# Patient Record
Sex: FEMALE | Race: OTHER | NOT HISPANIC OR LATINO | URBAN - METROPOLITAN AREA
[De-identification: names, ages, dates, MRNs, and addresses within clinical notes are randomized per-mention and may not be internally consistent; named-entity substitution may affect disease eponyms.]

---

## 2022-01-01 ENCOUNTER — OUTPATIENT (OUTPATIENT)
Dept: OUTPATIENT SERVICES | Facility: HOSPITAL | Age: 57
LOS: 1 days | Discharge: HOME | End: 2022-01-01

## 2022-01-01 ENCOUNTER — RESULT REVIEW (OUTPATIENT)
Age: 57
End: 2022-01-01

## 2022-01-01 ENCOUNTER — INPATIENT (INPATIENT)
Facility: HOSPITAL | Age: 57
LOS: 51 days | Discharge: EXPIRED | End: 2022-10-05
Attending: STUDENT IN AN ORGANIZED HEALTH CARE EDUCATION/TRAINING PROGRAM | Admitting: STUDENT IN AN ORGANIZED HEALTH CARE EDUCATION/TRAINING PROGRAM

## 2022-01-01 ENCOUNTER — APPOINTMENT (OUTPATIENT)
Dept: HEMATOLOGY ONCOLOGY | Facility: CLINIC | Age: 57
End: 2022-01-01

## 2022-01-01 ENCOUNTER — TRANSCRIPTION ENCOUNTER (OUTPATIENT)
Age: 57
End: 2022-01-01

## 2022-01-01 ENCOUNTER — APPOINTMENT (OUTPATIENT)
Dept: SURGICAL ONCOLOGY | Facility: HOSPITAL | Age: 57
End: 2022-01-01

## 2022-01-01 VITALS
RESPIRATION RATE: 20 BRPM | DIASTOLIC BLOOD PRESSURE: 50 MMHG | OXYGEN SATURATION: 94 % | TEMPERATURE: 98 F | HEART RATE: 84 BPM | SYSTOLIC BLOOD PRESSURE: 85 MMHG

## 2022-01-01 VITALS
TEMPERATURE: 98 F | OXYGEN SATURATION: 98 % | DIASTOLIC BLOOD PRESSURE: 82 MMHG | RESPIRATION RATE: 18 BRPM | HEART RATE: 97 BPM | WEIGHT: 154.32 LBS | SYSTOLIC BLOOD PRESSURE: 115 MMHG

## 2022-01-01 DIAGNOSIS — Z29.9 ENCOUNTER FOR PROPHYLACTIC MEASURES, UNSPECIFIED: ICD-10-CM

## 2022-01-01 DIAGNOSIS — R10.9 UNSPECIFIED ABDOMINAL PAIN: ICD-10-CM

## 2022-01-01 DIAGNOSIS — Z01.818 ENCOUNTER FOR OTHER PREPROCEDURAL EXAMINATION: ICD-10-CM

## 2022-01-01 DIAGNOSIS — A41.9 SEPSIS, UNSPECIFIED ORGANISM: ICD-10-CM

## 2022-01-01 DIAGNOSIS — C16.9 MALIGNANT NEOPLASM OF STOMACH, UNSPECIFIED: ICD-10-CM

## 2022-01-01 DIAGNOSIS — R11.2 NAUSEA WITH VOMITING, UNSPECIFIED: ICD-10-CM

## 2022-01-01 DIAGNOSIS — D50.9 IRON DEFICIENCY ANEMIA, UNSPECIFIED: ICD-10-CM

## 2022-01-01 DIAGNOSIS — E43 UNSPECIFIED SEVERE PROTEIN-CALORIE MALNUTRITION: ICD-10-CM

## 2022-01-01 DIAGNOSIS — Z51.5 ENCOUNTER FOR PALLIATIVE CARE: ICD-10-CM

## 2022-01-01 DIAGNOSIS — I26.99 OTHER PULMONARY EMBOLISM WITHOUT ACUTE COR PULMONALE: ICD-10-CM

## 2022-01-01 DIAGNOSIS — G89.3 NEOPLASM RELATED PAIN (ACUTE) (CHRONIC): ICD-10-CM

## 2022-01-01 DIAGNOSIS — N17.9 ACUTE KIDNEY FAILURE, UNSPECIFIED: ICD-10-CM

## 2022-01-01 DIAGNOSIS — R60.0 LOCALIZED EDEMA: ICD-10-CM

## 2022-01-01 DIAGNOSIS — Z71.89 OTHER SPECIFIED COUNSELING: ICD-10-CM

## 2022-01-01 DIAGNOSIS — N20.0 CALCULUS OF KIDNEY: ICD-10-CM

## 2022-01-01 DIAGNOSIS — K92.0 HEMATEMESIS: ICD-10-CM

## 2022-01-01 DIAGNOSIS — F41.9 ANXIETY DISORDER, UNSPECIFIED: ICD-10-CM

## 2022-01-01 DIAGNOSIS — R74.01 ELEVATION OF LEVELS OF LIVER TRANSAMINASE LEVELS: ICD-10-CM

## 2022-01-01 DIAGNOSIS — N13.30 UNSPECIFIED HYDRONEPHROSIS: ICD-10-CM

## 2022-01-01 LAB
A1C WITH ESTIMATED AVERAGE GLUCOSE RESULT: 5.8 % — HIGH (ref 4–5.6)
ACE SERPL-CCNC: 23 U/L — SIGNIFICANT CHANGE UP (ref 14–82)
AFP-TM SERPL-MCNC: <1.8 NG/ML — SIGNIFICANT CHANGE UP
ALBUMIN SERPL ELPH-MCNC: 2.5 G/DL — LOW (ref 3.3–5)
ALBUMIN SERPL ELPH-MCNC: 2.5 G/DL — LOW (ref 3.3–5)
ALBUMIN SERPL ELPH-MCNC: 2.6 G/DL — LOW (ref 3.3–5)
ALBUMIN SERPL ELPH-MCNC: 2.7 G/DL — LOW (ref 3.3–5)
ALBUMIN SERPL ELPH-MCNC: 2.8 G/DL — LOW (ref 3.3–5)
ALBUMIN SERPL ELPH-MCNC: 2.9 G/DL — LOW (ref 3.3–5)
ALBUMIN SERPL ELPH-MCNC: 3 G/DL — LOW (ref 3.3–5)
ALBUMIN SERPL ELPH-MCNC: 3.1 G/DL — LOW (ref 3.3–5)
ALBUMIN SERPL ELPH-MCNC: 3.2 G/DL — LOW (ref 3.3–5)
ALBUMIN SERPL ELPH-MCNC: 4.3 G/DL — SIGNIFICANT CHANGE UP (ref 3.3–5)
ALP SERPL-CCNC: 1016 U/L — HIGH (ref 40–120)
ALP SERPL-CCNC: 109 U/L — SIGNIFICANT CHANGE UP (ref 40–120)
ALP SERPL-CCNC: 1111 U/L — HIGH (ref 40–120)
ALP SERPL-CCNC: 1133 U/L — HIGH (ref 40–120)
ALP SERPL-CCNC: 116 U/L — SIGNIFICANT CHANGE UP (ref 40–120)
ALP SERPL-CCNC: 125 U/L — HIGH (ref 40–120)
ALP SERPL-CCNC: 1261 U/L — HIGH (ref 40–120)
ALP SERPL-CCNC: 288 U/L — HIGH (ref 40–120)
ALP SERPL-CCNC: 312 U/L — HIGH (ref 40–120)
ALP SERPL-CCNC: 328 U/L — HIGH (ref 40–120)
ALP SERPL-CCNC: 355 U/L — HIGH (ref 40–120)
ALP SERPL-CCNC: 436 U/L — HIGH (ref 40–120)
ALP SERPL-CCNC: 440 U/L — HIGH (ref 40–120)
ALP SERPL-CCNC: 480 U/L — HIGH (ref 40–120)
ALP SERPL-CCNC: 534 U/L — HIGH (ref 40–120)
ALP SERPL-CCNC: 545 U/L — HIGH (ref 40–120)
ALP SERPL-CCNC: 603 U/L — HIGH (ref 40–120)
ALP SERPL-CCNC: 62 U/L — SIGNIFICANT CHANGE UP (ref 40–120)
ALP SERPL-CCNC: 648 U/L — HIGH (ref 40–120)
ALP SERPL-CCNC: 693 U/L — HIGH (ref 40–120)
ALP SERPL-CCNC: 70 U/L — SIGNIFICANT CHANGE UP (ref 40–120)
ALP SERPL-CCNC: 74 U/L — SIGNIFICANT CHANGE UP (ref 40–120)
ALP SERPL-CCNC: 75 U/L — SIGNIFICANT CHANGE UP (ref 40–120)
ALP SERPL-CCNC: 796 U/L — HIGH (ref 40–120)
ALP SERPL-CCNC: 81 U/L — SIGNIFICANT CHANGE UP (ref 40–120)
ALP SERPL-CCNC: 83 U/L — SIGNIFICANT CHANGE UP (ref 40–120)
ALP SERPL-CCNC: 85 U/L — SIGNIFICANT CHANGE UP (ref 40–120)
ALP SERPL-CCNC: 87 U/L — SIGNIFICANT CHANGE UP (ref 40–120)
ALP SERPL-CCNC: 877 U/L — HIGH (ref 40–120)
ALP SERPL-CCNC: 896 U/L — HIGH (ref 40–120)
ALP SERPL-CCNC: 91 U/L — SIGNIFICANT CHANGE UP (ref 40–120)
ALP SERPL-CCNC: 950 U/L — HIGH (ref 40–120)
ALT FLD-CCNC: 10 U/L — SIGNIFICANT CHANGE UP (ref 4–33)
ALT FLD-CCNC: 110 U/L — HIGH (ref 4–33)
ALT FLD-CCNC: 117 U/L — HIGH (ref 4–33)
ALT FLD-CCNC: 13 U/L — SIGNIFICANT CHANGE UP (ref 4–33)
ALT FLD-CCNC: 139 U/L — HIGH (ref 4–33)
ALT FLD-CCNC: 14 U/L — SIGNIFICANT CHANGE UP (ref 4–33)
ALT FLD-CCNC: 147 U/L — HIGH (ref 4–33)
ALT FLD-CCNC: 154 U/L — HIGH (ref 4–33)
ALT FLD-CCNC: 157 U/L — HIGH (ref 4–33)
ALT FLD-CCNC: 160 U/L — HIGH (ref 4–33)
ALT FLD-CCNC: 168 U/L — HIGH (ref 4–33)
ALT FLD-CCNC: 18 U/L — SIGNIFICANT CHANGE UP (ref 4–33)
ALT FLD-CCNC: 18 U/L — SIGNIFICANT CHANGE UP (ref 4–33)
ALT FLD-CCNC: 183 U/L — HIGH (ref 4–33)
ALT FLD-CCNC: 190 U/L — HIGH (ref 4–33)
ALT FLD-CCNC: 194 U/L — HIGH (ref 4–33)
ALT FLD-CCNC: 201 U/L — HIGH (ref 4–33)
ALT FLD-CCNC: 202 U/L — HIGH (ref 4–33)
ALT FLD-CCNC: 208 U/L — HIGH (ref 4–33)
ALT FLD-CCNC: 21 U/L — SIGNIFICANT CHANGE UP (ref 4–33)
ALT FLD-CCNC: 212 U/L — HIGH (ref 4–33)
ALT FLD-CCNC: 22 U/L — SIGNIFICANT CHANGE UP (ref 4–33)
ALT FLD-CCNC: 22 U/L — SIGNIFICANT CHANGE UP (ref 4–33)
ALT FLD-CCNC: 221 U/L — HIGH (ref 4–33)
ALT FLD-CCNC: 226 U/L — HIGH (ref 4–33)
ALT FLD-CCNC: 242 U/L — HIGH (ref 4–33)
ALT FLD-CCNC: 242 U/L — HIGH (ref 4–33)
ALT FLD-CCNC: 243 U/L — HIGH (ref 4–33)
ALT FLD-CCNC: 26 U/L — SIGNIFICANT CHANGE UP (ref 4–33)
ALT FLD-CCNC: 8 U/L — SIGNIFICANT CHANGE UP (ref 4–33)
ALT FLD-CCNC: 9 U/L — SIGNIFICANT CHANGE UP (ref 4–33)
ALT FLD-CCNC: 9 U/L — SIGNIFICANT CHANGE UP (ref 4–33)
ANA TITR SER: NEGATIVE — SIGNIFICANT CHANGE UP
ANION GAP SERPL CALC-SCNC: 10 MMOL/L — SIGNIFICANT CHANGE UP (ref 7–14)
ANION GAP SERPL CALC-SCNC: 11 MMOL/L — SIGNIFICANT CHANGE UP (ref 7–14)
ANION GAP SERPL CALC-SCNC: 12 MMOL/L — SIGNIFICANT CHANGE UP (ref 7–14)
ANION GAP SERPL CALC-SCNC: 13 MMOL/L — SIGNIFICANT CHANGE UP (ref 7–14)
ANION GAP SERPL CALC-SCNC: 14 MMOL/L — SIGNIFICANT CHANGE UP (ref 7–14)
ANION GAP SERPL CALC-SCNC: 15 MMOL/L — HIGH (ref 7–14)
ANION GAP SERPL CALC-SCNC: 16 MMOL/L — HIGH (ref 7–14)
ANION GAP SERPL CALC-SCNC: 17 MMOL/L — HIGH (ref 7–14)
ANION GAP SERPL CALC-SCNC: 18 MMOL/L — HIGH (ref 7–14)
ANION GAP SERPL CALC-SCNC: 9 MMOL/L — SIGNIFICANT CHANGE UP (ref 7–14)
ANION GAP SERPL CALC-SCNC: 9 MMOL/L — SIGNIFICANT CHANGE UP (ref 7–14)
ANISOCYTOSIS BLD QL: SLIGHT — SIGNIFICANT CHANGE UP
APPEARANCE UR: ABNORMAL
APPEARANCE UR: CLEAR — SIGNIFICANT CHANGE UP
APPEARANCE UR: CLEAR — SIGNIFICANT CHANGE UP
APTT BLD: 25 SEC — LOW (ref 27–36.3)
APTT BLD: 25.4 SEC — LOW (ref 27–36.3)
APTT BLD: 26.7 SEC — LOW (ref 27–36.3)
APTT BLD: 27.4 SEC — SIGNIFICANT CHANGE UP (ref 27–36.3)
APTT BLD: 30.4 SEC — SIGNIFICANT CHANGE UP (ref 27–36.3)
APTT BLD: 30.7 SEC — SIGNIFICANT CHANGE UP (ref 27–36.3)
AST SERPL-CCNC: 115 U/L — HIGH (ref 4–32)
AST SERPL-CCNC: 161 U/L — HIGH (ref 4–32)
AST SERPL-CCNC: 168 U/L — HIGH (ref 4–32)
AST SERPL-CCNC: 173 U/L — HIGH (ref 4–32)
AST SERPL-CCNC: 178 U/L — HIGH (ref 4–32)
AST SERPL-CCNC: 18 U/L — SIGNIFICANT CHANGE UP (ref 4–32)
AST SERPL-CCNC: 180 U/L — HIGH (ref 4–32)
AST SERPL-CCNC: 181 U/L — HIGH (ref 4–32)
AST SERPL-CCNC: 19 U/L — SIGNIFICANT CHANGE UP (ref 4–32)
AST SERPL-CCNC: 19 U/L — SIGNIFICANT CHANGE UP (ref 4–32)
AST SERPL-CCNC: 195 U/L — HIGH (ref 4–32)
AST SERPL-CCNC: 198 U/L — HIGH (ref 4–32)
AST SERPL-CCNC: 208 U/L — HIGH (ref 4–32)
AST SERPL-CCNC: 21 U/L — SIGNIFICANT CHANGE UP (ref 4–32)
AST SERPL-CCNC: 214 U/L — HIGH (ref 4–32)
AST SERPL-CCNC: 23 U/L — SIGNIFICANT CHANGE UP (ref 4–32)
AST SERPL-CCNC: 233 U/L — HIGH (ref 4–32)
AST SERPL-CCNC: 234 U/L — HIGH (ref 4–32)
AST SERPL-CCNC: 242 U/L — HIGH (ref 4–32)
AST SERPL-CCNC: 245 U/L — HIGH (ref 4–32)
AST SERPL-CCNC: 25 U/L — SIGNIFICANT CHANGE UP (ref 4–32)
AST SERPL-CCNC: 26 U/L — SIGNIFICANT CHANGE UP (ref 4–32)
AST SERPL-CCNC: 273 U/L — HIGH (ref 4–32)
AST SERPL-CCNC: 283 U/L — HIGH (ref 4–32)
AST SERPL-CCNC: 29 U/L — SIGNIFICANT CHANGE UP (ref 4–32)
AST SERPL-CCNC: 29 U/L — SIGNIFICANT CHANGE UP (ref 4–32)
AST SERPL-CCNC: 293 U/L — HIGH (ref 4–32)
AST SERPL-CCNC: 294 U/L — HIGH (ref 4–32)
AST SERPL-CCNC: 30 U/L — SIGNIFICANT CHANGE UP (ref 4–32)
AST SERPL-CCNC: 33 U/L — HIGH (ref 4–32)
AST SERPL-CCNC: 36 U/L — HIGH (ref 4–32)
AST SERPL-CCNC: 38 U/L — HIGH (ref 4–32)
AST SERPL-CCNC: 44 U/L — HIGH (ref 4–32)
AST SERPL-CCNC: 93 U/L — HIGH (ref 4–32)
B PERT DNA SPEC QL NAA+PROBE: SIGNIFICANT CHANGE UP
B PERT DNA SPEC QL NAA+PROBE: SIGNIFICANT CHANGE UP
B PERT+PARAPERT DNA PNL SPEC NAA+PROBE: SIGNIFICANT CHANGE UP
B PERT+PARAPERT DNA PNL SPEC NAA+PROBE: SIGNIFICANT CHANGE UP
B-OH-BUTYR SERPL-SCNC: 0.8 MMOL/L — HIGH (ref 0–0.4)
B-OH-BUTYR SERPL-SCNC: 1.1 MMOL/L — HIGH (ref 0–0.4)
BACTERIA # UR AUTO: ABNORMAL
BACTERIA # UR AUTO: NEGATIVE — SIGNIFICANT CHANGE UP
BASE EXCESS BLDV CALC-SCNC: -1.6 MMOL/L — SIGNIFICANT CHANGE UP (ref -2–3)
BASE EXCESS BLDV CALC-SCNC: 0.8 MMOL/L — SIGNIFICANT CHANGE UP (ref -2–3)
BASE EXCESS BLDV CALC-SCNC: 1.6 MMOL/L — SIGNIFICANT CHANGE UP (ref -2–3)
BASOPHILS # BLD AUTO: 0 K/UL — SIGNIFICANT CHANGE UP (ref 0–0.2)
BASOPHILS # BLD AUTO: 0.02 K/UL — SIGNIFICANT CHANGE UP (ref 0–0.2)
BASOPHILS # BLD AUTO: 0.03 K/UL — SIGNIFICANT CHANGE UP (ref 0–0.2)
BASOPHILS # BLD AUTO: 0.04 K/UL — SIGNIFICANT CHANGE UP (ref 0–0.2)
BASOPHILS # BLD AUTO: 0.04 K/UL — SIGNIFICANT CHANGE UP (ref 0–0.2)
BASOPHILS # BLD AUTO: 0.05 K/UL — SIGNIFICANT CHANGE UP (ref 0–0.2)
BASOPHILS # BLD AUTO: 0.05 K/UL — SIGNIFICANT CHANGE UP (ref 0–0.2)
BASOPHILS # BLD AUTO: 0.06 K/UL — SIGNIFICANT CHANGE UP (ref 0–0.2)
BASOPHILS # BLD AUTO: 0.17 K/UL — SIGNIFICANT CHANGE UP (ref 0–0.2)
BASOPHILS NFR BLD AUTO: 0 % — SIGNIFICANT CHANGE UP (ref 0–2)
BASOPHILS NFR BLD AUTO: 0.2 % — SIGNIFICANT CHANGE UP (ref 0–2)
BASOPHILS NFR BLD AUTO: 0.3 % — SIGNIFICANT CHANGE UP (ref 0–2)
BASOPHILS NFR BLD AUTO: 0.4 % — SIGNIFICANT CHANGE UP (ref 0–2)
BASOPHILS NFR BLD AUTO: 0.5 % — SIGNIFICANT CHANGE UP (ref 0–2)
BASOPHILS NFR BLD AUTO: 0.6 % — SIGNIFICANT CHANGE UP (ref 0–2)
BASOPHILS NFR BLD AUTO: 1.7 % — SIGNIFICANT CHANGE UP (ref 0–2)
BILIRUB SERPL-MCNC: 0.2 MG/DL — SIGNIFICANT CHANGE UP (ref 0.2–1.2)
BILIRUB SERPL-MCNC: 0.3 MG/DL — SIGNIFICANT CHANGE UP (ref 0.2–1.2)
BILIRUB SERPL-MCNC: 0.4 MG/DL — SIGNIFICANT CHANGE UP (ref 0.2–1.2)
BILIRUB SERPL-MCNC: 0.5 MG/DL — SIGNIFICANT CHANGE UP (ref 0.2–1.2)
BILIRUB SERPL-MCNC: 0.6 MG/DL — SIGNIFICANT CHANGE UP (ref 0.2–1.2)
BILIRUB SERPL-MCNC: 0.7 MG/DL — SIGNIFICANT CHANGE UP (ref 0.2–1.2)
BILIRUB SERPL-MCNC: 0.7 MG/DL — SIGNIFICANT CHANGE UP (ref 0.2–1.2)
BILIRUB SERPL-MCNC: 1 MG/DL — SIGNIFICANT CHANGE UP (ref 0.2–1.2)
BILIRUB SERPL-MCNC: 1.1 MG/DL — SIGNIFICANT CHANGE UP (ref 0.2–1.2)
BILIRUB SERPL-MCNC: 1.3 MG/DL — HIGH (ref 0.2–1.2)
BILIRUB SERPL-MCNC: 1.4 MG/DL — HIGH (ref 0.2–1.2)
BILIRUB SERPL-MCNC: 1.6 MG/DL — HIGH (ref 0.2–1.2)
BILIRUB SERPL-MCNC: 1.8 MG/DL — HIGH (ref 0.2–1.2)
BILIRUB SERPL-MCNC: 11.6 MG/DL — HIGH (ref 0.2–1.2)
BILIRUB SERPL-MCNC: 13.3 MG/DL — HIGH (ref 0.2–1.2)
BILIRUB SERPL-MCNC: 2.9 MG/DL — HIGH (ref 0.2–1.2)
BILIRUB SERPL-MCNC: 3.9 MG/DL — HIGH (ref 0.2–1.2)
BILIRUB SERPL-MCNC: 5.3 MG/DL — HIGH (ref 0.2–1.2)
BILIRUB SERPL-MCNC: 6.2 MG/DL — HIGH (ref 0.2–1.2)
BILIRUB SERPL-MCNC: 7.1 MG/DL — HIGH (ref 0.2–1.2)
BILIRUB SERPL-MCNC: 7.9 MG/DL — HIGH (ref 0.2–1.2)
BILIRUB SERPL-MCNC: 8.3 MG/DL — HIGH (ref 0.2–1.2)
BILIRUB SERPL-MCNC: 9.3 MG/DL — HIGH (ref 0.2–1.2)
BILIRUB SERPL-MCNC: <0.2 MG/DL — SIGNIFICANT CHANGE UP (ref 0.2–1.2)
BILIRUB UR-MCNC: ABNORMAL
BILIRUB UR-MCNC: NEGATIVE — SIGNIFICANT CHANGE UP
BILIRUB UR-MCNC: NEGATIVE — SIGNIFICANT CHANGE UP
BLD GP AB SCN SERPL QL: NEGATIVE — SIGNIFICANT CHANGE UP
BLOOD GAS VENOUS COMPREHENSIVE RESULT: SIGNIFICANT CHANGE UP
BORDETELLA PARAPERTUSSIS (RAPRVP): SIGNIFICANT CHANGE UP
BORDETELLA PARAPERTUSSIS (RAPRVP): SIGNIFICANT CHANGE UP
BUN SERPL-MCNC: 10 MG/DL — SIGNIFICANT CHANGE UP (ref 7–23)
BUN SERPL-MCNC: 11 MG/DL — SIGNIFICANT CHANGE UP (ref 7–23)
BUN SERPL-MCNC: 12 MG/DL — SIGNIFICANT CHANGE UP (ref 7–23)
BUN SERPL-MCNC: 13 MG/DL — SIGNIFICANT CHANGE UP (ref 7–23)
BUN SERPL-MCNC: 13 MG/DL — SIGNIFICANT CHANGE UP (ref 7–23)
BUN SERPL-MCNC: 14 MG/DL — SIGNIFICANT CHANGE UP (ref 7–23)
BUN SERPL-MCNC: 16 MG/DL — SIGNIFICANT CHANGE UP (ref 7–23)
BUN SERPL-MCNC: 16 MG/DL — SIGNIFICANT CHANGE UP (ref 7–23)
BUN SERPL-MCNC: 17 MG/DL — SIGNIFICANT CHANGE UP (ref 7–23)
BUN SERPL-MCNC: 20 MG/DL — SIGNIFICANT CHANGE UP (ref 7–23)
BUN SERPL-MCNC: 24 MG/DL — HIGH (ref 7–23)
BUN SERPL-MCNC: 26 MG/DL — HIGH (ref 7–23)
BUN SERPL-MCNC: 36 MG/DL — HIGH (ref 7–23)
BUN SERPL-MCNC: 37 MG/DL — HIGH (ref 7–23)
BUN SERPL-MCNC: 40 MG/DL — HIGH (ref 7–23)
BUN SERPL-MCNC: 42 MG/DL — HIGH (ref 7–23)
BUN SERPL-MCNC: 6 MG/DL — LOW (ref 7–23)
BUN SERPL-MCNC: 6 MG/DL — LOW (ref 7–23)
BUN SERPL-MCNC: 8 MG/DL — SIGNIFICANT CHANGE UP (ref 7–23)
BURR CELLS BLD QL SMEAR: PRESENT — SIGNIFICANT CHANGE UP
BURR CELLS BLD QL SMEAR: PRESENT — SIGNIFICANT CHANGE UP
C PNEUM DNA SPEC QL NAA+PROBE: SIGNIFICANT CHANGE UP
C PNEUM DNA SPEC QL NAA+PROBE: SIGNIFICANT CHANGE UP
CALCIUM SERPL-MCNC: 7.8 MG/DL — LOW (ref 8.4–10.5)
CALCIUM SERPL-MCNC: 7.8 MG/DL — LOW (ref 8.4–10.5)
CALCIUM SERPL-MCNC: 7.9 MG/DL — LOW (ref 8.4–10.5)
CALCIUM SERPL-MCNC: 8 MG/DL — LOW (ref 8.4–10.5)
CALCIUM SERPL-MCNC: 8.1 MG/DL — LOW (ref 8.4–10.5)
CALCIUM SERPL-MCNC: 8.2 MG/DL — LOW (ref 8.4–10.5)
CALCIUM SERPL-MCNC: 8.3 MG/DL — LOW (ref 8.4–10.5)
CALCIUM SERPL-MCNC: 8.4 MG/DL — SIGNIFICANT CHANGE UP (ref 8.4–10.5)
CALCIUM SERPL-MCNC: 8.5 MG/DL — SIGNIFICANT CHANGE UP (ref 8.4–10.5)
CALCIUM SERPL-MCNC: 8.6 MG/DL — SIGNIFICANT CHANGE UP (ref 8.4–10.5)
CALCIUM SERPL-MCNC: 8.8 MG/DL — SIGNIFICANT CHANGE UP (ref 8.4–10.5)
CALCIUM SERPL-MCNC: 9 MG/DL — SIGNIFICANT CHANGE UP (ref 8.4–10.5)
CALCIUM SERPL-MCNC: 9.8 MG/DL — SIGNIFICANT CHANGE UP (ref 8.4–10.5)
CANCER AG125 SERPL-ACNC: 47 U/ML — HIGH
CANCER AG19-9 SERPL-ACNC: 24 U/ML — SIGNIFICANT CHANGE UP
CEA SERPL-MCNC: 1544 NG/ML — HIGH (ref 1–3.8)
CHLORIDE BLDV-SCNC: 103 MMOL/L — SIGNIFICANT CHANGE UP (ref 96–108)
CHLORIDE BLDV-SCNC: 105 MMOL/L — SIGNIFICANT CHANGE UP (ref 96–108)
CHLORIDE BLDV-SCNC: 105 MMOL/L — SIGNIFICANT CHANGE UP (ref 96–108)
CHLORIDE SERPL-SCNC: 100 MMOL/L — SIGNIFICANT CHANGE UP (ref 98–107)
CHLORIDE SERPL-SCNC: 101 MMOL/L — SIGNIFICANT CHANGE UP (ref 98–107)
CHLORIDE SERPL-SCNC: 102 MMOL/L — SIGNIFICANT CHANGE UP (ref 98–107)
CHLORIDE SERPL-SCNC: 103 MMOL/L — SIGNIFICANT CHANGE UP (ref 98–107)
CHLORIDE SERPL-SCNC: 104 MMOL/L — SIGNIFICANT CHANGE UP (ref 98–107)
CHLORIDE SERPL-SCNC: 105 MMOL/L — SIGNIFICANT CHANGE UP (ref 98–107)
CHLORIDE SERPL-SCNC: 106 MMOL/L — SIGNIFICANT CHANGE UP (ref 98–107)
CHLORIDE SERPL-SCNC: 106 MMOL/L — SIGNIFICANT CHANGE UP (ref 98–107)
CHLORIDE SERPL-SCNC: 107 MMOL/L — SIGNIFICANT CHANGE UP (ref 98–107)
CHLORIDE SERPL-SCNC: 108 MMOL/L — HIGH (ref 98–107)
CHLORIDE SERPL-SCNC: 109 MMOL/L — HIGH (ref 98–107)
CHLORIDE SERPL-SCNC: 110 MMOL/L — HIGH (ref 98–107)
CHLORIDE SERPL-SCNC: 111 MMOL/L — HIGH (ref 98–107)
CHLORIDE SERPL-SCNC: 114 MMOL/L — HIGH (ref 98–107)
CHLORIDE SERPL-SCNC: 116 MMOL/L — HIGH (ref 98–107)
CHLORIDE SERPL-SCNC: 117 MMOL/L — HIGH (ref 98–107)
CHLORIDE SERPL-SCNC: 98 MMOL/L — SIGNIFICANT CHANGE UP (ref 98–107)
CHLORIDE SERPL-SCNC: 99 MMOL/L — SIGNIFICANT CHANGE UP (ref 98–107)
CMV DNA CSF QL NAA+PROBE: SIGNIFICANT CHANGE UP
CMV DNA SPEC NAA+PROBE-LOG#: SIGNIFICANT CHANGE UP LOG10IU/ML
CO2 BLDV-SCNC: 23.9 MMOL/L — SIGNIFICANT CHANGE UP (ref 22–26)
CO2 BLDV-SCNC: 26.4 MMOL/L — HIGH (ref 22–26)
CO2 BLDV-SCNC: 27 MMOL/L — HIGH (ref 22–26)
CO2 SERPL-SCNC: 15 MMOL/L — LOW (ref 22–31)
CO2 SERPL-SCNC: 17 MMOL/L — LOW (ref 22–31)
CO2 SERPL-SCNC: 17 MMOL/L — LOW (ref 22–31)
CO2 SERPL-SCNC: 18 MMOL/L — LOW (ref 22–31)
CO2 SERPL-SCNC: 18 MMOL/L — LOW (ref 22–31)
CO2 SERPL-SCNC: 19 MMOL/L — LOW (ref 22–31)
CO2 SERPL-SCNC: 20 MMOL/L — LOW (ref 22–31)
CO2 SERPL-SCNC: 21 MMOL/L — LOW (ref 22–31)
CO2 SERPL-SCNC: 22 MMOL/L — SIGNIFICANT CHANGE UP (ref 22–31)
CO2 SERPL-SCNC: 23 MMOL/L — SIGNIFICANT CHANGE UP (ref 22–31)
CO2 SERPL-SCNC: 24 MMOL/L — SIGNIFICANT CHANGE UP (ref 22–31)
CO2 SERPL-SCNC: 25 MMOL/L — SIGNIFICANT CHANGE UP (ref 22–31)
CO2 SERPL-SCNC: 26 MMOL/L — SIGNIFICANT CHANGE UP (ref 22–31)
COLOR SPEC: ABNORMAL
COLOR SPEC: YELLOW — SIGNIFICANT CHANGE UP
COLOR SPEC: YELLOW — SIGNIFICANT CHANGE UP
CREAT SERPL-MCNC: 0.43 MG/DL — LOW (ref 0.5–1.3)
CREAT SERPL-MCNC: 0.45 MG/DL — LOW (ref 0.5–1.3)
CREAT SERPL-MCNC: 0.46 MG/DL — LOW (ref 0.5–1.3)
CREAT SERPL-MCNC: 0.47 MG/DL — LOW (ref 0.5–1.3)
CREAT SERPL-MCNC: 0.48 MG/DL — LOW (ref 0.5–1.3)
CREAT SERPL-MCNC: 0.52 MG/DL — SIGNIFICANT CHANGE UP (ref 0.5–1.3)
CREAT SERPL-MCNC: 0.53 MG/DL — SIGNIFICANT CHANGE UP (ref 0.5–1.3)
CREAT SERPL-MCNC: 0.53 MG/DL — SIGNIFICANT CHANGE UP (ref 0.5–1.3)
CREAT SERPL-MCNC: 0.55 MG/DL — SIGNIFICANT CHANGE UP (ref 0.5–1.3)
CREAT SERPL-MCNC: 0.58 MG/DL — SIGNIFICANT CHANGE UP (ref 0.5–1.3)
CREAT SERPL-MCNC: 0.59 MG/DL — SIGNIFICANT CHANGE UP (ref 0.5–1.3)
CREAT SERPL-MCNC: 0.62 MG/DL — SIGNIFICANT CHANGE UP (ref 0.5–1.3)
CREAT SERPL-MCNC: 0.63 MG/DL — SIGNIFICANT CHANGE UP (ref 0.5–1.3)
CREAT SERPL-MCNC: 0.64 MG/DL — SIGNIFICANT CHANGE UP (ref 0.5–1.3)
CREAT SERPL-MCNC: 0.65 MG/DL — SIGNIFICANT CHANGE UP (ref 0.5–1.3)
CREAT SERPL-MCNC: 0.65 MG/DL — SIGNIFICANT CHANGE UP (ref 0.5–1.3)
CREAT SERPL-MCNC: 0.66 MG/DL — SIGNIFICANT CHANGE UP (ref 0.5–1.3)
CREAT SERPL-MCNC: 0.67 MG/DL — SIGNIFICANT CHANGE UP (ref 0.5–1.3)
CREAT SERPL-MCNC: 0.68 MG/DL — SIGNIFICANT CHANGE UP (ref 0.5–1.3)
CREAT SERPL-MCNC: 0.69 MG/DL — SIGNIFICANT CHANGE UP (ref 0.5–1.3)
CREAT SERPL-MCNC: 0.71 MG/DL — SIGNIFICANT CHANGE UP (ref 0.5–1.3)
CREAT SERPL-MCNC: 0.72 MG/DL — SIGNIFICANT CHANGE UP (ref 0.5–1.3)
CREAT SERPL-MCNC: 0.78 MG/DL — SIGNIFICANT CHANGE UP (ref 0.5–1.3)
CREAT SERPL-MCNC: 0.85 MG/DL — SIGNIFICANT CHANGE UP (ref 0.5–1.3)
CREAT SERPL-MCNC: 0.93 MG/DL — SIGNIFICANT CHANGE UP (ref 0.5–1.3)
CREAT SERPL-MCNC: 0.96 MG/DL — SIGNIFICANT CHANGE UP (ref 0.5–1.3)
CREAT SERPL-MCNC: 1.03 MG/DL — SIGNIFICANT CHANGE UP (ref 0.5–1.3)
CREAT SERPL-MCNC: 1.42 MG/DL — HIGH (ref 0.5–1.3)
CREAT SERPL-MCNC: 1.53 MG/DL — HIGH (ref 0.5–1.3)
CREAT SERPL-MCNC: 2.14 MG/DL — HIGH (ref 0.5–1.3)
CREAT SERPL-MCNC: 3.13 MG/DL — HIGH (ref 0.5–1.3)
CULTURE RESULTS: SIGNIFICANT CHANGE UP
DIFF PNL FLD: ABNORMAL
DIFF PNL FLD: NEGATIVE — SIGNIFICANT CHANGE UP
DIFF PNL FLD: NEGATIVE — SIGNIFICANT CHANGE UP
EBV EA AB SER IA-ACNC: 122 U/ML — HIGH
EBV EA AB TITR SER IF: POSITIVE
EBV EA IGG SER-ACNC: POSITIVE
EBV NA IGG SER IA-ACNC: 27 U/ML — HIGH
EBV PATRN SPEC IB-IMP: SIGNIFICANT CHANGE UP
EBV VCA IGG AVIDITY SER QL IA: POSITIVE
EBV VCA IGM SER IA-ACNC: <10 U/ML — SIGNIFICANT CHANGE UP
EBV VCA IGM SER IA-ACNC: >750 U/ML — HIGH
EBV VCA IGM TITR FLD: NEGATIVE — SIGNIFICANT CHANGE UP
EGFR: 100 ML/MIN/1.73M2 — SIGNIFICANT CHANGE UP
EGFR: 102 ML/MIN/1.73M2 — SIGNIFICANT CHANGE UP
EGFR: 102 ML/MIN/1.73M2 — SIGNIFICANT CHANGE UP
EGFR: 103 ML/MIN/1.73M2 — SIGNIFICANT CHANGE UP
EGFR: 104 ML/MIN/1.73M2 — SIGNIFICANT CHANGE UP
EGFR: 106 ML/MIN/1.73M2 — SIGNIFICANT CHANGE UP
EGFR: 106 ML/MIN/1.73M2 — SIGNIFICANT CHANGE UP
EGFR: 108 ML/MIN/1.73M2 — SIGNIFICANT CHANGE UP
EGFR: 109 ML/MIN/1.73M2 — SIGNIFICANT CHANGE UP
EGFR: 111 ML/MIN/1.73M2 — SIGNIFICANT CHANGE UP
EGFR: 112 ML/MIN/1.73M2 — SIGNIFICANT CHANGE UP
EGFR: 112 ML/MIN/1.73M2 — SIGNIFICANT CHANGE UP
EGFR: 113 ML/MIN/1.73M2 — SIGNIFICANT CHANGE UP
EGFR: 114 ML/MIN/1.73M2 — SIGNIFICANT CHANGE UP
EGFR: 17 ML/MIN/1.73M2 — LOW
EGFR: 27 ML/MIN/1.73M2 — LOW
EGFR: 40 ML/MIN/1.73M2 — LOW
EGFR: 43 ML/MIN/1.73M2 — LOW
EGFR: 64 ML/MIN/1.73M2 — SIGNIFICANT CHANGE UP
EGFR: 69 ML/MIN/1.73M2 — SIGNIFICANT CHANGE UP
EGFR: 72 ML/MIN/1.73M2 — SIGNIFICANT CHANGE UP
EGFR: 80 ML/MIN/1.73M2 — SIGNIFICANT CHANGE UP
EGFR: 89 ML/MIN/1.73M2 — SIGNIFICANT CHANGE UP
EGFR: 98 ML/MIN/1.73M2 — SIGNIFICANT CHANGE UP
ELLIPTOCYTES BLD QL SMEAR: SLIGHT — SIGNIFICANT CHANGE UP
EOSINOPHIL # BLD AUTO: 0.05 K/UL — SIGNIFICANT CHANGE UP (ref 0–0.5)
EOSINOPHIL # BLD AUTO: 0.18 K/UL — SIGNIFICANT CHANGE UP (ref 0–0.5)
EOSINOPHIL # BLD AUTO: 0.18 K/UL — SIGNIFICANT CHANGE UP (ref 0–0.5)
EOSINOPHIL # BLD AUTO: 0.19 K/UL — SIGNIFICANT CHANGE UP (ref 0–0.5)
EOSINOPHIL # BLD AUTO: 0.26 K/UL — SIGNIFICANT CHANGE UP (ref 0–0.5)
EOSINOPHIL # BLD AUTO: 0.3 K/UL — SIGNIFICANT CHANGE UP (ref 0–0.5)
EOSINOPHIL # BLD AUTO: 0.35 K/UL — SIGNIFICANT CHANGE UP (ref 0–0.5)
EOSINOPHIL # BLD AUTO: 0.37 K/UL — SIGNIFICANT CHANGE UP (ref 0–0.5)
EOSINOPHIL # BLD AUTO: 0.41 K/UL — SIGNIFICANT CHANGE UP (ref 0–0.5)
EOSINOPHIL # BLD AUTO: 0.41 K/UL — SIGNIFICANT CHANGE UP (ref 0–0.5)
EOSINOPHIL # BLD AUTO: 0.42 K/UL — SIGNIFICANT CHANGE UP (ref 0–0.5)
EOSINOPHIL # BLD AUTO: 0.42 K/UL — SIGNIFICANT CHANGE UP (ref 0–0.5)
EOSINOPHIL # BLD AUTO: 0.51 K/UL — HIGH (ref 0–0.5)
EOSINOPHIL # BLD AUTO: 0.52 K/UL — HIGH (ref 0–0.5)
EOSINOPHIL # BLD AUTO: 0.8 K/UL — HIGH (ref 0–0.5)
EOSINOPHIL NFR BLD AUTO: 0.3 % — SIGNIFICANT CHANGE UP (ref 0–6)
EOSINOPHIL NFR BLD AUTO: 1.7 % — SIGNIFICANT CHANGE UP (ref 0–6)
EOSINOPHIL NFR BLD AUTO: 1.8 % — SIGNIFICANT CHANGE UP (ref 0–6)
EOSINOPHIL NFR BLD AUTO: 1.8 % — SIGNIFICANT CHANGE UP (ref 0–6)
EOSINOPHIL NFR BLD AUTO: 3.5 % — SIGNIFICANT CHANGE UP (ref 0–6)
EOSINOPHIL NFR BLD AUTO: 4.3 % — SIGNIFICANT CHANGE UP (ref 0–6)
EOSINOPHIL NFR BLD AUTO: 4.4 % — SIGNIFICANT CHANGE UP (ref 0–6)
EOSINOPHIL NFR BLD AUTO: 4.4 % — SIGNIFICANT CHANGE UP (ref 0–6)
EOSINOPHIL NFR BLD AUTO: 4.8 % — SIGNIFICANT CHANGE UP (ref 0–6)
EOSINOPHIL NFR BLD AUTO: 4.9 % — SIGNIFICANT CHANGE UP (ref 0–6)
EOSINOPHIL NFR BLD AUTO: 5.8 % — SIGNIFICANT CHANGE UP (ref 0–6)
EOSINOPHIL NFR BLD AUTO: 6.4 % — HIGH (ref 0–6)
EOSINOPHIL NFR BLD AUTO: 7.2 % — HIGH (ref 0–6)
EPI CELLS # UR: 0 /HPF — SIGNIFICANT CHANGE UP (ref 0–5)
EPI CELLS # UR: 1 /HPF — SIGNIFICANT CHANGE UP (ref 0–5)
ESTIMATED AVERAGE GLUCOSE: 120 — SIGNIFICANT CHANGE UP
FERRITIN SERPL-MCNC: 99 NG/ML — SIGNIFICANT CHANGE UP (ref 15–150)
FLUAV SUBTYP SPEC NAA+PROBE: SIGNIFICANT CHANGE UP
FLUAV SUBTYP SPEC NAA+PROBE: SIGNIFICANT CHANGE UP
FLUBV RNA SPEC QL NAA+PROBE: SIGNIFICANT CHANGE UP
FLUBV RNA SPEC QL NAA+PROBE: SIGNIFICANT CHANGE UP
GAMMA INTERFERON BACKGROUND BLD IA-ACNC: 0.04 IU/ML — SIGNIFICANT CHANGE UP
GAS PNL BLDV: 134 MMOL/L — LOW (ref 136–145)
GAS PNL BLDV: 136 MMOL/L — SIGNIFICANT CHANGE UP (ref 136–145)
GAS PNL BLDV: 136 MMOL/L — SIGNIFICANT CHANGE UP (ref 136–145)
GAS PNL BLDV: SIGNIFICANT CHANGE UP
GGT SERPL-CCNC: 531 U/L — HIGH (ref 5–36)
GIANT PLATELETS BLD QL SMEAR: PRESENT — SIGNIFICANT CHANGE UP
GIANT PLATELETS BLD QL SMEAR: PRESENT — SIGNIFICANT CHANGE UP
GLUCOSE BLDC GLUCOMTR-MCNC: 128 MG/DL — HIGH (ref 70–99)
GLUCOSE BLDV-MCNC: 110 MG/DL — HIGH (ref 70–99)
GLUCOSE BLDV-MCNC: 125 MG/DL — HIGH (ref 70–99)
GLUCOSE BLDV-MCNC: 134 MG/DL — HIGH (ref 70–99)
GLUCOSE SERPL-MCNC: 103 MG/DL — HIGH (ref 70–99)
GLUCOSE SERPL-MCNC: 104 MG/DL — HIGH (ref 70–99)
GLUCOSE SERPL-MCNC: 105 MG/DL — HIGH (ref 70–99)
GLUCOSE SERPL-MCNC: 105 MG/DL — HIGH (ref 70–99)
GLUCOSE SERPL-MCNC: 106 MG/DL — HIGH (ref 70–99)
GLUCOSE SERPL-MCNC: 107 MG/DL — HIGH (ref 70–99)
GLUCOSE SERPL-MCNC: 110 MG/DL — HIGH (ref 70–99)
GLUCOSE SERPL-MCNC: 112 MG/DL — HIGH (ref 70–99)
GLUCOSE SERPL-MCNC: 113 MG/DL — HIGH (ref 70–99)
GLUCOSE SERPL-MCNC: 114 MG/DL — HIGH (ref 70–99)
GLUCOSE SERPL-MCNC: 115 MG/DL — HIGH (ref 70–99)
GLUCOSE SERPL-MCNC: 117 MG/DL — HIGH (ref 70–99)
GLUCOSE SERPL-MCNC: 118 MG/DL — HIGH (ref 70–99)
GLUCOSE SERPL-MCNC: 121 MG/DL — HIGH (ref 70–99)
GLUCOSE SERPL-MCNC: 122 MG/DL — HIGH (ref 70–99)
GLUCOSE SERPL-MCNC: 122 MG/DL — HIGH (ref 70–99)
GLUCOSE SERPL-MCNC: 123 MG/DL — HIGH (ref 70–99)
GLUCOSE SERPL-MCNC: 125 MG/DL — HIGH (ref 70–99)
GLUCOSE SERPL-MCNC: 128 MG/DL — HIGH (ref 70–99)
GLUCOSE SERPL-MCNC: 128 MG/DL — HIGH (ref 70–99)
GLUCOSE SERPL-MCNC: 129 MG/DL — HIGH (ref 70–99)
GLUCOSE SERPL-MCNC: 130 MG/DL — HIGH (ref 70–99)
GLUCOSE SERPL-MCNC: 130 MG/DL — HIGH (ref 70–99)
GLUCOSE SERPL-MCNC: 132 MG/DL — HIGH (ref 70–99)
GLUCOSE SERPL-MCNC: 133 MG/DL — HIGH (ref 70–99)
GLUCOSE SERPL-MCNC: 135 MG/DL — HIGH (ref 70–99)
GLUCOSE SERPL-MCNC: 139 MG/DL — HIGH (ref 70–99)
GLUCOSE SERPL-MCNC: 141 MG/DL — HIGH (ref 70–99)
GLUCOSE SERPL-MCNC: 141 MG/DL — HIGH (ref 70–99)
GLUCOSE SERPL-MCNC: 143 MG/DL — HIGH (ref 70–99)
GLUCOSE SERPL-MCNC: 148 MG/DL — HIGH (ref 70–99)
GLUCOSE SERPL-MCNC: 158 MG/DL — HIGH (ref 70–99)
GLUCOSE SERPL-MCNC: 160 MG/DL — HIGH (ref 70–99)
GLUCOSE SERPL-MCNC: 76 MG/DL — SIGNIFICANT CHANGE UP (ref 70–99)
GLUCOSE SERPL-MCNC: 86 MG/DL — SIGNIFICANT CHANGE UP (ref 70–99)
GLUCOSE SERPL-MCNC: 89 MG/DL — SIGNIFICANT CHANGE UP (ref 70–99)
GLUCOSE SERPL-MCNC: 91 MG/DL — SIGNIFICANT CHANGE UP (ref 70–99)
GLUCOSE SERPL-MCNC: 93 MG/DL — SIGNIFICANT CHANGE UP (ref 70–99)
GLUCOSE SERPL-MCNC: 95 MG/DL — SIGNIFICANT CHANGE UP (ref 70–99)
GLUCOSE SERPL-MCNC: 96 MG/DL — SIGNIFICANT CHANGE UP (ref 70–99)
GLUCOSE SERPL-MCNC: 98 MG/DL — SIGNIFICANT CHANGE UP (ref 70–99)
GLUCOSE SERPL-MCNC: 99 MG/DL — SIGNIFICANT CHANGE UP (ref 70–99)
GLUCOSE UR QL: NEGATIVE — SIGNIFICANT CHANGE UP
HADV DNA SPEC QL NAA+PROBE: SIGNIFICANT CHANGE UP
HADV DNA SPEC QL NAA+PROBE: SIGNIFICANT CHANGE UP
HAV IGG SER QL IA: REACTIVE
HAV IGM SER-ACNC: SIGNIFICANT CHANGE UP
HAV IGM SER-ACNC: SIGNIFICANT CHANGE UP
HBV CORE IGM SER-ACNC: SIGNIFICANT CHANGE UP
HBV SURFACE AB SER-ACNC: <3 MIU/ML — LOW
HBV SURFACE AB SER-ACNC: SIGNIFICANT CHANGE UP
HBV SURFACE AG SER-ACNC: SIGNIFICANT CHANGE UP
HBV SURFACE AG SER-ACNC: SIGNIFICANT CHANGE UP
HCG SERPL-ACNC: 1285 MIU/ML — SIGNIFICANT CHANGE UP
HCO3 BLDV-SCNC: 23 MMOL/L — SIGNIFICANT CHANGE UP (ref 22–29)
HCO3 BLDV-SCNC: 25 MMOL/L — SIGNIFICANT CHANGE UP (ref 22–29)
HCO3 BLDV-SCNC: 26 MMOL/L — SIGNIFICANT CHANGE UP (ref 22–29)
HCOV 229E RNA SPEC QL NAA+PROBE: SIGNIFICANT CHANGE UP
HCOV 229E RNA SPEC QL NAA+PROBE: SIGNIFICANT CHANGE UP
HCOV HKU1 RNA SPEC QL NAA+PROBE: SIGNIFICANT CHANGE UP
HCOV HKU1 RNA SPEC QL NAA+PROBE: SIGNIFICANT CHANGE UP
HCOV NL63 RNA SPEC QL NAA+PROBE: SIGNIFICANT CHANGE UP
HCOV NL63 RNA SPEC QL NAA+PROBE: SIGNIFICANT CHANGE UP
HCOV OC43 RNA SPEC QL NAA+PROBE: SIGNIFICANT CHANGE UP
HCOV OC43 RNA SPEC QL NAA+PROBE: SIGNIFICANT CHANGE UP
HCT VFR BLD CALC: 29.2 % — LOW (ref 34.5–45)
HCT VFR BLD CALC: 29.9 % — LOW (ref 34.5–45)
HCT VFR BLD CALC: 29.9 % — LOW (ref 34.5–45)
HCT VFR BLD CALC: 30 % — LOW (ref 34.5–45)
HCT VFR BLD CALC: 30 % — LOW (ref 34.5–45)
HCT VFR BLD CALC: 30.5 % — LOW (ref 34.5–45)
HCT VFR BLD CALC: 30.7 % — LOW (ref 34.5–45)
HCT VFR BLD CALC: 30.8 % — LOW (ref 34.5–45)
HCT VFR BLD CALC: 31 % — LOW (ref 34.5–45)
HCT VFR BLD CALC: 31.2 % — LOW (ref 34.5–45)
HCT VFR BLD CALC: 31.2 % — LOW (ref 34.5–45)
HCT VFR BLD CALC: 31.7 % — LOW (ref 34.5–45)
HCT VFR BLD CALC: 31.8 % — LOW (ref 34.5–45)
HCT VFR BLD CALC: 31.9 % — LOW (ref 34.5–45)
HCT VFR BLD CALC: 32 % — LOW (ref 34.5–45)
HCT VFR BLD CALC: 32.2 % — LOW (ref 34.5–45)
HCT VFR BLD CALC: 32.2 % — LOW (ref 34.5–45)
HCT VFR BLD CALC: 32.3 % — LOW (ref 34.5–45)
HCT VFR BLD CALC: 32.7 % — LOW (ref 34.5–45)
HCT VFR BLD CALC: 32.7 % — LOW (ref 34.5–45)
HCT VFR BLD CALC: 32.8 % — LOW (ref 34.5–45)
HCT VFR BLD CALC: 32.8 % — LOW (ref 34.5–45)
HCT VFR BLD CALC: 32.9 % — LOW (ref 34.5–45)
HCT VFR BLD CALC: 33.3 % — LOW (ref 34.5–45)
HCT VFR BLD CALC: 33.5 % — LOW (ref 34.5–45)
HCT VFR BLD CALC: 33.5 % — LOW (ref 34.5–45)
HCT VFR BLD CALC: 33.6 % — LOW (ref 34.5–45)
HCT VFR BLD CALC: 33.8 % — LOW (ref 34.5–45)
HCT VFR BLD CALC: 33.8 % — LOW (ref 34.5–45)
HCT VFR BLD CALC: 34 % — LOW (ref 34.5–45)
HCT VFR BLD CALC: 34.1 % — LOW (ref 34.5–45)
HCT VFR BLD CALC: 34.3 % — LOW (ref 34.5–45)
HCT VFR BLD CALC: 34.8 % — SIGNIFICANT CHANGE UP (ref 34.5–45)
HCT VFR BLD CALC: 35.3 % — SIGNIFICANT CHANGE UP (ref 34.5–45)
HCT VFR BLD CALC: 35.4 % — SIGNIFICANT CHANGE UP (ref 34.5–45)
HCT VFR BLD CALC: 36.8 % — SIGNIFICANT CHANGE UP (ref 34.5–45)
HCT VFR BLDA CALC: 22 % — LOW (ref 34.5–46.5)
HCT VFR BLDA CALC: 28 % — LOW (ref 34.5–46.5)
HCT VFR BLDA CALC: 33 % — LOW (ref 34.5–46.5)
HCV AB S/CO SERPL IA: 0.13 S/CO — SIGNIFICANT CHANGE UP (ref 0–0.99)
HCV AB S/CO SERPL IA: 0.15 S/CO — SIGNIFICANT CHANGE UP (ref 0–0.99)
HCV AB SERPL-IMP: SIGNIFICANT CHANGE UP
HCV AB SERPL-IMP: SIGNIFICANT CHANGE UP
HEV IGM SER QL: SIGNIFICANT CHANGE UP
HGB BLD CALC-MCNC: 11 G/DL — LOW (ref 11.5–15.5)
HGB BLD CALC-MCNC: 7.3 G/DL — LOW (ref 11.5–15.5)
HGB BLD CALC-MCNC: 9.3 G/DL — LOW (ref 11.5–15.5)
HGB BLD-MCNC: 10 G/DL — LOW (ref 11.5–15.5)
HGB BLD-MCNC: 10.1 G/DL — LOW (ref 11.5–15.5)
HGB BLD-MCNC: 10.2 G/DL — LOW (ref 11.5–15.5)
HGB BLD-MCNC: 10.3 G/DL — LOW (ref 11.5–15.5)
HGB BLD-MCNC: 10.4 G/DL — LOW (ref 11.5–15.5)
HGB BLD-MCNC: 10.5 G/DL — LOW (ref 11.5–15.5)
HGB BLD-MCNC: 10.6 G/DL — LOW (ref 11.5–15.5)
HGB BLD-MCNC: 10.6 G/DL — LOW (ref 11.5–15.5)
HGB BLD-MCNC: 10.8 G/DL — LOW (ref 11.5–15.5)
HGB BLD-MCNC: 11 G/DL — LOW (ref 11.5–15.5)
HGB BLD-MCNC: 11.4 G/DL — LOW (ref 11.5–15.5)
HGB BLD-MCNC: 8.9 G/DL — LOW (ref 11.5–15.5)
HGB BLD-MCNC: 9 G/DL — LOW (ref 11.5–15.5)
HGB BLD-MCNC: 9.3 G/DL — LOW (ref 11.5–15.5)
HGB BLD-MCNC: 9.3 G/DL — LOW (ref 11.5–15.5)
HGB BLD-MCNC: 9.4 G/DL — LOW (ref 11.5–15.5)
HGB BLD-MCNC: 9.5 G/DL — LOW (ref 11.5–15.5)
HGB BLD-MCNC: 9.5 G/DL — LOW (ref 11.5–15.5)
HGB BLD-MCNC: 9.6 G/DL — LOW (ref 11.5–15.5)
HGB BLD-MCNC: 9.6 G/DL — LOW (ref 11.5–15.5)
HGB BLD-MCNC: 9.7 G/DL — LOW (ref 11.5–15.5)
HGB BLD-MCNC: 9.7 G/DL — LOW (ref 11.5–15.5)
HGB BLD-MCNC: 9.8 G/DL — LOW (ref 11.5–15.5)
HGB BLD-MCNC: 9.9 G/DL — LOW (ref 11.5–15.5)
HMPV RNA SPEC QL NAA+PROBE: SIGNIFICANT CHANGE UP
HMPV RNA SPEC QL NAA+PROBE: SIGNIFICANT CHANGE UP
HPIV1 RNA SPEC QL NAA+PROBE: SIGNIFICANT CHANGE UP
HPIV1 RNA SPEC QL NAA+PROBE: SIGNIFICANT CHANGE UP
HPIV2 RNA SPEC QL NAA+PROBE: SIGNIFICANT CHANGE UP
HPIV2 RNA SPEC QL NAA+PROBE: SIGNIFICANT CHANGE UP
HPIV3 RNA SPEC QL NAA+PROBE: SIGNIFICANT CHANGE UP
HPIV3 RNA SPEC QL NAA+PROBE: SIGNIFICANT CHANGE UP
HPIV4 RNA SPEC QL NAA+PROBE: SIGNIFICANT CHANGE UP
HPIV4 RNA SPEC QL NAA+PROBE: SIGNIFICANT CHANGE UP
HSV DNA1: SIGNIFICANT CHANGE UP
HSV DNA2: SIGNIFICANT CHANGE UP
HSV1 DNA BLD QL NAA+PROBE: SIGNIFICANT CHANGE UP
HSV2 DNA BLD QL NAA+PROBE: SIGNIFICANT CHANGE UP
HYALINE CASTS # UR AUTO: 0 /LPF — SIGNIFICANT CHANGE UP (ref 0–7)
HYALINE CASTS # UR AUTO: 0 /LPF — SIGNIFICANT CHANGE UP (ref 0–7)
IANC: 13.09 K/UL — HIGH (ref 1.8–7.4)
IANC: 3.79 K/UL — SIGNIFICANT CHANGE UP (ref 1.8–7.4)
IANC: 4.06 K/UL — SIGNIFICANT CHANGE UP (ref 1.8–7.4)
IANC: 4.51 K/UL — SIGNIFICANT CHANGE UP (ref 1.8–7.4)
IANC: 5.04 K/UL — SIGNIFICANT CHANGE UP (ref 1.8–7.4)
IANC: 5.14 K/UL — SIGNIFICANT CHANGE UP (ref 1.8–7.4)
IANC: 5.29 K/UL — SIGNIFICANT CHANGE UP (ref 1.8–7.4)
IANC: 5.86 K/UL — SIGNIFICANT CHANGE UP (ref 1.8–7.4)
IANC: 5.92 K/UL — SIGNIFICANT CHANGE UP (ref 1.8–7.4)
IANC: 6.03 K/UL — SIGNIFICANT CHANGE UP (ref 1.8–7.4)
IANC: 7.28 K/UL — SIGNIFICANT CHANGE UP (ref 1.8–7.4)
IANC: 7.78 K/UL — HIGH (ref 1.8–7.4)
IANC: 8.22 K/UL — HIGH (ref 1.8–7.4)
IANC: 8.73 K/UL — HIGH (ref 1.8–7.4)
IANC: 9.56 K/UL — HIGH (ref 1.8–7.4)
IF BLOCK AB SER-ACNC: 0.9 AU/ML — SIGNIFICANT CHANGE UP (ref 0–1.1)
IGA FLD-MCNC: 493 MG/DL — HIGH (ref 70–400)
IGG FLD-MCNC: 1616 MG/DL — HIGH (ref 700–1600)
IGG SERPL-MCNC: 1770 MG/DL — HIGH (ref 586–1602)
IGG1 SER-MCNC: 947 MG/DL — HIGH (ref 248–810)
IGG2 SER-MCNC: 467 MG/DL — SIGNIFICANT CHANGE UP (ref 130–555)
IGG3 SER-MCNC: 80 MG/DL — SIGNIFICANT CHANGE UP (ref 15–102)
IGG4 SER-MCNC: 63 MG/DL — SIGNIFICANT CHANGE UP (ref 2–96)
IGM SERPL-MCNC: 118 MG/DL — SIGNIFICANT CHANGE UP (ref 40–230)
IMM GRANULOCYTES NFR BLD AUTO: 0.3 % — SIGNIFICANT CHANGE UP (ref 0–1.5)
IMM GRANULOCYTES NFR BLD AUTO: 0.3 % — SIGNIFICANT CHANGE UP (ref 0–1.5)
IMM GRANULOCYTES NFR BLD AUTO: 0.5 % — SIGNIFICANT CHANGE UP (ref 0–1.5)
IMM GRANULOCYTES NFR BLD AUTO: 0.6 % — SIGNIFICANT CHANGE UP (ref 0–1.5)
IMM GRANULOCYTES NFR BLD AUTO: 0.8 % — SIGNIFICANT CHANGE UP (ref 0–1.5)
IMM GRANULOCYTES NFR BLD AUTO: 0.9 % — SIGNIFICANT CHANGE UP (ref 0–1.5)
IMM GRANULOCYTES NFR BLD AUTO: 1.1 % — SIGNIFICANT CHANGE UP (ref 0–1.5)
IMM GRANULOCYTES NFR BLD AUTO: 1.3 % — SIGNIFICANT CHANGE UP (ref 0–1.5)
IMM GRANULOCYTES NFR BLD AUTO: 1.4 % — SIGNIFICANT CHANGE UP (ref 0–1.5)
IMM GRANULOCYTES NFR BLD AUTO: 1.6 % — HIGH (ref 0–1.5)
IMM GRANULOCYTES NFR BLD AUTO: 1.7 % — HIGH (ref 0–0.9)
IMM GRANULOCYTES NFR BLD AUTO: 1.7 % — HIGH (ref 0–1.5)
IMM GRANULOCYTES NFR BLD AUTO: 2 % — HIGH (ref 0–0.9)
INR BLD: 1.03 RATIO — SIGNIFICANT CHANGE UP (ref 0.88–1.16)
INR BLD: 1.04 RATIO — SIGNIFICANT CHANGE UP (ref 0.88–1.16)
INR BLD: 1.04 RATIO — SIGNIFICANT CHANGE UP (ref 0.88–1.16)
INR BLD: 1.1 RATIO — SIGNIFICANT CHANGE UP (ref 0.88–1.16)
INR BLD: 1.15 RATIO — SIGNIFICANT CHANGE UP (ref 0.88–1.16)
INR BLD: 1.22 RATIO — HIGH (ref 0.88–1.16)
INR BLD: 1.23 RATIO — HIGH (ref 0.88–1.16)
INR BLD: 1.23 RATIO — HIGH (ref 0.88–1.16)
INR BLD: 1.26 RATIO — HIGH (ref 0.88–1.16)
IRON SATN MFR SERPL: 13 % — LOW (ref 14–50)
IRON SATN MFR SERPL: 22 UG/DL — LOW (ref 30–160)
KETONES UR-MCNC: ABNORMAL
KETONES UR-MCNC: ABNORMAL
KETONES UR-MCNC: NEGATIVE — SIGNIFICANT CHANGE UP
LACTATE BLDV-MCNC: 1.4 MMOL/L — SIGNIFICANT CHANGE UP (ref 0.5–2)
LACTATE BLDV-MCNC: 1.5 MMOL/L — SIGNIFICANT CHANGE UP (ref 0.5–2)
LACTATE BLDV-MCNC: 1.8 MMOL/L — SIGNIFICANT CHANGE UP (ref 0.5–2)
LEUKOCYTE ESTERASE UR-ACNC: ABNORMAL
LEUKOCYTE ESTERASE UR-ACNC: NEGATIVE — SIGNIFICANT CHANGE UP
LEUKOCYTE ESTERASE UR-ACNC: NEGATIVE — SIGNIFICANT CHANGE UP
LIDOCAIN IGE QN: 152 U/L — HIGH (ref 7–60)
LKM AB SER-ACNC: <20.1 UNITS — SIGNIFICANT CHANGE UP (ref 0–20)
LYMPHOCYTES # BLD AUTO: 0.38 K/UL — LOW (ref 1–3.3)
LYMPHOCYTES # BLD AUTO: 0.83 K/UL — LOW (ref 1–3.3)
LYMPHOCYTES # BLD AUTO: 0.93 K/UL — LOW (ref 1–3.3)
LYMPHOCYTES # BLD AUTO: 1 K/UL — SIGNIFICANT CHANGE UP (ref 1–3.3)
LYMPHOCYTES # BLD AUTO: 1.05 K/UL — SIGNIFICANT CHANGE UP (ref 1–3.3)
LYMPHOCYTES # BLD AUTO: 1.12 K/UL — SIGNIFICANT CHANGE UP (ref 1–3.3)
LYMPHOCYTES # BLD AUTO: 1.21 K/UL — SIGNIFICANT CHANGE UP (ref 1–3.3)
LYMPHOCYTES # BLD AUTO: 1.33 K/UL — SIGNIFICANT CHANGE UP (ref 1–3.3)
LYMPHOCYTES # BLD AUTO: 1.61 K/UL — SIGNIFICANT CHANGE UP (ref 1–3.3)
LYMPHOCYTES # BLD AUTO: 1.73 K/UL — SIGNIFICANT CHANGE UP (ref 1–3.3)
LYMPHOCYTES # BLD AUTO: 1.82 K/UL — SIGNIFICANT CHANGE UP (ref 1–3.3)
LYMPHOCYTES # BLD AUTO: 1.91 K/UL — SIGNIFICANT CHANGE UP (ref 1–3.3)
LYMPHOCYTES # BLD AUTO: 1.94 K/UL — SIGNIFICANT CHANGE UP (ref 1–3.3)
LYMPHOCYTES # BLD AUTO: 1.99 K/UL — SIGNIFICANT CHANGE UP (ref 1–3.3)
LYMPHOCYTES # BLD AUTO: 10.8 % — LOW (ref 13–44)
LYMPHOCYTES # BLD AUTO: 12.2 % — LOW (ref 13–44)
LYMPHOCYTES # BLD AUTO: 12.6 % — LOW (ref 13–44)
LYMPHOCYTES # BLD AUTO: 13.1 % — SIGNIFICANT CHANGE UP (ref 13–44)
LYMPHOCYTES # BLD AUTO: 13.2 % — SIGNIFICANT CHANGE UP (ref 13–44)
LYMPHOCYTES # BLD AUTO: 14.9 % — SIGNIFICANT CHANGE UP (ref 13–44)
LYMPHOCYTES # BLD AUTO: 15 % — SIGNIFICANT CHANGE UP (ref 13–44)
LYMPHOCYTES # BLD AUTO: 16.4 % — SIGNIFICANT CHANGE UP (ref 13–44)
LYMPHOCYTES # BLD AUTO: 19.1 % — SIGNIFICANT CHANGE UP (ref 13–44)
LYMPHOCYTES # BLD AUTO: 2.09 K/UL — SIGNIFICANT CHANGE UP (ref 1–3.3)
LYMPHOCYTES # BLD AUTO: 20.2 % — SIGNIFICANT CHANGE UP (ref 13–44)
LYMPHOCYTES # BLD AUTO: 20.3 % — SIGNIFICANT CHANGE UP (ref 13–44)
LYMPHOCYTES # BLD AUTO: 21.7 % — SIGNIFICANT CHANGE UP (ref 13–44)
LYMPHOCYTES # BLD AUTO: 22.7 % — SIGNIFICANT CHANGE UP (ref 13–44)
LYMPHOCYTES # BLD AUTO: 3.5 % — LOW (ref 13–44)
LYMPHOCYTES # BLD AUTO: 8.5 % — LOW (ref 13–44)
M PNEUMO DNA SPEC QL NAA+PROBE: SIGNIFICANT CHANGE UP
M PNEUMO DNA SPEC QL NAA+PROBE: SIGNIFICANT CHANGE UP
M TB IFN-G BLD-IMP: NEGATIVE — SIGNIFICANT CHANGE UP
M TB IFN-G CD4+ BCKGRND COR BLD-ACNC: 0 IU/ML — SIGNIFICANT CHANGE UP
M TB IFN-G CD4+CD8+ BCKGRND COR BLD-ACNC: 0 IU/ML — SIGNIFICANT CHANGE UP
MAGNESIUM SERPL-MCNC: 1.4 MG/DL — LOW (ref 1.6–2.6)
MAGNESIUM SERPL-MCNC: 1.4 MG/DL — LOW (ref 1.6–2.6)
MAGNESIUM SERPL-MCNC: 1.6 MG/DL — SIGNIFICANT CHANGE UP (ref 1.6–2.6)
MAGNESIUM SERPL-MCNC: 1.7 MG/DL — SIGNIFICANT CHANGE UP (ref 1.6–2.6)
MAGNESIUM SERPL-MCNC: 1.8 MG/DL — SIGNIFICANT CHANGE UP (ref 1.6–2.6)
MAGNESIUM SERPL-MCNC: 1.9 MG/DL — SIGNIFICANT CHANGE UP (ref 1.6–2.6)
MAGNESIUM SERPL-MCNC: 2 MG/DL — SIGNIFICANT CHANGE UP (ref 1.6–2.6)
MAGNESIUM SERPL-MCNC: 2.1 MG/DL — SIGNIFICANT CHANGE UP (ref 1.6–2.6)
MAGNESIUM SERPL-MCNC: 2.2 MG/DL — SIGNIFICANT CHANGE UP (ref 1.6–2.6)
MAGNESIUM SERPL-MCNC: 2.3 MG/DL — SIGNIFICANT CHANGE UP (ref 1.6–2.6)
MAGNESIUM SERPL-MCNC: 2.3 MG/DL — SIGNIFICANT CHANGE UP (ref 1.6–2.6)
MAGNESIUM SERPL-MCNC: 2.4 MG/DL — SIGNIFICANT CHANGE UP (ref 1.6–2.6)
MAGNESIUM SERPL-MCNC: 2.6 MG/DL — SIGNIFICANT CHANGE UP (ref 1.6–2.6)
MANUAL SMEAR VERIFICATION: SIGNIFICANT CHANGE UP
MCHC RBC-ENTMCNC: 24 PG — LOW (ref 27–34)
MCHC RBC-ENTMCNC: 24.1 PG — LOW (ref 27–34)
MCHC RBC-ENTMCNC: 24.2 PG — LOW (ref 27–34)
MCHC RBC-ENTMCNC: 24.3 PG — LOW (ref 27–34)
MCHC RBC-ENTMCNC: 24.3 PG — LOW (ref 27–34)
MCHC RBC-ENTMCNC: 24.4 PG — LOW (ref 27–34)
MCHC RBC-ENTMCNC: 24.5 PG — LOW (ref 27–34)
MCHC RBC-ENTMCNC: 24.6 PG — LOW (ref 27–34)
MCHC RBC-ENTMCNC: 24.7 PG — LOW (ref 27–34)
MCHC RBC-ENTMCNC: 24.8 PG — LOW (ref 27–34)
MCHC RBC-ENTMCNC: 24.9 PG — LOW (ref 27–34)
MCHC RBC-ENTMCNC: 25 PG — LOW (ref 27–34)
MCHC RBC-ENTMCNC: 25 PG — LOW (ref 27–34)
MCHC RBC-ENTMCNC: 25.3 PG — LOW (ref 27–34)
MCHC RBC-ENTMCNC: 25.4 PG — LOW (ref 27–34)
MCHC RBC-ENTMCNC: 25.5 PG — LOW (ref 27–34)
MCHC RBC-ENTMCNC: 29.6 GM/DL — LOW (ref 32–36)
MCHC RBC-ENTMCNC: 29.8 GM/DL — LOW (ref 32–36)
MCHC RBC-ENTMCNC: 29.8 GM/DL — LOW (ref 32–36)
MCHC RBC-ENTMCNC: 29.9 GM/DL — LOW (ref 32–36)
MCHC RBC-ENTMCNC: 30 GM/DL — LOW (ref 32–36)
MCHC RBC-ENTMCNC: 30.1 GM/DL — LOW (ref 32–36)
MCHC RBC-ENTMCNC: 30.2 GM/DL — LOW (ref 32–36)
MCHC RBC-ENTMCNC: 30.2 GM/DL — LOW (ref 32–36)
MCHC RBC-ENTMCNC: 30.3 GM/DL — LOW (ref 32–36)
MCHC RBC-ENTMCNC: 30.4 GM/DL — LOW (ref 32–36)
MCHC RBC-ENTMCNC: 30.6 GM/DL — LOW (ref 32–36)
MCHC RBC-ENTMCNC: 30.6 GM/DL — LOW (ref 32–36)
MCHC RBC-ENTMCNC: 30.7 GM/DL — LOW (ref 32–36)
MCHC RBC-ENTMCNC: 30.8 GM/DL — LOW (ref 32–36)
MCHC RBC-ENTMCNC: 30.9 GM/DL — LOW (ref 32–36)
MCHC RBC-ENTMCNC: 31 GM/DL — LOW (ref 32–36)
MCHC RBC-ENTMCNC: 31.1 GM/DL — LOW (ref 32–36)
MCHC RBC-ENTMCNC: 31.3 GM/DL — LOW (ref 32–36)
MCHC RBC-ENTMCNC: 31.4 GM/DL — LOW (ref 32–36)
MCHC RBC-ENTMCNC: 31.7 GM/DL — LOW (ref 32–36)
MCHC RBC-ENTMCNC: 31.8 GM/DL — LOW (ref 32–36)
MCHC RBC-ENTMCNC: 31.9 GM/DL — LOW (ref 32–36)
MCHC RBC-ENTMCNC: 32.1 GM/DL — SIGNIFICANT CHANGE UP (ref 32–36)
MCV RBC AUTO: 78.2 FL — LOW (ref 80–100)
MCV RBC AUTO: 78.3 FL — LOW (ref 80–100)
MCV RBC AUTO: 78.8 FL — LOW (ref 80–100)
MCV RBC AUTO: 78.8 FL — LOW (ref 80–100)
MCV RBC AUTO: 78.9 FL — LOW (ref 80–100)
MCV RBC AUTO: 79 FL — LOW (ref 80–100)
MCV RBC AUTO: 79.1 FL — LOW (ref 80–100)
MCV RBC AUTO: 79.1 FL — LOW (ref 80–100)
MCV RBC AUTO: 79.2 FL — LOW (ref 80–100)
MCV RBC AUTO: 79.2 FL — LOW (ref 80–100)
MCV RBC AUTO: 79.3 FL — LOW (ref 80–100)
MCV RBC AUTO: 79.3 FL — LOW (ref 80–100)
MCV RBC AUTO: 79.4 FL — LOW (ref 80–100)
MCV RBC AUTO: 79.5 FL — LOW (ref 80–100)
MCV RBC AUTO: 79.5 FL — LOW (ref 80–100)
MCV RBC AUTO: 79.6 FL — LOW (ref 80–100)
MCV RBC AUTO: 79.6 FL — LOW (ref 80–100)
MCV RBC AUTO: 79.8 FL — LOW (ref 80–100)
MCV RBC AUTO: 79.9 FL — LOW (ref 80–100)
MCV RBC AUTO: 80 FL — SIGNIFICANT CHANGE UP (ref 80–100)
MCV RBC AUTO: 80 FL — SIGNIFICANT CHANGE UP (ref 80–100)
MCV RBC AUTO: 80.1 FL — SIGNIFICANT CHANGE UP (ref 80–100)
MCV RBC AUTO: 80.1 FL — SIGNIFICANT CHANGE UP (ref 80–100)
MCV RBC AUTO: 80.3 FL — SIGNIFICANT CHANGE UP (ref 80–100)
MCV RBC AUTO: 80.3 FL — SIGNIFICANT CHANGE UP (ref 80–100)
MCV RBC AUTO: 80.4 FL — SIGNIFICANT CHANGE UP (ref 80–100)
MCV RBC AUTO: 80.6 FL — SIGNIFICANT CHANGE UP (ref 80–100)
MCV RBC AUTO: 80.8 FL — SIGNIFICANT CHANGE UP (ref 80–100)
MCV RBC AUTO: 80.9 FL — SIGNIFICANT CHANGE UP (ref 80–100)
MCV RBC AUTO: 80.9 FL — SIGNIFICANT CHANGE UP (ref 80–100)
MCV RBC AUTO: 81 FL — SIGNIFICANT CHANGE UP (ref 80–100)
MCV RBC AUTO: 81.4 FL — SIGNIFICANT CHANGE UP (ref 80–100)
MCV RBC AUTO: 81.9 FL — SIGNIFICANT CHANGE UP (ref 80–100)
MCV RBC AUTO: 82.5 FL — SIGNIFICANT CHANGE UP (ref 80–100)
MCV RBC AUTO: 84 FL — SIGNIFICANT CHANGE UP (ref 80–100)
MICROCYTES BLD QL: SLIGHT — SIGNIFICANT CHANGE UP
MITOCHONDRIA AB SER-ACNC: SIGNIFICANT CHANGE UP
MONOCYTES # BLD AUTO: 0.19 K/UL — SIGNIFICANT CHANGE UP (ref 0–0.9)
MONOCYTES # BLD AUTO: 0.41 K/UL — SIGNIFICANT CHANGE UP (ref 0–0.9)
MONOCYTES # BLD AUTO: 0.64 K/UL — SIGNIFICANT CHANGE UP (ref 0–0.9)
MONOCYTES # BLD AUTO: 0.67 K/UL — SIGNIFICANT CHANGE UP (ref 0–0.9)
MONOCYTES # BLD AUTO: 0.79 K/UL — SIGNIFICANT CHANGE UP (ref 0–0.9)
MONOCYTES # BLD AUTO: 0.89 K/UL — SIGNIFICANT CHANGE UP (ref 0–0.9)
MONOCYTES # BLD AUTO: 0.92 K/UL — HIGH (ref 0–0.9)
MONOCYTES # BLD AUTO: 1.04 K/UL — HIGH (ref 0–0.9)
MONOCYTES # BLD AUTO: 1.05 K/UL — HIGH (ref 0–0.9)
MONOCYTES # BLD AUTO: 1.05 K/UL — HIGH (ref 0–0.9)
MONOCYTES # BLD AUTO: 1.06 K/UL — HIGH (ref 0–0.9)
MONOCYTES # BLD AUTO: 1.1 K/UL — HIGH (ref 0–0.9)
MONOCYTES # BLD AUTO: 1.11 K/UL — HIGH (ref 0–0.9)
MONOCYTES # BLD AUTO: 1.26 K/UL — HIGH (ref 0–0.9)
MONOCYTES # BLD AUTO: 1.55 K/UL — HIGH (ref 0–0.9)
MONOCYTES NFR BLD AUTO: 1.8 % — LOW (ref 2–14)
MONOCYTES NFR BLD AUTO: 10.2 % — SIGNIFICANT CHANGE UP (ref 2–14)
MONOCYTES NFR BLD AUTO: 10.5 % — SIGNIFICANT CHANGE UP (ref 2–14)
MONOCYTES NFR BLD AUTO: 10.9 % — SIGNIFICANT CHANGE UP (ref 2–14)
MONOCYTES NFR BLD AUTO: 11 % — SIGNIFICANT CHANGE UP (ref 2–14)
MONOCYTES NFR BLD AUTO: 11 % — SIGNIFICANT CHANGE UP (ref 2–14)
MONOCYTES NFR BLD AUTO: 11.2 % — SIGNIFICANT CHANGE UP (ref 2–14)
MONOCYTES NFR BLD AUTO: 12.5 % — SIGNIFICANT CHANGE UP (ref 2–14)
MONOCYTES NFR BLD AUTO: 13.7 % — SIGNIFICANT CHANGE UP (ref 2–14)
MONOCYTES NFR BLD AUTO: 14.6 % — HIGH (ref 2–14)
MONOCYTES NFR BLD AUTO: 4.2 % — SIGNIFICANT CHANGE UP (ref 2–14)
MONOCYTES NFR BLD AUTO: 7.9 % — SIGNIFICANT CHANGE UP (ref 2–14)
MONOCYTES NFR BLD AUTO: 9.1 % — SIGNIFICANT CHANGE UP (ref 2–14)
MONOCYTES NFR BLD AUTO: 9.2 % — SIGNIFICANT CHANGE UP (ref 2–14)
MONOCYTES NFR BLD AUTO: 9.2 % — SIGNIFICANT CHANGE UP (ref 2–14)
MYELOCYTES NFR BLD: 1.8 % — HIGH (ref 0–0)
NEUTROPHILS # BLD AUTO: 10.01 K/UL — HIGH (ref 1.8–7.4)
NEUTROPHILS # BLD AUTO: 13.09 K/UL — HIGH (ref 1.8–7.4)
NEUTROPHILS # BLD AUTO: 3.79 K/UL — SIGNIFICANT CHANGE UP (ref 1.8–7.4)
NEUTROPHILS # BLD AUTO: 4.06 K/UL — SIGNIFICANT CHANGE UP (ref 1.8–7.4)
NEUTROPHILS # BLD AUTO: 4.51 K/UL — SIGNIFICANT CHANGE UP (ref 1.8–7.4)
NEUTROPHILS # BLD AUTO: 5.04 K/UL — SIGNIFICANT CHANGE UP (ref 1.8–7.4)
NEUTROPHILS # BLD AUTO: 5.14 K/UL — SIGNIFICANT CHANGE UP (ref 1.8–7.4)
NEUTROPHILS # BLD AUTO: 5.29 K/UL — SIGNIFICANT CHANGE UP (ref 1.8–7.4)
NEUTROPHILS # BLD AUTO: 5.86 K/UL — SIGNIFICANT CHANGE UP (ref 1.8–7.4)
NEUTROPHILS # BLD AUTO: 5.92 K/UL — SIGNIFICANT CHANGE UP (ref 1.8–7.4)
NEUTROPHILS # BLD AUTO: 6.78 K/UL — SIGNIFICANT CHANGE UP (ref 1.8–7.4)
NEUTROPHILS # BLD AUTO: 7.28 K/UL — SIGNIFICANT CHANGE UP (ref 1.8–7.4)
NEUTROPHILS # BLD AUTO: 7.78 K/UL — HIGH (ref 1.8–7.4)
NEUTROPHILS # BLD AUTO: 8.22 K/UL — HIGH (ref 1.8–7.4)
NEUTROPHILS # BLD AUTO: 9.56 K/UL — HIGH (ref 1.8–7.4)
NEUTROPHILS NFR BLD AUTO: 60 % — SIGNIFICANT CHANGE UP (ref 43–77)
NEUTROPHILS NFR BLD AUTO: 60.3 % — SIGNIFICANT CHANGE UP (ref 43–77)
NEUTROPHILS NFR BLD AUTO: 62.1 % — SIGNIFICANT CHANGE UP (ref 43–77)
NEUTROPHILS NFR BLD AUTO: 62.1 % — SIGNIFICANT CHANGE UP (ref 43–77)
NEUTROPHILS NFR BLD AUTO: 63.2 % — SIGNIFICANT CHANGE UP (ref 43–77)
NEUTROPHILS NFR BLD AUTO: 63.2 % — SIGNIFICANT CHANGE UP (ref 43–77)
NEUTROPHILS NFR BLD AUTO: 65 % — SIGNIFICANT CHANGE UP (ref 43–77)
NEUTROPHILS NFR BLD AUTO: 66.9 % — SIGNIFICANT CHANGE UP (ref 43–77)
NEUTROPHILS NFR BLD AUTO: 67.5 % — SIGNIFICANT CHANGE UP (ref 43–77)
NEUTROPHILS NFR BLD AUTO: 70 % — SIGNIFICANT CHANGE UP (ref 43–77)
NEUTROPHILS NFR BLD AUTO: 72.2 % — SIGNIFICANT CHANGE UP (ref 43–77)
NEUTROPHILS NFR BLD AUTO: 74.9 % — SIGNIFICANT CHANGE UP (ref 43–77)
NEUTROPHILS NFR BLD AUTO: 76.5 % — SIGNIFICANT CHANGE UP (ref 43–77)
NEUTROPHILS NFR BLD AUTO: 84.4 % — HIGH (ref 43–77)
NEUTROPHILS NFR BLD AUTO: 90.3 % — HIGH (ref 43–77)
NEUTS BAND # BLD: 2.6 % — SIGNIFICANT CHANGE UP (ref 0–6)
NITRITE UR-MCNC: NEGATIVE — SIGNIFICANT CHANGE UP
NITRITE UR-MCNC: NEGATIVE — SIGNIFICANT CHANGE UP
NITRITE UR-MCNC: POSITIVE
NRBC # BLD: 0 /100 WBCS — SIGNIFICANT CHANGE UP
NRBC # BLD: 0 /100 WBCS — SIGNIFICANT CHANGE UP (ref 0–0)
NRBC # BLD: 1 /100 WBCS — HIGH (ref 0–0)
NRBC # FLD: 0 K/UL — SIGNIFICANT CHANGE UP
NRBC # FLD: 0 K/UL — SIGNIFICANT CHANGE UP (ref 0–0)
NRBC # FLD: 0.02 K/UL — HIGH (ref 0–0)
NRBC # FLD: 0.04 K/UL — HIGH (ref 0–0)
NRBC # FLD: 0.22 K/UL — HIGH (ref 0–0)
OVALOCYTES BLD QL SMEAR: SLIGHT — SIGNIFICANT CHANGE UP
PCO2 BLDV: 36 MMHG — LOW (ref 39–42)
PCO2 BLDV: 38 MMHG — LOW (ref 39–42)
PCO2 BLDV: 38 MMHG — LOW (ref 39–42)
PH BLDV: 7.41 — SIGNIFICANT CHANGE UP (ref 7.32–7.43)
PH BLDV: 7.43 — SIGNIFICANT CHANGE UP (ref 7.32–7.43)
PH BLDV: 7.44 — HIGH (ref 7.32–7.43)
PH UR: 6 — SIGNIFICANT CHANGE UP (ref 5–8)
PH UR: 6 — SIGNIFICANT CHANGE UP (ref 5–8)
PH UR: 7.5 — SIGNIFICANT CHANGE UP (ref 5–8)
PHOSPHATE SERPL-MCNC: 2.3 MG/DL — LOW (ref 2.5–4.5)
PHOSPHATE SERPL-MCNC: 2.4 MG/DL — LOW (ref 2.5–4.5)
PHOSPHATE SERPL-MCNC: 2.8 MG/DL — SIGNIFICANT CHANGE UP (ref 2.5–4.5)
PHOSPHATE SERPL-MCNC: 2.9 MG/DL — SIGNIFICANT CHANGE UP (ref 2.5–4.5)
PHOSPHATE SERPL-MCNC: 3 MG/DL — SIGNIFICANT CHANGE UP (ref 2.5–4.5)
PHOSPHATE SERPL-MCNC: 3.1 MG/DL — SIGNIFICANT CHANGE UP (ref 2.5–4.5)
PHOSPHATE SERPL-MCNC: 3.2 MG/DL — SIGNIFICANT CHANGE UP (ref 2.5–4.5)
PHOSPHATE SERPL-MCNC: 3.3 MG/DL — SIGNIFICANT CHANGE UP (ref 2.5–4.5)
PHOSPHATE SERPL-MCNC: 3.4 MG/DL — SIGNIFICANT CHANGE UP (ref 2.5–4.5)
PHOSPHATE SERPL-MCNC: 3.5 MG/DL — SIGNIFICANT CHANGE UP (ref 2.5–4.5)
PHOSPHATE SERPL-MCNC: 3.6 MG/DL — SIGNIFICANT CHANGE UP (ref 2.5–4.5)
PHOSPHATE SERPL-MCNC: 3.8 MG/DL — SIGNIFICANT CHANGE UP (ref 2.5–4.5)
PHOSPHATE SERPL-MCNC: 3.9 MG/DL — SIGNIFICANT CHANGE UP (ref 2.5–4.5)
PHOSPHATE SERPL-MCNC: 4 MG/DL — SIGNIFICANT CHANGE UP (ref 2.5–4.5)
PHOSPHATE SERPL-MCNC: 5.4 MG/DL — HIGH (ref 2.5–4.5)
PLAT MORPH BLD: NORMAL — SIGNIFICANT CHANGE UP
PLAT MORPH BLD: NORMAL — SIGNIFICANT CHANGE UP
PLATELET # BLD AUTO: 239 K/UL — SIGNIFICANT CHANGE UP (ref 150–400)
PLATELET # BLD AUTO: 244 K/UL — SIGNIFICANT CHANGE UP (ref 150–400)
PLATELET # BLD AUTO: 249 K/UL — SIGNIFICANT CHANGE UP (ref 150–400)
PLATELET # BLD AUTO: 252 K/UL — SIGNIFICANT CHANGE UP (ref 150–400)
PLATELET # BLD AUTO: 261 K/UL — SIGNIFICANT CHANGE UP (ref 150–400)
PLATELET # BLD AUTO: 267 K/UL — SIGNIFICANT CHANGE UP (ref 150–400)
PLATELET # BLD AUTO: 281 K/UL — SIGNIFICANT CHANGE UP (ref 150–400)
PLATELET # BLD AUTO: 283 K/UL — SIGNIFICANT CHANGE UP (ref 150–400)
PLATELET # BLD AUTO: 284 K/UL — SIGNIFICANT CHANGE UP (ref 150–400)
PLATELET # BLD AUTO: 285 K/UL — SIGNIFICANT CHANGE UP (ref 150–400)
PLATELET # BLD AUTO: 286 K/UL — SIGNIFICANT CHANGE UP (ref 150–400)
PLATELET # BLD AUTO: 286 K/UL — SIGNIFICANT CHANGE UP (ref 150–400)
PLATELET # BLD AUTO: 291 K/UL — SIGNIFICANT CHANGE UP (ref 150–400)
PLATELET # BLD AUTO: 296 K/UL — SIGNIFICANT CHANGE UP (ref 150–400)
PLATELET # BLD AUTO: 302 K/UL — SIGNIFICANT CHANGE UP (ref 150–400)
PLATELET # BLD AUTO: 304 K/UL — SIGNIFICANT CHANGE UP (ref 150–400)
PLATELET # BLD AUTO: 309 K/UL — SIGNIFICANT CHANGE UP (ref 150–400)
PLATELET # BLD AUTO: 324 K/UL — SIGNIFICANT CHANGE UP (ref 150–400)
PLATELET # BLD AUTO: 326 K/UL — SIGNIFICANT CHANGE UP (ref 150–400)
PLATELET # BLD AUTO: 329 K/UL — SIGNIFICANT CHANGE UP (ref 150–400)
PLATELET # BLD AUTO: 335 K/UL — SIGNIFICANT CHANGE UP (ref 150–400)
PLATELET # BLD AUTO: 336 K/UL — SIGNIFICANT CHANGE UP (ref 150–400)
PLATELET # BLD AUTO: 338 K/UL — SIGNIFICANT CHANGE UP (ref 150–400)
PLATELET # BLD AUTO: 344 K/UL — SIGNIFICANT CHANGE UP (ref 150–400)
PLATELET # BLD AUTO: 344 K/UL — SIGNIFICANT CHANGE UP (ref 150–400)
PLATELET # BLD AUTO: 351 K/UL — SIGNIFICANT CHANGE UP (ref 150–400)
PLATELET # BLD AUTO: 354 K/UL — SIGNIFICANT CHANGE UP (ref 150–400)
PLATELET # BLD AUTO: 357 K/UL — SIGNIFICANT CHANGE UP (ref 150–400)
PLATELET # BLD AUTO: 357 K/UL — SIGNIFICANT CHANGE UP (ref 150–400)
PLATELET # BLD AUTO: 372 K/UL — SIGNIFICANT CHANGE UP (ref 150–400)
PLATELET # BLD AUTO: 374 K/UL — SIGNIFICANT CHANGE UP (ref 150–400)
PLATELET # BLD AUTO: 381 K/UL — SIGNIFICANT CHANGE UP (ref 150–400)
PLATELET # BLD AUTO: 381 K/UL — SIGNIFICANT CHANGE UP (ref 150–400)
PLATELET # BLD AUTO: 387 K/UL — SIGNIFICANT CHANGE UP (ref 150–400)
PLATELET # BLD AUTO: 388 K/UL — SIGNIFICANT CHANGE UP (ref 150–400)
PLATELET # BLD AUTO: 389 K/UL — SIGNIFICANT CHANGE UP (ref 150–400)
PLATELET # BLD AUTO: 390 K/UL — SIGNIFICANT CHANGE UP (ref 150–400)
PLATELET # BLD AUTO: 390 K/UL — SIGNIFICANT CHANGE UP (ref 150–400)
PLATELET # BLD AUTO: 391 K/UL — SIGNIFICANT CHANGE UP (ref 150–400)
PLATELET # BLD AUTO: 400 K/UL — SIGNIFICANT CHANGE UP (ref 150–400)
PLATELET # BLD AUTO: 400 K/UL — SIGNIFICANT CHANGE UP (ref 150–400)
PLATELET # BLD AUTO: 413 K/UL — HIGH (ref 150–400)
PLATELET # BLD AUTO: 448 K/UL — HIGH (ref 150–400)
PLATELET COUNT - ESTIMATE: NORMAL — SIGNIFICANT CHANGE UP
PLATELET COUNT - ESTIMATE: NORMAL — SIGNIFICANT CHANGE UP
PO2 BLDV: 56 MMHG — SIGNIFICANT CHANGE UP
PO2 BLDV: 60 MMHG — SIGNIFICANT CHANGE UP
PO2 BLDV: 77 MMHG — SIGNIFICANT CHANGE UP
POIKILOCYTOSIS BLD QL AUTO: SIGNIFICANT CHANGE UP
POIKILOCYTOSIS BLD QL AUTO: SLIGHT — SIGNIFICANT CHANGE UP
POLYCHROMASIA BLD QL SMEAR: SLIGHT — SIGNIFICANT CHANGE UP
POTASSIUM BLDV-SCNC: 4.2 MMOL/L — SIGNIFICANT CHANGE UP (ref 3.5–5.1)
POTASSIUM BLDV-SCNC: 4.5 MMOL/L — SIGNIFICANT CHANGE UP (ref 3.5–5.1)
POTASSIUM BLDV-SCNC: 4.8 MMOL/L — SIGNIFICANT CHANGE UP (ref 3.5–5.1)
POTASSIUM SERPL-MCNC: 3.4 MMOL/L — LOW (ref 3.5–5.3)
POTASSIUM SERPL-MCNC: 3.5 MMOL/L — SIGNIFICANT CHANGE UP (ref 3.5–5.3)
POTASSIUM SERPL-MCNC: 3.6 MMOL/L — SIGNIFICANT CHANGE UP (ref 3.5–5.3)
POTASSIUM SERPL-MCNC: 3.7 MMOL/L — SIGNIFICANT CHANGE UP (ref 3.5–5.3)
POTASSIUM SERPL-MCNC: 3.8 MMOL/L — SIGNIFICANT CHANGE UP (ref 3.5–5.3)
POTASSIUM SERPL-MCNC: 3.8 MMOL/L — SIGNIFICANT CHANGE UP (ref 3.5–5.3)
POTASSIUM SERPL-MCNC: 3.9 MMOL/L — SIGNIFICANT CHANGE UP (ref 3.5–5.3)
POTASSIUM SERPL-MCNC: 4 MMOL/L — SIGNIFICANT CHANGE UP (ref 3.5–5.3)
POTASSIUM SERPL-MCNC: 4.1 MMOL/L — SIGNIFICANT CHANGE UP (ref 3.5–5.3)
POTASSIUM SERPL-MCNC: 4.2 MMOL/L — SIGNIFICANT CHANGE UP (ref 3.5–5.3)
POTASSIUM SERPL-MCNC: 4.2 MMOL/L — SIGNIFICANT CHANGE UP (ref 3.5–5.3)
POTASSIUM SERPL-MCNC: 4.4 MMOL/L — SIGNIFICANT CHANGE UP (ref 3.5–5.3)
POTASSIUM SERPL-MCNC: 4.5 MMOL/L — SIGNIFICANT CHANGE UP (ref 3.5–5.3)
POTASSIUM SERPL-MCNC: 4.7 MMOL/L — SIGNIFICANT CHANGE UP (ref 3.5–5.3)
POTASSIUM SERPL-MCNC: 4.7 MMOL/L — SIGNIFICANT CHANGE UP (ref 3.5–5.3)
POTASSIUM SERPL-MCNC: 4.8 MMOL/L — SIGNIFICANT CHANGE UP (ref 3.5–5.3)
POTASSIUM SERPL-SCNC: 3.4 MMOL/L — LOW (ref 3.5–5.3)
POTASSIUM SERPL-SCNC: 3.5 MMOL/L — SIGNIFICANT CHANGE UP (ref 3.5–5.3)
POTASSIUM SERPL-SCNC: 3.6 MMOL/L — SIGNIFICANT CHANGE UP (ref 3.5–5.3)
POTASSIUM SERPL-SCNC: 3.7 MMOL/L — SIGNIFICANT CHANGE UP (ref 3.5–5.3)
POTASSIUM SERPL-SCNC: 3.8 MMOL/L — SIGNIFICANT CHANGE UP (ref 3.5–5.3)
POTASSIUM SERPL-SCNC: 3.8 MMOL/L — SIGNIFICANT CHANGE UP (ref 3.5–5.3)
POTASSIUM SERPL-SCNC: 3.9 MMOL/L — SIGNIFICANT CHANGE UP (ref 3.5–5.3)
POTASSIUM SERPL-SCNC: 4 MMOL/L — SIGNIFICANT CHANGE UP (ref 3.5–5.3)
POTASSIUM SERPL-SCNC: 4.1 MMOL/L — SIGNIFICANT CHANGE UP (ref 3.5–5.3)
POTASSIUM SERPL-SCNC: 4.2 MMOL/L — SIGNIFICANT CHANGE UP (ref 3.5–5.3)
POTASSIUM SERPL-SCNC: 4.2 MMOL/L — SIGNIFICANT CHANGE UP (ref 3.5–5.3)
POTASSIUM SERPL-SCNC: 4.4 MMOL/L — SIGNIFICANT CHANGE UP (ref 3.5–5.3)
POTASSIUM SERPL-SCNC: 4.5 MMOL/L — SIGNIFICANT CHANGE UP (ref 3.5–5.3)
POTASSIUM SERPL-SCNC: 4.7 MMOL/L — SIGNIFICANT CHANGE UP (ref 3.5–5.3)
POTASSIUM SERPL-SCNC: 4.7 MMOL/L — SIGNIFICANT CHANGE UP (ref 3.5–5.3)
POTASSIUM SERPL-SCNC: 4.8 MMOL/L — SIGNIFICANT CHANGE UP (ref 3.5–5.3)
PROT SERPL-MCNC: 5.8 G/DL — LOW (ref 6–8.3)
PROT SERPL-MCNC: 5.9 G/DL — LOW (ref 6–8.3)
PROT SERPL-MCNC: 6.1 G/DL — SIGNIFICANT CHANGE UP (ref 6–8.3)
PROT SERPL-MCNC: 6.2 G/DL — SIGNIFICANT CHANGE UP (ref 6–8.3)
PROT SERPL-MCNC: 6.2 G/DL — SIGNIFICANT CHANGE UP (ref 6–8.3)
PROT SERPL-MCNC: 6.3 G/DL — SIGNIFICANT CHANGE UP (ref 6–8.3)
PROT SERPL-MCNC: 6.4 G/DL — SIGNIFICANT CHANGE UP (ref 6–8.3)
PROT SERPL-MCNC: 6.5 G/DL — SIGNIFICANT CHANGE UP (ref 6–8.3)
PROT SERPL-MCNC: 6.6 G/DL — SIGNIFICANT CHANGE UP (ref 6–8.3)
PROT SERPL-MCNC: 6.7 G/DL — SIGNIFICANT CHANGE UP (ref 6–8.3)
PROT SERPL-MCNC: 6.8 G/DL — SIGNIFICANT CHANGE UP (ref 6–8.3)
PROT SERPL-MCNC: 6.9 G/DL — SIGNIFICANT CHANGE UP (ref 6–8.3)
PROT SERPL-MCNC: 6.9 G/DL — SIGNIFICANT CHANGE UP (ref 6–8.3)
PROT SERPL-MCNC: 7 G/DL — SIGNIFICANT CHANGE UP (ref 6–8.3)
PROT SERPL-MCNC: 7 G/DL — SIGNIFICANT CHANGE UP (ref 6–8.3)
PROT SERPL-MCNC: 7.1 G/DL — SIGNIFICANT CHANGE UP (ref 6–8.3)
PROT SERPL-MCNC: 7.8 G/DL — SIGNIFICANT CHANGE UP (ref 6–8.3)
PROT UR-MCNC: ABNORMAL
PROTHROM AB SERPL-ACNC: 12 SEC — SIGNIFICANT CHANGE UP (ref 10.5–13.4)
PROTHROM AB SERPL-ACNC: 12.1 SEC — SIGNIFICANT CHANGE UP (ref 10.5–13.4)
PROTHROM AB SERPL-ACNC: 12.1 SEC — SIGNIFICANT CHANGE UP (ref 10.5–13.4)
PROTHROM AB SERPL-ACNC: 12.8 SEC — SIGNIFICANT CHANGE UP (ref 10.5–13.4)
PROTHROM AB SERPL-ACNC: 13.4 SEC — SIGNIFICANT CHANGE UP (ref 10.5–13.4)
PROTHROM AB SERPL-ACNC: 14.2 SEC — HIGH (ref 10.5–13.4)
PROTHROM AB SERPL-ACNC: 14.3 SEC — HIGH (ref 10.5–13.4)
PROTHROM AB SERPL-ACNC: 14.3 SEC — HIGH (ref 10.5–13.4)
PROTHROM AB SERPL-ACNC: 14.7 SEC — HIGH (ref 10.5–13.4)
QUANT TB PLUS MITOGEN MINUS NIL: 1.71 IU/ML — SIGNIFICANT CHANGE UP
RAPID RVP RESULT: SIGNIFICANT CHANGE UP
RAPID RVP RESULT: SIGNIFICANT CHANGE UP
RBC # BLD: 3.54 M/UL — LOW (ref 3.8–5.2)
RBC # BLD: 3.56 M/UL — LOW (ref 3.8–5.2)
RBC # BLD: 3.79 M/UL — LOW (ref 3.8–5.2)
RBC # BLD: 3.8 M/UL — SIGNIFICANT CHANGE UP (ref 3.8–5.2)
RBC # BLD: 3.81 M/UL — SIGNIFICANT CHANGE UP (ref 3.8–5.2)
RBC # BLD: 3.83 M/UL — SIGNIFICANT CHANGE UP (ref 3.8–5.2)
RBC # BLD: 3.85 M/UL — SIGNIFICANT CHANGE UP (ref 3.8–5.2)
RBC # BLD: 3.87 M/UL — SIGNIFICANT CHANGE UP (ref 3.8–5.2)
RBC # BLD: 3.88 M/UL — SIGNIFICANT CHANGE UP (ref 3.8–5.2)
RBC # BLD: 3.88 M/UL — SIGNIFICANT CHANGE UP (ref 3.8–5.2)
RBC # BLD: 3.94 M/UL — SIGNIFICANT CHANGE UP (ref 3.8–5.2)
RBC # BLD: 3.96 M/UL — SIGNIFICANT CHANGE UP (ref 3.8–5.2)
RBC # BLD: 3.97 M/UL — SIGNIFICANT CHANGE UP (ref 3.8–5.2)
RBC # BLD: 3.98 M/UL — SIGNIFICANT CHANGE UP (ref 3.8–5.2)
RBC # BLD: 4 M/UL — SIGNIFICANT CHANGE UP (ref 3.8–5.2)
RBC # BLD: 4.02 M/UL — SIGNIFICANT CHANGE UP (ref 3.8–5.2)
RBC # BLD: 4.02 M/UL — SIGNIFICANT CHANGE UP (ref 3.8–5.2)
RBC # BLD: 4.03 M/UL — SIGNIFICANT CHANGE UP (ref 3.8–5.2)
RBC # BLD: 4.03 M/UL — SIGNIFICANT CHANGE UP (ref 3.8–5.2)
RBC # BLD: 4.05 M/UL — SIGNIFICANT CHANGE UP (ref 3.8–5.2)
RBC # BLD: 4.07 M/UL — SIGNIFICANT CHANGE UP (ref 3.8–5.2)
RBC # BLD: 4.11 M/UL — SIGNIFICANT CHANGE UP (ref 3.8–5.2)
RBC # BLD: 4.12 M/UL — SIGNIFICANT CHANGE UP (ref 3.8–5.2)
RBC # BLD: 4.13 M/UL — SIGNIFICANT CHANGE UP (ref 3.8–5.2)
RBC # BLD: 4.15 M/UL — SIGNIFICANT CHANGE UP (ref 3.8–5.2)
RBC # BLD: 4.16 M/UL — SIGNIFICANT CHANGE UP (ref 3.8–5.2)
RBC # BLD: 4.19 M/UL — SIGNIFICANT CHANGE UP (ref 3.8–5.2)
RBC # BLD: 4.21 M/UL — SIGNIFICANT CHANGE UP (ref 3.8–5.2)
RBC # BLD: 4.22 M/UL — SIGNIFICANT CHANGE UP (ref 3.8–5.2)
RBC # BLD: 4.29 M/UL — SIGNIFICANT CHANGE UP (ref 3.8–5.2)
RBC # BLD: 4.31 M/UL — SIGNIFICANT CHANGE UP (ref 3.8–5.2)
RBC # BLD: 4.32 M/UL — SIGNIFICANT CHANGE UP (ref 3.8–5.2)
RBC # BLD: 4.36 M/UL — SIGNIFICANT CHANGE UP (ref 3.8–5.2)
RBC # BLD: 4.44 M/UL — SIGNIFICANT CHANGE UP (ref 3.8–5.2)
RBC # BLD: 4.49 M/UL — SIGNIFICANT CHANGE UP (ref 3.8–5.2)
RBC # BLD: 4.61 M/UL — SIGNIFICANT CHANGE UP (ref 3.8–5.2)
RBC # FLD: 14.4 % — SIGNIFICANT CHANGE UP (ref 10.3–14.5)
RBC # FLD: 14.6 % — HIGH (ref 10.3–14.5)
RBC # FLD: 14.8 % — HIGH (ref 10.3–14.5)
RBC # FLD: 14.9 % — HIGH (ref 10.3–14.5)
RBC # FLD: 15 % — HIGH (ref 10.3–14.5)
RBC # FLD: 15.2 % — HIGH (ref 10.3–14.5)
RBC # FLD: 15.3 % — HIGH (ref 10.3–14.5)
RBC # FLD: 15.4 % — HIGH (ref 10.3–14.5)
RBC # FLD: 15.4 % — HIGH (ref 10.3–14.5)
RBC # FLD: 15.5 % — HIGH (ref 10.3–14.5)
RBC # FLD: 15.5 % — HIGH (ref 10.3–14.5)
RBC # FLD: 15.8 % — HIGH (ref 10.3–14.5)
RBC # FLD: 15.8 % — HIGH (ref 10.3–14.5)
RBC # FLD: 16.2 % — HIGH (ref 10.3–14.5)
RBC # FLD: 16.2 % — HIGH (ref 10.3–14.5)
RBC # FLD: 16.3 % — HIGH (ref 10.3–14.5)
RBC # FLD: 16.4 % — HIGH (ref 10.3–14.5)
RBC # FLD: 16.5 % — HIGH (ref 10.3–14.5)
RBC # FLD: 16.6 % — HIGH (ref 10.3–14.5)
RBC # FLD: 16.6 % — HIGH (ref 10.3–14.5)
RBC # FLD: 16.8 % — HIGH (ref 10.3–14.5)
RBC # FLD: 16.9 % — HIGH (ref 10.3–14.5)
RBC # FLD: 16.9 % — HIGH (ref 10.3–14.5)
RBC # FLD: 17.2 % — HIGH (ref 10.3–14.5)
RBC # FLD: 17.2 % — HIGH (ref 10.3–14.5)
RBC # FLD: 17.7 % — HIGH (ref 10.3–14.5)
RBC # FLD: 18.2 % — HIGH (ref 10.3–14.5)
RBC # FLD: 18.4 % — HIGH (ref 10.3–14.5)
RBC # FLD: 18.5 % — HIGH (ref 10.3–14.5)
RBC # FLD: 18.7 % — HIGH (ref 10.3–14.5)
RBC # FLD: 19.2 % — HIGH (ref 10.3–14.5)
RBC # FLD: 19.3 % — HIGH (ref 10.3–14.5)
RBC # FLD: 19.7 % — HIGH (ref 10.3–14.5)
RBC # FLD: 20.4 % — HIGH (ref 10.3–14.5)
RBC # FLD: 20.5 % — HIGH (ref 10.3–14.5)
RBC # FLD: 20.5 % — HIGH (ref 10.3–14.5)
RBC # FLD: 20.7 % — HIGH (ref 10.3–14.5)
RBC # FLD: 20.8 % — HIGH (ref 10.3–14.5)
RBC # FLD: 20.9 % — HIGH (ref 10.3–14.5)
RBC # FLD: 21 % — HIGH (ref 10.3–14.5)
RBC # FLD: 21.1 % — HIGH (ref 10.3–14.5)
RBC BLD AUTO: ABNORMAL
RBC BLD AUTO: ABNORMAL
RBC CASTS # UR COMP ASSIST: 1 /HPF — SIGNIFICANT CHANGE UP (ref 0–4)
RBC CASTS # UR COMP ASSIST: 7 /HPF — HIGH (ref 0–4)
RH IG SCN BLD-IMP: POSITIVE — SIGNIFICANT CHANGE UP
RSV RNA SPEC QL NAA+PROBE: SIGNIFICANT CHANGE UP
RSV RNA SPEC QL NAA+PROBE: SIGNIFICANT CHANGE UP
RV+EV RNA SPEC QL NAA+PROBE: SIGNIFICANT CHANGE UP
RV+EV RNA SPEC QL NAA+PROBE: SIGNIFICANT CHANGE UP
SAO2 % BLDV: 90.1 % — SIGNIFICANT CHANGE UP
SAO2 % BLDV: 91 % — SIGNIFICANT CHANGE UP
SAO2 % BLDV: 96.2 % — SIGNIFICANT CHANGE UP
SARS-COV-2 RNA SPEC QL NAA+PROBE: SIGNIFICANT CHANGE UP
SMOOTH MUSCLE AB SER-ACNC: ABNORMAL
SODIUM SERPL-SCNC: 133 MMOL/L — LOW (ref 135–145)
SODIUM SERPL-SCNC: 134 MMOL/L — LOW (ref 135–145)
SODIUM SERPL-SCNC: 134 MMOL/L — LOW (ref 135–145)
SODIUM SERPL-SCNC: 135 MMOL/L — SIGNIFICANT CHANGE UP (ref 135–145)
SODIUM SERPL-SCNC: 136 MMOL/L — SIGNIFICANT CHANGE UP (ref 135–145)
SODIUM SERPL-SCNC: 137 MMOL/L — SIGNIFICANT CHANGE UP (ref 135–145)
SODIUM SERPL-SCNC: 137 MMOL/L — SIGNIFICANT CHANGE UP (ref 135–145)
SODIUM SERPL-SCNC: 138 MMOL/L — SIGNIFICANT CHANGE UP (ref 135–145)
SODIUM SERPL-SCNC: 138 MMOL/L — SIGNIFICANT CHANGE UP (ref 135–145)
SODIUM SERPL-SCNC: 139 MMOL/L — SIGNIFICANT CHANGE UP (ref 135–145)
SODIUM SERPL-SCNC: 140 MMOL/L — SIGNIFICANT CHANGE UP (ref 135–145)
SODIUM SERPL-SCNC: 141 MMOL/L — SIGNIFICANT CHANGE UP (ref 135–145)
SODIUM SERPL-SCNC: 142 MMOL/L — SIGNIFICANT CHANGE UP (ref 135–145)
SODIUM SERPL-SCNC: 143 MMOL/L — SIGNIFICANT CHANGE UP (ref 135–145)
SODIUM SERPL-SCNC: 144 MMOL/L — SIGNIFICANT CHANGE UP (ref 135–145)
SODIUM SERPL-SCNC: 145 MMOL/L — SIGNIFICANT CHANGE UP (ref 135–145)
SP GR SPEC: 1.02 — SIGNIFICANT CHANGE UP (ref 1.01–1.05)
SP GR SPEC: 1.04 — SIGNIFICANT CHANGE UP (ref 1.01–1.05)
SP GR SPEC: >1.05 (ref 1.01–1.05)
SPECIMEN SOURCE: SIGNIFICANT CHANGE UP
SURGICAL PATHOLOGY STUDY: SIGNIFICANT CHANGE UP
TIBC SERPL-MCNC: 165 UG/DL — LOW (ref 220–430)
TRANSFERRIN SERPL-MCNC: 149 MG/DL — LOW (ref 200–360)
UIBC SERPL-MCNC: 143 UG/DL — SIGNIFICANT CHANGE UP (ref 110–370)
UROBILINOGEN FLD QL: SIGNIFICANT CHANGE UP
VARIANT LYMPHS # BLD: 4.4 % — SIGNIFICANT CHANGE UP (ref 0–6)
VIT B12 SERPL-MCNC: 1333 PG/ML — HIGH (ref 200–900)
WBC # BLD: 10.04 K/UL — SIGNIFICANT CHANGE UP (ref 3.8–10.5)
WBC # BLD: 10.18 K/UL — SIGNIFICANT CHANGE UP (ref 3.8–10.5)
WBC # BLD: 10.76 K/UL — HIGH (ref 3.8–10.5)
WBC # BLD: 10.77 K/UL — HIGH (ref 3.8–10.5)
WBC # BLD: 10.86 K/UL — HIGH (ref 3.8–10.5)
WBC # BLD: 11.72 K/UL — HIGH (ref 3.8–10.5)
WBC # BLD: 12.43 K/UL — HIGH (ref 3.8–10.5)
WBC # BLD: 12.95 K/UL — HIGH (ref 3.8–10.5)
WBC # BLD: 13.69 K/UL — HIGH (ref 3.8–10.5)
WBC # BLD: 14.68 K/UL — HIGH (ref 3.8–10.5)
WBC # BLD: 14.82 K/UL — HIGH (ref 3.8–10.5)
WBC # BLD: 15.22 K/UL — HIGH (ref 3.8–10.5)
WBC # BLD: 15.81 K/UL — HIGH (ref 3.8–10.5)
WBC # BLD: 16.39 K/UL — HIGH (ref 3.8–10.5)
WBC # BLD: 16.76 K/UL — HIGH (ref 3.8–10.5)
WBC # BLD: 19.8 K/UL — HIGH (ref 3.8–10.5)
WBC # BLD: 20.12 K/UL — HIGH (ref 3.8–10.5)
WBC # BLD: 5.62 K/UL — SIGNIFICANT CHANGE UP (ref 3.8–10.5)
WBC # BLD: 5.85 K/UL — SIGNIFICANT CHANGE UP (ref 3.8–10.5)
WBC # BLD: 6.08 K/UL — SIGNIFICANT CHANGE UP (ref 3.8–10.5)
WBC # BLD: 6.55 K/UL — SIGNIFICANT CHANGE UP (ref 3.8–10.5)
WBC # BLD: 7 K/UL — SIGNIFICANT CHANGE UP (ref 3.8–10.5)
WBC # BLD: 7.02 K/UL — SIGNIFICANT CHANGE UP (ref 3.8–10.5)
WBC # BLD: 7.12 K/UL — SIGNIFICANT CHANGE UP (ref 3.8–10.5)
WBC # BLD: 7.33 K/UL — SIGNIFICANT CHANGE UP (ref 3.8–10.5)
WBC # BLD: 7.51 K/UL — SIGNIFICANT CHANGE UP (ref 3.8–10.5)
WBC # BLD: 7.51 K/UL — SIGNIFICANT CHANGE UP (ref 3.8–10.5)
WBC # BLD: 7.73 K/UL — SIGNIFICANT CHANGE UP (ref 3.8–10.5)
WBC # BLD: 8.12 K/UL — SIGNIFICANT CHANGE UP (ref 3.8–10.5)
WBC # BLD: 8.4 K/UL — SIGNIFICANT CHANGE UP (ref 3.8–10.5)
WBC # BLD: 8.48 K/UL — SIGNIFICANT CHANGE UP (ref 3.8–10.5)
WBC # BLD: 8.64 K/UL — SIGNIFICANT CHANGE UP (ref 3.8–10.5)
WBC # BLD: 8.77 K/UL — SIGNIFICANT CHANGE UP (ref 3.8–10.5)
WBC # BLD: 8.77 K/UL — SIGNIFICANT CHANGE UP (ref 3.8–10.5)
WBC # BLD: 9.33 K/UL — SIGNIFICANT CHANGE UP (ref 3.8–10.5)
WBC # BLD: 9.45 K/UL — SIGNIFICANT CHANGE UP (ref 3.8–10.5)
WBC # BLD: 9.48 K/UL — SIGNIFICANT CHANGE UP (ref 3.8–10.5)
WBC # BLD: 9.54 K/UL — SIGNIFICANT CHANGE UP (ref 3.8–10.5)
WBC # BLD: 9.69 K/UL — SIGNIFICANT CHANGE UP (ref 3.8–10.5)
WBC # BLD: 9.72 K/UL — SIGNIFICANT CHANGE UP (ref 3.8–10.5)
WBC # BLD: 9.72 K/UL — SIGNIFICANT CHANGE UP (ref 3.8–10.5)
WBC # BLD: 9.75 K/UL — SIGNIFICANT CHANGE UP (ref 3.8–10.5)
WBC # BLD: 9.88 K/UL — SIGNIFICANT CHANGE UP (ref 3.8–10.5)
WBC # FLD AUTO: 10.04 K/UL — SIGNIFICANT CHANGE UP (ref 3.8–10.5)
WBC # FLD AUTO: 10.18 K/UL — SIGNIFICANT CHANGE UP (ref 3.8–10.5)
WBC # FLD AUTO: 10.76 K/UL — HIGH (ref 3.8–10.5)
WBC # FLD AUTO: 10.77 K/UL — HIGH (ref 3.8–10.5)
WBC # FLD AUTO: 10.86 K/UL — HIGH (ref 3.8–10.5)
WBC # FLD AUTO: 11.72 K/UL — HIGH (ref 3.8–10.5)
WBC # FLD AUTO: 12.43 K/UL — HIGH (ref 3.8–10.5)
WBC # FLD AUTO: 12.95 K/UL — HIGH (ref 3.8–10.5)
WBC # FLD AUTO: 13.69 K/UL — HIGH (ref 3.8–10.5)
WBC # FLD AUTO: 14.68 K/UL — HIGH (ref 3.8–10.5)
WBC # FLD AUTO: 14.82 K/UL — HIGH (ref 3.8–10.5)
WBC # FLD AUTO: 15.22 K/UL — HIGH (ref 3.8–10.5)
WBC # FLD AUTO: 15.81 K/UL — HIGH (ref 3.8–10.5)
WBC # FLD AUTO: 16.39 K/UL — HIGH (ref 3.8–10.5)
WBC # FLD AUTO: 16.76 K/UL — HIGH (ref 3.8–10.5)
WBC # FLD AUTO: 19.8 K/UL — HIGH (ref 3.8–10.5)
WBC # FLD AUTO: 20.12 K/UL — HIGH (ref 3.8–10.5)
WBC # FLD AUTO: 5.62 K/UL — SIGNIFICANT CHANGE UP (ref 3.8–10.5)
WBC # FLD AUTO: 5.85 K/UL — SIGNIFICANT CHANGE UP (ref 3.8–10.5)
WBC # FLD AUTO: 6.08 K/UL — SIGNIFICANT CHANGE UP (ref 3.8–10.5)
WBC # FLD AUTO: 6.55 K/UL — SIGNIFICANT CHANGE UP (ref 3.8–10.5)
WBC # FLD AUTO: 7 K/UL — SIGNIFICANT CHANGE UP (ref 3.8–10.5)
WBC # FLD AUTO: 7.02 K/UL — SIGNIFICANT CHANGE UP (ref 3.8–10.5)
WBC # FLD AUTO: 7.12 K/UL — SIGNIFICANT CHANGE UP (ref 3.8–10.5)
WBC # FLD AUTO: 7.33 K/UL — SIGNIFICANT CHANGE UP (ref 3.8–10.5)
WBC # FLD AUTO: 7.51 K/UL — SIGNIFICANT CHANGE UP (ref 3.8–10.5)
WBC # FLD AUTO: 7.51 K/UL — SIGNIFICANT CHANGE UP (ref 3.8–10.5)
WBC # FLD AUTO: 7.73 K/UL — SIGNIFICANT CHANGE UP (ref 3.8–10.5)
WBC # FLD AUTO: 8.12 K/UL — SIGNIFICANT CHANGE UP (ref 3.8–10.5)
WBC # FLD AUTO: 8.4 K/UL — SIGNIFICANT CHANGE UP (ref 3.8–10.5)
WBC # FLD AUTO: 8.48 K/UL — SIGNIFICANT CHANGE UP (ref 3.8–10.5)
WBC # FLD AUTO: 8.64 K/UL — SIGNIFICANT CHANGE UP (ref 3.8–10.5)
WBC # FLD AUTO: 8.77 K/UL — SIGNIFICANT CHANGE UP (ref 3.8–10.5)
WBC # FLD AUTO: 8.77 K/UL — SIGNIFICANT CHANGE UP (ref 3.8–10.5)
WBC # FLD AUTO: 9.33 K/UL — SIGNIFICANT CHANGE UP (ref 3.8–10.5)
WBC # FLD AUTO: 9.45 K/UL — SIGNIFICANT CHANGE UP (ref 3.8–10.5)
WBC # FLD AUTO: 9.48 K/UL — SIGNIFICANT CHANGE UP (ref 3.8–10.5)
WBC # FLD AUTO: 9.54 K/UL — SIGNIFICANT CHANGE UP (ref 3.8–10.5)
WBC # FLD AUTO: 9.69 K/UL — SIGNIFICANT CHANGE UP (ref 3.8–10.5)
WBC # FLD AUTO: 9.72 K/UL — SIGNIFICANT CHANGE UP (ref 3.8–10.5)
WBC # FLD AUTO: 9.72 K/UL — SIGNIFICANT CHANGE UP (ref 3.8–10.5)
WBC # FLD AUTO: 9.75 K/UL — SIGNIFICANT CHANGE UP (ref 3.8–10.5)
WBC # FLD AUTO: 9.88 K/UL — SIGNIFICANT CHANGE UP (ref 3.8–10.5)
WBC UR QL: 1 /HPF — SIGNIFICANT CHANGE UP (ref 0–5)
WBC UR QL: 285 /HPF — HIGH (ref 0–5)

## 2022-01-01 PROCEDURE — 74177 CT ABD & PELVIS W/CONTRAST: CPT | Mod: 26

## 2022-01-01 PROCEDURE — 99232 SBSQ HOSP IP/OBS MODERATE 35: CPT

## 2022-01-01 PROCEDURE — 43235 EGD DIAGNOSTIC BRUSH WASH: CPT

## 2022-01-01 PROCEDURE — 44186 LAP JEJUNOSTOMY: CPT

## 2022-01-01 PROCEDURE — 99232 SBSQ HOSP IP/OBS MODERATE 35: CPT | Mod: GC

## 2022-01-01 PROCEDURE — 88367 INSITU HYBRIDIZATION AUTO: CPT | Mod: 26

## 2022-01-01 PROCEDURE — 99233 SBSQ HOSP IP/OBS HIGH 50: CPT | Mod: GC

## 2022-01-01 PROCEDURE — 99233 SBSQ HOSP IP/OBS HIGH 50: CPT

## 2022-01-01 PROCEDURE — 88341 IMHCHEM/IMCYTCHM EA ADD ANTB: CPT | Mod: 26,59

## 2022-01-01 PROCEDURE — 74018 RADEX ABDOMEN 1 VIEW: CPT | Mod: 26

## 2022-01-01 PROCEDURE — 49203: CPT

## 2022-01-01 PROCEDURE — 43259 EGD US EXAM DUODENUM/JEJUNUM: CPT | Mod: GC

## 2022-01-01 PROCEDURE — 99223 1ST HOSP IP/OBS HIGH 75: CPT | Mod: GC

## 2022-01-01 PROCEDURE — 88341 IMHCHEM/IMCYTCHM EA ADD ANTB: CPT | Mod: 26

## 2022-01-01 PROCEDURE — 88342 IMHCHEM/IMCYTCHM 1ST ANTB: CPT | Mod: 26,59

## 2022-01-01 PROCEDURE — 43239 EGD BIOPSY SINGLE/MULTIPLE: CPT

## 2022-01-01 PROCEDURE — 71045 X-RAY EXAM CHEST 1 VIEW: CPT | Mod: 26

## 2022-01-01 PROCEDURE — 99231 SBSQ HOSP IP/OBS SF/LOW 25: CPT

## 2022-01-01 PROCEDURE — 31645 BRNCHSC W/THER ASPIR 1ST: CPT | Mod: GC

## 2022-01-01 PROCEDURE — 99221 1ST HOSP IP/OBS SF/LOW 40: CPT

## 2022-01-01 PROCEDURE — 88305 TISSUE EXAM BY PATHOLOGIST: CPT | Mod: 26

## 2022-01-01 PROCEDURE — 99497 ADVNCD CARE PLAN 30 MIN: CPT

## 2022-01-01 PROCEDURE — 99222 1ST HOSP IP/OBS MODERATE 55: CPT | Mod: GC

## 2022-01-01 PROCEDURE — 99222 1ST HOSP IP/OBS MODERATE 55: CPT

## 2022-01-01 PROCEDURE — S2900 ROBOTIC SURGICAL SYSTEM: CPT | Mod: NC

## 2022-01-01 PROCEDURE — 99291 CRITICAL CARE FIRST HOUR: CPT

## 2022-01-01 PROCEDURE — 93970 EXTREMITY STUDY: CPT | Mod: 26

## 2022-01-01 PROCEDURE — 74183 MRI ABD W/O CNTR FLWD CNTR: CPT | Mod: 26

## 2022-01-01 PROCEDURE — 93306 TTE W/DOPPLER COMPLETE: CPT | Mod: 26

## 2022-01-01 PROCEDURE — 12345: CPT | Mod: NC,GC

## 2022-01-01 PROCEDURE — 99253 IP/OBS CNSLTJ NEW/EST LOW 45: CPT | Mod: GC

## 2022-01-01 PROCEDURE — 88365 INSITU HYBRIDIZATION (FISH): CPT | Mod: 26,59

## 2022-01-01 PROCEDURE — 93010 ELECTROCARDIOGRAM REPORT: CPT

## 2022-01-01 PROCEDURE — 71260 CT THORAX DX C+: CPT | Mod: 26

## 2022-01-01 PROCEDURE — 88342 IMHCHEM/IMCYTCHM 1ST ANTB: CPT | Mod: 26

## 2022-01-01 PROCEDURE — 99233 SBSQ HOSP IP/OBS HIGH 50: CPT | Mod: GC,25

## 2022-01-01 PROCEDURE — 74177 CT ABD & PELVIS W/CONTRAST: CPT | Mod: 26,MA

## 2022-01-01 PROCEDURE — 71275 CT ANGIOGRAPHY CHEST: CPT | Mod: 26

## 2022-01-01 PROCEDURE — 88112 CYTOPATH CELL ENHANCE TECH: CPT | Mod: 26

## 2022-01-01 PROCEDURE — 88173 CYTOPATH EVAL FNA REPORT: CPT | Mod: 26

## 2022-01-01 PROCEDURE — 31652 BRONCH EBUS SAMPLNG 1/2 NODE: CPT | Mod: GC

## 2022-01-01 PROCEDURE — 88305 TISSUE EXAM BY PATHOLOGIST: CPT | Mod: 26,59

## 2022-01-01 PROCEDURE — 88360 TUMOR IMMUNOHISTOCHEM/MANUAL: CPT | Mod: 26,59

## 2022-01-01 PROCEDURE — 76700 US EXAM ABDOM COMPLETE: CPT | Mod: 26

## 2022-01-01 PROCEDURE — 99223 1ST HOSP IP/OBS HIGH 75: CPT

## 2022-01-01 PROCEDURE — 99024 POSTOP FOLLOW-UP VISIT: CPT

## 2022-01-01 DEVICE — HYDRATOME 44: Type: IMPLANTABLE DEVICE | Status: FUNCTIONAL

## 2022-01-01 RX ORDER — METHADONE HYDROCHLORIDE 40 MG/1
5 TABLET ORAL EVERY 12 HOURS
Refills: 0 | Status: DISCONTINUED | OUTPATIENT
Start: 2022-01-01 | End: 2022-01-01

## 2022-01-01 RX ORDER — MORPHINE SULFATE 50 MG/1
7.5 CAPSULE, EXTENDED RELEASE ORAL
Qty: 900 | Refills: 0
Start: 2022-01-01 | End: 2022-01-01

## 2022-01-01 RX ORDER — HYDROMORPHONE HYDROCHLORIDE 2 MG/ML
1 INJECTION INTRAMUSCULAR; INTRAVENOUS; SUBCUTANEOUS EVERY 4 HOURS
Refills: 0 | Status: DISCONTINUED | OUTPATIENT
Start: 2022-01-01 | End: 2022-01-01

## 2022-01-01 RX ORDER — ENOXAPARIN SODIUM 100 MG/ML
100 INJECTION SUBCUTANEOUS
Qty: 300 | Refills: 3
Start: 2022-01-01 | End: 2023-01-14

## 2022-01-01 RX ORDER — HYDROMORPHONE HYDROCHLORIDE 2 MG/ML
0.9 INJECTION INTRAMUSCULAR; INTRAVENOUS; SUBCUTANEOUS
Refills: 0 | Status: DISCONTINUED | OUTPATIENT
Start: 2022-01-01 | End: 2022-01-01

## 2022-01-01 RX ORDER — HYDROMORPHONE HYDROCHLORIDE 2 MG/ML
0.5 INJECTION INTRAMUSCULAR; INTRAVENOUS; SUBCUTANEOUS EVERY 6 HOURS
Refills: 0 | Status: DISCONTINUED | OUTPATIENT
Start: 2022-01-01 | End: 2022-01-01

## 2022-01-01 RX ORDER — ENOXAPARIN SODIUM 100 MG/ML
40 INJECTION SUBCUTANEOUS EVERY 24 HOURS
Refills: 0 | Status: DISCONTINUED | OUTPATIENT
Start: 2022-01-01 | End: 2022-01-01

## 2022-01-01 RX ORDER — LANOLIN ALCOHOL/MO/W.PET/CERES
3 CREAM (GRAM) TOPICAL AT BEDTIME
Refills: 0 | Status: DISCONTINUED | OUTPATIENT
Start: 2022-01-01 | End: 2022-01-01

## 2022-01-01 RX ORDER — MAGNESIUM SULFATE 500 MG/ML
2 VIAL (ML) INJECTION ONCE
Refills: 0 | Status: COMPLETED | OUTPATIENT
Start: 2022-01-01 | End: 2022-01-01

## 2022-01-01 RX ORDER — SODIUM CHLORIDE 9 MG/ML
2500 INJECTION INTRAMUSCULAR; INTRAVENOUS; SUBCUTANEOUS
Refills: 0 | Status: DISCONTINUED | OUTPATIENT
Start: 2022-01-01 | End: 2022-01-01

## 2022-01-01 RX ORDER — CALCIUM CARBONATE 500(1250)
1 TABLET ORAL THREE TIMES A DAY
Refills: 0 | Status: DISCONTINUED | OUTPATIENT
Start: 2022-01-01 | End: 2022-01-01

## 2022-01-01 RX ORDER — POLYETHYLENE GLYCOL 3350 17 G/17G
17 POWDER, FOR SOLUTION ORAL DAILY
Refills: 0 | Status: DISCONTINUED | OUTPATIENT
Start: 2022-01-01 | End: 2022-01-01

## 2022-01-01 RX ORDER — METHADONE HYDROCHLORIDE 40 MG/1
1 TABLET ORAL
Qty: 180 | Refills: 0
Start: 2022-01-01 | End: 2022-12-11

## 2022-01-01 RX ORDER — METOCLOPRAMIDE HCL 10 MG
5 TABLET ORAL ONCE
Refills: 0 | Status: COMPLETED | OUTPATIENT
Start: 2022-01-01 | End: 2022-01-01

## 2022-01-01 RX ORDER — MORPHINE SULFATE 50 MG/1
15 CAPSULE, EXTENDED RELEASE ORAL
Refills: 0 | Status: DISCONTINUED | OUTPATIENT
Start: 2022-01-01 | End: 2022-01-01

## 2022-01-01 RX ORDER — SENNA PLUS 8.6 MG/1
2 TABLET ORAL AT BEDTIME
Refills: 0 | Status: DISCONTINUED | OUTPATIENT
Start: 2022-01-01 | End: 2022-01-01

## 2022-01-01 RX ORDER — SODIUM CHLORIDE 9 MG/ML
2000 INJECTION INTRAMUSCULAR; INTRAVENOUS; SUBCUTANEOUS ONCE
Refills: 0 | Status: COMPLETED | OUTPATIENT
Start: 2022-01-01 | End: 2022-01-01

## 2022-01-01 RX ORDER — KETOROLAC TROMETHAMINE 30 MG/ML
15 SYRINGE (ML) INJECTION ONCE
Refills: 0 | Status: DISCONTINUED | OUTPATIENT
Start: 2022-01-01 | End: 2022-01-01

## 2022-01-01 RX ORDER — ONDANSETRON 8 MG/1
4 TABLET, FILM COATED ORAL THREE TIMES A DAY
Refills: 0 | Status: DISCONTINUED | OUTPATIENT
Start: 2022-01-01 | End: 2022-01-01

## 2022-01-01 RX ORDER — LIDOCAINE 4 G/100G
1 CREAM TOPICAL ONCE
Refills: 0 | Status: COMPLETED | OUTPATIENT
Start: 2022-01-01 | End: 2022-01-01

## 2022-01-01 RX ORDER — MORPHINE SULFATE 50 MG/1
2 CAPSULE, EXTENDED RELEASE ORAL ONCE
Refills: 0 | Status: DISCONTINUED | OUTPATIENT
Start: 2022-01-01 | End: 2022-01-01

## 2022-01-01 RX ORDER — PIPERACILLIN AND TAZOBACTAM 4; .5 G/20ML; G/20ML
3.38 INJECTION, POWDER, LYOPHILIZED, FOR SOLUTION INTRAVENOUS ONCE
Refills: 0 | Status: COMPLETED | OUTPATIENT
Start: 2022-01-01 | End: 2022-01-01

## 2022-01-01 RX ORDER — MORPHINE SULFATE 50 MG/1
10 CAPSULE, EXTENDED RELEASE ORAL EVERY 4 HOURS
Refills: 0 | Status: DISCONTINUED | OUTPATIENT
Start: 2022-01-01 | End: 2022-01-01

## 2022-01-01 RX ORDER — METOCLOPRAMIDE HCL 10 MG
5 TABLET ORAL ONCE
Refills: 0 | Status: DISCONTINUED | OUTPATIENT
Start: 2022-01-01 | End: 2022-01-01

## 2022-01-01 RX ORDER — MORPHINE SULFATE 50 MG/1
20 CAPSULE, EXTENDED RELEASE ORAL EVERY 4 HOURS
Refills: 0 | Status: DISCONTINUED | OUTPATIENT
Start: 2022-01-01 | End: 2022-01-01

## 2022-01-01 RX ORDER — METOCLOPRAMIDE HCL 10 MG
5 TABLET ORAL EVERY 4 HOURS
Refills: 0 | Status: DISCONTINUED | OUTPATIENT
Start: 2022-01-01 | End: 2022-01-01

## 2022-01-01 RX ORDER — PIPERACILLIN AND TAZOBACTAM 4; .5 G/20ML; G/20ML
3.38 INJECTION, POWDER, LYOPHILIZED, FOR SOLUTION INTRAVENOUS EVERY 8 HOURS
Refills: 0 | Status: DISCONTINUED | OUTPATIENT
Start: 2022-01-01 | End: 2022-01-01

## 2022-01-01 RX ORDER — HYDROMORPHONE HYDROCHLORIDE 2 MG/ML
2 INJECTION INTRAMUSCULAR; INTRAVENOUS; SUBCUTANEOUS EVERY 4 HOURS
Refills: 0 | Status: DISCONTINUED | OUTPATIENT
Start: 2022-01-01 | End: 2022-01-01

## 2022-01-01 RX ORDER — HYDROMORPHONE HYDROCHLORIDE 2 MG/ML
1.5 INJECTION INTRAMUSCULAR; INTRAVENOUS; SUBCUTANEOUS ONCE
Refills: 0 | Status: DISCONTINUED | OUTPATIENT
Start: 2022-01-01 | End: 2022-01-01

## 2022-01-01 RX ORDER — HYDROMORPHONE HYDROCHLORIDE 2 MG/ML
0.3 INJECTION INTRAMUSCULAR; INTRAVENOUS; SUBCUTANEOUS
Refills: 0 | Status: DISCONTINUED | OUTPATIENT
Start: 2022-01-01 | End: 2022-01-01

## 2022-01-01 RX ORDER — HYDROMORPHONE HYDROCHLORIDE 2 MG/ML
1 INJECTION INTRAMUSCULAR; INTRAVENOUS; SUBCUTANEOUS
Qty: 100 | Refills: 0 | Status: DISCONTINUED | OUTPATIENT
Start: 2022-01-01 | End: 2022-01-01

## 2022-01-01 RX ORDER — SODIUM CHLORIDE 9 MG/ML
1000 INJECTION INTRAMUSCULAR; INTRAVENOUS; SUBCUTANEOUS
Refills: 0 | Status: DISCONTINUED | OUTPATIENT
Start: 2022-01-01 | End: 2022-01-01

## 2022-01-01 RX ORDER — HYDROMORPHONE HYDROCHLORIDE 2 MG/ML
2 INJECTION INTRAMUSCULAR; INTRAVENOUS; SUBCUTANEOUS
Refills: 0 | Status: DISCONTINUED | OUTPATIENT
Start: 2022-01-01 | End: 2022-01-01

## 2022-01-01 RX ORDER — MORPHINE SULFATE 50 MG/1
10 CAPSULE, EXTENDED RELEASE ORAL
Qty: 600 | Refills: 0
Start: 2022-01-01 | End: 2022-01-01

## 2022-01-01 RX ORDER — MAGNESIUM SULFATE 500 MG/ML
2 VIAL (ML) INJECTION
Refills: 0 | Status: COMPLETED | OUTPATIENT
Start: 2022-01-01 | End: 2022-01-01

## 2022-01-01 RX ORDER — POTASSIUM CHLORIDE 20 MEQ
40 PACKET (EA) ORAL ONCE
Refills: 0 | Status: COMPLETED | OUTPATIENT
Start: 2022-01-01 | End: 2022-01-01

## 2022-01-01 RX ORDER — PANTOPRAZOLE SODIUM 20 MG/1
40 TABLET, DELAYED RELEASE ORAL EVERY 12 HOURS
Refills: 0 | Status: DISCONTINUED | OUTPATIENT
Start: 2022-01-01 | End: 2022-01-01

## 2022-01-01 RX ORDER — LIDOCAINE 4 G/100G
1 CREAM TOPICAL DAILY
Refills: 0 | Status: DISCONTINUED | OUTPATIENT
Start: 2022-01-01 | End: 2022-01-01

## 2022-01-01 RX ORDER — SODIUM CHLORIDE 9 MG/ML
1000 INJECTION, SOLUTION INTRAVENOUS
Refills: 0 | Status: DISCONTINUED | OUTPATIENT
Start: 2022-01-01 | End: 2022-01-01

## 2022-01-01 RX ORDER — HYDROMORPHONE HYDROCHLORIDE 2 MG/ML
0.5 INJECTION INTRAMUSCULAR; INTRAVENOUS; SUBCUTANEOUS
Refills: 0 | Status: DISCONTINUED | OUTPATIENT
Start: 2022-01-01 | End: 2022-01-01

## 2022-01-01 RX ORDER — ENOXAPARIN SODIUM 100 MG/ML
70 INJECTION SUBCUTANEOUS EVERY 12 HOURS
Refills: 0 | Status: DISCONTINUED | OUTPATIENT
Start: 2022-01-01 | End: 2022-01-01

## 2022-01-01 RX ORDER — MORPHINE SULFATE 50 MG/1
10 CAPSULE, EXTENDED RELEASE ORAL
Refills: 0 | Status: DISCONTINUED | OUTPATIENT
Start: 2022-01-01 | End: 2022-01-01

## 2022-01-01 RX ORDER — POLYETHYLENE GLYCOL 3350 17 G/17G
17 POWDER, FOR SOLUTION ORAL
Refills: 0 | Status: DISCONTINUED | OUTPATIENT
Start: 2022-01-01 | End: 2022-01-01

## 2022-01-01 RX ORDER — LANOLIN ALCOHOL/MO/W.PET/CERES
3 CREAM (GRAM) TOPICAL ONCE
Refills: 0 | Status: COMPLETED | OUTPATIENT
Start: 2022-01-01 | End: 2022-01-01

## 2022-01-01 RX ORDER — ACETAMINOPHEN 500 MG
1000 TABLET ORAL ONCE
Refills: 0 | Status: COMPLETED | OUTPATIENT
Start: 2022-01-01 | End: 2022-01-01

## 2022-01-01 RX ORDER — NALOXONE HYDROCHLORIDE 4 MG/.1ML
4 SPRAY NASAL
Qty: 1 | Refills: 0
Start: 2022-01-01 | End: 2022-01-01

## 2022-01-01 RX ORDER — PANTOPRAZOLE SODIUM 20 MG/1
40 TABLET, DELAYED RELEASE ORAL
Refills: 0 | Status: DISCONTINUED | OUTPATIENT
Start: 2022-01-01 | End: 2022-01-01

## 2022-01-01 RX ORDER — MULTIVIT-MIN/FERROUS GLUCONATE 9 MG/15 ML
1 LIQUID (ML) ORAL DAILY
Refills: 0 | Status: DISCONTINUED | OUTPATIENT
Start: 2022-01-01 | End: 2022-01-01

## 2022-01-01 RX ORDER — METOCLOPRAMIDE HCL 10 MG
10 TABLET ORAL ONCE
Refills: 0 | Status: COMPLETED | OUTPATIENT
Start: 2022-01-01 | End: 2022-01-01

## 2022-01-01 RX ORDER — ONDANSETRON 8 MG/1
4 TABLET, FILM COATED ORAL ONCE
Refills: 0 | Status: COMPLETED | OUTPATIENT
Start: 2022-01-01 | End: 2022-01-01

## 2022-01-01 RX ORDER — ACETAMINOPHEN 500 MG
650 TABLET ORAL EVERY 6 HOURS
Refills: 0 | Status: DISCONTINUED | OUTPATIENT
Start: 2022-01-01 | End: 2022-01-01

## 2022-01-01 RX ORDER — POTASSIUM PHOSPHATE, MONOBASIC POTASSIUM PHOSPHATE, DIBASIC 236; 224 MG/ML; MG/ML
15 INJECTION, SOLUTION INTRAVENOUS ONCE
Refills: 0 | Status: COMPLETED | OUTPATIENT
Start: 2022-01-01 | End: 2022-01-01

## 2022-01-01 RX ORDER — ENOXAPARIN SODIUM 100 MG/ML
30 INJECTION SUBCUTANEOUS EVERY 24 HOURS
Refills: 0 | Status: DISCONTINUED | OUTPATIENT
Start: 2022-01-01 | End: 2022-01-01

## 2022-01-01 RX ORDER — POTASSIUM CHLORIDE 20 MEQ
10 PACKET (EA) ORAL ONCE
Refills: 0 | Status: COMPLETED | OUTPATIENT
Start: 2022-01-01 | End: 2022-01-01

## 2022-01-01 RX ORDER — HYDROMORPHONE HYDROCHLORIDE 2 MG/ML
1.5 INJECTION INTRAMUSCULAR; INTRAVENOUS; SUBCUTANEOUS EVERY 4 HOURS
Refills: 0 | Status: DISCONTINUED | OUTPATIENT
Start: 2022-01-01 | End: 2022-01-01

## 2022-01-01 RX ORDER — HYDROMORPHONE HYDROCHLORIDE 2 MG/ML
0.6 INJECTION INTRAMUSCULAR; INTRAVENOUS; SUBCUTANEOUS
Refills: 0 | Status: DISCONTINUED | OUTPATIENT
Start: 2022-01-01 | End: 2022-01-01

## 2022-01-01 RX ORDER — ENOXAPARIN SODIUM 100 MG/ML
100 INJECTION SUBCUTANEOUS
Qty: 9000 | Refills: 0
Start: 2022-01-01 | End: 2022-12-10

## 2022-01-01 RX ORDER — ONDANSETRON 8 MG/1
8 TABLET, FILM COATED ORAL EVERY 8 HOURS
Refills: 0 | Status: DISCONTINUED | OUTPATIENT
Start: 2022-01-01 | End: 2022-01-01

## 2022-01-01 RX ORDER — ONDANSETRON 8 MG/1
4 TABLET, FILM COATED ORAL EVERY 6 HOURS
Refills: 0 | Status: DISCONTINUED | OUTPATIENT
Start: 2022-01-01 | End: 2022-01-01

## 2022-01-01 RX ORDER — ONDANSETRON 8 MG/1
4 TABLET, FILM COATED ORAL
Refills: 0 | Status: DISCONTINUED | OUTPATIENT
Start: 2022-01-01 | End: 2022-01-01

## 2022-01-01 RX ORDER — HYDROMORPHONE HYDROCHLORIDE 2 MG/ML
0.5 INJECTION INTRAMUSCULAR; INTRAVENOUS; SUBCUTANEOUS
Qty: 10 | Refills: 0 | Status: DISCONTINUED | OUTPATIENT
Start: 2022-01-01 | End: 2022-01-01

## 2022-01-01 RX ORDER — METOCLOPRAMIDE HCL 10 MG
5 TABLET ORAL EVERY 4 HOURS
Refills: 0 | Status: COMPLETED | OUTPATIENT
Start: 2022-01-01 | End: 2022-01-01

## 2022-01-01 RX ORDER — LANOLIN ALCOHOL/MO/W.PET/CERES
3 CREAM (GRAM) TOPICAL ONCE
Refills: 0 | Status: DISCONTINUED | OUTPATIENT
Start: 2022-01-01 | End: 2022-01-01

## 2022-01-01 RX ORDER — HYDROMORPHONE HYDROCHLORIDE 2 MG/ML
1.5 INJECTION INTRAMUSCULAR; INTRAVENOUS; SUBCUTANEOUS
Refills: 0 | Status: DISCONTINUED | OUTPATIENT
Start: 2022-01-01 | End: 2022-01-01

## 2022-01-01 RX ORDER — HYDROMORPHONE HYDROCHLORIDE 2 MG/ML
0.6 INJECTION INTRAMUSCULAR; INTRAVENOUS; SUBCUTANEOUS EVERY 4 HOURS
Refills: 0 | Status: DISCONTINUED | OUTPATIENT
Start: 2022-01-01 | End: 2022-01-01

## 2022-01-01 RX ORDER — HYDROMORPHONE HYDROCHLORIDE 2 MG/ML
1 INJECTION INTRAMUSCULAR; INTRAVENOUS; SUBCUTANEOUS
Refills: 0 | Status: DISCONTINUED | OUTPATIENT
Start: 2022-01-01 | End: 2022-01-01

## 2022-01-01 RX ORDER — SODIUM CHLORIDE 9 MG/ML
1000 INJECTION INTRAMUSCULAR; INTRAVENOUS; SUBCUTANEOUS ONCE
Refills: 0 | Status: DISCONTINUED | OUTPATIENT
Start: 2022-01-01 | End: 2022-01-01

## 2022-01-01 RX ORDER — POLYETHYLENE GLYCOL 3350 17 G/17G
17 POWDER, FOR SOLUTION ORAL
Qty: 510 | Refills: 0
Start: 2022-01-01 | End: 2022-10-16

## 2022-01-01 RX ORDER — ENOXAPARIN SODIUM 100 MG/ML
65 INJECTION SUBCUTANEOUS EVERY 12 HOURS
Refills: 0 | Status: DISCONTINUED | OUTPATIENT
Start: 2022-01-01 | End: 2022-01-01

## 2022-01-01 RX ORDER — HYDROMORPHONE HYDROCHLORIDE 2 MG/ML
0.5 INJECTION INTRAMUSCULAR; INTRAVENOUS; SUBCUTANEOUS
Qty: 100 | Refills: 0 | Status: DISCONTINUED | OUTPATIENT
Start: 2022-01-01 | End: 2022-01-01

## 2022-01-01 RX ORDER — METHADONE HYDROCHLORIDE 40 MG/1
2.5 TABLET ORAL
Qty: 50 | Refills: 0
Start: 2022-01-01 | End: 2022-01-01

## 2022-01-01 RX ORDER — MORPHINE SULFATE 50 MG/1
15 CAPSULE, EXTENDED RELEASE ORAL EVERY 4 HOURS
Refills: 0 | Status: DISCONTINUED | OUTPATIENT
Start: 2022-01-01 | End: 2022-01-01

## 2022-01-01 RX ORDER — FENTANYL CITRATE 50 UG/ML
25 INJECTION INTRAVENOUS
Refills: 0 | Status: DISCONTINUED | OUTPATIENT
Start: 2022-01-01 | End: 2022-01-01

## 2022-01-01 RX ORDER — SALIVA SUBSTITUTE COMB NO.11 351 MG
5 POWDER IN PACKET (EA) MUCOUS MEMBRANE
Refills: 0 | Status: DISCONTINUED | OUTPATIENT
Start: 2022-01-01 | End: 2022-01-01

## 2022-01-01 RX ORDER — PANTOPRAZOLE SODIUM 20 MG/1
40 TABLET, DELAYED RELEASE ORAL DAILY
Refills: 0 | Status: DISCONTINUED | OUTPATIENT
Start: 2022-01-01 | End: 2022-01-01

## 2022-01-01 RX ORDER — METOCLOPRAMIDE HCL 10 MG
5 TABLET ORAL
Qty: 300 | Refills: 2
Start: 2022-01-01 | End: 2022-10-12

## 2022-01-01 RX ORDER — POTASSIUM CHLORIDE 20 MEQ
10 PACKET (EA) ORAL
Refills: 0 | Status: COMPLETED | OUTPATIENT
Start: 2022-01-01 | End: 2022-01-01

## 2022-01-01 RX ORDER — POLYETHYLENE GLYCOL 3350 17 G/17G
17 POWDER, FOR SOLUTION ORAL
Qty: 0 | Refills: 0 | DISCHARGE
Start: 2022-01-01

## 2022-01-01 RX ORDER — HYDROMORPHONE HYDROCHLORIDE 2 MG/ML
0.8 INJECTION INTRAMUSCULAR; INTRAVENOUS; SUBCUTANEOUS
Qty: 100 | Refills: 0 | Status: DISCONTINUED | OUTPATIENT
Start: 2022-01-01 | End: 2022-01-01

## 2022-01-01 RX ORDER — METHADONE HYDROCHLORIDE 40 MG/1
7.5 TABLET ORAL EVERY 12 HOURS
Refills: 0 | Status: DISCONTINUED | OUTPATIENT
Start: 2022-01-01 | End: 2022-01-01

## 2022-01-01 RX ORDER — HYDROMORPHONE HYDROCHLORIDE 2 MG/ML
1 INJECTION INTRAMUSCULAR; INTRAVENOUS; SUBCUTANEOUS EVERY 6 HOURS
Refills: 0 | Status: DISCONTINUED | OUTPATIENT
Start: 2022-01-01 | End: 2022-01-01

## 2022-01-01 RX ADMIN — METHADONE HYDROCHLORIDE 5 MILLIGRAM(S): 40 TABLET ORAL at 05:02

## 2022-01-01 RX ADMIN — LIDOCAINE 1 PATCH: 4 CREAM TOPICAL at 18:25

## 2022-01-01 RX ADMIN — HYDROMORPHONE HYDROCHLORIDE 0.5 MILLIGRAM(S): 2 INJECTION INTRAMUSCULAR; INTRAVENOUS; SUBCUTANEOUS at 08:54

## 2022-01-01 RX ADMIN — PIPERACILLIN AND TAZOBACTAM 25 GRAM(S): 4; .5 INJECTION, POWDER, LYOPHILIZED, FOR SOLUTION INTRAVENOUS at 06:29

## 2022-01-01 RX ADMIN — Medication 5 MILLILITER(S): at 21:52

## 2022-01-01 RX ADMIN — Medication 5 MILLILITER(S): at 06:50

## 2022-01-01 RX ADMIN — HYDROMORPHONE HYDROCHLORIDE 1.5 MILLIGRAM(S): 2 INJECTION INTRAMUSCULAR; INTRAVENOUS; SUBCUTANEOUS at 06:16

## 2022-01-01 RX ADMIN — POLYETHYLENE GLYCOL 3350 17 GRAM(S): 17 POWDER, FOR SOLUTION ORAL at 12:20

## 2022-01-01 RX ADMIN — MORPHINE SULFATE 15 MILLIGRAM(S): 50 CAPSULE, EXTENDED RELEASE ORAL at 22:36

## 2022-01-01 RX ADMIN — HYDROMORPHONE HYDROCHLORIDE 0.8 MG/HR: 2 INJECTION INTRAMUSCULAR; INTRAVENOUS; SUBCUTANEOUS at 20:06

## 2022-01-01 RX ADMIN — Medication 5 MILLIGRAM(S): at 06:15

## 2022-01-01 RX ADMIN — LIDOCAINE 1 PATCH: 4 CREAM TOPICAL at 19:04

## 2022-01-01 RX ADMIN — PANTOPRAZOLE SODIUM 40 MILLIGRAM(S): 20 TABLET, DELAYED RELEASE ORAL at 05:04

## 2022-01-01 RX ADMIN — Medication 5 MILLIGRAM(S): at 06:21

## 2022-01-01 RX ADMIN — PANTOPRAZOLE SODIUM 40 MILLIGRAM(S): 20 TABLET, DELAYED RELEASE ORAL at 11:54

## 2022-01-01 RX ADMIN — Medication 5 MILLILITER(S): at 18:45

## 2022-01-01 RX ADMIN — SODIUM CHLORIDE 75 MILLILITER(S): 9 INJECTION INTRAMUSCULAR; INTRAVENOUS; SUBCUTANEOUS at 11:32

## 2022-01-01 RX ADMIN — MORPHINE SULFATE 15 MILLIGRAM(S): 50 CAPSULE, EXTENDED RELEASE ORAL at 02:30

## 2022-01-01 RX ADMIN — LIDOCAINE 1 PATCH: 4 CREAM TOPICAL at 11:20

## 2022-01-01 RX ADMIN — HYDROMORPHONE HYDROCHLORIDE 1 MILLIGRAM(S): 2 INJECTION INTRAMUSCULAR; INTRAVENOUS; SUBCUTANEOUS at 06:23

## 2022-01-01 RX ADMIN — LIDOCAINE 1 PATCH: 4 CREAM TOPICAL at 19:00

## 2022-01-01 RX ADMIN — Medication 5 MILLIGRAM(S): at 15:22

## 2022-01-01 RX ADMIN — LIDOCAINE 1 PATCH: 4 CREAM TOPICAL at 11:35

## 2022-01-01 RX ADMIN — MORPHINE SULFATE 15 MILLIGRAM(S): 50 CAPSULE, EXTENDED RELEASE ORAL at 20:55

## 2022-01-01 RX ADMIN — HYDROMORPHONE HYDROCHLORIDE 1 MILLIGRAM(S): 2 INJECTION INTRAMUSCULAR; INTRAVENOUS; SUBCUTANEOUS at 13:50

## 2022-01-01 RX ADMIN — Medication 5 MILLILITER(S): at 06:42

## 2022-01-01 RX ADMIN — LIDOCAINE 1 PATCH: 4 CREAM TOPICAL at 20:00

## 2022-01-01 RX ADMIN — LIDOCAINE 1 PATCH: 4 CREAM TOPICAL at 13:36

## 2022-01-01 RX ADMIN — HYDROMORPHONE HYDROCHLORIDE 1.5 MILLIGRAM(S): 2 INJECTION INTRAMUSCULAR; INTRAVENOUS; SUBCUTANEOUS at 03:47

## 2022-01-01 RX ADMIN — ENOXAPARIN SODIUM 65 MILLIGRAM(S): 100 INJECTION SUBCUTANEOUS at 18:30

## 2022-01-01 RX ADMIN — Medication 5 MILLIGRAM(S): at 21:46

## 2022-01-01 RX ADMIN — LIDOCAINE 1 PATCH: 4 CREAM TOPICAL at 18:48

## 2022-01-01 RX ADMIN — Medication 5 MILLILITER(S): at 05:36

## 2022-01-01 RX ADMIN — LIDOCAINE 1 PATCH: 4 CREAM TOPICAL at 13:07

## 2022-01-01 RX ADMIN — PANTOPRAZOLE SODIUM 40 MILLIGRAM(S): 20 TABLET, DELAYED RELEASE ORAL at 18:42

## 2022-01-01 RX ADMIN — Medication 5 MILLIGRAM(S): at 22:05

## 2022-01-01 RX ADMIN — HYDROMORPHONE HYDROCHLORIDE 2 MILLIGRAM(S): 2 INJECTION INTRAMUSCULAR; INTRAVENOUS; SUBCUTANEOUS at 13:27

## 2022-01-01 RX ADMIN — PANTOPRAZOLE SODIUM 40 MILLIGRAM(S): 20 TABLET, DELAYED RELEASE ORAL at 05:34

## 2022-01-01 RX ADMIN — MORPHINE SULFATE 15 MILLIGRAM(S): 50 CAPSULE, EXTENDED RELEASE ORAL at 17:00

## 2022-01-01 RX ADMIN — ONDANSETRON 8 MILLIGRAM(S): 8 TABLET, FILM COATED ORAL at 22:21

## 2022-01-01 RX ADMIN — ONDANSETRON 4 MILLIGRAM(S): 8 TABLET, FILM COATED ORAL at 20:43

## 2022-01-01 RX ADMIN — HYDROMORPHONE HYDROCHLORIDE 1 MILLIGRAM(S): 2 INJECTION INTRAMUSCULAR; INTRAVENOUS; SUBCUTANEOUS at 13:09

## 2022-01-01 RX ADMIN — LIDOCAINE 1 PATCH: 4 CREAM TOPICAL at 15:15

## 2022-01-01 RX ADMIN — ENOXAPARIN SODIUM 40 MILLIGRAM(S): 100 INJECTION SUBCUTANEOUS at 06:28

## 2022-01-01 RX ADMIN — PIPERACILLIN AND TAZOBACTAM 200 GRAM(S): 4; .5 INJECTION, POWDER, LYOPHILIZED, FOR SOLUTION INTRAVENOUS at 17:12

## 2022-01-01 RX ADMIN — Medication 5 MILLIGRAM(S): at 02:11

## 2022-01-01 RX ADMIN — MORPHINE SULFATE 20 MILLIGRAM(S): 50 CAPSULE, EXTENDED RELEASE ORAL at 13:34

## 2022-01-01 RX ADMIN — HYDROMORPHONE HYDROCHLORIDE 0.6 MILLIGRAM(S): 2 INJECTION INTRAMUSCULAR; INTRAVENOUS; SUBCUTANEOUS at 04:15

## 2022-01-01 RX ADMIN — MORPHINE SULFATE 15 MILLIGRAM(S): 50 CAPSULE, EXTENDED RELEASE ORAL at 11:22

## 2022-01-01 RX ADMIN — LIDOCAINE 1 PATCH: 4 CREAM TOPICAL at 00:00

## 2022-01-01 RX ADMIN — ONDANSETRON 4 MILLIGRAM(S): 8 TABLET, FILM COATED ORAL at 18:11

## 2022-01-01 RX ADMIN — HYDROMORPHONE HYDROCHLORIDE 0.9 MILLIGRAM(S): 2 INJECTION INTRAMUSCULAR; INTRAVENOUS; SUBCUTANEOUS at 04:15

## 2022-01-01 RX ADMIN — ONDANSETRON 4 MILLIGRAM(S): 8 TABLET, FILM COATED ORAL at 09:54

## 2022-01-01 RX ADMIN — MORPHINE SULFATE 15 MILLIGRAM(S): 50 CAPSULE, EXTENDED RELEASE ORAL at 14:12

## 2022-01-01 RX ADMIN — HYDROMORPHONE HYDROCHLORIDE 1.5 MILLIGRAM(S): 2 INJECTION INTRAMUSCULAR; INTRAVENOUS; SUBCUTANEOUS at 22:10

## 2022-01-01 RX ADMIN — LIDOCAINE 1 PATCH: 4 CREAM TOPICAL at 12:59

## 2022-01-01 RX ADMIN — HYDROMORPHONE HYDROCHLORIDE 1.5 MILLIGRAM(S): 2 INJECTION INTRAMUSCULAR; INTRAVENOUS; SUBCUTANEOUS at 14:31

## 2022-01-01 RX ADMIN — POLYETHYLENE GLYCOL 3350 17 GRAM(S): 17 POWDER, FOR SOLUTION ORAL at 13:38

## 2022-01-01 RX ADMIN — MORPHINE SULFATE 15 MILLIGRAM(S): 50 CAPSULE, EXTENDED RELEASE ORAL at 06:54

## 2022-01-01 RX ADMIN — SODIUM CHLORIDE 75 MILLILITER(S): 9 INJECTION INTRAMUSCULAR; INTRAVENOUS; SUBCUTANEOUS at 19:12

## 2022-01-01 RX ADMIN — MORPHINE SULFATE 15 MILLIGRAM(S): 50 CAPSULE, EXTENDED RELEASE ORAL at 07:31

## 2022-01-01 RX ADMIN — Medication 5 MILLIGRAM(S): at 22:00

## 2022-01-01 RX ADMIN — Medication 5 MILLILITER(S): at 06:10

## 2022-01-01 RX ADMIN — HYDROMORPHONE HYDROCHLORIDE 1 MILLIGRAM(S): 2 INJECTION INTRAMUSCULAR; INTRAVENOUS; SUBCUTANEOUS at 17:56

## 2022-01-01 RX ADMIN — Medication 5 MILLIGRAM(S): at 22:10

## 2022-01-01 RX ADMIN — MORPHINE SULFATE 15 MILLIGRAM(S): 50 CAPSULE, EXTENDED RELEASE ORAL at 04:00

## 2022-01-01 RX ADMIN — HYDROMORPHONE HYDROCHLORIDE 0.6 MILLIGRAM(S): 2 INJECTION INTRAMUSCULAR; INTRAVENOUS; SUBCUTANEOUS at 04:05

## 2022-01-01 RX ADMIN — Medication 5 MILLIGRAM(S): at 14:02

## 2022-01-01 RX ADMIN — HYDROMORPHONE HYDROCHLORIDE 1 MILLIGRAM(S): 2 INJECTION INTRAMUSCULAR; INTRAVENOUS; SUBCUTANEOUS at 20:41

## 2022-01-01 RX ADMIN — SODIUM CHLORIDE 2000 MILLILITER(S): 9 INJECTION INTRAMUSCULAR; INTRAVENOUS; SUBCUTANEOUS at 16:01

## 2022-01-01 RX ADMIN — MORPHINE SULFATE 15 MILLIGRAM(S): 50 CAPSULE, EXTENDED RELEASE ORAL at 00:46

## 2022-01-01 RX ADMIN — Medication 5 MILLIGRAM(S): at 14:22

## 2022-01-01 RX ADMIN — MORPHINE SULFATE 15 MILLIGRAM(S): 50 CAPSULE, EXTENDED RELEASE ORAL at 23:39

## 2022-01-01 RX ADMIN — Medication 5 MILLIGRAM(S): at 04:03

## 2022-01-01 RX ADMIN — LIDOCAINE 1 PATCH: 4 CREAM TOPICAL at 19:02

## 2022-01-01 RX ADMIN — Medication 15 MILLIGRAM(S): at 14:06

## 2022-01-01 RX ADMIN — HYDROMORPHONE HYDROCHLORIDE 0.5 MILLIGRAM(S): 2 INJECTION INTRAMUSCULAR; INTRAVENOUS; SUBCUTANEOUS at 10:24

## 2022-01-01 RX ADMIN — ONDANSETRON 8 MILLIGRAM(S): 8 TABLET, FILM COATED ORAL at 08:16

## 2022-01-01 RX ADMIN — MORPHINE SULFATE 15 MILLIGRAM(S): 50 CAPSULE, EXTENDED RELEASE ORAL at 13:45

## 2022-01-01 RX ADMIN — MORPHINE SULFATE 20 MILLIGRAM(S): 50 CAPSULE, EXTENDED RELEASE ORAL at 14:04

## 2022-01-01 RX ADMIN — Medication 1 TABLET(S): at 17:58

## 2022-01-01 RX ADMIN — PANTOPRAZOLE SODIUM 40 MILLIGRAM(S): 20 TABLET, DELAYED RELEASE ORAL at 06:20

## 2022-01-01 RX ADMIN — ONDANSETRON 8 MILLIGRAM(S): 8 TABLET, FILM COATED ORAL at 08:02

## 2022-01-01 RX ADMIN — LIDOCAINE 1 PATCH: 4 CREAM TOPICAL at 11:33

## 2022-01-01 RX ADMIN — HYDROMORPHONE HYDROCHLORIDE 1.5 MILLIGRAM(S): 2 INJECTION INTRAMUSCULAR; INTRAVENOUS; SUBCUTANEOUS at 18:06

## 2022-01-01 RX ADMIN — Medication 1 TABLET(S): at 18:07

## 2022-01-01 RX ADMIN — POLYETHYLENE GLYCOL 3350 17 GRAM(S): 17 POWDER, FOR SOLUTION ORAL at 13:32

## 2022-01-01 RX ADMIN — HYDROMORPHONE HYDROCHLORIDE 1 MILLIGRAM(S): 2 INJECTION INTRAMUSCULAR; INTRAVENOUS; SUBCUTANEOUS at 13:57

## 2022-01-01 RX ADMIN — HYDROMORPHONE HYDROCHLORIDE 0.9 MILLIGRAM(S): 2 INJECTION INTRAMUSCULAR; INTRAVENOUS; SUBCUTANEOUS at 00:51

## 2022-01-01 RX ADMIN — Medication 5 MILLIGRAM(S): at 01:48

## 2022-01-01 RX ADMIN — HYDROMORPHONE HYDROCHLORIDE 1 MILLIGRAM(S): 2 INJECTION INTRAMUSCULAR; INTRAVENOUS; SUBCUTANEOUS at 03:03

## 2022-01-01 RX ADMIN — HYDROMORPHONE HYDROCHLORIDE 1 MILLIGRAM(S): 2 INJECTION INTRAMUSCULAR; INTRAVENOUS; SUBCUTANEOUS at 19:03

## 2022-01-01 RX ADMIN — PIPERACILLIN AND TAZOBACTAM 25 GRAM(S): 4; .5 INJECTION, POWDER, LYOPHILIZED, FOR SOLUTION INTRAVENOUS at 22:40

## 2022-01-01 RX ADMIN — PANTOPRAZOLE SODIUM 40 MILLIGRAM(S): 20 TABLET, DELAYED RELEASE ORAL at 18:15

## 2022-01-01 RX ADMIN — Medication 5 MILLILITER(S): at 06:45

## 2022-01-01 RX ADMIN — ENOXAPARIN SODIUM 70 MILLIGRAM(S): 100 INJECTION SUBCUTANEOUS at 15:52

## 2022-01-01 RX ADMIN — MORPHINE SULFATE 15 MILLIGRAM(S): 50 CAPSULE, EXTENDED RELEASE ORAL at 13:09

## 2022-01-01 RX ADMIN — METHADONE HYDROCHLORIDE 5 MILLIGRAM(S): 40 TABLET ORAL at 17:39

## 2022-01-01 RX ADMIN — ENOXAPARIN SODIUM 40 MILLIGRAM(S): 100 INJECTION SUBCUTANEOUS at 07:03

## 2022-01-01 RX ADMIN — Medication 5 MILLIGRAM(S): at 18:31

## 2022-01-01 RX ADMIN — MORPHINE SULFATE 15 MILLIGRAM(S): 50 CAPSULE, EXTENDED RELEASE ORAL at 02:16

## 2022-01-01 RX ADMIN — HYDROMORPHONE HYDROCHLORIDE 1 MILLIGRAM(S): 2 INJECTION INTRAMUSCULAR; INTRAVENOUS; SUBCUTANEOUS at 20:56

## 2022-01-01 RX ADMIN — LIDOCAINE 1 PATCH: 4 CREAM TOPICAL at 06:00

## 2022-01-01 RX ADMIN — HYDROMORPHONE HYDROCHLORIDE 0.5 MG/HR: 2 INJECTION INTRAMUSCULAR; INTRAVENOUS; SUBCUTANEOUS at 19:11

## 2022-01-01 RX ADMIN — Medication 5 MILLIGRAM(S): at 21:25

## 2022-01-01 RX ADMIN — HYDROMORPHONE HYDROCHLORIDE 1.5 MILLIGRAM(S): 2 INJECTION INTRAMUSCULAR; INTRAVENOUS; SUBCUTANEOUS at 13:26

## 2022-01-01 RX ADMIN — HYDROMORPHONE HYDROCHLORIDE 1.5 MILLIGRAM(S): 2 INJECTION INTRAMUSCULAR; INTRAVENOUS; SUBCUTANEOUS at 22:26

## 2022-01-01 RX ADMIN — HYDROMORPHONE HYDROCHLORIDE 1 MILLIGRAM(S): 2 INJECTION INTRAMUSCULAR; INTRAVENOUS; SUBCUTANEOUS at 23:09

## 2022-01-01 RX ADMIN — MORPHINE SULFATE 15 MILLIGRAM(S): 50 CAPSULE, EXTENDED RELEASE ORAL at 03:16

## 2022-01-01 RX ADMIN — Medication 5 MILLIGRAM(S): at 21:05

## 2022-01-01 RX ADMIN — MORPHINE SULFATE 15 MILLIGRAM(S): 50 CAPSULE, EXTENDED RELEASE ORAL at 03:05

## 2022-01-01 RX ADMIN — HYDROMORPHONE HYDROCHLORIDE 1 MILLIGRAM(S): 2 INJECTION INTRAMUSCULAR; INTRAVENOUS; SUBCUTANEOUS at 22:13

## 2022-01-01 RX ADMIN — PANTOPRAZOLE SODIUM 40 MILLIGRAM(S): 20 TABLET, DELAYED RELEASE ORAL at 18:11

## 2022-01-01 RX ADMIN — HYDROMORPHONE HYDROCHLORIDE 1 MILLIGRAM(S): 2 INJECTION INTRAMUSCULAR; INTRAVENOUS; SUBCUTANEOUS at 14:10

## 2022-01-01 RX ADMIN — Medication 400 MILLIGRAM(S): at 11:32

## 2022-01-01 RX ADMIN — LIDOCAINE 1 PATCH: 4 CREAM TOPICAL at 14:05

## 2022-01-01 RX ADMIN — LIDOCAINE 1 PATCH: 4 CREAM TOPICAL at 22:43

## 2022-01-01 RX ADMIN — HYDROMORPHONE HYDROCHLORIDE 1.5 MILLIGRAM(S): 2 INJECTION INTRAMUSCULAR; INTRAVENOUS; SUBCUTANEOUS at 09:29

## 2022-01-01 RX ADMIN — HYDROMORPHONE HYDROCHLORIDE 1 MILLIGRAM(S): 2 INJECTION INTRAMUSCULAR; INTRAVENOUS; SUBCUTANEOUS at 11:38

## 2022-01-01 RX ADMIN — HYDROMORPHONE HYDROCHLORIDE 0.9 MILLIGRAM(S): 2 INJECTION INTRAMUSCULAR; INTRAVENOUS; SUBCUTANEOUS at 01:26

## 2022-01-01 RX ADMIN — Medication 5 MILLIGRAM(S): at 05:59

## 2022-01-01 RX ADMIN — MORPHINE SULFATE 15 MILLIGRAM(S): 50 CAPSULE, EXTENDED RELEASE ORAL at 19:37

## 2022-01-01 RX ADMIN — MORPHINE SULFATE 15 MILLIGRAM(S): 50 CAPSULE, EXTENDED RELEASE ORAL at 11:15

## 2022-01-01 RX ADMIN — LIDOCAINE 1 PATCH: 4 CREAM TOPICAL at 20:42

## 2022-01-01 RX ADMIN — ENOXAPARIN SODIUM 70 MILLIGRAM(S): 100 INJECTION SUBCUTANEOUS at 05:18

## 2022-01-01 RX ADMIN — HYDROMORPHONE HYDROCHLORIDE 1.5 MILLIGRAM(S): 2 INJECTION INTRAMUSCULAR; INTRAVENOUS; SUBCUTANEOUS at 18:29

## 2022-01-01 RX ADMIN — HYDROMORPHONE HYDROCHLORIDE 2 MILLIGRAM(S): 2 INJECTION INTRAMUSCULAR; INTRAVENOUS; SUBCUTANEOUS at 10:05

## 2022-01-01 RX ADMIN — MORPHINE SULFATE 15 MILLIGRAM(S): 50 CAPSULE, EXTENDED RELEASE ORAL at 02:05

## 2022-01-01 RX ADMIN — HYDROMORPHONE HYDROCHLORIDE 1 MILLIGRAM(S): 2 INJECTION INTRAMUSCULAR; INTRAVENOUS; SUBCUTANEOUS at 00:49

## 2022-01-01 RX ADMIN — MORPHINE SULFATE 15 MILLIGRAM(S): 50 CAPSULE, EXTENDED RELEASE ORAL at 03:37

## 2022-01-01 RX ADMIN — PANTOPRAZOLE SODIUM 40 MILLIGRAM(S): 20 TABLET, DELAYED RELEASE ORAL at 13:28

## 2022-01-01 RX ADMIN — Medication 5 MILLILITER(S): at 17:13

## 2022-01-01 RX ADMIN — Medication 5 MILLIGRAM(S): at 01:53

## 2022-01-01 RX ADMIN — HYDROMORPHONE HYDROCHLORIDE 0.8 MG/HR: 2 INJECTION INTRAMUSCULAR; INTRAVENOUS; SUBCUTANEOUS at 02:03

## 2022-01-01 RX ADMIN — SODIUM CHLORIDE 100 MILLILITER(S): 9 INJECTION INTRAMUSCULAR; INTRAVENOUS; SUBCUTANEOUS at 17:12

## 2022-01-01 RX ADMIN — HYDROMORPHONE HYDROCHLORIDE 1 MILLIGRAM(S): 2 INJECTION INTRAMUSCULAR; INTRAVENOUS; SUBCUTANEOUS at 22:47

## 2022-01-01 RX ADMIN — Medication 5 MILLIGRAM(S): at 07:05

## 2022-01-01 RX ADMIN — PIPERACILLIN AND TAZOBACTAM 25 GRAM(S): 4; .5 INJECTION, POWDER, LYOPHILIZED, FOR SOLUTION INTRAVENOUS at 21:55

## 2022-01-01 RX ADMIN — Medication 5 MILLIGRAM(S): at 00:37

## 2022-01-01 RX ADMIN — Medication 5 MILLIGRAM(S): at 10:46

## 2022-01-01 RX ADMIN — HYDROMORPHONE HYDROCHLORIDE 1 MILLIGRAM(S): 2 INJECTION INTRAMUSCULAR; INTRAVENOUS; SUBCUTANEOUS at 10:01

## 2022-01-01 RX ADMIN — MORPHINE SULFATE 10 MILLIGRAM(S): 50 CAPSULE, EXTENDED RELEASE ORAL at 12:20

## 2022-01-01 RX ADMIN — LIDOCAINE 1 PATCH: 4 CREAM TOPICAL at 13:28

## 2022-01-01 RX ADMIN — HYDROMORPHONE HYDROCHLORIDE 0.5 MG/HR: 2 INJECTION INTRAMUSCULAR; INTRAVENOUS; SUBCUTANEOUS at 16:15

## 2022-01-01 RX ADMIN — Medication 5 MILLILITER(S): at 22:29

## 2022-01-01 RX ADMIN — Medication 5 MILLILITER(S): at 06:56

## 2022-01-01 RX ADMIN — PANTOPRAZOLE SODIUM 40 MILLIGRAM(S): 20 TABLET, DELAYED RELEASE ORAL at 06:09

## 2022-01-01 RX ADMIN — METHADONE HYDROCHLORIDE 7.5 MILLIGRAM(S): 40 TABLET ORAL at 20:59

## 2022-01-01 RX ADMIN — HYDROMORPHONE HYDROCHLORIDE 2 MILLIGRAM(S): 2 INJECTION INTRAMUSCULAR; INTRAVENOUS; SUBCUTANEOUS at 06:08

## 2022-01-01 RX ADMIN — HYDROMORPHONE HYDROCHLORIDE 1 MILLIGRAM(S): 2 INJECTION INTRAMUSCULAR; INTRAVENOUS; SUBCUTANEOUS at 05:54

## 2022-01-01 RX ADMIN — HYDROMORPHONE HYDROCHLORIDE 1.5 MILLIGRAM(S): 2 INJECTION INTRAMUSCULAR; INTRAVENOUS; SUBCUTANEOUS at 21:33

## 2022-01-01 RX ADMIN — ONDANSETRON 8 MILLIGRAM(S): 8 TABLET, FILM COATED ORAL at 15:15

## 2022-01-01 RX ADMIN — Medication 5 MILLILITER(S): at 17:36

## 2022-01-01 RX ADMIN — MORPHINE SULFATE 15 MILLIGRAM(S): 50 CAPSULE, EXTENDED RELEASE ORAL at 03:39

## 2022-01-01 RX ADMIN — MORPHINE SULFATE 15 MILLIGRAM(S): 50 CAPSULE, EXTENDED RELEASE ORAL at 13:30

## 2022-01-01 RX ADMIN — PANTOPRAZOLE SODIUM 40 MILLIGRAM(S): 20 TABLET, DELAYED RELEASE ORAL at 06:16

## 2022-01-01 RX ADMIN — HYDROMORPHONE HYDROCHLORIDE 1 MILLIGRAM(S): 2 INJECTION INTRAMUSCULAR; INTRAVENOUS; SUBCUTANEOUS at 20:26

## 2022-01-01 RX ADMIN — HYDROMORPHONE HYDROCHLORIDE 2 MILLIGRAM(S): 2 INJECTION INTRAMUSCULAR; INTRAVENOUS; SUBCUTANEOUS at 17:29

## 2022-01-01 RX ADMIN — HYDROMORPHONE HYDROCHLORIDE 0.5 MG/HR: 2 INJECTION INTRAMUSCULAR; INTRAVENOUS; SUBCUTANEOUS at 19:08

## 2022-01-01 RX ADMIN — LIDOCAINE 1 PATCH: 4 CREAM TOPICAL at 18:45

## 2022-01-01 RX ADMIN — HYDROMORPHONE HYDROCHLORIDE 2 MILLIGRAM(S): 2 INJECTION INTRAMUSCULAR; INTRAVENOUS; SUBCUTANEOUS at 06:43

## 2022-01-01 RX ADMIN — LIDOCAINE 1 PATCH: 4 CREAM TOPICAL at 13:45

## 2022-01-01 RX ADMIN — ENOXAPARIN SODIUM 65 MILLIGRAM(S): 100 INJECTION SUBCUTANEOUS at 06:45

## 2022-01-01 RX ADMIN — MORPHINE SULFATE 15 MILLIGRAM(S): 50 CAPSULE, EXTENDED RELEASE ORAL at 04:20

## 2022-01-01 RX ADMIN — LIDOCAINE 1 PATCH: 4 CREAM TOPICAL at 02:00

## 2022-01-01 RX ADMIN — Medication 5 MILLIGRAM(S): at 18:29

## 2022-01-01 RX ADMIN — HYDROMORPHONE HYDROCHLORIDE 0.6 MILLIGRAM(S): 2 INJECTION INTRAMUSCULAR; INTRAVENOUS; SUBCUTANEOUS at 14:40

## 2022-01-01 RX ADMIN — Medication 5 MILLIGRAM(S): at 16:39

## 2022-01-01 RX ADMIN — PANTOPRAZOLE SODIUM 40 MILLIGRAM(S): 20 TABLET, DELAYED RELEASE ORAL at 18:06

## 2022-01-01 RX ADMIN — Medication 5 MILLIGRAM(S): at 17:29

## 2022-01-01 RX ADMIN — Medication 25 GRAM(S): at 12:57

## 2022-01-01 RX ADMIN — Medication 5 MILLIGRAM(S): at 10:31

## 2022-01-01 RX ADMIN — Medication 5 MILLIGRAM(S): at 09:30

## 2022-01-01 RX ADMIN — HYDROMORPHONE HYDROCHLORIDE 2 MILLIGRAM(S): 2 INJECTION INTRAMUSCULAR; INTRAVENOUS; SUBCUTANEOUS at 09:05

## 2022-01-01 RX ADMIN — MORPHINE SULFATE 15 MILLIGRAM(S): 50 CAPSULE, EXTENDED RELEASE ORAL at 02:40

## 2022-01-01 RX ADMIN — HYDROMORPHONE HYDROCHLORIDE 0.9 MILLIGRAM(S): 2 INJECTION INTRAMUSCULAR; INTRAVENOUS; SUBCUTANEOUS at 16:45

## 2022-01-01 RX ADMIN — SODIUM CHLORIDE 200 MILLILITER(S): 9 INJECTION, SOLUTION INTRAVENOUS at 21:57

## 2022-01-01 RX ADMIN — HYDROMORPHONE HYDROCHLORIDE 2 MILLIGRAM(S): 2 INJECTION INTRAMUSCULAR; INTRAVENOUS; SUBCUTANEOUS at 13:40

## 2022-01-01 RX ADMIN — PANTOPRAZOLE SODIUM 40 MILLIGRAM(S): 20 TABLET, DELAYED RELEASE ORAL at 05:41

## 2022-01-01 RX ADMIN — Medication 5 MILLIGRAM(S): at 20:01

## 2022-01-01 RX ADMIN — METHADONE HYDROCHLORIDE 5 MILLIGRAM(S): 40 TABLET ORAL at 05:49

## 2022-01-01 RX ADMIN — HYDROMORPHONE HYDROCHLORIDE 0.3 MILLIGRAM(S): 2 INJECTION INTRAMUSCULAR; INTRAVENOUS; SUBCUTANEOUS at 16:57

## 2022-01-01 RX ADMIN — MORPHINE SULFATE 15 MILLIGRAM(S): 50 CAPSULE, EXTENDED RELEASE ORAL at 20:38

## 2022-01-01 RX ADMIN — MORPHINE SULFATE 20 MILLIGRAM(S): 50 CAPSULE, EXTENDED RELEASE ORAL at 19:04

## 2022-01-01 RX ADMIN — MORPHINE SULFATE 15 MILLIGRAM(S): 50 CAPSULE, EXTENDED RELEASE ORAL at 10:20

## 2022-01-01 RX ADMIN — PANTOPRAZOLE SODIUM 40 MILLIGRAM(S): 20 TABLET, DELAYED RELEASE ORAL at 05:05

## 2022-01-01 RX ADMIN — PIPERACILLIN AND TAZOBACTAM 200 GRAM(S): 4; .5 INJECTION, POWDER, LYOPHILIZED, FOR SOLUTION INTRAVENOUS at 10:59

## 2022-01-01 RX ADMIN — LIDOCAINE 1 PATCH: 4 CREAM TOPICAL at 23:00

## 2022-01-01 RX ADMIN — MORPHINE SULFATE 15 MILLIGRAM(S): 50 CAPSULE, EXTENDED RELEASE ORAL at 13:04

## 2022-01-01 RX ADMIN — ENOXAPARIN SODIUM 40 MILLIGRAM(S): 100 INJECTION SUBCUTANEOUS at 12:57

## 2022-01-01 RX ADMIN — HYDROMORPHONE HYDROCHLORIDE 2 MILLIGRAM(S): 2 INJECTION INTRAMUSCULAR; INTRAVENOUS; SUBCUTANEOUS at 05:30

## 2022-01-01 RX ADMIN — MORPHINE SULFATE 20 MILLIGRAM(S): 50 CAPSULE, EXTENDED RELEASE ORAL at 14:21

## 2022-01-01 RX ADMIN — METHADONE HYDROCHLORIDE 5 MILLIGRAM(S): 40 TABLET ORAL at 06:52

## 2022-01-01 RX ADMIN — POLYETHYLENE GLYCOL 3350 17 GRAM(S): 17 POWDER, FOR SOLUTION ORAL at 13:34

## 2022-01-01 RX ADMIN — HYDROMORPHONE HYDROCHLORIDE 0.5 MILLIGRAM(S): 2 INJECTION INTRAMUSCULAR; INTRAVENOUS; SUBCUTANEOUS at 12:36

## 2022-01-01 RX ADMIN — HYDROMORPHONE HYDROCHLORIDE 2 MILLIGRAM(S): 2 INJECTION INTRAMUSCULAR; INTRAVENOUS; SUBCUTANEOUS at 09:27

## 2022-01-01 RX ADMIN — SODIUM CHLORIDE 75 MILLILITER(S): 9 INJECTION INTRAMUSCULAR; INTRAVENOUS; SUBCUTANEOUS at 16:36

## 2022-01-01 RX ADMIN — HYDROMORPHONE HYDROCHLORIDE 0.6 MILLIGRAM(S): 2 INJECTION INTRAMUSCULAR; INTRAVENOUS; SUBCUTANEOUS at 18:10

## 2022-01-01 RX ADMIN — PANTOPRAZOLE SODIUM 40 MILLIGRAM(S): 20 TABLET, DELAYED RELEASE ORAL at 06:15

## 2022-01-01 RX ADMIN — Medication 5 MILLILITER(S): at 05:30

## 2022-01-01 RX ADMIN — Medication 5 MILLILITER(S): at 07:06

## 2022-01-01 RX ADMIN — HYDROMORPHONE HYDROCHLORIDE 2 MILLIGRAM(S): 2 INJECTION INTRAMUSCULAR; INTRAVENOUS; SUBCUTANEOUS at 13:14

## 2022-01-01 RX ADMIN — HYDROMORPHONE HYDROCHLORIDE 0.8 MG/HR: 2 INJECTION INTRAMUSCULAR; INTRAVENOUS; SUBCUTANEOUS at 19:45

## 2022-01-01 RX ADMIN — MORPHINE SULFATE 15 MILLIGRAM(S): 50 CAPSULE, EXTENDED RELEASE ORAL at 11:50

## 2022-01-01 RX ADMIN — PIPERACILLIN AND TAZOBACTAM 25 GRAM(S): 4; .5 INJECTION, POWDER, LYOPHILIZED, FOR SOLUTION INTRAVENOUS at 13:02

## 2022-01-01 RX ADMIN — Medication 5 MILLIGRAM(S): at 10:01

## 2022-01-01 RX ADMIN — MORPHINE SULFATE 15 MILLIGRAM(S): 50 CAPSULE, EXTENDED RELEASE ORAL at 08:30

## 2022-01-01 RX ADMIN — HYDROMORPHONE HYDROCHLORIDE 1.5 MILLIGRAM(S): 2 INJECTION INTRAMUSCULAR; INTRAVENOUS; SUBCUTANEOUS at 13:18

## 2022-01-01 RX ADMIN — MORPHINE SULFATE 10 MILLIGRAM(S): 50 CAPSULE, EXTENDED RELEASE ORAL at 05:02

## 2022-01-01 RX ADMIN — LIDOCAINE 1 PATCH: 4 CREAM TOPICAL at 17:57

## 2022-01-01 RX ADMIN — ENOXAPARIN SODIUM 40 MILLIGRAM(S): 100 INJECTION SUBCUTANEOUS at 06:30

## 2022-01-01 RX ADMIN — PANTOPRAZOLE SODIUM 40 MILLIGRAM(S): 20 TABLET, DELAYED RELEASE ORAL at 11:12

## 2022-01-01 RX ADMIN — METHADONE HYDROCHLORIDE 5 MILLIGRAM(S): 40 TABLET ORAL at 05:22

## 2022-01-01 RX ADMIN — Medication 400 MILLIGRAM(S): at 14:06

## 2022-01-01 RX ADMIN — METHADONE HYDROCHLORIDE 7.5 MILLIGRAM(S): 40 TABLET ORAL at 22:36

## 2022-01-01 RX ADMIN — Medication 5 MILLIGRAM(S): at 15:23

## 2022-01-01 RX ADMIN — LIDOCAINE 1 PATCH: 4 CREAM TOPICAL at 22:23

## 2022-01-01 RX ADMIN — POLYETHYLENE GLYCOL 3350 17 GRAM(S): 17 POWDER, FOR SOLUTION ORAL at 11:25

## 2022-01-01 RX ADMIN — PIPERACILLIN AND TAZOBACTAM 25 GRAM(S): 4; .5 INJECTION, POWDER, LYOPHILIZED, FOR SOLUTION INTRAVENOUS at 13:42

## 2022-01-01 RX ADMIN — MORPHINE SULFATE 20 MILLIGRAM(S): 50 CAPSULE, EXTENDED RELEASE ORAL at 22:01

## 2022-01-01 RX ADMIN — Medication 100 MILLIEQUIVALENT(S): at 12:16

## 2022-01-01 RX ADMIN — MORPHINE SULFATE 20 MILLIGRAM(S): 50 CAPSULE, EXTENDED RELEASE ORAL at 05:47

## 2022-01-01 RX ADMIN — PIPERACILLIN AND TAZOBACTAM 25 GRAM(S): 4; .5 INJECTION, POWDER, LYOPHILIZED, FOR SOLUTION INTRAVENOUS at 13:28

## 2022-01-01 RX ADMIN — HYDROMORPHONE HYDROCHLORIDE 0.6 MILLIGRAM(S): 2 INJECTION INTRAMUSCULAR; INTRAVENOUS; SUBCUTANEOUS at 06:47

## 2022-01-01 RX ADMIN — HYDROMORPHONE HYDROCHLORIDE 2 MILLIGRAM(S): 2 INJECTION INTRAMUSCULAR; INTRAVENOUS; SUBCUTANEOUS at 21:34

## 2022-01-01 RX ADMIN — MORPHINE SULFATE 15 MILLIGRAM(S): 50 CAPSULE, EXTENDED RELEASE ORAL at 02:10

## 2022-01-01 RX ADMIN — PIPERACILLIN AND TAZOBACTAM 25 GRAM(S): 4; .5 INJECTION, POWDER, LYOPHILIZED, FOR SOLUTION INTRAVENOUS at 06:26

## 2022-01-01 RX ADMIN — PIPERACILLIN AND TAZOBACTAM 25 GRAM(S): 4; .5 INJECTION, POWDER, LYOPHILIZED, FOR SOLUTION INTRAVENOUS at 06:54

## 2022-01-01 RX ADMIN — HYDROMORPHONE HYDROCHLORIDE 1.5 MILLIGRAM(S): 2 INJECTION INTRAMUSCULAR; INTRAVENOUS; SUBCUTANEOUS at 10:35

## 2022-01-01 RX ADMIN — MORPHINE SULFATE 15 MILLIGRAM(S): 50 CAPSULE, EXTENDED RELEASE ORAL at 22:06

## 2022-01-01 RX ADMIN — MORPHINE SULFATE 15 MILLIGRAM(S): 50 CAPSULE, EXTENDED RELEASE ORAL at 22:40

## 2022-01-01 RX ADMIN — HYDROMORPHONE HYDROCHLORIDE 1.5 MILLIGRAM(S): 2 INJECTION INTRAMUSCULAR; INTRAVENOUS; SUBCUTANEOUS at 18:50

## 2022-01-01 RX ADMIN — Medication 5 MILLILITER(S): at 06:28

## 2022-01-01 RX ADMIN — HYDROMORPHONE HYDROCHLORIDE 2 MILLIGRAM(S): 2 INJECTION INTRAMUSCULAR; INTRAVENOUS; SUBCUTANEOUS at 17:31

## 2022-01-01 RX ADMIN — MORPHINE SULFATE 20 MILLIGRAM(S): 50 CAPSULE, EXTENDED RELEASE ORAL at 18:00

## 2022-01-01 RX ADMIN — MORPHINE SULFATE 15 MILLIGRAM(S): 50 CAPSULE, EXTENDED RELEASE ORAL at 11:39

## 2022-01-01 RX ADMIN — MORPHINE SULFATE 20 MILLIGRAM(S): 50 CAPSULE, EXTENDED RELEASE ORAL at 01:50

## 2022-01-01 RX ADMIN — HYDROMORPHONE HYDROCHLORIDE 1 MILLIGRAM(S): 2 INJECTION INTRAMUSCULAR; INTRAVENOUS; SUBCUTANEOUS at 07:19

## 2022-01-01 RX ADMIN — SODIUM CHLORIDE 75 MILLILITER(S): 9 INJECTION INTRAMUSCULAR; INTRAVENOUS; SUBCUTANEOUS at 16:44

## 2022-01-01 RX ADMIN — HYDROMORPHONE HYDROCHLORIDE 1 MILLIGRAM(S): 2 INJECTION INTRAMUSCULAR; INTRAVENOUS; SUBCUTANEOUS at 00:34

## 2022-01-01 RX ADMIN — SODIUM CHLORIDE 75 MILLILITER(S): 9 INJECTION INTRAMUSCULAR; INTRAVENOUS; SUBCUTANEOUS at 10:23

## 2022-01-01 RX ADMIN — PANTOPRAZOLE SODIUM 40 MILLIGRAM(S): 20 TABLET, DELAYED RELEASE ORAL at 11:34

## 2022-01-01 RX ADMIN — MORPHINE SULFATE 10 MILLIGRAM(S): 50 CAPSULE, EXTENDED RELEASE ORAL at 11:33

## 2022-01-01 RX ADMIN — MORPHINE SULFATE 15 MILLIGRAM(S): 50 CAPSULE, EXTENDED RELEASE ORAL at 13:50

## 2022-01-01 RX ADMIN — MORPHINE SULFATE 15 MILLIGRAM(S): 50 CAPSULE, EXTENDED RELEASE ORAL at 13:00

## 2022-01-01 RX ADMIN — LIDOCAINE 1 PATCH: 4 CREAM TOPICAL at 23:38

## 2022-01-01 RX ADMIN — HYDROMORPHONE HYDROCHLORIDE 0.6 MILLIGRAM(S): 2 INJECTION INTRAMUSCULAR; INTRAVENOUS; SUBCUTANEOUS at 02:26

## 2022-01-01 RX ADMIN — ENOXAPARIN SODIUM 40 MILLIGRAM(S): 100 INJECTION SUBCUTANEOUS at 06:37

## 2022-01-01 RX ADMIN — HYDROMORPHONE HYDROCHLORIDE 1 MILLIGRAM(S): 2 INJECTION INTRAMUSCULAR; INTRAVENOUS; SUBCUTANEOUS at 13:38

## 2022-01-01 RX ADMIN — Medication 5 MILLIGRAM(S): at 14:29

## 2022-01-01 RX ADMIN — METHADONE HYDROCHLORIDE 7.5 MILLIGRAM(S): 40 TABLET ORAL at 08:20

## 2022-01-01 RX ADMIN — LIDOCAINE 1 PATCH: 4 CREAM TOPICAL at 18:31

## 2022-01-01 RX ADMIN — MORPHINE SULFATE 15 MILLIGRAM(S): 50 CAPSULE, EXTENDED RELEASE ORAL at 02:23

## 2022-01-01 RX ADMIN — ONDANSETRON 8 MILLIGRAM(S): 8 TABLET, FILM COATED ORAL at 13:34

## 2022-01-01 RX ADMIN — HYDROMORPHONE HYDROCHLORIDE 0.6 MILLIGRAM(S): 2 INJECTION INTRAMUSCULAR; INTRAVENOUS; SUBCUTANEOUS at 00:20

## 2022-01-01 RX ADMIN — HYDROMORPHONE HYDROCHLORIDE 1 MILLIGRAM(S): 2 INJECTION INTRAMUSCULAR; INTRAVENOUS; SUBCUTANEOUS at 22:37

## 2022-01-01 RX ADMIN — PANTOPRAZOLE SODIUM 40 MILLIGRAM(S): 20 TABLET, DELAYED RELEASE ORAL at 11:48

## 2022-01-01 RX ADMIN — HYDROMORPHONE HYDROCHLORIDE 1 MILLIGRAM(S): 2 INJECTION INTRAMUSCULAR; INTRAVENOUS; SUBCUTANEOUS at 09:38

## 2022-01-01 RX ADMIN — HYDROMORPHONE HYDROCHLORIDE 0.6 MILLIGRAM(S): 2 INJECTION INTRAMUSCULAR; INTRAVENOUS; SUBCUTANEOUS at 15:05

## 2022-01-01 RX ADMIN — Medication 5 MILLIGRAM(S): at 10:12

## 2022-01-01 RX ADMIN — PIPERACILLIN AND TAZOBACTAM 25 GRAM(S): 4; .5 INJECTION, POWDER, LYOPHILIZED, FOR SOLUTION INTRAVENOUS at 07:08

## 2022-01-01 RX ADMIN — LIDOCAINE 1 PATCH: 4 CREAM TOPICAL at 20:56

## 2022-01-01 RX ADMIN — Medication 5 MILLIGRAM(S): at 02:06

## 2022-01-01 RX ADMIN — LIDOCAINE 1 PATCH: 4 CREAM TOPICAL at 12:29

## 2022-01-01 RX ADMIN — PANTOPRAZOLE SODIUM 40 MILLIGRAM(S): 20 TABLET, DELAYED RELEASE ORAL at 18:32

## 2022-01-01 RX ADMIN — MORPHINE SULFATE 15 MILLIGRAM(S): 50 CAPSULE, EXTENDED RELEASE ORAL at 18:13

## 2022-01-01 RX ADMIN — POLYETHYLENE GLYCOL 3350 17 GRAM(S): 17 POWDER, FOR SOLUTION ORAL at 11:56

## 2022-01-01 RX ADMIN — HYDROMORPHONE HYDROCHLORIDE 1.5 MILLIGRAM(S): 2 INJECTION INTRAMUSCULAR; INTRAVENOUS; SUBCUTANEOUS at 17:52

## 2022-01-01 RX ADMIN — HYDROMORPHONE HYDROCHLORIDE 2 MILLIGRAM(S): 2 INJECTION INTRAMUSCULAR; INTRAVENOUS; SUBCUTANEOUS at 18:19

## 2022-01-01 RX ADMIN — HYDROMORPHONE HYDROCHLORIDE 1 MILLIGRAM(S): 2 INJECTION INTRAMUSCULAR; INTRAVENOUS; SUBCUTANEOUS at 08:45

## 2022-01-01 RX ADMIN — Medication 650 MILLIGRAM(S): at 00:23

## 2022-01-01 RX ADMIN — HYDROMORPHONE HYDROCHLORIDE 1 MILLIGRAM(S): 2 INJECTION INTRAMUSCULAR; INTRAVENOUS; SUBCUTANEOUS at 09:43

## 2022-01-01 RX ADMIN — Medication 5 MILLIGRAM(S): at 13:26

## 2022-01-01 RX ADMIN — Medication 5 MILLIGRAM(S): at 02:20

## 2022-01-01 RX ADMIN — ENOXAPARIN SODIUM 65 MILLIGRAM(S): 100 INJECTION SUBCUTANEOUS at 06:00

## 2022-01-01 RX ADMIN — Medication 10 MILLIGRAM(S): at 13:44

## 2022-01-01 RX ADMIN — LIDOCAINE 1 PATCH: 4 CREAM TOPICAL at 21:15

## 2022-01-01 RX ADMIN — Medication 5 MILLILITER(S): at 06:33

## 2022-01-01 RX ADMIN — METHADONE HYDROCHLORIDE 5 MILLIGRAM(S): 40 TABLET ORAL at 05:48

## 2022-01-01 RX ADMIN — HYDROMORPHONE HYDROCHLORIDE 1 MILLIGRAM(S): 2 INJECTION INTRAMUSCULAR; INTRAVENOUS; SUBCUTANEOUS at 08:51

## 2022-01-01 RX ADMIN — SODIUM CHLORIDE 75 MILLILITER(S): 9 INJECTION INTRAMUSCULAR; INTRAVENOUS; SUBCUTANEOUS at 12:25

## 2022-01-01 RX ADMIN — HYDROMORPHONE HYDROCHLORIDE 1.5 MILLIGRAM(S): 2 INJECTION INTRAMUSCULAR; INTRAVENOUS; SUBCUTANEOUS at 22:13

## 2022-01-01 RX ADMIN — MORPHINE SULFATE 15 MILLIGRAM(S): 50 CAPSULE, EXTENDED RELEASE ORAL at 12:25

## 2022-01-01 RX ADMIN — LIDOCAINE 1 PATCH: 4 CREAM TOPICAL at 19:48

## 2022-01-01 RX ADMIN — LIDOCAINE 1 PATCH: 4 CREAM TOPICAL at 00:30

## 2022-01-01 RX ADMIN — MORPHINE SULFATE 15 MILLIGRAM(S): 50 CAPSULE, EXTENDED RELEASE ORAL at 01:39

## 2022-01-01 RX ADMIN — ONDANSETRON 4 MILLIGRAM(S): 8 TABLET, FILM COATED ORAL at 10:59

## 2022-01-01 RX ADMIN — MORPHINE SULFATE 15 MILLIGRAM(S): 50 CAPSULE, EXTENDED RELEASE ORAL at 17:12

## 2022-01-01 RX ADMIN — PANTOPRAZOLE SODIUM 40 MILLIGRAM(S): 20 TABLET, DELAYED RELEASE ORAL at 12:12

## 2022-01-01 RX ADMIN — Medication 5 MILLIGRAM(S): at 06:25

## 2022-01-01 RX ADMIN — LIDOCAINE 1 PATCH: 4 CREAM TOPICAL at 00:27

## 2022-01-01 RX ADMIN — METHADONE HYDROCHLORIDE 5 MILLIGRAM(S): 40 TABLET ORAL at 06:26

## 2022-01-01 RX ADMIN — Medication 5 MILLIGRAM(S): at 13:20

## 2022-01-01 RX ADMIN — MORPHINE SULFATE 15 MILLIGRAM(S): 50 CAPSULE, EXTENDED RELEASE ORAL at 03:12

## 2022-01-01 RX ADMIN — HYDROMORPHONE HYDROCHLORIDE 0.9 MILLIGRAM(S): 2 INJECTION INTRAMUSCULAR; INTRAVENOUS; SUBCUTANEOUS at 06:21

## 2022-01-01 RX ADMIN — HYDROMORPHONE HYDROCHLORIDE 0.6 MILLIGRAM(S): 2 INJECTION INTRAMUSCULAR; INTRAVENOUS; SUBCUTANEOUS at 04:30

## 2022-01-01 RX ADMIN — HYDROMORPHONE HYDROCHLORIDE 2 MILLIGRAM(S): 2 INJECTION INTRAMUSCULAR; INTRAVENOUS; SUBCUTANEOUS at 05:31

## 2022-01-01 RX ADMIN — PANTOPRAZOLE SODIUM 40 MILLIGRAM(S): 20 TABLET, DELAYED RELEASE ORAL at 13:18

## 2022-01-01 RX ADMIN — LIDOCAINE 1 PATCH: 4 CREAM TOPICAL at 19:18

## 2022-01-01 RX ADMIN — PANTOPRAZOLE SODIUM 40 MILLIGRAM(S): 20 TABLET, DELAYED RELEASE ORAL at 05:39

## 2022-01-01 RX ADMIN — ONDANSETRON 4 MILLIGRAM(S): 8 TABLET, FILM COATED ORAL at 22:38

## 2022-01-01 RX ADMIN — HYDROMORPHONE HYDROCHLORIDE 1.5 MILLIGRAM(S): 2 INJECTION INTRAMUSCULAR; INTRAVENOUS; SUBCUTANEOUS at 22:15

## 2022-01-01 RX ADMIN — HYDROMORPHONE HYDROCHLORIDE 1.5 MILLIGRAM(S): 2 INJECTION INTRAMUSCULAR; INTRAVENOUS; SUBCUTANEOUS at 02:41

## 2022-01-01 RX ADMIN — MORPHINE SULFATE 15 MILLIGRAM(S): 50 CAPSULE, EXTENDED RELEASE ORAL at 09:52

## 2022-01-01 RX ADMIN — ENOXAPARIN SODIUM 40 MILLIGRAM(S): 100 INJECTION SUBCUTANEOUS at 13:00

## 2022-01-01 RX ADMIN — HYDROMORPHONE HYDROCHLORIDE 1 MILLIGRAM(S): 2 INJECTION INTRAMUSCULAR; INTRAVENOUS; SUBCUTANEOUS at 01:50

## 2022-01-01 RX ADMIN — SENNA PLUS 2 TABLET(S): 8.6 TABLET ORAL at 22:32

## 2022-01-01 RX ADMIN — PIPERACILLIN AND TAZOBACTAM 25 GRAM(S): 4; .5 INJECTION, POWDER, LYOPHILIZED, FOR SOLUTION INTRAVENOUS at 14:36

## 2022-01-01 RX ADMIN — METHADONE HYDROCHLORIDE 5 MILLIGRAM(S): 40 TABLET ORAL at 05:24

## 2022-01-01 RX ADMIN — ONDANSETRON 4 MILLIGRAM(S): 8 TABLET, FILM COATED ORAL at 09:05

## 2022-01-01 RX ADMIN — ENOXAPARIN SODIUM 40 MILLIGRAM(S): 100 INJECTION SUBCUTANEOUS at 06:25

## 2022-01-01 RX ADMIN — METHADONE HYDROCHLORIDE 5 MILLIGRAM(S): 40 TABLET ORAL at 06:11

## 2022-01-01 RX ADMIN — Medication 5 MILLIGRAM(S): at 05:19

## 2022-01-01 RX ADMIN — METHADONE HYDROCHLORIDE 7.5 MILLIGRAM(S): 40 TABLET ORAL at 08:54

## 2022-01-01 RX ADMIN — HYDROMORPHONE HYDROCHLORIDE 2 MILLIGRAM(S): 2 INJECTION INTRAMUSCULAR; INTRAVENOUS; SUBCUTANEOUS at 17:59

## 2022-01-01 RX ADMIN — Medication 5 MILLIGRAM(S): at 09:37

## 2022-01-01 RX ADMIN — PANTOPRAZOLE SODIUM 40 MILLIGRAM(S): 20 TABLET, DELAYED RELEASE ORAL at 12:59

## 2022-01-01 RX ADMIN — Medication 5 MILLILITER(S): at 17:31

## 2022-01-01 RX ADMIN — PANTOPRAZOLE SODIUM 40 MILLIGRAM(S): 20 TABLET, DELAYED RELEASE ORAL at 17:37

## 2022-01-01 RX ADMIN — LIDOCAINE 1 PATCH: 4 CREAM TOPICAL at 19:29

## 2022-01-01 RX ADMIN — PIPERACILLIN AND TAZOBACTAM 25 GRAM(S): 4; .5 INJECTION, POWDER, LYOPHILIZED, FOR SOLUTION INTRAVENOUS at 22:14

## 2022-01-01 RX ADMIN — Medication 5 MILLIGRAM(S): at 09:31

## 2022-01-01 RX ADMIN — PIPERACILLIN AND TAZOBACTAM 25 GRAM(S): 4; .5 INJECTION, POWDER, LYOPHILIZED, FOR SOLUTION INTRAVENOUS at 22:36

## 2022-01-01 RX ADMIN — MORPHINE SULFATE 2 MILLIGRAM(S): 50 CAPSULE, EXTENDED RELEASE ORAL at 13:31

## 2022-01-01 RX ADMIN — MORPHINE SULFATE 15 MILLIGRAM(S): 50 CAPSULE, EXTENDED RELEASE ORAL at 21:55

## 2022-01-01 RX ADMIN — ENOXAPARIN SODIUM 70 MILLIGRAM(S): 100 INJECTION SUBCUTANEOUS at 18:43

## 2022-01-01 RX ADMIN — Medication 5 MILLIGRAM(S): at 06:17

## 2022-01-01 RX ADMIN — LIDOCAINE 1 PATCH: 4 CREAM TOPICAL at 19:07

## 2022-01-01 RX ADMIN — MORPHINE SULFATE 15 MILLIGRAM(S): 50 CAPSULE, EXTENDED RELEASE ORAL at 18:39

## 2022-01-01 RX ADMIN — MORPHINE SULFATE 15 MILLIGRAM(S): 50 CAPSULE, EXTENDED RELEASE ORAL at 14:45

## 2022-01-01 RX ADMIN — SODIUM CHLORIDE 250 MILLILITER(S): 9 INJECTION INTRAMUSCULAR; INTRAVENOUS; SUBCUTANEOUS at 03:13

## 2022-01-01 RX ADMIN — Medication 5 MILLILITER(S): at 05:43

## 2022-01-01 RX ADMIN — Medication 5 MILLIGRAM(S): at 05:49

## 2022-01-01 RX ADMIN — Medication 5 MILLIGRAM(S): at 13:56

## 2022-01-01 RX ADMIN — HYDROMORPHONE HYDROCHLORIDE 2 MILLIGRAM(S): 2 INJECTION INTRAMUSCULAR; INTRAVENOUS; SUBCUTANEOUS at 01:31

## 2022-01-01 RX ADMIN — HYDROMORPHONE HYDROCHLORIDE 0.5 MILLIGRAM(S): 2 INJECTION INTRAMUSCULAR; INTRAVENOUS; SUBCUTANEOUS at 13:05

## 2022-01-01 RX ADMIN — HYDROMORPHONE HYDROCHLORIDE 2 MILLIGRAM(S): 2 INJECTION INTRAMUSCULAR; INTRAVENOUS; SUBCUTANEOUS at 01:38

## 2022-01-01 RX ADMIN — METHADONE HYDROCHLORIDE 5 MILLIGRAM(S): 40 TABLET ORAL at 07:07

## 2022-01-01 RX ADMIN — HYDROMORPHONE HYDROCHLORIDE 1 MILLIGRAM(S): 2 INJECTION INTRAMUSCULAR; INTRAVENOUS; SUBCUTANEOUS at 13:22

## 2022-01-01 RX ADMIN — PANTOPRAZOLE SODIUM 40 MILLIGRAM(S): 20 TABLET, DELAYED RELEASE ORAL at 12:29

## 2022-01-01 RX ADMIN — HYDROMORPHONE HYDROCHLORIDE 1 MILLIGRAM(S): 2 INJECTION INTRAMUSCULAR; INTRAVENOUS; SUBCUTANEOUS at 02:27

## 2022-01-01 RX ADMIN — LIDOCAINE 1 PATCH: 4 CREAM TOPICAL at 13:02

## 2022-01-01 RX ADMIN — HYDROMORPHONE HYDROCHLORIDE 1 MILLIGRAM(S): 2 INJECTION INTRAMUSCULAR; INTRAVENOUS; SUBCUTANEOUS at 13:39

## 2022-01-01 RX ADMIN — MORPHINE SULFATE 15 MILLIGRAM(S): 50 CAPSULE, EXTENDED RELEASE ORAL at 07:40

## 2022-01-01 RX ADMIN — HYDROMORPHONE HYDROCHLORIDE 0.3 MILLIGRAM(S): 2 INJECTION INTRAMUSCULAR; INTRAVENOUS; SUBCUTANEOUS at 12:05

## 2022-01-01 RX ADMIN — HYDROMORPHONE HYDROCHLORIDE 0.9 MILLIGRAM(S): 2 INJECTION INTRAMUSCULAR; INTRAVENOUS; SUBCUTANEOUS at 00:36

## 2022-01-01 RX ADMIN — METHADONE HYDROCHLORIDE 5 MILLIGRAM(S): 40 TABLET ORAL at 18:42

## 2022-01-01 RX ADMIN — PIPERACILLIN AND TAZOBACTAM 25 GRAM(S): 4; .5 INJECTION, POWDER, LYOPHILIZED, FOR SOLUTION INTRAVENOUS at 05:47

## 2022-01-01 RX ADMIN — Medication 5 MILLIGRAM(S): at 21:53

## 2022-01-01 RX ADMIN — Medication 5 MILLIGRAM(S): at 22:15

## 2022-01-01 RX ADMIN — PIPERACILLIN AND TAZOBACTAM 25 GRAM(S): 4; .5 INJECTION, POWDER, LYOPHILIZED, FOR SOLUTION INTRAVENOUS at 22:59

## 2022-01-01 RX ADMIN — LIDOCAINE 1 PATCH: 4 CREAM TOPICAL at 10:03

## 2022-01-01 RX ADMIN — ENOXAPARIN SODIUM 40 MILLIGRAM(S): 100 INJECTION SUBCUTANEOUS at 06:17

## 2022-01-01 RX ADMIN — Medication 5 MILLIGRAM(S): at 21:33

## 2022-01-01 RX ADMIN — METHADONE HYDROCHLORIDE 7.5 MILLIGRAM(S): 40 TABLET ORAL at 22:39

## 2022-01-01 RX ADMIN — HYDROMORPHONE HYDROCHLORIDE 1 MILLIGRAM(S): 2 INJECTION INTRAMUSCULAR; INTRAVENOUS; SUBCUTANEOUS at 04:26

## 2022-01-01 RX ADMIN — ENOXAPARIN SODIUM 70 MILLIGRAM(S): 100 INJECTION SUBCUTANEOUS at 17:24

## 2022-01-01 RX ADMIN — HYDROMORPHONE HYDROCHLORIDE 0.6 MILLIGRAM(S): 2 INJECTION INTRAMUSCULAR; INTRAVENOUS; SUBCUTANEOUS at 20:58

## 2022-01-01 RX ADMIN — ENOXAPARIN SODIUM 40 MILLIGRAM(S): 100 INJECTION SUBCUTANEOUS at 06:19

## 2022-01-01 RX ADMIN — HYDROMORPHONE HYDROCHLORIDE 1 MILLIGRAM(S): 2 INJECTION INTRAMUSCULAR; INTRAVENOUS; SUBCUTANEOUS at 06:30

## 2022-01-01 RX ADMIN — Medication 5 MILLILITER(S): at 20:55

## 2022-01-01 RX ADMIN — ENOXAPARIN SODIUM 65 MILLIGRAM(S): 100 INJECTION SUBCUTANEOUS at 21:47

## 2022-01-01 RX ADMIN — MORPHINE SULFATE 15 MILLIGRAM(S): 50 CAPSULE, EXTENDED RELEASE ORAL at 23:28

## 2022-01-01 RX ADMIN — Medication 5 MILLIGRAM(S): at 14:05

## 2022-01-01 RX ADMIN — Medication 1 TABLET(S): at 13:32

## 2022-01-01 RX ADMIN — HYDROMORPHONE HYDROCHLORIDE 0.6 MILLIGRAM(S): 2 INJECTION INTRAMUSCULAR; INTRAVENOUS; SUBCUTANEOUS at 22:36

## 2022-01-01 RX ADMIN — ENOXAPARIN SODIUM 40 MILLIGRAM(S): 100 INJECTION SUBCUTANEOUS at 13:38

## 2022-01-01 RX ADMIN — PANTOPRAZOLE SODIUM 40 MILLIGRAM(S): 20 TABLET, DELAYED RELEASE ORAL at 12:38

## 2022-01-01 RX ADMIN — LIDOCAINE 1 PATCH: 4 CREAM TOPICAL at 12:11

## 2022-01-01 RX ADMIN — MORPHINE SULFATE 15 MILLIGRAM(S): 50 CAPSULE, EXTENDED RELEASE ORAL at 11:55

## 2022-01-01 RX ADMIN — PANTOPRAZOLE SODIUM 40 MILLIGRAM(S): 20 TABLET, DELAYED RELEASE ORAL at 18:28

## 2022-01-01 RX ADMIN — Medication 5 MILLILITER(S): at 07:25

## 2022-01-01 RX ADMIN — HYDROMORPHONE HYDROCHLORIDE 1 MILLIGRAM(S): 2 INJECTION INTRAMUSCULAR; INTRAVENOUS; SUBCUTANEOUS at 14:20

## 2022-01-01 RX ADMIN — HYDROMORPHONE HYDROCHLORIDE 0.3 MILLIGRAM(S): 2 INJECTION INTRAMUSCULAR; INTRAVENOUS; SUBCUTANEOUS at 11:40

## 2022-01-01 RX ADMIN — Medication 5 MILLILITER(S): at 10:21

## 2022-01-01 RX ADMIN — Medication 5 MILLIGRAM(S): at 03:17

## 2022-01-01 RX ADMIN — HYDROMORPHONE HYDROCHLORIDE 1 MILLIGRAM(S): 2 INJECTION INTRAMUSCULAR; INTRAVENOUS; SUBCUTANEOUS at 22:29

## 2022-01-01 RX ADMIN — Medication 25 GRAM(S): at 10:02

## 2022-01-01 RX ADMIN — Medication 10 MILLIGRAM(S): at 17:41

## 2022-01-01 RX ADMIN — HYDROMORPHONE HYDROCHLORIDE 1 MILLIGRAM(S): 2 INJECTION INTRAMUSCULAR; INTRAVENOUS; SUBCUTANEOUS at 02:05

## 2022-01-01 RX ADMIN — PIPERACILLIN AND TAZOBACTAM 25 GRAM(S): 4; .5 INJECTION, POWDER, LYOPHILIZED, FOR SOLUTION INTRAVENOUS at 18:45

## 2022-01-01 RX ADMIN — PIPERACILLIN AND TAZOBACTAM 25 GRAM(S): 4; .5 INJECTION, POWDER, LYOPHILIZED, FOR SOLUTION INTRAVENOUS at 21:58

## 2022-01-01 RX ADMIN — Medication 650 MILLIGRAM(S): at 08:33

## 2022-01-01 RX ADMIN — HYDROMORPHONE HYDROCHLORIDE 0.8 MG/HR: 2 INJECTION INTRAMUSCULAR; INTRAVENOUS; SUBCUTANEOUS at 07:26

## 2022-01-01 RX ADMIN — ONDANSETRON 4 MILLIGRAM(S): 8 TABLET, FILM COATED ORAL at 13:25

## 2022-01-01 RX ADMIN — MORPHINE SULFATE 15 MILLIGRAM(S): 50 CAPSULE, EXTENDED RELEASE ORAL at 02:57

## 2022-01-01 RX ADMIN — HYDROMORPHONE HYDROCHLORIDE 1.5 MILLIGRAM(S): 2 INJECTION INTRAMUSCULAR; INTRAVENOUS; SUBCUTANEOUS at 10:19

## 2022-01-01 RX ADMIN — HYDROMORPHONE HYDROCHLORIDE 1.5 MILLIGRAM(S): 2 INJECTION INTRAMUSCULAR; INTRAVENOUS; SUBCUTANEOUS at 10:04

## 2022-01-01 RX ADMIN — HYDROMORPHONE HYDROCHLORIDE 0.6 MILLIGRAM(S): 2 INJECTION INTRAMUSCULAR; INTRAVENOUS; SUBCUTANEOUS at 10:31

## 2022-01-01 RX ADMIN — PANTOPRAZOLE SODIUM 40 MILLIGRAM(S): 20 TABLET, DELAYED RELEASE ORAL at 06:13

## 2022-01-01 RX ADMIN — MORPHINE SULFATE 15 MILLIGRAM(S): 50 CAPSULE, EXTENDED RELEASE ORAL at 06:02

## 2022-01-01 RX ADMIN — HYDROMORPHONE HYDROCHLORIDE 1.5 MILLIGRAM(S): 2 INJECTION INTRAMUSCULAR; INTRAVENOUS; SUBCUTANEOUS at 10:46

## 2022-01-01 RX ADMIN — Medication 1 TABLET(S): at 12:20

## 2022-01-01 RX ADMIN — ONDANSETRON 4 MILLIGRAM(S): 8 TABLET, FILM COATED ORAL at 00:02

## 2022-01-01 RX ADMIN — METHADONE HYDROCHLORIDE 5 MILLIGRAM(S): 40 TABLET ORAL at 17:11

## 2022-01-01 RX ADMIN — LIDOCAINE 1 PATCH: 4 CREAM TOPICAL at 23:25

## 2022-01-01 RX ADMIN — LIDOCAINE 1 PATCH: 4 CREAM TOPICAL at 13:21

## 2022-01-01 RX ADMIN — HYDROMORPHONE HYDROCHLORIDE 0.3 MILLIGRAM(S): 2 INJECTION INTRAMUSCULAR; INTRAVENOUS; SUBCUTANEOUS at 20:10

## 2022-01-01 RX ADMIN — HYDROMORPHONE HYDROCHLORIDE 1 MILLIGRAM(S): 2 INJECTION INTRAMUSCULAR; INTRAVENOUS; SUBCUTANEOUS at 18:12

## 2022-01-01 RX ADMIN — HYDROMORPHONE HYDROCHLORIDE 2 MILLIGRAM(S): 2 INJECTION INTRAMUSCULAR; INTRAVENOUS; SUBCUTANEOUS at 22:18

## 2022-01-01 RX ADMIN — ONDANSETRON 8 MILLIGRAM(S): 8 TABLET, FILM COATED ORAL at 09:47

## 2022-01-01 RX ADMIN — Medication 5 MILLIGRAM(S): at 18:15

## 2022-01-01 RX ADMIN — PIPERACILLIN AND TAZOBACTAM 25 GRAM(S): 4; .5 INJECTION, POWDER, LYOPHILIZED, FOR SOLUTION INTRAVENOUS at 14:07

## 2022-01-01 RX ADMIN — HYDROMORPHONE HYDROCHLORIDE 1.5 MILLIGRAM(S): 2 INJECTION INTRAMUSCULAR; INTRAVENOUS; SUBCUTANEOUS at 06:20

## 2022-01-01 RX ADMIN — Medication 5 MILLILITER(S): at 19:03

## 2022-01-01 RX ADMIN — Medication 5 MILLIGRAM(S): at 22:31

## 2022-01-01 RX ADMIN — Medication 5 MILLIGRAM(S): at 21:49

## 2022-01-01 RX ADMIN — PIPERACILLIN AND TAZOBACTAM 25 GRAM(S): 4; .5 INJECTION, POWDER, LYOPHILIZED, FOR SOLUTION INTRAVENOUS at 21:18

## 2022-01-01 RX ADMIN — METHADONE HYDROCHLORIDE 5 MILLIGRAM(S): 40 TABLET ORAL at 18:29

## 2022-01-01 RX ADMIN — HYDROMORPHONE HYDROCHLORIDE 2 MILLIGRAM(S): 2 INJECTION INTRAMUSCULAR; INTRAVENOUS; SUBCUTANEOUS at 10:36

## 2022-01-01 RX ADMIN — ENOXAPARIN SODIUM 40 MILLIGRAM(S): 100 INJECTION SUBCUTANEOUS at 06:24

## 2022-01-01 RX ADMIN — HYDROMORPHONE HYDROCHLORIDE 2 MILLIGRAM(S): 2 INJECTION INTRAMUSCULAR; INTRAVENOUS; SUBCUTANEOUS at 06:00

## 2022-01-01 RX ADMIN — PIPERACILLIN AND TAZOBACTAM 25 GRAM(S): 4; .5 INJECTION, POWDER, LYOPHILIZED, FOR SOLUTION INTRAVENOUS at 13:49

## 2022-01-01 RX ADMIN — Medication 5 MILLILITER(S): at 06:29

## 2022-01-01 RX ADMIN — ENOXAPARIN SODIUM 70 MILLIGRAM(S): 100 INJECTION SUBCUTANEOUS at 06:55

## 2022-01-01 RX ADMIN — Medication 5 MILLILITER(S): at 18:46

## 2022-01-01 RX ADMIN — MORPHINE SULFATE 15 MILLIGRAM(S): 50 CAPSULE, EXTENDED RELEASE ORAL at 02:53

## 2022-01-01 RX ADMIN — HYDROMORPHONE HYDROCHLORIDE 2 MILLIGRAM(S): 2 INJECTION INTRAMUSCULAR; INTRAVENOUS; SUBCUTANEOUS at 22:06

## 2022-01-01 RX ADMIN — Medication 650 MILLIGRAM(S): at 00:50

## 2022-01-01 RX ADMIN — HYDROMORPHONE HYDROCHLORIDE 1 MILLIGRAM(S): 2 INJECTION INTRAMUSCULAR; INTRAVENOUS; SUBCUTANEOUS at 09:21

## 2022-01-01 RX ADMIN — LIDOCAINE 1 PATCH: 4 CREAM TOPICAL at 20:46

## 2022-01-01 RX ADMIN — Medication 5 MILLILITER(S): at 06:26

## 2022-01-01 RX ADMIN — LIDOCAINE 1 PATCH: 4 CREAM TOPICAL at 03:10

## 2022-01-01 RX ADMIN — HYDROMORPHONE HYDROCHLORIDE 1 MILLIGRAM(S): 2 INJECTION INTRAMUSCULAR; INTRAVENOUS; SUBCUTANEOUS at 18:33

## 2022-01-01 RX ADMIN — Medication 5 MILLIGRAM(S): at 20:55

## 2022-01-01 RX ADMIN — HYDROMORPHONE HYDROCHLORIDE 1.5 MILLIGRAM(S): 2 INJECTION INTRAMUSCULAR; INTRAVENOUS; SUBCUTANEOUS at 01:56

## 2022-01-01 RX ADMIN — LIDOCAINE 1 PATCH: 4 CREAM TOPICAL at 12:20

## 2022-01-01 RX ADMIN — MORPHINE SULFATE 15 MILLIGRAM(S): 50 CAPSULE, EXTENDED RELEASE ORAL at 09:23

## 2022-01-01 RX ADMIN — Medication 5 MILLILITER(S): at 17:03

## 2022-01-01 RX ADMIN — HYDROMORPHONE HYDROCHLORIDE 1.5 MILLIGRAM(S): 2 INJECTION INTRAMUSCULAR; INTRAVENOUS; SUBCUTANEOUS at 10:30

## 2022-01-01 RX ADMIN — Medication 5 MILLIGRAM(S): at 09:23

## 2022-01-01 RX ADMIN — Medication 5 MILLIGRAM(S): at 02:10

## 2022-01-01 RX ADMIN — MORPHINE SULFATE 2 MILLIGRAM(S): 50 CAPSULE, EXTENDED RELEASE ORAL at 13:01

## 2022-01-01 RX ADMIN — ENOXAPARIN SODIUM 40 MILLIGRAM(S): 100 INJECTION SUBCUTANEOUS at 06:10

## 2022-01-01 RX ADMIN — LIDOCAINE 1 PATCH: 4 CREAM TOPICAL at 19:30

## 2022-01-01 RX ADMIN — MORPHINE SULFATE 20 MILLIGRAM(S): 50 CAPSULE, EXTENDED RELEASE ORAL at 10:11

## 2022-01-01 RX ADMIN — HYDROMORPHONE HYDROCHLORIDE 2 MILLIGRAM(S): 2 INJECTION INTRAMUSCULAR; INTRAVENOUS; SUBCUTANEOUS at 14:40

## 2022-01-01 RX ADMIN — MORPHINE SULFATE 15 MILLIGRAM(S): 50 CAPSULE, EXTENDED RELEASE ORAL at 15:17

## 2022-01-01 RX ADMIN — Medication 5 MILLIGRAM(S): at 06:54

## 2022-01-01 RX ADMIN — HYDROMORPHONE HYDROCHLORIDE 1.5 MILLIGRAM(S): 2 INJECTION INTRAMUSCULAR; INTRAVENOUS; SUBCUTANEOUS at 17:37

## 2022-01-01 RX ADMIN — Medication 5 MILLILITER(S): at 18:23

## 2022-01-01 RX ADMIN — HYDROMORPHONE HYDROCHLORIDE 1.5 MILLIGRAM(S): 2 INJECTION INTRAMUSCULAR; INTRAVENOUS; SUBCUTANEOUS at 21:52

## 2022-01-01 RX ADMIN — HYDROMORPHONE HYDROCHLORIDE 0.3 MILLIGRAM(S): 2 INJECTION INTRAMUSCULAR; INTRAVENOUS; SUBCUTANEOUS at 00:42

## 2022-01-01 RX ADMIN — MORPHINE SULFATE 15 MILLIGRAM(S): 50 CAPSULE, EXTENDED RELEASE ORAL at 14:10

## 2022-01-01 RX ADMIN — HYDROMORPHONE HYDROCHLORIDE 2 MILLIGRAM(S): 2 INJECTION INTRAMUSCULAR; INTRAVENOUS; SUBCUTANEOUS at 19:00

## 2022-01-01 RX ADMIN — Medication 650 MILLIGRAM(S): at 17:31

## 2022-01-01 RX ADMIN — Medication 1 TABLET(S): at 02:25

## 2022-01-01 RX ADMIN — Medication 5 MILLILITER(S): at 18:25

## 2022-01-01 RX ADMIN — Medication 5 MILLIGRAM(S): at 01:39

## 2022-01-01 RX ADMIN — HYDROMORPHONE HYDROCHLORIDE 1.5 MILLIGRAM(S): 2 INJECTION INTRAMUSCULAR; INTRAVENOUS; SUBCUTANEOUS at 02:15

## 2022-01-01 RX ADMIN — MORPHINE SULFATE 15 MILLIGRAM(S): 50 CAPSULE, EXTENDED RELEASE ORAL at 17:55

## 2022-01-01 RX ADMIN — HYDROMORPHONE HYDROCHLORIDE 1 MILLIGRAM(S): 2 INJECTION INTRAMUSCULAR; INTRAVENOUS; SUBCUTANEOUS at 06:12

## 2022-01-01 RX ADMIN — HYDROMORPHONE HYDROCHLORIDE 2 MILLIGRAM(S): 2 INJECTION INTRAMUSCULAR; INTRAVENOUS; SUBCUTANEOUS at 11:36

## 2022-01-01 RX ADMIN — HYDROMORPHONE HYDROCHLORIDE 2 MILLIGRAM(S): 2 INJECTION INTRAMUSCULAR; INTRAVENOUS; SUBCUTANEOUS at 13:44

## 2022-01-01 RX ADMIN — Medication 5 MILLIGRAM(S): at 02:40

## 2022-01-01 RX ADMIN — PIPERACILLIN AND TAZOBACTAM 25 GRAM(S): 4; .5 INJECTION, POWDER, LYOPHILIZED, FOR SOLUTION INTRAVENOUS at 21:45

## 2022-01-01 RX ADMIN — ENOXAPARIN SODIUM 40 MILLIGRAM(S): 100 INJECTION SUBCUTANEOUS at 05:47

## 2022-01-01 RX ADMIN — HYDROMORPHONE HYDROCHLORIDE 1.5 MILLIGRAM(S): 2 INJECTION INTRAMUSCULAR; INTRAVENOUS; SUBCUTANEOUS at 02:20

## 2022-01-01 RX ADMIN — METHADONE HYDROCHLORIDE 5 MILLIGRAM(S): 40 TABLET ORAL at 06:00

## 2022-01-01 RX ADMIN — PANTOPRAZOLE SODIUM 40 MILLIGRAM(S): 20 TABLET, DELAYED RELEASE ORAL at 18:19

## 2022-01-01 RX ADMIN — HYDROMORPHONE HYDROCHLORIDE 2 MILLIGRAM(S): 2 INJECTION INTRAMUSCULAR; INTRAVENOUS; SUBCUTANEOUS at 09:57

## 2022-01-01 RX ADMIN — LIDOCAINE 1 PATCH: 4 CREAM TOPICAL at 02:55

## 2022-01-01 RX ADMIN — Medication 5 MILLILITER(S): at 05:22

## 2022-01-01 RX ADMIN — LIDOCAINE 1 PATCH: 4 CREAM TOPICAL at 12:16

## 2022-01-01 RX ADMIN — MORPHINE SULFATE 15 MILLIGRAM(S): 50 CAPSULE, EXTENDED RELEASE ORAL at 01:45

## 2022-01-01 RX ADMIN — ONDANSETRON 4 MILLIGRAM(S): 8 TABLET, FILM COATED ORAL at 06:32

## 2022-01-01 RX ADMIN — Medication 100 MILLIEQUIVALENT(S): at 10:35

## 2022-01-01 RX ADMIN — METHADONE HYDROCHLORIDE 7.5 MILLIGRAM(S): 40 TABLET ORAL at 10:36

## 2022-01-01 RX ADMIN — ENOXAPARIN SODIUM 70 MILLIGRAM(S): 100 INJECTION SUBCUTANEOUS at 05:20

## 2022-01-01 RX ADMIN — LIDOCAINE 1 PATCH: 4 CREAM TOPICAL at 11:14

## 2022-01-01 RX ADMIN — PANTOPRAZOLE SODIUM 40 MILLIGRAM(S): 20 TABLET, DELAYED RELEASE ORAL at 11:06

## 2022-01-01 RX ADMIN — Medication 3 MILLIGRAM(S): at 21:35

## 2022-01-01 RX ADMIN — Medication 3 MILLIGRAM(S): at 22:26

## 2022-01-01 RX ADMIN — Medication 5 MILLIGRAM(S): at 14:17

## 2022-01-01 RX ADMIN — LIDOCAINE 1 PATCH: 4 CREAM TOPICAL at 19:21

## 2022-01-01 RX ADMIN — LIDOCAINE 1 PATCH: 4 CREAM TOPICAL at 01:01

## 2022-01-01 RX ADMIN — ENOXAPARIN SODIUM 70 MILLIGRAM(S): 100 INJECTION SUBCUTANEOUS at 06:52

## 2022-01-01 RX ADMIN — HYDROMORPHONE HYDROCHLORIDE 2 MILLIGRAM(S): 2 INJECTION INTRAMUSCULAR; INTRAVENOUS; SUBCUTANEOUS at 11:05

## 2022-01-01 RX ADMIN — HYDROMORPHONE HYDROCHLORIDE 0.9 MILLIGRAM(S): 2 INJECTION INTRAMUSCULAR; INTRAVENOUS; SUBCUTANEOUS at 16:32

## 2022-01-01 RX ADMIN — PIPERACILLIN AND TAZOBACTAM 25 GRAM(S): 4; .5 INJECTION, POWDER, LYOPHILIZED, FOR SOLUTION INTRAVENOUS at 13:01

## 2022-01-01 RX ADMIN — PANTOPRAZOLE SODIUM 40 MILLIGRAM(S): 20 TABLET, DELAYED RELEASE ORAL at 18:05

## 2022-01-01 RX ADMIN — Medication 5 MILLIGRAM(S): at 02:44

## 2022-01-01 RX ADMIN — HYDROMORPHONE HYDROCHLORIDE 0.6 MILLIGRAM(S): 2 INJECTION INTRAMUSCULAR; INTRAVENOUS; SUBCUTANEOUS at 22:21

## 2022-01-01 RX ADMIN — MORPHINE SULFATE 15 MILLIGRAM(S): 50 CAPSULE, EXTENDED RELEASE ORAL at 10:37

## 2022-01-01 RX ADMIN — HYDROMORPHONE HYDROCHLORIDE 1 MILLIGRAM(S): 2 INJECTION INTRAMUSCULAR; INTRAVENOUS; SUBCUTANEOUS at 13:40

## 2022-01-01 RX ADMIN — HYDROMORPHONE HYDROCHLORIDE 0.5 MG/HR: 2 INJECTION INTRAMUSCULAR; INTRAVENOUS; SUBCUTANEOUS at 19:12

## 2022-01-01 RX ADMIN — HYDROMORPHONE HYDROCHLORIDE 0.6 MILLIGRAM(S): 2 INJECTION INTRAMUSCULAR; INTRAVENOUS; SUBCUTANEOUS at 10:46

## 2022-01-01 RX ADMIN — MORPHINE SULFATE 15 MILLIGRAM(S): 50 CAPSULE, EXTENDED RELEASE ORAL at 21:48

## 2022-01-01 RX ADMIN — HYDROMORPHONE HYDROCHLORIDE 1 MILLIGRAM(S): 2 INJECTION INTRAMUSCULAR; INTRAVENOUS; SUBCUTANEOUS at 05:55

## 2022-01-01 RX ADMIN — Medication 5 MILLIGRAM(S): at 23:27

## 2022-01-01 RX ADMIN — HYDROMORPHONE HYDROCHLORIDE 1 MILLIGRAM(S): 2 INJECTION INTRAMUSCULAR; INTRAVENOUS; SUBCUTANEOUS at 22:54

## 2022-01-01 RX ADMIN — HYDROMORPHONE HYDROCHLORIDE 1 MILLIGRAM(S): 2 INJECTION INTRAMUSCULAR; INTRAVENOUS; SUBCUTANEOUS at 02:17

## 2022-01-01 RX ADMIN — Medication 5 MILLILITER(S): at 05:48

## 2022-01-01 RX ADMIN — ENOXAPARIN SODIUM 70 MILLIGRAM(S): 100 INJECTION SUBCUTANEOUS at 05:48

## 2022-01-01 RX ADMIN — ENOXAPARIN SODIUM 40 MILLIGRAM(S): 100 INJECTION SUBCUTANEOUS at 05:45

## 2022-01-01 RX ADMIN — MORPHINE SULFATE 15 MILLIGRAM(S): 50 CAPSULE, EXTENDED RELEASE ORAL at 02:39

## 2022-01-01 RX ADMIN — Medication 5 MILLIGRAM(S): at 18:06

## 2022-01-01 RX ADMIN — Medication 5 MILLILITER(S): at 19:01

## 2022-01-01 RX ADMIN — PANTOPRAZOLE SODIUM 40 MILLIGRAM(S): 20 TABLET, DELAYED RELEASE ORAL at 18:04

## 2022-01-01 RX ADMIN — HYDROMORPHONE HYDROCHLORIDE 0.3 MILLIGRAM(S): 2 INJECTION INTRAMUSCULAR; INTRAVENOUS; SUBCUTANEOUS at 20:25

## 2022-01-01 RX ADMIN — LIDOCAINE 1 PATCH: 4 CREAM TOPICAL at 19:16

## 2022-01-01 RX ADMIN — MORPHINE SULFATE 15 MILLIGRAM(S): 50 CAPSULE, EXTENDED RELEASE ORAL at 03:30

## 2022-01-01 RX ADMIN — HYDROMORPHONE HYDROCHLORIDE 1 MILLIGRAM(S): 2 INJECTION INTRAMUSCULAR; INTRAVENOUS; SUBCUTANEOUS at 13:44

## 2022-01-01 RX ADMIN — PANTOPRAZOLE SODIUM 40 MILLIGRAM(S): 20 TABLET, DELAYED RELEASE ORAL at 06:12

## 2022-01-01 RX ADMIN — HYDROMORPHONE HYDROCHLORIDE 0.5 MG/HR: 2 INJECTION INTRAMUSCULAR; INTRAVENOUS; SUBCUTANEOUS at 07:07

## 2022-01-01 RX ADMIN — ENOXAPARIN SODIUM 40 MILLIGRAM(S): 100 INJECTION SUBCUTANEOUS at 06:13

## 2022-01-01 RX ADMIN — Medication 5 MILLILITER(S): at 07:09

## 2022-01-01 RX ADMIN — Medication 3 MILLIGRAM(S): at 22:42

## 2022-01-01 RX ADMIN — MORPHINE SULFATE 15 MILLIGRAM(S): 50 CAPSULE, EXTENDED RELEASE ORAL at 15:00

## 2022-01-01 RX ADMIN — PANTOPRAZOLE SODIUM 40 MILLIGRAM(S): 20 TABLET, DELAYED RELEASE ORAL at 05:45

## 2022-01-01 RX ADMIN — PANTOPRAZOLE SODIUM 40 MILLIGRAM(S): 20 TABLET, DELAYED RELEASE ORAL at 17:28

## 2022-01-01 RX ADMIN — MORPHINE SULFATE 15 MILLIGRAM(S): 50 CAPSULE, EXTENDED RELEASE ORAL at 04:37

## 2022-01-01 RX ADMIN — MORPHINE SULFATE 15 MILLIGRAM(S): 50 CAPSULE, EXTENDED RELEASE ORAL at 02:46

## 2022-01-01 RX ADMIN — Medication 5 MILLILITER(S): at 18:59

## 2022-01-01 RX ADMIN — Medication 5 MILLIGRAM(S): at 18:04

## 2022-01-01 RX ADMIN — MORPHINE SULFATE 15 MILLIGRAM(S): 50 CAPSULE, EXTENDED RELEASE ORAL at 12:04

## 2022-01-01 RX ADMIN — Medication 5 MILLIGRAM(S): at 03:08

## 2022-01-01 RX ADMIN — MORPHINE SULFATE 15 MILLIGRAM(S): 50 CAPSULE, EXTENDED RELEASE ORAL at 12:39

## 2022-01-01 RX ADMIN — Medication 5 MILLILITER(S): at 07:12

## 2022-01-01 RX ADMIN — PIPERACILLIN AND TAZOBACTAM 25 GRAM(S): 4; .5 INJECTION, POWDER, LYOPHILIZED, FOR SOLUTION INTRAVENOUS at 05:43

## 2022-01-01 RX ADMIN — MORPHINE SULFATE 15 MILLIGRAM(S): 50 CAPSULE, EXTENDED RELEASE ORAL at 10:57

## 2022-01-01 RX ADMIN — Medication 5 MILLIGRAM(S): at 11:19

## 2022-01-01 RX ADMIN — HYDROMORPHONE HYDROCHLORIDE 0.6 MILLIGRAM(S): 2 INJECTION INTRAMUSCULAR; INTRAVENOUS; SUBCUTANEOUS at 05:56

## 2022-01-01 RX ADMIN — Medication 5 MILLIGRAM(S): at 01:51

## 2022-01-01 RX ADMIN — Medication 5 MILLIGRAM(S): at 18:43

## 2022-01-01 RX ADMIN — MORPHINE SULFATE 15 MILLIGRAM(S): 50 CAPSULE, EXTENDED RELEASE ORAL at 22:42

## 2022-01-01 RX ADMIN — Medication 5 MILLIGRAM(S): at 01:04

## 2022-01-01 RX ADMIN — PANTOPRAZOLE SODIUM 40 MILLIGRAM(S): 20 TABLET, DELAYED RELEASE ORAL at 12:05

## 2022-01-01 RX ADMIN — LIDOCAINE 1 PATCH: 4 CREAM TOPICAL at 11:53

## 2022-01-01 RX ADMIN — Medication 25 GRAM(S): at 13:39

## 2022-01-01 RX ADMIN — METHADONE HYDROCHLORIDE 5 MILLIGRAM(S): 40 TABLET ORAL at 18:55

## 2022-01-01 RX ADMIN — LIDOCAINE 1 PATCH: 4 CREAM TOPICAL at 00:28

## 2022-01-01 RX ADMIN — ENOXAPARIN SODIUM 65 MILLIGRAM(S): 100 INJECTION SUBCUTANEOUS at 11:19

## 2022-01-01 RX ADMIN — ONDANSETRON 8 MILLIGRAM(S): 8 TABLET, FILM COATED ORAL at 14:45

## 2022-01-01 RX ADMIN — LIDOCAINE 1 PATCH: 4 CREAM TOPICAL at 13:32

## 2022-01-01 RX ADMIN — ENOXAPARIN SODIUM 40 MILLIGRAM(S): 100 INJECTION SUBCUTANEOUS at 11:26

## 2022-01-01 RX ADMIN — MORPHINE SULFATE 15 MILLIGRAM(S): 50 CAPSULE, EXTENDED RELEASE ORAL at 22:57

## 2022-01-01 RX ADMIN — MORPHINE SULFATE 15 MILLIGRAM(S): 50 CAPSULE, EXTENDED RELEASE ORAL at 18:10

## 2022-01-01 RX ADMIN — Medication 1 TABLET(S): at 12:57

## 2022-01-01 RX ADMIN — PIPERACILLIN AND TAZOBACTAM 25 GRAM(S): 4; .5 INJECTION, POWDER, LYOPHILIZED, FOR SOLUTION INTRAVENOUS at 13:17

## 2022-01-01 RX ADMIN — MORPHINE SULFATE 15 MILLIGRAM(S): 50 CAPSULE, EXTENDED RELEASE ORAL at 00:48

## 2022-01-01 RX ADMIN — Medication 5 MILLILITER(S): at 05:40

## 2022-01-01 RX ADMIN — PANTOPRAZOLE SODIUM 40 MILLIGRAM(S): 20 TABLET, DELAYED RELEASE ORAL at 05:28

## 2022-01-01 RX ADMIN — MORPHINE SULFATE 15 MILLIGRAM(S): 50 CAPSULE, EXTENDED RELEASE ORAL at 21:30

## 2022-01-01 RX ADMIN — MORPHINE SULFATE 15 MILLIGRAM(S): 50 CAPSULE, EXTENDED RELEASE ORAL at 19:26

## 2022-01-01 RX ADMIN — HYDROMORPHONE HYDROCHLORIDE 0.6 MILLIGRAM(S): 2 INJECTION INTRAMUSCULAR; INTRAVENOUS; SUBCUTANEOUS at 17:05

## 2022-01-01 RX ADMIN — LIDOCAINE 1 PATCH: 4 CREAM TOPICAL at 01:11

## 2022-01-01 RX ADMIN — HYDROMORPHONE HYDROCHLORIDE 2 MILLIGRAM(S): 2 INJECTION INTRAMUSCULAR; INTRAVENOUS; SUBCUTANEOUS at 05:34

## 2022-01-01 RX ADMIN — PANTOPRAZOLE SODIUM 40 MILLIGRAM(S): 20 TABLET, DELAYED RELEASE ORAL at 11:38

## 2022-01-01 RX ADMIN — Medication 5 MILLILITER(S): at 05:54

## 2022-01-01 RX ADMIN — Medication 5 MILLILITER(S): at 06:37

## 2022-01-01 RX ADMIN — Medication 5 MILLIGRAM(S): at 01:05

## 2022-01-01 RX ADMIN — HYDROMORPHONE HYDROCHLORIDE 1 MILLIGRAM(S): 2 INJECTION INTRAMUSCULAR; INTRAVENOUS; SUBCUTANEOUS at 23:24

## 2022-01-01 RX ADMIN — Medication 5 MILLIGRAM(S): at 18:09

## 2022-01-01 RX ADMIN — Medication 1 TABLET(S): at 11:26

## 2022-01-01 RX ADMIN — METHADONE HYDROCHLORIDE 5 MILLIGRAM(S): 40 TABLET ORAL at 17:30

## 2022-01-01 RX ADMIN — Medication 5 MILLIGRAM(S): at 23:29

## 2022-01-01 RX ADMIN — HYDROMORPHONE HYDROCHLORIDE 0.5 MG/HR: 2 INJECTION INTRAMUSCULAR; INTRAVENOUS; SUBCUTANEOUS at 07:10

## 2022-01-01 RX ADMIN — HYDROMORPHONE HYDROCHLORIDE 1.5 MILLIGRAM(S): 2 INJECTION INTRAMUSCULAR; INTRAVENOUS; SUBCUTANEOUS at 22:43

## 2022-01-01 RX ADMIN — PANTOPRAZOLE SODIUM 40 MILLIGRAM(S): 20 TABLET, DELAYED RELEASE ORAL at 18:38

## 2022-01-01 RX ADMIN — Medication 5 MILLIGRAM(S): at 22:17

## 2022-01-01 RX ADMIN — HYDROMORPHONE HYDROCHLORIDE 1.5 MILLIGRAM(S): 2 INJECTION INTRAMUSCULAR; INTRAVENOUS; SUBCUTANEOUS at 14:02

## 2022-01-01 RX ADMIN — METHADONE HYDROCHLORIDE 5 MILLIGRAM(S): 40 TABLET ORAL at 06:59

## 2022-01-01 RX ADMIN — MORPHINE SULFATE 15 MILLIGRAM(S): 50 CAPSULE, EXTENDED RELEASE ORAL at 19:01

## 2022-01-01 RX ADMIN — MORPHINE SULFATE 15 MILLIGRAM(S): 50 CAPSULE, EXTENDED RELEASE ORAL at 23:40

## 2022-01-01 RX ADMIN — HYDROMORPHONE HYDROCHLORIDE 2 MILLIGRAM(S): 2 INJECTION INTRAMUSCULAR; INTRAVENOUS; SUBCUTANEOUS at 12:57

## 2022-01-01 RX ADMIN — Medication 0.5 MILLIGRAM(S): at 16:41

## 2022-01-01 RX ADMIN — MORPHINE SULFATE 15 MILLIGRAM(S): 50 CAPSULE, EXTENDED RELEASE ORAL at 22:35

## 2022-01-01 RX ADMIN — HYDROMORPHONE HYDROCHLORIDE 0.6 MILLIGRAM(S): 2 INJECTION INTRAMUSCULAR; INTRAVENOUS; SUBCUTANEOUS at 14:10

## 2022-01-01 RX ADMIN — HYDROMORPHONE HYDROCHLORIDE 1.5 MILLIGRAM(S): 2 INJECTION INTRAMUSCULAR; INTRAVENOUS; SUBCUTANEOUS at 06:40

## 2022-01-01 RX ADMIN — METHADONE HYDROCHLORIDE 5 MILLIGRAM(S): 40 TABLET ORAL at 18:32

## 2022-01-01 RX ADMIN — LIDOCAINE 1 PATCH: 4 CREAM TOPICAL at 19:37

## 2022-01-01 RX ADMIN — POLYETHYLENE GLYCOL 3350 17 GRAM(S): 17 POWDER, FOR SOLUTION ORAL at 11:37

## 2022-01-01 RX ADMIN — HYDROMORPHONE HYDROCHLORIDE 2 MILLIGRAM(S): 2 INJECTION INTRAMUSCULAR; INTRAVENOUS; SUBCUTANEOUS at 18:38

## 2022-01-01 RX ADMIN — Medication 5 MILLIGRAM(S): at 06:28

## 2022-01-01 RX ADMIN — SODIUM CHLORIDE 2000 MILLILITER(S): 9 INJECTION INTRAMUSCULAR; INTRAVENOUS; SUBCUTANEOUS at 13:25

## 2022-01-01 RX ADMIN — HYDROMORPHONE HYDROCHLORIDE 0.6 MILLIGRAM(S): 2 INJECTION INTRAMUSCULAR; INTRAVENOUS; SUBCUTANEOUS at 08:22

## 2022-01-01 RX ADMIN — LIDOCAINE 1 PATCH: 4 CREAM TOPICAL at 00:20

## 2022-01-01 RX ADMIN — PANTOPRAZOLE SODIUM 40 MILLIGRAM(S): 20 TABLET, DELAYED RELEASE ORAL at 06:56

## 2022-01-01 RX ADMIN — Medication 5 MILLIGRAM(S): at 23:50

## 2022-01-01 RX ADMIN — MORPHINE SULFATE 10 MILLIGRAM(S): 50 CAPSULE, EXTENDED RELEASE ORAL at 04:47

## 2022-01-01 RX ADMIN — HYDROMORPHONE HYDROCHLORIDE 2 MILLIGRAM(S): 2 INJECTION INTRAMUSCULAR; INTRAVENOUS; SUBCUTANEOUS at 22:04

## 2022-01-01 RX ADMIN — Medication 5 MILLIGRAM(S): at 06:09

## 2022-01-01 RX ADMIN — HYDROMORPHONE HYDROCHLORIDE 1.5 MILLIGRAM(S): 2 INJECTION INTRAMUSCULAR; INTRAVENOUS; SUBCUTANEOUS at 01:06

## 2022-01-01 RX ADMIN — PANTOPRAZOLE SODIUM 40 MILLIGRAM(S): 20 TABLET, DELAYED RELEASE ORAL at 17:29

## 2022-01-01 RX ADMIN — METHADONE HYDROCHLORIDE 7.5 MILLIGRAM(S): 40 TABLET ORAL at 07:37

## 2022-01-01 RX ADMIN — MORPHINE SULFATE 15 MILLIGRAM(S): 50 CAPSULE, EXTENDED RELEASE ORAL at 05:02

## 2022-01-01 RX ADMIN — HYDROMORPHONE HYDROCHLORIDE 1 MILLIGRAM(S): 2 INJECTION INTRAMUSCULAR; INTRAVENOUS; SUBCUTANEOUS at 09:27

## 2022-01-01 RX ADMIN — HYDROMORPHONE HYDROCHLORIDE 1.5 MILLIGRAM(S): 2 INJECTION INTRAMUSCULAR; INTRAVENOUS; SUBCUTANEOUS at 02:10

## 2022-01-01 RX ADMIN — MORPHINE SULFATE 15 MILLIGRAM(S): 50 CAPSULE, EXTENDED RELEASE ORAL at 21:01

## 2022-01-01 RX ADMIN — Medication 5 MILLIGRAM(S): at 17:37

## 2022-01-01 RX ADMIN — Medication 5 MILLIGRAM(S): at 05:47

## 2022-01-01 RX ADMIN — HYDROMORPHONE HYDROCHLORIDE 2 MILLIGRAM(S): 2 INJECTION INTRAMUSCULAR; INTRAVENOUS; SUBCUTANEOUS at 06:39

## 2022-01-01 RX ADMIN — PANTOPRAZOLE SODIUM 40 MILLIGRAM(S): 20 TABLET, DELAYED RELEASE ORAL at 05:13

## 2022-01-01 RX ADMIN — LIDOCAINE 1 PATCH: 4 CREAM TOPICAL at 10:31

## 2022-01-01 RX ADMIN — MORPHINE SULFATE 20 MILLIGRAM(S): 50 CAPSULE, EXTENDED RELEASE ORAL at 01:39

## 2022-01-01 RX ADMIN — PIPERACILLIN AND TAZOBACTAM 25 GRAM(S): 4; .5 INJECTION, POWDER, LYOPHILIZED, FOR SOLUTION INTRAVENOUS at 21:26

## 2022-01-01 RX ADMIN — PANTOPRAZOLE SODIUM 40 MILLIGRAM(S): 20 TABLET, DELAYED RELEASE ORAL at 16:30

## 2022-01-01 RX ADMIN — Medication 15 MILLIGRAM(S): at 13:25

## 2022-01-01 RX ADMIN — LIDOCAINE 1 PATCH: 4 CREAM TOPICAL at 11:09

## 2022-01-01 RX ADMIN — PANTOPRAZOLE SODIUM 40 MILLIGRAM(S): 20 TABLET, DELAYED RELEASE ORAL at 18:30

## 2022-01-01 RX ADMIN — HYDROMORPHONE HYDROCHLORIDE 1.5 MILLIGRAM(S): 2 INJECTION INTRAMUSCULAR; INTRAVENOUS; SUBCUTANEOUS at 22:06

## 2022-01-01 RX ADMIN — ENOXAPARIN SODIUM 40 MILLIGRAM(S): 100 INJECTION SUBCUTANEOUS at 12:20

## 2022-01-01 RX ADMIN — Medication 5 MILLIGRAM(S): at 09:56

## 2022-01-01 RX ADMIN — Medication 5 MILLIGRAM(S): at 22:36

## 2022-01-01 RX ADMIN — LIDOCAINE 1 PATCH: 4 CREAM TOPICAL at 11:55

## 2022-01-01 RX ADMIN — Medication 5 MILLIGRAM(S): at 22:01

## 2022-01-01 RX ADMIN — Medication 5 MILLIGRAM(S): at 18:35

## 2022-01-01 RX ADMIN — SODIUM CHLORIDE 200 MILLILITER(S): 9 INJECTION, SOLUTION INTRAVENOUS at 16:15

## 2022-01-01 RX ADMIN — MORPHINE SULFATE 20 MILLIGRAM(S): 50 CAPSULE, EXTENDED RELEASE ORAL at 22:00

## 2022-01-01 RX ADMIN — LIDOCAINE 1 PATCH: 4 CREAM TOPICAL at 18:49

## 2022-01-01 RX ADMIN — Medication 5 MILLIGRAM(S): at 00:48

## 2022-01-01 RX ADMIN — Medication 650 MILLIGRAM(S): at 17:01

## 2022-01-01 RX ADMIN — Medication 5 MILLIGRAM(S): at 01:56

## 2022-01-01 RX ADMIN — LIDOCAINE 1 PATCH: 4 CREAM TOPICAL at 01:17

## 2022-01-01 RX ADMIN — HYDROMORPHONE HYDROCHLORIDE 1 MILLIGRAM(S): 2 INJECTION INTRAMUSCULAR; INTRAVENOUS; SUBCUTANEOUS at 04:03

## 2022-01-01 RX ADMIN — HYDROMORPHONE HYDROCHLORIDE 0.9 MILLIGRAM(S): 2 INJECTION INTRAMUSCULAR; INTRAVENOUS; SUBCUTANEOUS at 17:02

## 2022-01-01 RX ADMIN — Medication 5 MILLIGRAM(S): at 10:30

## 2022-01-01 RX ADMIN — Medication 5 MILLIGRAM(S): at 11:46

## 2022-01-01 RX ADMIN — MORPHINE SULFATE 15 MILLIGRAM(S): 50 CAPSULE, EXTENDED RELEASE ORAL at 22:01

## 2022-01-01 RX ADMIN — MORPHINE SULFATE 15 MILLIGRAM(S): 50 CAPSULE, EXTENDED RELEASE ORAL at 20:37

## 2022-01-01 RX ADMIN — ENOXAPARIN SODIUM 40 MILLIGRAM(S): 100 INJECTION SUBCUTANEOUS at 06:45

## 2022-01-01 RX ADMIN — PIPERACILLIN AND TAZOBACTAM 25 GRAM(S): 4; .5 INJECTION, POWDER, LYOPHILIZED, FOR SOLUTION INTRAVENOUS at 05:20

## 2022-01-01 RX ADMIN — LIDOCAINE 1 PATCH: 4 CREAM TOPICAL at 11:22

## 2022-01-01 RX ADMIN — HYDROMORPHONE HYDROCHLORIDE 1 MILLIGRAM(S): 2 INJECTION INTRAMUSCULAR; INTRAVENOUS; SUBCUTANEOUS at 14:25

## 2022-01-01 RX ADMIN — PIPERACILLIN AND TAZOBACTAM 25 GRAM(S): 4; .5 INJECTION, POWDER, LYOPHILIZED, FOR SOLUTION INTRAVENOUS at 05:18

## 2022-01-01 RX ADMIN — HYDROMORPHONE HYDROCHLORIDE 1.5 MILLIGRAM(S): 2 INJECTION INTRAMUSCULAR; INTRAVENOUS; SUBCUTANEOUS at 06:19

## 2022-01-01 RX ADMIN — HYDROMORPHONE HYDROCHLORIDE 2 MILLIGRAM(S): 2 INJECTION INTRAMUSCULAR; INTRAVENOUS; SUBCUTANEOUS at 21:36

## 2022-01-01 RX ADMIN — PIPERACILLIN AND TAZOBACTAM 25 GRAM(S): 4; .5 INJECTION, POWDER, LYOPHILIZED, FOR SOLUTION INTRAVENOUS at 05:38

## 2022-01-01 RX ADMIN — Medication 25 GRAM(S): at 13:31

## 2022-01-01 RX ADMIN — Medication 1 TABLET(S): at 11:56

## 2022-01-01 RX ADMIN — HYDROMORPHONE HYDROCHLORIDE 1.5 MILLIGRAM(S): 2 INJECTION INTRAMUSCULAR; INTRAVENOUS; SUBCUTANEOUS at 03:01

## 2022-01-01 RX ADMIN — ONDANSETRON 4 MILLIGRAM(S): 8 TABLET, FILM COATED ORAL at 13:09

## 2022-01-01 RX ADMIN — Medication 1000 MILLIGRAM(S): at 12:02

## 2022-01-01 RX ADMIN — ENOXAPARIN SODIUM 70 MILLIGRAM(S): 100 INJECTION SUBCUTANEOUS at 17:22

## 2022-01-01 RX ADMIN — MORPHINE SULFATE 15 MILLIGRAM(S): 50 CAPSULE, EXTENDED RELEASE ORAL at 18:00

## 2022-01-01 RX ADMIN — HYDROMORPHONE HYDROCHLORIDE 1 MILLIGRAM(S): 2 INJECTION INTRAMUSCULAR; INTRAVENOUS; SUBCUTANEOUS at 05:39

## 2022-01-01 RX ADMIN — PANTOPRAZOLE SODIUM 40 MILLIGRAM(S): 20 TABLET, DELAYED RELEASE ORAL at 11:53

## 2022-01-01 RX ADMIN — HYDROMORPHONE HYDROCHLORIDE 2 MILLIGRAM(S): 2 INJECTION INTRAMUSCULAR; INTRAVENOUS; SUBCUTANEOUS at 15:16

## 2022-01-01 RX ADMIN — HYDROMORPHONE HYDROCHLORIDE 1.5 MILLIGRAM(S): 2 INJECTION INTRAMUSCULAR; INTRAVENOUS; SUBCUTANEOUS at 06:25

## 2022-01-01 RX ADMIN — HYDROMORPHONE HYDROCHLORIDE 0.8 MG/HR: 2 INJECTION INTRAMUSCULAR; INTRAVENOUS; SUBCUTANEOUS at 08:25

## 2022-01-01 RX ADMIN — Medication 1 TABLET(S): at 12:59

## 2022-01-01 RX ADMIN — HYDROMORPHONE HYDROCHLORIDE 1 MILLIGRAM(S): 2 INJECTION INTRAMUSCULAR; INTRAVENOUS; SUBCUTANEOUS at 02:35

## 2022-01-01 RX ADMIN — Medication 5 MILLILITER(S): at 18:42

## 2022-01-01 RX ADMIN — HYDROMORPHONE HYDROCHLORIDE 1.5 MILLIGRAM(S): 2 INJECTION INTRAMUSCULAR; INTRAVENOUS; SUBCUTANEOUS at 14:21

## 2022-01-01 RX ADMIN — POLYETHYLENE GLYCOL 3350 17 GRAM(S): 17 POWDER, FOR SOLUTION ORAL at 13:09

## 2022-01-01 RX ADMIN — HYDROMORPHONE HYDROCHLORIDE 2 MILLIGRAM(S): 2 INJECTION INTRAMUSCULAR; INTRAVENOUS; SUBCUTANEOUS at 18:31

## 2022-01-01 RX ADMIN — ENOXAPARIN SODIUM 70 MILLIGRAM(S): 100 INJECTION SUBCUTANEOUS at 18:11

## 2022-01-01 RX ADMIN — POLYETHYLENE GLYCOL 3350 17 GRAM(S): 17 POWDER, FOR SOLUTION ORAL at 14:36

## 2022-01-01 RX ADMIN — MORPHINE SULFATE 20 MILLIGRAM(S): 50 CAPSULE, EXTENDED RELEASE ORAL at 06:24

## 2022-01-01 RX ADMIN — HYDROMORPHONE HYDROCHLORIDE 0.5 MG/HR: 2 INJECTION INTRAMUSCULAR; INTRAVENOUS; SUBCUTANEOUS at 08:45

## 2022-01-01 RX ADMIN — MORPHINE SULFATE 15 MILLIGRAM(S): 50 CAPSULE, EXTENDED RELEASE ORAL at 12:20

## 2022-01-01 RX ADMIN — ENOXAPARIN SODIUM 65 MILLIGRAM(S): 100 INJECTION SUBCUTANEOUS at 12:54

## 2022-01-01 RX ADMIN — HYDROMORPHONE HYDROCHLORIDE 2 MILLIGRAM(S): 2 INJECTION INTRAMUSCULAR; INTRAVENOUS; SUBCUTANEOUS at 05:06

## 2022-01-01 RX ADMIN — HYDROMORPHONE HYDROCHLORIDE 2 MILLIGRAM(S): 2 INJECTION INTRAMUSCULAR; INTRAVENOUS; SUBCUTANEOUS at 00:46

## 2022-01-01 RX ADMIN — Medication 25 GRAM(S): at 10:35

## 2022-01-01 RX ADMIN — PIPERACILLIN AND TAZOBACTAM 25 GRAM(S): 4; .5 INJECTION, POWDER, LYOPHILIZED, FOR SOLUTION INTRAVENOUS at 22:31

## 2022-01-01 RX ADMIN — MORPHINE SULFATE 15 MILLIGRAM(S): 50 CAPSULE, EXTENDED RELEASE ORAL at 10:54

## 2022-01-01 RX ADMIN — HYDROMORPHONE HYDROCHLORIDE 0.5 MG/HR: 2 INJECTION INTRAMUSCULAR; INTRAVENOUS; SUBCUTANEOUS at 19:28

## 2022-01-01 RX ADMIN — PIPERACILLIN AND TAZOBACTAM 25 GRAM(S): 4; .5 INJECTION, POWDER, LYOPHILIZED, FOR SOLUTION INTRAVENOUS at 21:53

## 2022-01-01 RX ADMIN — LIDOCAINE 1 PATCH: 4 CREAM TOPICAL at 11:07

## 2022-01-01 RX ADMIN — PANTOPRAZOLE SODIUM 40 MILLIGRAM(S): 20 TABLET, DELAYED RELEASE ORAL at 18:09

## 2022-01-01 RX ADMIN — HYDROMORPHONE HYDROCHLORIDE 0.6 MILLIGRAM(S): 2 INJECTION INTRAMUSCULAR; INTRAVENOUS; SUBCUTANEOUS at 22:05

## 2022-01-01 RX ADMIN — LIDOCAINE 1 PATCH: 4 CREAM TOPICAL at 23:11

## 2022-01-01 RX ADMIN — Medication 1000 MILLIGRAM(S): at 16:01

## 2022-01-01 RX ADMIN — PIPERACILLIN AND TAZOBACTAM 25 GRAM(S): 4; .5 INJECTION, POWDER, LYOPHILIZED, FOR SOLUTION INTRAVENOUS at 15:24

## 2022-01-01 RX ADMIN — PIPERACILLIN AND TAZOBACTAM 25 GRAM(S): 4; .5 INJECTION, POWDER, LYOPHILIZED, FOR SOLUTION INTRAVENOUS at 14:04

## 2022-01-01 RX ADMIN — MORPHINE SULFATE 15 MILLIGRAM(S): 50 CAPSULE, EXTENDED RELEASE ORAL at 06:44

## 2022-01-01 RX ADMIN — ENOXAPARIN SODIUM 70 MILLIGRAM(S): 100 INJECTION SUBCUTANEOUS at 06:27

## 2022-01-01 RX ADMIN — HYDROMORPHONE HYDROCHLORIDE 1.5 MILLIGRAM(S): 2 INJECTION INTRAMUSCULAR; INTRAVENOUS; SUBCUTANEOUS at 10:31

## 2022-01-01 RX ADMIN — Medication 5 MILLILITER(S): at 06:00

## 2022-01-01 RX ADMIN — PANTOPRAZOLE SODIUM 40 MILLIGRAM(S): 20 TABLET, DELAYED RELEASE ORAL at 06:08

## 2022-01-01 RX ADMIN — HYDROMORPHONE HYDROCHLORIDE 1 MILLIGRAM(S): 2 INJECTION INTRAMUSCULAR; INTRAVENOUS; SUBCUTANEOUS at 09:00

## 2022-01-01 RX ADMIN — Medication 5 MILLIGRAM(S): at 09:38

## 2022-01-01 RX ADMIN — METHADONE HYDROCHLORIDE 5 MILLIGRAM(S): 40 TABLET ORAL at 06:39

## 2022-01-01 RX ADMIN — Medication 40 MILLIEQUIVALENT(S): at 10:02

## 2022-01-01 RX ADMIN — PIPERACILLIN AND TAZOBACTAM 25 GRAM(S): 4; .5 INJECTION, POWDER, LYOPHILIZED, FOR SOLUTION INTRAVENOUS at 06:56

## 2022-01-01 RX ADMIN — HYDROMORPHONE HYDROCHLORIDE 1.5 MILLIGRAM(S): 2 INJECTION INTRAMUSCULAR; INTRAVENOUS; SUBCUTANEOUS at 03:17

## 2022-01-01 RX ADMIN — Medication 400 MILLIGRAM(S): at 17:12

## 2022-01-01 RX ADMIN — PIPERACILLIN AND TAZOBACTAM 25 GRAM(S): 4; .5 INJECTION, POWDER, LYOPHILIZED, FOR SOLUTION INTRAVENOUS at 06:39

## 2022-01-01 RX ADMIN — Medication 5 MILLIGRAM(S): at 18:40

## 2022-01-01 RX ADMIN — ENOXAPARIN SODIUM 40 MILLIGRAM(S): 100 INJECTION SUBCUTANEOUS at 06:20

## 2022-01-01 RX ADMIN — Medication 5 MILLIGRAM(S): at 22:46

## 2022-01-01 RX ADMIN — HYDROMORPHONE HYDROCHLORIDE 0.5 MG/HR: 2 INJECTION INTRAMUSCULAR; INTRAVENOUS; SUBCUTANEOUS at 19:14

## 2022-01-01 RX ADMIN — PANTOPRAZOLE SODIUM 40 MILLIGRAM(S): 20 TABLET, DELAYED RELEASE ORAL at 17:57

## 2022-01-01 RX ADMIN — HYDROMORPHONE HYDROCHLORIDE 2 MILLIGRAM(S): 2 INJECTION INTRAMUSCULAR; INTRAVENOUS; SUBCUTANEOUS at 09:47

## 2022-01-01 RX ADMIN — PIPERACILLIN AND TAZOBACTAM 25 GRAM(S): 4; .5 INJECTION, POWDER, LYOPHILIZED, FOR SOLUTION INTRAVENOUS at 22:04

## 2022-01-01 RX ADMIN — HYDROMORPHONE HYDROCHLORIDE 0.6 MILLIGRAM(S): 2 INJECTION INTRAMUSCULAR; INTRAVENOUS; SUBCUTANEOUS at 10:23

## 2022-01-01 RX ADMIN — HYDROMORPHONE HYDROCHLORIDE 2 MILLIGRAM(S): 2 INJECTION INTRAMUSCULAR; INTRAVENOUS; SUBCUTANEOUS at 02:32

## 2022-01-01 RX ADMIN — Medication 3 MILLIGRAM(S): at 00:45

## 2022-01-01 RX ADMIN — HYDROMORPHONE HYDROCHLORIDE 0.6 MILLIGRAM(S): 2 INJECTION INTRAMUSCULAR; INTRAVENOUS; SUBCUTANEOUS at 06:19

## 2022-01-01 RX ADMIN — HYDROMORPHONE HYDROCHLORIDE 1.5 MILLIGRAM(S): 2 INJECTION INTRAMUSCULAR; INTRAVENOUS; SUBCUTANEOUS at 18:23

## 2022-01-01 RX ADMIN — HYDROMORPHONE HYDROCHLORIDE 1.5 MILLIGRAM(S): 2 INJECTION INTRAMUSCULAR; INTRAVENOUS; SUBCUTANEOUS at 02:19

## 2022-01-01 RX ADMIN — Medication 3 MILLIGRAM(S): at 22:30

## 2022-01-01 RX ADMIN — Medication 5 MILLIGRAM(S): at 19:59

## 2022-01-01 RX ADMIN — Medication 5 MILLIGRAM(S): at 06:55

## 2022-01-01 RX ADMIN — HYDROMORPHONE HYDROCHLORIDE 1.5 MILLIGRAM(S): 2 INJECTION INTRAMUSCULAR; INTRAVENOUS; SUBCUTANEOUS at 17:28

## 2022-01-01 RX ADMIN — HYDROMORPHONE HYDROCHLORIDE 2 MILLIGRAM(S): 2 INJECTION INTRAMUSCULAR; INTRAVENOUS; SUBCUTANEOUS at 03:05

## 2022-01-01 RX ADMIN — MORPHINE SULFATE 15 MILLIGRAM(S): 50 CAPSULE, EXTENDED RELEASE ORAL at 06:42

## 2022-01-01 RX ADMIN — SENNA PLUS 2 TABLET(S): 8.6 TABLET ORAL at 22:56

## 2022-01-01 RX ADMIN — MORPHINE SULFATE 15 MILLIGRAM(S): 50 CAPSULE, EXTENDED RELEASE ORAL at 00:30

## 2022-01-01 RX ADMIN — Medication 5 MILLILITER(S): at 19:33

## 2022-01-01 RX ADMIN — MORPHINE SULFATE 15 MILLIGRAM(S): 50 CAPSULE, EXTENDED RELEASE ORAL at 11:21

## 2022-01-01 RX ADMIN — LIDOCAINE 1 PATCH: 4 CREAM TOPICAL at 12:23

## 2022-01-01 RX ADMIN — HYDROMORPHONE HYDROCHLORIDE 0.8 MG/HR: 2 INJECTION INTRAMUSCULAR; INTRAVENOUS; SUBCUTANEOUS at 10:59

## 2022-01-01 RX ADMIN — HYDROMORPHONE HYDROCHLORIDE 1.5 MILLIGRAM(S): 2 INJECTION INTRAMUSCULAR; INTRAVENOUS; SUBCUTANEOUS at 18:14

## 2022-01-01 RX ADMIN — PANTOPRAZOLE SODIUM 40 MILLIGRAM(S): 20 TABLET, DELAYED RELEASE ORAL at 18:51

## 2022-01-01 RX ADMIN — PANTOPRAZOLE SODIUM 40 MILLIGRAM(S): 20 TABLET, DELAYED RELEASE ORAL at 14:12

## 2022-01-01 RX ADMIN — ONDANSETRON 4 MILLIGRAM(S): 8 TABLET, FILM COATED ORAL at 05:56

## 2022-01-01 RX ADMIN — Medication 5 MILLILITER(S): at 17:23

## 2022-01-01 RX ADMIN — MORPHINE SULFATE 15 MILLIGRAM(S): 50 CAPSULE, EXTENDED RELEASE ORAL at 15:52

## 2022-01-01 RX ADMIN — HYDROMORPHONE HYDROCHLORIDE 1.5 MILLIGRAM(S): 2 INJECTION INTRAMUSCULAR; INTRAVENOUS; SUBCUTANEOUS at 09:38

## 2022-01-01 RX ADMIN — HYDROMORPHONE HYDROCHLORIDE 0.5 MG/HR: 2 INJECTION INTRAMUSCULAR; INTRAVENOUS; SUBCUTANEOUS at 07:29

## 2022-01-01 RX ADMIN — HYDROMORPHONE HYDROCHLORIDE 0.9 MILLIGRAM(S): 2 INJECTION INTRAMUSCULAR; INTRAVENOUS; SUBCUTANEOUS at 04:00

## 2022-01-01 RX ADMIN — PANTOPRAZOLE SODIUM 40 MILLIGRAM(S): 20 TABLET, DELAYED RELEASE ORAL at 06:25

## 2022-01-01 RX ADMIN — HYDROMORPHONE HYDROCHLORIDE 2 MILLIGRAM(S): 2 INJECTION INTRAMUSCULAR; INTRAVENOUS; SUBCUTANEOUS at 23:06

## 2022-01-01 RX ADMIN — MORPHINE SULFATE 15 MILLIGRAM(S): 50 CAPSULE, EXTENDED RELEASE ORAL at 06:12

## 2022-01-01 RX ADMIN — HYDROMORPHONE HYDROCHLORIDE 1.5 MILLIGRAM(S): 2 INJECTION INTRAMUSCULAR; INTRAVENOUS; SUBCUTANEOUS at 02:06

## 2022-01-01 RX ADMIN — HYDROMORPHONE HYDROCHLORIDE 0.6 MILLIGRAM(S): 2 INJECTION INTRAMUSCULAR; INTRAVENOUS; SUBCUTANEOUS at 02:33

## 2022-01-01 RX ADMIN — POLYETHYLENE GLYCOL 3350 17 GRAM(S): 17 POWDER, FOR SOLUTION ORAL at 12:59

## 2022-01-01 RX ADMIN — MORPHINE SULFATE 15 MILLIGRAM(S): 50 CAPSULE, EXTENDED RELEASE ORAL at 16:56

## 2022-01-01 RX ADMIN — Medication 5 MILLIGRAM(S): at 05:27

## 2022-01-01 RX ADMIN — Medication 0.5 MILLIGRAM(S): at 10:00

## 2022-01-01 RX ADMIN — Medication 400 MILLIGRAM(S): at 14:05

## 2022-01-01 RX ADMIN — HYDROMORPHONE HYDROCHLORIDE 1.5 MILLIGRAM(S): 2 INJECTION INTRAMUSCULAR; INTRAVENOUS; SUBCUTANEOUS at 18:32

## 2022-01-01 RX ADMIN — LIDOCAINE 1 PATCH: 4 CREAM TOPICAL at 22:46

## 2022-01-01 RX ADMIN — HYDROMORPHONE HYDROCHLORIDE 2 MILLIGRAM(S): 2 INJECTION INTRAMUSCULAR; INTRAVENOUS; SUBCUTANEOUS at 05:04

## 2022-01-01 RX ADMIN — LIDOCAINE 1 PATCH: 4 CREAM TOPICAL at 14:31

## 2022-01-01 RX ADMIN — PANTOPRAZOLE SODIUM 40 MILLIGRAM(S): 20 TABLET, DELAYED RELEASE ORAL at 21:48

## 2022-01-01 RX ADMIN — POTASSIUM PHOSPHATE, MONOBASIC POTASSIUM PHOSPHATE, DIBASIC 62.5 MILLIMOLE(S): 236; 224 INJECTION, SOLUTION INTRAVENOUS at 10:43

## 2022-01-01 RX ADMIN — HYDROMORPHONE HYDROCHLORIDE 0.5 MG/HR: 2 INJECTION INTRAMUSCULAR; INTRAVENOUS; SUBCUTANEOUS at 07:11

## 2022-01-01 RX ADMIN — PIPERACILLIN AND TAZOBACTAM 25 GRAM(S): 4; .5 INJECTION, POWDER, LYOPHILIZED, FOR SOLUTION INTRAVENOUS at 06:27

## 2022-01-01 RX ADMIN — LIDOCAINE 1 PATCH: 4 CREAM TOPICAL at 23:10

## 2022-01-01 RX ADMIN — PANTOPRAZOLE SODIUM 40 MILLIGRAM(S): 20 TABLET, DELAYED RELEASE ORAL at 11:13

## 2022-01-01 RX ADMIN — Medication 5 MILLIGRAM(S): at 05:48

## 2022-01-01 RX ADMIN — PANTOPRAZOLE SODIUM 40 MILLIGRAM(S): 20 TABLET, DELAYED RELEASE ORAL at 14:36

## 2022-01-01 RX ADMIN — Medication 5 MILLIGRAM(S): at 20:56

## 2022-01-01 RX ADMIN — LIDOCAINE 1 PATCH: 4 CREAM TOPICAL at 23:54

## 2022-01-01 RX ADMIN — HYDROMORPHONE HYDROCHLORIDE 1.5 MILLIGRAM(S): 2 INJECTION INTRAMUSCULAR; INTRAVENOUS; SUBCUTANEOUS at 05:33

## 2022-01-01 RX ADMIN — LIDOCAINE 1 PATCH: 4 CREAM TOPICAL at 23:30

## 2022-01-01 RX ADMIN — ENOXAPARIN SODIUM 40 MILLIGRAM(S): 100 INJECTION SUBCUTANEOUS at 13:08

## 2022-01-01 RX ADMIN — HYDROMORPHONE HYDROCHLORIDE 1 MILLIGRAM(S): 2 INJECTION INTRAMUSCULAR; INTRAVENOUS; SUBCUTANEOUS at 04:11

## 2022-01-01 RX ADMIN — LIDOCAINE 1 PATCH: 4 CREAM TOPICAL at 19:34

## 2022-01-01 RX ADMIN — MORPHINE SULFATE 15 MILLIGRAM(S): 50 CAPSULE, EXTENDED RELEASE ORAL at 11:24

## 2022-01-01 RX ADMIN — ENOXAPARIN SODIUM 65 MILLIGRAM(S): 100 INJECTION SUBCUTANEOUS at 17:14

## 2022-01-01 RX ADMIN — HYDROMORPHONE HYDROCHLORIDE 0.5 MILLIGRAM(S): 2 INJECTION INTRAMUSCULAR; INTRAVENOUS; SUBCUTANEOUS at 19:13

## 2022-01-01 RX ADMIN — METHADONE HYDROCHLORIDE 7.5 MILLIGRAM(S): 40 TABLET ORAL at 20:27

## 2022-01-01 RX ADMIN — PANTOPRAZOLE SODIUM 40 MILLIGRAM(S): 20 TABLET, DELAYED RELEASE ORAL at 11:14

## 2022-01-01 RX ADMIN — MORPHINE SULFATE 15 MILLIGRAM(S): 50 CAPSULE, EXTENDED RELEASE ORAL at 02:35

## 2022-01-01 RX ADMIN — Medication 5 MILLILITER(S): at 06:49

## 2022-01-01 RX ADMIN — LIDOCAINE 1 PATCH: 4 CREAM TOPICAL at 20:13

## 2022-01-01 RX ADMIN — Medication 5 MILLIGRAM(S): at 18:55

## 2022-01-01 RX ADMIN — PIPERACILLIN AND TAZOBACTAM 25 GRAM(S): 4; .5 INJECTION, POWDER, LYOPHILIZED, FOR SOLUTION INTRAVENOUS at 13:58

## 2022-01-01 RX ADMIN — MORPHINE SULFATE 15 MILLIGRAM(S): 50 CAPSULE, EXTENDED RELEASE ORAL at 20:42

## 2022-01-01 RX ADMIN — PANTOPRAZOLE SODIUM 40 MILLIGRAM(S): 20 TABLET, DELAYED RELEASE ORAL at 06:21

## 2022-01-01 RX ADMIN — Medication 5 MILLILITER(S): at 06:52

## 2022-01-01 RX ADMIN — MORPHINE SULFATE 15 MILLIGRAM(S): 50 CAPSULE, EXTENDED RELEASE ORAL at 01:31

## 2022-01-01 RX ADMIN — MORPHINE SULFATE 15 MILLIGRAM(S): 50 CAPSULE, EXTENDED RELEASE ORAL at 18:40

## 2022-01-01 RX ADMIN — PANTOPRAZOLE SODIUM 40 MILLIGRAM(S): 20 TABLET, DELAYED RELEASE ORAL at 12:34

## 2022-01-01 RX ADMIN — Medication 5 MILLIGRAM(S): at 10:15

## 2022-01-01 RX ADMIN — ONDANSETRON 4 MILLIGRAM(S): 8 TABLET, FILM COATED ORAL at 22:54

## 2022-01-01 RX ADMIN — HYDROMORPHONE HYDROCHLORIDE 0.6 MILLIGRAM(S): 2 INJECTION INTRAMUSCULAR; INTRAVENOUS; SUBCUTANEOUS at 02:06

## 2022-01-01 RX ADMIN — HYDROMORPHONE HYDROCHLORIDE 1 MILLIGRAM(S): 2 INJECTION INTRAMUSCULAR; INTRAVENOUS; SUBCUTANEOUS at 03:48

## 2022-01-01 RX ADMIN — HYDROMORPHONE HYDROCHLORIDE 0.6 MILLIGRAM(S): 2 INJECTION INTRAMUSCULAR; INTRAVENOUS; SUBCUTANEOUS at 18:47

## 2022-01-01 RX ADMIN — PANTOPRAZOLE SODIUM 40 MILLIGRAM(S): 20 TABLET, DELAYED RELEASE ORAL at 13:00

## 2022-01-01 RX ADMIN — HYDROMORPHONE HYDROCHLORIDE 1 MILLIGRAM(S): 2 INJECTION INTRAMUSCULAR; INTRAVENOUS; SUBCUTANEOUS at 03:18

## 2022-01-01 RX ADMIN — ONDANSETRON 4 MILLIGRAM(S): 8 TABLET, FILM COATED ORAL at 09:03

## 2022-01-01 RX ADMIN — HYDROMORPHONE HYDROCHLORIDE 1.5 MILLIGRAM(S): 2 INJECTION INTRAMUSCULAR; INTRAVENOUS; SUBCUTANEOUS at 13:56

## 2022-01-01 RX ADMIN — HYDROMORPHONE HYDROCHLORIDE 1 MILLIGRAM(S): 2 INJECTION INTRAMUSCULAR; INTRAVENOUS; SUBCUTANEOUS at 09:57

## 2022-01-01 RX ADMIN — HYDROMORPHONE HYDROCHLORIDE 1.5 MILLIGRAM(S): 2 INJECTION INTRAMUSCULAR; INTRAVENOUS; SUBCUTANEOUS at 11:01

## 2022-01-01 RX ADMIN — METHADONE HYDROCHLORIDE 7.5 MILLIGRAM(S): 40 TABLET ORAL at 08:16

## 2022-01-01 RX ADMIN — MORPHINE SULFATE 15 MILLIGRAM(S): 50 CAPSULE, EXTENDED RELEASE ORAL at 16:08

## 2022-01-01 RX ADMIN — HYDROMORPHONE HYDROCHLORIDE 1 MILLIGRAM(S): 2 INJECTION INTRAMUSCULAR; INTRAVENOUS; SUBCUTANEOUS at 18:15

## 2022-01-01 RX ADMIN — Medication 5 MILLIGRAM(S): at 09:47

## 2022-01-01 RX ADMIN — ENOXAPARIN SODIUM 70 MILLIGRAM(S): 100 INJECTION SUBCUTANEOUS at 17:36

## 2022-01-01 RX ADMIN — HYDROMORPHONE HYDROCHLORIDE 1.5 MILLIGRAM(S): 2 INJECTION INTRAMUSCULAR; INTRAVENOUS; SUBCUTANEOUS at 14:17

## 2022-01-01 RX ADMIN — METHADONE HYDROCHLORIDE 7.5 MILLIGRAM(S): 40 TABLET ORAL at 21:55

## 2022-01-01 RX ADMIN — METHADONE HYDROCHLORIDE 7.5 MILLIGRAM(S): 40 TABLET ORAL at 08:22

## 2022-01-01 RX ADMIN — LIDOCAINE 1 PATCH: 4 CREAM TOPICAL at 11:46

## 2022-01-01 RX ADMIN — MORPHINE SULFATE 20 MILLIGRAM(S): 50 CAPSULE, EXTENDED RELEASE ORAL at 10:31

## 2022-01-01 RX ADMIN — HYDROMORPHONE HYDROCHLORIDE 0.9 MILLIGRAM(S): 2 INJECTION INTRAMUSCULAR; INTRAVENOUS; SUBCUTANEOUS at 01:56

## 2022-01-01 RX ADMIN — PANTOPRAZOLE SODIUM 40 MILLIGRAM(S): 20 TABLET, DELAYED RELEASE ORAL at 18:25

## 2022-01-01 RX ADMIN — METHADONE HYDROCHLORIDE 5 MILLIGRAM(S): 40 TABLET ORAL at 17:15

## 2022-01-01 RX ADMIN — ONDANSETRON 8 MILLIGRAM(S): 8 TABLET, FILM COATED ORAL at 22:37

## 2022-01-01 RX ADMIN — Medication 5 MILLILITER(S): at 18:31

## 2022-01-01 RX ADMIN — METHADONE HYDROCHLORIDE 5 MILLIGRAM(S): 40 TABLET ORAL at 21:47

## 2022-01-01 RX ADMIN — LIDOCAINE 1 PATCH: 4 CREAM TOPICAL at 00:23

## 2022-01-01 RX ADMIN — LIDOCAINE 1 PATCH: 4 CREAM TOPICAL at 11:40

## 2022-01-01 RX ADMIN — HYDROMORPHONE HYDROCHLORIDE 1 MILLIGRAM(S): 2 INJECTION INTRAMUSCULAR; INTRAVENOUS; SUBCUTANEOUS at 14:02

## 2022-01-01 RX ADMIN — LIDOCAINE 1 PATCH: 4 CREAM TOPICAL at 19:49

## 2022-01-01 RX ADMIN — PANTOPRAZOLE SODIUM 40 MILLIGRAM(S): 20 TABLET, DELAYED RELEASE ORAL at 18:16

## 2022-01-01 RX ADMIN — PANTOPRAZOLE SODIUM 40 MILLIGRAM(S): 20 TABLET, DELAYED RELEASE ORAL at 11:19

## 2022-01-01 RX ADMIN — MORPHINE SULFATE 15 MILLIGRAM(S): 50 CAPSULE, EXTENDED RELEASE ORAL at 22:00

## 2022-01-01 RX ADMIN — HYDROMORPHONE HYDROCHLORIDE 1.5 MILLIGRAM(S): 2 INJECTION INTRAMUSCULAR; INTRAVENOUS; SUBCUTANEOUS at 18:05

## 2022-01-01 RX ADMIN — ONDANSETRON 4 MILLIGRAM(S): 8 TABLET, FILM COATED ORAL at 06:58

## 2022-01-01 RX ADMIN — HYDROMORPHONE HYDROCHLORIDE 2 MILLIGRAM(S): 2 INJECTION INTRAMUSCULAR; INTRAVENOUS; SUBCUTANEOUS at 05:16

## 2022-01-01 RX ADMIN — MORPHINE SULFATE 15 MILLIGRAM(S): 50 CAPSULE, EXTENDED RELEASE ORAL at 22:30

## 2022-01-01 RX ADMIN — Medication 5 MILLIGRAM(S): at 18:25

## 2022-01-01 RX ADMIN — HYDROMORPHONE HYDROCHLORIDE 1 MILLIGRAM(S): 2 INJECTION INTRAMUSCULAR; INTRAVENOUS; SUBCUTANEOUS at 23:40

## 2022-01-01 RX ADMIN — ONDANSETRON 4 MILLIGRAM(S): 8 TABLET, FILM COATED ORAL at 06:44

## 2022-01-01 RX ADMIN — LIDOCAINE 1 PATCH: 4 CREAM TOPICAL at 19:24

## 2022-01-01 RX ADMIN — PIPERACILLIN AND TAZOBACTAM 25 GRAM(S): 4; .5 INJECTION, POWDER, LYOPHILIZED, FOR SOLUTION INTRAVENOUS at 05:45

## 2022-01-01 RX ADMIN — HYDROMORPHONE HYDROCHLORIDE 2 MILLIGRAM(S): 2 INJECTION INTRAMUSCULAR; INTRAVENOUS; SUBCUTANEOUS at 19:10

## 2022-01-01 RX ADMIN — HYDROMORPHONE HYDROCHLORIDE 0.6 MILLIGRAM(S): 2 INJECTION INTRAMUSCULAR; INTRAVENOUS; SUBCUTANEOUS at 16:31

## 2022-01-01 RX ADMIN — HYDROMORPHONE HYDROCHLORIDE 2 MILLIGRAM(S): 2 INJECTION INTRAMUSCULAR; INTRAVENOUS; SUBCUTANEOUS at 06:12

## 2022-01-01 RX ADMIN — PIPERACILLIN AND TAZOBACTAM 25 GRAM(S): 4; .5 INJECTION, POWDER, LYOPHILIZED, FOR SOLUTION INTRAVENOUS at 22:37

## 2022-01-01 RX ADMIN — Medication 5 MILLIGRAM(S): at 01:31

## 2022-01-01 RX ADMIN — LIDOCAINE 1 PATCH: 4 CREAM TOPICAL at 12:26

## 2022-01-01 RX ADMIN — METHADONE HYDROCHLORIDE 7.5 MILLIGRAM(S): 40 TABLET ORAL at 21:27

## 2022-01-01 RX ADMIN — MORPHINE SULFATE 15 MILLIGRAM(S): 50 CAPSULE, EXTENDED RELEASE ORAL at 15:45

## 2022-01-01 RX ADMIN — MORPHINE SULFATE 15 MILLIGRAM(S): 50 CAPSULE, EXTENDED RELEASE ORAL at 00:20

## 2022-01-01 RX ADMIN — HYDROMORPHONE HYDROCHLORIDE 2 MILLIGRAM(S): 2 INJECTION INTRAMUSCULAR; INTRAVENOUS; SUBCUTANEOUS at 05:12

## 2022-01-01 RX ADMIN — MORPHINE SULFATE 15 MILLIGRAM(S): 50 CAPSULE, EXTENDED RELEASE ORAL at 08:52

## 2022-01-01 RX ADMIN — HYDROMORPHONE HYDROCHLORIDE 1.5 MILLIGRAM(S): 2 INJECTION INTRAMUSCULAR; INTRAVENOUS; SUBCUTANEOUS at 09:51

## 2022-01-01 RX ADMIN — PANTOPRAZOLE SODIUM 40 MILLIGRAM(S): 20 TABLET, DELAYED RELEASE ORAL at 17:55

## 2022-01-01 RX ADMIN — Medication 5 MILLIGRAM(S): at 05:33

## 2022-01-01 RX ADMIN — ONDANSETRON 8 MILLIGRAM(S): 8 TABLET, FILM COATED ORAL at 03:44

## 2022-01-01 RX ADMIN — HYDROMORPHONE HYDROCHLORIDE 1 MILLIGRAM(S): 2 INJECTION INTRAMUSCULAR; INTRAVENOUS; SUBCUTANEOUS at 14:07

## 2022-01-01 RX ADMIN — PIPERACILLIN AND TAZOBACTAM 25 GRAM(S): 4; .5 INJECTION, POWDER, LYOPHILIZED, FOR SOLUTION INTRAVENOUS at 21:46

## 2022-01-01 RX ADMIN — MORPHINE SULFATE 15 MILLIGRAM(S): 50 CAPSULE, EXTENDED RELEASE ORAL at 16:30

## 2022-01-01 RX ADMIN — PANTOPRAZOLE SODIUM 40 MILLIGRAM(S): 20 TABLET, DELAYED RELEASE ORAL at 05:15

## 2022-01-01 RX ADMIN — PIPERACILLIN AND TAZOBACTAM 25 GRAM(S): 4; .5 INJECTION, POWDER, LYOPHILIZED, FOR SOLUTION INTRAVENOUS at 13:18

## 2022-01-01 RX ADMIN — HYDROMORPHONE HYDROCHLORIDE 1.5 MILLIGRAM(S): 2 INJECTION INTRAMUSCULAR; INTRAVENOUS; SUBCUTANEOUS at 11:05

## 2022-01-01 RX ADMIN — HYDROMORPHONE HYDROCHLORIDE 1.5 MILLIGRAM(S): 2 INJECTION INTRAMUSCULAR; INTRAVENOUS; SUBCUTANEOUS at 05:27

## 2022-01-01 RX ADMIN — Medication 5 MILLIGRAM(S): at 13:17

## 2022-01-01 RX ADMIN — ONDANSETRON 8 MILLIGRAM(S): 8 TABLET, FILM COATED ORAL at 06:25

## 2022-01-01 RX ADMIN — Medication 5 MILLIGRAM(S): at 18:42

## 2022-01-01 RX ADMIN — HYDROMORPHONE HYDROCHLORIDE 1 MILLIGRAM(S): 2 INJECTION INTRAMUSCULAR; INTRAVENOUS; SUBCUTANEOUS at 20:11

## 2022-01-01 RX ADMIN — Medication 5 MILLILITER(S): at 18:38

## 2022-01-01 RX ADMIN — ENOXAPARIN SODIUM 40 MILLIGRAM(S): 100 INJECTION SUBCUTANEOUS at 11:56

## 2022-01-01 RX ADMIN — MORPHINE SULFATE 15 MILLIGRAM(S): 50 CAPSULE, EXTENDED RELEASE ORAL at 15:08

## 2022-01-01 RX ADMIN — Medication 1 TABLET(S): at 11:37

## 2022-01-01 RX ADMIN — HYDROMORPHONE HYDROCHLORIDE 0.6 MILLIGRAM(S): 2 INJECTION INTRAMUSCULAR; INTRAVENOUS; SUBCUTANEOUS at 22:37

## 2022-01-01 RX ADMIN — Medication 650 MILLIGRAM(S): at 08:03

## 2022-01-01 RX ADMIN — Medication 5 MILLIGRAM(S): at 05:17

## 2022-01-01 RX ADMIN — HYDROMORPHONE HYDROCHLORIDE 0.9 MILLIGRAM(S): 2 INJECTION INTRAMUSCULAR; INTRAVENOUS; SUBCUTANEOUS at 16:30

## 2022-01-01 RX ADMIN — Medication 5 MILLIGRAM(S): at 15:02

## 2022-01-01 RX ADMIN — METHADONE HYDROCHLORIDE 5 MILLIGRAM(S): 40 TABLET ORAL at 20:55

## 2022-01-01 RX ADMIN — HYDROMORPHONE HYDROCHLORIDE 1.5 MILLIGRAM(S): 2 INJECTION INTRAMUSCULAR; INTRAVENOUS; SUBCUTANEOUS at 22:36

## 2022-01-01 RX ADMIN — LIDOCAINE 1 PATCH: 4 CREAM TOPICAL at 11:54

## 2022-01-01 RX ADMIN — Medication 5 MILLIGRAM(S): at 06:59

## 2022-01-01 RX ADMIN — HYDROMORPHONE HYDROCHLORIDE 2 MILLIGRAM(S): 2 INJECTION INTRAMUSCULAR; INTRAVENOUS; SUBCUTANEOUS at 03:27

## 2022-01-01 RX ADMIN — HYDROMORPHONE HYDROCHLORIDE 0.3 MILLIGRAM(S): 2 INJECTION INTRAMUSCULAR; INTRAVENOUS; SUBCUTANEOUS at 00:27

## 2022-01-01 RX ADMIN — MORPHINE SULFATE 15 MILLIGRAM(S): 50 CAPSULE, EXTENDED RELEASE ORAL at 20:05

## 2022-01-01 RX ADMIN — LIDOCAINE 1 PATCH: 4 CREAM TOPICAL at 23:43

## 2022-01-01 RX ADMIN — MORPHINE SULFATE 15 MILLIGRAM(S): 50 CAPSULE, EXTENDED RELEASE ORAL at 05:54

## 2022-01-01 RX ADMIN — Medication 5 MILLIGRAM(S): at 18:00

## 2022-01-01 RX ADMIN — Medication 1 TABLET(S): at 13:08

## 2022-01-01 RX ADMIN — LIDOCAINE 1 PATCH: 4 CREAM TOPICAL at 23:32

## 2022-01-01 RX ADMIN — PIPERACILLIN AND TAZOBACTAM 25 GRAM(S): 4; .5 INJECTION, POWDER, LYOPHILIZED, FOR SOLUTION INTRAVENOUS at 05:40

## 2022-01-01 RX ADMIN — LIDOCAINE 1 PATCH: 4 CREAM TOPICAL at 01:09

## 2022-01-01 RX ADMIN — METHADONE HYDROCHLORIDE 5 MILLIGRAM(S): 40 TABLET ORAL at 18:30

## 2022-01-01 RX ADMIN — Medication 5 MILLIGRAM(S): at 21:52

## 2022-01-01 RX ADMIN — Medication 5 MILLIGRAM(S): at 05:06

## 2022-01-01 RX ADMIN — ENOXAPARIN SODIUM 65 MILLIGRAM(S): 100 INJECTION SUBCUTANEOUS at 06:11

## 2022-01-01 RX ADMIN — HYDROMORPHONE HYDROCHLORIDE 1.5 MILLIGRAM(S): 2 INJECTION INTRAMUSCULAR; INTRAVENOUS; SUBCUTANEOUS at 22:23

## 2022-01-01 RX ADMIN — Medication 5 MILLILITER(S): at 17:45

## 2022-01-01 RX ADMIN — HYDROMORPHONE HYDROCHLORIDE 0.9 MILLIGRAM(S): 2 INJECTION INTRAMUSCULAR; INTRAVENOUS; SUBCUTANEOUS at 06:51

## 2022-08-14 NOTE — ED PROVIDER NOTE - OBJECTIVE STATEMENT
Laura: History of kidney sounds. Never had a procedure for the kidney stone. Presents with one month of left greater than right flank pain associated with abdominal pain and nausea/vomiting. No fever. The patient has not been able to take in good or intake for the last month going to paint and vomiting. She states she’s lost 25 pounds in the last month.

## 2022-08-14 NOTE — ED ADULT TRIAGE NOTE - CHIEF COMPLAINT QUOTE
Pt AOX4 c/o  bilateral flank pain, worse on Left, pt has Hx of kidney stones and says she is always sent home on pain meds but pain never stops,  in past month pt has lost 25 lbs, stsates she cannot eat due to nausea from the pain, no PMHx

## 2022-08-14 NOTE — ED PROVIDER NOTE - CRITICAL CARE ATTENDING CONTRIBUTION TO CARE
I have personally provided the amount of critical care time documented below, excluding time spent on separate procedures.     I contacted the GI consult service.

## 2022-08-14 NOTE — ED PROVIDER NOTE - PHYSICAL EXAMINATION
Well appearing, well nourished, awake, alert, oriented to person, place, time/situation and in pain distress.    Airway patent    Eyes without scleral injection. No jaundice.    Strong pulse.    Respirations unlabored.    Abdomen soft, non-tender, no guarding. L CVAT.    Spine appears normal, range of motion is not limited, no muscle or joint tenderness. No LE edema.     Alert and oriented, no gross motor or sensory deficits.    Skin normal color for race, warm, dry and intact. No evidence of rash.    No SI/HI.

## 2022-08-14 NOTE — ED ADULT NURSE NOTE - OBJECTIVE STATEMENT
patient arrives to the ER A&Ox4, ambulatory c/o L flank pain, patient has Hx of kidney stones and says she is always sent home on pain meds but pain never stops. patient was seen in [facility] and dc'd with pain medication however it never helped her pain and it is getting worse. patient has not been able to eat or drink r/t vomiting after PO intake. patient is well appearing, no acute distress noted. 20G IV Placed in R AC, labs sent. medication given and tolerated well per EMAR. all safety maintained at this time.

## 2022-08-14 NOTE — ED PROVIDER NOTE - EMPLOYMENT
12 year old M pt with no PMHx presents to the ED c/o abdominal pain, fever, headache and n/v that onset 3 days ago s/p receiving flu shot. Came to this ED 1 day ago, seen by Dr. Manning when he was told if the pain did not go away, to return. Localizes the pain to the gavin-umbilical region of the abdomen, Denies chest pain, SOB, testicular pain, cough, rhinnorea, blurred vision, rash or any other complaints. CHRIS Unemployed

## 2022-08-14 NOTE — ED PROVIDER NOTE - CLINICAL SUMMARY MEDICAL DECISION MAKING FREE TEXT BOX
Laura: Consider renal stone versus pancreatitis versus renal cancer. Check CT scan. Pain control. IV fluids. Nausea medication. Pain medication.

## 2022-08-14 NOTE — ED ADULT NURSE NOTE - NSIMPLEMENTINTERV_GEN_ALL_ED
Implemented All Universal Safety Interventions:  [place] to call system. Call bell, personal items and telephone within reach. Instruct patient to call for assistance. Room bathroom lighting operational. Non-slip footwear when patient is off stretcher. Physically safe environment: no spills, clutter or unnecessary equipment. Stretcher in lowest position, wheels locked, appropriate side rails in place.

## 2022-08-15 NOTE — PATIENT PROFILE ADULT - FALL HARM RISK - HARM RISK INTERVENTIONS

## 2022-08-15 NOTE — CONSULT NOTE ADULT - SUBJECTIVE AND OBJECTIVE BOX
HPI:  56 Y/O F from [facility] w/ PMH Nephrolithiasis who presents to the hospital for abdominal pain lasting about 1 month associated with nausea/vomiting. The abdominal pain is in the midepigastric and left flank > r flank. although pain not worst after eating, osborn report nausea & vomiting after any PO intake. No dysphagia. No heartburn. reports 20 pounds wt loss in the last month. Patient went to [facility] for abdominal pain recently, was prescribed Ibuprofen for which she has been getting for the past 1-2 weeks.    No family hx of Colon/liver/pancreatic cancer. Never had an EGD/Colonoscopy in the past. Hospital evaluation remarkable for gastric body wall thickening and gastrocolic ligament nodularity + Trace ascites concerning for malignancy. GI called for further evaluation       Allergies:  No Known Allergies        Hospital Medications:  acetaminophen     Tablet .. 650 milliGRAM(s) Oral every 6 hours PRN  HYDROmorphone  Injectable 1 milliGRAM(s) IV Push every 6 hours PRN  ondansetron Injectable 4 milliGRAM(s) IV Push every 6 hours PRN      PMHX/PSHX:  Nephrolithiasis        Family history:      Social History: no smoking    ROS:   General:  No fevers, chills or night sweats  ENT:  No sore throat or dysphagia  CV:  No pain or palpitations  Resp:  No dyspnea, cough or  wheezing  GI:  as above  Skin:  No rash or edema  Neuro: no weakness   Hematologic: no bleeding  Musculoskeletal: no muscle pain or join pain  Psych: no agitation     : no dysuria      PHYSICAL EXAM:   GENERAL:  NAD, Appears stated age  HEENT:  NC/AT,  conjunctivae clear and pink, sclera -anicteric  CHEST:  CTA B/L, Normal effort  HEART:  RRR S1/S2,  ABDOMEN:  Soft tender to palpation in epigastric region  EXTREMITIES:  No cyanosis or Edema  SKIN:  Warm & Dry. No rash or erythema  NEURO:  Alert, oriented, no focal deficit    Vital Signs:  Vital Signs Last 24 Hrs  T(C): 36.8 (15 Aug 2022 06:25), Max: 36.8 (15 Aug 2022 06:25)  T(F): 98.2 (15 Aug 2022 06:25), Max: 98.2 (15 Aug 2022 06:25)  HR: 69 (15 Aug 2022 06:25) (68 - 97)  BP: 113/70 (15 Aug 2022 06:25) (100/78 - 124/63)  BP(mean): --  RR: 17 (15 Aug 2022 06:25) (16 - 18)  SpO2: 100% (15 Aug 2022 06:25) (98% - 100%)    Parameters below as of 15 Aug 2022 06:25  Patient On (Oxygen Delivery Method): room air      Daily     Daily     LABS:                        9.5    6.08  )-----------( 283      ( 15 Aug 2022 07:20 )             31.7     Mean Cell Volume: 81.9 fL (08-15- @ 07:20)    -15    144  |  117<H>  |  24<H>  ----------------------------<  105<H>  3.6   |  15<L>  |  0.93    Ca    7.8<L>      15 Aug 2022 07:20  Phos  3.8     08-15  Mg     1.60     08-15    TPro  5.8<L>  /  Alb  2.9<L>  /  TBili  0.2  /  DBili  x   /  AST  19  /  ALT  10  /  AlkPhos  62  08-15    LIVER FUNCTIONS - ( 15 Aug 2022 07:20 )  Alb: 2.9 g/dL / Pro: 5.8 g/dL / ALK PHOS: 62 U/L / ALT: 10 U/L / AST: 19 U/L / GGT: x             Urinalysis Basic - ( 14 Aug 2022 16:20 )    Color: Yellow / Appearance: Clear / S.036 / pH: x  Gluc: x / Ketone: Trace  / Bili: Negative / Urobili: <2 mg/dL   Blood: x / Protein: 30 mg/dL / Nitrite: Negative   Leuk Esterase: Negative / RBC: 2 /HPF / WBC 3 /HPF   Sq Epi: x / Non Sq Epi: 2 /HPF / Bacteria: Negative      Amylase Serum--      Lipase [id number]       Ammonia--                          9.5    6.08  )-----------( 283      ( 15 Aug 2022 07:20 )             31.7                         11.4   9.72  )-----------( 372      ( 14 Aug 2022 13:33 )             36.8     Imaging:  < from: CT Abdomen and Pelvis w/ IV Cont (22 @ 15:37) >    FINDINGS:  LOWER CHEST: Incompletely imaged  adenopathy, for example:  *  Right hilar, 1.3 x 1.0 cm  *  Anterior supradiaphragmatic, 1.4 x 0.9 cm      LIVER: No suspicious lesion.  BILE DUCTS: Normal caliber.  GALLBLADDER: Within normal limits.  SPLEEN: Within normal limits.  PANCREAS: Within normal limits.  ADRENALS: Left adrenal thickening.  KIDNEYS/URETERS: Punctate nonobstructing calculi and bilateral cysts.    BLADDER: Within normal limits.  REPRODUCTIVE ORGANS: Uterusin situ.    BOWEL/ PERITONEUM: No bowel obstruction. Quality gastric body wall   thickening. Loculated fluid and gastrocolic ligament nodularity (2, 47;   601, 18). Small volume ascites.    RETROPERITONEUM/LYMPH NODES: Retroperitoneal adenopathy, forexample:  *  [Left retrocrural, 2.0 x 1.5 cm  *  Posterior to right renal vein, 2.7 x 1.8 cm  *  Left para-aortic, 2.5 x 1.9 cm    BONES AND SOFT TISSUES: No suspicious lesion.    IMPRESSION:    1. Abdominal and incompletely imaged low thoracic adenopathy.  Query   gastric body wall thickening and gastrocolic ligament nodularity. Trace   ascites. Findings suspicious for malignancy, metastatic or   lymphoproliferative.  2. Nonobstructing nephrolithiasis.    < end of copied text >     HPI:  58 Y/O F from [facility] w/ PMH Nephrolithiasis who presents to the hospital for abdominal pain lasting about 1 month associated with nausea/vomiting. The abdominal pain is in the midepigastric and left flank > r flank. although pain not worst after eating, osborn report nausea & vomiting after any PO intake. No dysphagia. No heartburn. reports 20 pounds wt loss in the last month. Patient went to [facility] for abdominal pain recently, was prescribed Ibuprofen for which she has been getting for the past 1-2 weeks.    No family hx of Colon/liver/pancreatic cancer. Never had an EGD/Colonoscopy in the past. Hospital evaluation remarkable for gastric body wall thickening and gastrocolic ligament nodularity + Trace ascites concerning for malignancy. GI called for further evaluation       Allergies:  No Known Allergies        Hospital Medications:  acetaminophen     Tablet .. 650 milliGRAM(s) Oral every 6 hours PRN  HYDROmorphone  Injectable 1 milliGRAM(s) IV Push every 6 hours PRN  ondansetron Injectable 4 milliGRAM(s) IV Push every 6 hours PRN      PMHX/PSHX:  Nephrolithiasis        Family history:      Social History: no smoking    ROS:   14-point ROS reviewed and negative except as per HPI above    PHYSICAL EXAM:   GENERAL:  mild distress, Appears stated age  HEENT:  NC/AT,  conjunctivae clear and pink, sclera -anicteric  CHEST:  CTA B/L, Normal effort  HEART:  RRR S1/S2, no murmur  ABDOMEN:  Soft tender to palpation in epigastric region, no rebound/guarding  EXTREMITIES:  No cyanosis or Edema  SKIN: Normal temperature, turgor and texture; no rash  PSYCH: Appropriate affect, alert and oriented to person, place and time  NEURO: No tremor, asterixis       Vital Signs:  Vital Signs Last 24 Hrs  T(C): 36.8 (15 Aug 2022 06:25), Max: 36.8 (15 Aug 2022 06:25)  T(F): 98.2 (15 Aug 2022 06:25), Max: 98.2 (15 Aug 2022 06:25)  HR: 69 (15 Aug 2022 06:25) (68 - 97)  BP: 113/70 (15 Aug 2022 06:25) (100/78 - 124/63)  BP(mean): --  RR: 17 (15 Aug 2022 06:25) (16 - 18)  SpO2: 100% (15 Aug 2022 06:25) (98% - 100%)    Parameters below as of 15 Aug 2022 06:25  Patient On (Oxygen Delivery Method): room air      Daily     Daily     LABS:                        9.5    6.08  )-----------( 283      ( 15 Aug 2022 07:20 )             31.7     Mean Cell Volume: 81.9 fL (08-15- @ 07:20)    08-15    144  |  117<H>  |  24<H>  ----------------------------<  105<H>  3.6   |  15<L>  |  0.93    Ca    7.8<L>      15 Aug 2022 07:20  Phos  3.8     08-15  Mg     1.60     08-15    TPro  5.8<L>  /  Alb  2.9<L>  /  TBili  0.2  /  DBili  x   /  AST  19  /  ALT  10  /  AlkPhos  62  08-15    LIVER FUNCTIONS - ( 15 Aug 2022 07:20 )  Alb: 2.9 g/dL / Pro: 5.8 g/dL / ALK PHOS: 62 U/L / ALT: 10 U/L / AST: 19 U/L / GGT: x             Urinalysis Basic - ( 14 Aug 2022 16:20 )    Color: Yellow / Appearance: Clear / S.036 / pH: x  Gluc: x / Ketone: Trace  / Bili: Negative / Urobili: <2 mg/dL   Blood: x / Protein: 30 mg/dL / Nitrite: Negative   Leuk Esterase: Negative / RBC: 2 /HPF / WBC 3 /HPF   Sq Epi: x / Non Sq Epi: 2 /HPF / Bacteria: Negative      Amylase Serum--      Lipase [id number]       Ammonia--                          9.5    6.08  )-----------( 283      ( 15 Aug 2022 07:20 )             31.7                         11.4   9.72  )-----------( 372      ( 14 Aug 2022 13:33 )             36.8     Imaging:  < from: CT Abdomen and Pelvis w/ IV Cont (22 @ 15:37) >    FINDINGS:  LOWER CHEST: Incompletely imaged  adenopathy, for example:  *  Right hilar, 1.3 x 1.0 cm  *  Anterior supradiaphragmatic, 1.4 x 0.9 cm      LIVER: No suspicious lesion.  BILE DUCTS: Normal caliber.  GALLBLADDER: Within normal limits.  SPLEEN: Within normal limits.  PANCREAS: Within normal limits.  ADRENALS: Left adrenal thickening.  KIDNEYS/URETERS: Punctate nonobstructing calculi and bilateral cysts.    BLADDER: Within normal limits.  REPRODUCTIVE ORGANS: Uterusin situ.    BOWEL/ PERITONEUM: No bowel obstruction. Quality gastric body wall   thickening. Loculated fluid and gastrocolic ligament nodularity (2, 47;   601, 18). Small volume ascites.    RETROPERITONEUM/LYMPH NODES: Retroperitoneal adenopathy, forexample:  *  [Left retrocrural, 2.0 x 1.5 cm  *  Posterior to right renal vein, 2.7 x 1.8 cm  *  Left para-aortic, 2.5 x 1.9 cm    BONES AND SOFT TISSUES: No suspicious lesion.    IMPRESSION:    1. Abdominal and incompletely imaged low thoracic adenopathy.  Query   gastric body wall thickening and gastrocolic ligament nodularity. Trace   ascites. Findings suspicious for malignancy, metastatic or   lymphoproliferative.  2. Nonobstructing nephrolithiasis.    < end of copied text >

## 2022-08-15 NOTE — H&P ADULT - PROBLEM SELECTOR PLAN 1
The patient's abdominal pain, persistent nausea/vomiting and weight loss in the s/o Ct findings indicating gastric body thickening are suspicious for malignancy. Patient has never felt abdominal pain similar to this level, family history is unknown for gastric carcinoma/rectal cancer.  Patient will likely need to be managed by Heme/Onc and Gastrology guidance on the presentation and clinical course.  - Consult Gastrology for likely EGD & colonoscopy  - Consult Heme/Onc for management of likely malignancy  - Can get IR on board for biopsy as well  - Quantiferon for PPD rule-out  - ACE levels for Sarcoidosis r/o The patient's abdominal pain, persistent nausea/vomiting and weight loss in the s/o Ct findings indicating gastric body thickening are suspicious for malignancy. Patient has never felt abdominal pain similar to this level, family history is unknown for gastric carcinoma/rectal cancer.  Patient will likely need to be managed by Heme/Onc and Gastrology guidance on the presentation and clinical course.  - Consult Gastrology for likely EGD   - Keep NPO for now in case of EGD tomorrow  - Consult Heme/Onc for management of likely malignancy  - Quantiferon for TB r/o  - ACE levels for Sarcoidosis r/o

## 2022-08-15 NOTE — PROGRESS NOTE ADULT - ASSESSMENT
Ms. Cali Gordillo is a 58 Y/O F from [facility] w/ PMH Nephrolithiasis who presents to the hospital for abdominal pain lasting about 2 months, associated with nausea/vomiting, associated with 20 lb weight loss, w/ CT Abd/Pelvis indicating gastric body wall thickening and gastrocolic ligament nodularity, suspicious for malignancy, metastatic or lymphoproliferative.    The patient's abdominal pain, persistent nausea/vomiting and weight loss in the s/o Ct findings indicating gastric body thickening are suspicious for malignancy. Patient has never felt abdominal pain similar to this level, family history is unknown for gastric carcinoma/rectal cancer. Causes of gastric can include lymphoma, adenocarcinoma, Menetriers' disease, Crohn's disease, peptic ulcer disease, sarcoidosis and tuberculosis. Unlikely to be TB, as pt not endorsing night sweats or hemoptysis, does endorse mild cough occasionally at night however, Chron's unlikely to flare up now. DEVI likely 2/2 volume depletion from poor PO intake. Nephrolithiasis also found on imaging, as pt seems to have h/o nephrolith but denies having history. Nephrolith unlikely to fully explain abdominal pain, may be adding to flank pain and unlikely to be causing DEVI. Microcytic anemia, barely microcytic w/ MVC 79.8, may be combination of Iron deficiency anemia from poor PO intake and normocytic anemia from Anemia of Chronic Disease w/ this presentation c/f gastric malignancy. Iron studies to be ordered for workup of anemia. Gastrology will be consulted for likely EGD & colonoscopy. Ms. Cali Gordillo is a 58 Y/O F from [facility] Nephrolithiasis admitted to the hospital for high clinical suspicion for GI malignancy, from CT findings and elevated CEA   Ms. Cali Gordillo is a 56 Y/O F from [facility] w/ [facility] Nephrolithiasis admitted to the hospital for high clinical suspicion for GI malignancy, from CT findings and elevated CEA c/b DEVI, nephrolithiasis.

## 2022-08-15 NOTE — PROGRESS NOTE ADULT - PROBLEM SELECTOR PLAN 5
Anemia to 11.4, microcytic but barely at 79.8. May be combination of Iron deficiency anemia from poor PO intake and normocytic anemia from Anemia of Chronic Disease w/ this presentation c/f gastric malignancy.  - Iron studies (ferritin, TIBC, iron, transferrin) labs ordered  - Replete iron levels  - FOBT Anemia to 11.4, microcytic but barely at 79.8. May be combination of Iron deficiency anemia from poor PO intake and normocytic anemia from Anemia of Chronic Disease w/ this presentation c/f gastric malignancy.  - Total Iron 22, TIBC 165, transferrin 149  - Replete iron levels

## 2022-08-15 NOTE — PROGRESS NOTE ADULT - PROBLEM SELECTOR PLAN 1
The patient's abdominal pain, persistent nausea/vomiting and weight loss in the s/o Ct findings indicating gastric body thickening are suspicious for malignancy. Patient has never felt abdominal pain similar to this level, family history is unknown for gastric carcinoma/rectal cancer.  Patient will likely need to be managed by Heme/Onc and Gastrology guidance on the presentation and clinical course.  - Consult Gastrology for likely EGD   - Keep NPO for now in case of EGD tomorrow  - Consult Heme/Onc for management of likely malignancy  - Quantiferon for TB r/o  - ACE levels for Sarcoidosis r/o The patient's abdominal pain, persistent nausea/vomiting and weight loss in the s/o Ct findings indicating gastric body thickening are suspicious for malignancy. Patient has never felt abdominal pain similar to this level, family history is unknown for gastric carcinoma/rectal cancer.  Patient will likely need to be managed by Heme/Onc and Gastrology guidance on the presentation and clinical course.  - GI following  - As per GI recs, CLD now, NPO at midnight - EGD vs. colonoscopy or both tomorrow  - dilaudid 1 mg IV Q6 for pain   - pantoprazole IV 40 mg BID  - Quantiferon for TB r/o  - ACE levels for Sarcoidosis r/o

## 2022-08-15 NOTE — H&P ADULT - NSHPSOCIALHISTORY_GEN_ALL_CORE
Lives with son in apartment in [place]  Moved [place] in [place] from [facility]  Is single, not currently sexually active  Denies ever drinking alcohol, smoking cigarettes.

## 2022-08-15 NOTE — H&P ADULT - PROBLEM SELECTOR PLAN 3
Seen on CT imaging, nonobstructive. May be adding to the flank pain presentation, unlikely to be sole cause of abdominal tenderness. Pt denies [id number] history of nephrolithiasis, but gives a history about being evaluated for nephroliths. Was seen at [facility] for abdominal pain, according to her, and prescribed NSAIDs, possibly being diagnosed with nephrolithiasis there.  - As nonobstructive, monitor for now  - IV hydration, pain control (avoid NSAIDs for now) Seen on CT imaging, nonobstructive. May be adding to the flank pain presentation, unlikely to be sole cause of abdominal tenderness. Pt denies [id number] history of nephrolithiasis, but gives a history about being evaluated for nephroliths. Was seen at [facility] for abdominal pain, according to her, and prescribed NSAIDs, possibly being diagnosed with nephrolithiasis there.  - As nonobstructive, monitor for now  - IV hydration  mL/hr for 10 hours  - Pain control w/ Dilaudid 1mg q6h PRN  -  (avoid NSAIDs for now)

## 2022-08-15 NOTE — H&P ADULT - NSHPPHYSICALEXAM_GEN_ALL_CORE
VITALS:   T(C): 36.7 (08-14-22 @ 19:42), Max: 36.7 (08-14-22 @ 12:29)  HR: 68 (08-14-22 @ 19:42) (68 - 97)  BP: 122/47 (08-14-22 @ 19:42) (100/78 - 124/63)  RR: 18 (08-14-22 @ 19:42) (16 - 18)  SpO2: 100% (08-14-22 @ 19:42) (98% - 100%)    PHYSICAL EXAM:     GENERAL: NAD, lying in bed comfortably.  HEAD:  Atraumatic, normocephalic.  EYES: EOMI, PERRLA, conjunctiva and sclera clear.  ENT: Moist mucous membranes.  NECK: Supple, no JVD, trachea midline.  CHEST/LUNG: CTAB. No rales, rhonchi, wheezing, or rubs. Unlabored respirations.  HEART: RRR, no M/R/G, S1/S2  ABDOMEN: Soft, Normoactive bowel sounds. Significant tenderness to palpation of midepigastrium and L flank, w/ guarding. Mild tenderness to palpation of R flank. No organomegaly upon deep palpation. No masses felt.   EXTREMITIES:  2+ peripheral pulses b/l, brisk capillary refill. No clubbing, cyanosis, or edema.  Neurological:  AAOx3, no focal deficits. 5/5 muscle strength on all muscle movements of UEs and LEs bilaterally.  SKIN: No rashes or lesions. No lymphadenopathy felt  PSYCH: Normal affect and mood. VITALS:   T(C): 36.7 (08-14-22 @ 19:42), Max: 36.7 (08-14-22 @ 12:29)  HR: 68 (08-14-22 @ 19:42) (68 - 97)  BP: 122/47 (08-14-22 @ 19:42) (100/78 - 124/63)  RR: 18 (08-14-22 @ 19:42) (16 - 18)  SpO2: 100% (08-14-22 @ 19:42) (98% - 100%)    PHYSICAL EXAM:     GENERAL: NAD, lying in bed comfortably.  HEAD:  Atraumatic, normocephalic.  EYES: EOMI, PERRLA, conjunctiva and sclera clear.  ENT: Moist mucous membranes.  NECK: Supple, no JVD, trachea midline.  CHEST/LUNG: CTAB. No rales, rhonchi, wheezing, or rubs. Unlabored respirations.  HEART: RRR, no M/R/G, S1/S2  ABDOMEN: Soft, Normoactive bowel sounds. Significant tenderness to palpation of midepigastrium and L flank, w/ guarding. Mild tenderness to palpation of R flank. No organomegaly upon deep palpation. No masses felt.   EXTREMITIES:  2+ peripheral pulses b/l, brisk capillary refill. No clubbing, cyanosis, or edema.  Neurological:  AAOx3, no focal deficits. 5/5 muscle strength on all muscle movements of UEs and LEs bilaterally.  SKIN: No rashes or lesions. No lymphadenopathy felt (no Sister Zuleyma Valenzuela nodule)  PSYCH: Normal affect and mood. VITALS:   T(C): 36.7 (08-14-22 @ 19:42), Max: 36.7 (08-14-22 @ 12:29)  HR: 68 (08-14-22 @ 19:42) (68 - 97)  BP: 122/47 (08-14-22 @ 19:42) (100/78 - 124/63)  RR: 18 (08-14-22 @ 19:42) (16 - 18)  SpO2: 100% (08-14-22 @ 19:42) (98% - 100%)    PHYSICAL EXAM:     GENERAL: NAD, lying in bed comfortably.  HEAD:  Atraumatic, normocephalic.  EYES: EOMI, PERRLA, conjunctiva and sclera clear.  ENT: Moist mucous membranes.  NECK: Supple, no JVD, trachea midline.  CHEST/LUNG: CTAB. No rales, rhonchi, wheezing, or rubs. Unlabored respirations.  HEART: RRR, no M/R/G, S1/S2  ABDOMEN: Soft, Normoactive bowel sounds. Significant tenderness to palpation of midepigastrium and L flank, w/ guarding. Mild tenderness to palpation of R flank. No organomegaly upon deep palpation. No masses felt. No CVA tenderness  EXTREMITIES:  2+ peripheral pulses b/l, brisk capillary refill. No clubbing, cyanosis, or edema.  Neurological:  AAOx3, no focal deficits. 5/5 muscle strength on all muscle movements of UEs and LEs bilaterally.  SKIN: No rashes or lesions. No lymphadenopathy felt (no Sister Zuleyma Valenzuela nodule)  PSYCH: Normal affect and mood.

## 2022-08-15 NOTE — H&P ADULT - PROBLEM SELECTOR PLAN 2
Cr upon admission 3.13 down to 2.14 on subsequent labs, has been getting IV hydration. DEVI likely 2/2 volume depletion from poor PO intake w/ intractable nausea/vomiting. Nephrolithiasis also found on imaging, as pt seems to have h/o nephrolith but denies having history. Nephrolith may be adding to flank pain, unlikely to be causing DEVI.  - Continue IV fluids  - Monitor BMP daily  - Avoid NSAIDs, nephrotoxins

## 2022-08-15 NOTE — PROGRESS NOTE ADULT - ATTENDING COMMENTS
57F From [facility], Renal stones p/w suspicion for Gastric malignancy c/b DEVI, Nephrolithiasis.  - High clinical concern for GI malignancy; GI consult - plan for EGD on 8/15; Pain control with dilaudid IV PRN; CLD for today, NPO after MN for planned procedure.  - DEVI - resolved after IVF hydration; likely due to pre-renal due to poor PO intake.  - Nephrolithiasis noted but no obstructed or infected. Continue to monitor.  - Lovenox SC for VTE ppx

## 2022-08-15 NOTE — H&P ADULT - HISTORY OF PRESENT ILLNESS
Ms. Cali Gordillo is a 58 Y/O F from [facility] w/ [facility] Nephrolithiasis who presents to the hospital for abdominal pain lasting about 2 months, associated with nausea/vomiting. The abdominal pain is described as midepigastric and left flank > r flank, 10/10 pain unable to describe consistency of pain. Pain is worsened by lying flat and is worse in general at night, is better during day. Flank pain is worsened with breathing. Nausea & vomiting always with eating food & drinking, denies any red color or blood she noticed. Pt has also lost 20 pounds in the last month, which she associates with inability to eat and feels fatigued in general because of it. Patient went to [facility] for abdominal pain recently, was prescribed pain medications and discharged, unable to remember name of pain meds or when she went to hospital. Is having fewer bowel movements because is eating less, says probably constipated. Denies headaches, dizziness, weakness, diarrhea, blood in stool or urine. Denies fevers, chills, night sweats, rashes.    Patient's family history difficult to assess for colorectal cancer or gastric etiologies, as she never knew father and mother passed away at age 23, had no medical conditions. Has three children, aged 40, 30 and 29. Lives with son in apartment. All children healthy w/o medical conditions. Ms. Cali Gordillo is a 56 Y/O F from [facility] w/ [facility] Nephrolithiasis who presents to the hospital for abdominal pain lasting about 2 months, associated with nausea/vomiting. The abdominal pain is described as midepigastric and left flank > r flank, 10/10 pain unable to describe consistency of pain. Pain is worsened by lying flat and is worse in general at night, is better during day. Flank pain is worsened with breathing. Nausea & vomiting always with eating food & drinking, denies any red color or blood she noticed. Pt has also lost 20 pounds in the last month, which she associates with inability to eat and feels fatigued in general because of it. Patient went to [facility] for abdominal pain recently, was prescribed pain medications and discharged, unable to remember name of pain meds or when she went to hospital. Is having fewer bowel movements because is eating less, says probably constipated. Denies headaches, dizziness, weakness, diarrhea, blood in stool or urine. Denies fevers, chills, night sweats, rashes.    Patient's family history difficult to assess for colorectal cancer or gastric etiologies, as she never knew father and mother passed away at age 23, had no medical conditions. Has three children, aged 40, 30 and 29. Lives with son in apartment. All children healthy w/o medical conditions.

## 2022-08-15 NOTE — H&P ADULT - NSHPADDITIONALINFOADULT_GEN_ALL_CORE
# Dispo  DVT prophylaxis w/ Lovenox 30mg SubQ qd renal dosing (DEVI )  regular diet when can tolerate

## 2022-08-15 NOTE — PROGRESS NOTE ADULT - PROBLEM SELECTOR PLAN 4
Anemia to 11.4, microcytic but barely at 79.8. May be combination of Iron deficiency anemia from poor PO intake and normocytic anemia from Anemia of Chronic Disease w/ this presentation c/f gastric malignancy.  - Iron studies (ferritin, TIBC, iron, transferrin) labs ordered  - Replete iron levels  - FOBT Seen on CT imaging, nonobstructive. May be adding to the flank pain presentation, unlikely to be sole cause of abdominal tenderness. Pt denies [id number] history of nephrolithiasis, but gives a history about being evaluated for nephroliths. Was seen at [facility] for abdominal pain, according to her, and prescribed NSAIDs, possibly being diagnosed with nephrolithiasis there.  - As nonobstructive, monitor for now  - IV hydration  mL/hr for 10 hours  - Pain control w/ Dilaudid 1mg q6h PRN  -  (avoid NSAIDs for now)

## 2022-08-15 NOTE — H&P ADULT - ASSESSMENT
Ms. Cali Gordillo is a 58 Y/O F from [facility] w/ [facility] who presents to the hospital for abdominal pain lasting about 2 months, associated with nausea/vomiting, associated with 20 lb weight loss, w/ CT Abd/Pelvis indicating gastric body wall thickening and gastrocolic ligament nodularity, suspicious for malignancy, metastatic or lymphoproliferative.    The patient's abdominal pain, persistent nausea/vomiting and weight loss in the s/o Ct findings indicating gastric body thickening are suspicious for malignancy. Patient has never felt abdominal pain similar to this level, family history is unknown for gastric carcinoma/rectal cancer. Causes of gastric can include lymphoma, adenocarcinoma, Menetriers' disease, Crohn's disease, peptic ulcer disease, sarcoidosis and tuberculosis. Unlikely to be TB, as pt not endorsing night sweats or hemoptysis, does endorse mild cough occasionally at night however, Chron's unlikely to flare up now. DEVI likely 2/2 volume depletion from poor PO intake. Nephrolithiasis also found on imaging, as pt seems to have h/o nephrolith but denies having history. Nephrolith unlikely to fully explain abdominal pain, may be adding to flank pain and unlikely to be causing DEVI. Microcytic anemia, barely microcytic w/ MVC 79.8, may be combination of Iron deficiency anemia from poor PO intake and normocytic anemia from Anemia of Chronic Disease w/ this presentation c/f gastric malignancy. Iron studies to be ordered for workup of anemia. Gastrology will be consulted for likely EGD & colonoscopy. Ms. Cali Gordillo is a 58 Y/O F from [facility] w/ [facility]phrol[facility] who presents to the hospital for abdominal pain lasting about 2 months, associated with nausea/vomiting, associated with 20 lb weight loss, w/ CT Abd/Pelvis indicating gastric body wall thickening and gastrocolic ligament nodularity, suspicious for malignancy, metastatic or lymphoproliferative.    The patient's abdominal pain, persistent nausea/vomiting and weight loss in the s/o Ct findings indicating gastric body thickening are suspicious for malignancy. Patient has never felt abdominal pain similar to this level, family history is unknown for gastric carcinoma/rectal cancer. Causes of gastric can include lymphoma, adenocarcinoma, Menetriers' disease, Crohn's disease, peptic ulcer disease, sarcoidosis and tuberculosis. Unlikely to be TB, as pt not endorsing night sweats or hemoptysis, does endorse mild cough occasionally at night however, Chron's unlikely to flare up now. DEVI likely 2/2 volume depletion from poor PO intake. Nephrolithiasis also found on imaging, as pt seems to have h/o nephrolith but denies having history. Nephrolith unlikely to fully explain abdominal pain, may be adding to flank pain and unlikely to be causing DEVI. Microcytic anemia, barely microcytic w/ MVC 79.8, may be combination of Iron deficiency anemia from poor PO intake and normocytic anemia from Anemia of Chronic Disease w/ this presentation c/f gastric malignancy. Iron studies to be ordered for workup of anemia. Gastrology will be consulted for likely EGD & colonoscopy.

## 2022-08-15 NOTE — H&P ADULT - NSHPLABSRESULTS_GEN_ALL_CORE
LABS:                           11.4   9.72  )-----------( 372      ( 14 Aug 2022 13:33 )             36.8     08-    140  |  108<H>  |  36<H>  ----------------------------<  135<H>  3.6   |  17<L>  |  2.14<H>    Ca    7.9<L>      14 Aug 2022 18:41    TPro  7.8  /  Alb  4.3  /  TBili  0.4  /  DBili  x   /  AST  26  /  ALT  14  /  AlkPhos  85                Urinalysis Basic - ( 14 Aug 2022 16:20 )    Color: Yellow / Appearance: Clear / S.036 / pH: x  Gluc: x / Ketone: Trace  / Bili: Negative / Urobili: <2 mg/dL   Blood: x / Protein: 30 mg/dL / Nitrite: Negative   Leuk Esterase: Negative / RBC: 2 /HPF / WBC 3 /HPF   Sq Epi: x / Non Sq Epi: 2 /HPF / Bacteria: Negative        LIVER FUNCTIONS - ( 14 Aug 2022 13:33 )  Alb: 4.3 g/dL / Pro: 7.8 g/dL / ALK PHOS: 85 U/L / ALT: 14 U/L / AST: 26 U/L / GGT: x          RADIOLOGY:  CT Abd/Pelvis w/IV Contrast:  1. Abdominal and incompletely imaged low thoracic adenopathy.  Query   gastric body wall thickening and gastrocolic ligament nodularity. Trace   ascites. Findings suspicious for malignancy, metastatic or   lymphoproliferative.  2. Nonobstructive nephrolithiasis.

## 2022-08-15 NOTE — H&P ADULT - PROBLEM SELECTOR PLAN 4
Anemia to 11.4, microcytic but barely at 79.8. May be combination of Iron deficiency anemia from poor PO intake and normocytic anemia from Anemia of Chronic Disease w/ this presentation c/f gastric malignancy.  - Iron studies (ferritin, TIBC, iron, transferrin) labs ordered  - Replete iron levels  - FOBT

## 2022-08-15 NOTE — H&P ADULT - NSHPREVIEWOFSYSTEMS_GEN_ALL_CORE
REVIEW OF SYSTEMS:  CONSTITUTIONAL: No fever, chills, night sweats, or fatigue  EYES: No eye pain, visual disturbances, or discharge  ENMT:  No difficulty hearing, tinnitus, vertigo; No sinus or throat pain  NECK: No pain or stiffness  RESPIRATORY: No cough, wheezing, or hemoptysis; No shortness of breath  CARDIOVASCULAR: No chest pain, palpitations, dizziness, or leg swelling  GASTROINTESTINAL: No abdominal or epigastric pain. No nausea, vomiting, or hematemesis; No diarrhea or constipation. No melena or hematochezia.  GENITOURINARY: No dysuria, frequency, hematuria, or incontinence  NEUROLOGICAL: No headaches, memory loss, loss of strength, numbness, or tremors  SKIN: No itching, burning, rashes, or lesions   LYMPH NODES: No enlarged glands  ENDOCRINE: No heat or cold intolerance; No hair loss  MUSCULOSKELETAL: No joint pain or swelling; No muscle, back, or extremity pain  PSYCHIATRIC: No depression, anxiety, mood swings, or difficulty sleeping  HEME/LYMPH: No easy bruising, or bleeding gums  ALLERGY AND IMMUNOLOGIC: No hives or eczema REVIEW OF SYSTEMS:  CONSTITUTIONAL: No fever, chills, night sweats, or fatigue  EYES: No eye pain, visual disturbances, or discharge  ENMT:  No difficulty hearing, tinnitus, vertigo; No sinus or throat pain  NECK: No pain or stiffness  RESPIRATORY: + occasional cough, wheezing, or hemoptysis; No shortness of breath  CARDIOVASCULAR: No chest pain, palpitations, dizziness, or leg swelling  GASTROINTESTINAL: No abdominal or epigastric pain. + nausea, vomiting, - hematemesis; No diarrhea. No melena or hematochezia.  GENITOURINARY: No dysuria, frequency, hematuria, or incontinence  NEUROLOGICAL: No headaches, memory loss, loss of strength, numbness, or tremors  SKIN: No itching, burning, rashes, or lesions   LYMPH NODES: No enlarged glands  ENDOCRINE: No heat or cold intolerance; No hair loss  MUSCULOSKELETAL: No joint pain or swelling; No muscle, back, or extremity pain  PSYCHIATRIC: No depression, anxiety, mood swings, or difficulty sleeping  HEME/LYMPH: No easy bruising, or bleeding gums  ALLERGY AND IMMUNOLOGIC: No hives or eczema

## 2022-08-15 NOTE — PROGRESS NOTE ADULT - SUBJECTIVE AND OBJECTIVE BOX
OVERNIGHT EVENTS: No acute overnight events.      SUBJECTIVE:       MEDICATIONS  (STANDING):  sodium chloride 0.9%. 2500 milliLiter(s) (125 mL/Hr) IV Continuous <Continuous>    MEDICATIONS  (PRN):  acetaminophen     Tablet .. 650 milliGRAM(s) Oral every 6 hours PRN Temp greater or equal to 38C (100.4F), Mild Pain (1 - 3)  HYDROmorphone  Injectable 1 milliGRAM(s) IV Push every 6 hours PRN Severe Pain (7 - 10)  ondansetron Injectable 4 milliGRAM(s) IV Push every 6 hours PRN Nausea and/or Vomiting        T(F): 98.2 (08-15-22 @ 06:25), Max: 98.2 (08-15-22 @ 06:25)  HR: 69 (08-15-22 @ 06:25) (68 - 97)  BP: 113/70 (08-15-22 @ 06:25) (100/78 - 124/63)  BP(mean): --  RR: 17 (08-15-22 @ 06:25) (16 - 18)  SpO2: 100% (08-15-22 @ 06:25) (98% - 100%)    PHYSICAL EXAM:     GENERAL: NAD, lying in bed comfortably  HEAD:  Atraumatic, Normocephalic  EYES: EOMI, PERRLA, conjunctiva and sclera clear, no nystagmus noted  ENT: Moist mucous membranes,   NECK: Supple, No JVD, trachea midline  CHEST/LUNG: Clear to auscultation bilaterally; No rales, rhonchi, wheezing, or rubs. Unlabored respirations  HEART: Regular rate and rhythm; No murmurs, rubs, or gallops, normal S1/S2  ABDOMEN: BS+, significant tenderness to palpation of midepigastrium and L flank. Guarding. Mild tenderness to palpation of R flank. No organomegaly  EXTREMITIES:  2+ Peripheral Pulses, brisk capillary refill. No clubbing, cyanosis, or edema  MSK: No gross deformities noted   Neurological:  A&Ox3, no focal deficits   SKIN: No rashes or lesions  PSYCH: Normal mood, affect     TELEMETRY:    LABS:                        11.4   9.72  )-----------( 372      ( 14 Aug 2022 13:33 )             36.8     08-14    140  |  108<H>  |  36<H>  ----------------------------<  135<H>  3.6   |  17<L>  |  2.14<H>    Ca    7.9<L>      14 Aug 2022 18:41    TPro  7.8  /  Alb  4.3  /  TBili  0.4  /  DBili  x   /  AST  26  /  ALT  14  /  AlkPhos  85  08-14            Creatinine Trend: 2.14<--, 3.13<--  I&O's Summary    BNP    RADIOLOGY & ADDITIONAL STUDIES:                 OVERNIGHT EVENTS: No acute overnight events.      SUBJECTIVE: Patient endorsing epigastric and L flank pain. Patient also states she vomited earlier in the morning. Patient states the dilaudid and zofran are effective in managing her symptoms  Denies SOB, chest pain, fever, chills, dysuria, hematuria, melena, hematochezia      MEDICATIONS  (STANDING):  sodium chloride 0.9%. 2500 milliLiter(s) (125 mL/Hr) IV Continuous <Continuous>    MEDICATIONS  (PRN):  acetaminophen     Tablet .. 650 milliGRAM(s) Oral every 6 hours PRN Temp greater or equal to 38C (100.4F), Mild Pain (1 - 3)  HYDROmorphone  Injectable 1 milliGRAM(s) IV Push every 6 hours PRN Severe Pain (7 - 10)  ondansetron Injectable 4 milliGRAM(s) IV Push every 6 hours PRN Nausea and/or Vomiting        T(F): 98.2 (08-15-22 @ 06:25), Max: 98.2 (08-15-22 @ 06:25)  HR: 69 (08-15-22 @ 06:25) (68 - 97)  BP: 113/70 (08-15-22 @ 06:25) (100/78 - 124/63)  BP(mean): --  RR: 17 (08-15-22 @ 06:25) (16 - 18)  SpO2: 100% (08-15-22 @ 06:25) (98% - 100%)    PHYSICAL EXAM:     GENERAL: NAD, lying in bed comfortably  HEAD:  Atraumatic, Normocephalic  EYES: EOMI, PERRLA, conjunctiva and sclera clear, no nystagmus noted  ENT: Moist mucous membranes,   NECK: Supple, No JVD, trachea midline  CHEST/LUNG: Clear to auscultation bilaterally; No rales, rhonchi, wheezing, or rubs. Unlabored respirations  HEART: Regular rate and rhythm; No murmurs, rubs, or gallops, normal S1/S2  ABDOMEN: BS+, significant tenderness to palpation of midepigastrium and L flank. Guarding. Mild tenderness to palpation of R flank. No organomegaly  EXTREMITIES:  2+ Peripheral Pulses, brisk capillary refill. No clubbing, cyanosis, or edema  MSK: No gross deformities noted   Neurological:  A&Ox3, no focal deficits   SKIN: No rashes or lesions  PSYCH: Normal mood, affect     TELEMETRY:    LABS:                        11.4   9.72  )-----------( 372      ( 14 Aug 2022 13:33 )             36.8     08-14    140  |  108<H>  |  36<H>  ----------------------------<  135<H>  3.6   |  17<L>  |  2.14<H>    Ca    7.9<L>      14 Aug 2022 18:41    TPro  7.8  /  Alb  4.3  /  TBili  0.4  /  DBili  x   /  AST  26  /  ALT  14  /  AlkPhos  85  08-14            Creatinine Trend: 2.14<--, 3.13<--  I&O's Summary    BNP    RADIOLOGY & ADDITIONAL STUDIES:                 OVERNIGHT EVENTS: No acute overnight events.      SUBJECTIVE: Patient endorsing epigastric and L flank pain. Patient also states she vomited earlier in the morning. Patient states the dilaudid and zofran are effective in managing her symptoms  Denies SOB, chest pain, fever, chills, dysuria, hematuria, melena, hematochezia      MEDICATIONS  (STANDING):  sodium chloride 0.9%. 2500 milliLiter(s) (125 mL/Hr) IV Continuous <Continuous>    MEDICATIONS  (PRN):  acetaminophen     Tablet .. 650 milliGRAM(s) Oral every 6 hours PRN Temp greater or equal to 38C (100.4F), Mild Pain (1 - 3)  HYDROmorphone  Injectable 1 milliGRAM(s) IV Push every 6 hours PRN Severe Pain (7 - 10)  ondansetron Injectable 4 milliGRAM(s) IV Push every 6 hours PRN Nausea and/or Vomiting        T(F): 98.2 (08-15-22 @ 06:25), Max: 98.2 (08-15-22 @ 06:25)  HR: 69 (08-15-22 @ 06:25) (68 - 97)  BP: 113/70 (08-15-22 @ 06:25) (100/78 - 124/63)  BP(mean): --  RR: 17 (08-15-22 @ 06:25) (16 - 18)  SpO2: 100% (08-15-22 @ 06:25) (98% - 100%)    PHYSICAL EXAM:     GENERAL: NAD, lying in bed comfortably  HEAD:  Atraumatic, Normocephalic  EYES: EOMI, PERRLA, conjunctiva and sclera clear, no nystagmus noted  ENT: Moist mucous membranes,   NECK: Supple, No JVD, trachea midline  CHEST/LUNG: Clear to auscultation bilaterally; No rales, rhonchi, wheezing, or rubs. Unlabored respirations  HEART: Regular rate and rhythm; No murmurs, rubs, or gallops, normal S1/S2  ABDOMEN: BS+, significant tenderness to palpation of midepigastrium and L flank. Guarding. Mild tenderness to palpation of R flank. No organomegaly. Abdomen distended  EXTREMITIES:  2+ Peripheral Pulses, brisk capillary refill. No clubbing, cyanosis, or edema  MSK: No gross deformities noted   Neurological:  A&Ox3, no focal deficits   SKIN: No rashes or lesions  PSYCH: Normal mood, affect     TELEMETRY:    LABS:                        11.4   9.72  )-----------( 372      ( 14 Aug 2022 13:33 )             36.8     08-14    140  |  108<H>  |  36<H>  ----------------------------<  135<H>  3.6   |  17<L>  |  2.14<H>    Ca    7.9<L>      14 Aug 2022 18:41    TPro  7.8  /  Alb  4.3  /  TBili  0.4  /  DBili  x   /  AST  26  /  ALT  14  /  AlkPhos  85  08-14            Creatinine Trend: 2.14<--, 3.13<--  I&O's Summary    BNP    RADIOLOGY & ADDITIONAL STUDIES:

## 2022-08-15 NOTE — PROGRESS NOTE ADULT - PROBLEM SELECTOR PLAN 2
Cr upon admission 3.13 down to 2.14 on subsequent labs, has been getting IV hydration. DEVI likely 2/2 volume depletion from poor PO intake w/ intractable nausea/vomiting. Nephrolithiasis also found on imaging, as pt seems to have h/o nephrolith but denies having history. Nephrolith may be adding to flank pain, unlikely to be causing DEVI.  - Continue IV fluids  - Monitor BMP daily  - Avoid NSAIDs, nephrotoxins ondansetron 4 mg q6 IV PRN  nausea now well-controlled - no emesis episodes in over 24 hours

## 2022-08-15 NOTE — H&P ADULT - ATTENDING COMMENTS
Pt with h/o kidney stones p/w abd pain, and vomiting.   On exam: Pt in NAD, heart RRR, lungs CTA B, abd s/nt/nd BS normal  Labs reviewed  CT a/p reviewed showing abd and thoracic LAD, and gastric wall thickening  Symptoms concerning for gastric neoplasm.  GI consult this am for EGD   Pain ctrl with dilaudid and tylenol  trend creatinine and hydrate with NS

## 2022-08-15 NOTE — CONSULT NOTE ADULT - ASSESSMENT
56 Y/O F from [facility] w/ [facility] Nephrolithiasis who presents to the hospital for abdominal pain lasting about 1 month associated with nausea/vomiting. The abdominal pain is in the midepigastric and left flank > r flank. although pain not worst after eating, osborn report nausea & vomiting after any PO intake. No dysphagia. No heartburn. reports 20 pounds wt loss in the last month. Patient went to [facility] for abdominal pain recently, was prescribed Ibuprofen for which she has been getting for the past 1-2 weeks.    No family hx of Colon/liver/pancreatic cancer. Never had an EGD/Colonoscopy in the past. Hospital evaluation remarkable for gastric body wall thickening and gastrocolic ligament nodularity + Trace ascites concerning for malignancy. GI called for further evaluation     #Gastric body wall thickening and gastrocolic ligament nodularity  #Ascites, trace  #Wt loss  #Abd pain  DDx includes PUD vs GI malignancy, although patient with kidney stone pain it seems less likely.     Recommendations:  -Keep on clear liquid diet for now, NPO after midnight  -Ultimately, would benefit from EGD and Colonoscopy. Pending how patient does, could consider doing both tomorrow vs EGD alone if unable to tolerate bowel prep due to pain  -IV PPI BID  -IV fluid and supportive care per primary team  -Obtain CA 19-9 and AFP    GI will continue to follow    Recommendations preliminary until signed by attending.     Reagan Ramsay MD  Gastroenterology/Hepatology Fellow  1st option: [phone] (text or call), ONLY available from 7:00 am to 5:00 pm.   **Contact on-call GI fellow via answering service ([phone]) from 5pm-7am AND on weekends/holidays**  2nd option: Available via [employer] Teams  3rd option: Pager: [phone]           56 Y/O F from [facility] w/ [facility] Nephrolithiasis who presents to the hospital for abdominal pain lasting about 1 month associated with nausea/vomiting. The abdominal pain is in the midepigastric and left flank > r flank. although pain not worst after eating, osborn report nausea & vomiting after any PO intake. No dysphagia. No heartburn. reports 20 pounds wt loss in the last month. Patient went to [facility] for abdominal pain recently, was prescribed Ibuprofen for which she has been getting for the past 1-2 weeks.    No family hx of Colon/liver/pancreatic cancer. Never had an EGD/Colonoscopy in the past. Hospital evaluation remarkable for gastric body wall thickening and gastrocolic ligament nodularity + Trace ascites concerning for malignancy. GI called for further evaluation     #Gastric body wall thickening and gastrocolic ligament nodularity  #Ascites, trace  #Wt loss  #Abd pain  DDx includes PUD vs GI malignancy, although patient with kidney stone pain it seems less likely.     Recommendations:  -Keep on clear liquid diet for now, NPO after midnight  -Ultimately, would benefit from EGD +/- colonoscopy. Will plan for EGD tomorrow 8/16. If unremarkable, will readdress possible colonoscopy (patient in pain and does not believe she can tolerate prep at this time)  -IV PPI BID  -IV fluid and supportive care per primary team  -Obtain CA 19-9 and AFP    GI will continue to follow    Recommendations preliminary until signed by attending.     Reagan Ramsay MD  Gastroenterology/Hepatology Fellow  1st option: [phone] (text or call), ONLY available from 7:00 am to 5:00 pm.   **Contact on-call GI fellow via answering service ([phone]) from 5pm-7am AND on weekends/holidays**  2nd option: Available via [employer] Teams  3rd option: Pager: [phone]

## 2022-08-15 NOTE — PROGRESS NOTE ADULT - PROBLEM SELECTOR PLAN 3
Seen on CT imaging, nonobstructive. May be adding to the flank pain presentation, unlikely to be sole cause of abdominal tenderness. Pt denies [id number] history of nephrolithiasis, but gives a history about being evaluated for nephroliths. Was seen at [facility] for abdominal pain, according to her, and prescribed NSAIDs, possibly being diagnosed with nephrolithiasis there.  - As nonobstructive, monitor for now  - IV hydration  mL/hr for 10 hours  - Pain control w/ Dilaudid 1mg q6h PRN  -  (avoid NSAIDs for now) Cr upon admission 3.13 down to 2.14 on subsequent labs, has been getting IV hydration. DEVI likely 2/2 volume depletion from poor PO intake w/ intractable nausea/vomiting. Nephrolithiasis also found on imaging, as pt seems to have h/o nephrolith but denies having history. Nephrolith may be adding to flank pain, unlikely to be causing DEVI.  - Continue IV fluids  - Monitor BMP daily  - Avoid NSAIDs, nephrotoxins

## 2022-08-16 NOTE — PROGRESS NOTE ADULT - PROBLEM SELECTOR PLAN 5
Anemia to 11.4, microcytic but barely at 79.8. May be combination of Iron deficiency anemia from poor PO intake and normocytic anemia from Anemia of Chronic Disease w/ this presentation c/f gastric malignancy.  - Total Iron 22, TIBC 165, transferrin 149  - Replete iron levels

## 2022-08-16 NOTE — CHART NOTE - NSCHARTNOTEFT_GEN_A_CORE
56 yo female admitted for progressive abdominal pain, vomiting, weight loss with findings suspicious for malignancy.   CT imaging showing abdominal and thoracic adenopathy along with a gastric body thickening.     Gi did EGD today 8/16. showing:    - Normal esophagus.  - Infiltrative changes in gastric body concerning for gastric malignancy. Biopsied.  - Congested duodenal mucosa.    Labs significant for elevated CEA, normal AFP     Recommend to complete staging with   - CT Chest with IV contrast   - CA-125 and      Full consult to follow once pathology and staging complete   Will email to expedite pathology     Jennifer Madsen MD   PGY5 heme onc fellow

## 2022-08-16 NOTE — DISCHARGE NOTE PROVIDER - NSFOLLOWUPCLINICS_GEN_ALL_ED_FT
[facility]  Geriatrics  [address]  [place] [address]  Phone: [phone]  Fax:

## 2022-08-16 NOTE — DIETITIAN INITIAL EVALUATION ADULT - ORAL INTAKE PTA/DIET HISTORY
Pt lives at home with her son. They cook together at home. No difficulty obtaining groceries. No specific diet followed PTA. No supplements/Vitamins taken PTA. Pt states she has lost a lot of weight within the last month. UBW (~170#).

## 2022-08-16 NOTE — DISCHARGE NOTE PROVIDER - CARE PROVIDER_API CALL
Danita Hernandez)  Internal Medicine  [address] [place] [address]  Phone: [phone]  Fax: [phone]  Scheduled Appointment: 09/19/2022 08:30 AM    Amauri Arredondo)  Urology  [address], [address]  [place] [address]  Phone: [phone]  Fax: [phone]  Follow Up Time: 2 weeks   Aiden Cardona  (RN)  2018 13:33:28 Anastasiia Estes  (DO)  2018 08:32:23

## 2022-08-16 NOTE — DIETITIAN INITIAL EVALUATION ADULT - OTHER INFO
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    Nutrition interview: No recent episodes of nausea, vomiting, diarrhea or constipation, BM noted yesterday per Pt. Denies any chewing/swallowing difficulties. No food allergies. Stated UBW: ~170# x1 month ago, attributed to decreased PO intake 2/2 nausea and vomiting with PO intake (-7.8%). Food preferences explored and noted. Intake is <50% per pt. Feeding skills: independent. Pt amendable to trying Ensure Clear 3x daily (540 azchary and 24 gm protein) to promote optimal PO intake.

## 2022-08-16 NOTE — DIETITIAN INITIAL EVALUATION ADULT - PERTINENT MEDS FT
MEDICATIONS  (STANDING):  enoxaparin Injectable 40 milliGRAM(s) SubCutaneous every 24 hours  pantoprazole    Tablet 40 milliGRAM(s) Oral two times a day  polyethylene glycol 3350 17 Gram(s) Oral daily    MEDICATIONS  (PRN):  acetaminophen     Tablet .. 650 milliGRAM(s) Oral every 6 hours PRN Temp greater or equal to 38C (100.4F), Mild Pain (1 - 3)  HYDROmorphone  Injectable 1 milliGRAM(s) IV Push every 6 hours PRN Severe Pain (7 - 10)  ondansetron Injectable 4 milliGRAM(s) IV Push every 6 hours PRN Nausea and/or Vomiting

## 2022-08-16 NOTE — PROGRESS NOTE ADULT - ASSESSMENT
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

## 2022-08-16 NOTE — DISCHARGE NOTE PROVIDER - HOSPITAL COURSE
Ms. 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 Patient presented to the ED with abdominal pain for the past two months associated with nausea/vomiting. She also noted a 20 pound weight loss.      Ms. 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 Patient presented to the ED with abdominal pain for the past two months associated with nausea/vomiting. She also noted a 20 pound weight loss. In the ED, patient received IVF and zofran for nausea management. A CT A/P was done that showed gastric wall thickening concerning for malignancy. The patient was admitted to the hospital and GI was consulted. An EGD with biopsy was done and it showed severe, diffuse mucosal changes in the gastric body that were highly suspicious of gastric malignancy. Oncology was consulted for further management and a CT chest was done for further staging. Ms. 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 Patient presented to the ED with abdominal pain for the past two months associated with nausea/vomiting. She also noted a 20 pound weight loss. In the ED, patient received IVF and zofran for nausea management. A CT A/P was done that showed gastric wall thickening concerning for malignancy. The patient was admitted to the hospital and GI was consulted. An EGD with biopsy was done and it showed severe, diffuse mucosal changes in the gastric body that were highly suspicious of gastric malignancy. Oncology was consulted for further management and a CT chest was done for further staging. The chest CT scan showed several areas of lymphadenopathy, concerning for mets. Ms. 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 Patient presented to the ED with abdominal pain for the past two months associated with nausea/vomiting. She also noted a 20 pound weight loss. In the ED, patient received IVF and zofran for nausea management. A CT A/P was done that showed gastric wall thickening concerning for malignancy. The patient was admitted to the hospital and GI was consulted. An EGD with biopsy was done and it showed severe, diffuse mucosal changes in the gastric body that were highly suspicious of gastric malignancy. Oncology was consulted for further management and a CT chest was done for further staging. The chest CT scan showed several areas of lymphadenopathy, concerning for mets. Interventional radiology was consulted regarding a lymph node biopsy and it was determined there was not percutaneous window for intervention. Interventional pulmonology was then consulted and a lymph node biopsy was scheduled, which was positive in the station #7 mediastinal lymph node for adenocarcinoma. The patient continued to experience pain and the palliative care team was consulted for further management. Her PO Dilaudid was switched to IV with good effect. Ms. 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 Patient presented to the ED with abdominal pain for the past two months associated with nausea/vomiting. She also noted a 20 pound weight loss. In the ED, patient received IVF and zofran for nausea management. A CT A/P was done that showed gastric wall thickening concerning for malignancy. The patient was admitted to the hospital and GI was consulted. An EGD with biopsy was done and it showed severe, diffuse mucosal changes in the gastric body that were highly suspicious of gastric malignancy. Oncology was consulted for further management and a CT chest was done for further staging. The chest CT scan showed several areas of lymphadenopathy, concerning for mets. Interventional radiology was consulted regarding a lymph node biopsy and it was determined there was not percutaneous window for intervention. Interventional pulmonology was then consulted and a lymph node biopsy was scheduled, which was positive in the station #7 mediastinal lymph node for adenocarcinoma. The patient continued to experience pain and the palliative care team was consulted for further management. Her PO Dilaudid was switched to IV with good effect.    Patient was Ms. 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 Patient presented to the ED with abdominal pain for the past two months associated with nausea/vomiting. She also noted a 20 pound weight loss. In the ED, patient received IVF and zofran for nausea management. A CT A/P was done that showed gastric wall thickening concerning for malignancy. The patient was admitted to the hospital and GI was consulted. An EGD with biopsy was done and it showed severe, diffuse mucosal changes in the gastric body that were highly suspicious of gastric malignancy. Oncology was consulted for further management and a CT chest was done for further staging. The chest CT scan showed several areas of lymphadenopathy, concerning for mets. Interventional radiology was consulted regarding a lymph node biopsy and it was determined there was not percutaneous window for intervention. Interventional pulmonology was then consulted and a lymph node biopsy was scheduled, which was positive in the station #7 mediastinal lymph node for adenocarcinoma. The patient continued to experience pain and the palliative care team was consulted for further management. Her PO Dilaudid was switched to IV with good effect.    Patient was evaluated by surgery and underwent j-tube placement. No surgical resection could be done because of the extent of disease in the stomach. The patient was seen by nutrition and started on tube feeds. SW helped in the application of emergency medicaid. Ms. 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 Patient presented to the ED with abdominal pain for the past two months associated with nausea/vomiting. She also noted a 20 pound weight loss. In the ED, patient received IVF and zofran for nausea management. A CT A/P was done that showed gastric wall thickening concerning for malignancy. The patient was admitted to the hospital and GI was consulted. An EGD with biopsy was done and it showed severe, diffuse mucosal changes in the gastric body that were highly suspicious of gastric malignancy. Oncology was consulted for further management and a CT chest was done for further staging. The chest CT scan showed several areas of lymphadenopathy, concerning for mets. Interventional radiology was consulted regarding a lymph node biopsy and it was determined there was not percutaneous window for intervention. Interventional pulmonology was then consulted and a lymph node biopsy was scheduled, which was positive in the station #7 mediastinal lymph node for adenocarcinoma. The patient continued to experience pain and the palliative care team was consulted for further management. Her PO Dilaudid was switched to IV with good effect.    Patient was evaluated by surgery and underwent j-tube placement. No surgical resection could be done because of the extent of disease in the stomach, Jejunostomy feeding tube was placed. The patient was seen by nutrition and started on tube feeds. SW helped in the application of emergency medicaid and patient was approved for outpatient treatment with [facility]. Patient spiked fever on 9/8/22, infectious workup showed Ms. 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 Patient presented to the ED with abdominal pain for the past two months associated with nausea/vomiting. She also noted a 20 pound weight loss.     ED Course:, patient received IVF and zofran for nausea management. A CT A/P was done that showed gastric wall thickening concerning for malignancy. EGD with biopsy showed severe, diffuse mucosal changes in the gastric body that were highly suspicious of gastric malignancy. Oncology was consulted for further management and a CT chest was done for further staging. The chest CT scan showed several areas of lymphadenopathy, concerning for mets. Interventional pulmonology consulted, Bx showed #7 mediastinal lymph node for adenocarcinoma.    Patient was evaluated by surgery; and underwent j-tube placement. No surgical resection could be done because of the extent of disease in the stomach, Jejunostomy feeding tube was placed. The patient was seen by nutrition and started on tube feeds. SW helped in the application of emergency medicaid and patient was approved for outpatient treatment with [facility]. Patient spiked fever on 9/8/22, subsequent workup showed concern for cholangitis as well as PE     The patient continued to experience pain and the palliative care team was consulted for further management. Her PO Dilaudid was switched to IV with good effect. Ms. 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 Patient presented to the ED with abdominal pain for the past two months associated with nausea/vomiting. She also noted a 20 pound weight loss.     ED Course:, patient received IVF and zofran for nausea management. A CT A/P was done that showed gastric wall thickening concerning for malignancy. EGD with biopsy showed severe, diffuse mucosal changes in the gastric body that were highly suspicious of gastric malignancy. Oncology was consulted for further management and a CT chest was done for further staging. The chest CT scan showed several areas of lymphadenopathy, concerning for mets. Interventional pulmonology consulted, Bx showed #7 mediastinal lymph node for adenocarcinoma.    Patient was evaluated by surgery; and underwent j-tube placement. No surgical resection could be done because of the extent of disease in the stomach, Jejunostomy feeding tube was placed. The patient was seen by nutrition and started on tube feeds; currently stable at 27cc/hr. The patient continued to experience pain and the palliative care team was consulted for further management. Patient's pain is managed with methadone and morphine solutions through her J tube. SW helped in the application of emergency medicaid and patient was approved for outpatient treatment with [email or web address] Patient spiked fever on 9/8/22, subsequent workup showed concern for cholangitis as well as PE. US was equivocal. Hepatology was consulted for progressively worseining transaminitis as well as elevated alkaline phosphatase. Ms. 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 Patient presented to the ED with abdominal pain for the past two months associated with nausea/vomiting. She also noted a 20 pound weight loss.     ED Course:, patient received IVF and zofran for nausea management. A CT A/P was done that showed gastric wall thickening concerning for malignancy. EGD with biopsy showed severe, diffuse mucosal changes in the gastric body that were highly suspicious of gastric malignancy. Oncology was consulted for further management and a CT chest was done for further staging. The chest CT scan showed several areas of lymphadenopathy, concerning for mets. Interventional pulmonology consulted, Bx showed #7 mediastinal lymph node for adenocarcinoma.    Patient was evaluated by surgery; and underwent j-tube placement. No surgical resection could be done because of the extent of disease in the stomach, Jejunostomy feeding tube was placed. The patient was seen by nutrition and started on tube feeds; currently stable at 27cc/hr. The patient continued to experience pain and the palliative care team was consulted for further management. Patient's pain is managed with methadone 5mg BID and morphine 15mg q2h PRN for severe pain all were solutions through her J tube. SW helped in the application of emergency medicaid and patient was approved for outpatient treatment with [facility]. Patient spiked fever on 9/8/22, subsequent workup showed concern for cholangitis as well as PE. Patient was started on Lovenox and  US was equivocal. Hepatology was consulted for progressively worsening transaminitis as well as elevated alkaline phosphatase. Ms. 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 Patient presented to the ED with abdominal pain for the past two months associated with nausea/vomiting. She also noted a 20 pound weight loss.     ED Course:, patient received IVF and zofran for nausea management. A CT A/P was done that showed gastric wall thickening concerning for malignancy. EGD with biopsy showed severe, diffuse mucosal changes in the gastric body that were highly suspicious of gastric malignancy. Oncology was consulted for further management and a CT chest was done for further staging. The chest CT scan showed several areas of lymphadenopathy, concerning for mets. Interventional pulmonology consulted, Bx showed #7 mediastinal lymph node for adenocarcinoma.    Patient was evaluated by surgery; and underwent j-tube placement. No surgical resection could be done because of the extent of disease in the stomach, Jejunostomy feeding tube was placed. The patient was seen by nutrition and started on tube feeds; currently stable at 27cc/hr. The patient continued to experience pain and the palliative care team was consulted for further management. Patient's pain is managed with methadone 5mg BID and morphine 15mg q2h PRN for severe pain all were solutions through her J tube. SW helped in the application of emergency medicaid and patient was approved for outpatient treatment with [facility]. Patient spiked fever on 9/8/22, subsequent workup showed concern for cholangitis as well as PE. Patient was started on Lovenox and started on zosyn. Hepatology was consulted for progressively worsening transaminitis as well as elevated alkaline phosphatase. Ms. 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 Patient presented to the ED with abdominal pain for the past two months associated with nausea/vomiting. She also noted a 20 pound weight loss.     ED Course:, patient received IVF and zofran for nausea management. A CT A/P was done that showed gastric wall thickening concerning for malignancy. EGD with biopsy showed severe, diffuse mucosal changes in the gastric body that were highly suspicious of gastric malignancy. Oncology was consulted for further management and a CT chest was done for further staging. The chest CT scan showed several areas of lymphadenopathy, concerning for mets. Interventional pulmonology consulted, Bx showed #7 mediastinal lymph node for adenocarcinoma.    Patient was evaluated by surgery; and underwent j-tube placement. No surgical resection could be done because of the extent of disease in the stomach, Jejunostomy feeding tube was placed. The patient was seen by nutrition and started on tube feeds; currently stable at 27cc/hr. The patient continued to experience pain and the palliative care team was consulted for further management. Patient's pain is managed with methadone 5mg BID and morphine 15mg q2h PRN for severe pain all were solutions through her J tube. SW helped in the application of emergency medicaid and patient was approved for outpatient treatment with [facility]. Patient spiked fever on 9/8/22, subsequent workup showed concern for cholangitis (patient only had 1 day of fever, normal TBili, anicteric),PE, and L sided hydronephrosis. Patient was started on Lovenox and started on prophylactic zosyn.    Patient improved clinically, however hepatology was consulted for progressively worsening transaminitis as well as elevated alkaline phosphatase. Patient underwent MRCP which showed _________    Patient considered medically stable for discharge. Ms. Cali Gordillo is a 58 Y/O F from 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 Patient presented to the ED with abdominal pain for the past two months associated with nausea/vomiting. She also noted a 20 pound weight loss.     ED Course:, patient received IVF and zofran for nausea management. A CT A/P was done that showed gastric wall thickening concerning for malignancy. EGD with biopsy showed severe, diffuse mucosal changes in the gastric body that were highly suspicious of gastric malignancy. Oncology was consulted for further management and a CT chest was done for further staging. The chest CT scan showed several areas of lymphadenopathy, concerning for mets. Interventional pulmonology consulted, Bx showed #7 mediastinal lymph node for adenocarcinoma.    Patient was evaluated by surgery; and underwent j-tube placement. No surgical resection could be done because of the extent of disease in the stomach, Jejunostomy feeding tube was placed. The patient was seen by nutrition and started on tube feeds; currently stable at 27cc/hr. The patient continued to experience pain and the palliative care team was consulted for further management. Patient's pain is managed with methadone 5mg BID and morphine 15mg q2h PRN for severe pain all were solutions through her J tube. SW helped in the application of emergency medicaid and patient was approved for outpatient treatment with [facility]. Patient spiked fever on 9/8/22, subsequent workup showed concern for cholangitis (patient only had 1 day of fever, normal TBili, anicteric),PE, and L sided hydronephrosis. Patient was started on Lovenox and started on prophylactic zosyn.    Patient improved clinically, however hepatology was consulted for progressively worsening transaminitis as well as elevated alkaline phosphatase. Patient underwent MRCP which showed possible stricture near the CBD and ERCP, but the team was unable to bypass disease within the duodenal bulb. Interventional radiology was consulted and indicated there was no need for percutaneous biliary drainage placement at the time.    Patient considered medically stable for discharge and further workup in the outpatient setting. Ms. 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 Patient presented to the ED with abdominal pain for the past two months associated with nausea/vomiting. She also noted a 20 pound weight loss.     ED Course:, patient received IVF and zofran for nausea management. A CT A/P was done that showed gastric wall thickening concerning for malignancy. EGD with biopsy showed severe, diffuse mucosal changes in the gastric body that were highly suspicious of gastric malignancy. Oncology was consulted for further management and a CT chest was done for further staging. The chest CT scan showed several areas of lymphadenopathy, concerning for mets. Interventional pulmonology consulted, Bx showed #7 mediastinal lymph node for adenocarcinoma.    Patient was evaluated by surgery; and underwent j-tube placement. No surgical resection could be done because of the extent of disease in the stomach, Jejunostomy feeding tube was placed. The patient was seen by nutrition and started on tube feeds. The patient continued to experience pain and the palliative care team was consulted for further management.     Patient with progressively worsening transaminitis as well as elevated alkaline phosphatase. 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 Interventional radiology was consulted and indicated there was no need for percutaneous biliary drainage placement at the time. Pt's consition continued to detiorate; she began to have hemoptysis/hematemesis so AC was held. At this point without therapeutic options focus shifted to comfort care.    Pt's family deciding if they are interested in inpatient hospice.

## 2022-08-16 NOTE — DISCHARGE NOTE PROVIDER - NSDCCPTREATMENT_GEN_ALL_CORE_FT
PRINCIPAL PROCEDURE  Procedure: EGD  Findings and Treatment: Procedure:           Upper GI endoscopy  Indications:         Abdominal pain, Abnormal CT of the GI tract,   Findings:       The examined esophagus was normal.       Severe diffuse mucosal changes were seen in the gastric body        characterized by congestion, erythema, friability (with spontaneous bleeding) and nodularity. Appearance concerning for malignant infiltration. This infiltrative process resulted in luminal narrowing in the distal gastric body; this could be traversed by the adult endoscopewithout resistance. Biopsies were taken with a cold forceps for histology. Retained pills and limited retained food seen in gastric body. The exam of the stomach was otherwise grossly normal. Diffuse moderately congested mucosa without active bleeding and with no stigmata of bleeding was found in the duodenal bulb.The second portion of the duodenum was normal.                                                                                   Impression:          - Normal esophagus.                       - Infiltrative changes in gastric body concerning for                        gastric malignancy. Biopsied.                       - Congested duodenal mucosa.                       - Normal second portion of the duodenum.  Recommendation:      - Return patient to hospital guidry for ongoing care.                       - Clear liquid diet as tolerated.                       - Await pathology results.                       - Obtain CT chest to complete staging and consult                        oncology for presumed gastric cancer.                       - May benefit from palliative care consultation for                        management of pain.                       - Ensure adequate bowel regimen.                       - Ultimately, may need to consider alternate means of                        nutrition (J-tube, TPN, etc).      SECONDARY PROCEDURE  Procedure: CT scan of abdomen and pelvis  Findings and Treatment:      PRINCIPAL PROCEDURE  Procedure: EGD  Findings and Treatment: Procedure:           Upper GI endoscopy  Indications:         Abdominal pain, Abnormal CT of the GI tract,   Findings:       The examined esophagus was normal.       Severe diffuse mucosal changes were seen in the gastric body        characterized by congestion, erythema, friability (with spontaneous bleeding) and nodularity. Appearance concerning for malignant infiltration. This infiltrative process resulted in luminal narrowing in the distal gastric body; this could be traversed by the adult endoscopewithout resistance. Biopsies were taken with a cold forceps for histology. Retained pills and limited retained food seen in gastric body. The exam of the stomach was otherwise grossly normal. Diffuse moderately congested mucosa without active bleeding and with no stigmata of bleeding was found in the duodenal bulb.The second portion of the duodenum was normal.                                                                                   Impression:          - Normal esophagus.                       - Infiltrative changes in gastric body concerning for                        gastric malignancy. Biopsied.                       - Congested duodenal mucosa.                       - Normal second portion of the duodenum.  Recommendation:      - Return patient to hospital guidry for ongoing care.                       - Clear liquid diet as tolerated.                       - Await pathology results.                       - Obtain CT chest to complete staging and consult                        oncology for presumed gastric cancer.                       - May benefit from palliative care consultation for                        management of pain.                       - Ensure adequate bowel regimen.                       - Ultimately, may need to consider alternate means of                        nutrition (J-tube, TPN, etc).      SECONDARY PROCEDURE  Procedure: CT scan of abdomen and pelvis  Findings and Treatment:   < end of copied text >  IMPRESSION:  1. Abdominal and incompletely imaged low thoracic adenopathy.  Query   gastric body wall thickening and gastrocolic ligament nodularity. Trace   ascites. Findings suspicious for malignancy, metastatic or   lymphoproliferative.  2. Nonobstructing nephrolithiasis.  --- End of Report ---  YAW HERNÁNDEZ MD; Attending Radiologist  This document has been electronically signed.  YAW HERNÁNDEZ MD; Attending Radiologist  This document has been electronically signed. Aug 14 2022  4:38PM< from: CT Abdomen and Pelvis w/ IV Cont (08.14.22 @ 15:37) >       PRINCIPAL PROCEDURE  Procedure: EGD  Findings and Treatment: Procedure:           Upper GI endoscopy  Indications:         Abdominal pain, Abnormal CT of the GI tract,   Findings:       The examined esophagus was normal.       Severe diffuse mucosal changes were seen in the gastric body        characterized by congestion, erythema, friability (with spontaneous bleeding) and nodularity. Appearance concerning for malignant infiltration. This infiltrative process resulted in luminal narrowing in the distal gastric body; this could be traversed by the adult endoscopewithout resistance. Biopsies were taken with a cold forceps for histology. Retained pills and limited retained food seen in gastric body. The exam of the stomach was otherwise grossly normal. Diffuse moderately congested mucosa without active bleeding and with no stigmata of bleeding was found in the duodenal bulb.The second portion of the duodenum was normal.                                                                                   Impression:          - Normal esophagus.                       - Infiltrative changes in gastric body concerning for                        gastric malignancy. Biopsied.                       - Congested duodenal mucosa.                       - Normal second portion of the duodenum.  Recommendation:      - Return patient to hospital guidry for ongoing care.                       - Clear liquid diet as tolerated.                       - Await pathology results.                       - Obtain CT chest to complete staging and consult                        oncology for presumed gastric cancer.                       - May benefit from palliative care consultation for                        management of pain.                       - Ensure adequate bowel regimen.                       - Ultimately, may need to consider alternate means of                        nutrition (J-tube, TPN, etc).      SECONDARY PROCEDURE  Procedure: CT scan of abdomen and pelvis  Findings and Treatment:   < end of copied text >  IMPRESSION:  1. Abdominal and incompletely imaged low thoracic adenopathy.  Query   gastric body wall thickening and gastrocolic ligament nodularity. Trace   ascites. Findings suspicious for malignancy, metastatic or   lymphoproliferative.  2. Nonobstructing nephrolithiasis.  --- End of Report ---  YAW HERNÁNDEZ MD; Attending Radiologist  This document has been electronically signed.  YAW HERNÁNDEZ MD; Attending Radiologist  This document has been electronically signed. Aug 14 2022  4:38PM< from: CT Abdomen and Pelvis w/ IV Cont (08.14.22 @ 15:37) >      Procedure: ERCP  Findings and Treatment:   < end of copied text >  indings:       The examined esophagus was normal.       A large, fungating, circumferential mass with oozing bleeding was found        in the gastric body and antrum. There was difficulty traversing with the        duodenoscope. The duodenoscope was exchanged for a standard endoscope.        The mass was able to be traversed.       The prepyloric region of the stomach was normal.       A moderate stenosis secondaryto infiltrative disease was found in the        distal duodenal bulb and was non-traversed. Given stenosis and large        gastric mass, the decision was made to abort the ERCP.            Impression:          - Normal esophagus.                       - Malignant gastric tumor in the gastric body.                       - Normal prepyloric region of the stomach.                       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                       - No specimens collected.  Recommendation:      - Return patient to hospital guidry for ongoing care.                       - Given J-tube for nutrition, no plans for luminal                        stenting at this time                       - Can consider IR evaluation for biliary compression.< from: Upper Endoscopy (09.16.22 @ 17:51) >      Procedure: MR MRCP  Findings and Treatment:   < end of copied text >  IMPRESSION:  Metastatic gastric cancer as previously visualized.  A smooth stricture of mid to distal CBD with findings suspicious for   cholangitis.  Abnormal signal and delayed enhancement in the left hepatic and caudate   lobes along the biliary radicles. This canbe infectious/inflammatory,   although infiltrative neoplasm like cholangiocarcinoma is also possible.< from: MR Abdomen w/wo IV Cont (09.14.22 @ 22:26) >      Procedure: Interventional radiology consult  Findings and Treatment: Recommendations:  - Case and imaging was reviewed. No intrahepatic biliary ductal dilatation on imaging with Tbili below 1.6 trending down. No indication for percutaneous biliary drainage  placement at this time.        PRINCIPAL PROCEDURE  Procedure: EGD  Findings and Treatment: Procedure:           Upper GI endoscopy  Indications:         Abdominal pain, Abnormal CT of the GI tract,   Findings:       The examined esophagus was normal.       Severe diffuse mucosal changes were seen in the gastric body        characterized by congestion, erythema, friability (with spontaneous bleeding) and nodularity. Appearance concerning for malignant infiltration. This infiltrative process resulted in luminal narrowing in the distal gastric body; this could be traversed by the adult endoscopewithout resistance. Biopsies were taken with a cold forceps for histology. Retained pills and limited retained food seen in gastric body. The exam of the stomach was otherwise grossly normal. Diffuse moderately congested mucosa without active bleeding and with no stigmata of bleeding was found in the duodenal bulb.The second portion of the duodenum was normal.                                                                                   Impression:          - Normal esophagus.                       - Infiltrative changes in gastric body concerning for                        gastric malignancy. Biopsied.                       - Congested duodenal mucosa.                       - Normal second portion of the duodenum.  Recommendation:      - Return patient to hospital guidry for ongoing care.                       - Clear liquid diet as tolerated.                       - Await pathology results.                       - Obtain CT chest to complete staging and consult                        oncology for presumed gastric cancer.                       - May benefit from palliative care consultation for                        management of pain.                       - Ensure adequate bowel regimen.                       - Ultimately, may need to consider alternate means of                        nutrition (J-tube, TPN, etc).      SECONDARY PROCEDURE  Procedure: CT scan of abdomen and pelvis  Findings and Treatment:   < end of copied text >  IMPRESSION:  1. Abdominal and incompletely imaged low thoracic adenopathy.  Query   gastric body wall thickening and gastrocolic ligament nodularity. Trace   ascites. Findings suspicious for malignancy, metastatic or   lymphoproliferative.  2. Nonobstructing nephrolithiasis.  --- End of Report ---  YAW HERNÁNDEZ MD; Attending Radiologist  This document has been electronically signed.  YAW HERNÁNDEZ MD; Attending Radiologist  This document has been electronically signed. Aug 14 2022  4:38PM< from: CT Abdomen and Pelvis w/ IV Cont (08.14.22 @ 15:37) >      Procedure: ERCP  Findings and Treatment:   < end of copied text >  indings:       The examined esophagus was normal.       A large, fungating, circumferential mass with oozing bleeding was found        in the gastric body and antrum. There was difficulty traversing with the        duodenoscope. The duodenoscope was exchanged for a standard endoscope.        The mass was able to be traversed.       The prepyloric region of the stomach was normal.       A moderate stenosis secondaryto infiltrative disease was found in the        distal duodenal bulb and was non-traversed. Given stenosis and large        gastric mass, the decision was made to abort the ERCP.            Impression:          - Normal esophagus.                       - Malignant gastric tumor in the gastric body.                       - Normal prepyloric region of the stomach.                       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                       - No specimens collected.  Recommendation:      - Return patient to hospital guidry for ongoing care.                       - Given J-tube for nutrition, no plans for luminal                        stenting at this time                       - Can consider IR evaluation for biliary compression.< from: Upper Endoscopy (09.16.22 @ 17:51) >      Procedure: MR MRCP  Findings and Treatment:   < end of copied text >  IMPRESSION:  Metastatic gastric cancer as previously visualized.  A smooth stricture of mid to distal CBD with findings suspicious for   cholangitis.  Abnormal signal and delayed enhancement in the left hepatic and caudate   lobes along the biliary radicles. This canbe infectious/inflammatory,   although infiltrative neoplasm like cholangiocarcinoma is also possible.< from: MR Abdomen w/wo IV Cont (09.14.22 @ 22:26) >      Procedure: Interventional radiology consult  Findings and Treatment: Recommendations:  - Case and imaging was reviewed. No intrahepatic biliary ductal dilatation on imaging with Tbili below 1.6 trending down. No indication for percutaneous biliary drainage  placement at this time.       Procedure: Transthoracic echo  Findings and Treatment: OBSERVATIONS:  Mitral Valve: Normal mitral valve.  Aortic Root: Normal aortic root.  Aortic Valve: Aortic valve not well visualized; probably  normal.  Left Atrium: Normal left atrium.  Left Ventricle: Endocardium not well visualized; grossly  normal left ventricular systolic function. Normal left  ventricular internal dimensions and wall thicknesses.  Right Heart: Normal right atrium. Normal right ventricular  size and function. Normal tricuspid valve. Normal pulmonic  valve.  Pericardium/PleuraNormal pericardium with no pericardial  effusion.  ------------------------------------------------------------------------  CONCLUSIONS:  1. Aortic valve not well visualized; probably normal.  2. Endocardium not well visualized; grossly normal left  ventricular systolic function.  3. Normal right ventricular size and function.       PRINCIPAL PROCEDURE  Procedure: EGD  Findings and Treatment: Procedure:           Upper GI endoscopy  Indications:         Abdominal pain, Abnormal CT of the GI tract,                                                                                    Impression:          - Normal esophagus.                       - Infiltrative changes in gastric body concerning for                        gastric malignancy. Biopsied.                       - Congested duodenal mucosa.                       - Normal second portion of the duodenum.        SECONDARY PROCEDURE  Procedure: CT scan of abdomen and pelvis  Findings and Treatment: IMPRESSION:  1. Abdominal and incompletely imaged low thoracic adenopathy.  Query   gastric body wall thickening and gastrocolic ligament nodularity. Trace   ascites. Findings suspicious for malignancy, metastatic or   lymphoproliferative.  2. Nonobstructing nephrolithiasis.      Procedure: ERCP  Findings and Treatment:   ERCP            Impression:          - Normal esophagus.                       - Malignant gastric tumor in the gastric body.                       - Normal prepyloric region of the stomach.                       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                       - No specimens collected.      Procedure: MR MRCP  Findings and Treatment: IMPRESSION:  Metastatic gastric cancer as previously visualized.  A smooth stricture of mid to distal CBD with findings suspicious for   cholangitis.  Abnormal signal and delayed enhancement in the left hepatic and caudate   lobes along the biliary radicles. This canbe infectious/inflammatory,   although infiltrative neoplasm like cholangiocarcinoma is also possible.< from: MR Abdomen w/wo IV Cont (09.14.22 @ 22:26) >      Procedure: Interventional radiology consult  Findings and Treatment: Recommendations:  - Case and imaging was reviewed. No intrahepatic biliary ductal dilatation on imaging with Tbili below 1.6 trending down. No indication for percutaneous biliary drainage  placement at this time.       Procedure: Transthoracic echo  Findings and Treatment: CONCLUSIONS:  1. Aortic valve not well visualized; probably normal.  2. Endocardium not well visualized; grossly normal left  ventricular systolic function.  3. Normal right ventricular size and function.

## 2022-08-16 NOTE — DISCHARGE NOTE PROVIDER - PROVIDER TOKENS
PROVIDER:[TOKEN:[[id number]],SCHEDULEDAPPT:[09/19/2022],SCHEDULEDAPPTTIME:[08:30 AM]],PROVIDER:[TOKEN:[[id number]],FOLLOWUP:[2 weeks]]

## 2022-08-16 NOTE — DIETITIAN INITIAL EVALUATION ADULT - ADD RECOMMEND
1) Advance diet to regular PO diet when medically feasible, and as tolerated by patient  2) Recommend Ensure Clear 3x daily (540 zachary and 24 gm protein) to promote optimal PO intake  3) Recommend multivitamin w/ minerals once daily for micronutrient and mineral provisions   4) Encourage PO intake and honor food preferences as able.  5) Continue to Monitor weights, labs, BM's, skin integrity, p.o. intake.  6) Reconsult RD as needed

## 2022-08-16 NOTE — DISCHARGE NOTE PROVIDER - NSDCFUSCHEDAPPT_GEN_ALL_CORE_FT
Sathya Kingsley  [facility]  [phone]   Scheduled Appointment: 08/26/2022     [facility]  [facility]an  Scheduled Appointment: 09/19/2022

## 2022-08-16 NOTE — DISCHARGE NOTE PROVIDER - NSDCMRMEDTOKEN_GEN_ALL_CORE_FT
morphine 10 mg/5 mL oral solution: 10 milliliter(s) orally every 4 hours     test script: please page [phone] with cost/coverage. Thanks! MDD:120mg or 60mL maximum daily dose  Narcan 4 mg/0.1 mL nasal spray: 4 milligram(s) intranasally once a day   tube feeds: Tube feeds:    TWOCAL Tube Feeds infused at rate of 20cc/hr for 24h/d via jejunostomy tube.     enoxaparin 100 mg/mL injectable solution: 100 milligram(s) subcutaneously once a day     test script, please page [phone] with price thx!   methadone 10 mg/5 mL oral solution: 2.5 milliliter(s) orally every 12 hours MDD:5mL per day  Test Script: Please page [phone] with price. Thanks!   metoclopramide 5 mg/5 mL oral syrup: 5 milliliter(s) orally every 4 hours    test script, please page [phone] with price thanks!   morphine 10 mg/5 mL oral solution: 7.5 milliliter(s) by jejunostomy tube every 2 hours  test script: please page [phone] with cost/coverage. Thanks!     MDD:180mg o 90mL maximum daily dose.  Narcan 4 mg/0.1 mL nasal spray: 4 milligram(s) intranasally once a day   tube feeds: Tube feeds:    TWOCAL Tube Feeds infused at rate of 20cc/hr for 24h/d via jejunostomy tube.     enoxaparin: 100 milligram(s) subcutaneous once a day   methadone 10 mg/5 mL oral solution: 2.5 milliliter(s) orally every 12 hours MDD:5mL per day  Test Script: Please page [phone] with price. Thanks!   metoclopramide 5 mg/5 mL oral syrup: 5 milliliter(s) orally every 4 hours    test script, please page [phone] with price thanks!   morphine 10 mg/5 mL oral solution: 7.5 milliliter(s) by jejunostomy tube every 2 hours  test script: please page [phone] with cost/coverage. Thanks!     MDD:180mg o 90mL maximum daily dose.  Narcan 4 mg/0.1 mL nasal spray: 4 milligram(s) intranasally once for opioid overdose.   polyethylene glycol 3350 oral powder for reconstitution: 17 gram(s) by jejunostomy tube once a day, As Needed -Constipation   tube feeds: Tube feeds:    TWOCAL Tube Feeds infused at rate of 20cc/hr for 24h/d via jejunostomy tube.     enoxaparin: 100 milligram(s) subcutaneous once a day   methadone 10 mg/5 mL oral solution: 2.5 milliliter(s) orally every 12 hours MDD:5mL per day  Test Script: Please page [phone] with price. Thanks!   metoclopramide 5 mg/5 mL oral syrup: 5 milliliter(s) orally every 4 hours    test script, please page [phone] with price thanks!   morphine 10 mg/5 mL oral solution: 7.5 milliliter(s) by jejunostomy tube every 2 hours  test script: please page [phone] with cost/coverage. Thanks!     MDD:180mg o 90mL maximum daily dose.  Narcan 4 mg/0.1 mL nasal spray: 4 milligram(s) intranasally once for opioid overdose.   polyethylene glycol 3350 oral powder for reconstitution: 17 gram(s) by jejunostomy tube once a day, As Needed -Constipation   polyethylene glycol 3350 oral powder for reconstitution: 17 gram(s) orally once a day, As needed, Constipation  tube feeds: Tube feeds:    TWOCAL Tube Feeds infused at rate of 20cc/hr for 24h/d via jejunostomy tube.

## 2022-08-16 NOTE — DIETITIAN INITIAL EVALUATION ADULT - NSICDXPASTMEDICALHX_GEN_ALL_CORE_FT
Problem: Communication  Goal: The ability to communicate needs accurately and effectively will improve  Outcome: PROGRESSING AS EXPECTED  Note:   Plan of care discussed, pt verbalizes understanding     Problem: Safety  Goal: Will remain free from injury  Outcome: PROGRESSING AS EXPECTED  Note:   Call light within reach, room near nursing station, hourly rounding      Problem: Pain Management  Goal: Pain level will decrease to patient's comfort goal  Outcome: PROGRESSING SLOWER THAN EXPECTED  Note:   Medicated per mar for spasm with flexeril, polar pad in place     Problem: Urinary Elimination:  Goal: Ability to reestablish a normal urinary elimination pattern will improve  Outcome: PROGRESSING SLOWER THAN EXPECTED  Note:   Pt has purewick in place for incontinence, adequate output      PAST MEDICAL HISTORY:  Nephrolithiasis

## 2022-08-16 NOTE — PROGRESS NOTE ADULT - ATTENDING COMMENTS
57F From [facility], Renal stones p/w suspicion for Gastric malignancy c/b DEVI, Nephrolithiasis.  - High clinical concern for GI malignancy; GI consult - s/p EGD on 8/15 - concern for gastric CA, Bx obtained - results pending; Pain control with dilaudid IV PRN - transition to PO formulation; CLD and advance as tolerated - however if patient has significant difficulty with PO intake, may need evaluation for J-tube for nutritional support; CT Chest pending for staging.  - Oncology consult - will need outpatient follow up.  - DEVI - resolved after IVF hydration; likely due to pre-renal due to poor PO intake.  - Nephrolithiasis noted but no obstructed or infected. Continue to monitor.  - Lovenox SC for VTE ppx.

## 2022-08-16 NOTE — PROGRESS NOTE ADULT - PROBLEM SELECTOR PLAN 4
Seen on CT imaging, nonobstructive. May be adding to the flank pain presentation, unlikely to be sole cause of abdominal tenderness. Pt denies [id number] history of nephrolithiasis, but gives a history about being evaluated for nephroliths. Was seen at [facility] for abdominal pain, according to her, and prescribed NSAIDs, possibly being diagnosed with nephrolithiasis there.  - As nonobstructive, monitor for now  - IV hydration  mL/hr for 10 hours  - Pain control w/ Dilaudid 1mg q6h PRN  -  (avoid NSAIDs for now)

## 2022-08-16 NOTE — DISCHARGE NOTE PROVIDER - DETAILS OF MALNUTRITION DIAGNOSIS/DIAGNOSES
This patient has been assessed with a concern for Malnutrition and was treated during this hospitalization for the following Nutrition diagnosis/diagnoses:     -  08/16/2022: Severe protein-calorie malnutrition

## 2022-08-16 NOTE — PROGRESS NOTE ADULT - SUBJECTIVE AND OBJECTIVE BOX
OVERNIGHT EVENTS: No acute overnight events.      SUBJECTIVE:       MEDICATIONS  (STANDING):  pantoprazole    Tablet 40 milliGRAM(s) Oral two times a day    MEDICATIONS  (PRN):  acetaminophen     Tablet .. 650 milliGRAM(s) Oral every 6 hours PRN Temp greater or equal to 38C (100.4F), Mild Pain (1 - 3)  HYDROmorphone  Injectable 1 milliGRAM(s) IV Push every 6 hours PRN Severe Pain (7 - 10)  ondansetron Injectable 4 milliGRAM(s) IV Push every 6 hours PRN Nausea and/or Vomiting        T(F): 98.3 (08-16-22 @ 05:37), Max: 99.1 (08-15-22 @ 23:46)  HR: 73 (08-16-22 @ 05:47) (63 - 73)  BP: 124/65 (08-16-22 @ 05:47) (104/56 - 135/84)  BP(mean): --  RR: 18 (08-16-22 @ 05:47) (16 - 18)  SpO2: 98% (08-16-22 @ 05:47) (98% - 100%)    PHYSICAL EXAM:     GENERAL: NAD, lying in bed comfortably  HEAD:  Atraumatic, Normocephalic  EYES: EOMI, PERRLA, conjunctiva and sclera clear, no nystagmus noted  ENT: Moist mucous membranes,   NECK: Supple, No JVD, trachea midline  CHEST/LUNG: Clear to auscultation bilaterally; No rales, rhonchi, wheezing, or rubs. Unlabored respirations  HEART: Regular rate and rhythm; No murmurs, rubs, or gallops, normal S1/S2  ABDOMEN: BS+, significant tenderness to palpation of midepigastrium and L flank. Guarding. Mild tenderness to palpation of R flank. No organomegaly. Abdomen distended  EXTREMITIES:  2+ Peripheral Pulses, brisk capillary refill. No clubbing, cyanosis, or edema  MSK: No gross deformities noted   Neurological:  A&Ox3, no focal deficits   SKIN: No rashes or lesions  PSYCH: Normal mood, affect       TELEMETRY:    LABS:                        9.5    6.08  )-----------( 283      ( 15 Aug 2022 07:20 )             31.7     08-15    144  |  117<H>  |  24<H>  ----------------------------<  105<H>  3.6   |  15<L>  |  0.93    Ca    7.8<L>      15 Aug 2022 07:20  Phos  3.8     08-15  Mg     1.60     08-15    TPro  5.8<L>  /  Alb  2.9<L>  /  TBili  0.2  /  DBili  x   /  AST  19  /  ALT  10  /  AlkPhos  62  08-15            Creatinine Trend: 0.93<--, 2.14<--, 3.13<--  I&O's Summary    BNP    RADIOLOGY & ADDITIONAL STUDIES:                 OVERNIGHT EVENTS: No acute overnight events.      SUBJECTIVE: Patient endorsing epigastric pain. States the dilaudid is effective for pain management. States that she has not had nausea because she has not had anything to eat. No other complaints at this time. Pt is passing gas and had 1 BM yesterday.  Denies SOB, chest pain, HA, rash, back pain, hematuria, hematochezia, paresthesia       MEDICATIONS  (STANDING):  pantoprazole    Tablet 40 milliGRAM(s) Oral two times a day    MEDICATIONS  (PRN):  acetaminophen     Tablet .. 650 milliGRAM(s) Oral every 6 hours PRN Temp greater or equal to 38C (100.4F), Mild Pain (1 - 3)  HYDROmorphone  Injectable 1 milliGRAM(s) IV Push every 6 hours PRN Severe Pain (7 - 10)  ondansetron Injectable 4 milliGRAM(s) IV Push every 6 hours PRN Nausea and/or Vomiting        T(F): 98.3 (08-16-22 @ 05:37), Max: 99.1 (08-15-22 @ 23:46)  HR: 73 (08-16-22 @ 05:47) (63 - 73)  BP: 124/65 (08-16-22 @ 05:47) (104/56 - 135/84)  BP(mean): --  RR: 18 (08-16-22 @ 05:47) (16 - 18)  SpO2: 98% (08-16-22 @ 05:47) (98% - 100%)    PHYSICAL EXAM:     GENERAL: NAD, lying in bed comfortably  HEAD:  Atraumatic, Normocephalic  EYES: EOMI, PERRLA, conjunctiva and sclera clear, no nystagmus noted  ENT: Moist mucous membranes,   NECK: Supple, No JVD, trachea midline  CHEST/LUNG: Clear to auscultation bilaterally; No rales, rhonchi, wheezing, or rubs. Unlabored respirations  HEART: Regular rate and rhythm; No murmurs, rubs, or gallops, normal S1/S2  ABDOMEN: BS+, significant tenderness to palpation of midepigastrium and L flank. Guarding. Mild tenderness to palpation of R flank. No organomegaly. Abdomen distended  EXTREMITIES:  2+ Peripheral Pulses, brisk capillary refill. No clubbing, cyanosis, or edema  MSK: No gross deformities noted   Neurological:  A&Ox3, no focal deficits   SKIN: No rashes or lesions  PSYCH: Normal mood, affect       TELEMETRY:    LABS:                        9.5    6.08  )-----------( 283      ( 15 Aug 2022 07:20 )             31.7     08-15    144  |  117<H>  |  24<H>  ----------------------------<  105<H>  3.6   |  15<L>  |  0.93    Ca    7.8<L>      15 Aug 2022 07:20  Phos  3.8     08-15  Mg     1.60     08-15    TPro  5.8<L>  /  Alb  2.9<L>  /  TBili  0.2  /  DBili  x   /  AST  19  /  ALT  10  /  AlkPhos  62  08-15            Creatinine Trend: 0.93<--, 2.14<--, 3.13<--  I&O's Summary    BNP    RADIOLOGY & ADDITIONAL STUDIES:

## 2022-08-16 NOTE — DISCHARGE NOTE PROVIDER - NSDCCPCAREPLAN_GEN_ALL_CORE_FT
PRINCIPAL DISCHARGE DIAGNOSIS  Diagnosis: Gastric adenocarcinoma  Assessment and Plan of Treatment: You came into the ED with abdominal pain and were found to have findings concerning for cancer on abdominal CT. An endoscopy/biopsy of the stomach showed gastric adenocarcinoma. A subsequent CT chest was done, which showed abnormal lymph node. A lymph node biopsy was then done, which also showed gastric cancer. We started you on tube feeds and managed your pain with the help of the palliative care team.   Please follow up outpatient with oncology.  Please return to the ED if you expereince chest pain, shortness of breath, vomiting, rash, or changes in vision.       PRINCIPAL DISCHARGE DIAGNOSIS  Diagnosis: Gastric adenocarcinoma  Assessment and Plan of Treatment: You came into the ED with abdominal pain and were found to have findings concerning for cancer on abdominal CT. An endoscopy/biopsy of the stomach showed gastric adenocarcinoma. A subsequent CT chest was done, which showed abnormal lymph node. A lymph node biopsy was then done, which also showed gastric cancer. We started you on tube feeds and managed your pain with the help of the palliative care team.   Please follow up outpatient with your oncologists at [facility] at your appointment on the 9/19/22  Please return to the ED if you expereince chest pain, shortness of breath, vomiting, rash, or changes in vision.       PRINCIPAL DISCHARGE DIAGNOSIS  Diagnosis: Gastric adenocarcinoma  Assessment and Plan of Treatment: You came into the ED with abdominal pain and were found to have findings concerning for cancer on abdominal CT. An endoscopy/biopsy of the stomach showed gastric adenocarcinoma. A subsequent CT chest was done, which showed abnormal lymph node. A lymph node biopsy was then done, which also showed gastric cancer. We started you on tube feeds and managed your pain with the help of the palliative care team.   Please follow up outpatient with your oncologists at [facility] at your appointment on the 9/19/22  Please return to the ED if you expereince chest pain, shortness of breath, vomiting, rash, or changes in vision.      SECONDARY DISCHARGE DIAGNOSES  Diagnosis: Cancer-related pain  Assessment and Plan of Treatment: Your course in the hospital was complicated by multiple bouts of pain that was likely related to your gastric cancer. Pain doctors were consulted to assist in management. You were started on a stable regiment with your palliative team.  Please follow up with your palliative care doctors in the outpatient setting for assistance with managing your pain medications.  Please return to the ED if you experience any sudden worsening of pain and are unable to control it at home.    Diagnosis: Transaminitis  Assessment and Plan of Treatment: During the course of your hospitalization, you were found to have some inflammation of the liver which we were concerned about. The liver team was consulted to assist and you underwent an MRI of your liver which showed ________.  Please follow up with your __________     PRINCIPAL DISCHARGE DIAGNOSIS  Diagnosis: Gastric adenocarcinoma  Assessment and Plan of Treatment: You came into the ED with abdominal pain and were found to have findings concerning for cancer on abdominal CT. An endoscopy/biopsy of the stomach showed gastric adenocarcinoma. A subsequent CT chest was done, which showed abnormal lymph node. A lymph node biopsy was then done, which also showed gastric cancer. We started you on tube feeds and managed your pain with the help of the palliative care team.   Please follow up outpatient with your oncologists at [facility] at your appointment on the 9/19/22  Please return to the ED if you expereince chest pain, shortness of breath, vomiting, rash, or changes in vision.      SECONDARY DISCHARGE DIAGNOSES  Diagnosis: Transaminitis  Assessment and Plan of Treatment: During the course of your hospitalization, you were found to have some inflammation of the liver which we were concerned about. The liver team was consulted to assist and you underwent an MRI of your liver which showed a possible stricture within your Common bile duct. [facility] was consulted to visualize and possibly stent the obstruction, however, they were unable to access the area during their procedure. Interventional radiology was subsequently consulted to assess the need for placeing a stent. Based on their interpretation of the images, they believed that further intervention was unnecessary.  If you feel any signficant pain in your abdomen or notice your eyes start to become yellow or begin to experience fevers, please return to the Emergency Department for further evaluation.  Please follow up with your outpatinet oncology appointment so they may obtain labs to further evaluate the progression of your liver numbers and ascertain if you need to return to the hopsital.    Diagnosis: Cancer-related pain  Assessment and Plan of Treatment: Your course in the hospital was complicated by multiple bouts of pain that was likely related to your gastric cancer. Pain doctors were consulted to assist in management. You were started on a stable regiment with your palliative team.  Please follow up with your palliative care doctors in the outpatient setting for assistance with managing your pain medications.  Please return to the ED if you experience any sudden worsening of pain and are unable to control it at home.     PRINCIPAL DISCHARGE DIAGNOSIS  Diagnosis: Gastric adenocarcinoma  Assessment and Plan of Treatment: You came into the ED with abdominal pain and were found to have findings concerning for cancer on abdominal CT. An endoscopy/biopsy of the stomach showed gastric adenocarcinoma. A subsequent CT chest was done, which showed abnormal lymph node. A lymph node biopsy was then done, which also showed gastric cancer. We started you on tube feeds and managed your pain with the help of the palliative care team.   Please follow up outpatient with your oncologists at [facility] at your appointment on the 9/19/22  Please return to the ED if you expereince chest pain, shortness of breath, vomiting, rash, or changes in vision.      SECONDARY DISCHARGE DIAGNOSES  Diagnosis: Transaminitis  Assessment and Plan of Treatment: During the course of your hospitalization, you were found to have some inflammation of the liver which we were concerned about. The liver team was consulted to assist and you underwent an MRI of your liver which showed a possible stricture within your Common bile duct. [facility] was consulted to visualize and possibly stent the obstruction, however, they were unable to access the area during their procedure. Interventional radiology was subsequently consulted to assess the need for placeing a stent. Based on their interpretation of the images, they believed that further intervention was unnecessary.  If you feel any signficant pain in your abdomen or notice your eyes start to become yellow or begin to experience fevers, please return to the Emergency Department for further evaluation.  Please follow up with your outpatinet oncology appointment so they may obtain labs to further evaluate the progression of your liver numbers and ascertain if you need to return to the hopsital.    Diagnosis: Cancer-related pain  Assessment and Plan of Treatment: Your course in the hospital was complicated by multiple bouts of pain that was likely related to your gastric cancer. Pain doctors were consulted to assist in management. You were started on a stable regiment with your palliative team.  Please follow up with your palliative care doctors in the outpatient setting for assistance with managing your pain medications.  Please return to the ED if you experience any sudden worsening of pain and are unable to control it at home.    Diagnosis: Hydronephrosis, left  Assessment and Plan of Treatment: During the course of your admission, you were found to have fluid around your kidney. This is a condition known as hydronephrosis. You were seen by both Urology and Interventional radiology who determined there was no urgent need for intervention due to the lack of infectious symptoms.   Please return to the ED if you feel worsening left sided flank pain, have any sustained fevers or begin to have urinary discomfort or bloody urine.  Please follow up with Dr. Amauri Arredondo in the outpatient setting to follow up the resolution of your     PRINCIPAL DISCHARGE DIAGNOSIS  Diagnosis: Gastric adenocarcinoma  Assessment and Plan of Treatment: You came into the ED with abdominal pain and were found to have findings concerning for cancer on abdominal CT. An endoscopy/biopsy of the stomach showed gastric adenocarcinoma. A subsequent CT chest was done, which showed abnormal lymph node. A lymph node biopsy was then done, which also showed gastric cancer. We started you on tube feeds and managed your pain with the help of the palliative care team.   Please follow up outpatient with your oncologists at [facility] at your appointment on the 9/19/22  Please return to the ED if you expereince chest pain, shortness of breath, vomiting, rash, or changes in vision.      SECONDARY DISCHARGE DIAGNOSES  Diagnosis: Transaminitis  Assessment and Plan of Treatment: During the course of your hospitalization, you were found to have some inflammation of the liver which we were concerned about. The liver team was consulted to assist and you underwent an MRI of your liver which showed a possible stricture within your Common bile duct. [facility] was consulted to visualize and possibly stent the obstruction, however, they were unable to access the area during their procedure. Interventional radiology was subsequently consulted to assess the need for placeing a stent. Based on their interpretation of the images, they believed that further intervention was unnecessary.  If you feel any signficant pain in your abdomen or notice your eyes start to become yellow or begin to experience fevers, please return to the Emergency Department for further evaluation.  Please follow up with your outpatinet oncology appointment so they may obtain labs to further evaluate the progression of your liver numbers and ascertain if you need to return to the hopsital.    Diagnosis: Cancer-related pain  Assessment and Plan of Treatment: Your course in the hospital was complicated by multiple bouts of pain that was likely related to your gastric cancer. Pain doctors were consulted to assist in management. You were started on a stable regiment with your palliative team.  Please follow up with your palliative care doctors in the outpatient setting for assistance with managing your pain medications.  Please return to the ED if you experience any sudden worsening of pain and are unable to control it at home.    Diagnosis: Hydronephrosis, left  Assessment and Plan of Treatment: During the course of your admission, you were found to have fluid around your kidney. This is a condition known as hydronephrosis. You were seen by both Urology and Interventional radiology who determined there was no urgent need for intervention due to the lack of infectious symptoms.   Please return to the ED if you feel worsening left sided flank pain, have any sustained fevers or begin to have urinary discomfort or bloody urine.  Please follow up with Dr. Amauri Arredondo in the outpatient setting to follow up the resolution of your    Diagnosis: Hematemesis  Assessment and Plan of Treatment: Episodes of throwing up blood after the procedure (ERCP attempt). GI team continues to follow-up, your hemoglobin remained stable. We provided you with anti-emetics with some relief.     PRINCIPAL DISCHARGE DIAGNOSIS  Diagnosis: Gastric adenocarcinoma  Assessment and Plan of Treatment: You came into the ED with abdominal pain and were found to have findings concerning for cancer on abdominal CT. An endoscopy/biopsy of the stomach showed gastric adenocarcinoma. A subsequent CT chest was done, which showed abnormal lymph node. A lymph node biopsy was then done, which also showed gastric cancer. We started you on tube feeds and managed your pain with the help of the palliative care team.         SECONDARY DISCHARGE DIAGNOSES  Diagnosis: Transaminitis  Assessment and Plan of Treatment: During the course of your hospitalization, you were found to have some inflammation of the liver which we were concerned about. The liver team was consulted to assist and you underwent an MRI of your liver which showed a possible stricture within your Common bile duct. [facility] was consulted to visualize and possibly stent the obstruction, however, they were unable to access the area during their procedure. Interventional radiology was subsequently consulted to assess the need for placeing a stent. Based on their interpretation of the images, they believed that further intervention was not indicated.      Diagnosis: Cancer-related pain  Assessment and Plan of Treatment: Your course in the hospital was complicated by multiple bouts of pain that was likely related to your gastric cancer. Pain doctors were consulted to assist in management. You were started on a stable regiment with your palliative team.    Diagnosis: Hydronephrosis, left  Assessment and Plan of Treatment: During the course of your admission, you were found to have fluid around your kidney. This is a condition known as hydronephrosis. You were seen by both Urology and Interventional radiology who determined there was no urgent need for intervention due to the lack of infectious symptoms.       Diagnosis: Hematemesis  Assessment and Plan of Treatment: Episodes of throwing up blood after the procedure (ERCP attempt). GI team continues to follow-up, your hemoglobin remained stable. We provided you with anti-emetics with some relief.

## 2022-08-16 NOTE — DIETITIAN INITIAL EVALUATION ADULT - PERTINENT LABORATORY DATA
08-16 Na 144 mmol/L Glu 89 mg/dL K+ 3.6 mmol/L Cr 0.66 mg/dL BUN 14 mg/dL Phos 2.9 mg/dL    A1C with Estimated Average Glucose Result: 5.8 % (08-15-22 @ 07:20)

## 2022-08-16 NOTE — PROGRESS NOTE ADULT - PROBLEM SELECTOR PLAN 1
The patient's abdominal pain, persistent nausea/vomiting and weight loss in the s/o Ct findings indicating gastric body thickening are suspicious for malignancy. Patient has never felt abdominal pain similar to this level, family history is unknown for gastric carcinoma/rectal cancer.  Patient will likely need to be managed by Heme/Onc and Gastrology guidance on the presentation and clinical course.  - GI following  - As per GI recs, CLD now, NPO at midnight - EGD vs. colonoscopy or both tomorrow  - dilaudid 1 mg IV Q6 for pain   - pantoprazole IV 40 mg BID  - Quantiferon for TB r/o  - ACE levels for Sarcoidosis r/o The patient's abdominal pain, persistent nausea/vomiting and weight loss in the s/o Ct findings indicating gastric body thickening are suspicious for malignancy. Patient has never felt abdominal pain similar to this level, family history is unknown for gastric carcinoma/rectal cancer.  Patient will likely need to be managed by Heme/Onc and Gastrology guidance on the presentation and clinical course.  - GI following  - dilaudid 1 mg IV Q6 for pain   - pantoprazole IV 40 mg BID  - EGD:   - Infiltrative changes in gastric body concerning gastric malignancy. Biopsied, Congested duodenal mucosa  - onc consulted - recs - CA-199 pending, f/u recs  - clear liquid diet

## 2022-08-17 NOTE — PROGRESS NOTE ADULT - ATTENDING COMMENTS
57F From [facility], Renal stones p/w suspicion for Gastric malignancy c/b DEVI, Nephrolithiasis.  - High clinical concern for GI malignancy; GI consult - s/p EGD on 8/15 - concern for gastric CA, Bx obtained - results pending; Pain control with dilaudid IV PRN - transition to PO formulation; CLD and advance as tolerated - however if patient has significant difficulty with PO intake, may need evaluation for J-tube for nutritional support; Caloric count on going; CT Chest reviewed - concern for metastasis to the mediastinal LNs.  - Oncology consult - will need outpatient follow up.  - DEVI - resolved after IVF hydration; likely due to pre-renal due to poor PO intake.  - Nephrolithiasis noted but no obstructed or infected. Continue to monitor.  - Lovenox SC for VTE ppx.

## 2022-08-17 NOTE — PROGRESS NOTE ADULT - PROBLEM SELECTOR PLAN 4
Seen on CT imaging, nonobstructive. May be adding to the flank pain presentation, unlikely to be sole cause of abdominal tenderness. Pt denies [id number] history of nephrolithiasis, but gives a history about being evaluated for nephroliths. Was seen at [facility] for abdominal pain, according to her, and prescribed NSAIDs, possibly being diagnosed with nephrolithiasis there.  - As nonobstructive, monitor for now  - IV hydration  mL/hr for 10 hours  - Pain control w/ Dilaudid 1mg q6h PRN  -  (avoid NSAIDs for now) Cr upon admission 3.13 down to 2.14 on subsequent labs,  DEVI likely 2/2 volume depletion from poor PO intake w/ intractable nausea/vomiting.   - 8/17 CR 0.6 wnl  - Continue IV fluids  - Monitor BMP daily  - Avoid NSAIDs, nephrotoxins

## 2022-08-17 NOTE — CHART NOTE - NSCHARTNOTEFT_GEN_A_CORE
Pathology results from gastric biopsies preformed on EGD on 8/16/2022 resulted as poorly differentiated adenocarcinoma. Findings discussed with primary team. Would continue to discussion with oncolgoy regarding staging and discussion of potential treatment options.      Philip Henning, PGY-4  Gastroenterology/Hepatology Fellow  Available on [employer] Teams   [phone] (Long Range Pager)  [phone] (Short Range Pager [facility])    After 5pm, please contact the on-call GI fellow. [phone]

## 2022-08-17 NOTE — PROGRESS NOTE ADULT - PROBLEM SELECTOR PLAN 5
Anemia to 11.4, microcytic but barely at 79.8. May be combination of Iron deficiency anemia from poor PO intake and normocytic anemia from Anemia of Chronic Disease w/ this presentation c/f gastric malignancy.  - Total Iron 22, TIBC 165, transferrin 149  - Replete iron levels Seen on CT imaging, nonobstructive. May be adding to the flank pain presentation, unlikely to be sole cause of abdominal tenderness. Pt denies [id number] history of nephrolithiasis, but gives a history about being evaluated for nephroliths. Was seen at [facility] for abdominal pain, according to her, and prescribed NSAIDs, possibly being diagnosed with nephrolithiasis there.  - As nonobstructive, monitor for now  - IV hydration  mL/hr for 10 hours  - Pain control w/ Dilaudid 1mg q6h PRN  -  (avoid NSAIDs for now)

## 2022-08-17 NOTE — PROGRESS NOTE ADULT - PROBLEM SELECTOR PLAN 2
ondansetron 4mg Q6 IV PRN  now well-controlled - no emesis episodes in over 24 hours. ondansetron 4mg Q6 IV PRN  now well-controlled - no emesis episodes since yesterday AM

## 2022-08-17 NOTE — PROGRESS NOTE ADULT - PROBLEM SELECTOR PLAN 1
The patient's abdominal pain, persistent nausea/vomiting and weight loss in the s/o Ct findings indicating gastric body thickening are suspicious for malignancy. Patient has never felt abdominal pain similar to this level, family history is unknown for gastric carcinoma/rectal cancer.  Patient will likely need to be managed by Heme/Onc and Gastrology guidance on the presentation and clinical course.  - GI following  - dilaudid 1 mg IV Q6 for pain   - pantoprazole IV 40 mg BID  - EGD:   - Infiltrative changes in gastric body concerning gastric malignancy. Biopsied, Congested duodenal mucosa  - onc consulted - recs - CA-199 pending, f/u recs  - clear liquid diet The patient's abdominal pain, persistent nausea/vomiting and weight loss in the s/o Ct findings indicating gastric body thickening are suspicious for malignancy. Patient has never felt abdominal pain similar to this level, family history is unknown for gastric carcinoma/rectal cancer.  Patient will likely need to be managed by Heme/Onc and Gastrology guidance on the presentation and clinical course.  - GI following  - dilaudid 1 mg IV Q6 for pain   - pantoprazole IV 40 mg BID  - EGD:   - Infiltrative changes in gastric body concerning gastric malignancy. Biopsied, Congested duodenal mucosa  - CT scan for staging - pending  - onc consulted - recs - CA-199 pending, f/u recs pending   - clear liquid diet The patient's abdominal pain, persistent nausea/vomiting and weight loss in the s/o Ct findings indicating gastric body thickening are suspicious for malignancy. Patient has never felt abdominal pain similar to this level, family history is unknown for gastric carcinoma/rectal cancer.  Patient will likely need to be managed by Heme/Onc and Gastrology guidance on the presentation and clinical course.  - GI, onc following  - transition from IV to PO dilaudid 1 mg Q4 for severe pain,   - PO dilaudid 1 mg Q4 for breakthrough pain, if first dose is not effective after 1 hour   - pantoprazole IV 40 mg BID  - EGD:  Infiltrative changes in gastric body concerning gastric malignancy. Biopsied, Congested duodenal mucosa  - staging CT chest - Mediastinal/bilateral hilar/R internal mammary LAD; indeterminate, but metastatic disease is a diagnostic consideration.  - As per onc - CA-199 pending, f/u recs pending   - clear liquid diet -> advance diet

## 2022-08-17 NOTE — PROGRESS NOTE ADULT - ASSESSMENT
Ms. Gordillo is a 57 year oldx from [facility] w/ [facility] Nephrolithiasis who presents to the hospital for abdominal pain lasting about 1 month associated with nausea/vomiting and weight loss with CT notable for gastric body wall thickening and gastrocolic ligament nodularity + Trace ascites concerning for malignancy.GI called for further evaluation     #Gastric body wall thickening and gastrocolic ligament nodularity  #Ascites, trace  #Wt loss/Abd pain  Workup with CT notable for gastric body wall thickening and gastrocolic ligament nodularity with trace ascites with diffuse LAD in the chest. s/p EGD on 8/16 notable for infiltrative changes in gastric body concerning for gastric malignancy. Results c/f metastatic cacner although awaiting pathology results.  Hospital course c/b continued n/v/abdominal pain although suspect that this will be an ongoing issues based on endoscopic findings. May benefit from palliative care consultation for management of pain and nausea. Pending PO adequacy of PO intake suspect that  she may need to consider alternate means of nutrition (J-tube, TPN, etc).     Recommendations:  - Clear liquid diet for now, advance as tolerated   - Follow up gastric pathology results, appreciate oncology recs   -PO PPI BID   - Zofran PRN (replete K>4 Mg>2 and monitor QTc)  -Consider palliative care consult for management of pain/nasuea     All recommendations are tentative until note is attested by attending.     Philip Henning, PGY-4  Gastroenterology/Hepatology Fellow  Available on Microsoft Teams   [phone] (Long Range Pager)  [phone] (Short Range Pager [facility])    After 5pm, please contact the on-call GI fellow. [phone]         Ms. Gordillo is a 57 year oldx from [facility] w/ [facility] Nephrolithiasis who presents to the hospital for abdominal pain lasting about 1 month associated with nausea/vomiting and weight loss with CT notable for gastric body wall thickening and gastrocolic ligament nodularity + Trace ascites concerning for malignancy.GI called for further evaluation     #Gastric body wall thickening and gastrocolic ligament nodularity  #Ascites, trace  #Wt loss/Abd pain  Workup with CT notable for gastric body wall thickening and gastrocolic ligament nodularity with trace ascites with diffuse LAD in the chest. s/p EGD on 8/16 notable for infiltrative changes in gastric body concerning for gastric malignancy. Results c/f metastatic cacner although awaiting pathology results.  Hospital course c/b continued n/v/abdominal pain although suspect that this will be an ongoing issues based on endoscopic findings. May benefit from palliative care consultation for management of pain and nausea. Pending PO adequacy of PO intake suspect that  she may need to consider alternate means of nutrition (J-tube, TPN, etc).     Recommendations:  - Clear liquid diet for now, advance as tolerated   - Follow up gastric pathology results, appreciate oncology recs   -PO PPI BID   - Zofran PRN (replete K>4 Mg>2 and monitor QTc)  - Consider palliative care consult for management of pain/nasuea     All recommendations are tentative until note is attested by attending.     Philip Henning, PGY-4  Gastroenterology/Hepatology Fellow  Available on Microsoft Teams   [phone] (Long Range Pager)  [phone] (Short Range Pager [facility])    After 5pm, please contact the on-call GI fellow. [phone]

## 2022-08-17 NOTE — PROGRESS NOTE ADULT - SUBJECTIVE AND OBJECTIVE BOX
OVERNIGHT EVENTS: No acute overnight events.      SUBJECTIVE:       MEDICATIONS  (STANDING):  enoxaparin Injectable 40 milliGRAM(s) SubCutaneous every 24 hours  multivitamin 1 Tablet(s) Oral daily  pantoprazole    Tablet 40 milliGRAM(s) Oral two times a day  polyethylene glycol 3350 17 Gram(s) Oral daily    MEDICATIONS  (PRN):  acetaminophen     Tablet .. 650 milliGRAM(s) Oral every 6 hours PRN Temp greater or equal to 38C (100.4F), Mild Pain (1 - 3)  HYDROmorphone  Injectable 1 milliGRAM(s) IV Push every 6 hours PRN Severe Pain (7 - 10)  ondansetron Injectable 4 milliGRAM(s) IV Push every 6 hours PRN Nausea and/or Vomiting        T(F): 97.8 (08-17-22 @ 05:22), Max: 98.1 (08-16-22 @ 21:59)  HR: 84 (08-17-22 @ 05:22) (69 - 84)  BP: 132/80 (08-17-22 @ 05:22) (103/75 - 137/79)  BP(mean): --  RR: 17 (08-17-22 @ 05:22) (17 - 25)  SpO2: 100% (08-17-22 @ 05:22) (97% - 100%)    PHYSICAL EXAM:     GENERAL: NAD, lying in bed comfortably  HEAD:  Atraumatic, Normocephalic  EYES: EOMI, PERRLA, conjunctiva and sclera clear, no nystagmus noted  ENT: Moist mucous membranes,   NECK: Supple, No JVD, trachea midline  CHEST/LUNG: Clear to auscultation bilaterally; No rales, rhonchi, wheezing, or rubs. Unlabored respirations  HEART: Regular rate and rhythm; No murmurs, rubs, or gallops, normal S1/S2  ABDOMEN: BS+, significant tenderness to palpation of midepigastrium and L flank. Guarding. Mild tenderness to palpation of R flank. No organomegaly. Abdomen distended  EXTREMITIES:  2+ Peripheral Pulses, brisk capillary refill. No clubbing, cyanosis, or edema  MSK: No gross deformities noted   Neurological:  A&Ox3, no focal deficits   SKIN: No rashes or lesions  PSYCH: Normal mood, affect     TELEMETRY:    LABS:                        9.8    5.85  )-----------( 261      ( 16 Aug 2022 07:31 )             30.5     08-16    144  |  116<H>  |  14  ----------------------------<  89  3.6   |  18<L>  |  0.66    Ca    8.2<L>      16 Aug 2022 07:31  Phos  2.9     08-16  Mg     1.40     08-16    TPro  5.9<L>  /  Alb  2.9<L>  /  TBili  0.3  /  DBili  x   /  AST  19  /  ALT  10  /  AlkPhos  70  08-16        PT/INR - ( 16 Aug 2022 07:31 )   PT: 14.3 sec;   INR: 1.23 ratio             Creatinine Trend: 0.66<--, 0.93<--, 2.14<--, 3.13<--  I&O's Summary    BNP    RADIOLOGY & ADDITIONAL STUDIES:                 OVERNIGHT EVENTS: No acute overnight events.      SUBJECTIVE: Patient states she still has high intensity epigastric pain and L flank pain. Patient is concerned about next steps regarding her potential malignancy        MEDICATIONS  (STANDING):  enoxaparin Injectable 40 milliGRAM(s) SubCutaneous every 24 hours  multivitamin 1 Tablet(s) Oral daily  pantoprazole    Tablet 40 milliGRAM(s) Oral two times a day  polyethylene glycol 3350 17 Gram(s) Oral daily    MEDICATIONS  (PRN):  acetaminophen     Tablet .. 650 milliGRAM(s) Oral every 6 hours PRN Temp greater or equal to 38C (100.4F), Mild Pain (1 - 3)  HYDROmorphone  Injectable 1 milliGRAM(s) IV Push every 6 hours PRN Severe Pain (7 - 10)  ondansetron Injectable 4 milliGRAM(s) IV Push every 6 hours PRN Nausea and/or Vomiting        T(F): 97.8 (08-17-22 @ 05:22), Max: 98.1 (08-16-22 @ 21:59)  HR: 84 (08-17-22 @ 05:22) (69 - 84)  BP: 132/80 (08-17-22 @ 05:22) (103/75 - 137/79)  BP(mean): --  RR: 17 (08-17-22 @ 05:22) (17 - 25)  SpO2: 100% (08-17-22 @ 05:22) (97% - 100%)    PHYSICAL EXAM:     GENERAL: NAD, lying in bed comfortably  HEAD:  Atraumatic, Normocephalic  EYES: EOMI, PERRLA, conjunctiva and sclera clear, no nystagmus noted  ENT: Moist mucous membranes,   NECK: Supple, No JVD, trachea midline  CHEST/LUNG: Clear to auscultation bilaterally; No rales, rhonchi, wheezing, or rubs. Unlabored respirations  HEART: Regular rate and rhythm; No murmurs, rubs, or gallops, normal S1/S2  ABDOMEN: BS+, significant tenderness to palpation of midepigastrium and L flank. Guarding. Mild tenderness to palpation of R flank. No organomegaly. Abdomen distended  EXTREMITIES:  2+ Peripheral Pulses, brisk capillary refill. No clubbing, cyanosis, or edema  MSK: No gross deformities noted   Neurological:  A&Ox3, no focal deficits   SKIN: No rashes or lesions  PSYCH: Normal mood, affect     TELEMETRY:    LABS:                        9.8    5.85  )-----------( 261      ( 16 Aug 2022 07:31 )             30.5     08-16    144  |  116<H>  |  14  ----------------------------<  89  3.6   |  18<L>  |  0.66    Ca    8.2<L>      16 Aug 2022 07:31  Phos  2.9     08-16  Mg     1.40     08-16    TPro  5.9<L>  /  Alb  2.9<L>  /  TBili  0.3  /  DBili  x   /  AST  19  /  ALT  10  /  AlkPhos  70  08-16        PT/INR - ( 16 Aug 2022 07:31 )   PT: 14.3 sec;   INR: 1.23 ratio             Creatinine Trend: 0.66<--, 0.93<--, 2.14<--, 3.13<--  I&O's Summary    BNP    RADIOLOGY & ADDITIONAL STUDIES:                 OVERNIGHT EVENTS: No acute overnight events.      SUBJECTIVE: Patient states she still has high intensity epigastric pain and L flank pain. Patient is concerned about next steps regarding her potential malignancy management. Patient also endorsing nausea but last vomiting episode was yesterday morning. Patient not interested in advancing diet at this time due to nasuea.   Denies chest pain, SOB, fever, chills, HA, numbness, tingling, leg pain, rash      MEDICATIONS  (STANDING):  enoxaparin Injectable 40 milliGRAM(s) SubCutaneous every 24 hours  multivitamin 1 Tablet(s) Oral daily  pantoprazole    Tablet 40 milliGRAM(s) Oral two times a day  polyethylene glycol 3350 17 Gram(s) Oral daily    MEDICATIONS  (PRN):  acetaminophen     Tablet .. 650 milliGRAM(s) Oral every 6 hours PRN Temp greater or equal to 38C (100.4F), Mild Pain (1 - 3)  HYDROmorphone  Injectable 1 milliGRAM(s) IV Push every 6 hours PRN Severe Pain (7 - 10)  ondansetron Injectable 4 milliGRAM(s) IV Push every 6 hours PRN Nausea and/or Vomiting        T(F): 97.8 (08-17-22 @ 05:22), Max: 98.1 (08-16-22 @ 21:59)  HR: 84 (08-17-22 @ 05:22) (69 - 84)  BP: 132/80 (08-17-22 @ 05:22) (103/75 - 137/79)  BP(mean): --  RR: 17 (08-17-22 @ 05:22) (17 - 25)  SpO2: 100% (08-17-22 @ 05:22) (97% - 100%)    PHYSICAL EXAM:     GENERAL: NAD, lying in bed comfortably  HEAD:  Atraumatic, Normocephalic  EYES: EOMI, PERRLA, conjunctiva and sclera clear, no nystagmus noted  ENT: Moist mucous membranes,   NECK: Supple, No JVD, trachea midline  CHEST/LUNG: Clear to auscultation bilaterally; No rales, rhonchi, wheezing, or rubs. Unlabored respirations  HEART: Regular rate and rhythm; No murmurs, rubs, or gallops, normal S1/S2  ABDOMEN: BS+, significant tenderness to palpation of midepigastrium and L flank. Guarding. Mild tenderness to palpation of R flank. No organomegaly. Abdomen distended  EXTREMITIES:  2+ Peripheral Pulses, brisk capillary refill. No clubbing, cyanosis, or edema  MSK: No gross deformities noted   Neurological:  A&Ox3, no focal deficits   SKIN: No rashes or lesions  PSYCH: Normal mood, affect     TELEMETRY:    LABS:                        9.8    5.85  )-----------( 261      ( 16 Aug 2022 07:31 )             30.5     08-16    144  |  116<H>  |  14  ----------------------------<  89  3.6   |  18<L>  |  0.66    Ca    8.2<L>      16 Aug 2022 07:31  Phos  2.9     08-16  Mg     1.40     08-16    TPro  5.9<L>  /  Alb  2.9<L>  /  TBili  0.3  /  DBili  x   /  AST  19  /  ALT  10  /  AlkPhos  70  08-16        PT/INR - ( 16 Aug 2022 07:31 )   PT: 14.3 sec;   INR: 1.23 ratio             Creatinine Trend: 0.66<--, 0.93<--, 2.14<--, 3.13<--  I&O's Summary    BNP    RADIOLOGY & ADDITIONAL STUDIES:

## 2022-08-17 NOTE — PROGRESS NOTE ADULT - NUTRITIONAL ASSESSMENT
This patient has been assessed with a concern for Malnutrition and has been determined to have a diagnosis/diagnoses of Severe protein-calorie malnutrition.    This patient is being managed with:   Diet Clear Liquid-  Supplement Feeding Modality:  Oral  Ensure Clear Cans or Servings Per Day:  1       Frequency:  Three Times a day  Entered: Aug 16 2022  3:49PM

## 2022-08-17 NOTE — PROGRESS NOTE ADULT - ASSESSMENT
57 year old female from [facility] with PMH of nephrolithiasis p/w high clinical suspicion for Gastric malignancy c/b DEVI, Nephrolithiasis. 57 year old female from [facility] with PMH of nephrolithiasis p/w high clinical suspicion for Gastric malignancy c/b DEVI, Nephrolithiasis.

## 2022-08-17 NOTE — PROGRESS NOTE ADULT - PROBLEM SELECTOR PLAN 3
Cr upon admission 3.13 down to 2.14 on subsequent labs, has been getting IV hydration. DEVI likely 2/2 volume depletion from poor PO intake w/ intractable nausea/vomiting. Nephrolithiasis also found on imaging, as pt seems to have h/o nephrolith but denies having history. Nephrolith may be adding to flank pain, unlikely to be causing DEVI.  - Continue IV fluids  - Monitor BMP daily  - Avoid NSAIDs, nephrotoxins As per RD, ensure clear  advance diet as tolerated  may need evaluation for J-tube for nutritional support if unable to tolerate diet

## 2022-08-17 NOTE — PROGRESS NOTE ADULT - ATTENDING COMMENTS
S/p EGD yesterday with findings concerning for gastric cancer (initial suspicion based on CTAP from admission). Biopsies obtained, path pending. CT chest also obtained with lymphadenopathy concerning for metastatic disease. Will likely benefit from palliative care input for symptom management given ongoing pain, nausea. Oncology input appreciated. Will follow up path results, expedited review requested.

## 2022-08-17 NOTE — PROGRESS NOTE ADULT - SUBJECTIVE AND OBJECTIVE BOX
Results of CL trial. Goal -1.00-1.25 *30. Gastroenterology Progress Note    Interval Events:   EGD on 8/16 with infiltrative changes in gastric body concerning for gastric malignancy. Narrowing of the gastric body although able to be transversed with the scope. Pathology results still pending. Unable to tolerate a liquid diet with nausea and episode of NBNB emesis. Having pain in the midepigastric abdominal region.  Anxious about biopsy results although understands that findings possibly c/f malignancy     Allergies:  No Known Allergies      Hospital Medications:  acetaminophen     Tablet .. 650 milliGRAM(s) Oral every 6 hours PRN  enoxaparin Injectable 40 milliGRAM(s) SubCutaneous every 24 hours  HYDROmorphone   Tablet 1 milliGRAM(s) Oral every 4 hours PRN  HYDROmorphone  Injectable 1 milliGRAM(s) IV Push every 4 hours PRN  multivitamin 1 Tablet(s) Oral daily  ondansetron Injectable 4 milliGRAM(s) IV Push every 6 hours PRN  pantoprazole    Tablet 40 milliGRAM(s) Oral two times a day  polyethylene glycol 3350 17 Gram(s) Oral daily      ROS: 14 point ROS negative unless otherwise state in subjective    PHYSICAL EXAM:   Vital Signs:  Vital Signs Last 24 Hrs  T(C): 36.8 (17 Aug 2022 12:45), Max: 36.8 (17 Aug 2022 12:45)  T(F): 98.2 (17 Aug 2022 12:45), Max: 98.2 (17 Aug 2022 12:45)  HR: 78 (17 Aug 2022 12:45) (74 - 84)  BP: 141/77 (17 Aug 2022 12:45) (132/80 - 141/77)  BP(mean): --  RR: 18 (17 Aug 2022 12:45) (17 - 18)  SpO2: 100% (17 Aug 2022 12:45) (100% - 100%)    Parameters below as of 17 Aug 2022 12:45  Patient On (Oxygen Delivery Method): room air    GENERAL:  Mild distress  HEENT:  NCAT, no scleral icterus  CHEST: no resp distress  HEART:  RRR  ABDOMEN:  +TTP in the midepigastric abdomnial region. No r/g.   NEURO:  Alert and oriented x 3    LABS:                        10.0   5.62  )-----------( 249      ( 17 Aug 2022 07:05 )             31.8     Mean Cell Volume: 78.9 fL (08-17-22 @ 07:05)    08-17    144  |  114<H>  |  8   ----------------------------<  103<H>  3.7   |  18<L>  |  0.63    Ca    8.5      17 Aug 2022 07:05  Phos  2.8     08-17  Mg     1.60     08-17    TPro  5.9<L>  /  Alb  2.9<L>  /  TBili  0.3  /  DBili  x   /  AST  19  /  ALT  10  /  AlkPhos  70  08-16    LIVER FUNCTIONS - ( 16 Aug 2022 07:31 )  Alb: 2.9 g/dL / Pro: 5.9 g/dL / ALK PHOS: 70 U/L / ALT: 10 U/L / AST: 19 U/L / GGT: x           PT/INR - ( 16 Aug 2022 07:31 )   PT: 14.3 sec;   INR: 1.23 ratio      Imaging:  EGD 8/16/2022    Impression:          - Normal esophagus.                       - Infiltrative changes in gastric body concerning for                        gastric malignancy. Biopsied.                       - Congested duodenal mucosa.                       - Normal second portion of the duodenum.  Recommendation:      - Return patient to hospital guidry for ongoing care.                       - Clear liquid diet as tolerated.                       - Await pathology results.                       - Obtain CT chest to complete staging and consult                        oncology for presumed gastric cancer.                       - May benefit from palliative care consultation for                        management of pain.                       - Ensure adequate bowel regimen.                       - Ultimately, may need to consider alternate means of                        nutrition (J-tube, TPN, etc).

## 2022-08-18 NOTE — CONSULT NOTE ADULT - SUBJECTIVE AND OBJECTIVE BOX
General Surgery Consult  Consulting surgical team: D Surgery  Consulting attending: Teetee    HPI:  Ms. Cali Gordillo is a 56 Y/O F from [facility] w/ [facility] Nephrolithiasis who presents to the hospital for abdominal pain lasting about 2 months, associated with nausea/vomiting. The abdominal pain is described as midepigastric and left flank > r flank, 10/10 pain unable to describe consistency of pain. Pain is worsened by lying flat and is worse in general at night, is better during day. Flank pain is worsened with breathing. Nausea & vomiting always with eating food & drinking, denies any red color or blood she noticed. Pt has also lost 20 pounds in the last month, which she associates with inability to eat and feels fatigued in general because of it. Patient went to [facility] for abdominal pain recently, was prescribed pain medications and discharged, unable to remember name of pain meds or when she went to hospital. Is having fewer bowel movements because is eating less, says probably constipated. Denies headaches, dizziness, weakness, diarrhea, blood in stool or urine. Denies fevers, chills, night sweats, rashes.  Patient's family history difficult to assess for colorectal cancer or gastric etiologies, as she never knew father and mother passed away at age 23, had no medical conditions. Has three children, aged 40, 30 and 29. Lives with son in apartment. All children healthy w/o medical conditions.     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 The pt had a CT chest and abdomen which was suspicious for advanced disease with mediastinal, b/l hilar, and internal mammary lymphadenopathy. Currently the pt reports she is having trouble eating. She reports nausea and emesis after trying to eat. She is able to tolerate liquids. She reports all her symptoms have occurred in the past month.       PAST MEDICAL HISTORY:  Nephrolithiasis        PAST SURGICAL HISTORY:      MEDICATIONS:  acetaminophen     Tablet .. 650 milliGRAM(s) Oral every 6 hours PRN  enoxaparin Injectable 40 milliGRAM(s) SubCutaneous every 24 hours  HYDROmorphone   Tablet 2 milliGRAM(s) Oral every 4 hours PRN  melatonin 3 milliGRAM(s) Oral at bedtime PRN  multivitamin 1 Tablet(s) Oral daily  ondansetron Injectable 4 milliGRAM(s) IV Push every 6 hours PRN  pantoprazole    Tablet 40 milliGRAM(s) Oral two times a day  polyethylene glycol 3350 17 Gram(s) Oral daily      ALLERGIES:  No Known Allergies      VITALS & I/Os:  Vital Signs Last 24 Hrs  T(C): 36.8 (18 Aug 2022 13:37), Max: 36.8 (17 Aug 2022 22:50)  T(F): 98.3 (18 Aug 2022 13:37), Max: 98.3 (17 Aug 2022 22:50)  HR: 83 (18 Aug 2022 13:37) (83 - 88)  BP: 124/71 (18 Aug 2022 13:37) (124/71 - 132/93)  BP(mean): --  RR: 18 (18 Aug 2022 13:37) (17 - 19)  SpO2: 100% (18 Aug 2022 13:37) (94% - 100%)    Parameters below as of 18 Aug 2022 13:37  Patient On (Oxygen Delivery Method): room air        I&O's Summary      PHYSICAL EXAM:  General: No acute distress  Respiratory: Nonlabored  Cardiovascular: RRR  Abdominal: Soft, mildly distended, epigastric tenderness. No rebound or guarding. No organomegaly, no palpable mass.  Extremities: Warm    LABS:                        10.3   6.55  )-----------( 252      ( 18 Aug 2022 07:05 )             32.8     08-18    143  |  111<H>  |  6<L>  ----------------------------<  96  3.4<L>   |  21<L>  |  0.62    Ca    8.5      18 Aug 2022 07:05  Phos  2.9     08-18  Mg     1.40     08-18      Lactate:                  IMAGING:  < from: CT Chest w/ IV Cont (08.16.22 @ 22:23) >  IMPRESSION:    Mediastinal, bilateral hilar, and right internal mammary lymphadenopathy;   indeterminate, but metastatic disease is a diagnostic consideration.    Right middle lobe 0.3 cm nodule; indeterminate and recommend CT chest   follow-up in 3 months to assess stability.    < end of copied text >  < from: CT Abdomen and Pelvis w/ IV Cont (08.14.22 @ 15:37) >  IMPRESSION:    1. Abdominal and incompletely imaged low thoracic adenopathy.  Query   gastric body wall thickening and gastrocolic ligament nodularity. Trace   ascites. Findings suspicious for malignancy, metastatic or   lymphoproliferative.  2. Nonobstructing nephrolithiasis.    < end of copied text >                                                                                               Surgical Oncology Consult  Consulting surgical team: D Surgery  Consulting attending: Teetee    HPI:  Ms. Cali Gordillo is a 56 Y/O F from [facility] w/ [facility] Nephrolithiasis who presents to the hospital for abdominal pain lasting about 2 months, associated with nausea/vomiting. The abdominal pain is described as midepigastric and left flank > r flank, 10/10 pain unable to describe consistency of pain. Pain is worsened by lying flat and is worse in general at night, is better during day. Flank pain is worsened with breathing. Nausea & vomiting always with eating food & drinking, denies any red color or blood she noticed. Pt has also lost 20 pounds in the last month, which she associates with inability to eat and feels fatigued in general because of it. Patient went to [facility] for abdominal pain recently, was prescribed pain medications and discharged, unable to remember name of pain meds or when she went to hospital. Is having fewer bowel movements because is eating less, says probably constipated. Denies headaches, dizziness, weakness, diarrhea, blood in stool or urine. Denies fevers, chills, night sweats, rashes.  Patient's family history difficult to assess for colorectal cancer or gastric etiologies, as she never knew father and mother passed away at age 23, had no medical conditions. Has three children, aged 40, 30 and 29. Lives with son in apartment. All children healthy w/o medical conditions.     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 The pt had a CT chest and abdomen which was suspicious for advanced disease with mediastinal, b/l hilar, and internal mammary lymphadenopathy. Currently the pt reports she is having trouble eating. She reports nausea and emesis after trying to eat. She is able to tolerate liquids. She reports all her symptoms have occurred in the past month.       PAST MEDICAL HISTORY:  Nephrolithiasis        PAST SURGICAL HISTORY:      MEDICATIONS:  acetaminophen     Tablet .. 650 milliGRAM(s) Oral every 6 hours PRN  enoxaparin Injectable 40 milliGRAM(s) SubCutaneous every 24 hours  HYDROmorphone   Tablet 2 milliGRAM(s) Oral every 4 hours PRN  melatonin 3 milliGRAM(s) Oral at bedtime PRN  multivitamin 1 Tablet(s) Oral daily  ondansetron Injectable 4 milliGRAM(s) IV Push every 6 hours PRN  pantoprazole    Tablet 40 milliGRAM(s) Oral two times a day  polyethylene glycol 3350 17 Gram(s) Oral daily      ALLERGIES:  No Known Allergies      VITALS & I/Os:  Vital Signs Last 24 Hrs  T(C): 36.8 (18 Aug 2022 13:37), Max: 36.8 (17 Aug 2022 22:50)  T(F): 98.3 (18 Aug 2022 13:37), Max: 98.3 (17 Aug 2022 22:50)  HR: 83 (18 Aug 2022 13:37) (83 - 88)  BP: 124/71 (18 Aug 2022 13:37) (124/71 - 132/93)  BP(mean): --  RR: 18 (18 Aug 2022 13:37) (17 - 19)  SpO2: 100% (18 Aug 2022 13:37) (94% - 100%)    Parameters below as of 18 Aug 2022 13:37  Patient On (Oxygen Delivery Method): room air        I&O's Summary      PHYSICAL EXAM:  General: No acute distress  Respiratory: Nonlabored  Cardiovascular: RRR  Abdominal: Soft, mildly distended, epigastric tenderness. No rebound or guarding. No organomegaly, no palpable mass.  Extremities: Warm    LABS:                        10.3   6.55  )-----------( 252      ( 18 Aug 2022 07:05 )             32.8     08-18    143  |  111<H>  |  6<L>  ----------------------------<  96  3.4<L>   |  21<L>  |  0.62    Ca    8.5      18 Aug 2022 07:05  Phos  2.9     08-18  Mg     1.40     08-18      Lactate:                  IMAGING:  < from: CT Chest w/ IV Cont (08.16.22 @ 22:23) >  IMPRESSION:    Mediastinal, bilateral hilar, and right internal mammary lymphadenopathy;   indeterminate, but metastatic disease is a diagnostic consideration.    Right middle lobe 0.3 cm nodule; indeterminate and recommend CT chest   follow-up in 3 months to assess stability.    < end of copied text >  < from: CT Abdomen and Pelvis w/ IV Cont (08.14.22 @ 15:37) >  IMPRESSION:    1. Abdominal and incompletely imaged low thoracic adenopathy.  Query   gastric body wall thickening and gastrocolic ligament nodularity. Trace   ascites. Findings suspicious for malignancy, metastatic or   lymphoproliferative.  2. Nonobstructing nephrolithiasis.    < end of copied text >

## 2022-08-18 NOTE — CONSULT NOTE ADULT - SUBJECTIVE AND OBJECTIVE BOX
CHIEF COMPLAINT:Patient is a 57y old  Female who presents with a chief complaint of abdominal pain & nausea/vomiting (18 Aug 2022 15:20)      HPI:  57 y.o. female who presents to the hospital for abdominal pain lasting about 2 months, associated with nausea/vomiting. The abdominal pain is described as midepigastric and left flank > r flank, 10/10 pain unable to describe consistency of pain. Pain is worsened by lying flat and is worse in general at night, is better during day. Flank pain is worsened with breathing. Nausea & vomiting always with eating food & drinking, denies any red color or blood she noticed. Pt has also lost 20 pounds in the last month, which she associates with inability to eat and feels fatigued in general because of it. Patient went to [facility] for abdominal pain recently, was prescribed pain medications and discharged, unable to remember name of pain meds or when she went to hospital. Is having fewer bowel movements because is eating less, says probably constipated. Denies headaches, dizziness, weakness, diarrhea, blood in stool or urine. Denies fevers, chills, night sweats, rashes.    Patient's family history difficult to assess for colorectal cancer or gastric etiologies, as she never knew father and mother passed away at age 23, had no medical conditions. Has three children, aged 40, 30 and 29. Lives with son in apartment. All children healthy w/o medical conditions.       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   Recent Travel:  Country of Birth: [place]  Advance Directives:    Allergies    No Known Allergies    Intolerances        HOME MEDICATIONS:  Home Medications:      REVIEW OF SYSTEMS:  Constitutional: [ x] negative [ ] fevers [ ] chills [ ] weight loss [ ] weight gain  HEENT: [x ] negative [ ] dry eyes [ ] eye irritation [ ] postnasal drip [ ] nasal congestion  CV: [ x] negative  [ ] chest pain [ ] orthopnea [ ] palpitations [ ] murmur  Resp: [x ] negative [ ] cough [ ] shortness of breath [ ] dyspnea [ ] wheezing [ ] sputum [ ] hemoptysis  GI: [ ] negative [ x] nausea [x ] vomiting [ ] diarrhea [ ] constipation [ ] abd pain [ ] dysphagia   : [x ] negative [ ] dysuria [ ] nocturia [ ] hematuria [ ] increased urinary frequency  Musculoskeletal: [x ] negative [ ] back pain [ ] myalgias [ ] arthralgias [ ] fracture  Skin: [ x] negative [ ] rash [ ] itch  Neurological: [ x] negative [ ] headache [ ] dizziness [ ] syncope [ ] weakness [ ] numbness  Psychiatric: [x ] negative [ ] anxiety [ ] depression  Endocrine: [x ] negative [ ] diabetes [ ] thyroid problem  Hematologic/Lymphatic: [ ] negative [ ] anemia [ ] bleeding problem  Allergic/Immunologic: [ ] negative [ ] itchy eyes [ ] nasal discharge [ ] hives [ ] angioedema  [ ] All other systems negative  [ ] Unable to assess ROS because ________    OBJECTIVE:  ICU Vital Signs Last 24 Hrs  T(C): 36.8 (18 Aug 2022 13:37), Max: 36.8 (17 Aug 2022 22:50)  T(F): 98.3 (18 Aug 2022 13:37), Max: 98.3 (17 Aug 2022 22:50)  HR: 83 (18 Aug 2022 13:37) (83 - 88)  BP: 124/71 (18 Aug 2022 13:37) (124/71 - 132/93)  BP(mean): --  ABP: --  ABP(mean): --  RR: 18 (18 Aug 2022 13:37) (17 - 19)  SpO2: 100% (18 Aug 2022 13:37) (94% - 100%)    O2 Parameters below as of 18 Aug 2022 13:37  Patient On (Oxygen Delivery Method): room air              CAPILLARY BLOOD GLUCOSE          PHYSICAL EXAM:  GENERAL: NAD, well-groomed, well-developed  EYES: EOMI, PERRLA, conjunctiva and sclera clear  ENMT: No tonsillar erythema, exudates, or enlargement; Moist mucous membranes, Good dentition, No lesions  CHEST/LUNG: Clear to auscultation bilaterally; No rales, rhonchi, wheezing, or rubs  HEART: Regular rate and rhythm; No murmurs, rubs, or gallops  ABDOMEN: Soft, Nontender, Nondistended; Bowel sounds present  VASCULAR:  2+ Peripheral Pulses, No clubbing, cyanosis, or edema  LYMPH: No lymphadenopathy noted  SKIN: No rashes or lesions  NERVOUS SYSTEM:  Alert & Oriented X3, Good concentration; Motor Strength 5/5 B/L upper and lower extremities    HOSPITAL MEDICATIONS:  Standing Meds:  enoxaparin Injectable 40 milliGRAM(s) SubCutaneous every 24 hours  multivitamin 1 Tablet(s) Oral daily  pantoprazole    Tablet 40 milliGRAM(s) Oral two times a day  polyethylene glycol 3350 17 Gram(s) Oral daily      PRN Meds:  acetaminophen     Tablet .. 650 milliGRAM(s) Oral every 6 hours PRN  HYDROmorphone   Tablet 2 milliGRAM(s) Oral every 4 hours PRN  melatonin 3 milliGRAM(s) Oral at bedtime PRN  ondansetron Injectable 4 milliGRAM(s) IV Push every 6 hours PRN      LABS:    The Labs were reviewed by me   The Radiology was reviewed by me    EKG tracing reviewed by me    08-18    143  |  111<H>  |  6<L>  ----------------------------<  96  3.4<L>   |  21<L>  |  0.62  08-17    144  |  114<H>  |  8   ----------------------------<  103<H>  3.7   |  18<L>  |  0.63  08-16    144  |  116<H>  |  14  ----------------------------<  89  3.6   |  18<L>  |  0.66    Ca    8.5      18 Aug 2022 07:05  Ca    8.5      17 Aug 2022 07:05  Ca    8.2<L>      16 Aug 2022 07:31  Phos  2.9     08-18  Mg     1.40     08-18    TPro  5.9<L>  /  Alb  2.9<L>  /  TBili  0.3  /  DBili  x   /  AST  19  /  ALT  10  /  AlkPhos  70  08-16    Magnesium, Serum: 1.40 mg/dL (08-18-22 @ 07:05)  Magnesium, Serum: 1.60 mg/dL (08-17-22 @ 07:05)  Magnesium, Serum: 1.40 mg/dL (08-16-22 @ 07:31)    Phosphorus Level, Serum: 2.9 mg/dL (08-18-22 @ 07:05)  Phosphorus Level, Serum: 2.8 mg/dL (08-17-22 @ 07:05)  Phosphorus Level, Serum: 2.9 mg/dL (08-16-22 @ 07:31)                                              10.3   6.55  )-----------( 252      ( 18 Aug 2022 07:05 )             32.8                         10.0   5.62  )-----------( 249      ( 17 Aug 2022 07:05 )             31.8                         9.8    5.85  )-----------( 261      ( 16 Aug 2022 07:31 )             30.5     CAPILLARY BLOOD GLUCOSE            MICROBIOLOGY:       RADIOLOGY:  [ x] Reviewed and interpreted by me    < from: CT Chest w/ IV Cont (08.16.22 @ 22:23) >  FINDINGS:    LYMPH NODES: Mediastinal and bilateral hilar lymphadenopathy; reference   subcarinal 2.2 x 1.3 cm node (image 55, series 2) and right hilar 2.3 x   1.8 cm node (image 47, series 2). Right internal mammary 0.7 cm node   (image 60, series 2).    HEART/VASCULATURE: Heart size normal.No pericardial effusion. Thoracic   aorta normal caliber.    AIRWAYS/LUNGS/PLEURA: Central airways are patent. Right upper lobe apex   interlobular septal thickening. Bilateral lower lobe passive atelectasis.   Small bilateral pleural effusions, new compared to 8/14/2022 CT abdomen.    Right middle lobe 3 mm nodule (2:56).    UPPER ABDOMEN: Fluid within the lumen of the esophagus. Gastric wall   thickening and ascites.    BONES/SOFT TISSUES: Unremarkable.    IMPRESSION:    Mediastinal, bilateral hilar, and right internal mammary lymphadenopathy;   indeterminate, but metastatic disease is a diagnostic consideration.    Right middle lobe 0.3 cm nodule; indeterminate and recommend CT chest   follow-up in 3 months to assess stability.    < end of copied text >      PULMONARY FUNCTION TESTS:    EKG:

## 2022-08-18 NOTE — CONSULT NOTE ADULT - ASSESSMENT
57 y.o. 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 IP team consulted for EBUS for oncological staging.     Plan   - Patient has multiple enlarged mediastinal LAD concerning for metastatic spread as well as 0.3 cm lung nodule in the RML  - plan for Flexible Bronchoscopy with EBUS for LN Biopsy   - Tentatively scheduled for next Tuesday on 8/23  - On the day prior to the Bronch   - NPO after midnight   - check CMP w/Mg and Phos, CBC, PTT/PT/INR, Type and Screen  - Hold VTE PPX on the day off the procedure   - Needs a negative COVID-19 PCR test within 72 hours of procedure      Ace France MD   Pulmonary/Critical Care Fellow PGY-7  [facility] Pager #: [phone]  [facility] Pager #: [phone] 57 y.o. 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 IP team consulted for EBUS for oncological staging.     Plan   - Patient has multiple enlarged mediastinal LAD concerning for metastatic spread as well as 0.3 cm lung nodule in the RML  - Interventional Pulmonology consulted for biopsy of the enlarged mediastinal/hilar nodes  - plan for Flexible Bronchoscopy with EBUS for LN Biopsy   - Tentatively scheduled for next Tuesday on 8/23  - On the day prior to the Bronch   - NPO after midnight   - check CMP w/Mg and Phos, CBC, PTT/PT/INR, Type and Screen  - Hold VTE PPX on the day off the procedure   - Needs a negative COVID-19 PCR test within 72 hours of procedure      Ace France MD   Pulmonary/Critical Care Fellow PGY-7  [facility] Pager #: [phone]  [facility] Pager #: [phone]

## 2022-08-18 NOTE — CONSULT NOTE ADULT - SUBJECTIVE AND OBJECTIVE BOX
Oncology Consult Note    HPI:  Ms. Cali Gordillo is a 56 Y/O F from [facility] w/ [facility] Nephrolithiasis who presents to the hospital for abdominal pain lasting about 2 months, associated with nausea/vomiting. The abdominal pain is described as midepigastric and left flank > r flank, 10/10 pain unable to describe consistency of pain. Pain is worsened by lying flat and is worse in general at night, is better during day. Flank pain is worsened with breathing. Nausea & vomiting always with eating food & drinking, denies any red color or blood she noticed. Pt has also lost 20 pounds in the last month, which she associates with inability to eat and feels fatigued in general because of it. Patient went to [facility] for abdominal pain recently, was prescribed pain medications and discharged, unable to remember name of pain meds or when she went to hospital. Is having fewer bowel movements because is eating less, says probably constipated. Denies headaches, dizziness, weakness, diarrhea, blood in stool or urine. Denies fevers, chills, night sweats, rashes.    Patient's family history difficult to assess for colorectal cancer or gastric etiologies, as she never knew father and mother passed away at age 23, had no medical conditions. Has three children, aged 40, 30 and 29. Lives with son in apartment. All children healthy w/o medical conditions. (15 Aug 2022 01:52)    During her admission, patient underwent a EUS with biopsy revealing poorly differentiated adenocarcinoma. Oncology consulted for further management.     Allergies    No Known Allergies    Intolerances        MEDICATIONS  (STANDING):  enoxaparin Injectable 40 milliGRAM(s) SubCutaneous every 24 hours  multivitamin 1 Tablet(s) Oral daily  pantoprazole    Tablet 40 milliGRAM(s) Oral two times a day  polyethylene glycol 3350 17 Gram(s) Oral daily    MEDICATIONS  (PRN):  acetaminophen     Tablet .. 650 milliGRAM(s) Oral every 6 hours PRN Temp greater or equal to 38C (100.4F), Mild Pain (1 - 3)  HYDROmorphone   Tablet 1 milliGRAM(s) Oral every 4 hours PRN Severe Pain (7 - 10)  HYDROmorphone  Injectable 1 milliGRAM(s) IV Push every 4 hours PRN Severe Pain (7 - 10)  melatonin 3 milliGRAM(s) Oral at bedtime PRN Sleep  ondansetron Injectable 4 milliGRAM(s) IV Push every 6 hours PRN Nausea and/or Vomiting      PAST MEDICAL & SURGICAL HISTORY:  Nephrolithiasis      FAMILY HISTORY:  Mother passed away young from fire; does not know her father; no siblings     SOCIAL HISTORY: No EtOH, no tobacco      T(F): 97.9 (08-18-22 @ 05:03), Max: 98.3 (08-17-22 @ 22:50)  HR: 88 (08-18-22 @ 05:03)  BP: 124/78 (08-18-22 @ 05:03)  RR: 17 (08-18-22 @ 05:03)  SpO2: 94% (08-18-22 @ 05:03)  Wt(kg): --    GENERAL: NAD, well-developed  HEAD:  Atraumatic, Normocephalic  EYES: EOMI, PERRLA, conjunctiva and sclera clear  NECK: Supple, No JVD  CHEST/LUNG: Clear to auscultation bilaterally; No wheeze  HEART: Regular rate and rhythm; No murmurs, rubs, or gallops  ABDOMEN: Soft, tender to palpatioin   EXTREMITIES:  2+ Peripheral Pulses, No clubbing, cyanosis, or edema  NEUROLOGY: non-focal  SKIN: No rashes or lesions                          10.3   6.55  )-----------( 252      ( 18 Aug 2022 07:05 )             32.8       08-18    143  |  111<H>  |  6<L>  ----------------------------<  96  3.4<L>   |  21<L>  |  0.62    Ca    8.5      18 Aug 2022 07:05  Phos  2.9     08-18  Mg     1.40     08-18        Phosphorus Level, Serum: 2.9 mg/dL (08-18 @ 07:05)  Magnesium, Serum: 1.40 mg/dL (08-18 @ 07:05)

## 2022-08-18 NOTE — PROGRESS NOTE ADULT - SUBJECTIVE AND OBJECTIVE BOX
OVERNIGHT EVENTS: No acute overnight events.      SUBJECTIVE:       MEDICATIONS  (STANDING):  enoxaparin Injectable 40 milliGRAM(s) SubCutaneous every 24 hours  multivitamin 1 Tablet(s) Oral daily  pantoprazole    Tablet 40 milliGRAM(s) Oral two times a day  polyethylene glycol 3350 17 Gram(s) Oral daily    MEDICATIONS  (PRN):  acetaminophen     Tablet .. 650 milliGRAM(s) Oral every 6 hours PRN Temp greater or equal to 38C (100.4F), Mild Pain (1 - 3)  HYDROmorphone   Tablet 1 milliGRAM(s) Oral every 4 hours PRN Severe Pain (7 - 10)  HYDROmorphone  Injectable 1 milliGRAM(s) IV Push every 4 hours PRN Severe Pain (7 - 10)  melatonin 3 milliGRAM(s) Oral at bedtime PRN Sleep  ondansetron Injectable 4 milliGRAM(s) IV Push every 6 hours PRN Nausea and/or Vomiting        T(F): 97.9 (08-18-22 @ 05:03), Max: 98.3 (08-17-22 @ 22:50)  HR: 88 (08-18-22 @ 05:03) (78 - 88)  BP: 124/78 (08-18-22 @ 05:03) (124/78 - 141/77)  BP(mean): --  RR: 17 (08-18-22 @ 05:03) (17 - 19)  SpO2: 94% (08-18-22 @ 05:03) (94% - 100%)    PHYSICAL EXAM:     GENERAL: NAD, lying in bed comfortably  HEAD:  Atraumatic, Normocephalic  EYES: EOMI, PERRLA, conjunctiva and sclera clear, no nystagmus noted  ENT: Moist mucous membranes,   NECK: Supple, No JVD, trachea midline  CHEST/LUNG: Clear to auscultation bilaterally; No rales, rhonchi, wheezing, or rubs. Unlabored respirations  HEART: Regular rate and rhythm; No murmurs, rubs, or gallops, normal S1/S2  ABDOMEN: BS+, significant tenderness to palpation of midepigastrium and L flank. Guarding. Mild tenderness to palpation of R flank. No organomegaly. Abdomen distended  EXTREMITIES:  2+ Peripheral Pulses, brisk capillary refill. No clubbing, cyanosis, or edema  MSK: No gross deformities noted   Neurological:  A&Ox3, no focal deficits   SKIN: No rashes or lesions  PSYCH: Normal mood, affect     TELEMETRY:    LABS:                        10.0   5.62  )-----------( 249      ( 17 Aug 2022 07:05 )             31.8     08-17    144  |  114<H>  |  8   ----------------------------<  103<H>  3.7   |  18<L>  |  0.63    Ca    8.5      17 Aug 2022 07:05  Phos  2.8     08-17  Mg     1.60     08-17              Creatinine Trend: 0.63<--, 0.66<--, 0.93<--, 2.14<--, 3.13<--  I&O's Summary    BNP    RADIOLOGY & ADDITIONAL STUDIES:

## 2022-08-18 NOTE — CONSULT NOTE ADULT - ASSESSMENT
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   - GI EUS on 8/16 with biopsy of only gastric mucosa; no LN; Procedure note stating normal esophagus, diffuse infiltrative changes in the gastric body concerning for gastric malignancy.   - Pathology stating: Poorly differentiated adenocarcinoma, pending HER2 and MMR status. No IHC stains reported. Email sent to pathologist to clarify depth of invasion as well as confirm origin.   - Given the LAD of the chest and abdomen; suspect advanced disease. This needs to be confirmed for staging with Biopsy of thoracic LN. Please arrange for biopsy of LN.   - Please consult Surg onc  for evaluation of intervention in likely palliative vs curative resection given continuned pain and poor PO intake.   - Aggressive pain management that is sustainable as outpatient.        Jennifer Madsen MD  PGY5 heme onc fellow   [phone]; please contact on call fellow from 5pm to 8am and weekends.

## 2022-08-18 NOTE — CONSULT NOTE ADULT - SUBJECTIVE AND OBJECTIVE BOX
Vascular & Interventional Radiology    HPI: 57y Female with PMH Nephrolithiasis who presents to the hospital for abdominal pain lasting about 2 months, associated with nausea/vomiting. The abdominal pain is described as midepigastric and left flank > r flank, 10/10 pain unable to describe consistency of pain. Pain is worsened by lying flat and is worse in general at night, is better during day. Flank pain is worsened with breathing. Nausea & vomiting always with eating food & drinking, denies any red color or blood she noticed. Pt has also lost 20 pounds in the last month, which she associates with inability to eat and feels fatigued in general because of it. Patient went to [facility] for abdominal pain recently, was prescribed pain medications and discharged, unable to remember name of pain meds or when she went to hospital. Is having fewer bowel movements because is eating less, says probably constipated. During her admission, patient underwent a EUS with biopsy revealing poorly differentiated adenocarcinoma.     CT chest w/ Mediastinal, bilateral hilar, and right internal mammary lymphadenopathy.  CT a/p w/ RP lymphadenopathy.    Data:  T(C): 36.8  HR: 83  BP: 124/71  RR: 18  SpO2: 100%    -WBC 6.55 / HgB 10.3 / Hct 32.8 / Plt 252  -Na 143 / Cl 111 / BUN 6 / Glucose 96  -K 3.4 / CO2 21 / Cr 0.62  -INR1.23    Imaging: reviewed and as in HPI.    Assessment:   57y Female w/ gastric neoplasm and concern for metastatic disease. IR consulted for evaluation for thoracic lymphadenopathy biopsy.    Plan:   - No percutaneous window for thoracic LN biopsy. Would consider CTS and/or pulm consult for EBUS biopsy.  - If EBUS is not possible or unsuccessful, we could biopsy a retroperitoneal lymph node.  - Reconsult as needed.    --  Grey Mondragon MD, PGY-6  Vascular and Interventional Radiology  Available on [employer] Teams    - Nonemergent consults:  [facility] order "Consult- Interventional Radiology"  - Emergent issues (pager): [facility] [phone]; [facility] [phone]; [phone]  - Scheduling questions: [facility] [phone]; [facility] [phone]  - Clinic/outpatient booking: [facility] [phone]; [facility] [phone]

## 2022-08-18 NOTE — PROGRESS NOTE ADULT - PROBLEM SELECTOR PLAN 3
As per RD, ensure clear  advance diet as tolerated  may need evaluation for J-tube for nutritional support if unable to tolerate diet As per RD, ensure clear  advance diet as tolerated  72 hour caloric count to evaluate for malnutrition  may need evaluation for J-tube for nutritional support if unable to tolerate diet

## 2022-08-18 NOTE — PROGRESS NOTE ADULT - ATTENDING COMMENTS
57F From [facility], Renal stones p/w gastric adenocarcinoma w/ mets to LN c/b DEVI, Nephrolithiasis.  - Bx resulted from EGD on 8/15. Pain control with dilaudid PO PRN - increasing dosage for more optimal pain control.   - IR consult for mediastinal LN bx to confirm metastasis  - Surg Onc consult  - Patient wishes to try regular diet and monitor PO intakes; however if patient has significant difficulty with PO intake, may need evaluation for J-tube for nutritional support; Caloric count on going;   - Oncology consult appreciated  - DEVI - resolved after IVF hydration; likely due to pre-renal due to poor PO intake.  - Nephrolithiasis noted but no obstructed or infected. Continue to monitor.  - Lovenox SC for VTE ppx.

## 2022-08-18 NOTE — PROGRESS NOTE ADULT - PROBLEM SELECTOR PLAN 5
Seen on CT imaging, nonobstructive. May be adding to the flank pain presentation, unlikely to be sole cause of abdominal tenderness. Pt denies [id number] history of nephrolithiasis, but gives a history about being evaluated for nephroliths. Was seen at [facility] for abdominal pain, according to her, and prescribed NSAIDs, possibly being diagnosed with nephrolithiasis there.  - As nonobstructive, monitor for now  - IV hydration  mL/hr for 10 hours  - Pain control w/ Dilaudid 1mg q6h PRN  -  (avoid NSAIDs for now)

## 2022-08-18 NOTE — PROGRESS NOTE ADULT - PROBLEM SELECTOR PLAN 1
The patient's abdominal pain, persistent nausea/vomiting and weight loss in the s/o Ct findings indicating gastric body thickening are suspicious for malignancy. Patient has never felt abdominal pain similar to this level, family history is unknown for gastric carcinoma/rectal cancer.  Patient will likely need to be managed by Heme/Onc and Gastrology guidance on the presentation and clinical course.  - GI, onc following  - transition from IV to PO dilaudid 1 mg Q4 for severe pain,   - PO dilaudid 1 mg Q4 for breakthrough pain, if first dose is not effective after 1 hour   - pantoprazole IV 40 mg BID  - EGD:  Infiltrative changes in gastric body concerning gastric malignancy. Biopsied, Congested duodenal mucosa  - staging CT chest - Mediastinal/bilateral hilar/R internal mammary LAD; indeterminate, but metastatic disease is a diagnostic consideration.  - As per onc - CA-199 pending, f/u recs pending   - clear liquid diet -> advance diet

## 2022-08-18 NOTE — CONSULT NOTE ADULT - ASSESSMENT
Ms. Cali Gordillo is a 58 Y/O F from [facility] w/ [facility] Nephrolithiasis who presents to the hospital for abdominal pain lasting about 2 months, associated with nausea/vomiting found to have gastric cancer with possible metastatic disease. Surgical oncology consulted for possible resection vs palliative procedure.     -f/u full staging workup and pending staging workup will discuss resection vs palliative procedure  -as pt tolerating liquids would supplement diet with ensures to better optimize nutrition  -will follow    Discussed w/ Dr. Teetee AVALOS Surgery, [phone]

## 2022-08-18 NOTE — PROGRESS NOTE ADULT - ASSESSMENT
57 year old female from [facility] with PMH of nephrolithiasis p/w undifferentiated gastric adenocarcinoma c/b DEVI, Nephrolithiasis.

## 2022-08-19 NOTE — PROGRESS NOTE ADULT - NUTRITIONAL ASSESSMENT
This patient has been assessed with a concern for Malnutrition and has been determined to have a diagnosis/diagnoses of Severe protein-calorie malnutrition.    This patient is being managed with:   Diet Regular-  Supplement Feeding Modality:  Oral  Ensure Enlive Cans or Servings Per Day:  1       Frequency:  Daily  Entered: Aug 18 2022 10:07AM

## 2022-08-19 NOTE — PROGRESS NOTE ADULT - PROBLEM SELECTOR PLAN 6
Anemia to 11.4, microcytic but barely at 79.8. May be combination of Iron deficiency anemia from poor PO intake and normocytic anemia from Anemia of Chronic Disease w/ this presentation c/f gastric malignancy.  - Total Iron 22, TIBC 165, transferrin 149  - Replete iron levels Stable. Anemia to 11.4, microcytic but barely at 79.8. May be combination of Iron deficiency anemia from poor PO intake and normocytic anemia from Anemia of Chronic Disease w/ this presentation c/f gastric malignancy.  - Total Iron 22, TIBC 165, transferrin 149  - Replete iron levels

## 2022-08-19 NOTE — PROGRESS NOTE ADULT - PROBLEM SELECTOR PLAN 3
As per RD, ensure clear  advance diet as tolerated  72 hour caloric count to evaluate for malnutrition  may need evaluation for J-tube for nutritional support if unable to tolerate diet  surg/onc consult - will f/u after full staging workup done

## 2022-08-19 NOTE — PROGRESS NOTE ADULT - ATTENDING COMMENTS
Patient seen and examined at the bedside. Diagnosis and staging discussed. Discussed the proposed procedure and indications that include staging for newly diagnosed gastric malignancy. Risks and benefits discussed. Planned for OR tuesday.

## 2022-08-19 NOTE — PROGRESS NOTE ADULT - ASSESSMENT
57F From [facility], Renal stones p/w gastric adenocarcinoma w/ mets to LN c/b DEVI, Nephrolithiasis.   57F From [facility], Renal stones p/w gastric adenocarcinoma w/ mets to LN c/b DEVI, Nephrolithiasis.

## 2022-08-19 NOTE — PROGRESS NOTE ADULT - PROBLEM SELECTOR PLAN 1
The patient's abdominal pain, persistent nausea/vomiting and weight loss in the s/o Ct findings indicating gastric body thickening are suspicious for malignancy. Patient has never felt abdominal pain similar to this level, family history is unknown for gastric carcinoma/rectal cancer.  Patient will likely need to be managed by Heme/Onc and Gastrology guidance on the presentation and clinical course.  - GI, onc following  - PO 2 mg dilaudid q4 pain  - pantoprazole IV 40 mg BID  - EGD:  Infiltrative changes in gastric body concerning gastric malignancy. Biopsied, Congested duodenal mucosa  - staging CT chest - Mediastinal/bilateral hilar/R internal mammary LAD; indeterminate, but metastatic disease is a diagnostic consideration.  - CEA elevated    As per onc, email sent to pathology to clarify depth of invasion  IR consulted regarding lymph node biopsy - no tx at this time - recs - consult pulm  Pulm consulted - lymph node bx 8/23 The patient's abdominal pain, persistent nausea/vomiting and weight loss in the s/o Ct findings indicating gastric body thickening are suspicious for malignancy. Patient has never felt abdominal pain similar to this level, family history is unknown for gastric carcinoma/rectal cancer.  Patient will likely need to be managed by Heme/Onc and Gastrology guidance on the presentation and clinical course.  - GI, onc following  - dilaudid 2 mg PO q4 pain  - pantoprazole IV 40 mg BID  - EGD:  Infiltrative changes in gastric body concerning gastric malignancy. Biopsied, Congested duodenal mucosa  - staging CT chest - Mediastinal/bilateral hilar/R internal mammary LAD; indeterminate, but metastatic disease is a diagnostic consideration.  - CEA elevated    As per onc, email sent to pathology to clarify depth of invasion  IR consulted regarding lymph node biopsy - no tx at this time - recs - consult pulm  Pulm consulted - lymph node bx 8/23

## 2022-08-19 NOTE — PROGRESS NOTE ADULT - SUBJECTIVE AND OBJECTIVE BOX
Chief complaint: Dysphagia    Interval events: No acute events overnight. No change in clinical symptoms. Mediastinal adenopathy.    REVIEW OF SYSTEMS:  Constitutional: [ x] negative [ ] fevers [ ] chills [ ] weight loss [ ] weight gain  HEENT: [x ] negative [ ] dry eyes [ ] eye irritation [ ] postnasal drip [ ] nasal congestion  CV: [ x] negative  [ ] chest pain [ ] orthopnea [ ] palpitations [ ] murmur  Resp: [x ] negative [ ] cough [ ] shortness of breath [ ] dyspnea [ ] wheezing [ ] sputum [ ] hemoptysis  GI: [ ] negative [ x] nausea [x ] vomiting [ ] diarrhea [ ] constipation [ ] abd pain [ ] dysphagia   : [x ] negative [ ] dysuria [ ] nocturia [ ] hematuria [ ] increased urinary frequency  Musculoskeletal: [x ] negative [ ] back pain [ ] myalgias [ ] arthralgias [ ] fracture  Skin: [ x] negative [ ] rash [ ] itch  Neurological: [ x] negative [ ] headache [ ] dizziness [ ] syncope [ ] weakness [ ] numbness  Psychiatric: [x ] negative [ ] anxiety [ ] depression  Endocrine: [x ] negative [ ] diabetes [ ] thyroid problem  Hematologic/Lymphatic: [ ] negative [ ] anemia [ ] bleeding problem  Allergic/Immunologic: [ ] negative [ ] itchy eyes [ ] nasal discharge [ ] hives [ ] angioedema  [ ] All other systems negative  [ ] Unable to assess ROS because ________    OBJECTIVE:  ICU Vital Signs Last 24 Hrs  T(C): 36.8 (18 Aug 2022 13:37), Max: 36.8 (17 Aug 2022 22:50)  T(F): 98.3 (18 Aug 2022 13:37), Max: 98.3 (17 Aug 2022 22:50)  HR: 83 (18 Aug 2022 13:37) (83 - 88)  BP: 124/71 (18 Aug 2022 13:37) (124/71 - 132/93)  BP(mean): --  ABP: --  ABP(mean): --  RR: 18 (18 Aug 2022 13:37) (17 - 19)  SpO2: 100% (18 Aug 2022 13:37) (94% - 100%)    O2 Parameters below as of 18 Aug 2022 13:37  Patient On (Oxygen Delivery Method): room air              CAPILLARY BLOOD GLUCOSE          PHYSICAL EXAM:  GENERAL: NAD, well-groomed, well-developed  EYES: EOMI, PERRLA, conjunctiva and sclera clear  ENMT: No tonsillar erythema, exudates, or enlargement; Moist mucous membranes, Good dentition, No lesions  CHEST/LUNG: Clear to auscultation bilaterally; No rales, rhonchi, wheezing, or rubs  HEART: Regular rate and rhythm; No murmurs, rubs, or gallops  ABDOMEN: Soft, Nontender, Nondistended; Bowel sounds present  VASCULAR:  2+ Peripheral Pulses, No clubbing, cyanosis, or edema  LYMPH: No lymphadenopathy noted  SKIN: No rashes or lesions  NERVOUS SYSTEM:  Alert & Oriented X3, Good concentration; Motor Strength 5/5 B/L upper and lower extremities    HOSPITAL MEDICATIONS:  Standing Meds:  enoxaparin Injectable 40 milliGRAM(s) SubCutaneous every 24 hours  multivitamin 1 Tablet(s) Oral daily  pantoprazole    Tablet 40 milliGRAM(s) Oral two times a day  polyethylene glycol 3350 17 Gram(s) Oral daily      PRN Meds:  acetaminophen     Tablet .. 650 milliGRAM(s) Oral every 6 hours PRN  HYDROmorphone   Tablet 2 milliGRAM(s) Oral every 4 hours PRN  melatonin 3 milliGRAM(s) Oral at bedtime PRN  ondansetron Injectable 4 milliGRAM(s) IV Push every 6 hours PRN      LABS:    The Labs were reviewed by me   The Radiology was reviewed by me    EKG tracing reviewed by me    08-18    143  |  111<H>  |  6<L>  ----------------------------<  96  3.4<L>   |  21<L>  |  0.62  08-17    144  |  114<H>  |  8   ----------------------------<  103<H>  3.7   |  18<L>  |  0.63  08-16    144  |  116<H>  |  14  ----------------------------<  89  3.6   |  18<L>  |  0.66    Ca    8.5      18 Aug 2022 07:05  Ca    8.5      17 Aug 2022 07:05  Ca    8.2<L>      16 Aug 2022 07:31  Phos  2.9     08-18  Mg     1.40     08-18    TPro  5.9<L>  /  Alb  2.9<L>  /  TBili  0.3  /  DBili  x   /  AST  19  /  ALT  10  /  AlkPhos  70  08-16    Magnesium, Serum: 1.40 mg/dL (08-18-22 @ 07:05)  Magnesium, Serum: 1.60 mg/dL (08-17-22 @ 07:05)  Magnesium, Serum: 1.40 mg/dL (08-16-22 @ 07:31)    Phosphorus Level, Serum: 2.9 mg/dL (08-18-22 @ 07:05)  Phosphorus Level, Serum: 2.8 mg/dL (08-17-22 @ 07:05)  Phosphorus Level, Serum: 2.9 mg/dL (08-16-22 @ 07:31)                                              10.3   6.55  )-----------( 252      ( 18 Aug 2022 07:05 )             32.8                         10.0   5.62  )-----------( 249      ( 17 Aug 2022 07:05 )             31.8                         9.8    5.85  )-----------( 261      ( 16 Aug 2022 07:31 )             30.5     CAPILLARY BLOOD GLUCOSE            MICROBIOLOGY:       RADIOLOGY:  [ x] Reviewed and interpreted by me    < from: CT Chest w/ IV Cont (08.16.22 @ 22:23) >  FINDINGS:    LYMPH NODES: Mediastinal and bilateral hilar lymphadenopathy; reference   subcarinal 2.2 x 1.3 cm node (image 55, series 2) and right hilar 2.3 x   1.8 cm node (image 47, series 2). Right internal mammary 0.7 cm node   (image 60, series 2).    HEART/VASCULATURE: Heart size normal.No pericardial effusion. Thoracic   aorta normal caliber.    AIRWAYS/LUNGS/PLEURA: Central airways are patent. Right upper lobe apex   interlobular septal thickening. Bilateral lower lobe passive atelectasis.   Small bilateral pleural effusions, new compared to 8/14/2022 CT abdomen.    Right middle lobe 3 mm nodule (2:56).    UPPER ABDOMEN: Fluid within the lumen of the esophagus. Gastric wall   thickening and ascites.    BONES/SOFT TISSUES: Unremarkable.    IMPRESSION:    Mediastinal, bilateral hilar, and right internal mammary lymphadenopathy;   indeterminate, but metastatic disease is a diagnostic consideration.    Right middle lobe 0.3 cm nodule; indeterminate and recommend CT chest   follow-up in 3 months to assess stability.    < end of copied text >      PULMONARY FUNCTION TESTS:    EKG:

## 2022-08-19 NOTE — PROGRESS NOTE ADULT - PROBLEM SELECTOR PLAN 5
Seen on CT imaging, nonobstructive. May be adding to the flank pain presentation, unlikely to be sole cause of abdominal tenderness. Pt denies [id number] history of nephrolithiasis, but gives a history about being evaluated for nephroliths. Was seen at [facility] for abdominal pain, according to her, and prescribed NSAIDs, possibly being diagnosed with nephrolithiasis there.  - As nonobstructive, monitor for now  - IV hydration  mL/hr for 10 hours  - Pain control w/ Dilaudid 1mg q6h PRN  -  (avoid NSAIDs for now) Seen on CT imaging, nonobstructive. May be adding to the flank pain presentation, unlikely to be sole cause of abdominal tenderness. Pt denies [id number] history of nephrolithiasis, but gives a history about being evaluated for nephroliths. Was seen at [facility] for abdominal pain, according to her, and prescribed NSAIDs, possibly being diagnosed with nephrolithiasis there.  - s/p IV hydration  - No evidence for obstruction or infection. Continue to monitor.  - Pain control w/ Lidocaine patch on bilateral back/ flanks PRN  -  (avoid NSAIDs for now)

## 2022-08-19 NOTE — PROGRESS NOTE ADULT - ASSESSMENT
57 y.o. 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 IP team consulted for EBUS for oncological staging.     Plan   - Patient has multiple enlarged mediastinal LAD concerning for metastatic spread as well as 0.3 cm lung nodule in the RML  - Interventional Pulmonology consulted for biopsy of the enlarged mediastinal/hilar nodes  - plan for Flexible Bronchoscopy with EBUS for LN Biopsy   - Tentatively scheduled for next Tuesday on 8/23  - On the day prior to the Bronch   - NPO after midnight   - check CMP w/Mg and Phos, CBC, PTT/PT/INR, Type and Screen  - Hold VTE PPX on the day off the procedure   - Needs a negative COVID-19 PCR test within 72 hours of procedure

## 2022-08-19 NOTE — PROGRESS NOTE ADULT - ATTENDING COMMENTS
57F From [facility], Renal stones p/w gastric adenocarcinoma w/ mets to LN c/b DEVI, Nephrolithiasis.  - Bx resulted from EGD on 8/15. Pain control with dilaudid PO PRN - increasing dosage for more optimal pain control and added lidocaine patch.  - Interventional Pulm consult for mediastinal LN bx to confirm metastasis - tentatively planned for 8/23  - Surg Onc consult appreciated  - RadOn consult appreciated - to follow as outpt.  - Patient wishes to try regular diet and monitor PO intakes; however if patient has significant difficulty with PO intake, may need evaluation for J-tube for nutritional support; Caloric count on going;   - Oncology consult appreciated  - DEVI - resolved after IVF hydration; likely due to pre-renal due to poor PO intake.  - Nephrolithiasis noted but no obstructed or infected. Continue to monitor.  - Lovenox SC for VTE ppx.

## 2022-08-19 NOTE — PROGRESS NOTE ADULT - SUBJECTIVE AND OBJECTIVE BOX
OVERNIGHT EVENTS: No acute overnight events.      SUBJECTIVE:       MEDICATIONS  (STANDING):  enoxaparin Injectable 40 milliGRAM(s) SubCutaneous every 24 hours  multivitamin 1 Tablet(s) Oral daily  pantoprazole    Tablet 40 milliGRAM(s) Oral two times a day  polyethylene glycol 3350 17 Gram(s) Oral daily    MEDICATIONS  (PRN):  acetaminophen     Tablet .. 650 milliGRAM(s) Oral every 6 hours PRN Temp greater or equal to 38C (100.4F), Mild Pain (1 - 3)  HYDROmorphone   Tablet 2 milliGRAM(s) Oral every 4 hours PRN Severe Pain (7 - 10)  melatonin 3 milliGRAM(s) Oral at bedtime PRN Sleep  ondansetron Injectable 4 milliGRAM(s) IV Push every 6 hours PRN Nausea and/or Vomiting        T(F): 98.6 (08-19-22 @ 05:07), Max: 98.6 (08-19-22 @ 05:07)  HR: 84 (08-19-22 @ 05:07) (83 - 88)  BP: 115/67 (08-19-22 @ 05:07) (115/67 - 139/90)  BP(mean): --  RR: 17 (08-19-22 @ 05:07) (17 - 18)  SpO2: 96% (08-19-22 @ 05:07) (96% - 100%)  PHYSICAL EXAM:     GENERAL: NAD, lying in bed comfortably  HEAD:  Atraumatic, Normocephalic  EYES: EOMI, PERRLA, conjunctiva and sclera clear, no nystagmus noted  ENT: Moist mucous membranes,   NECK: Supple, No JVD, trachea midline  CHEST/LUNG: Clear to auscultation bilaterally; No rales, rhonchi, wheezing, or rubs. Unlabored respirations  HEART: Regular rate and rhythm; No murmurs, rubs, or gallops, normal S1/S2  ABDOMEN: BS+, significant tenderness to palpation of midepigastrium and L flank. Guarding. Mild tenderness to palpation of R flank. No organomegaly. Abdomen distended  EXTREMITIES:  2+ Peripheral Pulses, brisk capillary refill. No clubbing, cyanosis, or edema  MSK: No gross deformities noted   Neurological:  A&Ox3, no focal deficits   SKIN: No rashes or lesions  PSYCH: Normal mood, affect     TELEMETRY:    LABS:                        10.5   7.00  )-----------( 239      ( 19 Aug 2022 06:00 )             34.0     08-19    143  |  110<H>  |  8   ----------------------------<  95  4.0   |  21<L>  |  0.63    Ca    8.5      19 Aug 2022 06:00  Phos  3.3     08-19  Mg     1.90     08-19    TPro  6.3  /  Alb  3.2<L>  /  TBili  0.3  /  DBili  x   /  AST  21  /  ALT  10  /  AlkPhos  74  08-19            Creatinine Trend: 0.63<--, 0.62<--, 0.63<--, 0.66<--, 0.93<--, 2.14<--  I&O's Summary    BNP    RADIOLOGY & ADDITIONAL STUDIES:                 OVERNIGHT EVENTS: No acute overnight events.      SUBJECTIVE: Patient seen at bedside. Yesterday afternoon, she was advanced to full regular diet and tolerated small amounts well if she ate slowly. She noted that if she drinks too much liquid too fast, then she'll throw up the liquid because "her stomach is full". She slept better and her pain is more controlled with the increased Dilauded dose and lidocaine patch on L back. This morning she had a couple bites of eggs with some milk and juice. She asked for bilateral lidocaine patches    REVIEW OF SYSTEMS:  CONSTITUTIONAL: No weakness, fevers or chills  EYES: No blurry vision. No scleral icterus   RESPIRATORY: No cough, wheezing, hemoptysis; No shortness of breath  CARDIOVASCULAR: No chest pain or palpitations  GASTROINTESTINAL: See above  GENITOURINARY: No dysuria, frequency or hematuria  NEUROLOGICAL: No dizziness or LOC. No numbness  PSYCHIATRIC: Mild anxiety and worry concerning cancer  MUSCULOSKELETAL: No myalgias or muscle weakness   SKIN: No itching, rashes      MEDICATIONS  (STANDING):  enoxaparin Injectable 40 milliGRAM(s) SubCutaneous every 24 hours  multivitamin 1 Tablet(s) Oral daily  pantoprazole    Tablet 40 milliGRAM(s) Oral two times a day  polyethylene glycol 3350 17 Gram(s) Oral daily    MEDICATIONS  (PRN):  acetaminophen     Tablet .. 650 milliGRAM(s) Oral every 6 hours PRN Temp greater or equal to 38C (100.4F), Mild Pain (1 - 3)  HYDROmorphone   Tablet 2 milliGRAM(s) Oral every 4 hours PRN Severe Pain (7 - 10)  melatonin 3 milliGRAM(s) Oral at bedtime PRN Sleep  ondansetron Injectable 4 milliGRAM(s) IV Push every 6 hours PRN Nausea and/or Vomiting        T(F): 98.6 (08-19-22 @ 05:07), Max: 98.6 (08-19-22 @ 05:07)  HR: 84 (08-19-22 @ 05:07) (83 - 88)  BP: 115/67 (08-19-22 @ 05:07) (115/67 - 139/90)  BP(mean): --  RR: 17 (08-19-22 @ 05:07) (17 - 18)  SpO2: 96% (08-19-22 @ 05:07) (96% - 100%)  PHYSICAL EXAM:     GENERAL: NAD, lying in bed comfortably  HEAD:  Atraumatic, Normocephalic  EYES: EOMI, PERRLA, conjunctiva and sclera clear, no nystagmus noted  ENT: Moist mucous membranes,   NECK: Supple, No JVD, trachea midline  CHEST/LUNG: Clear to auscultation bilaterally; No rales, rhonchi, wheezing, or rubs. Unlabored respirations  HEART: Regular rate and rhythm; No murmurs, rubs, or gallops, normal S1/S2  ABDOMEN: BS+, significant tenderness to palpation of midepigastrium and L flank. Guarding. Mild tenderness to palpation of R flank. No organomegaly. Abdomen distended  EXTREMITIES:  2+ Peripheral Pulses, brisk capillary refill. No clubbing, cyanosis, or edema  MSK: No gross deformities noted   Neurological:  A&Ox3, no focal deficits   SKIN: No rashes or lesions  PSYCH: Normal mood, affect     TELEMETRY:    LABS:                        10.5   7.00  )-----------( 239      ( 19 Aug 2022 06:00 )             34.0     08-19    143  |  110<H>  |  8   ----------------------------<  95  4.0   |  21<L>  |  0.63    Ca    8.5      19 Aug 2022 06:00  Phos  3.3     08-19  Mg     1.90     08-19    TPro  6.3  /  Alb  3.2<L>  /  TBili  0.3  /  DBili  x   /  AST  21  /  ALT  10  /  AlkPhos  74  08-19            Creatinine Trend: 0.63<--, 0.62<--, 0.63<--, 0.66<--, 0.93<--, 2.14<--  I&O's Summary    BNP    RADIOLOGY & ADDITIONAL STUDIES:                 OVERNIGHT EVENTS: No acute overnight events.      SUBJECTIVE: Patient seen at bedside. Yesterday afternoon, she was advanced to full regular diet and tolerated small amounts well if she ate slowly. She noted that if she drinks too much liquid too fast, then she'll throw up the liquid because "her stomach is full". She slept better and her pain is more controlled with the increased Dilauded dose and lidocaine patch on L back. This morning she had a couple bites of eggs with some milk and juice. She asked for bilateral lidocaine patches. Patient continues to endorse epigastric pain and L flank pain.    Denies chest pain, SOB, wheezing, fever, chills, n/v, HA    REVIEW OF SYSTEMS:  CONSTITUTIONAL: No weakness, fevers or chills  EYES: No blurry vision. No scleral icterus   RESPIRATORY: No cough, wheezing, hemoptysis; No shortness of breath  CARDIOVASCULAR: No chest pain or palpitations  GASTROINTESTINAL: See above  GENITOURINARY: No dysuria, frequency or hematuria  NEUROLOGICAL: No dizziness or LOC. No numbness  PSYCHIATRIC: Mild anxiety and worry concerning cancer  MUSCULOSKELETAL: No myalgias or muscle weakness   SKIN: No itching, rashes      MEDICATIONS  (STANDING):  enoxaparin Injectable 40 milliGRAM(s) SubCutaneous every 24 hours  multivitamin 1 Tablet(s) Oral daily  pantoprazole    Tablet 40 milliGRAM(s) Oral two times a day  polyethylene glycol 3350 17 Gram(s) Oral daily    MEDICATIONS  (PRN):  acetaminophen     Tablet .. 650 milliGRAM(s) Oral every 6 hours PRN Temp greater or equal to 38C (100.4F), Mild Pain (1 - 3)  HYDROmorphone   Tablet 2 milliGRAM(s) Oral every 4 hours PRN Severe Pain (7 - 10)  melatonin 3 milliGRAM(s) Oral at bedtime PRN Sleep  ondansetron Injectable 4 milliGRAM(s) IV Push every 6 hours PRN Nausea and/or Vomiting        T(F): 98.6 (08-19-22 @ 05:07), Max: 98.6 (08-19-22 @ 05:07)  HR: 84 (08-19-22 @ 05:07) (83 - 88)  BP: 115/67 (08-19-22 @ 05:07) (115/67 - 139/90)  BP(mean): --  RR: 17 (08-19-22 @ 05:07) (17 - 18)  SpO2: 96% (08-19-22 @ 05:07) (96% - 100%)  PHYSICAL EXAM:     GENERAL: NAD, lying in bed comfortably  HEAD:  Atraumatic, Normocephalic  EYES: EOMI, PERRLA, conjunctiva and sclera clear, no nystagmus noted  ENT: Moist mucous membranes,   NECK: Supple, No JVD, trachea midline  CHEST/LUNG: Clear to auscultation bilaterally; No rales, rhonchi, wheezing, or rubs. Unlabored respirations  HEART: Regular rate and rhythm; No murmurs, rubs, or gallops, normal S1/S2  ABDOMEN: BS+, significant tenderness to palpation of midepigastrium and L flank. Guarding. Mild tenderness to palpation of R flank. No organomegaly. Abdomen distended  EXTREMITIES:  2+ Peripheral Pulses, brisk capillary refill. No clubbing, cyanosis, or edema  MSK: No gross deformities noted   Neurological:  A&Ox3, no focal deficits   SKIN: No rashes or lesions  PSYCH: Normal mood, affect     TELEMETRY:    LABS:                        10.5   7.00  )-----------( 239      ( 19 Aug 2022 06:00 )             34.0     08-19    143  |  110<H>  |  8   ----------------------------<  95  4.0   |  21<L>  |  0.63    Ca    8.5      19 Aug 2022 06:00  Phos  3.3     08-19  Mg     1.90     08-19    TPro  6.3  /  Alb  3.2<L>  /  TBili  0.3  /  DBili  x   /  AST  21  /  ALT  10  /  AlkPhos  74  08-19            Creatinine Trend: 0.63<--, 0.62<--, 0.63<--, 0.66<--, 0.93<--, 2.14<--  I&O's Summary    BNP    RADIOLOGY & ADDITIONAL STUDIES:

## 2022-08-19 NOTE — PROGRESS NOTE ADULT - PROBLEM SELECTOR PLAN 4
Cr upon admission 3.13 down to 2.14 on subsequent labs,  DEVI likely 2/2 volume depletion from poor PO intake w/ intractable nausea/vomiting.   - 8/17 CR 0.6 wnl  - Continue IV fluids  - Monitor BMP daily  - Avoid NSAIDs, nephrotoxins DEVI likely 2/2 volume depletion from poor PO intake w/ intractable nausea/vomiting. Cr upon admission 3.13, now WNL and stable at 0.6  - Monitor BMP daily  - Avoid NSAIDs, nephrotoxins

## 2022-08-20 NOTE — PROGRESS NOTE ADULT - PROBLEM SELECTOR PLAN 6
Stable. Anemia to 11.4, microcytic but barely at 79.8. May be combination of Iron deficiency anemia from poor PO intake and normocytic anemia from Anemia of Chronic Disease w/ this presentation c/f gastric malignancy.  - Total Iron 22, TIBC 165, transferrin 149  - Replete iron levels

## 2022-08-20 NOTE — PROGRESS NOTE ADULT - ATTENDING COMMENTS
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  - Bx resulted from EGD on 8/15. Pain control with dilaudid PO PRN - increasing dosage for more optimal pain control and added lidocaine patch.  - Interventional Pulm consult for mediastinal LN bx to confirm metastasis - tentatively planned for 8/23  - Surg Onc consult appreciated  - [facility] to follow as outpt.  - Patient wishes to try regular diet and monitor PO intakes; however if patient has significant difficulty with PO intake, may need evaluation for J-tube for nutritional support; Caloric count on going;   - DEVI - resolved after IVF hydration; likely due to pre-renal due to poor PO intake.  - Nephrolithiasis noted but no obstructed or infected. Continue to monitor.  - Lovenox SC for VTE ppx, hold day prior to biopsy    Plan for EBUS on Tues w Pulm

## 2022-08-20 NOTE — PROGRESS NOTE ADULT - ASSESSMENT
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

## 2022-08-20 NOTE — PROGRESS NOTE ADULT - PROBLEM SELECTOR PLAN 1
The patient's abdominal pain, persistent nausea/vomiting and weight loss in the s/o Ct findings indicating gastric body thickening are suspicious for malignancy. Patient has never felt abdominal pain similar to this level, family history is unknown for gastric carcinoma/rectal cancer.  Patient will likely need to be managed by Heme/Onc and Gastrology guidance on the presentation and clinical course.  - GI, onc following  - dilaudid 2 mg PO q4 pain  - pantoprazole IV 40 mg BID  - EGD:  Infiltrative changes in gastric body concerning gastric malignancy. Biopsied, Congested duodenal mucosa  - staging CT chest - Mediastinal/bilateral hilar/R internal mammary LAD; indeterminate, but metastatic disease is a diagnostic consideration.  - CEA elevated    As per onc, email sent to pathology to clarify depth of invasion  IR consulted regarding lymph node biopsy - no tx at this time - recs - consult pulm  Pulm consulted - lymph node bx 8/23 The patient's abdominal pain, persistent nausea/vomiting and weight loss in the s/o Ct findings indicating gastric body thickening are suspicious for malignancy. Patient has never felt abdominal pain similar to this level, family history is unknown for gastric carcinoma/rectal cancer.  Patient will likely need to be managed by Heme/Onc and Gastrology guidance on the presentation and clinical course.  - GI, onc following  - dilaudid 2 mg PO q4 pain  - pantoprazole IV 40 mg BID  - EGD:  Infiltrative changes in gastric body concerning gastric malignancy. Biopsied, Congested duodenal mucosa  - staging CT chest - Mediastinal/bilateral hilar/R internal mammary LAD; indeterminate, but metastatic disease is a diagnostic consideration. Lymph node biopsy on 8/23  - CEA elevated    As per onc, email sent to pathology to clarify depth of invasion  IR consulted regarding lymph node biopsy - no tx at this time - recs - consult pulm  Pulm consulted - lymph node bx 8/23

## 2022-08-20 NOTE — PROGRESS NOTE ADULT - SUBJECTIVE AND OBJECTIVE BOX
Patient is a 57y old  Female who presents with a chief complaint of abdominal pain & nausea/vomiting (19 Aug 2022 18:25)      SUBJECTIVE / OVERNIGHT EVENTS: No acute events overnight.     ADDITIONAL REVIEW OF SYSTEMS:  REVIEW OF SYSTEMS:  CONSTITUTIONAL: No weakness, fevers or chills  EYES/ENT: No visual changes;  No vertigo or throat pain   NECK: No pain or stiffness  RESPIRATORY: No cough, wheezing, hemoptysis; No shortness of breath  CARDIOVASCULAR: No chest pain or palpitations  GASTROINTESTINAL: No abdominal or epigastric pain. No nausea, vomiting, or hematemesis; No diarrhea or constipation. No melena or hematochezia.  GENITOURINARY: No dysuria, frequency or hematuria  NEUROLOGICAL: No numbness or weakness  SKIN: No itching, rashes      MEDICATIONS  (STANDING):  enoxaparin Injectable 40 milliGRAM(s) SubCutaneous every 24 hours  lidocaine   4% Patch 1 Patch Transdermal daily  multivitamin 1 Tablet(s) Oral daily  pantoprazole    Tablet 40 milliGRAM(s) Oral two times a day  polyethylene glycol 3350 17 Gram(s) Oral daily    MEDICATIONS  (PRN):  acetaminophen     Tablet .. 650 milliGRAM(s) Oral every 6 hours PRN Temp greater or equal to 38C (100.4F), Mild Pain (1 - 3)  HYDROmorphone   Tablet 2 milliGRAM(s) Oral every 4 hours PRN Severe Pain (7 - 10)  melatonin 3 milliGRAM(s) Oral at bedtime PRN Sleep  ondansetron Injectable 4 milliGRAM(s) IV Push every 6 hours PRN Nausea and/or Vomiting      CAPILLARY BLOOD GLUCOSE        I&O's Summary      PHYSICAL EXAM:  Vital Signs Last 24 Hrs  T(C): 36.6 (20 Aug 2022 05:07), Max: 36.7 (19 Aug 2022 12:49)  T(F): 97.9 (20 Aug 2022 05:07), Max: 98 (19 Aug 2022 12:49)  HR: 91 (20 Aug 2022 05:07) (90 - 94)  BP: 114/79 (20 Aug 2022 05:07) (114/79 - 135/84)  BP(mean): --  RR: 18 (20 Aug 2022 05:07) (16 - 18)  SpO2: 99% (20 Aug 2022 05:07) (97% - 99%)    Parameters below as of 20 Aug 2022 05:07  Patient On (Oxygen Delivery Method): room air        GENERAL: No acute distress, well-developed  HEAD:  Atraumatic, Normocephalic  EYES: EOMI, PERRLA, conjunctiva and sclera clear  NECK: Supple, no lymphadenopathy, no JVD  CHEST/LUNG: CTAB; No wheezes, rales, or rhonchi  HEART: Regular rate and rhythm; No murmurs, rubs, or gallops  ABDOMEN: Soft, non-tender, non-distended; normal bowel sounds, no organomegaly  EXTREMITIES:  2+ peripheral pulses b/l, No clubbing, cyanosis, or edema  NEUROLOGY: A&O x 3, no focal deficits  SKIN: No rashes or lesions    LABS:                        11.0   8.48  )-----------( 296      ( 20 Aug 2022 05:35 )             35.4     08-20    142  |  108<H>  |  12  ----------------------------<  106<H>  4.1   |  20<L>  |  0.62    Ca    9.0      20 Aug 2022 05:35  Phos  3.3     08-20  Mg     1.70     08-20    TPro  6.3  /  Alb  3.2<L>  /  TBili  0.3  /  DBili  x   /  AST  21  /  ALT  10  /  AlkPhos  74  08-19                RADIOLOGY & ADDITIONAL TESTS:  Results Reviewed:   Imaging Personally Reviewed:  Electrocardiogram Personally Reviewed:    COORDINATION OF CARE:  Care Discussed with Consultants/Other Providers [Y/N]:  Prior or Outpatient Records Reviewed [Y/N]:   Patient is a 57y old  Female who presents with a chief complaint of abdominal pain & nausea/vomiting (19 Aug 2022 18:25)      SUBJECTIVE / OVERNIGHT EVENTS: No acute events overnight. Patient had one episode of vomiting yesterday afternoon. Maintains mild epigastric pain.    ADDITIONAL REVIEW OF SYSTEMS:  REVIEW OF SYSTEMS:  CONSTITUTIONAL: No weakness, fevers or chills  EYES/ENT: No visual changes;  No vertigo or throat pain   NECK: No pain or stiffness  RESPIRATORY: No cough, wheezing, hemoptysis; No shortness of breath  CARDIOVASCULAR: No chest pain or palpitations  GASTROINTESTINAL: Nausea and mild epigastric pain  GENITOURINARY: No dysuria, frequency or hematuria  NEUROLOGICAL: No numbness or weakness  SKIN: No itching, rashes      MEDICATIONS  (STANDING):  enoxaparin Injectable 40 milliGRAM(s) SubCutaneous every 24 hours  lidocaine   4% Patch 1 Patch Transdermal daily  multivitamin 1 Tablet(s) Oral daily  pantoprazole    Tablet 40 milliGRAM(s) Oral two times a day  polyethylene glycol 3350 17 Gram(s) Oral daily    MEDICATIONS  (PRN):  acetaminophen     Tablet .. 650 milliGRAM(s) Oral every 6 hours PRN Temp greater or equal to 38C (100.4F), Mild Pain (1 - 3)  HYDROmorphone   Tablet 2 milliGRAM(s) Oral every 4 hours PRN Severe Pain (7 - 10)  melatonin 3 milliGRAM(s) Oral at bedtime PRN Sleep  ondansetron Injectable 4 milliGRAM(s) IV Push every 6 hours PRN Nausea and/or Vomiting      CAPILLARY BLOOD GLUCOSE        I&O's Summary      PHYSICAL EXAM:  Vital Signs Last 24 Hrs  T(C): 36.6 (20 Aug 2022 05:07), Max: 36.7 (19 Aug 2022 12:49)  T(F): 97.9 (20 Aug 2022 05:07), Max: 98 (19 Aug 2022 12:49)  HR: 91 (20 Aug 2022 05:07) (90 - 94)  BP: 114/79 (20 Aug 2022 05:07) (114/79 - 135/84)  BP(mean): --  RR: 18 (20 Aug 2022 05:07) (16 - 18)  SpO2: 99% (20 Aug 2022 05:07) (97% - 99%)    Parameters below as of 20 Aug 2022 05:07  Patient On (Oxygen Delivery Method): room air        GENERAL: No acute distress, well-developed  HEAD:  Atraumatic, Normocephalic  EYES: EOMI, PERRLA, conjunctiva and sclera clear  NECK: Supple, no lymphadenopathy, no JVD  CHEST/LUNG: CTAB; No wheezes, rales, or rhonchi  HEART: Regular rate and rhythm; No murmurs, rubs, or gallops  ABDOMEN: Soft, non-tender, non-distended; normal bowel sounds, no organomegaly  EXTREMITIES:  2+ peripheral pulses b/l, No clubbing, cyanosis, or edema  NEUROLOGY: A&O x 3, no focal deficits  SKIN: No rashes or lesions    LABS:                        11.0   8.48  )-----------( 296      ( 20 Aug 2022 05:35 )             35.4     08-20    142  |  108<H>  |  12  ----------------------------<  106<H>  4.1   |  20<L>  |  0.62    Ca    9.0      20 Aug 2022 05:35  Phos  3.3     08-20  Mg     1.70     08-20    TPro  6.3  /  Alb  3.2<L>  /  TBili  0.3  /  DBili  x   /  AST  21  /  ALT  10  /  AlkPhos  74  08-19                RADIOLOGY & ADDITIONAL TESTS:  Results Reviewed:   Imaging Personally Reviewed:  Electrocardiogram Personally Reviewed:    COORDINATION OF CARE:  Care Discussed with Consultants/Other Providers [Y/N]:  Prior or Outpatient Records Reviewed [Y/N]:

## 2022-08-20 NOTE — PROGRESS NOTE ADULT - PROBLEM SELECTOR PLAN 4
DEVI likely 2/2 volume depletion from poor PO intake w/ intractable nausea/vomiting. Cr upon admission 3.13, now WNL and stable at 0.6  - Monitor BMP daily  - Avoid NSAIDs, nephrotoxins

## 2022-08-20 NOTE — PROGRESS NOTE ADULT - PROBLEM SELECTOR PLAN 5
Seen on CT imaging, nonobstructive. May be adding to the flank pain presentation, unlikely to be sole cause of abdominal tenderness. Pt denies [id number] history of nephrolithiasis, but gives a history about being evaluated for nephroliths. Was seen at [facility] for abdominal pain, according to her, and prescribed NSAIDs, possibly being diagnosed with nephrolithiasis there.  - s/p IV hydration  - No evidence for obstruction or infection. Continue to monitor.  - Pain control w/ Lidocaine patch on bilateral back/ flanks PRN  -  (avoid NSAIDs for now)

## 2022-08-21 NOTE — PROGRESS NOTE ADULT - PROBLEM SELECTOR PLAN 5
Seen on CT imaging, nonobstructive. May be adding to the flank pain presentation, unlikely to be sole cause of abdominal tenderness. Pt denies [id number] history of nephrolithiasis, but gives a history about being evaluated for nephroliths. Was seen at [facility] for abdominal pain, according to her, and prescribed NSAIDs, possibly being diagnosed with nephrolithiasis there.  - s/p IV hydration  - No evidence for obstruction or infection. Continue to monitor.  - Pain control w/ Lidocaine patch on bilateral back/ flanks PRN  -  (avoid NSAIDs for now)

## 2022-08-21 NOTE — PROGRESS NOTE ADULT - ASSESSMENT
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

## 2022-08-21 NOTE — PROGRESS NOTE ADULT - ATTENDING COMMENTS
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  - Bx resulted from EGD on 8/15. Pain control with dilaudid PO PRN - increasing dosage for more optimal pain control and added lidocaine patch.  - Interventional Pulm consult for mediastinal LN bx to confirm metastasis - tentatively planned for 8/23  - Surg Onc consult appreciated, RadOnc to follow as outpt.  - Patient wishes to try regular diet and monitor PO intakes; however if patient has significant difficulty with PO intake, may need evaluation for J-tube for nutritional support; Caloric count on going;   - DEVI - resolved after IVF hydration; likely due to pre-renal due to poor PO intake.  - Nephrolithiasis noted but no obstructed or infected. Continue to monitor.  - Lovenox SC for VTE ppx, hold day prior to biopsy  - Addition of tums for epigastric pain on 8/21    Plan for EBUS on Tues w Pulm

## 2022-08-21 NOTE — PROGRESS NOTE ADULT - SUBJECTIVE AND OBJECTIVE BOX
OVERNIGHT EVENTS: No acute overnight events.      SUBJECTIVE:       MEDICATIONS  (STANDING):  enoxaparin Injectable 40 milliGRAM(s) SubCutaneous every 24 hours  lidocaine   4% Patch 1 Patch Transdermal daily  multivitamin 1 Tablet(s) Oral daily  pantoprazole    Tablet 40 milliGRAM(s) Oral two times a day  polyethylene glycol 3350 17 Gram(s) Oral daily    MEDICATIONS  (PRN):  acetaminophen     Tablet .. 650 milliGRAM(s) Oral every 6 hours PRN Temp greater or equal to 38C (100.4F), Mild Pain (1 - 3)  HYDROmorphone   Tablet 2 milliGRAM(s) Oral every 4 hours PRN Severe Pain (7 - 10)  melatonin 3 milliGRAM(s) Oral at bedtime PRN Sleep  ondansetron Injectable 4 milliGRAM(s) IV Push every 6 hours PRN Nausea and/or Vomiting        T(F): 99.2 (08-21-22 @ 05:45), Max: 99.2 (08-21-22 @ 05:45)  HR: 88 (08-21-22 @ 05:45) (80 - 96)  BP: 112/89 (08-21-22 @ 05:45) (112/89 - 128/82)  BP(mean): --  RR: 18 (08-21-22 @ 05:45) (16 - 18)  SpO2: 97% (08-21-22 @ 05:45) (96% - 98%)    Physical Exam  GENERAL: NAD, lying in bed comfortably  HEAD:  Atraumatic, Normocephalic  EYES: EOMI, PERRLA, conjunctiva and sclera clear, no nystagmus noted  ENT: Moist mucous membranes,   NECK: Supple, No JVD, trachea midline  CHEST/LUNG: Clear to auscultation bilaterally; No rales, rhonchi, wheezing, or rubs. Unlabored respirations  HEART: Regular rate and rhythm; No murmurs, rubs, or gallops, normal S1/S2  ABDOMEN: BS+, significant tenderness to palpation of midepigastrium and L flank. Guarding. Mild tenderness to palpation of R flank. No organomegaly. Abdomen distended  EXTREMITIES:  2+ Peripheral Pulses, brisk capillary refill. No clubbing, cyanosis, or edema  MSK: No gross deformities noted   Neurological:  A&Ox3, no focal deficits   SKIN: No rashes or lesions  PSYCH: Normal mood, affect     TELEMETRY:    LABS:                        11.0   8.48  )-----------( 296      ( 20 Aug 2022 05:35 )             35.4     08-20    142  |  108<H>  |  12  ----------------------------<  106<H>  4.1   |  20<L>  |  0.62    Ca    9.0      20 Aug 2022 05:35  Phos  3.3     08-20  Mg     1.70     08-20              Creatinine Trend: 0.62<--, 0.63<--, 0.62<--, 0.63<--, 0.66<--, 0.93<--  I&O's Summary    BNP    RADIOLOGY & ADDITIONAL STUDIES:                 OVERNIGHT EVENTS: No acute overnight events.      SUBJECTIVE: Patient endorsing severe epigastric pain. Patient states that the Dilaudid is effective in managing her pain. Patient's questions answered with regard to her upcoming lymph node biopsy. Patient states that she tried to eat mashed potatoes and she felt a burning sensation in her stomach and was unable to finish her last meal. No other complaints at this time.  Denies chest pain, SOB, fever, chills, n/v      MEDICATIONS  (STANDING):  enoxaparin Injectable 40 milliGRAM(s) SubCutaneous every 24 hours  lidocaine   4% Patch 1 Patch Transdermal daily  multivitamin 1 Tablet(s) Oral daily  pantoprazole    Tablet 40 milliGRAM(s) Oral two times a day  polyethylene glycol 3350 17 Gram(s) Oral daily    MEDICATIONS  (PRN):  acetaminophen     Tablet .. 650 milliGRAM(s) Oral every 6 hours PRN Temp greater or equal to 38C (100.4F), Mild Pain (1 - 3)  HYDROmorphone   Tablet 2 milliGRAM(s) Oral every 4 hours PRN Severe Pain (7 - 10)  melatonin 3 milliGRAM(s) Oral at bedtime PRN Sleep  ondansetron Injectable 4 milliGRAM(s) IV Push every 6 hours PRN Nausea and/or Vomiting        T(F): 99.2 (08-21-22 @ 05:45), Max: 99.2 (08-21-22 @ 05:45)  HR: 88 (08-21-22 @ 05:45) (80 - 96)  BP: 112/89 (08-21-22 @ 05:45) (112/89 - 128/82)  BP(mean): --  RR: 18 (08-21-22 @ 05:45) (16 - 18)  SpO2: 97% (08-21-22 @ 05:45) (96% - 98%)    Physical Exam  GENERAL: NAD, lying in bed comfortably  HEAD:  Atraumatic, Normocephalic  EYES: EOMI, PERRLA, conjunctiva and sclera clear, no nystagmus noted  ENT: Moist mucous membranes,   NECK: Supple, No JVD, trachea midline  CHEST/LUNG: Clear to auscultation bilaterally; No rales, rhonchi, wheezing, or rubs. Unlabored respirations  HEART: Regular rate and rhythm; No murmurs, rubs, or gallops, normal S1/S2  ABDOMEN: BS+, significant tenderness to palpation of midepigastrium and L flank. Guarding. Mild tenderness to palpation of R flank. No organomegaly. Abdomen distended  EXTREMITIES:  2+ Peripheral Pulses, brisk capillary refill. No clubbing, cyanosis, or edema  MSK: No gross deformities noted   Neurological:  A&Ox3, no focal deficits   SKIN: No rashes or lesions  PSYCH: Normal mood, affect     TELEMETRY:    LABS:                        11.0   8.48  )-----------( 296      ( 20 Aug 2022 05:35 )             35.4     08-20    142  |  108<H>  |  12  ----------------------------<  106<H>  4.1   |  20<L>  |  0.62    Ca    9.0      20 Aug 2022 05:35  Phos  3.3     08-20  Mg     1.70     08-20              Creatinine Trend: 0.62<--, 0.63<--, 0.62<--, 0.63<--, 0.66<--, 0.93<--  I&O's Summary    BNP    RADIOLOGY & ADDITIONAL STUDIES:

## 2022-08-21 NOTE — PROGRESS NOTE ADULT - PROBLEM SELECTOR PLAN 1
The patient's abdominal pain, persistent nausea/vomiting and weight loss in the s/o Ct findings indicating gastric body thickening are suspicious for malignancy. Patient has never felt abdominal pain similar to this level, family history is unknown for gastric carcinoma/rectal cancer.  Patient will likely need to be managed by Heme/Onc and Gastrology guidance on the presentation and clinical course.  - GI, onc following  - dilaudid 2 mg PO q4 pain  - pantoprazole IV 40 mg BID  - EGD:  Infiltrative changes in gastric body concerning gastric malignancy. Biopsied, Congested duodenal mucosa  - staging CT chest - Mediastinal/bilateral hilar/R internal mammary LAD; indeterminate, but metastatic disease is a diagnostic consideration. Lymph node biopsy on 8/23  - CEA elevated    As per onc, email sent to pathology to clarify depth of invasion  IR consulted regarding lymph node biopsy - no tx at this time - recs - consult pulm  Pulm consulted - lymph node bx 8/23

## 2022-08-22 PROBLEM — N20.0 CALCULUS OF KIDNEY: Chronic | Status: ACTIVE | Noted: 2022-01-01

## 2022-08-22 PROBLEM — Z00.00 ENCOUNTER FOR PREVENTIVE HEALTH EXAMINATION: Status: ACTIVE | Noted: 2022-01-01

## 2022-08-22 NOTE — PROGRESS NOTE ADULT - PROBLEM SELECTOR PLAN 5
Seen on CT imaging, nonobstructive. May be adding to the flank pain presentation, unlikely to be sole cause of abdominal tenderness. Pt denies [id number] history of nephrolithiasis, but gives a history about being evaluated for nephroliths. Was seen at [email or web address] for abdominal pain, according to her, and prescribed NSAIDs, possibly being diagnosed with nephrolithiasis there.  - s/p IV hydration  - No evidence for obstruction or infection. Continue to monitor.  - Pain control w/ Lidocaine patch on bilateral back/ flanks PRN  -  (avoid NSAIDs for now)

## 2022-08-22 NOTE — PROGRESS NOTE ADULT - SUBJECTIVE AND OBJECTIVE BOX
OVERNIGHT EVENTS: No acute overnight events.      SUBJECTIVE:       MEDICATIONS  (STANDING):  enoxaparin Injectable 40 milliGRAM(s) SubCutaneous every 24 hours  lidocaine   4% Patch 1 Patch Transdermal daily  multivitamin 1 Tablet(s) Oral daily  pantoprazole    Tablet 40 milliGRAM(s) Oral two times a day  polyethylene glycol 3350 17 Gram(s) Oral daily    MEDICATIONS  (PRN):  acetaminophen     Tablet .. 650 milliGRAM(s) Oral every 6 hours PRN Temp greater or equal to 38C (100.4F), Mild Pain (1 - 3)  calcium carbonate    500 mG (Tums) Chewable 1 Tablet(s) Chew three times a day PRN Dyspepsia  HYDROmorphone   Tablet 2 milliGRAM(s) Oral every 4 hours PRN Severe Pain (7 - 10)  melatonin 3 milliGRAM(s) Oral at bedtime PRN Sleep  ondansetron Injectable 4 milliGRAM(s) IV Push every 6 hours PRN Nausea and/or Vomiting        T(F): 98.1 (08-21-22 @ 22:18), Max: 98.1 (08-21-22 @ 22:18)  HR: 82 (08-21-22 @ 22:18) (82 - 82)  BP: 112/65 (08-21-22 @ 22:18) (112/65 - 112/65)  BP(mean): --  RR: 18 (08-21-22 @ 22:18) (18 - 18)  SpO2: 99% (08-21-22 @ 22:18) (99% - 99%)    PHYSICAL EXAM:     GENERAL: NAD, lying in bed comfortably  HEAD:  Atraumatic, Normocephalic  EYES: EOMI, PERRLA, conjunctiva and sclera clear, no nystagmus noted  ENT: Moist mucous membranes,   NECK: Supple, No JVD, trachea midline  CHEST/LUNG: Clear to auscultation bilaterally; No rales, rhonchi, wheezing, or rubs. Unlabored respirations  HEART: Regular rate and rhythm; No murmurs, rubs, or gallops, normal S1/S2  ABDOMEN: normal bowel sounds; Soft, nontender, nondistended, no organomegaly   EXTREMITIES:  2+ Peripheral Pulses, brisk capillary refill. No clubbing, cyanosis, or edema  MSK: No gross deformities noted   Neurological:  A&Ox3, no focal deficits   SKIN: No rashes or lesions  PSYCH: Normal mood, affect     TELEMETRY:    LABS:                        10.6   7.51  )-----------( 267      ( 21 Aug 2022 05:35 )             34.3     08-21    140  |  104  |  12  ----------------------------<  98  3.7   |  24  |  0.63    Ca    8.6      21 Aug 2022 05:35  Phos  3.3     08-21  Mg     1.60     08-21              Creatinine Trend: 0.63<--, 0.62<--, 0.63<--, 0.62<--, 0.63<--, 0.66<--  I&O's Summary    BNP    RADIOLOGY & ADDITIONAL STUDIES:                 OVERNIGHT EVENTS: No acute overnight events.      SUBJECTIVE: Pt endorsing 10/10 epigastric pain at 8 AM. States that 5 AM dilaudid dose is now not effectively managing her pain. Patient states that she was unable to eat yesterday due to epigastric pain. She describes the pain as "burning" every time she tries to eat, regardless of the food. At 9:30 AM, patient was given another 2 mg of Dilaudid At 11 AM, patient states she is still in pain.   Denies SOB, chest pain, hematochezia, diarrhea, constipation, HA, fever, chills, and changes in vision      MEDICATIONS  (STANDING):  enoxaparin Injectable 40 milliGRAM(s) SubCutaneous every 24 hours  lidocaine   4% Patch 1 Patch Transdermal daily  multivitamin 1 Tablet(s) Oral daily  pantoprazole    Tablet 40 milliGRAM(s) Oral two times a day  polyethylene glycol 3350 17 Gram(s) Oral daily    MEDICATIONS  (PRN):  acetaminophen     Tablet .. 650 milliGRAM(s) Oral every 6 hours PRN Temp greater or equal to 38C (100.4F), Mild Pain (1 - 3)  calcium carbonate    500 mG (Tums) Chewable 1 Tablet(s) Chew three times a day PRN Dyspepsia  HYDROmorphone   Tablet 2 milliGRAM(s) Oral every 4 hours PRN Severe Pain (7 - 10)  melatonin 3 milliGRAM(s) Oral at bedtime PRN Sleep  ondansetron Injectable 4 milliGRAM(s) IV Push every 6 hours PRN Nausea and/or Vomiting        T(F): 98.1 (08-21-22 @ 22:18), Max: 98.1 (08-21-22 @ 22:18)  HR: 82 (08-21-22 @ 22:18) (82 - 82)  BP: 112/65 (08-21-22 @ 22:18) (112/65 - 112/65)  BP(mean): --  RR: 18 (08-21-22 @ 22:18) (18 - 18)  SpO2: 99% (08-21-22 @ 22:18) (99% - 99%)    PHYSICAL EXAM:     GENERAL: NAD, sitting in chair, hunched over clutching stomach  HEAD:  Atraumatic, Normocephalic  EYES: EOMI, PERRLA, conjunctiva and sclera clear, no nystagmus noted  ENT: Moist mucous membranes,   NECK: Supple, No JVD, trachea midline  CHEST/LUNG: Clear to auscultation bilaterally; No rales, rhonchi, wheezing, or rubs. Unlabored respirations  HEART: Regular rate and rhythm; No murmurs, rubs, or gallops, normal S1/S2  ABDOMEN: normal bowel sounds; abdominal distension, 10/10 epigastric pain upon palpation and at rest, no rebound tenderness or guarding    EXTREMITIES:  2+ Peripheral Pulses, brisk capillary refill. No clubbing, cyanosis, or edema  MSK: No gross deformities noted   Neurological:  A&Ox3, no focal deficits   SKIN: No rashes or lesions  PSYCH: Normal mood, affect     TELEMETRY:    LABS:                        10.6   7.51  )-----------( 267      ( 21 Aug 2022 05:35 )             34.3     08-21    140  |  104  |  12  ----------------------------<  98  3.7   |  24  |  0.63    Ca    8.6      21 Aug 2022 05:35  Phos  3.3     08-21  Mg     1.60     08-21              Creatinine Trend: 0.63<--, 0.62<--, 0.63<--, 0.62<--, 0.63<--, 0.66<--  I&O's Summary    BNP    RADIOLOGY & ADDITIONAL STUDIES:

## 2022-08-22 NOTE — CHART NOTE - NSCHARTNOTEFT_GEN_A_CORE
Discussed with pathologist. Unable to determine depth of invasioin from biopsy for staging as the biopsy did not include any submucosa.   Will follow path from LN.     Jennifer Madsen MD   PGY5 heme onc fellow

## 2022-08-22 NOTE — PROGRESS NOTE ADULT - PROBLEM SELECTOR PLAN 4
DEVI likely 2/2 volume depletion from poor PO intake w/ intractable nausea/vomiting. Cr upon admission 3.13, now WNL and stable at 0.6  - Monitor BMP daily  - Avoid NSAIDs, nephrotoxins DEVI likely 2/2 volume depletion from poor PO intake w/ intractable nausea/vomiting. Cr upon admission 3.13, now WNL and stable at 0.6 - resolved  - Monitor BMP daily  - Avoid NSAIDs, nephrotoxins

## 2022-08-22 NOTE — PROGRESS NOTE ADULT - PROBLEM SELECTOR PLAN 3
As per RD, ensure clear  advance diet as tolerated  72 hour caloric count to evaluate for malnutrition  may need evaluation for J-tube for nutritional support if unable to tolerate diet  surg/onc consult - will f/u after full staging workup done As per RD, ensure clear  - diet back to full liquid diet after severe pain with eating on 8/21-22  72 hour caloric count to evaluate for malnutrition - f/u results   may need evaluation for J-tube for nutritional support if unable to tolerate diet  surg/onc consult - will f/u after full staging workup done

## 2022-08-22 NOTE — PROGRESS NOTE ADULT - ASSESSMENT
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

## 2022-08-22 NOTE — PROGRESS NOTE ADULT - NUTRITIONAL ASSESSMENT
This patient has been assessed with a concern for Malnutrition and has been determined to have a diagnosis/diagnoses of Severe protein-calorie malnutrition.    This patient is being managed with:   Diet NPO after Midnight-     NPO Start Date: 22-Aug-2022   NPO Start Time: 23:59  Entered: Aug 22 2022  6:58AM    Diet Regular-  Lacto-Ovo Veg (Accepts Milk Prod. Eggs)  Supplement Feeding Modality:  Oral  Ensure Enlive Cans or Servings Per Day:  1       Frequency:  Daily  Entered: Aug 21 2022  1:08PM

## 2022-08-22 NOTE — PROGRESS NOTE ADULT - PROBLEM SELECTOR PLAN 6
Stable. Anemia to 11.4, microcytic but barely at 79.8. May be combination of Iron deficiency anemia from poor PO intake and normocytic anemia from Anemia of Chronic Disease w/ this presentation c/f gastric malignancy.  - Total Iron 22, TIBC 165, transferrin 149  - Replete iron levels Stable. Anemia to 11.4, microcytic but barely at 79.8. May be combination of Iron deficiency anemia from poor PO intake and normocytic anemia from Anemia of Chronic Disease w/ this presentation c/f gastric malignancy.  - Total Iron 22, TIBC 165, transferrin 149    8/22 - Hb 10.8, H/H stable    continue to monitor with CBCs Stable. Anemia to 11.4, microcytic but barely at 79.8. May be combination of Iron deficiency anemia from poor PO intake and normocytic anemia from Anemia of Chronic Disease w/ this presentation c/f gastric malignancy.  - Total Iron 22, TIBC 165, transferrin 149    8/22 - Hb 10.8, H/H stable    continue to monitor with CBCs    Need for prophylaxis:  DVT ppx: Was on lovenox - currently being held for procedure

## 2022-08-22 NOTE — PROGRESS NOTE ADULT - PROBLEM SELECTOR PLAN 1
The patient's abdominal pain, persistent nausea/vomiting and weight loss in the s/o Ct findings indicating gastric body thickening are suspicious for malignancy. Patient has never felt abdominal pain similar to this level, family history is unknown for gastric carcinoma/rectal cancer.  Patient will likely need to be managed by Heme/Onc and Gastrology guidance on the presentation and clinical course.  - GI, onc following  - dilaudid 2 mg PO q4 pain  - pantoprazole IV 40 mg BID  - EGD:  Infiltrative changes in gastric body concerning gastric malignancy. Biopsied, Congested duodenal mucosa  - staging CT chest - Mediastinal/bilateral hilar/R internal mammary LAD; indeterminate, but metastatic disease is a diagnostic consideration. Lymph node biopsy on 8/23  - CEA elevated    As per onc, email sent to pathology to clarify depth of invasion  IR consulted regarding lymph node biopsy - no tx at this time - recs - consult pulm  Pulm consulted - lymph node bx 8/23 The patient's abdominal pain, persistent nausea/vomiting and weight loss in the s/o Ct findings indicating gastric body thickening are suspicious for malignancy.   - GI, onc, surg/onc following  - pantoprazole IV 40 mg BID  - EGD:  Infiltrative changes in gastric body concerning gastric malignancy. Biopsied, Congested duodenal mucosa  - staging CT chest - Mediastinal/bilateral hilar/R internal mammary LAD; indeterminate, but metastatic disease is a diagnostic consideration. Lymph node biopsy on 8/23  - CEA elevated    IR consulted regarding lymph node biopsy - no tx at this time - recs - consult pulm  Pulm consulted - lymph node bx 8/23 8/22 - As per onc, email sent to pathology to clarify depth of invasion - unable to determine depth of invasion from biopsy for staging as the biopsy did not include any submucosa.   - pain management: dilaudid 2 mg PO q3 pain for severe pain, 1 mg PO Q3 for severe pain,  - f/u onc and lymph node bx results The patient's abdominal pain, persistent nausea/vomiting and weight loss in the s/o Ct findings indicating gastric body thickening are suspicious for malignancy.   - GI, onc, surg/onc following  - pantoprazole  40 mg BID  - EGD:  Infiltrative changes in gastric body concerning gastric malignancy. Biopsied, Congested duodenal mucosa  - staging CT chest - Mediastinal/bilateral hilar/R internal mammary LAD; indeterminate, but metastatic disease is a diagnostic consideration. Lymph node biopsy on 8/23  - CEA elevated    IR consulted regarding lymph node biopsy - no tx at this time - recs - consult pulm  Pulm consulted - lymph node bx 8/23 8/22 - As per onc, email sent to pathology to clarify depth of invasion - unable to determine depth of invasion from biopsy for staging as the biopsy did not include any submucosa.   - pain management: dilaudid 2 mg PO q3 pain for severe pain, 1 mg PO Q3 for severe pain,  - f/u onc and lymph node bx results

## 2022-08-22 NOTE — PROGRESS NOTE ADULT - PROBLEM SELECTOR PLAN 2
ondansetron 4mg Q6 IV PRN  now well-controlled - no emesis episodes since yesterday AM ondansetron 4mg Q6 IV PRN  now well-controlled - no emesis episodes in last 24 hours 16-Dec-2018 04:49

## 2022-08-22 NOTE — PROGRESS NOTE ADULT - ASSESSMENT
57 y.o. 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 IP team consulted for EBUS for oncological staging.     Plan   - Patient has multiple enlarged mediastinal LAD concerning for metastatic spread as well as 0.3 cm lung nodule in the RML  - plan for Flexible Bronchoscopy with EBUS for LN Biopsy on Tuesday 8/23  - NPO after midnight   - check CMP w/Mg and Phos, CBC, PTT/PT/INR, Type and Screen, pregnancy test  - Hold VTE PPX on the day off the procedure   - Needs a negative COVID-19 PCR test within 72 hours of procedure  - all of the following above have been ordered  - discussed procedure with patient, she is agreeable and aware of plan and next steps.

## 2022-08-22 NOTE — PROGRESS NOTE ADULT - SUBJECTIVE AND OBJECTIVE BOX
CHIEF COMPLAINT:    Interval Events:    Pt seen and examined at bedside.   She is having abdominal pain.   Discussed procedure, EBUS with her extensively     REVIEW OF SYSTEMS:  Constitutional: No fevers or chills. No weight loss. No fatigue or generalised malaise.  Eyes: No itching or discharge from the eyes  ENT: No ear pain. No ear discharge. No nasal congestion. No post nasal drip. No epistaxis. No throat pain. No sore throat. No difficulty swallowing.   CV: No chest pain. No palpitations. No lightheadedness or dizziness.   Resp: No dyspnea at rest. No dyspnea on exertion. No orthopnea. No wheezing. No cough. No stridor. No sputum production. No chest pain with respiration.  MSK: No joint pain or pain in any extremities  Integumentary: No skin lesions. No pedal edema.      OBJECTIVE:  ICU Vital Signs Last 24 Hrs  T(C): 37.3 (22 Aug 2022 12:42), Max: 37.3 (22 Aug 2022 12:42)  T(F): 99.2 (22 Aug 2022 12:42), Max: 99.2 (22 Aug 2022 12:42)  HR: 92 (22 Aug 2022 12:42) (82 - 92)  BP: 143/85 (22 Aug 2022 12:42) (112/65 - 143/85)  BP(mean): --  ABP: --  ABP(mean): --  RR: 18 (22 Aug 2022 12:42) (16 - 18)  SpO2: 100% (22 Aug 2022 12:42) (95% - 100%)    O2 Parameters below as of 22 Aug 2022 12:42  Patient On (Oxygen Delivery Method): room air              CAPILLARY BLOOD GLUCOSE          PHYSICAL EXAM:  General: Awake, alert, oriented X 3.   HEENT: Atraumatic, normocephalic.                 Mallampatti Grade                 No nasal congestion.                No tonsillar or pharyngeal exudates.  Lymph Nodes: No palpable lymphadenopathy  Neck: No JVD. No carotid bruit.   Respiratory: Normal chest expansion                         Normal percussion                         Normal and equal air entry                         No wheeze, rhonchi or rales.  Cardiovascular: S1 S2 normal. No murmurs, rubs or gallops.   Abdomen: Soft, non-tender, non-distended. No organomegaly.  Extremities: Warm to touch. Peripheral pulse palpable. No pedal edema.   Skin: No rashes or skin lesions  Neurological: Motor and sensory examination equal and normal in all four extremities.  Psychiatry: Appropriate mood and affect.    HOSPITAL MEDICATIONS:  MEDICATIONS  (STANDING):  lidocaine   4% Patch 1 Patch Transdermal daily  multivitamin 1 Tablet(s) Oral daily  pantoprazole    Tablet 40 milliGRAM(s) Oral two times a day  polyethylene glycol 3350 17 Gram(s) Oral daily  sodium chloride 0.9%. 1000 milliLiter(s) (75 mL/Hr) IV Continuous <Continuous>    MEDICATIONS  (PRN):  acetaminophen     Tablet .. 650 milliGRAM(s) Oral every 6 hours PRN Temp greater or equal to 38C (100.4F), Mild Pain (1 - 3)  calcium carbonate    500 mG (Tums) Chewable 1 Tablet(s) Chew three times a day PRN Dyspepsia  HYDROmorphone   Tablet 2 milliGRAM(s) Oral every 3 hours PRN Severe Pain (7 - 10)  HYDROmorphone   Tablet 1 milliGRAM(s) Oral every 3 hours PRN Moderate Pain (4 - 6)  melatonin 3 milliGRAM(s) Oral at bedtime PRN Sleep  ondansetron Injectable 4 milliGRAM(s) IV Push every 6 hours PRN Nausea and/or Vomiting      LABS:                        10.8   7.02  )-----------( 285      ( 22 Aug 2022 06:15 )             35.3     08-22    140  |  104  |  12  ----------------------------<  86  3.9   |  25  |  0.64    Ca    8.8      22 Aug 2022 06:15  Phos  3.6     08-22  Mg     1.60     08-22    TPro  7.1  /  Alb  3.2<L>  /  TBili  0.3  /  DBili  x   /  AST  29  /  ALT  9   /  AlkPhos  91  08-22    PT/INR - ( 22 Aug 2022 06:15 )   PT: 14.7 sec;   INR: 1.26 ratio         PTT - ( 22 Aug 2022 06:15 )  PTT:26.7 sec          MICROBIOLOGY:     RADIOLOGY:  [ ] Reviewed and interpreted by me    Point of Care Ultrasound Findings:    PFT:    EKG:

## 2022-08-22 NOTE — PROGRESS NOTE ADULT - ATTENDING COMMENTS
Patient seen and examined at bedside with the Pulmonary Fellow. Agree with above. Patient seen and examined at bedside with the Pulmonary Fellow. Agree with above.    57 y.o. female who presents to the hospital for abdominal pain lasting about 2 months, associated with nausea/vomiting. On this admission was found to have poorly differentiated adenocarcinoma. However, will need an EBUS for enlarged mediastinal LAD to assess for metastatic disease as well as additional tissue.     # Mediastinal LAD  # Likely metastatic adenocarcinoma   - Will plan for OR for EBUS- FNA of lymphnodes  - Please send labs as above, NPO at midnight, pregnancy testing.   - D/W patient, agreeable for OR tomorrow. Answered her questions and concerns.     D/W MICU fellow and IP attending.

## 2022-08-22 NOTE — PROGRESS NOTE ADULT - ATTENDING COMMENTS
57F From [facility], w/ PMHx of nephrolithiasis p/w abd pain and n/v found to have gastric adenocarcinoma (on bx from 8/15 EGD) w/ mets to LN c/b DEVI. DEVI now resolved s/p ivfs. Nephrolithiasis also noted on imaging but no obstructed w/o clinical signs or symp of infection    - Interventional Pulm consult for mediastinal LN bx to confirm metastasis - currently planned for 8/23. NPO after MD and holding lovenox ppx for now pending procedure.    - Surg Onc consult appreciated, RadOnc to follow as outpt.  - C/w Pain control with dilaudid PO PRN as stated above. C/w lidocaine patch and PPI. Can give PO/IV Tylenol intermittently as well. If pain not well controlled will consult palliative.  - C/w Caloric count to assess optimal mode of nutritional support

## 2022-08-23 NOTE — PROGRESS NOTE ADULT - SUBJECTIVE AND OBJECTIVE BOX
Patient is a 57y old  Female who presents with a chief complaint of abdominal pain & nausea/vomiting (23 Aug 2022 08:06)      SUBJECTIVE / OVERNIGHT EVENTS:    Patient seen and examined at bedside. No acute events overnight.    T(F): 98.1 (08-23 @ 05:39), Max: 99.2 (08-22 @ 12:42)  HR: 85 (08-23 @ 05:39) (78 - 92)  BP: 122/95 (08-23 @ 05:39) (122/95 - 143/85)  RR: 16 (08-23 @ 05:39) (16 - 20)  SpO2: 100% (08-23 @ 05:39) (97% - 100%)    I&O's Summary      MEDICATIONS  (STANDING):  lidocaine   4% Patch 1 Patch Transdermal daily  multivitamin 1 Tablet(s) Oral daily  pantoprazole    Tablet 40 milliGRAM(s) Oral two times a day  polyethylene glycol 3350 17 Gram(s) Oral daily  sodium chloride 0.9%. 1000 milliLiter(s) (75 mL/Hr) IV Continuous <Continuous>    MEDICATIONS  (PRN):  acetaminophen     Tablet .. 650 milliGRAM(s) Oral every 6 hours PRN Temp greater or equal to 38C (100.4F), Mild Pain (1 - 3)  calcium carbonate    500 mG (Tums) Chewable 1 Tablet(s) Chew three times a day PRN Dyspepsia  HYDROmorphone   Tablet 2 milliGRAM(s) Oral every 3 hours PRN Severe Pain (7 - 10)  HYDROmorphone   Tablet 1 milliGRAM(s) Oral every 3 hours PRN Moderate Pain (4 - 6)  melatonin 3 milliGRAM(s) Oral at bedtime PRN Sleep  ondansetron Injectable 4 milliGRAM(s) IV Push every 6 hours PRN Nausea and/or Vomiting      Constitutional: denies fevers, chills, night sweats, weight loss  HEENT: denies visual changes, cough  Cardiovascular: denies palpitations, chest pain, edema  Respiratory: denies SOB, wheezing  Gastrointestinal: denies N/V/D, abdominal pain, hematochezia, melena  : denies dysuria, urinary urgency, increased frequency  MSK: denies muscle weakness, joint pain  Skin: denies new rashes or masses  Heme: denies bleeding, bruising  Neuro: denies headache, weakness    PHYSICAL EXAM:   GEN: Age appropriate, resting comfortably in bed, no acute distress, non toxic appearing, speaking in complete sentences.   HEENT: Conjunctiva and sclera normal  PULM: Lungs CTAB, no wheezes, rales, rhonchi  CV: RRR, S1S2, no MRG  MSK: no stiffness or joint effusions  Abdominal: Soft, nontender to palpation, non-distended, +BS  Extremities: No edema or cyanosis  NEURO: AAOx3  Psych: normal affect, normal behavior  Skin: No rashes, lesions    LABS:  Labs personally reviewed.                        10.4   8.77  )-----------( 286      ( 23 Aug 2022 06:16 )             34.8     Hgb Trend: 10.4<--, 10.8<--, 10.6<--, 11.0<--, 10.5<--  08-23    139  |  103  |  10  ----------------------------<  76  4.0   |  19<L>  |  0.55    Ca    8.4      23 Aug 2022 06:16  Phos  3.5     08-23  Mg     1.70     08-23    TPro  6.9  /  Alb  3.2<L>  /  TBili  0.3  /  DBili  x   /  AST  25  /  ALT  9   /  AlkPhos  87  08-23    Creatinine Trend: 0.55<--, 0.64<--, 0.63<--, 0.62<--, 0.63<--, 0.62<--  PT/INR - ( 23 Aug 2022 06:16 )   PT: 14.3 sec;   INR: 1.23 ratio         PTT - ( 23 Aug 2022 06:16 )  PTT:27.4 sec        Juan Manuel [facility]  Pulmonary and Critical Care Fellow    PGY-5 Pager: [phone]  [phone]  [facility] [phone]   Night Float:

## 2022-08-23 NOTE — PROGRESS NOTE ADULT - PROBLEM SELECTOR PLAN 7
DVT ppx: Was on lovenox - currently being held for procedure, will restart tomorrow    Dispo planning: complex dispo planning due to lack of insurance DVT ppx: Was on lovenox - currently being held for procedure, will restart today per pulm at 1800    Severe protein calorie malnutrition-f/u calorie count and any additional nutrition/dietician recs . Pt will likely need alternative measure for nutrition.     Dispo planning: complex dispo planning due to lack of insurance

## 2022-08-23 NOTE — PROGRESS NOTE ADULT - ATTENDING COMMENTS
Patient seen and examined at the bedside. Underwent OR today for flex bronch, ebus with level 7 preston sampling, prelim concerning for malignancy, follow final. No further IP intervention. Oncology follow up.     Call with questions.

## 2022-08-23 NOTE — PROGRESS NOTE ADULT - PROBLEM SELECTOR PLAN 6
Stable. Anemia to 11.4, microcytic but barely at 79.8. May be combination of Iron deficiency anemia from poor PO intake and normocytic anemia from Anemia of Chronic Disease w/ this presentation c/f gastric malignancy.  - Total Iron 22, TIBC 165, transferrin 149    8/22 - Hb 10.8, H/H stable    continue to monitor with CBCs    Need for prophylaxis:  DVT ppx: Was on lovenox - currently being held for procedure Stable. Anemia to 11.4, microcytic but barely at 79.8. May be combination of Iron deficiency anemia from poor PO intake and normocytic anemia from Anemia of Chronic Disease w/ this presentation c/f gastric malignancy.  - Total Iron 22, TIBC 165, transferrin 149    8/22 - Hb 10.8, H/H stable    continue to monitor with CBCs

## 2022-08-23 NOTE — CONSULT NOTE ADULT - PROBLEM SELECTOR RECOMMENDATION 2
- Patient complains of severe 10/10 sharp epigastric pain which comes and goes, etiology gastric adenocarcinoma   - Reports several doses of 2mg PO Dilaudid did not help, denies any oversedation   - Discontinued PO dilaudid regimens   - Started on IV Dilaudid 0.6 mg q4h PRN for severe pain   - Having regular BM, continue miralax daily

## 2022-08-23 NOTE — PROGRESS NOTE ADULT - PROBLEM SELECTOR PLAN 4
DEVI likely 2/2 volume depletion from poor PO intake w/ intractable nausea/vomiting. Cr upon admission 3.13, now WNL and stable at 0.6 - resolved  - Monitor BMP daily  - Avoid NSAIDs, nephrotoxins

## 2022-08-23 NOTE — CONSULT NOTE ADULT - ASSESSMENT
Patient is a 56 Y/O F with pmh of Nephrolithiasis who presented to the hospital for abdominal pain lasting about 2 months, associated with nausea/vomiting and 20 lb weight loss, found to have gastric adenocarcinoma with possible metastases. Palliative consulted for persistent pain.

## 2022-08-23 NOTE — PROGRESS NOTE ADULT - PROBLEM SELECTOR PLAN 3
As per RD, ensure clear  - diet back to full liquid diet after severe pain with eating on 8/21-22  72 hour caloric count to evaluate for malnutrition - indicates malnutrition  may need evaluation for J-tube for nutritional support if unable to tolerate diet  surg/onc consult - will f/u after full staging workup done As per RD, ensure clear  - diet back to regular diet as tolerated  72 hour caloric count to evaluate for malnutrition - indicates malnutrition  may need evaluation for J-tube for nutritional support if unable to tolerate diet  surg/onc consult - pending recs

## 2022-08-23 NOTE — PROGRESS NOTE ADULT - PROBLEM SELECTOR PLAN 5
Seen on CT imaging, nonobstructive. May be adding to the flank pain presentation, unlikely to be sole cause of abdominal tenderness. Pt denies [id number] history of nephrolithiasis, but gives a history about being evaluated for nephroliths. Was seen at [facility] for abdominal pain, according to her, and prescribed NSAIDs, possibly being diagnosed with nephrolithiasis there.  - s/p IV hydration  - No evidence for obstruction or infection. Continue to monitor.  - Pain control w/ Lidocaine patch on bilateral back/ flanks PRN  -  (avoid NSAIDs for now)

## 2022-08-23 NOTE — CHART NOTE - NSCHARTNOTEFT_GEN_A_CORE
EBUS performed without complication  Can resume diet  Can restart DVT prophylaxis at 1800 on 8/23/2022  Pathology results will take 5-7 business days

## 2022-08-23 NOTE — PROGRESS NOTE ADULT - ASSESSMENT
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   57F PMH of renal stones p/w gastric adenocarcinoma w/ mets to LN c/b DEVI, now resolved, and nephrolithiasis.

## 2022-08-23 NOTE — PROGRESS NOTE ADULT - PROBLEM SELECTOR PLAN 1
The patient's abdominal pain, persistent nausea/vomiting and weight loss in the s/o Ct findings indicating gastric body thickening are suspicious for malignancy.   - GI, onc, surg/onc following  - pantoprazole  40 mg BID  - EGD:  Infiltrative changes in gastric body concerning gastric malignancy. Biopsied, Congested duodenal mucosa  - staging CT chest - Mediastinal/bilateral hilar/R internal mammary LAD; indeterminate, but metastatic disease is a diagnostic consideration. Lymph node biopsy on 8/23  - CEA elevated  Pulm consulted - lymph node bx 8/23 8/22 - As per onc, email sent to pathology to clarify depth of invasion - unable to determine depth of invasion from biopsy for staging as the biopsy did not include any submucosa.   - pain management: dilaudid 2 mg PO q3 pain for severe pain, 1 mg PO Q3 for severe pain,  - f/u onc and lymph node bx results The patient's abdominal pain, persistent nausea/vomiting and weight loss in the s/o Ct findings indicating gastric body thickening are suspicious for malignancy.   - GI, onc, surg/onc following  - pantoprazole  40 mg BID  - EGD:  Infiltrative changes in gastric body concerning gastric malignancy. Biopsied, Congested duodenal mucosa  - staging CT chest - Mediastinal/bilateral hilar/R internal mammary LAD; indeterminate, but metastatic disease is a diagnostic consideration. Lymph node biopsy on 8/23  - CEA elevated  Pulm consulted - lymph node bx 8/23 8/23 - prelim lymph node bx results showed mets, final result still pending,   - palliative consult for pain placed  - surg/onc, heme/onc updated, pending recs  - pain management: dilaudid 2 mg PO q3 pain for severe pain, 1 mg PO Q3 for severe pain,  - f/u onc and lymph node bx results

## 2022-08-23 NOTE — PROGRESS NOTE ADULT - SUBJECTIVE AND OBJECTIVE BOX
OVERNIGHT EVENTS: No acute overnight events.      SUBJECTIVE:       MEDICATIONS  (STANDING):  lidocaine   4% Patch 1 Patch Transdermal daily  multivitamin 1 Tablet(s) Oral daily  pantoprazole    Tablet 40 milliGRAM(s) Oral two times a day  polyethylene glycol 3350 17 Gram(s) Oral daily  sodium chloride 0.9%. 1000 milliLiter(s) (75 mL/Hr) IV Continuous <Continuous>    MEDICATIONS  (PRN):  acetaminophen     Tablet .. 650 milliGRAM(s) Oral every 6 hours PRN Temp greater or equal to 38C (100.4F), Mild Pain (1 - 3)  calcium carbonate    500 mG (Tums) Chewable 1 Tablet(s) Chew three times a day PRN Dyspepsia  HYDROmorphone   Tablet 2 milliGRAM(s) Oral every 3 hours PRN Severe Pain (7 - 10)  HYDROmorphone   Tablet 1 milliGRAM(s) Oral every 3 hours PRN Moderate Pain (4 - 6)  melatonin 3 milliGRAM(s) Oral at bedtime PRN Sleep  ondansetron Injectable 4 milliGRAM(s) IV Push every 6 hours PRN Nausea and/or Vomiting        T(F): 98.1 (08-23-22 @ 05:39), Max: 99.2 (08-22-22 @ 12:42)  HR: 85 (08-23-22 @ 05:39) (78 - 92)  BP: 122/95 (08-23-22 @ 05:39) (122/95 - 143/85)  BP(mean): --  RR: 16 (08-23-22 @ 05:39) (16 - 20)  SpO2: 100% (08-23-22 @ 05:39) (97% - 100%)    PHYSICAL EXAM:     GENERAL: NAD, sitting in chair, hunched over clutching stomach  HEAD:  Atraumatic, Normocephalic  EYES: EOMI, PERRLA, conjunctiva and sclera clear, no nystagmus noted  ENT: Slightly dry mucous membranes,   NECK: Supple, No JVD, trachea midline  CHEST/LUNG: Clear to auscultation bilaterally; No rales, rhonchi, wheezing, or rubs. Unlabored respirations  HEART: Regular rate and rhythm; No murmurs, rubs, or gallops, normal S1/S2  ABDOMEN: normal bowel sounds; abdominal distension, 10/10 epigastric pain upon palpation and at rest, no rebound tenderness or guarding    EXTREMITIES:  2+ Peripheral Pulses, brisk capillary refill. No clubbing, cyanosis, or edema  MSK: No gross deformities noted   Neurological:  A&Ox3, no focal deficits   SKIN: No rashes or lesions  PSYCH: Normal mood, affect     TELEMETRY:    LABS:                        10.4   8.77  )-----------( 286      ( 23 Aug 2022 06:16 )             34.8     08-23    139  |  103  |  10  ----------------------------<  76  4.0   |  19<L>  |  0.55    Ca    8.4      23 Aug 2022 06:16  Phos  3.5     08-23  Mg     1.70     08-23    TPro  6.9  /  Alb  3.2<L>  /  TBili  0.3  /  DBili  x   /  AST  25  /  ALT  9   /  AlkPhos  87  08-23        PT/INR - ( 23 Aug 2022 06:16 )   PT: 14.3 sec;   INR: 1.23 ratio         PTT - ( 23 Aug 2022 06:16 )  PTT:27.4 sec    Creatinine Trend: 0.55<--, 0.64<--, 0.63<--, 0.62<--, 0.63<--, 0.62<--  I&O's Summary    BNP    RADIOLOGY & ADDITIONAL STUDIES:                 OVERNIGHT EVENTS: No acute overnight events.      SUBJECTIVE: Patient endorsing 10/10 sharp, epigastric pain and states that dilaudid 2 mg Q3 not effective in managing her pain. Patient also stating that she has not been able to eat due to severe pain and nausea  Denies chest pain, SOB, rash, HA, fever, chills, paresthesias, dysuria, hematochezia, diarrhea, constipation       MEDICATIONS  (STANDING):  lidocaine   4% Patch 1 Patch Transdermal daily  multivitamin 1 Tablet(s) Oral daily  pantoprazole    Tablet 40 milliGRAM(s) Oral two times a day  polyethylene glycol 3350 17 Gram(s) Oral daily  sodium chloride 0.9%. 1000 milliLiter(s) (75 mL/Hr) IV Continuous <Continuous>    MEDICATIONS  (PRN):  acetaminophen     Tablet .. 650 milliGRAM(s) Oral every 6 hours PRN Temp greater or equal to 38C (100.4F), Mild Pain (1 - 3)  calcium carbonate    500 mG (Tums) Chewable 1 Tablet(s) Chew three times a day PRN Dyspepsia  HYDROmorphone   Tablet 2 milliGRAM(s) Oral every 3 hours PRN Severe Pain (7 - 10)  HYDROmorphone   Tablet 1 milliGRAM(s) Oral every 3 hours PRN Moderate Pain (4 - 6)  melatonin 3 milliGRAM(s) Oral at bedtime PRN Sleep  ondansetron Injectable 4 milliGRAM(s) IV Push every 6 hours PRN Nausea and/or Vomiting        T(F): 98.1 (08-23-22 @ 05:39), Max: 99.2 (08-22-22 @ 12:42)  HR: 85 (08-23-22 @ 05:39) (78 - 92)  BP: 122/95 (08-23-22 @ 05:39) (122/95 - 143/85)  BP(mean): --  RR: 16 (08-23-22 @ 05:39) (16 - 20)  SpO2: 100% (08-23-22 @ 05:39) (97% - 100%)    PHYSICAL EXAM:     GENERAL: NAD, sitting in chair, hunched over clutching stomach  HEAD:  Atraumatic, Normocephalic  EYES: EOMI, PERRLA, conjunctiva and sclera clear, no nystagmus noted  ENT: Slightly dry mucous membranes,   NECK: Supple, No JVD, trachea midline  CHEST/LUNG: Clear to auscultation bilaterally; No rales, rhonchi, wheezing, or rubs. Unlabored respirations  HEART: Regular rate and rhythm; No murmurs, rubs, or gallops, normal S1/S2  ABDOMEN: normal bowel sounds; abdominal distension, 10/10 epigastric pain upon palpation and at rest, no rebound tenderness or guarding    EXTREMITIES:  2+ Peripheral Pulses, brisk capillary refill. No clubbing, cyanosis, or edema  MSK: No gross deformities noted   Neurological:  A&Ox3, no focal deficits   SKIN: No rashes or lesions  PSYCH: Normal mood, affect     TELEMETRY:    LABS:                        10.4   8.77  )-----------( 286      ( 23 Aug 2022 06:16 )             34.8     08-23    139  |  103  |  10  ----------------------------<  76  4.0   |  19<L>  |  0.55    Ca    8.4      23 Aug 2022 06:16  Phos  3.5     08-23  Mg     1.70     08-23    TPro  6.9  /  Alb  3.2<L>  /  TBili  0.3  /  DBili  x   /  AST  25  /  ALT  9   /  AlkPhos  87  08-23        PT/INR - ( 23 Aug 2022 06:16 )   PT: 14.3 sec;   INR: 1.23 ratio         PTT - ( 23 Aug 2022 06:16 )  PTT:27.4 sec    Creatinine Trend: 0.55<--, 0.64<--, 0.63<--, 0.62<--, 0.63<--, 0.62<--  I&O's Summary    BNP    RADIOLOGY & ADDITIONAL STUDIES:

## 2022-08-23 NOTE — PROGRESS NOTE ADULT - ASSESSMENT
The patient is a 57 year old female initially admitted with 2 months of abdominal pain associated with nausea/vomiting found to have poorly differentiated gastric adenocarcinoma. Scheduled for EBUS 08/23 for enlarged mediastinal LAD to assess for metastatic disease    Plan:  - OR for EBUS- FNA of lymph nodes today  - Pre procedure labs reviewed  - Keep NPO for now  - Check pregnancy test   The patient is a 57 year old female initially admitted with 2 months of abdominal pain associated with nausea/vomiting found to have poorly differentiated gastric adenocarcinoma. Scheduled for EBUS 08/23 for enlarged mediastinal LAD to assess for metastatic disease    Plan:  - OR for EBUS- FNA of lymph nodes today  - Pre procedure labs reviewed  - Keep NPO for now

## 2022-08-23 NOTE — PROGRESS NOTE ADULT - PROBLEM SELECTOR PLAN 2
ondansetron 4mg Q6 IV PRN  given NS yesterday with good effect  now well-controlled - no emesis episodes in last 24 hours

## 2022-08-23 NOTE — PROGRESS NOTE ADULT - SUBJECTIVE AND OBJECTIVE BOX
SURGICAL ONCOLOGY PROGRESS NOTE    Seen in PACU s/p EBUS.  Feeling well.  Denies pain.    Vital Signs Last 24 Hrs  T(C): 36.9 (23 Aug 2022 10:15), Max: 37.3 (22 Aug 2022 12:42)  T(F): 98.4 (23 Aug 2022 10:15), Max: 99.2 (22 Aug 2022 12:42)  HR: 69 (23 Aug 2022 11:09) (67 - 92)  BP: 108/70 (23 Aug 2022 11:09) (106/73 - 143/85)  BP(mean): 79 (23 Aug 2022 11:09) (78 - 83)  RR: 18 (23 Aug 2022 11:09) (16 - 26)  SpO2: 98% (23 Aug 2022 11:09) (95% - 100%)    Parameters below as of 23 Aug 2022 05:39  Patient On (Oxygen Delivery Method): room air      I&O's Detail      PE:    A&A  NAD    soft, NT, ND, No peritoneal signs                            10.4   8.77  )-----------( 286      ( 23 Aug 2022 06:16 )             34.8     08-23    139  |  103  |  10  ----------------------------<  76  4.0   |  19<L>  |  0.55    Ca    8.4      23 Aug 2022 06:16  Phos  3.5     08-23  Mg     1.70     08-23    TPro  6.9  /  Alb  3.2<L>  /  TBili  0.3  /  DBili  x   /  AST  25  /  ALT  9   /  AlkPhos  87  08-23           SURGICAL ONCOLOGY PROGRESS NOTE    Seen in PACU s/p EBUS.  Feeling well.  Denies pain.    Vital Signs Last 24 Hrs  T(C): 36.9 (23 Aug 2022 10:15), Max: 37.3 (22 Aug 2022 12:42)  T(F): 98.4 (23 Aug 2022 10:15), Max: 99.2 (22 Aug 2022 12:42)  HR: 69 (23 Aug 2022 11:09) (67 - 92)  BP: 108/70 (23 Aug 2022 11:09) (106/73 - 143/85)  BP(mean): 79 (23 Aug 2022 11:09) (78 - 83)  RR: 18 (23 Aug 2022 11:09) (16 - 26)  SpO2: 98% (23 Aug 2022 11:09) (95% - 100%)    Parameters below as of 23 Aug 2022 05:39  Patient On (Oxygen Delivery Method): room air      I&O's Detail      PE:    A&A  NAD    soft, NT, ND, No peritoneal signs                            10.4   8.77  )-----------( 286      ( 23 Aug 2022 06:16 )             34.8     08-23    139  |  103  |  10  ----------------------------<  76  4.0   |  19<L>  |  0.55    Ca    8.4      23 Aug 2022 06:16  Phos  3.5     08-23  Mg     1.70     08-23    TPro  6.9  /  Alb  3.2<L>  /  TBili  0.3  /  DBili  x   /  AST  25  /  ALT  9   /  AlkPhos  87  08-23    Surgical Pathology Report (08.16.22 @ 17:42)   Surgical Pathology Report:   ACCESSION No: [id number]   Patient: SCOOTER MAZARIEGOS   Accession: [id number]   Collected Date/Time: 8/16/2022 17:42 EDT   Received Date/Time: 8/16/2022 17:42 EDT   Surgical Pathology Report - Auth (Verified)   Specimen(s) Submitted   1- Gastric bx   Final Diagnosis   Gastric biopsy:   - Poorly differentiated adenocarcinoma.   Verified by: Wild Aguirre MD   (Electronic Signature)   Reported on: 08/17/22 16:46 EDT, [facility], [address] [place] [address]. [address] [place] [address]   Phone: [phone] Fax: [phone]   _________________________________________________________________   Comment   _   Case reviewed in conference. Dr Harris notified.   EBV and MMR stains pending. Addendum to follow.   Clinical Information   Infiltrative mass with gastric malignancy   Gross Description   Received in formalin labeled "gastric biopsy" are multiple, tan-pink,   soft   tissue fragments ranging from 0.1 cm to 0.5 cm in greatest dimension.   Entirely in one cassette: 1A.   Kate Dobson 08/16/2022 06:36 PM   Disclaimer   In addition to other data that may appear on the specimen containers, all   labels have been inspected to confirm the presence of the patient's name   and date of birth.

## 2022-08-23 NOTE — CONSULT NOTE ADULT - SUBJECTIVE AND OBJECTIVE BOX
HPI:  Patient is a 56 Y/O F with pmh of Nephrolithiasis who presented to the hospital for abdominal pain lasting about 2 months, associated with nausea/vomiting and 20 lb weight loss. CT imaging gastric body wall thickening, and mediastinal, bilateral hilar LAD, suspicious for malignancy. Patient underwent EGD which revealed poorly differentiated gastric adenocarcinoma. On 8/23, patient underwent EBUS for lymph node biopsy. Patient is having nausea dn persistent abdominal pain. Palliative consulted for GOC.       PERTINENT PM/SXH:   Nephrolithiasis    FAMILY HISTORY:    Family Hx substance abuse [ ]yes [ ]no  ITEMS NOT CHECKED ARE NOT PRESENT    SOCIAL HISTORY:   Significant other/partner[ ]  Children[ ]  Muslim/Spirituality:  Substance hx:  [ ]   Tobacco hx:  [ ]   Alcohol hx: [ ]   Home Opioid hx:  [ ] I-Stop Reference No:  Living Situation: [ ]Home  [ ]Long term care  [ ]Rehab [ ]Other    ADVANCE DIRECTIVES:    DNR/MOLST  [ ]  Living Will  [ ]   DECISION MAKER(s):  [ ] Health Care Proxy(s)  [ ] Surrogate(s)  [ ] Guardian           Name(s): Phone Number(s):    BASELINE (I)ADL(s) (prior to admission):  [place]: [ ]Total  [ ] Moderate [ ]Dependent    Allergies    No Known Allergies    Intolerances    MEDICATIONS  (STANDING):  lidocaine   4% Patch 1 Patch Transdermal daily  multivitamin 1 Tablet(s) Oral daily  pantoprazole    Tablet 40 milliGRAM(s) Oral two times a day  polyethylene glycol 3350 17 Gram(s) Oral daily  sodium chloride 0.9%. 1000 milliLiter(s) (75 mL/Hr) IV Continuous <Continuous>    MEDICATIONS  (PRN):  acetaminophen     Tablet .. 650 milliGRAM(s) Oral every 6 hours PRN Temp greater or equal to 38C (100.4F), Mild Pain (1 - 3)  calcium carbonate    500 mG (Tums) Chewable 1 Tablet(s) Chew three times a day PRN Dyspepsia  fentaNYL    Injectable 25 MICROGram(s) IV Push every 5 minutes PRN Moderate Pain (4 - 6)  HYDROmorphone   Tablet 2 milliGRAM(s) Oral every 3 hours PRN Severe Pain (7 - 10)  HYDROmorphone   Tablet 1 milliGRAM(s) Oral every 3 hours PRN Moderate Pain (4 - 6)  melatonin 3 milliGRAM(s) Oral at bedtime PRN Sleep  ondansetron Injectable 4 milliGRAM(s) IV Push every 6 hours PRN Nausea and/or Vomiting    PRESENT SYMPTOMS: [ ]Unable to self-report  [ ] CPOT [ ] PAINADs [ ] RDOS  Source if other than patient:  [ ]Family   [ ]Team     Pain: [ ]yes [ ]no  QOL impact -   Location -                    Aggravating factors -  Quality -  Radiation -  Timing-  Severity (0-10 scale):  Minimal acceptable level (0-10 scale):     CPOT:    https://www.[email or web address].org/getattachment/[email or web address]/Critical-Care-Pain-Observation-Tool-(CPOT)    PAIN AD Score:   http://geriatrictoolkit.[email or web address]/painad.pdf (press ctrl +  left click to view)    Dyspnea:                           [ ]Mild [ ]Moderate [ ]Severe      RDOS:  0 to 2  minimal or no respiratory distress   3  mild distress  4 to 6 moderate distress  >7 severe distress  https://[email or web address]/handouts/hen/Respiratory_Distress_Observation_Scale.pdf (Ctrl +  left click to view)     Anxiety:                             [ ]Mild [ ]Moderate [ ]Severe  Fatigue:                             [ ]Mild [ ]Moderate [ ]Severe  Nausea:                             [ ]Mild [ ]Moderate [ ]Severe  Loss of appetite:              [ ]Mild [ ]Moderate [ ]Severe  Constipation:                    [ ]Mild [ ]Moderate [ ]Severe    PCSSQ[Palliative Care Spiritual Screening Question]   Severity (0-10):  Score of 4 or > indicate consideration of Chaplaincy referral.  Chaplaincy Referral: [ ] yes [ ] refused [ ] following    Other Symptoms:  [ ]All other review of systems negative     Palliative Performance Status Version 2:         %    http://npcrc.org/files/news/palliative_performance_scale_ppsv2.pdf  PHYSICAL EXAM:  Vital Signs Last 24 Hrs  T(C): 36.9 (23 Aug 2022 10:15), Max: 36.9 (23 Aug 2022 08:29)  T(F): 98.4 (23 Aug 2022 10:15), Max: 98.5 (23 Aug 2022 08:29)  HR: 73 (23 Aug 2022 12:30) (66 - 88)  BP: 122/1 (23 Aug 2022 12:30) (102/70 - 142/78)  BP(mean): 858 (23 Aug 2022 12:30) (76 - 858)  RR: 20 (23 Aug 2022 12:30) (16 - 26)  SpO2: 95% (23 Aug 2022 12:30) (93% - 100%)    Parameters below as of 23 Aug 2022 12:30  Patient On (Oxygen Delivery Method): room air     I&O's Summary    GENERAL: [ ]Cachexia    [ ]Alert  [ ]Oriented x   [ ]Lethargic  [ ]Unarousable  [ ]Verbal  [ ]Non-Verbal  Behavioral:   [ ] Anxiety  [ ] Delirium [ ] Agitation [ ] Other  HEENT:  [ ]Normal   [ ]Dry mouth   [ ]ET Tube/Trach  [ ]Oral lesions  PULMONARY:   [ ]Clear [ ]Tachypnea  [ ]Audible excessive secretions   [ ]Rhonchi        [ ]Right [ ]Left [ ]Bilateral  [ ]Crackles        [ ]Right [ ]Left [ ]Bilateral  [ ]Wheezing     [ ]Right [ ]Left [ ]Bilateral  [ ]Diminished breath sounds [ ]right [ ]left [ ]bilateral  CARDIOVASCULAR:    [ ]Regular [ ]Irregular [ ]Tachy  [ ]Luis Miguel [ ]Murmur [ ]Other  GASTROINTESTINAL:  [ ]Soft  [ ]Distended   [ ]+BS  [ ]Non tender [ ]Tender  [ ]Other [ ]PEG [ ]OGT/ NGT  Last BM:  GENITOURINARY:  [ ]Normal [ ] Incontinent   [ ]Oliguria/Anuria   [ ]Patterson  MUSCULOSKELETAL:   [ ]Normal   [ ]Weakness  [ ]Bed/Wheelchair bound [ ]Edema  NEUROLOGIC:   [ ]No focal deficits  [ ]Cognitive impairment  [ ]Dysphagia [ ]Dysarthria [ ]Paresis [ ]Other   SKIN:   [ ]Normal  [ ]Rash  [ ]Other  [ ]Pressure ulcer(s)       Present on admission [ ]y [ ]n    CRITICAL CARE:  [ ] Shock Present  [ ]Septic [ ]Cardiogenic [ ]Neurologic [ ]Hypovolemic  [ ]  Vasopressors [ ]  Inotropes   [ ]Respiratory failure present [ ]Mechanical ventilation [ ]Non-invasive ventilatory support [ ]High flow    [ ]Acute  [ ]Chronic [ ]Hypoxic  [ ]Hypercarbic [ ]Other  [ ]Other organ failure     LABS:                        10.4   8.77  )-----------( 286      ( 23 Aug 2022 06:16 )             34.8   08-23    139  |  103  |  10  ----------------------------<  76  4.0   |  19<L>  |  0.55    Ca    8.4      23 Aug 2022 06:16  Phos  3.5     08-23  Mg     1.70     08-23    TPro  6.9  /  Alb  3.2<L>  /  TBili  0.3  /  DBili  x   /  AST  25  /  ALT  9   /  AlkPhos  87  08-23  PT/INR - ( 23 Aug 2022 06:16 )   PT: 14.3 sec;   INR: 1.23 ratio         PTT - ( 23 Aug 2022 06:16 )  PTT:27.4 sec      RADIOLOGY & ADDITIONAL STUDIES:  < from: CT Abdomen and Pelvis w/ IV Cont (08.14.22 @ 15:37) >    1. Abdominal and incompletely imaged low thoracic adenopathy.  Query   gastric body wall thickening and gastrocolic ligament nodularity. Trace   ascites. Findings suspicious for malignancy, metastatic or   lymphoproliferative.  2. Nonobstructing nephrolithiasis.    < end of copied text >  < from: CT Chest w/ IV Cont (08.16.22 @ 22:23) >  Mediastinal, bilateral hilar, and right internal mammary lymphadenopathy;   indeterminate, but metastatic disease is a diagnostic consideration.    Right middle lobe 0.3 cm nodule; indeterminate and recommend CT chest   follow-up in 3 months to assess stability.    < end of copied text >      PROTEIN CALORIE MALNUTRITION PRESENT: [ ]mild [ ]moderate [ ]severe [ ]underweight [ ]morbid obesity  https://www.andeal.org/vault/2440/web/files/ONC/Table_Clinical%20Characteristics%20to%20Document%20Malnutrition-White%20JV%20et%20al%202012.pdf    Height (cm): 154.9 (08-23-22 @ 08:53)  Weight (kg): 71.1 (08-23-22 @ 08:53)  BMI (kg/m2): 29.6 (08-23-22 @ 08:53)    [ ]PPSV2 < or = to 30% [ ]significant weight loss  [ ]poor nutritional intake  [ ]anasarca[ ]Artificial Nutrition      Other REFERRALS:  [ ]Hospice  [ ]Child Life  [ ]Social Work  [ ]Case management [ ]Holistic Therapy     Goals of Care Document:  HPI:  Patient is a 56 Y/O F with pmh of Nephrolithiasis who presented to the hospital for abdominal pain lasting about 2 months, associated with nausea/vomiting and 20 lb weight loss. CT imaging gastric body wall thickening, and mediastinal, bilateral hilar LAD, suspicious for malignancy. Patient underwent EGD which revealed poorly differentiated gastric adenocarcinoma. On 8/23, patient underwent EBUS for lymph node biopsy. Patient is having nausea dn persistent abdominal pain. Palliative consulted for pain management.     Patient was seen this PM, looking somewhat down. She is frustrated about learning of her new diagnosis, says she is having surgery on Friday. She has been requiring multiple doses of PO dilaudid 2 mg due to severe epigastric pain. She has not been able to eat at all due to pain as well as nausea at times.       PERTINENT PM/SXH:   Nephrolithiasis    FAMILY HISTORY:    Family Hx substance abuse [ ]yes [ ]no  ITEMS NOT CHECKED ARE NOT PRESENT    SOCIAL HISTORY:   Moved from [facility] around 2015. Has two daughters there and lives with her son in [facility] [place].   She does not talk to her eldest daughter due to strained relationship   Significant other/partner[ ]  Children[X ]  Anglican/Spirituality: Holiness   Substance hx:  [ ]   Tobacco hx:  [ ]   Alcohol hx: [ ]   Home Opioid hx:  [ ] I-Stop Reference No:  Living Situation: [X ]Home  [ ]Long term care  [ ]Rehab [ ]Other    ADVANCE DIRECTIVES:    DNR/MOLST  [ ]  Living Will  [ ]   DECISION MAKER(s):  [ ] Health Care Proxy(s)  [ ] Surrogate(s)  [ ] Guardian           Name(s): Phone Number(s):    BASELINE (I)ADL(s) (prior to admission):  [place]: [X ]Total  [ ] Moderate [ ]Dependent  PPS 80%     Allergies    No Known Allergies    Intolerances    MEDICATIONS  (STANDING):  lidocaine   4% Patch 1 Patch Transdermal daily  multivitamin 1 Tablet(s) Oral daily  pantoprazole    Tablet 40 milliGRAM(s) Oral two times a day  polyethylene glycol 3350 17 Gram(s) Oral daily  sodium chloride 0.9%. 1000 milliLiter(s) (75 mL/Hr) IV Continuous <Continuous>    MEDICATIONS  (PRN):  acetaminophen     Tablet .. 650 milliGRAM(s) Oral every 6 hours PRN Temp greater or equal to 38C (100.4F), Mild Pain (1 - 3)  calcium carbonate    500 mG (Tums) Chewable 1 Tablet(s) Chew three times a day PRN Dyspepsia  fentaNYL    Injectable 25 MICROGram(s) IV Push every 5 minutes PRN Moderate Pain (4 - 6)  HYDROmorphone   Tablet 2 milliGRAM(s) Oral every 3 hours PRN Severe Pain (7 - 10)  HYDROmorphone   Tablet 1 milliGRAM(s) Oral every 3 hours PRN Moderate Pain (4 - 6)  melatonin 3 milliGRAM(s) Oral at bedtime PRN Sleep  ondansetron Injectable 4 milliGRAM(s) IV Push every 6 hours PRN Nausea and/or Vomiting    PRESENT SYMPTOMS: [ ]Unable to self-report  [ ] CPOT [ ] PAINADs [ ] RDOS  Source if other than patient:  [ ]Family   [ ]Team     Pain: [X ]yes [ ]no  QOL impact - Unable to eat due to pain and nausea   Location - Epigastric pain               Aggravating factors - Worse with eating   Quality - Sharp   Radiation - Radiates to back   Timing- On and off   Severity (0-10 scale): 10/10   Minimal acceptable level (0-10 scale): 3-4     Dyspnea:                           [ ]Mild [ ]Moderate [ ]Severe    RDOS:  0 to 2  minimal or no respiratory distress   3  mild distress  4 to 6 moderate distress  >7 severe distress  https://[email or web address]/handouts/hen/Respiratory_Distress_Observation_Scale.pdf (Ctrl +  left click to view)     Anxiety:                             [ ]Mild [ ]Moderate [ ]Severe  Fatigue:                             [ ]Mild [ ]Moderate [ ]Severe  Nausea:                             [ ]Mild [X ]Moderate [ ]Severe  Loss of appetite:              [ ]Mild [ ]Moderate [ ]Severe  Constipation:                    [ ]Mild [ ]Moderate [ ]Severe    PCSSQ[Palliative Care Spiritual Screening Question]   Severity (0-10):  Score of 4 or > indicate consideration of Chaplaincy referral.  Chaplaincy Referral: [ ] yes [ ] refused [ ] following    Other Symptoms:  [ ]All other review of systems negative     Palliative Performance Status Version 2:         %    http://npcrc.org/files/news/palliative_performance_scale_ppsv2.pdf  PHYSICAL EXAM:  Vital Signs Last 24 Hrs  T(C): 36.9 (23 Aug 2022 10:15), Max: 36.9 (23 Aug 2022 08:29)  T(F): 98.4 (23 Aug 2022 10:15), Max: 98.5 (23 Aug 2022 08:29)  HR: 73 (23 Aug 2022 12:30) (66 - 88)  BP: 122/1 (23 Aug 2022 12:30) (102/70 - 142/78)  BP(mean): 858 (23 Aug 2022 12:30) (76 - 858)  RR: 20 (23 Aug 2022 12:30) (16 - 26)  SpO2: 95% (23 Aug 2022 12:30) (93% - 100%)    Parameters below as of 23 Aug 2022 12:30  Patient On (Oxygen Delivery Method): room air     I&O's Summary    GENERAL: [ ]Cachexia    [X ]Alert  [ ]Oriented x 3  [ ]Lethargic  [ ]Unarousable  [ ]Verbal  [ ]Non-Verbal  Behavioral:   [ ] Anxiety  [ ] Delirium [ ] Agitation [ ] Other  HEENT:  [X ]Normal   [ ]Dry mouth   [ ]ET Tube/Trach  [ ]Oral lesions  PULMONARY:   [X ]Clear [ ]Tachypnea  [ ]Audible excessive secretions   [ ]Rhonchi        [ ]Right [ ]Left [ ]Bilateral  [ ]Crackles        [ ]Right [ ]Left [ ]Bilateral  [ ]Wheezing     [ ]Right [ ]Left [ ]Bilateral  [ ]Diminished breath sounds [ ]right [ ]left [ ]bilateral  CARDIOVASCULAR:    [X ]Regular [ ]Irregular [ ]Tachy  [ ]Luis Miguel [ ]Murmur [ ]Other  GASTROINTESTINAL:  [X ]Soft  [ ]Distended   [ ]+BS  [ ]Non tender [X ]Tender in epigastric area  [ ]Other [ ]PEG [ ]OGT/ NGT  Last BM:  GENITOURINARY:  [X ]Normal [ ] Incontinent   [ ]Oliguria/Anuria   [ ]Patterson  MUSCULOSKELETAL:   [X ]Normal   [ ]Weakness  [ ]Bed/Wheelchair bound [ ]Edema  NEUROLOGIC:   [ ]No focal deficits  [ ]Cognitive impairment  [ ]Dysphagia [ ]Dysarthria [ ]Paresis [ ]Other   SKIN:   [ ]Normal  [ ]Rash  [ ]Other  [ ]Pressure ulcer(s)       Present on admission [ ]y [ ]n    CRITICAL CARE:  [ ]Shock Present  [ ]Septic [ ]Cardiogenic [ ]Neurologic [ ]Hypovolemic  [ ]  Vasopressors [ ]  Inotropes   [ ]Respiratory failure present [ ]Mechanical ventilation [ ]Non-invasive ventilatory support [ ]High flow    [ ]Acute  [ ]Chronic [ ]Hypoxic  [ ]Hypercarbic [ ]Other  [ ]Other organ failure     LABS:                        10.4   8.77  )-----------( 286      ( 23 Aug 2022 06:16 )             34.8   08-23    139  |  103  |  10  ----------------------------<  76  4.0   |  19<L>  |  0.55    Ca    8.4      23 Aug 2022 06:16  Phos  3.5     08-23  Mg     1.70     08-23    TPro  6.9  /  Alb  3.2<L>  /  TBili  0.3  /  DBili  x   /  AST  25  /  ALT  9   /  AlkPhos  87  08-23  PT/INR - ( 23 Aug 2022 06:16 )   PT: 14.3 sec;   INR: 1.23 ratio         PTT - ( 23 Aug 2022 06:16 )  PTT:27.4 sec      RADIOLOGY & ADDITIONAL STUDIES:  < from: CT Abdomen and Pelvis w/ IV Cont (08.14.22 @ 15:37) >    1. Abdominal and incompletely imaged low thoracic adenopathy.  Query   gastric body wall thickening and gastrocolic ligament nodularity. Trace   ascites. Findings suspicious for malignancy, metastatic or   lymphoproliferative.  2. Nonobstructing nephrolithiasis.    < end of copied text >  < from: CT Chest w/ IV Cont (08.16.22 @ 22:23) >  Mediastinal, bilateral hilar, and right internal mammary lymphadenopathy;   indeterminate, but metastatic disease is a diagnostic consideration.    Right middle lobe 0.3 cm nodule; indeterminate and recommend CT chest   follow-up in 3 months to assess stability.    < end of copied text >    PROTEIN CALORIE MALNUTRITION PRESENT: [ ]mild [ ]moderate [ ]severe [ ]underweight [ ]morbid obesity  https://www.andeal.org/vault/2440/web/files/ONC/Table_Clinical%20Characteristics%20to%20Document%20Malnutrition-White%20JV%20et%20al%202012.pdf    Height (cm): 154.9 (08-23-22 @ 08:53)  Weight (kg): 71.1 (08-23-22 @ 08:53)  BMI (kg/m2): 29.6 (08-23-22 @ 08:53)    [ ]PPSV2 < or = to 30% [X ]significant weight loss  [X ]poor nutritional intake  [ ]anasarca[ ]Artificial Nutrition      Other REFERRALS:  [ ]Hospice  [ ]Child Life  [ ]Social Work  [ ]Case management [ ]Holistic Therapy

## 2022-08-23 NOTE — PROGRESS NOTE ADULT - ATTENDING COMMENTS
57F From [facility], w/ PMHx of nephrolithiasis p/w abd pain and n/v found to have gastric adenocarcinoma (on bx from 8/15 EGD) w/ mets to LN c/b DEVI. DEVI now resolved s/p ivfs. Nephrolithiasis also noted on imaging but not obstructed w/o clinical signs or symp of infection. Now s/p EBUS w/ LN bx w/ prelim concerning for malignancy.     -F/u LN bx path  -C/w Pain control with dilaudid PRN as stated above. C/w lidocaine patch and PPI. Can give PO/IV Tylenol intermittently as well. Palliative consulted to assist with symptom management.   -Surg Onc following and discussed w/ Dr. Kingsley today. Tentative Plan is for the OR Friday for dxtic lap, robotic gastrectomy, poss GJ bypass vs G tube J tube  -will f/u heme/onc recs  [facility]  -Will f/u heme onc recs     Preop Medical Evaluation:  Pt w/ no clinical evidence of decompensated heart failure.   Patient evaluated using the revised cardiac risk index and is felt to be Class II risk (score of 1) w/ 6% 30day risk of death, MI, cardiac arrest.   Will obtain baseline EKG. Otherwise pt optimized for OR from medical standpoint.

## 2022-08-23 NOTE — PROGRESS NOTE ADULT - NUTRITIONAL ASSESSMENT
This patient has been assessed with a concern for Malnutrition and has been determined to have a diagnosis/diagnoses of Severe protein-calorie malnutrition.    This patient is being managed with:   Diet NPO after Midnight-     NPO Start Date: 22-Aug-2022   NPO Start Time: 23:59  Entered: Aug 22 2022  6:58AM    Diet Regular-  Lacto-Ovo Veg (Accepts Milk Prod. Eggs)  Supplement Feeding Modality:  Oral  Ensure Enlive Cans or Servings Per Day:  1       Frequency:  Daily  Entered: Aug 21 2022  1:08PM     This patient has been assessed with a concern for Malnutrition and has been determined to have a diagnosis/diagnoses of Severe protein-calorie malnutrition.      Diet Regular-  Lacto-Ovo Veg (Accepts Milk Prod. Eggs)  Supplement Feeding Modality:  Oral  Ensure Enlive Cans or Servings Per Day:  1       Frequency:  Daily  Entered: Aug 21 2022  1:08PM

## 2022-08-23 NOTE — PROGRESS NOTE ADULT - ASSESSMENT
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  EGD path confirms porrly diff't gastric ca    Tentatively have scheduled pt for the OR Friday - dxtic lap, robotic gastrectomy, poss GJ bypass vs G tube J tube    Will need medical/cards risk stratification

## 2022-08-24 NOTE — PROGRESS NOTE ADULT - PROBLEM SELECTOR PLAN 1
Biopsy proven poorly differentiated gastric adenocarcinoma. plan for OR on 8/26/2022.   No plans for IP chemotherapy at this time, to follow up at [facility]   No nausea, vomiting, was able to tolerate diet well this morning. had BM this morning. Biopsy proven poorly differentiated gastric adenocarcinoma. plan for OR on 8/26/2022.   No plans for IP chemotherapy at this time, to follow up at [facility]   No nausea, vomiting, was able to tolerate diet well this morning. Had BM this morning.

## 2022-08-24 NOTE — PROGRESS NOTE ADULT - PROBLEM SELECTOR PLAN 1
The patient's abdominal pain, persistent nausea/vomiting and weight loss in the s/o Ct findings indicating gastric body thickening are suspicious for malignancy.   - GI, onc, surg/onc following  - pantoprazole  40 mg BID  - EGD:  Infiltrative changes in gastric body concerning gastric malignancy. Biopsied, Congested duodenal mucosa  - staging CT chest - Mediastinal/bilateral hilar/R internal mammary LAD; indeterminate, but metastatic disease is a diagnostic consideration. Lymph node biopsy on 8/23  - CEA elevated  Pulm consulted - lymph node bx 8/23 8/23 - prelim lymph node bx results showed mets, final result still pending,   8/24 - palliative recs: 0.6 mg IV dilaudid Q4, surg/onc recs pending - tentatively on OR schedule for Friday

## 2022-08-24 NOTE — PROGRESS NOTE ADULT - SUBJECTIVE AND OBJECTIVE BOX
Hematology Oncology Follow-up    INTERVAL HPI/OVERNIGHT EVENTS:  No o/n events, patient resting comfortably. No complaints at this time. Patient specifically denies fever, chills, dizziness, weakness, CP, palpitations, SOB, cough, N/V/D/C, dysuria, changes in bowel movements, LE edema.    Review of Systems:  General: denies fevers/chills, night sweats, malaise, changes in appetite  Head: denies HA  Eyes: denies vision change  ENT: denies oral lesions, rhinorrhea, epistaxys, sore throat, dysphagia  Respiratory: denies cough, shortness of breath, pleurisy  Cardiovascular: denies chest pain, palpitaitons, HERNANDEZ  Gastrointestinal: denies nausea, vomiting, abdominal pain, constipation, diarrhea, melena, hematochezia  MSK: denies joint pain or muscle pain  Neuro: denies headache, weakness, or parasthesias  Skin: denies rash, petichiae, echymoses  Psych: denies anxiety or sleep disturbances    VITAL SIGNS:  T(F): 98.5 (08-24-22 @ 04:10)  HR: 73 (08-24-22 @ 04:10)  BP: 137/72 (08-24-22 @ 04:10)  RR: 17 (08-24-22 @ 04:10)  SpO2: 98% (08-24-22 @ 04:10)  Wt(kg): --      PHYSICAL EXAM:    Constitutional: AAOx3, NAD  Eyes: PERRL, EOMI, sclera non-icteric  Neck: supple, no masses, no JVD, no lymphadenopathy  Respiratory: CTA b/l, no wheezing, rhonchi, rales, with normal respiratory effort  Cardiovascular: RRR, normal S1S2, no M/R/G  Gastrointestinal: soft, NTND, no masses palpable, BS normal in all four quadrants, no HSM  Extremities:  no edema  MSK: no obvious abnormalities, normal ROM, no lymphadenopathy  Neurological: Grossly intact  Skin: Normal temperature, no rash, no echymoses, no petichiae  Psych: normal affect    MEDICATIONS  (STANDING):  enoxaparin Injectable 40 milliGRAM(s) SubCutaneous every 24 hours  lidocaine   4% Patch 1 Patch Transdermal daily  multivitamin 1 Tablet(s) Oral daily  pantoprazole    Tablet 40 milliGRAM(s) Oral two times a day  polyethylene glycol 3350 17 Gram(s) Oral daily  sodium chloride 0.9%. 1000 milliLiter(s) (75 mL/Hr) IV Continuous <Continuous>    MEDICATIONS  (PRN):  acetaminophen     Tablet .. 650 milliGRAM(s) Oral every 6 hours PRN Temp greater or equal to 38C (100.4F), Mild Pain (1 - 3)  calcium carbonate    500 mG (Tums) Chewable 1 Tablet(s) Chew three times a day PRN Dyspepsia  HYDROmorphone  Injectable 0.6 milliGRAM(s) IV Push every 4 hours PRN Severe Pain (7 - 10)  melatonin 3 milliGRAM(s) Oral at bedtime PRN Sleep  ondansetron Injectable 4 milliGRAM(s) IV Push every 6 hours PRN Nausea and/or Vomiting      No Known Allergies      LABS:                        9.6    9.33  )-----------( 302      ( 24 Aug 2022 06:40 )             31.0     08-24    140  |  106  |  11  ----------------------------<  107<H>  4.0   |  21<L>  |  0.58    Ca    8.5      24 Aug 2022 06:40  Phos  3.2     08-24  Mg     1.60     08-24    TPro  6.4  /  Alb  3.0<L>  /  TBili  <0.2  /  DBili  x   /  AST  23  /  ALT  13  /  AlkPhos  75  08-24    PT/INR - ( 23 Aug 2022 06:16 )   PT: 14.3 sec;   INR: 1.23 ratio         PTT - ( 23 Aug 2022 06:16 )  PTT:27.4 sec                 RADIOLOGY & ADDITIONAL TESTS:  Studies reviewed.

## 2022-08-24 NOTE — PROGRESS NOTE ADULT - SUBJECTIVE AND OBJECTIVE BOX
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     OVERNIGHT EVENTS:    DNR on chart:  Allergies    No Known Allergies    Intolerances    MEDICATIONS  (STANDING):  enoxaparin Injectable 40 milliGRAM(s) SubCutaneous every 24 hours  lidocaine   4% Patch 1 Patch Transdermal daily  multivitamin 1 Tablet(s) Oral daily  pantoprazole    Tablet 40 milliGRAM(s) Oral two times a day  polyethylene glycol 3350 17 Gram(s) Oral daily  sodium chloride 0.9%. 1000 milliLiter(s) (75 mL/Hr) IV Continuous <Continuous>    MEDICATIONS  (PRN):  acetaminophen     Tablet .. 650 milliGRAM(s) Oral every 6 hours PRN Temp greater or equal to 38C (100.4F), Mild Pain (1 - 3)  calcium carbonate    500 mG (Tums) Chewable 1 Tablet(s) Chew three times a day PRN Dyspepsia  HYDROmorphone  Injectable 0.6 milliGRAM(s) IV Push every 4 hours PRN Severe Pain (7 - 10)  melatonin 3 milliGRAM(s) Oral at bedtime PRN Sleep  ondansetron Injectable 4 milliGRAM(s) IV Push every 6 hours PRN Nausea and/or Vomiting      ITEMS UNCHECKED ARE NOT PRESENT    PRESENT SYMPTOMS: [ ]Unable to self-report - see [ ] CPOT [ ] PAINADS [ ] RDOS  Source if other than patient:  [ ]Family   [ ]Team     Pain:  [ ]yes [ ]no  QOL impact -   Location -                    Aggravating factors -  Quality -  Radiation -  Timing-  Severity (0-10 scale):  Minimal acceptable level (0-10 scale):     CPOT:    https://www.sccm.org/getattachment/vtd87a20-7o4k-2b5c-4b2t-6613o6573w3k/Critical-Care-Pain-Observation-Tool-(CPOT)    PAIN AD Score:	  http://geriatrictoolkit.[place].[email or web address]/cog/painad.pdf (Ctrl + left click to view)    Dyspnea:                           [ ]Mild [ ]Moderate [ ]Severe    RDOS:  0 to 2  minimal or no respiratory distress   3  mild distress  4 to 6 moderate distress  >7 severe distress  https://[email or web address]/handouts/hen/Respiratory_Distress_Observation_Scale.pdf (Ctrl +  left click to view)     Anxiety:                             [ ]Mild [ ]Moderate [ ]Severe  Fatigue:                             [ ]Mild [ ]Moderate [ ]Severe  Nausea:                             [ ]Mild [ ]Moderate [ ]Severe  Loss of appetite:              [ ]Mild [ ]Moderate [ ]Severe  Constipation:                    [ ]Mild [ ]Moderate [ ]Severe    PCSSQ[Palliative Care Spiritual Screening Question]   Severity (0-10):  Score of 4 or > indicate consideration of Chaplaincy referral.  Chaplaincy Referral: [ ] yes [ ] refused [ ] following    Other Symptoms:  [ ]All other review of systems negative     Palliative Performance Status Version 2:         %      http://npcrc.org/files/news/palliative_performance_scale_ppsv2.pdf  PHYSICAL EXAM:  Vital Signs Last 24 Hrs  T(C): 36.6 (24 Aug 2022 13:46), Max: 36.9 (24 Aug 2022 04:10)  T(F): 97.8 (24 Aug 2022 13:46), Max: 98.5 (24 Aug 2022 04:10)  HR: 80 (24 Aug 2022 13:46) (73 - 80)  BP: 142/74 (24 Aug 2022 13:46) (135/69 - 142/74)  BP(mean): --  RR: 18 (24 Aug 2022 13:46) (17 - 18)  SpO2: 97% (24 Aug 2022 13:46) (97% - 98%)    Parameters below as of 24 Aug 2022 13:46  Patient On (Oxygen Delivery Method): room air     I&O's Summary     GENERAL: [ ]Cachexia    [ ]Alert  [ ]Oriented x   [ ]Lethargic  [ ]Unarousable  [ ]Verbal  [ ]Non-Verbal  Behavioral:   [ ]Anxiety  [ ]Delirium [ ]Agitation [ ]Other  HEENT:  [ ]Normal   [ ]Dry mouth   [ ]ET Tube/Trach  [ ]Oral lesions  PULMONARY:   [ ]Clear [ ]Tachypnea  [ ]Audible excessive secretions   [ ]Rhonchi        [ ]Right [ ]Left [ ]Bilateral  [ ]Crackles        [ ]Right [ ]Left [ ]Bilateral  [ ]Wheezing     [ ]Right [ ]Left [ ]Bilateral  [ ]Diminished BS [ ] Right [ ]Left [ ]Bilateral  CARDIOVASCULAR:    [ ]Regular [ ]Irregular [ ]Tachy  [ ]Luis Miguel [ ]Murmur [ ]Other  GASTROINTESTINAL:  [ ]Soft  [ ]Distended   [ ]+BS  [ ]Non tender [ ]Tender  [ ]Other [ ]PEG [ ]OGT/ NGT   Last BM:   GENITOURINARY:  [ ]Normal [ ]Incontinent   [ ]Oliguria/Anuria   [ ]Patterson  MUSCULOSKELETAL:   [ ]Normal   [ ]Weakness  [ ]Bed/Wheelchair bound [ ]Edema  NEUROLOGIC:   [ ]No focal deficits  [ ] Cognitive impairment  [ ] Dysphagia [ ]Dysarthria [ ] Paresis [ ]Other   SKIN:   [ ]Normal  [ ]Rash  [ ]Other  [ ]Pressure ulcer(s) [ ]y [ ]n present on admission    CRITICAL CARE:  [ ]Shock Present  [ ]Septic [ ]Cardiogenic [ ]Neurologic [ ]Hypovolemic  [ ]Vasopressors [ ]Inotropes  [ ]Respiratory failure present [ ]Mechanical Ventilation [ ]Non-invasive ventilatory support [ ]High-Flow   [ ]Acute  [ ]Chronic [ ]Hypoxic  [ ]Hypercarbic [ ]Other  [ ]Other organ failure     LABS:                        9.6    9.33  )-----------( 302      ( 24 Aug 2022 06:40 )             31.0   08-24    140  |  106  |  11  ----------------------------<  107<H>  4.0   |  21<L>  |  0.58    Ca    8.5      24 Aug 2022 06:40  Phos  3.2     08-24  Mg     1.60     08-24    TPro  6.4  /  Alb  3.0<L>  /  TBili  <0.2  /  DBili  x   /  AST  23  /  ALT  13  /  AlkPhos  75  08-24  PT/INR - ( 23 Aug 2022 06:16 )   PT: 14.3 sec;   INR: 1.23 ratio         PTT - ( 23 Aug 2022 06:16 )  PTT:27.4 sec      RADIOLOGY & ADDITIONAL STUDIES:    Protein Calorie Malnutrition Present: [ ]mild [ ]moderate [ ]severe [ ]underweight [ ]morbid obesity  https://www.andeal.org/vault/2440/web/files/ONC/Table_Clinical%20Characteristics%20to%20Document%20Malnutrition-White%20JV%20et%20al%202012.pdf    Height (cm): 154.9 (08-23-22 @ 08:53)  Weight (kg): 71.1 (08-23-22 @ 08:53)  BMI (kg/m2): 29.6 (08-23-22 @ 08:53)    [ ]PPSV2 < or = 30%  [ ]significant weight loss [ ]poor nutritional intake [ ]anasarca[ ]Artificial Nutrition    Other REFERRALS:  [ ]Hospice  [ ]Child Life  [ ]Social Work  [ ]Case management [ ]Holistic Therapy     Goals of Care Document: SUBJECTIVE AND OBJECTIVE:  Indication for Geriatrics and Palliative Care Services/INTERVAL HPI: 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     OVERNIGHT EVENTS: No acute overnight events, pain well controlled on the IV Dilaudid 2 doses overnight.     DNR on chart:  Allergies    No Known Allergies    Intolerances    MEDICATIONS  (STANDING):  enoxaparin Injectable 40 milliGRAM(s) SubCutaneous every 24 hours  lidocaine   4% Patch 1 Patch Transdermal daily  multivitamin 1 Tablet(s) Oral daily  pantoprazole    Tablet 40 milliGRAM(s) Oral two times a day  polyethylene glycol 3350 17 Gram(s) Oral daily  sodium chloride 0.9%. 1000 milliLiter(s) (75 mL/Hr) IV Continuous <Continuous>    MEDICATIONS  (PRN):  acetaminophen     Tablet .. 650 milliGRAM(s) Oral every 6 hours PRN Temp greater or equal to 38C (100.4F), Mild Pain (1 - 3)  calcium carbonate    500 mG (Tums) Chewable 1 Tablet(s) Chew three times a day PRN Dyspepsia  HYDROmorphone  Injectable 0.6 milliGRAM(s) IV Push every 4 hours PRN Severe Pain (7 - 10)  melatonin 3 milliGRAM(s) Oral at bedtime PRN Sleep  ondansetron Injectable 4 milliGRAM(s) IV Push every 6 hours PRN Nausea and/or Vomiting      ITEMS UNCHECKED ARE NOT PRESENT    PRESENT SYMPTOMS: [ ]Unable to self-report - see [ x] CPOT [ ] PAINADS [x ] RDOS  Source if other than patient:  [ ]Family   [ ]Team     Pain:  [ x]yes [ ]no  QOL impact -  limited by pain  Location -   upper abdomen                 Aggravating factors - none  Quality - achy  Radiation - none  Timing- constant   Severity (0-10 scale): 5  Minimal acceptable level (0-10 scale): 5    CPOT:  0  https://www.sccm.org/getattachment/oyo34y28-6m8n-2n4p-4n2f-2760p1610k7e/Critical-Care-Pain-Observation-Tool-(CPOT)    PAIN AD Score:	  http://geriatrictoolkit.[email or web address]/cog/painad.pdf (Ctrl + left click to view)    Dyspnea:                           [ ]Mild [ ]Moderate [ ]Severe    RDOS: 0  0 to 2  minimal or no respiratory distress   3  mild distress  4 to 6 moderate distress  >7 severe distress  https://[email or web address]/handouts/hen/Respiratory_Distress_Observation_Scale.pdf (Ctrl +  left click to view)     Anxiety:                             [ ]Mild [ ]Moderate [ ]Severe  Fatigue:                             [ ]Mild [ ]Moderate [ ]Severe  Nausea:                             [ ]Mild [ ]Moderate [ ]Severe  Loss of appetite:              [ ]Mild [ ]Moderate [ ]Severe  Constipation:                    [ ]Mild [ ]Moderate [ ]Severe    PCSSQ[Palliative Care Spiritual Screening Question]   Severity (0-10):  Score of 4 or > indicate consideration of Chaplaincy referral.  Chaplaincy Referral: [ ] yes [ ] refused [ ] following    Other Symptoms:  [x ]All other review of systems negative     Palliative Performance Status Version 2:   80      %      http://npcrc.org/files/news/palliative_performance_scale_ppsv2.pdf  PHYSICAL EXAM:  Vital Signs Last 24 Hrs  T(C): 36.6 (24 Aug 2022 13:46), Max: 36.9 (24 Aug 2022 04:10)  T(F): 97.8 (24 Aug 2022 13:46), Max: 98.5 (24 Aug 2022 04:10)  HR: 80 (24 Aug 2022 13:46) (73 - 80)  BP: 142/74 (24 Aug 2022 13:46) (135/69 - 142/74)  BP(mean): --  RR: 18 (24 Aug 2022 13:46) (17 - 18)  SpO2: 97% (24 Aug 2022 13:46) (97% - 98%)    Parameters below as of 24 Aug 2022 13:46  Patient On (Oxygen Delivery Method): room air     I&O's Summary     GENERAL: [ ]Cachexia    [x ]Alert  [x ]Oriented    [ ]Lethargic  [ ]Unarousable  [x ]Verbal  [ ]Non-Verbal  Behavioral:   [ ]Anxiety  [ ]Delirium [ ]Agitation [ ]Other  HEENT:  [x ]Normal   [ ]Dry mouth   [ ]ET Tube/Trach  [ ]Oral lesions  PULMONARY:   [ x]Clear [ ]Tachypnea  [ ]Audible excessive secretions   [ ]Rhonchi        [ ]Right [ ]Left [ ]Bilateral  [ ]Crackles        [ ]Right [ ]Left [ ]Bilateral  [ ]Wheezing     [ ]Right [ ]Left [ ]Bilateral  [ ]Diminished BS [ ] Right [ ]Left [ ]Bilateral  CARDIOVASCULAR:    [x ]Regular [ ]Irregular [ ]Tachy  [ ]Luis Miguel [ ]Murmur [ ]Other  GASTROINTESTINAL:  [x ]Soft  [ ]Distended   [x ]+BS  [ ]Non tender [x ]Tender  [ ]Other [ ]PEG [ ]OGT/ NGT   Last BM:  8/24/2022  GENITOURINARY:  [x ]Normal [ ]Incontinent   [ ]Oliguria/Anuria   [ ]Patterson  MUSCULOSKELETAL:   [x ]Normal   [ ]Weakness  [ ]Bed/Wheelchair bound [ ]Edema  NEUROLOGIC:   [x ]No focal deficits  [ ] Cognitive impairment  [ ] Dysphagia [ ]Dysarthria [ ] Paresis [ ]Other   SKIN:   [ ]Normal  [ ]Rash  [x ]Other  [ ]Pressure ulcer(s) [ ]y [x ]n present on admission    CRITICAL CARE:  [ ]Shock Present  [ ]Septic [ ]Cardiogenic [ ]Neurologic [ ]Hypovolemic  [ ]Vasopressors [ ]Inotropes  [ ]Respiratory failure present [ ]Mechanical Ventilation [ ]Non-invasive ventilatory support [ ]High-Flow   [ ]Acute  [ ]Chronic [ ]Hypoxic  [ ]Hypercarbic [ ]Other  [ ]Other organ failure     LABS:                        9.6    9.33  )-----------( 302      ( 24 Aug 2022 06:40 )             31.0   08-24    140  |  106  |  11  ----------------------------<  107<H>  4.0   |  21<L>  |  0.58    Ca    8.5      24 Aug 2022 06:40  Phos  3.2     08-24  Mg     1.60     08-24    TPro  6.4  /  Alb  3.0<L>  /  TBili  <0.2  /  DBili  x   /  AST  23  /  ALT  13  /  AlkPhos  75  08-24  PT/INR - ( 23 Aug 2022 06:16 )   PT: 14.3 sec;   INR: 1.23 ratio         PTT - ( 23 Aug 2022 06:16 )  PTT:27.4 sec      RADIOLOGY & ADDITIONAL STUDIES: < from: CT Chest w/ IV Cont (08.16.22 @ 22:23) >    Mediastinal, bilateral hilar, and right internal mammary lymphadenopathy;   indeterminate, but metastatic disease is a diagnostic consideration.    Right middle lobe 0.3 cm nodule; indeterminate and recommend CT chest   follow-up in 3 months to assess stability.        Protein Calorie Malnutrition Present: [ ]mild [ ]moderate [ ]severe [ ]underweight [ ]morbid obesity  https://www.andeal.org/vault/2440/web/files/ONC/Table_Clinical%20Characteristics%20to%20Document%20Malnutrition-White%20JV%20et%20al%627180.pdf    Height (cm): 154.9 (08-23-22 @ 08:53)  Weight (kg): 71.1 (08-23-22 @ 08:53)  BMI (kg/m2): 29.6 (08-23-22 @ 08:53)    [ ]PPSV2 < or = 30%  [ ]significant weight loss [ ]poor nutritional intake [ ]anasarca[ ]Artificial Nutrition    Other REFERRALS:  [ ]Hospice  [ ]Child Life  [ ]Social Work  [ ]Case management [ ]Holistic Therapy     Goals of Care Document: SUBJECTIVE AND OBJECTIVE:  Indication for Geriatrics and Palliative Care Services/INTERVAL HPI: 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     OVERNIGHT EVENTS: No acute overnight events, pain well controlled on the IV Dilaudid 2 doses overnight. At bedside was patient's cousin Last Gonzales, who is a support for her in her life. She named him HCP with her son as alternate.   Patient was seen again in the PM, stating she felt abdominal pain. She states IV dilaudid did help, but feels it somewhat gnawing again. She admits the pain is constant, but at times becomes worse.   Discussed with her option of around the clock meds with prn doses, which she was agreeable to.     DNR on chart: No   Allergies    No Known Allergies    Intolerances    MEDICATIONS  (STANDING):  enoxaparin Injectable 40 milliGRAM(s) SubCutaneous every 24 hours  lidocaine   4% Patch 1 Patch Transdermal daily  multivitamin 1 Tablet(s) Oral daily  pantoprazole    Tablet 40 milliGRAM(s) Oral two times a day  polyethylene glycol 3350 17 Gram(s) Oral daily  sodium chloride 0.9%. 1000 milliLiter(s) (75 mL/Hr) IV Continuous <Continuous>    MEDICATIONS  (PRN):  acetaminophen     Tablet .. 650 milliGRAM(s) Oral every 6 hours PRN Temp greater or equal to 38C (100.4F), Mild Pain (1 - 3)  calcium carbonate    500 mG (Tums) Chewable 1 Tablet(s) Chew three times a day PRN Dyspepsia  HYDROmorphone  Injectable 0.6 milliGRAM(s) IV Push every 4 hours PRN Severe Pain (7 - 10)  melatonin 3 milliGRAM(s) Oral at bedtime PRN Sleep  ondansetron Injectable 4 milliGRAM(s) IV Push every 6 hours PRN Nausea and/or Vomiting      ITEMS UNCHECKED ARE NOT PRESENT    PRESENT SYMPTOMS: [ ]Unable to self-report - see [ x] CPOT [ ] PAINADS [x ] RDOS  Source if other than patient:  [ ]Family   [ ]Team     Pain:  [ x]yes [ ]no  QOL impact -  limited by pain  Location -   upper abdomen                 Aggravating factors - none  Quality - achy  Radiation - none  Timing- constant   Severity (0-10 scale): 5  Minimal acceptable level (0-10 scale): 5    CPOT:  0  https://www.sccm.org/getattachment/bom04j01-6b6t-6g8t-7c5h-4519e5244l1k/Critical-Care-Pain-Observation-Tool-(CPOT)    PAIN AD Score:	  http://geriatrictoolkit.[email or web address]/cog/painad.pdf (Ctrl + left click to view)    Dyspnea:                           [ ]Mild [ ]Moderate [ ]Severe    RDOS: 0  0 to 2  minimal or no respiratory distress   3  mild distress  4 to 6 moderate distress  >7 severe distress  https://[email or web address]/handouts/hen/Respiratory_Distress_Observation_Scale.pdf (Ctrl +  left click to view)     Anxiety:                             [ ]Mild [ ]Moderate [ ]Severe  Fatigue:                             [ ]Mild [ ]Moderate [ ]Severe  Nausea:                             [X ]Mild [ ]Moderate [ ]Severe  Loss of appetite:              [ ]Mild [ ]Moderate [ ]Severe  Constipation:                    [ ]Mild [ ]Moderate [ ]Severe    PCSSQ[Palliative Care Spiritual Screening Question]   Severity (0-10):  Score of 4 or > indicate consideration of Chaplaincy referral.  Chaplaincy Referral: [ ] yes [ ] refused [ ] following    Other Symptoms:  [x ]All other review of systems negative     Palliative Performance Status Version 2:   80      %      http://npcrc.org/files/news/palliative_performance_scale_ppsv2.pdf  PHYSICAL EXAM:  Vital Signs Last 24 Hrs  T(C): 36.6 (24 Aug 2022 13:46), Max: 36.9 (24 Aug 2022 04:10)  T(F): 97.8 (24 Aug 2022 13:46), Max: 98.5 (24 Aug 2022 04:10)  HR: 80 (24 Aug 2022 13:46) (73 - 80)  BP: 142/74 (24 Aug 2022 13:46) (135/69 - 142/74)  BP(mean): --  RR: 18 (24 Aug 2022 13:46) (17 - 18)  SpO2: 97% (24 Aug 2022 13:46) (97% - 98%)    Parameters below as of 24 Aug 2022 13:46  Patient On (Oxygen Delivery Method): room air     I&O's Summary     GENERAL: [ ]Cachexia    [x ]Alert  [x ]Oriented    [ ]Lethargic  [ ]Unarousable  [x ]Verbal  [ ]Non-Verbal  Behavioral:   [ ]Anxiety  [ ]Delirium [ ]Agitation [ ]Other  HEENT:  [x ]Normal   [ ]Dry mouth   [ ]ET Tube/Trach  [ ]Oral lesions  PULMONARY:   [ x]Clear [ ]Tachypnea  [ ]Audible excessive secretions   [ ]Rhonchi        [ ]Right [ ]Left [ ]Bilateral  [ ]Crackles        [ ]Right [ ]Left [ ]Bilateral  [ ]Wheezing     [ ]Right [ ]Left [ ]Bilateral  [ ]Diminished BS [ ] Right [ ]Left [ ]Bilateral  CARDIOVASCULAR:    [x ]Regular [ ]Irregular [ ]Tachy  [ ]Luis Miguel [ ]Murmur [ ]Other  GASTROINTESTINAL:  [x ]Soft  [ ]Distended   [x ]+BS  [ ]Non tender [x ]Tender  [ ]Other [ ]PEG [ ]OGT/ NGT   Last BM:  8/24/2022  GENITOURINARY:  [x ]Normal [ ]Incontinent   [ ]Oliguria/Anuria   [ ]Patterson  MUSCULOSKELETAL:   [x ]Normal   [ ]Weakness  [ ]Bed/Wheelchair bound [ ]Edema  NEUROLOGIC:   [x ]No focal deficits  [ ] Cognitive impairment  [ ] Dysphagia [ ]Dysarthria [ ] Paresis [ ]Other   SKIN:   [ ]Normal  [ ]Rash  [x ]Other  [ ]Pressure ulcer(s) [ ]y [x ]n present on admission    CRITICAL CARE:  [ ]Shock Present  [ ]Septic [ ]Cardiogenic [ ]Neurologic [ ]Hypovolemic  [ ]Vasopressors [ ]Inotropes  [ ]Respiratory failure present [ ]Mechanical Ventilation [ ]Non-invasive ventilatory support [ ]High-Flow   [ ]Acute  [ ]Chronic [ ]Hypoxic  [ ]Hypercarbic [ ]Other  [ ]Other organ failure     LABS:                        9.6    9.33  )-----------( 302      ( 24 Aug 2022 06:40 )             31.0   08-24    140  |  106  |  11  ----------------------------<  107<H>  4.0   |  21<L>  |  0.58    Ca    8.5      24 Aug 2022 06:40  Phos  3.2     08-24  Mg     1.60     08-24    TPro  6.4  /  Alb  3.0<L>  /  TBili  <0.2  /  DBili  x   /  AST  23  /  ALT  13  /  AlkPhos  75  08-24  PT/INR - ( 23 Aug 2022 06:16 )   PT: 14.3 sec;   INR: 1.23 ratio         PTT - ( 23 Aug 2022 06:16 )  PTT:27.4 sec      RADIOLOGY & ADDITIONAL STUDIES: < from: CT Chest w/ IV Cont (08.16.22 @ 22:23) >    Mediastinal, bilateral hilar, and right internal mammary lymphadenopathy;   indeterminate, but metastatic disease is a diagnostic consideration.    Right middle lobe 0.3 cm nodule; indeterminate and recommend CT chest   follow-up in 3 months to assess stability.      Protein Calorie Malnutrition Present: [ ]mild [ ]moderate [ ]severe [ ]underweight [ ]morbid obesity  https://www.andeal.org/vault/2440/web/files/ONC/Table_Clinical%20Characteristics%20to%20Document%20Malnutrition-White%20JV%20et%20al%408843.pdf    Height (cm): 154.9 (08-23-22 @ 08:53)  Weight (kg): 71.1 (08-23-22 @ 08:53)  BMI (kg/m2): 29.6 (08-23-22 @ 08:53)    [ ]PPSV2 < or = 30%  [ ]significant weight loss [X ]poor nutritional intake [ ]anasarca[ ]Artificial Nutrition    Other REFERRALS:  [ ]Hospice  [ ]Child Life  [ ]Social Work  [ ]Case management [ ]Holistic Therapy

## 2022-08-24 NOTE — PROGRESS NOTE ADULT - ATTENDING COMMENTS
57F From [facility], w/ PMHx of nephrolithiasis p/w abd pain and n/v found to have gastric adenocarcinoma (on bx from 8/15 EGD) w/ mets to LN c/b DEVI. DEVI now resolved s/p ivfs. Nephrolithiasis also noted on imaging but not obstructed w/o clinical signs or symp of infection. Now s/p EBUS w/ LN bx w/ prelim concerning for malignancy.     -F/u LN bx path  -C/w Pain control, Palliative consult appreciated, pain improved now.    -Surg Onc following, plan for OR Friday.  - heme/onc recs appreciated, o/p Sanjay f/u

## 2022-08-24 NOTE — PROGRESS NOTE ADULT - SUBJECTIVE AND OBJECTIVE BOX
OVERNIGHT EVENTS: No acute overnight events.      SUBJECTIVE:       MEDICATIONS  (STANDING):  enoxaparin Injectable 40 milliGRAM(s) SubCutaneous every 24 hours  lidocaine   4% Patch 1 Patch Transdermal daily  multivitamin 1 Tablet(s) Oral daily  pantoprazole    Tablet 40 milliGRAM(s) Oral two times a day  polyethylene glycol 3350 17 Gram(s) Oral daily  sodium chloride 0.9%. 1000 milliLiter(s) (75 mL/Hr) IV Continuous <Continuous>    MEDICATIONS  (PRN):  acetaminophen     Tablet .. 650 milliGRAM(s) Oral every 6 hours PRN Temp greater or equal to 38C (100.4F), Mild Pain (1 - 3)  calcium carbonate    500 mG (Tums) Chewable 1 Tablet(s) Chew three times a day PRN Dyspepsia  HYDROmorphone  Injectable 0.6 milliGRAM(s) IV Push every 4 hours PRN Severe Pain (7 - 10)  melatonin 3 milliGRAM(s) Oral at bedtime PRN Sleep  ondansetron Injectable 4 milliGRAM(s) IV Push every 6 hours PRN Nausea and/or Vomiting        T(F): 98.5 (08-24-22 @ 04:10), Max: 98.5 (08-23-22 @ 08:29)  HR: 73 (08-24-22 @ 04:10) (66 - 88)  BP: 137/72 (08-24-22 @ 04:10) (102/70 - 137/72)  BP(mean): 858 (08-23-22 @ 12:30) (76 - 858)  RR: 17 (08-24-22 @ 04:10) (17 - 26)  SpO2: 98% (08-24-22 @ 04:10) (93% - 99%)    PHYSICAL EXAM:     GENERAL: NAD, sitting in chair, hunched over clutching stomach  HEAD:  Atraumatic, Normocephalic  EYES: EOMI, PERRLA, conjunctiva and sclera clear, no nystagmus noted  ENT: Slightly dry mucous membranes,   NECK: Supple, No JVD, trachea midline  CHEST/LUNG: Clear to auscultation bilaterally; No rales, rhonchi, wheezing, or rubs. Unlabored respirations  HEART: Regular rate and rhythm; No murmurs, rubs, or gallops, normal S1/S2  ABDOMEN: normal bowel sounds; abdominal distension, 10/10 epigastric pain upon palpation and at rest, no rebound tenderness or guarding    EXTREMITIES:  2+ Peripheral Pulses, brisk capillary refill. No clubbing, cyanosis, or edema  MSK: No gross deformities noted   Neurological:  A&Ox3, no focal deficits   SKIN: No rashes or lesions  PSYCH: Normal mood, affect       TELEMETRY:    LABS:                        10.4   8.77  )-----------( 286      ( 23 Aug 2022 06:16 )             34.8     08-23    139  |  103  |  10  ----------------------------<  76  4.0   |  19<L>  |  0.55    Ca    8.4      23 Aug 2022 06:16  Phos  3.5     08-23  Mg     1.70     08-23    TPro  6.9  /  Alb  3.2<L>  /  TBili  0.3  /  DBili  x   /  AST  25  /  ALT  9   /  AlkPhos  87  08-23        PT/INR - ( 23 Aug 2022 06:16 )   PT: 14.3 sec;   INR: 1.23 ratio         PTT - ( 23 Aug 2022 06:16 )  PTT:27.4 sec    Creatinine Trend: 0.55<--, 0.64<--, 0.63<--, 0.62<--, 0.63<--, 0.62<--  I&O's Summary    BNP    RADIOLOGY & ADDITIONAL STUDIES:                 OVERNIGHT EVENTS: No acute overnight events.      SUBJECTIVE: Patient states her pain is more well-controlled with the IV dilaudid as opposed to the PO. Does not note 3/10 epigastric/RUQ pain and abdominal distension. She has been able to eat breakfast without difficulty this AM.  Denies chest pain, SOB, fever, chills, n/v, hematuria, hematochezia, rash, HA      MEDICATIONS  (STANDING):  enoxaparin Injectable 40 milliGRAM(s) SubCutaneous every 24 hours  lidocaine   4% Patch 1 Patch Transdermal daily  multivitamin 1 Tablet(s) Oral daily  pantoprazole    Tablet 40 milliGRAM(s) Oral two times a day  polyethylene glycol 3350 17 Gram(s) Oral daily  sodium chloride 0.9%. 1000 milliLiter(s) (75 mL/Hr) IV Continuous <Continuous>    MEDICATIONS  (PRN):  acetaminophen     Tablet .. 650 milliGRAM(s) Oral every 6 hours PRN Temp greater or equal to 38C (100.4F), Mild Pain (1 - 3)  calcium carbonate    500 mG (Tums) Chewable 1 Tablet(s) Chew three times a day PRN Dyspepsia  HYDROmorphone  Injectable 0.6 milliGRAM(s) IV Push every 4 hours PRN Severe Pain (7 - 10)  melatonin 3 milliGRAM(s) Oral at bedtime PRN Sleep  ondansetron Injectable 4 milliGRAM(s) IV Push every 6 hours PRN Nausea and/or Vomiting        T(F): 98.5 (08-24-22 @ 04:10), Max: 98.5 (08-23-22 @ 08:29)  HR: 73 (08-24-22 @ 04:10) (66 - 88)  BP: 137/72 (08-24-22 @ 04:10) (102/70 - 137/72)  BP(mean): 858 (08-23-22 @ 12:30) (76 - 858)  RR: 17 (08-24-22 @ 04:10) (17 - 26)  SpO2: 98% (08-24-22 @ 04:10) (93% - 99%)    PHYSICAL EXAM:     GENERAL: NAD, sitting in chair, hunched over clutching stomach  HEAD:  Atraumatic, Normocephalic  EYES: EOMI, PERRLA, conjunctiva and sclera clear, no nystagmus noted  ENT: Slightly dry mucous membranes,   NECK: Supple, No JVD, trachea midline  CHEST/LUNG: Clear to auscultation bilaterally; No rales, rhonchi, wheezing, or rubs. Unlabored respirations  HEART: Regular rate and rhythm; No murmurs, rubs, or gallops, normal S1/S2  ABDOMEN: normal bowel sounds; abdominal distension, 10/10 epigastric pain upon palpation and at rest, no rebound tenderness or guarding    EXTREMITIES:  2+ Peripheral Pulses, brisk capillary refill. No clubbing, cyanosis, or edema  MSK: No gross deformities noted   Neurological:  A&Ox3, no focal deficits   SKIN: No rashes or lesions  PSYCH: Normal mood, affect       TELEMETRY:    LABS:                        10.4   8.77  )-----------( 286      ( 23 Aug 2022 06:16 )             34.8     08-23    139  |  103  |  10  ----------------------------<  76  4.0   |  19<L>  |  0.55    Ca    8.4      23 Aug 2022 06:16  Phos  3.5     08-23  Mg     1.70     08-23    TPro  6.9  /  Alb  3.2<L>  /  TBili  0.3  /  DBili  x   /  AST  25  /  ALT  9   /  AlkPhos  87  08-23        PT/INR - ( 23 Aug 2022 06:16 )   PT: 14.3 sec;   INR: 1.23 ratio         PTT - ( 23 Aug 2022 06:16 )  PTT:27.4 sec    Creatinine Trend: 0.55<--, 0.64<--, 0.63<--, 0.62<--, 0.63<--, 0.62<--  I&O's Summary    BNP    RADIOLOGY & ADDITIONAL STUDIES:

## 2022-08-24 NOTE — PROGRESS NOTE ADULT - SUBJECTIVE AND OBJECTIVE BOX
Interval Events: Patient was seen and examined at bedside. No overnight events.    Patient doing well, mild cough and thin sputum which is expected post bronchoscopy.  Main complain is abn discomfort and pain. Improves with medications.    REVIEW OF SYSTEMS:  Constitutional: [- ] fevers [ -] chills [ -] weight loss [- ] weight gain  CV: [ -] chest pain [ -] orthopnea [ -] palpitations [ -] murmur  Resp: [ +] cough [ -] shortness of breath [- ] dyspnea [- ] wheezing [+ ] sputum [- ] hemoptysis  [ ] All other systems negative  [ ] Unable to assess ROS because ________    OBJECTIVE:  ICU Vital Signs Last 24 Hrs  T(C): 36.9 (24 Aug 2022 04:10), Max: 36.9 (24 Aug 2022 04:10)  T(F): 98.5 (24 Aug 2022 04:10), Max: 98.5 (24 Aug 2022 04:10)  HR: 73 (24 Aug 2022 04:10) (67 - 73)  BP: 137/72 (24 Aug 2022 04:10) (102/70 - 137/72)  BP(mean): 858 (23 Aug 2022 12:30) (76 - 858)  ABP: --  ABP(mean): --  RR: 17 (24 Aug 2022 04:10) (17 - 20)  SpO2: 98% (24 Aug 2022 04:10) (93% - 98%)    O2 Parameters below as of 24 Aug 2022 04:10  Patient On (Oxygen Delivery Method): room air    CAPILLARY BLOOD GLUCOSE    PHYSICAL EXAM:  General: Awake, alert, oriented X 3.   HEENT: Atraumatic, normocephalic.                 Mallampatti Grade                 No nasal congestion.                No tonsillar or pharyngeal exudates.  Lymph Nodes: No palpable lymphadenopathy  Neck: No JVD. No carotid bruit.   Respiratory: Normal chest expansion                         Normal percussion                         Normal and equal air entry                         No wheeze, rhonchi or rales.  Cardiovascular: S1 S2 normal. No murmurs, rubs or gallops.   Abdomen: Soft, non-tender, non-distended. No organomegaly.  Extremities: Warm to touch. Peripheral pulse palpable. No pedal edema.   Skin: No rashes or skin lesions  Neurological: Motor and sensory examination equal and normal in all four extremities.  Psychiatry: Appropriate mood and affect.        HOSPITAL MEDICATIONS:  MEDICATIONS  (STANDING):  enoxaparin Injectable 40 milliGRAM(s) SubCutaneous every 24 hours  lidocaine   4% Patch 1 Patch Transdermal daily  multivitamin 1 Tablet(s) Oral daily  pantoprazole    Tablet 40 milliGRAM(s) Oral two times a day  polyethylene glycol 3350 17 Gram(s) Oral daily  sodium chloride 0.9%. 1000 milliLiter(s) (75 mL/Hr) IV Continuous <Continuous>    MEDICATIONS  (PRN):  acetaminophen     Tablet .. 650 milliGRAM(s) Oral every 6 hours PRN Temp greater or equal to 38C (100.4F), Mild Pain (1 - 3)  calcium carbonate    500 mG (Tums) Chewable 1 Tablet(s) Chew three times a day PRN Dyspepsia  HYDROmorphone  Injectable 0.6 milliGRAM(s) IV Push every 4 hours PRN Severe Pain (7 - 10)  melatonin 3 milliGRAM(s) Oral at bedtime PRN Sleep  ondansetron Injectable 4 milliGRAM(s) IV Push every 6 hours PRN Nausea and/or Vomiting      LABS:                        9.6    9.33  )-----------( 302      ( 24 Aug 2022 06:40 )             31.0     Hgb Trend: 9.6<--, 10.4<--, 10.8<--, 10.6<--, 11.0<--  08-24    140  |  106  |  11  ----------------------------<  107<H>  4.0   |  21<L>  |  0.58    Ca    8.5      24 Aug 2022 06:40  Phos  3.2     08-24  Mg     1.60     08-24    TPro  6.4  /  Alb  3.0<L>  /  TBili  <0.2  /  DBili  x   /  AST  23  /  ALT  13  /  AlkPhos  75  08-24    Creatinine Trend: 0.58<--, 0.55<--, 0.64<--, 0.63<--, 0.62<--, 0.63<--  PT/INR - ( 23 Aug 2022 06:16 )   PT: 14.3 sec;   INR: 1.23 ratio         PTT - ( 23 Aug 2022 06:16 )  PTT:27.4 sec    MICROBIOLOGY:     RADIOLOGY & EKG:  [x ] Reviewed and interpreted by me

## 2022-08-24 NOTE — PROGRESS NOTE ADULT - PROBLEM SELECTOR PLAN 2
Pain well tolerated on current pain regimen. Pian located in upper abdomen, rated 4/10 currently from 8/10 .   Continue Dilaudid 0.6 mg Q4H prn For Severe pain, used 2 does overnight. - Pain located in epigastric area (area of gastric cancer), rated 4/10 currently from 8/10 .   - Patient stated IV dilaudid dose has helped but pain is constant and prn doses not adequate   - Would start IV dilaudid 0.6 mg q4h ATC with 0.3 mg q2h PRN for severe/breakthrough pain

## 2022-08-24 NOTE — PROGRESS NOTE ADULT - SUBJECTIVE AND OBJECTIVE BOX
SURGICAL ONCOLOGY PROGRESS NOTE    No new complaints    Vital Signs Last 24 Hrs  T(C): 36.9 (24 Aug 2022 04:10), Max: 36.9 (24 Aug 2022 04:10)  T(F): 98.5 (24 Aug 2022 04:10), Max: 98.5 (24 Aug 2022 04:10)  HR: 73 (24 Aug 2022 04:10) (66 - 75)  BP: 137/72 (24 Aug 2022 04:10) (102/70 - 137/72)  BP(mean): 858 (23 Aug 2022 12:30) (76 - 858)  RR: 17 (24 Aug 2022 04:10) (17 - 20)  SpO2: 98% (24 Aug 2022 04:10) (93% - 99%)    Parameters below as of 24 Aug 2022 04:10  Patient On (Oxygen Delivery Method): room air      I&O's Detail      PE:    A&A  NAD    soft, NT, ND, No peritoneal signs                            9.6    9.33  )-----------( 302      ( 24 Aug 2022 06:40 )             31.0     08-24    140  |  106  |  11  ----------------------------<  107<H>  4.0   |  21<L>  |  0.58    Ca    8.5      24 Aug 2022 06:40  Phos  3.2     08-24  Mg     1.60     08-24    TPro  6.4  /  Alb  3.0<L>  /  TBili  <0.2  /  DBili  x   /  AST  23  /  ALT  13  /  AlkPhos  75  08-24

## 2022-08-24 NOTE — PROGRESS NOTE ADULT - PROBLEM SELECTOR PLAN 7
DVT ppx: Was on lovenox - currently being held for procedure, will restart today per pulm at 1800    Severe protein calorie malnutrition-f/u calorie count and any additional nutrition/dietician recs . Pt will likely need alternative measure for nutrition.     Dispo planning: complex dispo planning due to lack of insurance

## 2022-08-24 NOTE — PROGRESS NOTE ADULT - PROBLEM SELECTOR PLAN 3
As per RD, ensure clear  - diet back to regular diet as tolerated  72 hour caloric count to evaluate for malnutrition - indicates malnutrition  may need evaluation for J-tube for nutritional support if unable to tolerate diet  surg/onc consult - pending recs As per RD, ensure clear  - diet back to regular diet as tolerated  72 hour caloric count to evaluate for malnutrition - indicates malnutrition  may need evaluation for J-tube for nutritional support if unable to tolerate diet  pending OR friday

## 2022-08-24 NOTE — PROGRESS NOTE ADULT - PROBLEM SELECTOR PLAN 6
Stable. Anemia to 11.4, microcytic but barely at 79.8. May be combination of Iron deficiency anemia from poor PO intake and normocytic anemia from Anemia of Chronic Disease w/ this presentation c/f gastric malignancy.  - Total Iron 22, TIBC 165, transferrin 149    8/22 - Hb 10.8, H/H stable    continue to monitor with CBCs

## 2022-08-24 NOTE — PROGRESS NOTE ADULT - NUTRITIONAL ASSESSMENT
This patient has been assessed with a concern for Malnutrition and has been determined to have a diagnosis/diagnoses of Severe protein-calorie malnutrition.    This patient is being managed with:   Diet Regular-  Lacto-Ovo Veg (Accepts Milk Prod. Eggs)  Supplement Feeding Modality:  Oral  Ensure Enlive Cans or Servings Per Day:  1       Frequency:  Daily  Entered: Aug 21 2022  1:08PM

## 2022-08-24 NOTE — PROGRESS NOTE ADULT - ASSESSMENT
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   - GI EUS on 8/16 with biopsy of only gastric mucosa; no LN; Procedure note stating normal esophagus, diffuse infiltrative changes in the gastric body concerning for gastric malignancy.   - Pathology stating: Poorly differentiated adenocarcinoma, pending HER2 and MMR status. No IHC stains reported. Pathologist unable to give depth of invasion as tissue sample does not contain submucosa.   - S/P LN biopsy with frozen section reported to be positive for mlaignancy   -surg Onc consulted; planning on OR friday.   - No plan for inpatient chemotherapy at this time. [facility] need [facility] up at [facility]  PGY5 heme onc fellow   [phone]; please contact on call fellow from 5pm to 8am and weekends.

## 2022-08-24 NOTE — PROGRESS NOTE ADULT - PROBLEM SELECTOR PLAN 3
Patient has listed her  Last Gonzales and her son Dmitry Pablo as her HCP, form filled and on file. Copies made ,1 copy has been given to her as well. Patient has listed her cousin Last Gonzales and her son Dmitry Pablo as her HCP, form filled and on file. Copies made ,1 copy has been given to her as well.

## 2022-08-24 NOTE — PROGRESS NOTE ADULT - NUTRITIONAL ASSESSMENT
64M PMHx seizure disorder, schizophrenia, HTN here with altered mental status.    sitting in chair outside of room. Whispering and asking to come closer to him.     REVIEW OF SYSTEMS:  Not a reliable historian    Allergies  No Known Allergies    FAMILY HISTORY:  No pertinent family history in first degree relatives  unknown    patient refuses vitals     Vital Signs Last 24 Hrs  T(C): --  T(F): --  HR: --  BP: --  BP(mean): --  RR: --  SpO2: --    PHYSICAL EXAM:  Refusing  General: NAD, laying in bed  HEENT: NC/AT; PERRL,   Respiratory: good air movement, no increased work of breathing  Cardiovascular: RRR,  Abdomen: unable to assess  Extremities: no edema    MEDICATIONS  (STANDING):  amLODIPine   Tablet 10 milliGRAM(s) Oral daily  AQUAPHOR (petrolatum Ointment) 1 Application(s) Topical daily  chlorhexidine 4% Liquid 1 Application(s) Topical <User Schedule>  diVALproex  milliGRAM(s) Oral two times a day  enalapril 10 milliGRAM(s) Oral daily  enoxaparin Injectable 40 milliGRAM(s) SubCutaneous daily  gabapentin 100 milliGRAM(s) Oral three times a day  levETIRAcetam 750 milliGRAM(s) Oral two times a day  OLANZapine 5 milliGRAM(s) Oral daily  senna 2 Tablet(s) Oral at bedtime    MEDICATIONS  (PRN):  cloNIDine 0.1 milliGRAM(s) Oral every 8 hours PRN htn  haloperidol    Injectable 2 milliGRAM(s) IntraMuscular every 6 hours PRN Agitation  polyethylene glycol 3350 17 Gram(s) Oral two times a day PRN Constipation            ASSESSMENT AND PLAN    64M PMHx seizure disorder, schizophrenia, HTN here with altered mental status.    #Altered mental status, toxic metabolic encephalopathy  possible h/o schizophrenia, suspect psychosis vs. 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 Court date was 1/28, guardian to be appointed. Pending     This patient has been assessed with a concern for Malnutrition and has been determined to have a diagnosis/diagnoses of Severe protein-calorie malnutrition.    This patient is being managed with:   Diet Regular-  Lacto-Ovo Veg (Accepts Milk Prod. Eggs)  Supplement Feeding Modality:  Oral  Ensure Enlive Cans or Servings Per Day:  1       Frequency:  Daily  Entered: Aug 21 2022  1:08PM

## 2022-08-24 NOTE — PROGRESS NOTE ADULT - ASSESSMENT
57F PMH of renal stones p/w gastric adenocarcinoma w/ mets to LN c/b DEVI, now resolved, and nephrolithiasis.

## 2022-08-24 NOTE — PROGRESS NOTE ADULT - ASSESSMENT
57 yr old F , recently diagnosed with poorly differentiated gastric  adenocarcinoma , planning OR Friday. No plans for IP chemotherapy at this time, to follow up at [facility].  57 yr old F , recently diagnosed with poorly differentiated gastric  adenocarcinoma, most likely metastatic, planning OR Friday. No plans for IP chemotherapy at this time, to follow up at [facility].

## 2022-08-24 NOTE — PROGRESS NOTE ADULT - ASSESSMENT
57 y.o. female who presents to the hospital for abdominal pain lasting about 2 months, associated with nausea/vomiting. On this admission was found to have poorly differentiated adenocarcinoma. However, will need an EBUS for enlarged mediastinal LAD to assess for metastatic disease as well as additional tissue.     Patient is now s/p EBUS, Rapid path with likely adenocarcinoma indicating likely metastatic disease.    # Mediastinal LAD  # Likely metastatic adenocarcinoma   - Doing well from a respiratory status post EBUS  - Rapid path with likely adenocarcinoma indicating likely metastatic disease  - Optimized from a pulmonary prospective for OR.   - Pulmonary will signs off at this time. Please call back with any questions or change in status.

## 2022-08-24 NOTE — PROGRESS NOTE ADULT - PROBLEM SELECTOR PLAN 5
Seen on CT imaging, nonobstructive. May be adding to the flank pain presentation, unlikely to be sole cause of abdominal tenderness. Pt denies [id number] history of nephrolithiasis, but gives a history about being evaluated for nephroliths. Was seen at [facility] for abdominal pain, according to her, and prescribed NSAIDs, possibly being diagnosed with nephrolithiasis there.  - s/p IV hydration  - No evidence for obstruction or infection. Continue to monitor.  - Pain control w/ Lidocaine patch on bilateral back/ flanks PRN  -  (avoid NSAIDs for now)

## 2022-08-25 NOTE — PROGRESS NOTE ADULT - SUBJECTIVE AND OBJECTIVE BOX
OVERNIGHT EVENTS: No acute overnight events.      SUBJECTIVE:   Denies chest pain, SOB, rash, fever, chills, n/v, HA, changes in vision, dysuria, hematochezia    MEDICATIONS  (STANDING):  enoxaparin Injectable 40 milliGRAM(s) SubCutaneous every 24 hours  HYDROmorphone  Injectable 0.6 milliGRAM(s) IV Push every 4 hours  lidocaine   4% Patch 1 Patch Transdermal daily  multivitamin 1 Tablet(s) Oral daily  pantoprazole    Tablet 40 milliGRAM(s) Oral two times a day  polyethylene glycol 3350 17 Gram(s) Oral daily  sodium chloride 0.9%. 1000 milliLiter(s) (75 mL/Hr) IV Continuous <Continuous>    MEDICATIONS  (PRN):  acetaminophen     Tablet .. 650 milliGRAM(s) Oral every 6 hours PRN Temp greater or equal to 38C (100.4F), Mild Pain (1 - 3)  calcium carbonate    500 mG (Tums) Chewable 1 Tablet(s) Chew three times a day PRN Dyspepsia  HYDROmorphone  Injectable 0.3 milliGRAM(s) IV Push every 2 hours PRN Severe Pain (7 - 10)  melatonin 3 milliGRAM(s) Oral at bedtime PRN Sleep  ondansetron Injectable 4 milliGRAM(s) IV Push every 6 hours PRN Nausea and/or Vomiting        T(F): 97.9 (08-25-22 @ 06:10), Max: 98.3 (08-24-22 @ 21:38)  HR: 91 (08-25-22 @ 06:10) (74 - 97)  BP: 143/89 (08-25-22 @ 06:10) (125/63 - 143/89)  BP(mean): --  RR: 17 (08-25-22 @ 06:10) (17 - 18)  SpO2: 97% (08-25-22 @ 06:10) (97% - 99%)    PHYSICAL EXAM:     GENERAL: NAD, lying in bed comfortably  HEAD:  Atraumatic, Normocephalic  EYES: EOMI, PERRLA, conjunctiva and sclera clear, no nystagmus noted  ENT: Moist mucous membranes,   NECK: Supple, No JVD, trachea midline  CHEST/LUNG: Clear to auscultation bilaterally; No rales, rhonchi, wheezing, or rubs. Unlabored respirations  HEART: Regular rate and rhythm; No murmurs, rubs, or gallops, normal S1/S2  ABDOMEN: normal bowel sounds; Soft, nontender, nondistended, no organomegaly   EXTREMITIES:  2+ Peripheral Pulses, brisk capillary refill. No clubbing, cyanosis, or edema  MSK: No gross deformities noted   Neurological:  A&Ox3, no focal deficits   SKIN: No rashes or lesions  PSYCH: Normal mood, affect     TELEMETRY:    LABS:                        9.6    9.33  )-----------( 302      ( 24 Aug 2022 06:40 )             31.0     08-24    140  |  106  |  11  ----------------------------<  107<H>  4.0   |  21<L>  |  0.58    Ca    8.5      24 Aug 2022 06:40  Phos  3.2     08-24  Mg     1.60     08-24    TPro  6.4  /  Alb  3.0<L>  /  TBili  <0.2  /  DBili  x   /  AST  23  /  ALT  13  /  AlkPhos  75  08-24            Creatinine Trend: 0.58<--, 0.55<--, 0.64<--, 0.63<--, 0.62<--, 0.63<--  I&O's Summary    BNP    RADIOLOGY & ADDITIONAL STUDIES:                 OVERNIGHT EVENTS: No acute overnight events.      SUBJECTIVE:   Denies chest pain, SOB, rash, fever, chills, n/v, HA, changes in vision, dysuria, hematochezia    MEDICATIONS  (STANDING):  enoxaparin Injectable 40 milliGRAM(s) SubCutaneous every 24 hours  HYDROmorphone  Injectable 0.6 milliGRAM(s) IV Push every 4 hours  lidocaine   4% Patch 1 Patch Transdermal daily  multivitamin 1 Tablet(s) Oral daily  pantoprazole    Tablet 40 milliGRAM(s) Oral two times a day  polyethylene glycol 3350 17 Gram(s) Oral daily  sodium chloride 0.9%. 1000 milliLiter(s) (75 mL/Hr) IV Continuous <Continuous>    MEDICATIONS  (PRN):  acetaminophen     Tablet .. 650 milliGRAM(s) Oral every 6 hours PRN Temp greater or equal to 38C (100.4F), Mild Pain (1 - 3)  calcium carbonate    500 mG (Tums) Chewable 1 Tablet(s) Chew three times a day PRN Dyspepsia  HYDROmorphone  Injectable 0.3 milliGRAM(s) IV Push every 2 hours PRN Severe Pain (7 - 10)  melatonin 3 milliGRAM(s) Oral at bedtime PRN Sleep  ondansetron Injectable 4 milliGRAM(s) IV Push every 6 hours PRN Nausea and/or Vomiting        T(F): 97.9 (08-25-22 @ 06:10), Max: 98.3 (08-24-22 @ 21:38)  HR: 91 (08-25-22 @ 06:10) (74 - 97)  BP: 143/89 (08-25-22 @ 06:10) (125/63 - 143/89)  BP(mean): --  RR: 17 (08-25-22 @ 06:10) (17 - 18)  SpO2: 97% (08-25-22 @ 06:10) (97% - 99%)    PHYSICAL EXAM:     GENERAL: NAD, sitting in chair, hunched over clutching stomach  HEAD:  Atraumatic, Normocephalic  EYES: EOMI, PERRLA, conjunctiva and sclera clear, no nystagmus noted  ENT: Slightly dry mucous membranes,   NECK: Supple, No JVD, trachea midline  CHEST/LUNG: Clear to auscultation bilaterally; No rales, rhonchi, wheezing, or rubs. Unlabored respirations  HEART: Regular rate and rhythm; No murmurs, rubs, or gallops, normal S1/S2  ABDOMEN: normal bowel sounds; abdominal distension, 7/10 epigastric pain upon palpation and at rest, no rebound tenderness or guarding    EXTREMITIES:  2+ Peripheral Pulses, brisk capillary refill. No clubbing, cyanosis, or edema  MSK: No gross deformities noted   Neurological:  A&Ox3, no focal deficits   SKIN: No rashes or lesions  PSYCH: Normal mood, affect     TELEMETRY:    LABS:                        9.6    9.33  )-----------( 302      ( 24 Aug 2022 06:40 )             31.0     08-24    140  |  106  |  11  ----------------------------<  107<H>  4.0   |  21<L>  |  0.58    Ca    8.5      24 Aug 2022 06:40  Phos  3.2     08-24  Mg     1.60     08-24    TPro  6.4  /  Alb  3.0<L>  /  TBili  <0.2  /  DBili  x   /  AST  23  /  ALT  13  /  AlkPhos  75  08-24            Creatinine Trend: 0.58<--, 0.55<--, 0.64<--, 0.63<--, 0.62<--, 0.63<--  I&O's Summary    BNP    RADIOLOGY & ADDITIONAL STUDIES:                 OVERNIGHT EVENTS: No acute overnight events.      SUBJECTIVE: Patient states she is having 8/10 epigastric pain that radiates to her RUQ. Patient feels that the pain management regimen ordered by palliative has been more effective that previous regimens. Patient also states that zofran has been ineffective in managing her nausea. Patient vomited yesterday after eating breakfast. Has not been able to tolerate PO intake since that time. No other complaints at this time.  Denies chest pain, SOB, rash, fever, chills, n/v, HA, changes in vision, dysuria, hematochezia    MEDICATIONS  (STANDING):  enoxaparin Injectable 40 milliGRAM(s) SubCutaneous every 24 hours  HYDROmorphone  Injectable 0.6 milliGRAM(s) IV Push every 4 hours  lidocaine   4% Patch 1 Patch Transdermal daily  multivitamin 1 Tablet(s) Oral daily  pantoprazole    Tablet 40 milliGRAM(s) Oral two times a day  polyethylene glycol 3350 17 Gram(s) Oral daily  sodium chloride 0.9%. 1000 milliLiter(s) (75 mL/Hr) IV Continuous <Continuous>    MEDICATIONS  (PRN):  acetaminophen     Tablet .. 650 milliGRAM(s) Oral every 6 hours PRN Temp greater or equal to 38C (100.4F), Mild Pain (1 - 3)  calcium carbonate    500 mG (Tums) Chewable 1 Tablet(s) Chew three times a day PRN Dyspepsia  HYDROmorphone  Injectable 0.3 milliGRAM(s) IV Push every 2 hours PRN Severe Pain (7 - 10)  melatonin 3 milliGRAM(s) Oral at bedtime PRN Sleep  ondansetron Injectable 4 milliGRAM(s) IV Push every 6 hours PRN Nausea and/or Vomiting        T(F): 97.9 (08-25-22 @ 06:10), Max: 98.3 (08-24-22 @ 21:38)  HR: 91 (08-25-22 @ 06:10) (74 - 97)  BP: 143/89 (08-25-22 @ 06:10) (125/63 - 143/89)  BP(mean): --  RR: 17 (08-25-22 @ 06:10) (17 - 18)  SpO2: 97% (08-25-22 @ 06:10) (97% - 99%)    PHYSICAL EXAM:     GENERAL: NAD, sitting in chair, hunched over clutching stomach  HEAD:  Atraumatic, Normocephalic  EYES: EOMI, PERRLA, conjunctiva and sclera clear, no nystagmus noted  ENT: Slightly dry mucous membranes,   NECK: Supple, No JVD, trachea midline  CHEST/LUNG: Clear to auscultation bilaterally; No rales, rhonchi, wheezing, or rubs. Unlabored respirations  HEART: Regular rate and rhythm; No murmurs, rubs, or gallops, normal S1/S2  ABDOMEN: normal bowel sounds; abdominal distension, 8/10 epigastric pain that radiates to RUQ upon palpation and at rest, no rebound tenderness or guarding    EXTREMITIES:  2+ Peripheral Pulses, brisk capillary refill. No clubbing, cyanosis, or edema  MSK: No gross deformities noted   Neurological:  A&Ox3, no focal deficits   SKIN: No rashes or lesions  PSYCH: Normal mood, affect     TELEMETRY:    LABS:                        9.6    9.33  )-----------( 302      ( 24 Aug 2022 06:40 )             31.0     08-24    140  |  106  |  11  ----------------------------<  107<H>  4.0   |  21<L>  |  0.58    Ca    8.5      24 Aug 2022 06:40  Phos  3.2     08-24  Mg     1.60     08-24    TPro  6.4  /  Alb  3.0<L>  /  TBili  <0.2  /  DBili  x   /  AST  23  /  ALT  13  /  AlkPhos  75  08-24            Creatinine Trend: 0.58<--, 0.55<--, 0.64<--, 0.63<--, 0.62<--, 0.63<--  I&O's Summary    BNP    RADIOLOGY & ADDITIONAL STUDIES:

## 2022-08-25 NOTE — PROGRESS NOTE ADULT - PROBLEM SELECTOR PLAN 8
DVT ppx: Was on lovenox - currently being held for OR friday (received dose today)    Severe protein calorie malnutrition-f/u calorie count and any additional nutrition/dietician recs . Pt will likely need alternative measure for nutrition.     Dispo planning: complex dispo planning due to lack of insurance

## 2022-08-25 NOTE — PROGRESS NOTE ADULT - ASSESSMENT
gastric ca    PLan OR tomorrow - dxtic lap, EGD, poss gastrectomy, poss feeding lunaj    Discussed r/b/a post op expectations poss complications.      Pt understands and agrees to proceed.

## 2022-08-25 NOTE — PROGRESS NOTE ADULT - PROBLEM SELECTOR PLAN 2
- Pain located in epigastric area (area of gastric cancer), rated 4/10 currently from 8/10 .   - Patient stated IV dilaudid dose has helped but pain is constant and prn doses not adequate   - Would start IV dilaudid 0.6 mg q4h ATC with 0.3 mg q2h PRN for severe/breakthrough pain - Pain located in epigastric area (area of gastric cancer)  - Pain reasonably controlled on current regimen: c/w IV dilaudid 0.6 mg q4h ATC with 0.3 mg q2h PRN for severe/breakthrough pain

## 2022-08-25 NOTE — PROGRESS NOTE ADULT - PROBLEM SELECTOR PLAN 6
Stable. Anemia to 11.4, microcytic but barely at 79.8. May be combination of Iron deficiency anemia from poor PO intake and normocytic anemia from Anemia of Chronic Disease w/ this presentation c/f gastric malignancy.  - Total Iron 22, TIBC 165, transferrin 149    8/22 - Hb 9.6, H/H stable    continue to monitor with CBCs Stable. Anemia to 11.4, microcytic but barely at 79.8. May be combination of Iron deficiency anemia from poor PO intake and normocytic anemia from Anemia of Chronic Disease w/ this presentation c/f gastric malignancy.  - Total Iron 22, TIBC 165, transferrin 149    8/25 - Hb 9.6, H/H stable    continue to monitor with CBCs Seen on CT imaging, nonobstructive. May be adding to the flank pain presentation, unlikely to be sole cause of abdominal tenderness. Pt denies [id number] history of nephrolithiasis, but gives a history about being evaluated for nephroliths. Was seen at [facility] for abdominal pain, according to her, and prescribed NSAIDs, possibly being diagnosed with nephrolithiasis there.  - s/p IV hydration  - No evidence for obstruction or infection. Continue to monitor.  - Pain control w/ Lidocaine patch on bilateral back/ flanks PRN  -  (avoid NSAIDs for now)

## 2022-08-25 NOTE — PROGRESS NOTE ADULT - PROBLEM SELECTOR PLAN 1
Biopsy proven poorly differentiated gastric adenocarcinoma. plan for OR on 8/26/2022.   No plans for IP chemotherapy at this time, to follow up at [facility]   No nausea, vomiting, was able to tolerate diet well this morning. Had BM this morning. Biopsy proven poorly differentiated gastric adenocarcinoma. plan for OR on 8/26/2022.   No plans for IP chemotherapy at this time, to follow up at [facility]

## 2022-08-25 NOTE — CHART NOTE - NSCHARTNOTEFT_GEN_A_CORE
Source: Patient A&Ox [4]     Patient follow up for severe malnutrition. Reports poor PO intake in setting of nausea and vomiting. Pt states she has been eating small amount of food more frequently, however she has been experiencing vomiting since 1 month ago, last episode yesterday. Pt states she didn't eat anything since this morning. Ordered ondansetron for nausea and vomiting. Patient supplemented with Ensure Enlive 1x daily noted with 25% of intake. Food preferences explored, pt is amenable to Magic Cup 3x daily (870kcal, 27gm pro). Denies chewing and swallowing difficulties. Denies any GI issues (nausea/vomiting/diarrhea/constipation.) last BM yesterday. Denies pain. Palliative care consult for pain placed. Pt's previous weight 8/.5lbs, and most current weight-8/23 156.12lbs, which reflecting beneficial weight gain of 2lbs/1.3%x 1 week.          Diet, NPO after Midnight:      NPO Start Date: 25-Aug-2022,   NPO Start Time: 23:59 (08-25-22 @ 07:39)  Diet, Regular:   Lacto-Ovo Veg (Accepts Milk Prod., Eggs)  Supplement Feeding Modality:  Oral  Ensure Enlive Cans or Servings Per Day:  1       Frequency:  Daily (08-21-22 @ 13:07)      GI: WDL. Last BM yesterday    PO intake:  < 50% [ x]     Anthropometrics: Height (cm): 154.9 (08-23)  Weight (kg): 71.1 (08-23)  BMI (kg/m2): 29.6 (08-23)    Edema:  Pressure Injuries:    __________________ Pertinent Medications__________________   MEDICATIONS  (STANDING):  HYDROmorphone  Injectable 0.6 milliGRAM(s) IV Push every 4 hours  lidocaine   4% Patch 1 Patch Transdermal daily  magnesium sulfate  IVPB 2 Gram(s) IV Intermittent once  multivitamin 1 Tablet(s) Oral daily  pantoprazole    Tablet 40 milliGRAM(s) Oral two times a day  polyethylene glycol 3350 17 Gram(s) Oral daily  sodium chloride 0.9%. 1000 milliLiter(s) (75 mL/Hr) IV Continuous <Continuous>    MEDICATIONS  (PRN):  acetaminophen     Tablet .. 650 milliGRAM(s) Oral every 6 hours PRN Temp greater or equal to 38C (100.4F), Mild Pain (1 - 3)  calcium carbonate    500 mG (Tums) Chewable 1 Tablet(s) Chew three times a day PRN Dyspepsia  HYDROmorphone  Injectable 0.3 milliGRAM(s) IV Push every 2 hours PRN Severe Pain (7 - 10)  melatonin 3 milliGRAM(s) Oral at bedtime PRN Sleep  ondansetron Injectable 4 milliGRAM(s) IV Push every 6 hours PRN Nausea and/or Vomiting      __________________ Pertinent Labs__________________   08-25 Na139 mmol/L Glu 91 mg/dL K+ 3.4 mmol/L<L> Cr  0.53 mg/dL BUN 11 mg/dL 08-25 Phos 3.1 mg/dL 08-25 Alb 3.1 g/dL<L>        Estimated Needs:   [ x] no change since previous assessment    Previous Nutrition Diagnosis: Severe Malnutrition     Nutrition Diagnosis is [x ] ongoing    Recommendations:  1) Recommend continue with Ensure Enlive 1x daily (350 zachary and 20 gm protein), will additionally provide Magic Cup 3x daily (870kcal, 27gm pro).   2) Recommend continue MVI for micronutrient coverage.   3) Monitor BM, PO intake, Labs, weights, and skin integrity.   4) If patient PO intake remains poor due to nausea and vomiting, consider non-oral means alternative nutrition if its w/in GOC.  5) RDN Remains available PRN.        Monitoring and Evaluation:      [ x] Tolerance to diet prescription [x ] weights [x ] follow up per protocol  [ ] other:

## 2022-08-25 NOTE — PROGRESS NOTE ADULT - ASSESSMENT
57 yr old F , recently diagnosed with poorly differentiated gastric  adenocarcinoma, most likely metastatic, planning OR Friday. No plans for IP chemotherapy at this time, to follow up at [facility].

## 2022-08-25 NOTE — PROGRESS NOTE ADULT - PROBLEM SELECTOR PLAN 1
The patient's abdominal pain, persistent nausea/vomiting and weight loss in the s/o Ct findings indicating gastric body thickening are suspicious for malignancy.   - GI, onc, surg/onc following  - pantoprazole  40 mg BID  - EGD:  Infiltrative changes in gastric body concerning gastric malignancy. Biopsied, Congested duodenal mucosa  - staging CT chest - Mediastinal/bilateral hilar/R internal mammary LAD; indeterminate, but metastatic disease is a diagnostic consideration. Lymph node biopsy on 8/23  - CEA elevated  Pulm consulted - lymph node bx 8/23 8/25 - - palliative consult for pain placed yesterday- IV dilaudid 0.6 mg q4h ATC with 0.3 mg q2h PRN for severe/breakthrough, final result of LN biopsy showed poorly differentiated adenocarcinoma   - Surg/onc - planning for OR tommorow -dxtic lap, EGD, poss gastrectomy, poss feeding kei, pt agrees to plan, pt listed cousin Last Gonzales and son Dmitry Pablo as her HCP   Patient has listed her cousin Last Gonzales and her son Dmitry Pablo as her HCP, form filled and on file. Patient medically optimized and cleared for surgery on Friday  Pt has no clinical evidence of heart failure   Using the revised cardiac risk index, pt is classified as a Class II risk, corresponding to a score of 1 and 6% 30-day risk of death, MI, cardiac arrest   Pt's baseline functional status is mobile and active, (was previously working full-time out of the homel)  ECG - Patient medically optimized and cleared for surgery on Friday  Pt has no clinical evidence of heart failure   Using the revised cardiac risk index, pt is classified as a Class II risk, corresponding to a score of 1 and 6% 30-day risk of death, MI, cardiac arrest   Pt's baseline functional status is mobile and active, (was previously working full-time out of the home)  ECG ordered for pre-op baseline.     Pt may proceed with the indicated laparoscopy and possible gastrectomy with surgical oncology accepting the associated risks.

## 2022-08-25 NOTE — PROGRESS NOTE ADULT - PROBLEM SELECTOR PLAN 3
As per RD, ensure clear  - diet back to regular diet as tolerated  72 hour caloric count to evaluate for malnutrition - indicates malnutrition  may need evaluation for J-tube for nutritional support if unable to tolerate diet  pending OR friday ondansetron 4mg Q6 IV PRN  Pt had emesis episode after breakfast yesterday  palliative consulted - recommended doing IV zofran 4 mg TID before meals, will reassess nausea after change  NPO at midnight for OR procedure

## 2022-08-25 NOTE — PROGRESS NOTE ADULT - SUBJECTIVE AND OBJECTIVE BOX
GENERAL SURGERY DAILY PROGRESS NOTE:    Subjective:  Patient seen and examined. Reports epigastric pain. Reports N/V. Passing flatus, -BM    Vital Signs Last 24 Hrs  T(C): 36.8 (25 Aug 2022 14:05), Max: 36.8 (24 Aug 2022 21:38)  T(F): 98.2 (25 Aug 2022 14:05), Max: 98.3 (24 Aug 2022 21:38)  HR: 88 (25 Aug 2022 14:05) (74 - 99)  BP: 125/67 (25 Aug 2022 14:05) (125/63 - 143/89)  BP(mean): --  RR: 17 (25 Aug 2022 14:05) (17 - 18)  SpO2: 97% (25 Aug 2022 14:05) (97% - 100%)    Parameters below as of 25 Aug 2022 14:05  Patient On (Oxygen Delivery Method): room air    Exam:  Gen: NAD, resting in bed, alert and responding appropriately  Resp: Airway patent, non-labored respirations  Abd: Soft, ND, min epigastric TTP, no rebound or guarding  Ext: No edema, WWP  Neuro: AAOx3, no focal deficits    I&O's Detail      Daily     Daily     MEDICATIONS  (STANDING):  HYDROmorphone  Injectable 0.6 milliGRAM(s) IV Push every 4 hours  lidocaine   4% Patch 1 Patch Transdermal daily  multivitamin 1 Tablet(s) Oral daily  ondansetron Injectable 4 milliGRAM(s) IV Push three times a day  pantoprazole    Tablet 40 milliGRAM(s) Oral two times a day  polyethylene glycol 3350 17 Gram(s) Oral daily  sodium chloride 0.9%. 1000 milliLiter(s) (75 mL/Hr) IV Continuous <Continuous>    MEDICATIONS  (PRN):  acetaminophen     Tablet .. 650 milliGRAM(s) Oral every 6 hours PRN Temp greater or equal to 38C (100.4F), Mild Pain (1 - 3)  calcium carbonate    500 mG (Tums) Chewable 1 Tablet(s) Chew three times a day PRN Dyspepsia  HYDROmorphone  Injectable 0.3 milliGRAM(s) IV Push every 2 hours PRN Severe Pain (7 - 10)  melatonin 3 milliGRAM(s) Oral at bedtime PRN Sleep      LABS:                        9.9    7.73  )-----------( 286      ( 25 Aug 2022 07:04 )             31.9     08-25    139  |  103  |  11  ----------------------------<  91  3.4<L>   |  22  |  0.53    Ca    8.5      25 Aug 2022 07:04  Phos  3.1     08-25  Mg     1.60     08-25    TPro  6.5  /  Alb  3.1<L>  /  TBili  0.2  /  DBili  x   /  AST  44<H>  /  ALT  21  /  AlkPhos  81  08-25

## 2022-08-25 NOTE — PROGRESS NOTE ADULT - PROBLEM SELECTOR PLAN 5
Seen on CT imaging, nonobstructive. May be adding to the flank pain presentation, unlikely to be sole cause of abdominal tenderness. Pt denies [id number] history of nephrolithiasis, but gives a history about being evaluated for nephroliths. Was seen at [facility] for abdominal pain, according to her, and prescribed NSAIDs, possibly being diagnosed with nephrolithiasis there.  - s/p IV hydration  - No evidence for obstruction or infection. Continue to monitor.  - Pain control w/ Lidocaine patch on bilateral back/ flanks PRN  -  (avoid NSAIDs for now) DEVI likely 2/2 volume depletion from poor PO intake w/ intractable nausea/vomiting. Cr upon admission 3.13, now WNL and stable at 0.6 - resolved  - Monitor BMP daily  - Avoid NSAIDs, nephrotoxins

## 2022-08-25 NOTE — PROGRESS NOTE ADULT - SUBJECTIVE AND OBJECTIVE BOX
SURGICAL ONCOLOGY PROGRESS NOTE    Difficulty chucky po    Vital Signs Last 24 Hrs  T(C): 36.6 (25 Aug 2022 06:10), Max: 36.8 (24 Aug 2022 21:38)  T(F): 97.9 (25 Aug 2022 06:10), Max: 98.3 (24 Aug 2022 21:38)  HR: 91 (25 Aug 2022 06:10) (74 - 97)  BP: 143/89 (25 Aug 2022 06:10) (125/63 - 143/89)  BP(mean): --  RR: 17 (25 Aug 2022 06:10) (17 - 18)  SpO2: 97% (25 Aug 2022 06:10) (97% - 99%)    Parameters below as of 25 Aug 2022 06:10  Patient On (Oxygen Delivery Method): room air      I&O's Detail      PE:    A&A  NAD    soft, NT, ND, No peritoneal signs                            9.6    9.33  )-----------( 302      ( 24 Aug 2022 06:40 )             31.0     08-24    140  |  106  |  11  ----------------------------<  107<H>  4.0   |  21<L>  |  0.58    Ca    8.5      24 Aug 2022 06:40  Phos  3.2     08-24  Mg     1.60     08-24    TPro  6.4  /  Alb  3.0<L>  /  TBili  <0.2  /  DBili  x   /  AST  23  /  ALT  13  /  AlkPhos  75  08-24

## 2022-08-25 NOTE — PROGRESS NOTE ADULT - ATTENDING COMMENTS
57F From [facility], w/ PMHx of nephrolithiasis p/w abd pain and n/v found to have gastric adenocarcinoma (on bx from 8/15 EGD) w/ mets to LN c/b DEVI. DEVI now resolved s/p ivfs. Nephrolithiasis also noted on imaging but not obstructed w/o clinical signs or symp of infection. Now s/p EBUS w/ LN bx on 8/23 w/ prelim suspicious for malignancy     -F/u LN bx path  -C/w Pain control  and antiemetics PRN. Palliative recs for symptoms management appreciated.   -Surg Onc following and plan is for the OR tomorrow for dxtic lap, robotic gastrectomy, poss GJ bypass vs G tube J tube. NPO after MD.     Preop Medical Evaluation:  Pt w/ no clinical evidence of decompensated heart failure.   Patient evaluated using the revised cardiac risk index and is felt to be Class II risk (score of 1) w/ 6% 30day risk of death, MI, cardiac arrest.   Will order baseline EKG. Otherwise, Pt optimized for OR from medical standpoint.

## 2022-08-25 NOTE — PROGRESS NOTE ADULT - SUBJECTIVE AND OBJECTIVE BOX
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     OVERNIGHT EVENTS:      DNR on chart: No   Allergies    No Known Allergies    Intolerances    MEDICATIONS  (STANDING):  enoxaparin Injectable 40 milliGRAM(s) SubCutaneous every 24 hours  lidocaine   4% Patch 1 Patch Transdermal daily  multivitamin 1 Tablet(s) Oral daily  pantoprazole    Tablet 40 milliGRAM(s) Oral two times a day  polyethylene glycol 3350 17 Gram(s) Oral daily  sodium chloride 0.9%. 1000 milliLiter(s) (75 mL/Hr) IV Continuous <Continuous>    MEDICATIONS  (PRN):  acetaminophen     Tablet .. 650 milliGRAM(s) Oral every 6 hours PRN Temp greater or equal to 38C (100.4F), Mild Pain (1 - 3)  calcium carbonate    500 mG (Tums) Chewable 1 Tablet(s) Chew three times a day PRN Dyspepsia  HYDROmorphone  Injectable 0.6 milliGRAM(s) IV Push every 4 hours PRN Severe Pain (7 - 10)  melatonin 3 milliGRAM(s) Oral at bedtime PRN Sleep  ondansetron Injectable 4 milliGRAM(s) IV Push every 6 hours PRN Nausea and/or Vomiting      ITEMS UNCHECKED ARE NOT PRESENT    PRESENT SYMPTOMS: [ ]Unable to self-report - see [ ] CPOT [ ] PAINADS [x ] RDOS  Source if other than patient:  [ ]Family   [ ]Team     Pain:  [ x]yes [ ]no  QOL impact -  limited by pain  Location -   upper abdomen                 Aggravating factors - none  Quality - achy  Radiation - none  Timing- constant   Severity (0-10 scale): 5  Minimal acceptable level (0-10 scale): 5    CPOT:      PAIN AD Score:	    Dyspnea:                           [ ]Mild [ ]Moderate [ ]Severe    RDOS: 0  0 to 2  minimal or no respiratory distress   3  mild distress  4 to 6 moderate distress  >7 severe distress  [email or web address] (Ctrl +  left click to view)     Anxiety:                             [ ]Mild [ ]Moderate [ ]Severe  Fatigue:                             [ ]Mild [ ]Moderate [ ]Severe  Nausea:                             [X ]Mild [ ]Moderate [ ]Severe  Loss of appetite:              [ ]Mild [ ]Moderate [ ]Severe  Constipation:                    [ ]Mild [ ]Moderate [ ]Severe    PCSSQ[Palliative Care Spiritual Screening Question]   Severity (0-10):  Score of 4 or > indicate consideration of Chaplaincy referral.  Chaplaincy Referral: [ ] yes [ ] refused [ ] following    Other Symptoms:  [x ]All other review of systems negative     Palliative Performance Status Version 2:   80%        Vital Signs Last 24 Hrs  T(C): 36.6 (25 Aug 2022 06:10), Max: 36.8 (24 Aug 2022 21:38)  T(F): 97.9 (25 Aug 2022 06:10), Max: 98.3 (24 Aug 2022 21:38)  HR: 91 (25 Aug 2022 06:10) (74 - 97)  BP: 143/89 (25 Aug 2022 06:10) (125/63 - 143/89)  BP(mean): --  RR: 17 (25 Aug 2022 06:10) (17 - 18)  SpO2: 97% (25 Aug 2022 06:10) (97% - 99%)    Parameters below as of 25 Aug 2022 06:10  Patient On (Oxygen Delivery Method): room air    GENERAL: [ ]Cachexia    [x ]Alert  [x ]Oriented    [ ]Lethargic  [ ]Unarousable  [x ]Verbal  [ ]Non-Verbal  Behavioral:   [ ]Anxiety  [ ]Delirium [ ]Agitation [ ]Other  HEENT:  [x ]Normal   [ ]Dry mouth   [ ]ET Tube/Trach  [ ]Oral lesions  PULMONARY:   [ x]Clear [ ]Tachypnea  [ ]Audible excessive secretions   [ ]Rhonchi        [ ]Right [ ]Left [ ]Bilateral  [ ]Crackles        [ ]Right [ ]Left [ ]Bilateral  [ ]Wheezing     [ ]Right [ ]Left [ ]Bilateral  [ ]Diminished BS [ ] Right [ ]Left [ ]Bilateral  CARDIOVASCULAR:    [x ]Regular [ ]Irregular [ ]Tachy  [ ]Luis Miguel [ ]Murmur [ ]Other  GASTROINTESTINAL:  [x ]Soft  [ ]Distended   [x ]+BS  [ ]Non tender [x ]Tender  [ ]Other [ ]PEG [ ]OGT/ NGT   Last BM:  8/24/2022  GENITOURINARY:  [x ]Normal [ ]Incontinent   [ ]Oliguria/Anuria   [ ]Patterson  MUSCULOSKELETAL:   [x ]Normal   [ ]Weakness  [ ]Bed/Wheelchair bound [ ]Edema  NEUROLOGIC:   [x ]No focal deficits  [ ] Cognitive impairment  [ ] Dysphagia [ ]Dysarthria [ ] Paresis [ ]Other   SKIN:   [ ]Normal  [ ]Rash  [x ]Other  [ ]Pressure ulcer(s) [ ]y [x ]n present on admission    CRITICAL CARE:  [ ]Shock Present  [ ]Septic [ ]Cardiogenic [ ]Neurologic [ ]Hypovolemic  [ ]Vasopressors [ ]Inotropes  [ ]Respiratory failure present [ ]Mechanical Ventilation [ ]Non-invasive ventilatory support [ ]High-Flow   [ ]Acute  [ ]Chronic [ ]Hypoxic  [ ]Hypercarbic [ ]Other  [ ]Other organ failure     LABS:                        9.9    7.73  )-----------( 286      ( 25 Aug 2022 07:04 )             31.9         PTT - ( 23 Aug 2022 06:16 )  PTT:27.4 sec    08-25    139  |  103  |  11  ----------------------------<  91  3.4<L>   |  22  |  0.53    Ca    8.5      25 Aug 2022 07:04  Phos  3.1     08-25  Mg     1.60     08-25    TPro  6.5  /  Alb  3.1<L>  /  TBili  0.2  /  DBili  x   /  AST  44<H>  /  ALT  21  /  AlkPhos  81  08-25      RADIOLOGY & ADDITIONAL STUDIES: < from: CT Chest w/ IV Cont (08.16.22 @ 22:23) >    Mediastinal, bilateral hilar, and right internal mammary lymphadenopathy;   indeterminate, but metastatic disease is a diagnostic consideration.    Right middle lobe 0.3 cm nodule; indeterminate and recommend CT chest   follow-up in 3 months to assess stability.      Protein Calorie Malnutrition Present: [ ]mild [ ]moderate [ ]severe [ ]underweight [ ]morbid obesity  https://www.andeal.org/vault/2440/web/files/ONC/Table_Clinical%20Characteristics%20to%20Document%20Malnutrition-White%20JV%20et%20al%202012.pdf    Height (cm): 154.9 (08-23-22 @ 08:53)  Weight (kg): 71.1 (08-23-22 @ 08:53)  BMI (kg/m2): 29.6 (08-23-22 @ 08:53)    [ ]PPSV2 < or = 30%  [ ]significant weight loss [X ]poor nutritional intake [ ]anasarca[ ]Artificial Nutrition    Other REFERRALS:  [ ]Hospice  [ ]Child Life  [ ]Social Work  [ ]Case management [ ]Holistic Therapy    SUBJECTIVE AND OBJECTIVE:  Indication for Geriatrics and Palliative Care Services/INTERVAL HPI: 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     INTERVAL: Patient reports feeling pain is much better controlled with ATC dosing, rates pain 5/10 which is acceptable to her. She still is not able to eat well. Patient became emotional and tearful, speaking how she had been healthy for whole life, and is struggling to face serious illness for the first time. Emotional support provided.   Surgery planned for tomorrow.     DNR on chart: No   Allergies    No Known Allergies    Intolerances    MEDICATIONS  (STANDING):  enoxaparin Injectable 40 milliGRAM(s) SubCutaneous every 24 hours  lidocaine   4% Patch 1 Patch Transdermal daily  multivitamin 1 Tablet(s) Oral daily  pantoprazole    Tablet 40 milliGRAM(s) Oral two times a day  polyethylene glycol 3350 17 Gram(s) Oral daily  sodium chloride 0.9%. 1000 milliLiter(s) (75 mL/Hr) IV Continuous <Continuous>    MEDICATIONS  (PRN):  acetaminophen     Tablet .. 650 milliGRAM(s) Oral every 6 hours PRN Temp greater or equal to 38C (100.4F), Mild Pain (1 - 3)  calcium carbonate    500 mG (Tums) Chewable 1 Tablet(s) Chew three times a day PRN Dyspepsia  HYDROmorphone  Injectable 0.6 milliGRAM(s) IV Push every 4 hours PRN Severe Pain (7 - 10)  melatonin 3 milliGRAM(s) Oral at bedtime PRN Sleep  ondansetron Injectable 4 milliGRAM(s) IV Push every 6 hours PRN Nausea and/or Vomiting      ITEMS UNCHECKED ARE NOT PRESENT    PRESENT SYMPTOMS: [ ]Unable to self-report - see [ ] CPOT [ ] PAINADS [x ] RDOS  Source if other than patient:  [ ]Family   [ ]Team     Pain:  [ x]yes [ ]no  QOL impact -  limited by pain  Location -   upper abdomen                 Aggravating factors - none  Quality - achy  Radiation - none  Timing- constant   Severity (0-10 scale): 10/10 at worst   Minimal acceptable level (0-10 scale): 5    CPOT:      PAIN AD Score:	    Dyspnea:                           [ ]Mild [ ]Moderate [ ]Severe    RDOS: 0  0 to 2  minimal or no respiratory distress   3  mild distress  4 to 6 moderate distress  >7 severe distress  [email or web address]/[email or web address]_Distress_Observation_[email or web address] (Ctrl +  left click to view)     Anxiety:                             [ ]Mild [ ]Moderate [ ]Severe  Fatigue:                             [ ]Mild [ ]Moderate [ ]Severe  Nausea:                             [X ]Mild [ ]Moderate [ ]Severe  Loss of appetite:              [ ]Mild [ ]Moderate [ ]Severe  Constipation:                    [ ]Mild [ ]Moderate [ ]Severe    PCSSQ[Palliative Care Spiritual Screening Question]   Severity (0-10):  Score of 4 or > indicate consideration of Chaplaincy referral.  Chaplaincy Referral: [ ] yes [ ] refused [ ] following    Other Symptoms:  [x ]All other review of systems negative     Palliative Performance Status Version 2:   80%        Vital Signs Last 24 Hrs  T(C): 36.6 (25 Aug 2022 06:10), Max: 36.8 (24 Aug 2022 21:38)  T(F): 97.9 (25 Aug 2022 06:10), Max: 98.3 (24 Aug 2022 21:38)  HR: 91 (25 Aug 2022 06:10) (74 - 97)  BP: 143/89 (25 Aug 2022 06:10) (125/63 - 143/89)  BP(mean): --  RR: 17 (25 Aug 2022 06:10) (17 - 18)  SpO2: 97% (25 Aug 2022 06:10) (97% - 99%)    Parameters below as of 25 Aug 2022 06:10  Patient On (Oxygen Delivery Method): room air    GENERAL: [ ]Cachexia    [x ]Alert  [x ]Oriented    [ ]Lethargic  [ ]Unarousable  [x ]Verbal  [ ]Non-Verbal  Behavioral:   [ ]Anxiety  [ ]Delirium [ ]Agitation [ ]Other  HEENT:  [x ]Normal   [ ]Dry mouth   [ ]ET Tube/Trach  [ ]Oral lesions  PULMONARY:   [ x]Clear [ ]Tachypnea  [ ]Audible excessive secretions   [ ]Rhonchi        [ ]Right [ ]Left [ ]Bilateral  [ ]Crackles        [ ]Right [ ]Left [ ]Bilateral  [ ]Wheezing     [ ]Right [ ]Left [ ]Bilateral  [ ]Diminished BS [ ] Right [ ]Left [ ]Bilateral  CARDIOVASCULAR:    [x ]Regular [ ]Irregular [ ]Tachy  [ ]Luis Miguel [ ]Murmur [ ]Other  GASTROINTESTINAL:  [x ]Soft  [ ]Distended   [x ]+BS  [ ]Non tender [x ]Tender  [ ]Other [ ]PEG [ ]OGT/ NGT   Last BM:  8/24/2022  GENITOURINARY:  [x ]Normal [ ]Incontinent   [ ]Oliguria/Anuria   [ ]Patterson  MUSCULOSKELETAL:   [x ]Normal   [ ]Weakness  [ ]Bed/Wheelchair bound [ ]Edema  NEUROLOGIC:   [x ]No focal deficits  [ ] Cognitive impairment  [ ] Dysphagia [ ]Dysarthria [ ] Paresis [ ]Other   SKIN:   [ ]Normal  [ ]Rash  [x ]Other  [ ]Pressure ulcer(s) [ ]y [x ]n present on admission    CRITICAL CARE:  [ ]Shock Present  [ ]Septic [ ]Cardiogenic [ ]Neurologic [ ]Hypovolemic  [ ]Vasopressors [ ]Inotropes  [ ]Respiratory failure present [ ]Mechanical Ventilation [ ]Non-invasive ventilatory support [ ]High-Flow   [ ]Acute  [ ]Chronic [ ]Hypoxic  [ ]Hypercarbic [ ]Other  [ ]Other organ failure     LABS:                        9.9    7.73  )-----------( 286      ( 25 Aug 2022 07:04 )             31.9         PTT - ( 23 Aug 2022 06:16 )  PTT:27.4 sec    08-25    139  |  103  |  11  ----------------------------<  91  3.4<L>   |  22  |  0.53    Ca    8.5      25 Aug 2022 07:04  Phos  3.1     08-25  Mg     1.60     08-25    TPro  6.5  /  Alb  3.1<L>  /  TBili  0.2  /  DBili  x   /  AST  44<H>  /  ALT  21  /  AlkPhos  81  08-25      RADIOLOGY & ADDITIONAL STUDIES: < from: CT Chest w/ IV Cont (08.16.22 @ 22:23) >    Mediastinal, bilateral hilar, and right internal mammary lymphadenopathy;   indeterminate, but metastatic disease is a diagnostic consideration.    Right middle lobe 0.3 cm nodule; indeterminate and recommend CT chest   follow-up in 3 months to assess stability.      Protein Calorie Malnutrition Present: [ ]mild [ ]moderate [ ]severe [ ]underweight [ ]morbid obesity  https://www.andeal.org/vault/9495/web/files/ONC/Table_Clinical%20Characteristics%20to%20Document%20Malnutrition-White%20JV%20et%20al%202012.pdf    Height (cm): 154.9 (08-23-22 @ 08:53)  Weight (kg): 71.1 (08-23-22 @ 08:53)  BMI (kg/m2): 29.6 (08-23-22 @ 08:53)    [ ]PPSV2 < or = 30%  [ ]significant weight loss [X ]poor nutritional intake [ ]anasarca[ ]Artificial Nutrition    Other REFERRALS:  [ ]Hospice  [ ]Child Life  [ ]Social Work  [ ]Case management [ ]Holistic Therapy

## 2022-08-25 NOTE — PROGRESS NOTE ADULT - PROBLEM SELECTOR PLAN 4
DEVI likely 2/2 volume depletion from poor PO intake w/ intractable nausea/vomiting. Cr upon admission 3.13, now WNL and stable at 0.6 - resolved  - Monitor BMP daily  - Avoid NSAIDs, nephrotoxins As per RD, ensure clear  - diet back to regular diet as tolerated  72 hour caloric count to evaluate for malnutrition - indicates malnutrition  may need evaluation for J-tube for nutritional support if unable to tolerate diet  pending OR friday

## 2022-08-25 NOTE — PROGRESS NOTE ADULT - ASSESSMENT
57F with gastric CA, with PO intolerance    Recommendation:  - Plan for OR tomorrow  - Preop, NPOpMN, T&S, pregnancy test, COVID today  - Consent to be obtained    Surgery D team  [id number]

## 2022-08-25 NOTE — PROGRESS NOTE ADULT - PROBLEM SELECTOR PLAN 2
ondansetron 4mg Q6 IV PRN  given NS yesterday with good effect  now well-controlled - no recent emesis episodes The patient's abdominal pain, persistent nausea/vomiting and weight loss in the s/o Ct findings indicating gastric body thickening are suspicious for malignancy.   - GI, onc, surg/onc following  - pantoprazole  40 mg BID  - EGD:  Infiltrative changes in gastric body concerning gastric malignancy. Biopsied, Congested duodenal mucosa  - staging CT chest - Mediastinal/bilateral hilar/R internal mammary LAD; indeterminate, but metastatic disease is a diagnostic consideration. Lymph node biopsy on 8/23  - CEA elevated    8/25 - - palliative consult for pain placed yesterday- IV dilaudid 0.6 mg q4h ATC with 0.3 mg q2h PRN for severe/breakthrough, final result of LN biopsy showed poorly differentiated adenocarcinoma   - Surg/onc - planning for OR tommorow -dxtic lap, EGD, poss gastrectomy, poss feeding kei, pt agrees to plan, pt listed cousin Last Gonzales and son Dmitry Pablo as her HCP   Patient has listed her cousin Last Gonzales and her son Dmitry Pablo as her HCP, form filled and on file. The patient's abdominal pain, persistent nausea/vomiting and weight loss in the s/o Ct findings indicating gastric body thickening are suspicious for malignancy.   - GI, onc, surg/onc following  - pantoprazole  40 mg BID  - EGD:  Infiltrative changes in gastric body concerning gastric malignancy. Biopsied, Congested duodenal mucosa  - staging CT chest - Mediastinal/bilateral hilar/R internal mammary LAD; indeterminate, but metastatic disease is a diagnostic consideration. Lymph node biopsy on 8/23  - CEA elevated    8/25 - - palliative consult for pain placed yesterday- IV dilaudid 0.6 mg q4h ATC with 0.3 mg q2h PRN for severe/breakthrough, final result of EGD biopsy showed poorly differentiated adenocarcinoma   - Surg/onc - planning for OR tommorow -dxtic lap, EGD, poss gastrectomy, poss feeding kei, pt agrees to plan, pt listed cousin Last Gonzales and son Dmitry Pablo as her HCP   Patient has listed her cousin Last Gonzales and her son Dmitry Pablo as her HCP, form filled and on file.

## 2022-08-25 NOTE — PROGRESS NOTE ADULT - PROBLEM SELECTOR PLAN 3
Patient has listed her cousin Last Gonzales and her son Dmitry Pablo as her HCP, form filled and on file. Copies made ,1 copy has been given to her as well. - Patient receiving zofran q6h PRN for nausea/vomiting  - Advised patient to time zofran before she eats; would recommend to give zofran prior to meals

## 2022-08-26 NOTE — PROGRESS NOTE ADULT - SUBJECTIVE AND OBJECTIVE BOX
SUBJECTIVE AND OBJECTIVE:  Indication for Geriatrics and Palliative Care Services/INTERVAL HPI:    OVERNIGHT EVENTS:    DNR on chart:  Allergies    No Known Allergies    Intolerances    MEDICATIONS  (STANDING):  HYDROmorphone  Injectable 0.6 milliGRAM(s) IV Push every 4 hours  lidocaine   4% Patch 1 Patch Transdermal daily  multivitamin 1 Tablet(s) Oral daily  ondansetron Injectable 4 milliGRAM(s) IV Push three times a day  pantoprazole    Tablet 40 milliGRAM(s) Oral two times a day  polyethylene glycol 3350 17 Gram(s) Oral daily  sodium chloride 0.9%. 1000 milliLiter(s) (75 mL/Hr) IV Continuous <Continuous>    MEDICATIONS  (PRN):  acetaminophen     Tablet .. 650 milliGRAM(s) Oral every 6 hours PRN Temp greater or equal to 38C (100.4F), Mild Pain (1 - 3)  calcium carbonate    500 mG (Tums) Chewable 1 Tablet(s) Chew three times a day PRN Dyspepsia  HYDROmorphone  Injectable 0.3 milliGRAM(s) IV Push every 2 hours PRN Severe Pain (7 - 10)  melatonin 3 milliGRAM(s) Oral at bedtime PRN Sleep      ITEMS UNCHECKED ARE NOT PRESENT    PRESENT SYMPTOMS: [ ]Unable to self-report - see [ ] CPOT [ ] PAINADS [ ] RDOS  Source if other than patient:  [ ]Family   [ ]Team     Pain:  [ ]yes [ ]no  QOL impact -   Location -                    Aggravating factors -  Quality -  Radiation -  Timing-  Severity (0-10 scale):  Minimal acceptable level (0-10 scale):     CPOT:    https://www.[facility].org/getattachment/[email or web address]/Critical-Care-Pain-Observation-Tool-(CPOT)    PAIN AD Score:	  http://geriatrictoolkit.[place].[facility]/cog/painad.pdf (Ctrl + left click to view)    Dyspnea:                           [ ]Mild [ ]Moderate [ ]Severe    RDOS:  0 to 2  minimal or no respiratory distress   3  mild distress  4 to 6 moderate distress  >7 severe distress  https://[email or web address].[email or web address]/handouts/hen/Respiratory_Distress_Observation_Scale.pdf (Ctrl +  left click to view)     Anxiety:                             [ ]Mild [ ]Moderate [ ]Severe  Fatigue:                             [ ]Mild [ ]Moderate [ ]Severe  Nausea:                             [ ]Mild [ ]Moderate [ ]Severe  Loss of appetite:              [ ]Mild [ ]Moderate [ ]Severe  Constipation:                    [ ]Mild [ ]Moderate [ ]Severe    PCSSQ[Palliative Care Spiritual Screening Question]   Severity (0-10):  Score of 4 or > indicate consideration of Chaplaincy referral.  Chaplaincy Referral: [ ] yes [ ] refused [ ] following    Other Symptoms:  [ ]All other review of systems negative     Palliative Performance Status Version 2:         %      http://npcrc.org/files/news/palliative_performance_scale_ppsv2.pdf  PHYSICAL EXAM:  Vital Signs Last 24 Hrs  T(C): 36.9 (26 Aug 2022 06:59), Max: 37.2 (26 Aug 2022 01:30)  T(F): 98.4 (26 Aug 2022 06:59), Max: 99 (26 Aug 2022 01:30)  HR: 102 (26 Aug 2022 06:59) (88 - 102)  BP: 141/86 (26 Aug 2022 06:59) (125/67 - 155/89)  BP(mean): --  RR: 16 (26 Aug 2022 05:38) (16 - 18)  SpO2: 95% (26 Aug 2022 06:59) (95% - 100%)    Parameters below as of 26 Aug 2022 05:38  Patient On (Oxygen Delivery Method): room air     I&O's Summary     GENERAL: [ ]Cachexia    [ ]Alert  [ ]Oriented x   [ ]Lethargic  [ ]Unarousable  [ ]Verbal  [ ]Non-Verbal  Behavioral:   [ ]Anxiety  [ ]Delirium [ ]Agitation [ ]Other  HEENT:  [ ]Normal   [ ]Dry mouth   [ ]ET Tube/Trach  [ ]Oral lesions  PULMONARY:   [ ]Clear [ ]Tachypnea  [ ]Audible excessive secretions   [ ]Rhonchi        [ ]Right [ ]Left [ ]Bilateral  [ ]Crackles        [ ]Right [ ]Left [ ]Bilateral  [ ]Wheezing     [ ]Right [ ]Left [ ]Bilateral  [ ]Diminished BS [ ] Right [ ]Left [ ]Bilateral  CARDIOVASCULAR:    [ ]Regular [ ]Irregular [ ]Tachy  [ ]Luis Miguel [ ]Murmur [ ]Other  GASTROINTESTINAL:  [ ]Soft  [ ]Distended   [ ]+BS  [ ]Non tender [ ]Tender  [ ]Other [ ]PEG [ ]OGT/ NGT   Last BM:   GENITOURINARY:  [ ]Normal [ ]Incontinent   [ ]Oliguria/Anuria   [ ]Patterson  MUSCULOSKELETAL:   [ ]Normal   [ ]Weakness  [ ]Bed/Wheelchair bound [ ]Edema  NEUROLOGIC:   [ ]No focal deficits  [ ] Cognitive impairment  [ ] Dysphagia [ ]Dysarthria [ ] Paresis [ ]Other   SKIN:   [ ]Normal  [ ]Rash  [ ]Other  [ ]Pressure ulcer(s) [ ]y [ ]n present on admission    CRITICAL CARE:  [ ]Shock Present  [ ]Septic [ ]Cardiogenic [ ]Neurologic [ ]Hypovolemic  [ ]Vasopressors [ ]Inotropes  [ ]Respiratory failure present [ ]Mechanical Ventilation [ ]Non-invasive ventilatory support [ ]High-Flow   [ ]Acute  [ ]Chronic [ ]Hypoxic  [ ]Hypercarbic [ ]Other  [ ]Other organ failure     LABS:                        9.5    7.33  )-----------( 244      ( 26 Aug 2022 02:38 )             30.0   08-26    139  |  106  |  8   ----------------------------<  93  3.6   |  21<L>  |  0.48<L>    Ca    8.2<L>      26 Aug 2022 02:38  Phos  3.2     08-26  Mg     1.70     08-26    TPro  6.3  /  Alb  2.8<L>  /  TBili  0.2  /  DBili  x   /  AST  36<H>  /  ALT  22  /  AlkPhos  83  08-26  PT/INR - ( 26 Aug 2022 02:38 )   PT: 14.2 sec;   INR: 1.22 ratio         PTT - ( 26 Aug 2022 02:38 )  PTT:25.4 sec      RADIOLOGY & ADDITIONAL STUDIES:    Protein Calorie Malnutrition Present: [ ]mild [ ]moderate [ ]severe [ ]underweight [ ]morbid obesity  https://www.andeal.org/vault/9703/web/files/ONC/Table_Clinical%20Characteristics%20to%20Document%20Malnutrition-White%20JV%20et%20al%202012.pdf    Height (cm): 157.5 (08-26-22 @ 07:10)  Weight (kg): 71.1 (08-26-22 @ 07:11)  BMI (kg/m2): 28.7 (08-26-22 @ 07:11)    [ ]PPSV2 < or = 30%  [ ]significant weight loss [ ]poor nutritional intake [ ]anasarca[ ]Artificial Nutrition    Other REFERRALS:  [ ]Hospice  [ ]Child Life  [ ]Social Work  [ ]Case management [ ]Holistic Therapy     Goals of Care Document: SUBJECTIVE AND OBJECTIVE:  Indication for Geriatrics and Palliative Care Services/INTERVAL HPI: Patient seen and examined at bedside, she was slightly uncomfortable from undergoing surgery, slight abdominal discomfort.     OVERNIGHT EVENTS: No acute events overnight. Zero doses of PRN Dilaudid used  in 24 hours from 8 am to 8 am.     DNR on chart:  Allergies    No Known Allergies    Intolerances    MEDICATIONS  (STANDING):  HYDROmorphone  Injectable 0.6 milliGRAM(s) IV Push every 4 hours  lidocaine   4% Patch 1 Patch Transdermal daily  multivitamin 1 Tablet(s) Oral daily  ondansetron Injectable 4 milliGRAM(s) IV Push three times a day  pantoprazole    Tablet 40 milliGRAM(s) Oral two times a day  polyethylene glycol 3350 17 Gram(s) Oral daily  sodium chloride 0.9%. 1000 milliLiter(s) (75 mL/Hr) IV Continuous <Continuous>    MEDICATIONS  (PRN):  acetaminophen     Tablet .. 650 milliGRAM(s) Oral every 6 hours PRN Temp greater or equal to 38C (100.4F), Mild Pain (1 - 3)  calcium carbonate    500 mG (Tums) Chewable 1 Tablet(s) Chew three times a day PRN Dyspepsia  HYDROmorphone  Injectable 0.3 milliGRAM(s) IV Push every 2 hours PRN Severe Pain (7 - 10)  melatonin 3 milliGRAM(s) Oral at bedtime PRN Sleep      ITEMS UNCHECKED ARE NOT PRESENT    PRESENT SYMPTOMS: [ ]Unable to self-report - see [ ] CPOT [ ] PAINADS [ ] RDOS  Source if other than patient:  [ ]Family   [ ]Team     Pain:  [x ]yes [ ]no  QOL impact - moderately affecting.  Location -    epigastric area               Aggravating factors - none  Quality - sharp  Radiation - to the back  Timing- constant  Severity (0-10 scale): 6/10  Minimal acceptable level (0-10 scale): 4/10    CPOT:    https://www.sccm.org/getattachment/[email or web address]-[email or web address]-2542u7422l0p/Critical-Care-Pain-Observation-Tool-(CPOT)    PAIN AD Score:	  http://geriatrictoolkit.[facility]/cog/painad.pdf (Ctrl + left click to view)    Dyspnea:                           [ ]Mild [ ]Moderate [ ]Severe    RDOS:  0 to 2  minimal or no respiratory distress   3  mild distress  4 to 6 moderate distress  >7 severe distress  https://[email or web address]/handouts/hen/Respiratory_Distress_Observation_Scale.pdf (Ctrl +  left click to view)     Anxiety:                             [ ]Mild [ ]Moderate [ ]Severe  Fatigue:                             [ ]Mild [ ]Moderate [ ]Severe  Nausea:                             [ ]Mild [ ]Moderate [ ]Severe  Loss of appetite:              [ ]Mild [ ]Moderate [ ]Severe  Constipation:                    [ ]Mild [ ]Moderate [ ]Severe    PCSSQ[Palliative Care Spiritual Screening Question]   Severity (0-10):  Score of 4 or > indicate consideration of Chaplaincy referral.  Chaplaincy Referral: [ ] yes [ ] refused [ ] following    Other Symptoms:  [ ]All other review of systems negative     Palliative Performance Status Version 2:    80     %      http://[facility].org/files/news/palliative_performance_scale_ppsv2.pdf  PHYSICAL EXAM:  Vital Signs Last 24 Hrs  T(C): 36.9 (26 Aug 2022 06:59), Max: 37.2 (26 Aug 2022 01:30)  T(F): 98.4 (26 Aug 2022 06:59), Max: 99 (26 Aug 2022 01:30)  HR: 102 (26 Aug 2022 06:59) (88 - 102)  BP: 141/86 (26 Aug 2022 06:59) (125/67 - 155/89)  BP(mean): --  RR: 16 (26 Aug 2022 05:38) (16 - 18)  SpO2: 95% (26 Aug 2022 06:59) (95% - 100%)    Parameters below as of 26 Aug 2022 05:38  Patient On (Oxygen Delivery Method): room air     I&O's Summary     GENERAL: [ ]Cachexia    [x ]Alert  [x ]Oriented    [ ]Lethargic  [ ]Unarousable  [ x]Verbal  [ ]Non-Verbal  Behavioral:   [ ]Anxiety  [ ]Delirium [ ]Agitation [ ]Other  HEENT:  [x ]Normal   [ ]Dry mouth   [ ]ET Tube/Trach  [ ]Oral lesions  PULMONARY:   [x ]Clear [ ]Tachypnea  [ ]Audible excessive secretions   [ ]Rhonchi        [ ]Right [ ]Left [ ]Bilateral  [ ]Crackles        [ ]Right [ ]Left [ ]Bilateral  [ ]Wheezing     [ ]Right [ ]Left [ ]Bilateral  [ ]Diminished BS [ ] Right [ ]Left [ ]Bilateral  CARDIOVASCULAR:    [x ]Regular [ ]Irregular [ ]Tachy  [ ]Luis Miguel [ ]Murmur [ ]Other  GASTROINTESTINAL:  [x ]Soft  [ ]Distended   [ ]+BS  [ ]Non tender [ ]Tender  [ ]Other [ ]PEG [ ]OGT/ NGT Patient has laparoscopic surgical incisions covered by  gauze   Last BM: 8/24/2022  GENITOURINARY:  [ ]Normal [ ]Incontinent   [ ]Oliguria/Anuria   [x ]Patterson  MUSCULOSKELETAL:   [ x]Normal   [ ]Weakness  [ ]Bed/Wheelchair bound [ ]Edema  NEUROLOGIC:   [x ]No focal deficits  [ ] Cognitive impairment  [ ] Dysphagia [ ]Dysarthria [ ] Paresis [ ]Other   SKIN:   [x ]Normal  [ ]Rash  [ ]Other  [ ]Pressure ulcer(s) [ ]y [x ]n present on admission    CRITICAL CARE:  [ ]Shock Present  [ ]Septic [ ]Cardiogenic [ ]Neurologic [ ]Hypovolemic  [ ]Vasopressors [ ]Inotropes  [ ]Respiratory failure present [ ]Mechanical Ventilation [ ]Non-invasive ventilatory support [ ]High-Flow   [ ]Acute  [ ]Chronic [ ]Hypoxic  [ ]Hypercarbic [ ]Other  [ ]Other organ failure     LABS:                        9.5    7.33  )-----------( 244      ( 26 Aug 2022 02:38 )             30.0   08-26    139  |  106  |  8   ----------------------------<  93  3.6   |  21<L>  |  0.48<L>    Ca    8.2<L>      26 Aug 2022 02:38  Phos  3.2     08-26  Mg     1.70     08-26    TPro  6.3  /  Alb  2.8<L>  /  TBili  0.2  /  DBili  x   /  AST  36<H>  /  ALT  22  /  AlkPhos  83  08-26  PT/INR - ( 26 Aug 2022 02:38 )   PT: 14.2 sec;   INR: 1.22 ratio         PTT - ( 26 Aug 2022 02:38 )  PTT:25.4 sec      RADIOLOGY & ADDITIONAL STUDIES:< from: Xray Chest 1 View- PORTABLE-Urgent (Xray Chest 1 View- PORTABLE-Urgent .) (08.25.22 @ 17:28) >  Clear lungs.        Protein Calorie Malnutrition Present: [ ]mild [ ]moderate [ ]severe [ ]underweight [ ]morbid obesity  https://www.andeal.org/vault/2440/web/files/ONC/Table_Clinical%20Characteristics%20to%20Document%20Malnutrition-White%20JV%20et%20al%202012.pdf    Height (cm): 157.5 (08-26-22 @ 07:10)  Weight (kg): 71.1 (08-26-22 @ 07:11)  BMI (kg/m2): 28.7 (08-26-22 @ 07:11)    [ ]PPSV2 < or = 30%  [ ]significant weight loss [ ]poor nutritional intake [ ]anasarca[ ]Artificial Nutrition    Other REFERRALS:  [ ]Hospice  [ ]Child Life  [ ]Social Work  [ ]Case management [ ]Holistic Therapy      SUBJECTIVE AND OBJECTIVE:  Indication for Geriatrics and Palliative Care Services/INTERVAL HPI: Patient seen and examined at bedside, she was slightly uncomfortable from undergoing surgery, slight abdominal discomfort.     INTERVAL EVENTS: No acute events overnight. Zero doses of PRN Dilaudid used  in 24 hours from 8 am to 8 am. Patient underwent laparoscopy today and EGD, which revealed nondistended and friable stomach, not amenable to surgery. J tube placed.     Patient was seen back on floors, was coughing and stated she felt cold.     DNR on chart:  Allergies    No Known Allergies    Intolerances    MEDICATIONS  (STANDING):  HYDROmorphone  Injectable 0.6 milliGRAM(s) IV Push every 4 hours  lidocaine   4% Patch 1 Patch Transdermal daily  multivitamin 1 Tablet(s) Oral daily  ondansetron Injectable 4 milliGRAM(s) IV Push three times a day  pantoprazole    Tablet 40 milliGRAM(s) Oral two times a day  polyethylene glycol 3350 17 Gram(s) Oral daily  sodium chloride 0.9%. 1000 milliLiter(s) (75 mL/Hr) IV Continuous <Continuous>    MEDICATIONS  (PRN):  acetaminophen     Tablet .. 650 milliGRAM(s) Oral every 6 hours PRN Temp greater or equal to 38C (100.4F), Mild Pain (1 - 3)  calcium carbonate    500 mG (Tums) Chewable 1 Tablet(s) Chew three times a day PRN Dyspepsia  HYDROmorphone  Injectable 0.3 milliGRAM(s) IV Push every 2 hours PRN Severe Pain (7 - 10)  melatonin 3 milliGRAM(s) Oral at bedtime PRN Sleep      ITEMS UNCHECKED ARE NOT PRESENT    PRESENT SYMPTOMS: [ ]Unable to self-report - see [ ] CPOT [ ] PAINADS [ ] RDOS  Source if other than patient:  [ ]Family   [ ]Team     Pain:  [x ]yes [ ]no  QOL impact - moderately affecting.  Location -    epigastric area               Aggravating factors - none  Quality - sharp  Radiation - to the back  Timing- constant  Severity (0-10 scale): 6/10  Minimal acceptable level (0-10 scale): 4/10    CPOT:    https://www.sccm.org/getattachment/[email or web address]-[email or web address]/Critical-Care-Pain-Observation-Tool-(CPOT)    PAIN AD Score:	  http://geriatrictoolkit.[facility]/cog/painad.pdf (Ctrl + left click to view)    Dyspnea:                           [ ]Mild [ ]Moderate [ ]Severe    RDOS:  0 to 2  minimal or no respiratory distress   3  mild distress  4 to 6 moderate distress  >7 severe distress  https://homecareinformation.net/handouts/hen/Respiratory_Distress_Observation_Scale.pdf (Ctrl +  left click to view)     Anxiety:                             [ ]Mild [ ]Moderate [ ]Severe  Fatigue:                             [ ]Mild [ ]Moderate [ ]Severe  Nausea:                             [ ]Mild [ ]Moderate [ ]Severe  Loss of appetite:              [ ]Mild [ ]Moderate [ ]Severe  Constipation:                    [ ]Mild [ ]Moderate [ ]Severe    PCSSQ[Palliative Care Spiritual Screening Question]   Severity (0-10):  Score of 4 or > indicate consideration of Chaplaincy referral.  Chaplaincy Referral: [ ] yes [ ] refused [ ] following    Other Symptoms:  [ ]All other review of systems negative     Palliative Performance Status Version 2:    80     %      http://npcrc.org/files/news/palliative_performance_scale_ppsv2.pdf  PHYSICAL EXAM:  Vital Signs Last 24 Hrs  T(C): 36.9 (26 Aug 2022 06:59), Max: 37.2 (26 Aug 2022 01:30)  T(F): 98.4 (26 Aug 2022 06:59), Max: 99 (26 Aug 2022 01:30)  HR: 102 (26 Aug 2022 06:59) (88 - 102)  BP: 141/86 (26 Aug 2022 06:59) (125/67 - 155/89)  BP(mean): --  RR: 16 (26 Aug 2022 05:38) (16 - 18)  SpO2: 95% (26 Aug 2022 06:59) (95% - 100%)    Parameters below as of 26 Aug 2022 05:38  Patient On (Oxygen Delivery Method): room air     I&O's Summary     GENERAL: [ ]Cachexia    [x ]Alert  [x ]Oriented    [ ]Lethargic  [ ]Unarousable  [ x]Verbal  [ ]Non-Verbal  Behavioral:   [ ]Anxiety  [ ]Delirium [ ]Agitation [ ]Other  HEENT:  [x ]Normal   [ ]Dry mouth   [ ]ET Tube/Trach  [ ]Oral lesions  PULMONARY:   [x ]Clear [ ]Tachypnea  [ ]Audible excessive secretions   [ ]Rhonchi        [ ]Right [ ]Left [ ]Bilateral  [ ]Crackles        [ ]Right [ ]Left [ ]Bilateral  [ ]Wheezing     [ ]Right [ ]Left [ ]Bilateral  [ ]Diminished BS [ ] Right [ ]Left [ ]Bilateral  CARDIOVASCULAR:    [x ]Regular [ ]Irregular [ ]Tachy  [ ]Luis Miguel [ ]Murmur [ ]Other  GASTROINTESTINAL:  [x ]Soft  [ ]Distended   [ ]+BS  [ ]Non tender [ ]Tender  [ ]Other [ ]PEG [ ]OGT/ NGT Patient has laparoscopic surgical incisions covered by  gauze   Last BM: 8/24/2022  GENITOURINARY:  [ ]Normal [ ]Incontinent   [ ]Oliguria/Anuria   [x ]Patterson  MUSCULOSKELETAL:   [ x]Normal   [ ]Weakness  [ ]Bed/Wheelchair bound [ ]Edema  NEUROLOGIC:   [x ]No focal deficits  [ ] Cognitive impairment  [ ] Dysphagia [ ]Dysarthria [ ] Paresis [ ]Other   SKIN:   [x ]Normal  [ ]Rash  [ ]Other  [ ]Pressure ulcer(s) [ ]y [x ]n present on admission    CRITICAL CARE:  [ ]Shock Present  [ ]Septic [ ]Cardiogenic [ ]Neurologic [ ]Hypovolemic  [ ]Vasopressors [ ]Inotropes  [ ]Respiratory failure present [ ]Mechanical Ventilation [ ]Non-invasive ventilatory support [ ]High-Flow   [ ]Acute  [ ]Chronic [ ]Hypoxic  [ ]Hypercarbic [ ]Other  [ ]Other organ failure     LABS:                        9.5    7.33  )-----------( 244      ( 26 Aug 2022 02:38 )             30.0   08-26    139  |  106  |  8   ----------------------------<  93  3.6   |  21<L>  |  0.48<L>    Ca    8.2<L>      26 Aug 2022 02:38  Phos  3.2     08-26  Mg     1.70     08-26    TPro  6.3  /  Alb  2.8<L>  /  TBili  0.2  /  DBili  x   /  AST  36<H>  /  ALT  22  /  AlkPhos  83  08-26  PT/INR - ( 26 Aug 2022 02:38 )   PT: 14.2 sec;   INR: 1.22 ratio         PTT - ( 26 Aug 2022 02:38 )  PTT:25.4 sec      RADIOLOGY & ADDITIONAL STUDIES:< from: Xray Chest 1 View- PORTABLE-Urgent (Xray Chest 1 View- PORTABLE-Urgent .) (08.25.22 @ 17:28) >  Clear lungs.        Protein Calorie Malnutrition Present: [ ]mild [ ]moderate [ ]severe [ ]underweight [ ]morbid obesity  https://www.andeal.org/vault/2440/web/files/ONC/Table_Clinical%20Characteristics%20to%20Document%20Malnutrition-White%20JV%20et%20al%202012.pdf    Height (cm): 157.5 (08-26-22 @ 07:10)  Weight (kg): 71.1 (08-26-22 @ 07:11)  BMI (kg/m2): 28.7 (08-26-22 @ 07:11)    [ ]PPSV2 < or = 30%  [ ]significant weight loss [ ]poor nutritional intake [ ]anasarca[ ]Artificial Nutrition    Other REFERRALS:  [ ]Hospice  [ ]Child Life  [ ]Social Work  [ ]Case management [ ]Holistic Therapy

## 2022-08-26 NOTE — BRIEF OPERATIVE NOTE - NSICDXBRIEFPROCEDURE_GEN_ALL_CORE_FT
PROCEDURES:  Bronchoscopy, flexible 24-Aug-2022 10:26:36  Juan Manuel Velasco  
PROCEDURES:  Upper endoscopy 26-Aug-2022 11:48:06  Luciano Roberts  Tube jejunostomy 26-Aug-2022 11:48:48  Luciano Roberts

## 2022-08-26 NOTE — PROGRESS NOTE ADULT - NUTRITIONAL ASSESSMENT
This patient has been assessed with a concern for Malnutrition and has been determined to have a diagnosis/diagnoses of Severe protein-calorie malnutrition.    This patient is being managed with:   Diet NPO after Midnight-     NPO Start Date: 25-Aug-2022   NPO Start Time: 23:59  Entered: Aug 25 2022  7:37AM    Diet Regular-  Lacto-Ovo Veg (Accepts Milk Prod. Eggs)  Supplement Feeding Modality:  Oral  Ensure Enlive Cans or Servings Per Day:  1       Frequency:  Daily  Entered: Aug 21 2022  1:08PM

## 2022-08-26 NOTE — PROGRESS NOTE ADULT - PROBLEM SELECTOR PLAN 3
ondansetron 4mg Q6 IV PRN  Pt had emesis episode after breakfast yesterday  palliative consulted - recommended doing IV zofran 4 mg TID before meals, will reassess nausea after change  NPO at midnight for OR procedure As per RD, ensure clear  - diet back to regular diet as tolerated  72 hour caloric count to evaluate for malnutrition - indicates malnutrition  may need evaluation for J-tube for nutritional support if unable to tolerate diet  pending OR friday As per RD, ensure clear  - diet back to regular diet as tolerated  72 hour caloric count to evaluate for malnutrition - indicates malnutrition    J-tube placed today Impression: 72 hour caloric count to evaluate for malnutrition - indicates malnutrition. Pt unable to     J-tube placed today Impression: 72 hour caloric count to evaluate for malnutrition - indicates malnutrition. Pt unable to eat due to pain. Will need to assess for adequate nutrition methods.    J-tube placed today

## 2022-08-26 NOTE — PROGRESS NOTE ADULT - PROBLEM SELECTOR PLAN 3
- Patient receiving zofran 4 mg Q8h ATC   - Advised patient to time zofran before she eats; would recommend to give Zofran prior to meals.

## 2022-08-26 NOTE — PROGRESS NOTE ADULT - PROBLEM SELECTOR PLAN 1
Biopsy proven poorly differentiated gastric adenocarcinoma.   Status post laparoscopic evaluation on 8/26/2022 , non resectable tumor. Status post J tube placement.   No plans for IP chemotherapy at this time, to follow up at [facility].

## 2022-08-26 NOTE — PROGRESS NOTE ADULT - PROBLEM SELECTOR PLAN 5
DEVI likely 2/2 volume depletion from poor PO intake w/ intractable nausea/vomiting. Cr upon admission 3.13, now WNL and stable at 0.6 - resolved  - Monitor BMP daily  - Avoid NSAIDs, nephrotoxins Seen on CT imaging, nonobstructive. May be adding to the flank pain presentation, unlikely to be sole cause of abdominal tenderness. Pt denies [id number] history of nephrolithiasis, but gives a history about being evaluated for nephroliths. Was seen at [facility] for abdominal pain, according to her, and prescribed NSAIDs, possibly being diagnosed with nephrolithiasis there.  - s/p IV hydration  - No evidence for obstruction or infection. Continue to monitor.  - Pain control w/ Lidocaine patch on bilateral back/ flanks PRN  -  (avoid NSAIDs for now) DVT ppx: lovenox      Dispo planning: complex dispo planning due to lack of insurance

## 2022-08-26 NOTE — BRIEF OPERATIVE NOTE - OPERATION/FINDINGS
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 Robotic witzel jejunostomy tube placed. Biopsy taken of diaphragmatic anterior peritoneum. Jejunum pexied to anterior abdominal wall.

## 2022-08-26 NOTE — PROGRESS NOTE ADULT - PROBLEM SELECTOR PLAN 2
The patient's abdominal pain, persistent nausea/vomiting and weight loss in the s/o Ct findings indicating gastric body thickening are suspicious for malignancy.   - GI, onc, surg/onc following  - pantoprazole  40 mg BID  - EGD:  Infiltrative changes in gastric body concerning gastric malignancy. Biopsied, Congested duodenal mucosa  - staging CT chest - Mediastinal/bilateral hilar/R internal mammary LAD; indeterminate, but metastatic disease is a diagnostic consideration. Lymph node biopsy on 8/23  - CEA elevated    8/26 - - palliative consult for pain placed yesterday- IV dilaudid 0.6 mg q4h ATC with 0.3 mg q2h PRN for severe/breakthrough  - OR today for resection ondansetron 4mg Q6 IV PRN  Pt had emesis episode after breakfast yesterday  palliative consulted - recommended doing IV zofran 4 mg TID before meals, will reassess nausea after change  NPO at midnight for OR procedure Pt has vomiting and intermittent episodes. Vomiting associated with eating meals. Likely related to gastric adenocarcinoma     Palliative consulted - recommended doing IV zofran 4 mg TID before meals, will reassess nausea after change  currently NPO Impression: Pt has vomiting and intermittent episodes. Vomiting associated with eating meals. Likely related to gastric adenocarcinoma. The    Palliative consulted - recommended doing IV zofran 4 mg TID before meals, will reassess nausea after change  currently NPO Impression: Pt has vomiting and intermittent episodes. Vomiting associated with eating meals. Likely related to gastric adenocarcinoma.     Palliative consulted - recommended doing IV zofran 4 mg TID before meals, will reassess nausea after change  currently NPO

## 2022-08-26 NOTE — PROGRESS NOTE ADULT - ASSESSMENT
57 yr old F with poorly differentiated gastric adenocarcinoma, status post laparoscopy and J tube placement on 8/26/2022. She was found to have non resectable tumor.

## 2022-08-26 NOTE — PROGRESS NOTE ADULT - PROBLEM SELECTOR PLAN 4
As per RD, ensure clear  - diet back to regular diet as tolerated  72 hour caloric count to evaluate for malnutrition - indicates malnutrition  may need evaluation for J-tube for nutritional support if unable to tolerate diet  pending OR friday DEVI likely 2/2 volume depletion from poor PO intake w/ intractable nausea/vomiting. Cr upon admission 3.13, now WNL and stable at 0.6 - resolved  - Monitor BMP daily  - Avoid NSAIDs, nephrotoxins Stable. Anemia to 11.4, microcytic but barely at 79.8. May be combination of Iron deficiency anemia from poor PO intake and normocytic anemia from Anemia of Chronic Disease w/ this presentation c/f gastric malignancy.  - Total Iron 22, TIBC 165, transferrin 149    8/26 - Hb 9.5, H/H stable    continue to monitor with CBCs Impression: Stable anemia. May be combination of Iron deficiency anemia from poor PO intake and normocytic anemia from Anemia of Chronic Disease w/ this presentation c/f gastric malignancy.  - Total Iron 22, TIBC 165, transferrin 149    8/26 - Hb 9.5, H/H stable    continue to monitor with CBCs

## 2022-08-26 NOTE — PROGRESS NOTE ADULT - PROBLEM SELECTOR PLAN 1
Patient medically optimized and cleared for surgery on Friday  Pt has no clinical evidence of heart failure   Using the revised cardiac risk index, pt is classified as a Class II risk, corresponding to a score of 1 and 6% 30-day risk of death, MI, cardiac arrest   Pt's baseline functional status is mobile and active, (was previously working full-time out of the home)  ECG ordered for pre-op baseline.     Pt may proceed with the indicated laparoscopy and possible gastrectomy with surgical oncology accepting the associated risks. The patient's abdominal pain, persistent nausea/vomiting and weight loss iso of CT A/P findings indicating gastric body thickening and gastrocolic ligament nodularity are concerning for gastric adenocarcinoma.   - GI, onc, surg/onc following  - EGD:  Infiltrative changes in gastric body concerning gastric malignancy, Congested duodenal mucosa, biopsy = poorly differentiated   - staging CT chest - Mediastinal/bilateral hilar/R internal mammary LAD; indeterminate, but metastatic disease is a diagnostic consideration. Lymph node biopsy on 8/23  - pantoprazole  40 mg BID    - CEA elevated    8/26 - - palliative consult for pain placed yesterday- IV dilaudid 0.6 mg q4h ATC with 0.3 mg q2h PRN for severe/breakthrough  - OR today for resection The patient's abdominal pain, persistent nausea/vomiting and weight loss iso of CT A/P findings indicating gastric body thickening and gastrocolic ligament nodularity are concerning for gastric adenocarcinoma. EGD biopsy confirmed.  - GI, onc, surg/onc following  - EGD:  Infiltrative changes in gastric body concerning gastric malignancy, Congested duodenal mucosa, biopsy = poorly differentiated adenocarcinoma  - staging CT chest - Mediastinal/bilateral hilar/R internal mammary LAD    - f/u mediastinal lymph node biopsy results   - pantoprazole  40 mg BID    8/26 - - palliative consult for pain placed yesterday- IV dilaudid 0.6 mg q4h ATC with 0.3 mg q2h PRN for severe/breakthrough  - OR today - advanced disease not amenable to resection, J tube placed.  - keep NPO for now - surgery with f/u tomorrow with J tube capped Impression: The patient's abdominal pain, persistent nausea/vomiting and weight loss iso of CT A/P findings indicating gastric body thickening and gastrocolic ligament nodularity are concerning for gastric adenocarcinoma. EGD biopsy confirmed.  - GI, onc, surg/onc following  - EGD:  Infiltrative changes in gastric body concerning gastric malignancy, Congested duodenal mucosa, biopsy = poorly differentiated adenocarcinoma  - staging CT chest - Mediastinal/bilateral hilar/R internal mammary LAD    - f/u mediastinal lymph node biopsy results   - pantoprazole  40 mg BID    8/26 - - palliative consult for pain placed yesterday- IV dilaudid 0.6 mg q4h ATC with 0.3 mg q2h PRN for severe/breakthrough  - OR today - advanced disease not amenable to resection, J tube placed.  - keep NPO for now - surgery with f/u tomorrow with J tube capped

## 2022-08-26 NOTE — CHART NOTE - NSCHARTNOTEFT_GEN_A_CORE
Post Operative Note  Patient: SCOOTER MAZARIEGOS 57y (1965) Female   MRN: [id number]  Location: [address]  Visit: 08-14-22 Inpatient  Date: 08-26-22 @ 15:41    Procedure: S/P EGD, robotic feeding J-tube placement.    Subjective: Patient seen and examined post operatively. Reports pain as controlled. Denies nausea, vomiting, fever, chills, chest pain, SOB, cough.      Objective:  Vitals: T(F): 98.1 (08-26-22 @ 13:00), Max: 99 (08-26-22 @ 01:30)  HR: 102 (08-26-22 @ 13:00)  BP: 151/96 (08-26-22 @ 13:00) (114/69 - 155/89)  RR: 20 (08-26-22 @ 13:00)  SpO2: 97% (08-26-22 @ 13:00)  Vent Settings:     In:   08-26-22 @ 07:01  -  08-26-22 @ 15:41  --------------------------------------------------------  IN: 150 mL      IV Fluids: multivitamin 1 Tablet(s) Oral daily  sodium chloride 0.9%. 1000 milliLiter(s) (75 mL/Hr) IV Continuous <Continuous>      Out:   08-26-22 @ 07:01  -  08-26-22 @ 15:41  --------------------------------------------------------  OUT: 100 mL      EBL:     Voided Urine:   08-26-22 @ 07:01  -  08-26-22 @ 15:41  --------------------------------------------------------  OUT: 100 mL    Physical Examination:  General: NAD, resting comfortably in bed  HEENT: Normocephalic atraumatic  Respiratory: Nonlabored respirations, normal CW expansion.  Cardio: S1S2, regular rate and rhythm.  Abdomen: softly distended, appropriately tender, surgical incisions are c/d/i. J tube capped.  Vascular: extremities are warm and well perfused.     Imaging:  No post-op imaging studies    Assessment:  57yFemale patient S/P EGD and robotic feeding J tube placement    Plan:  - J tube capped currently  - [place]  - J tube study prior to starting trickle feeds tomorrow   - Surgery to follow    Date/Time: 08-26-22 @ 15:41

## 2022-08-26 NOTE — PROGRESS NOTE ADULT - PROBLEM SELECTOR PLAN 2
- Pain located in epigastric area (area of gastric cancer)  - Pain reasonably controlled on current regimen: c/w IV Dilaudid 0.6 mg q4h ATC with 0.3 mg q2h PRN for severe/breakthrough pain, zero PRN use in 24 hours from 8 am to 8 am..  -Bowel regimen in place. - Pain located in epigastric area (area of gastric cancer)  - Pain reasonably controlled on current regimen: c/w IV Dilaudid 0.6 mg q4h ATC with 0.3 mg q2h PRN for severe/breakthrough pain, zero PRN use in 24 hours from 8 am to 8 am..  - Bowel regimen in place.

## 2022-08-26 NOTE — PROGRESS NOTE ADULT - PROBLEM SELECTOR PLAN 7
Stable. Anemia to 11.4, microcytic but barely at 79.8. May be combination of Iron deficiency anemia from poor PO intake and normocytic anemia from Anemia of Chronic Disease w/ this presentation c/f gastric malignancy.  - Total Iron 22, TIBC 165, transferrin 149    8/26 - Hb 9.5, H/H stable    continue to monitor with CBCs DVT ppx: Was on lovenox - currently being held for OR friday (received dose today)    Severe protein calorie malnutrition-f/u calorie count and any additional nutrition/dietician recs . Pt will likely need alternative measure for nutrition.     Dispo planning: complex dispo planning due to lack of insurance

## 2022-08-26 NOTE — PROGRESS NOTE ADULT - PROBLEM SELECTOR PLAN 6
Seen on CT imaging, nonobstructive. May be adding to the flank pain presentation, unlikely to be sole cause of abdominal tenderness. Pt denies [id number] history of nephrolithiasis, but gives a history about being evaluated for nephroliths. Was seen at [facility] for abdominal pain, according to her, and prescribed NSAIDs, possibly being diagnosed with nephrolithiasis there.  - s/p IV hydration  - No evidence for obstruction or infection. Continue to monitor.  - Pain control w/ Lidocaine patch on bilateral back/ flanks PRN  -  (avoid NSAIDs for now) Stable. Anemia to 11.4, microcytic but barely at 79.8. May be combination of Iron deficiency anemia from poor PO intake and normocytic anemia from Anemia of Chronic Disease w/ this presentation c/f gastric malignancy.  - Total Iron 22, TIBC 165, transferrin 149    8/26 - Hb 9.5, H/H stable    continue to monitor with CBCs

## 2022-08-26 NOTE — PROGRESS NOTE ADULT - SUBJECTIVE AND OBJECTIVE BOX
OVERNIGHT EVENTS: No acute overnight events.      SUBJECTIVE:       MEDICATIONS  (STANDING):  HYDROmorphone  Injectable 0.6 milliGRAM(s) IV Push every 4 hours  lidocaine   4% Patch 1 Patch Transdermal daily  multivitamin 1 Tablet(s) Oral daily  ondansetron Injectable 4 milliGRAM(s) IV Push three times a day  pantoprazole    Tablet 40 milliGRAM(s) Oral two times a day  polyethylene glycol 3350 17 Gram(s) Oral daily  sodium chloride 0.9%. 1000 milliLiter(s) (75 mL/Hr) IV Continuous <Continuous>    MEDICATIONS  (PRN):  acetaminophen     Tablet .. 650 milliGRAM(s) Oral every 6 hours PRN Temp greater or equal to 38C (100.4F), Mild Pain (1 - 3)  calcium carbonate    500 mG (Tums) Chewable 1 Tablet(s) Chew three times a day PRN Dyspepsia  HYDROmorphone  Injectable 0.3 milliGRAM(s) IV Push every 2 hours PRN Severe Pain (7 - 10)  melatonin 3 milliGRAM(s) Oral at bedtime PRN Sleep        T(F): 98.4 (08-26-22 @ 06:59), Max: 99 (08-26-22 @ 01:30)  HR: 102 (08-26-22 @ 06:59) (88 - 102)  BP: 141/86 (08-26-22 @ 06:59) (125/67 - 155/89)  BP(mean): --  RR: 16 (08-26-22 @ 05:38) (16 - 18)  SpO2: 95% (08-26-22 @ 06:59) (95% - 100%)    PHYSICAL EXAM:     GENERAL: NAD, sitting in chair, hunched over clutching stomach  HEAD:  Atraumatic, Normocephalic  EYES: EOMI, PERRLA, conjunctiva and sclera clear, no nystagmus noted  ENT: Slightly dry mucous membranes,   NECK: Supple, No JVD, trachea midline  CHEST/LUNG: Clear to auscultation bilaterally; No rales, rhonchi, wheezing, or rubs. Unlabored respirations  HEART: Regular rate and rhythm; No murmurs, rubs, or gallops, normal S1/S2  ABDOMEN: normal bowel sounds; abdominal distension, 8/10 epigastric pain that radiates to RUQ upon palpation and at rest, no rebound tenderness or guarding    EXTREMITIES:  2+ Peripheral Pulses, brisk capillary refill. No clubbing, cyanosis, or edema  MSK: No gross deformities noted   Neurological:  A&Ox3, no focal deficits   SKIN: No rashes or lesions  PSYCH: Normal mood, affect   TELEMETRY:    LABS:                        9.5    7.33  )-----------( 244      ( 26 Aug 2022 02:38 )             30.0     08-26    139  |  106  |  8   ----------------------------<  93  3.6   |  21<L>  |  0.48<L>    Ca    8.2<L>      26 Aug 2022 02:38  Phos  3.2     08-26  Mg     1.70     08-26    TPro  6.3  /  Alb  2.8<L>  /  TBili  0.2  /  DBili  x   /  AST  36<H>  /  ALT  22  /  AlkPhos  83  08-26        PT/INR - ( 26 Aug 2022 02:38 )   PT: 14.2 sec;   INR: 1.22 ratio         PTT - ( 26 Aug 2022 02:38 )  PTT:25.4 sec    Creatinine Trend: 0.48<--, 0.53<--, 0.58<--, 0.55<--, 0.64<--, 0.63<--  I&O's Summary    BNP    RADIOLOGY & ADDITIONAL STUDIES:                 OVERNIGHT EVENTS: No acute overnight events.      SUBJECTIVE: Patient seen at bedside postop. Patient feeling lethargic after the surgery and endorsing sharp epigastric pain. Patient also endorsing nausea but no episodes of emesis.  Denies chest pain, SOB, leg pain, fever, chills, rash, HA, changes in vision, back pain      MEDICATIONS  (STANDING):  HYDROmorphone  Injectable 0.6 milliGRAM(s) IV Push every 4 hours  lidocaine   4% Patch 1 Patch Transdermal daily  multivitamin 1 Tablet(s) Oral daily  ondansetron Injectable 4 milliGRAM(s) IV Push three times a day  pantoprazole    Tablet 40 milliGRAM(s) Oral two times a day  polyethylene glycol 3350 17 Gram(s) Oral daily  sodium chloride 0.9%. 1000 milliLiter(s) (75 mL/Hr) IV Continuous <Continuous>    MEDICATIONS  (PRN):  acetaminophen     Tablet .. 650 milliGRAM(s) Oral every 6 hours PRN Temp greater or equal to 38C (100.4F), Mild Pain (1 - 3)  calcium carbonate    500 mG (Tums) Chewable 1 Tablet(s) Chew three times a day PRN Dyspepsia  HYDROmorphone  Injectable 0.3 milliGRAM(s) IV Push every 2 hours PRN Severe Pain (7 - 10)  melatonin 3 milliGRAM(s) Oral at bedtime PRN Sleep        T(F): 98.4 (08-26-22 @ 06:59), Max: 99 (08-26-22 @ 01:30)  HR: 102 (08-26-22 @ 06:59) (88 - 102)  BP: 141/86 (08-26-22 @ 06:59) (125/67 - 155/89)  BP(mean): --  RR: 16 (08-26-22 @ 05:38) (16 - 18)  SpO2: 95% (08-26-22 @ 06:59) (95% - 100%)    PHYSICAL EXAM:     GENERAL: lethargic, laying in bed   HEAD:  Atraumatic, Normocephalic  EYES: EOMI, PERRLA, conjunctiva and sclera clear, no nystagmus noted  ENT: Slightly dry mucous membranes,   NECK: Supple, No JVD, trachea midline  CHEST/LUNG: Clear to auscultation bilaterally; No rales, rhonchi, wheezing, or rubs. Unlabored respirations  HEART: Regular rate and rhythm; No murmurs, rubs, or gallops, normal S1/S2  ABDOMEN: normal bowel sounds; abdominal distension, 8/10 epigastric pain that radiates to RUQ upon palpation and at rest, no rebound tenderness or guarding    EXTREMITIES:  2+ Peripheral Pulses, brisk capillary refill. No clubbing, cyanosis, or edema  MSK: No gross deformities noted   Neurological:  A&Ox3, no focal deficits   SKIN: No rashes or lesions  PSYCH: Normal mood, affect     TELEMETRY:    LABS:                        9.5    7.33  )-----------( 244      ( 26 Aug 2022 02:38 )             30.0     08-26    139  |  106  |  8   ----------------------------<  93  3.6   |  21<L>  |  0.48<L>    Ca    8.2<L>      26 Aug 2022 02:38  Phos  3.2     08-26  Mg     1.70     08-26    TPro  6.3  /  Alb  2.8<L>  /  TBili  0.2  /  DBili  x   /  AST  36<H>  /  ALT  22  /  AlkPhos  83  08-26        PT/INR - ( 26 Aug 2022 02:38 )   PT: 14.2 sec;   INR: 1.22 ratio         PTT - ( 26 Aug 2022 02:38 )  PTT:25.4 sec    Creatinine Trend: 0.48<--, 0.53<--, 0.58<--, 0.55<--, 0.64<--, 0.63<--  I&O's Summary    BNP    RADIOLOGY & ADDITIONAL STUDIES:

## 2022-08-26 NOTE — BRIEF OPERATIVE NOTE - NSICDXBRIEFPREOP_GEN_ALL_CORE_FT
PRE-OP DIAGNOSIS:  Abnormal chest CT 24-Aug-2022 10:26:50  Juan Manuel Velasco  
PRE-OP DIAGNOSIS:  Gastric cancer 26-Aug-2022 11:49:30  Luciano Roberts

## 2022-08-27 NOTE — PROGRESS NOTE ADULT - NUTRITIONAL ASSESSMENT
This patient has been assessed with a concern for Malnutrition and has been determined to have a diagnosis/diagnoses of Severe protein-calorie malnutrition.    This patient is being managed with:   Diet NPO-  Entered: Aug 26 2022 12:08PM

## 2022-08-27 NOTE — PROGRESS NOTE ADULT - PROBLEM SELECTOR PLAN 4
Impression: Stable anemia. May be combination of Iron deficiency anemia from poor PO intake and normocytic anemia from Anemia of Chronic Disease w/ this presentation c/f gastric malignancy.  - Total Iron 22, TIBC 165, transferrin 149    8/26 - Hb 9.5, H/H stable    continue to monitor with CBCs Impression: Stable anemia. May be combination of Iron deficiency anemia from poor PO intake and normocytic anemia from Anemia of Chronic Disease w/ this presentation c/f gastric malignancy.  - Total Iron 22, TIBC 165, transferrin 149    8/27 - Hb 9.6, H/H stable    continue to monitor with CBCs

## 2022-08-27 NOTE — PROGRESS NOTE ADULT - PROBLEM SELECTOR PLAN 1
Impression: The patient's abdominal pain, persistent nausea/vomiting and weight loss iso of CT A/P findings indicating gastric body thickening and gastrocolic ligament nodularity are concerning for gastric adenocarcinoma. EGD biopsy confirmed.  - GI, onc, surg/onc following  - EGD:  Infiltrative changes in gastric body concerning gastric malignancy, Congested duodenal mucosa, biopsy = poorly differentiated adenocarcinoma  - staging CT chest - Mediastinal/bilateral hilar/R internal mammary LAD  - mediastinal lymph node biopsy results - Metastatic adenocarcinoma   - pantoprazole  40 mg BID    8/26 - - palliative consult for pain placed yesterday- IV dilaudid 0.6 mg q4h ATC with 0.3 mg q2h PRN for severe/breakthrough  - OR today - advanced disease not amenable to resection, J tube placed.  - keep NPO for now - surgery with f/u tomorrow with imaging with J tube capped Impression: The patient's abdominal pain, persistent nausea/vomiting and weight loss iso of CT A/P findings indicating gastric body thickening and gastrocolic ligament nodularity are concerning for gastric adenocarcinoma. EGD biopsy confirmed.  - GI, onc, surg/onc following  - EGD:  Infiltrative changes in gastric body concerning gastric malignancy, Congested duodenal mucosa, biopsy = poorly differentiated adenocarcinoma  - staging CT chest - Mediastinal/bilateral hilar/R internal mammary LAD  - mediastinal lymph node biopsy results - Metastatic adenocarcinoma   - pantoprazole  40 mg BID  - OR 8/26 with J tube placement   8/27- pain management - changed to IV Dilaudid 1 mg Q4 ATC with 0.3 mg q2h PRN for severe/breakthrough  - OR yesterday - surgery will get imaging today re: j tube.  - keep NPO for now - surgery with f/u tomorrow with imaging with J tube capped

## 2022-08-27 NOTE — PROGRESS NOTE ADULT - PROBLEM SELECTOR PLAN 2
Impression: Pt has vomiting and intermittent episodes. Vomiting associated with eating meals. Likely related to gastric adenocarcinoma.     Palliative consulted - recommended doing IV zofran 4 mg TID before meals, will reassess nausea after change  currently NPO Impression: Pt has vomiting and intermittent episodes. Vomiting associated with eating meals. Likely related to gastric adenocarcinoma.     Pt complaining of zofran being ineffective. Pt switched to IV reglan 5 mg Q4 today.

## 2022-08-27 NOTE — PROGRESS NOTE ADULT - PROBLEM SELECTOR PLAN 3
Impression: 72 hour caloric count to evaluate for malnutrition - indicates malnutrition. Pt unable to eat due to pain. Will need to assess for adequate nutrition methods.    J-tube placed today Impression: 72 hour caloric count to evaluate for malnutrition - indicates malnutrition. Pt unable to eat due to pain. Will need to assess for adequate nutrition methods.    J-tube placed, nutrition consult placed regarding tube feeds

## 2022-08-27 NOTE — PROGRESS NOTE ADULT - ASSESSMENT
57F with gastric CA, s/p EGD and robotic feeding J tube placement     Recommendation:  - J tube feeding study today     Surgery D team  [id number]

## 2022-08-27 NOTE — PROGRESS NOTE ADULT - SUBJECTIVE AND OBJECTIVE BOX
OVERNIGHT EVENTS: No acute overnight events.      SUBJECTIVE:   Denies chest pain, SOB, diarrhea, back pain, rash, HA, paresthesias, and changes in vision     MEDICATIONS  (STANDING):  enoxaparin Injectable 40 milliGRAM(s) SubCutaneous every 24 hours  HYDROmorphone  Injectable 0.6 milliGRAM(s) IV Push every 4 hours  lidocaine   4% Patch 1 Patch Transdermal daily  multivitamin 1 Tablet(s) Oral daily  ondansetron Injectable 4 milliGRAM(s) IV Push three times a day  pantoprazole  Injectable 40 milliGRAM(s) IV Push daily  polyethylene glycol 3350 17 Gram(s) Oral daily  sodium chloride 0.9%. 1000 milliLiter(s) (75 mL/Hr) IV Continuous <Continuous>    MEDICATIONS  (PRN):  acetaminophen     Tablet .. 650 milliGRAM(s) Oral every 6 hours PRN Temp greater or equal to 38C (100.4F), Mild Pain (1 - 3)  calcium carbonate    500 mG (Tums) Chewable 1 Tablet(s) Chew three times a day PRN Dyspepsia  HYDROmorphone  Injectable 0.3 milliGRAM(s) IV Push every 2 hours PRN Severe Pain (7 - 10)  melatonin 3 milliGRAM(s) Oral at bedtime PRN Sleep        T(F): 98.8 (08-27-22 @ 05:59), Max: 100.4 (08-27-22 @ 00:15)  HR: 95 (08-27-22 @ 05:59) (81 - 112)  BP: 140/72 (08-27-22 @ 05:59) (114/66 - 151/96)  BP(mean): 107 (08-26-22 @ 13:00) (80 - 107)  RR: 16 (08-27-22 @ 05:59) (15 - 23)  SpO2: 98% (08-27-22 @ 05:59) (92% - 100%)    PHYSICAL EXAM:     GENERAL: lethargic, laying in bed   HEAD:  Atraumatic, Normocephalic  EYES: EOMI, PERRLA, conjunctiva and sclera clear, no nystagmus noted  ENT: Slightly dry mucous membranes,   NECK: Supple, No JVD, trachea midline  CHEST/LUNG: Clear to auscultation bilaterally; No rales, rhonchi, wheezing, or rubs. Unlabored respirations  HEART: Regular rate and rhythm; No murmurs, rubs, or gallops, normal S1/S2  ABDOMEN: normal bowel sounds; abdominal distension, 8/10 epigastric pain that radiates to RUQ upon palpation and at rest, no rebound tenderness or guarding    EXTREMITIES:  2+ Peripheral Pulses, brisk capillary refill. No clubbing, cyanosis, or edema  MSK: No gross deformities noted   Neurological:  A&Ox3, no focal deficits   SKIN: No rashes or lesions  PSYCH: Normal mood, affect     TELEMETRY:    LABS:                        9.5    7.33  )-----------( 244      ( 26 Aug 2022 02:38 )             30.0     08-26    139  |  106  |  8   ----------------------------<  93  3.6   |  21<L>  |  0.48<L>    Ca    8.2<L>      26 Aug 2022 02:38  Phos  3.2     08-26  Mg     1.70     08-26    TPro  6.3  /  Alb  2.8<L>  /  TBili  0.2  /  DBili  x   /  AST  36<H>  /  ALT  22  /  AlkPhos  83  08-26        PT/INR - ( 26 Aug 2022 02:38 )   PT: 14.2 sec;   INR: 1.22 ratio         PTT - ( 26 Aug 2022 02:38 )  PTT:25.4 sec    Creatinine Trend: 0.48<--, 0.53<--, 0.58<--, 0.55<--, 0.64<--, 0.63<--  I&O's Summary    26 Aug 2022 07:01  -  27 Aug 2022 07:00  --------------------------------------------------------  IN: 150 mL / OUT: 750 mL / NET: -600 mL      BNP    RADIOLOGY & ADDITIONAL STUDIES:                 OVERNIGHT EVENTS: No acute overnight events.      SUBJECTIVE: Pt complaining of sharp epigastric pain and nausea. Patient stated she had 2 episodes of dark-colored emesis. Stated that zofran is ineffective in managing her nausea.   Denies chest pain, SOB, diarrhea, back pain, rash, HA, paresthesias, and changes in vision     MEDICATIONS  (STANDING):  enoxaparin Injectable 40 milliGRAM(s) SubCutaneous every 24 hours  HYDROmorphone  Injectable 0.6 milliGRAM(s) IV Push every 4 hours  lidocaine   4% Patch 1 Patch Transdermal daily  multivitamin 1 Tablet(s) Oral daily  ondansetron Injectable 4 milliGRAM(s) IV Push three times a day  pantoprazole  Injectable 40 milliGRAM(s) IV Push daily  polyethylene glycol 3350 17 Gram(s) Oral daily  sodium chloride 0.9%. 1000 milliLiter(s) (75 mL/Hr) IV Continuous <Continuous>    MEDICATIONS  (PRN):  acetaminophen     Tablet .. 650 milliGRAM(s) Oral every 6 hours PRN Temp greater or equal to 38C (100.4F), Mild Pain (1 - 3)  calcium carbonate    500 mG (Tums) Chewable 1 Tablet(s) Chew three times a day PRN Dyspepsia  HYDROmorphone  Injectable 0.3 milliGRAM(s) IV Push every 2 hours PRN Severe Pain (7 - 10)  melatonin 3 milliGRAM(s) Oral at bedtime PRN Sleep        T(F): 98.8 (08-27-22 @ 05:59), Max: 100.4 (08-27-22 @ 00:15)  HR: 95 (08-27-22 @ 05:59) (81 - 112)  BP: 140/72 (08-27-22 @ 05:59) (114/66 - 151/96)  BP(mean): 107 (08-26-22 @ 13:00) (80 - 107)  RR: 16 (08-27-22 @ 05:59) (15 - 23)  SpO2: 98% (08-27-22 @ 05:59) (92% - 100%)    PHYSICAL EXAM:     GENERAL: lethargic, laying in bed   HEAD:  Atraumatic, Normocephalic  EYES: EOMI, PERRLA, conjunctiva and sclera clear, no nystagmus noted  ENT: Slightly dry mucous membranes,   NECK: Supple, No JVD, trachea midline  CHEST/LUNG: Clear to auscultation bilaterally; No rales, rhonchi, wheezing, or rubs. Unlabored respirations  HEART: Regular rate and rhythm; No murmurs, rubs, or gallops, normal S1/S2  ABDOMEN: normal bowel sounds; abdominal distension, 8/10 epigastric pain that radiates to RUQ upon palpation and at rest, no rebound tenderness or guarding    EXTREMITIES:  2+ Peripheral Pulses, brisk capillary refill. No clubbing, cyanosis, or edema  MSK: No gross deformities noted   Neurological:  A&Ox3, no focal deficits   SKIN: No rashes or lesions  PSYCH: Normal mood, affect     TELEMETRY:    LABS:                        9.5    7.33  )-----------( 244      ( 26 Aug 2022 02:38 )             30.0     08-26    139  |  106  |  8   ----------------------------<  93  3.6   |  21<L>  |  0.48<L>    Ca    8.2<L>      26 Aug 2022 02:38  Phos  3.2     08-26  Mg     1.70     08-26    TPro  6.3  /  Alb  2.8<L>  /  TBili  0.2  /  DBili  x   /  AST  36<H>  /  ALT  22  /  AlkPhos  83  08-26        PT/INR - ( 26 Aug 2022 02:38 )   PT: 14.2 sec;   INR: 1.22 ratio         PTT - ( 26 Aug 2022 02:38 )  PTT:25.4 sec    Creatinine Trend: 0.48<--, 0.53<--, 0.58<--, 0.55<--, 0.64<--, 0.63<--  I&O's Summary    26 Aug 2022 07:01  -  27 Aug 2022 07:00  --------------------------------------------------------  IN: 150 mL / OUT: 750 mL / NET: -600 mL      BNP    RADIOLOGY & ADDITIONAL STUDIES:                 OVERNIGHT EVENTS: No acute overnight events.      SUBJECTIVE: Pt complaining of sharp epigastric pain and nausea. Patient stated she had 2 episodes of dark-colored emesis. Stated that zofran is ineffective in managing her nausea. Also stating that pain medication wears off before the next dose and leads to 10/10 pain.  Denies chest pain, SOB, diarrhea, back pain, rash, HA, paresthesias, and changes in vision     MEDICATIONS  (STANDING):  enoxaparin Injectable 40 milliGRAM(s) SubCutaneous every 24 hours  HYDROmorphone  Injectable 0.6 milliGRAM(s) IV Push every 4 hours  lidocaine   4% Patch 1 Patch Transdermal daily  multivitamin 1 Tablet(s) Oral daily  ondansetron Injectable 4 milliGRAM(s) IV Push three times a day  pantoprazole  Injectable 40 milliGRAM(s) IV Push daily  polyethylene glycol 3350 17 Gram(s) Oral daily  sodium chloride 0.9%. 1000 milliLiter(s) (75 mL/Hr) IV Continuous <Continuous>    MEDICATIONS  (PRN):  acetaminophen     Tablet .. 650 milliGRAM(s) Oral every 6 hours PRN Temp greater or equal to 38C (100.4F), Mild Pain (1 - 3)  calcium carbonate    500 mG (Tums) Chewable 1 Tablet(s) Chew three times a day PRN Dyspepsia  HYDROmorphone  Injectable 0.3 milliGRAM(s) IV Push every 2 hours PRN Severe Pain (7 - 10)  melatonin 3 milliGRAM(s) Oral at bedtime PRN Sleep        T(F): 98.8 (08-27-22 @ 05:59), Max: 100.4 (08-27-22 @ 00:15)  HR: 95 (08-27-22 @ 05:59) (81 - 112)  BP: 140/72 (08-27-22 @ 05:59) (114/66 - 151/96)  BP(mean): 107 (08-26-22 @ 13:00) (80 - 107)  RR: 16 (08-27-22 @ 05:59) (15 - 23)  SpO2: 98% (08-27-22 @ 05:59) (92% - 100%)    PHYSICAL EXAM:     GENERAL: lethargic, laying in bed   HEAD:  Atraumatic, Normocephalic  EYES: EOMI, PERRLA, conjunctiva and sclera clear, no nystagmus noted  ENT: Slightly dry mucous membranes,   NECK: Supple, No JVD, trachea midline  CHEST/LUNG: Clear to auscultation bilaterally; No rales, rhonchi, wheezing, or rubs. Unlabored respirations  HEART: Regular rate and rhythm; No murmurs, rubs, or gallops, normal S1/S2  ABDOMEN: normal bowel sounds; abdominal distension, 8/10 epigastric pain that radiates to RUQ upon palpation and at rest, no rebound tenderness or guarding. Surgical dressings c/d/i    EXTREMITIES:  2+ Peripheral Pulses, brisk capillary refill. No clubbing, cyanosis, or edema  MSK: No gross deformities noted   Neurological:  A&Ox3, no focal deficits   SKIN: No rashes or lesions  PSYCH: Normal mood, affect     TELEMETRY:    LABS:                        9.5    7.33  )-----------( 244      ( 26 Aug 2022 02:38 )             30.0     08-26    139  |  106  |  8   ----------------------------<  93  3.6   |  21<L>  |  0.48<L>    Ca    8.2<L>      26 Aug 2022 02:38  Phos  3.2     08-26  Mg     1.70     08-26    TPro  6.3  /  Alb  2.8<L>  /  TBili  0.2  /  DBili  x   /  AST  36<H>  /  ALT  22  /  AlkPhos  83  08-26        PT/INR - ( 26 Aug 2022 02:38 )   PT: 14.2 sec;   INR: 1.22 ratio         PTT - ( 26 Aug 2022 02:38 )  PTT:25.4 sec    Creatinine Trend: 0.48<--, 0.53<--, 0.58<--, 0.55<--, 0.64<--, 0.63<--  I&O's Summary    26 Aug 2022 07:01  -  27 Aug 2022 07:00  --------------------------------------------------------  IN: 150 mL / OUT: 750 mL / NET: -600 mL      BNP    RADIOLOGY & ADDITIONAL STUDIES:                 OVERNIGHT EVENTS: Patient had a fever to 100.4. Acetaminophen given.      SUBJECTIVE: Pt complaining of sharp epigastric pain and nausea. Patient stated she had 2 episodes of dark-colored emesis. Stated that zofran is ineffective in managing her nausea. Also stating that pain medication wears off before the next dose and leads to 10/10 pain.  Denies chest pain, SOB, diarrhea, back pain, rash, HA, paresthesias, and changes in vision     MEDICATIONS  (STANDING):  enoxaparin Injectable 40 milliGRAM(s) SubCutaneous every 24 hours  HYDROmorphone  Injectable 0.6 milliGRAM(s) IV Push every 4 hours  lidocaine   4% Patch 1 Patch Transdermal daily  multivitamin 1 Tablet(s) Oral daily  ondansetron Injectable 4 milliGRAM(s) IV Push three times a day  pantoprazole  Injectable 40 milliGRAM(s) IV Push daily  polyethylene glycol 3350 17 Gram(s) Oral daily  sodium chloride 0.9%. 1000 milliLiter(s) (75 mL/Hr) IV Continuous <Continuous>    MEDICATIONS  (PRN):  acetaminophen     Tablet .. 650 milliGRAM(s) Oral every 6 hours PRN Temp greater or equal to 38C (100.4F), Mild Pain (1 - 3)  calcium carbonate    500 mG (Tums) Chewable 1 Tablet(s) Chew three times a day PRN Dyspepsia  HYDROmorphone  Injectable 0.3 milliGRAM(s) IV Push every 2 hours PRN Severe Pain (7 - 10)  melatonin 3 milliGRAM(s) Oral at bedtime PRN Sleep        T(F): 98.8 (08-27-22 @ 05:59), Max: 100.4 (08-27-22 @ 00:15)  HR: 95 (08-27-22 @ 05:59) (81 - 112)  BP: 140/72 (08-27-22 @ 05:59) (114/66 - 151/96)  BP(mean): 107 (08-26-22 @ 13:00) (80 - 107)  RR: 16 (08-27-22 @ 05:59) (15 - 23)  SpO2: 98% (08-27-22 @ 05:59) (92% - 100%)    PHYSICAL EXAM:     GENERAL: lethargic, laying in bed   HEAD:  Atraumatic, Normocephalic  EYES: EOMI, PERRLA, conjunctiva and sclera clear, no nystagmus noted  ENT: Slightly dry mucous membranes,   NECK: Supple, No JVD, trachea midline  CHEST/LUNG: Clear to auscultation bilaterally; No rales, rhonchi, wheezing, or rubs. Unlabored respirations  HEART: Regular rate and rhythm; No murmurs, rubs, or gallops, normal S1/S2  ABDOMEN: normal bowel sounds; abdominal distension, 8/10 epigastric pain that radiates to RUQ upon palpation and at rest, no rebound tenderness or guarding. Surgical dressings c/d/i    EXTREMITIES:  2+ Peripheral Pulses, brisk capillary refill. No clubbing, cyanosis, or edema  MSK: No gross deformities noted   Neurological:  A&Ox3, no focal deficits   SKIN: No rashes or lesions  PSYCH: Normal mood, affect     TELEMETRY:    LABS:                        9.5    7.33  )-----------( 244      ( 26 Aug 2022 02:38 )             30.0     08-26    139  |  106  |  8   ----------------------------<  93  3.6   |  21<L>  |  0.48<L>    Ca    8.2<L>      26 Aug 2022 02:38  Phos  3.2     08-26  Mg     1.70     08-26    TPro  6.3  /  Alb  2.8<L>  /  TBili  0.2  /  DBili  x   /  AST  36<H>  /  ALT  22  /  AlkPhos  83  08-26        PT/INR - ( 26 Aug 2022 02:38 )   PT: 14.2 sec;   INR: 1.22 ratio         PTT - ( 26 Aug 2022 02:38 )  PTT:25.4 sec    Creatinine Trend: 0.48<--, 0.53<--, 0.58<--, 0.55<--, 0.64<--, 0.63<--  I&O's Summary    26 Aug 2022 07:01  -  27 Aug 2022 07:00  --------------------------------------------------------  IN: 150 mL / OUT: 750 mL / NET: -600 mL      BNP    RADIOLOGY & ADDITIONAL STUDIES:

## 2022-08-27 NOTE — CHART NOTE - NSCHARTNOTEFT_GEN_A_CORE
Informed from surgery team that J-tube study portable XR confirmed tube is ready for use. Started trickle feeds Jevity 1.2 at 10cc/hr. Will keep at 10cc/hr and monitor BMP in AM and assess patient tolerance.     Will not increase feeds rate until OKed by surgery, per their communication.    Philip Lofton PGY-2  Internal Medicine

## 2022-08-27 NOTE — PROGRESS NOTE ADULT - SUBJECTIVE AND OBJECTIVE BOX
GENERAL SURGERY DAILY PROGRESS NOTE:    Subjective:  Patient seen and examined.     ICU Vital Signs Last 24 Hrs  T(C): 37.1 (27 Aug 2022 05:59), Max: 38 (27 Aug 2022 00:15)  T(F): 98.8 (27 Aug 2022 05:59), Max: 100.4 (27 Aug 2022 00:15)  HR: 95 (27 Aug 2022 05:59) (81 - 112)  BP: 140/72 (27 Aug 2022 05:59) (114/66 - 151/96)  BP(mean): 107 (26 Aug 2022 13:00) (80 - 107)  ABP: --  ABP(mean): --  RR: 16 (27 Aug 2022 05:59) (15 - 23)  SpO2: 98% (27 Aug 2022 05:59) (92% - 100%)    O2 Parameters below as of 27 Aug 2022 05:59  Patient On (Oxygen Delivery Method): room air    Exam:  Gen: NAD, resting in bed, alert and responding appropriately  Resp: Airway patent, non-labored respirations  Abd: Soft, ND, min epigastric TTP, no rebound or guarding; J tube capped  Ext: No edema, WWP  Neuro: AAOx3, no focal deficits    MEDICATIONS  (STANDING):  enoxaparin Injectable 40 milliGRAM(s) SubCutaneous every 24 hours  HYDROmorphone  Injectable 0.6 milliGRAM(s) IV Push every 4 hours  lidocaine   4% Patch 1 Patch Transdermal daily  multivitamin 1 Tablet(s) Oral daily  ondansetron Injectable 4 milliGRAM(s) IV Push three times a day  pantoprazole  Injectable 40 milliGRAM(s) IV Push daily  polyethylene glycol 3350 17 Gram(s) Oral daily  sodium chloride 0.9%. 1000 milliLiter(s) (75 mL/Hr) IV Continuous <Continuous>    MEDICATIONS  (PRN):  acetaminophen     Tablet .. 650 milliGRAM(s) Oral every 6 hours PRN Temp greater or equal to 38C (100.4F), Mild Pain (1 - 3)  calcium carbonate    500 mG (Tums) Chewable 1 Tablet(s) Chew three times a day PRN Dyspepsia  HYDROmorphone  Injectable 0.3 milliGRAM(s) IV Push every 2 hours PRN Severe Pain (7 - 10)  melatonin 3 milliGRAM(s) Oral at bedtime PRN Sleep                          9.6    10.77 )-----------( 284      ( 27 Aug 2022 06:55 )             30.7       08-27    142  |  109<H>  |  8   ----------------------------<  105<H>  4.1   |  17<L>  |  0.59    Ca    8.2<L>      27 Aug 2022 06:55  Phos  3.4     08-27  Mg     1.60     08-27

## 2022-08-28 NOTE — PROGRESS NOTE ADULT - NS ATTEND AMEND GEN_ALL_CORE FT
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 DEVI now resolved s/p ivfs. Now s/p EBUS w/ LN bx on 8/23 w/results sig for metastatic adenocarcinoma. No plan for inpatient chemotherapy at this time, recs outpatient follow up. Also s/p EGD per surg sig for advanced disease not amenable to resection. s/p jejunostomy tube placement, biopsy of peritoneum sent.    #Metastatic disease  -F/u OR path reports  -J-Tube testing today  -Appreciate palliative |surg| heme-onc input     #Hemoptysis  -Pt w/ hemoptysis s/p recent procedure  -Surgery aware  -Now resolved per pt   -Hgb stable at this time, c/w monitoring     DISPO:  -Complicated dispo as pt w.o insurance; unlikely to be able to follow up at [facility]  -Ongoing discussions w/ CM regarding pt following up at another facility    DIET: Tube feeds, advance per surgery  DVT: c/w Lovenox
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 DEVI now resolved s/p ivfs. Now s/p EBUS w/ LN bx on 8/23 w/results sig for metastatic adenocarcinoma. No plan for inpatient chemotherapy at this time, recs oupatient follow up. Also s/p EGD per surg sig for advanced disease not amenable to resection. s/p jejunostomy tube placement, biopsy of peritoneum sent.    #Metastatic disease  -F/u OR path reports  -J-Tube testing today  -Appreciate palliative |surg| heme-onc input     #Hemoptysis  -Pt w/ new hemoptysis suspect iso recent procedure  -Surgery aware  -Hgb stable at this time, c/w monitoring for now     DISPO:  -Complicated dispo as pt w.o insurance; unlikely to be able to follow up at [facility]  -Ongoing discussions w/ CM regarding pt following up at another facility    DIET: NPO until further recs from surg re: using tube  DVT: c/w Lovenox, will hold if w/ worsening hemoptysis.
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 DEVI now resolved s/p ivfs. Nephrolithiasis also noted on imaging but not obstructed w/o clinical signs or symp of infection. Now s/p EBUS w/ LN bx on 8/23 w/results sig for metastatic adenocarcinoma. No plan for inpatient chemotherapy at this time, recs oupatient follow up. Also s/p dxtic lap sig for advanced disease not amenable to resection. s/p jejunostomy tube placement, biopsy of peritoneum sent.    #Metastatic disease  -F/u OR path reports  -Patterson placed in OR, TOV tonight  -Appreciate palliative |surg| heme-onc input     DISPO:  -Complicated dispo as pt w.o insurance; unlikely to be able to follow up at [facility]  -Ongoing discussions w/ CM regarding pt following up at another facility    DIET: NPO until further recs from surg re: using tube  DVT: Lovenox

## 2022-08-28 NOTE — PROGRESS NOTE ADULT - SUBJECTIVE AND OBJECTIVE BOX
GENERAL SURGERY DAILY PROGRESS NOTE:    Subjective:  Patient seen and examined. Endorses BM and flatus; Endorses mild pain and nausea.     Vital Signs Last 24 Hrs  T(C): 37.1 (28 Aug 2022 05:37), Max: 37.1 (28 Aug 2022 05:37)  T(F): 98.7 (28 Aug 2022 05:37), Max: 98.7 (28 Aug 2022 05:37)  HR: 87 (28 Aug 2022 05:37) (77 - 95)  BP: 137/81 (28 Aug 2022 05:37) (136/75 - 148/62)  BP(mean): --  RR: 17 (28 Aug 2022 05:37) (16 - 17)  SpO2: 99% (28 Aug 2022 05:37) (98% - 100%)    Parameters below as of 28 Aug 2022 05:37  Patient On (Oxygen Delivery Method): room air        Exam:  Gen: NAD, resting in bed, alert and responding appropriately  Resp: Airway patent, non-labored respirations  Abd: Soft, ND, min epigastric TTP, no rebound or guarding; J tube capped  Ext: No edema, WWP  Neuro: AAOx3, no focal deficits    MEDICATIONS  (STANDING):  enoxaparin Injectable 40 milliGRAM(s) SubCutaneous every 24 hours  HYDROmorphone  Injectable 0.6 milliGRAM(s) IV Push every 4 hours  lidocaine   4% Patch 1 Patch Transdermal daily  multivitamin 1 Tablet(s) Oral daily  ondansetron Injectable 4 milliGRAM(s) IV Push three times a day  pantoprazole  Injectable 40 milliGRAM(s) IV Push daily  polyethylene glycol 3350 17 Gram(s) Oral daily  sodium chloride 0.9%. 1000 milliLiter(s) (75 mL/Hr) IV Continuous <Continuous>    MEDICATIONS  (PRN):  acetaminophen     Tablet .. 650 milliGRAM(s) Oral every 6 hours PRN Temp greater or equal to 38C (100.4F), Mild Pain (1 - 3)  calcium carbonate    500 mG (Tums) Chewable 1 Tablet(s) Chew three times a day PRN Dyspepsia  HYDROmorphone  Injectable 0.3 milliGRAM(s) IV Push every 2 hours PRN Severe Pain (7 - 10)  melatonin 3 milliGRAM(s) Oral at bedtime PRN Sleep                          9.6    10.77 )-----------( 284      ( 27 Aug 2022 06:55 )             30.7       08-27    142  |  109<H>  |  8   ----------------------------<  105<H>  4.1   |  17<L>  |  0.59    Ca    8.2<L>      27 Aug 2022 06:55  Phos  3.4     08-27  Mg     1.60     08-27

## 2022-08-28 NOTE — PROGRESS NOTE ADULT - SUBJECTIVE AND OBJECTIVE BOX
***************************************************************  Candi Valle, PGY2  Internal Medicine   pager: BRAYAN: [phone], [facility]: [phone]  ***************************************************************    SCOOTER MAZARIEGOS  57y  MRN: [id number]    Patient is a 57y old  Female who presents with a chief complaint of abdominal pain & nausea/vomiting (27 Aug 2022 08:42)      Subjective: no events ON. Denies fever, CP, SOB, abn pain, N/V, dysuria. Tolerating diet.      MEDICATIONS  (STANDING):  enoxaparin Injectable 40 milliGRAM(s) SubCutaneous every 24 hours  HYDROmorphone  Injectable 1 milliGRAM(s) IV Push every 4 hours  lidocaine   4% Patch 1 Patch Transdermal daily  metoclopramide Injectable 5 milliGRAM(s) IV Push every 4 hours  multivitamin 1 Tablet(s) Oral daily  pantoprazole  Injectable 40 milliGRAM(s) IV Push every 12 hours  polyethylene glycol 3350 17 Gram(s) Oral daily  sodium chloride 0.9%. 1000 milliLiter(s) (75 mL/Hr) IV Continuous <Continuous>    MEDICATIONS  (PRN):  acetaminophen     Tablet .. 650 milliGRAM(s) Oral every 6 hours PRN Temp greater or equal to 38C (100.4F), Mild Pain (1 - 3)  calcium carbonate    500 mG (Tums) Chewable 1 Tablet(s) Chew three times a day PRN Dyspepsia  HYDROmorphone  Injectable 0.3 milliGRAM(s) IV Push every 2 hours PRN Severe Pain (7 - 10)      Objective:    Vitals: Vital Signs Last 24 Hrs  T(C): 37.1 (08-28-22 @ 05:37), Max: 37.1 (08-28-22 @ 05:37)  T(F): 98.7 (08-28-22 @ 05:37), Max: 98.7 (08-28-22 @ 05:37)  HR: 87 (08-28-22 @ 05:37) (77 - 95)  BP: 137/81 (08-28-22 @ 05:37) (136/75 - 148/62)  BP(mean): --  RR: 17 (08-28-22 @ 05:37) (16 - 17)  SpO2: 99% (08-28-22 @ 05:37) (98% - 100%)            I&O's Summary      GENERAL: lethargic, laying in bed   HEAD:  Atraumatic, Normocephalic  EYES: EOMI, PERRLA, conjunctiva and sclera clear, no nystagmus noted  ENT: Slightly dry mucous membranes,   NECK: Supple, No JVD, trachea midline  CHEST/LUNG: Clear to auscultation bilaterally; No rales, rhonchi, wheezing, or rubs. Unlabored respirations  HEART: Regular rate and rhythm; No murmurs, rubs, or gallops, normal S1/S2  ABDOMEN: normal bowel sounds; abdominal distension, 8/10 epigastric pain that radiates to RUQ upon palpation and at rest, no rebound tenderness or guarding. Surgical dressings c/d/i    EXTREMITIES:  2+ Peripheral Pulses, brisk capillary refill. No clubbing, cyanosis, or edema  MSK: No gross deformities noted   Neurological:  A&Ox3, no focal deficits   SKIN: No rashes or lesions  PSYCH: Normal mood, affect       LABS:  08-27    142  |  109<H>  |  8   ----------------------------<  105<H>  4.1   |  17<L>  |  0.59  08-26    139  |  106  |  8   ----------------------------<  93  3.6   |  21<L>  |  0.48<L>    Ca    8.2<L>      27 Aug 2022 06:55  Ca    8.2<L>      26 Aug 2022 02:38  Phos  3.4     08-27  Mg     1.60     08-27    TPro  6.1  /  Alb  2.7<L>  /  TBili  0.2  /  DBili  x   /  AST  29  /  ALT  18  /  AlkPhos  83  08-27  TPro  6.3  /  Alb  2.8<L>  /  TBili  0.2  /  DBili  x   /  AST  36<H>  /  ALT  22  /  AlkPhos  83  08-26                                              9.6    10.77 )-----------( 284      ( 27 Aug 2022 06:55 )             30.7                         9.5    7.33  )-----------( 244      ( 26 Aug 2022 02:38 )             30.0     CAPILLARY BLOOD GLUCOSE          RADIOLOGY & ADDITIONAL TESTS:    Imaging Personally Reviewed:  [x ] YES  [ ] NO    Consultants involved in case:   Consultant(s) Notes Reviewed:  [ x] YES  [ ] NO:   Care Discussed with Consultants/Other Providers [x ] YES  [ ] NO         ***************************************************************  Candi Valle, PGY2  Internal Medicine   pager: [facility]: [phone], [facility]: [phone]  ***************************************************************    SCOOTER MAZARIEGOS  57y  MRN: [id number]    Patient is a 57y old  Female who presents with a chief complaint of abdominal pain & nausea/vomiting (27 Aug 2022 08:42)      Subjective: no events ON. Tube feeds started O/N,     MEDICATIONS  (STANDING):  enoxaparin Injectable 40 milliGRAM(s) SubCutaneous every 24 hours  HYDROmorphone  Injectable 1 milliGRAM(s) IV Push every 4 hours  lidocaine   4% Patch 1 Patch Transdermal daily  metoclopramide Injectable 5 milliGRAM(s) IV Push every 4 hours  multivitamin 1 Tablet(s) Oral daily  pantoprazole  Injectable 40 milliGRAM(s) IV Push every 12 hours  polyethylene glycol 3350 17 Gram(s) Oral daily  sodium chloride 0.9%. 1000 milliLiter(s) (75 mL/Hr) IV Continuous <Continuous>    MEDICATIONS  (PRN):  acetaminophen     Tablet .. 650 milliGRAM(s) Oral every 6 hours PRN Temp greater or equal to 38C (100.4F), Mild Pain (1 - 3)  calcium carbonate    500 mG (Tums) Chewable 1 Tablet(s) Chew three times a day PRN Dyspepsia  HYDROmorphone  Injectable 0.3 milliGRAM(s) IV Push every 2 hours PRN Severe Pain (7 - 10)      Objective:    Vitals: Vital Signs Last 24 Hrs  T(C): 37.1 (08-28-22 @ 05:37), Max: 37.1 (08-28-22 @ 05:37)  T(F): 98.7 (08-28-22 @ 05:37), Max: 98.7 (08-28-22 @ 05:37)  HR: 87 (08-28-22 @ 05:37) (77 - 95)  BP: 137/81 (08-28-22 @ 05:37) (136/75 - 148/62)  BP(mean): --  RR: 17 (08-28-22 @ 05:37) (16 - 17)  SpO2: 99% (08-28-22 @ 05:37) (98% - 100%)            I&O's Summary      GENERAL: lethargic, laying in bed   HEAD:  Atraumatic, Normocephalic  EYES: EOMI, PERRLA, conjunctiva and sclera clear, no nystagmus noted  ENT: Slightly dry mucous membranes,   NECK: Supple, No JVD, trachea midline  CHEST/LUNG: Clear to auscultation bilaterally; No rales, rhonchi, wheezing, or rubs. Unlabored respirations  HEART: Regular rate and rhythm; No murmurs, rubs, or gallops, normal S1/S2  ABDOMEN: normal bowel sounds; abdominal distension, 8/10 epigastric pain that radiates to RUQ upon palpation and at rest, no rebound tenderness or guarding. Surgical dressings c/d/i    EXTREMITIES:  2+ Peripheral Pulses, brisk capillary refill. No clubbing, cyanosis, or edema  MSK: No gross deformities noted   Neurological:  A&Ox3, no focal deficits   SKIN: No rashes or lesions  PSYCH: Normal mood, affect       LABS:  08-27    142  |  109<H>  |  8   ----------------------------<  105<H>  4.1   |  17<L>  |  0.59  08-26    139  |  106  |  8   ----------------------------<  93  3.6   |  21<L>  |  0.48<L>    Ca    8.2<L>      27 Aug 2022 06:55  Ca    8.2<L>      26 Aug 2022 02:38  Phos  3.4     08-27  Mg     1.60     08-27    TPro  6.1  /  Alb  2.7<L>  /  TBili  0.2  /  DBili  x   /  AST  29  /  ALT  18  /  AlkPhos  83  08-27  TPro  6.3  /  Alb  2.8<L>  /  TBili  0.2  /  DBili  x   /  AST  36<H>  /  ALT  22  /  AlkPhos  83  08-26                                              9.6    10.77 )-----------( 284      ( 27 Aug 2022 06:55 )             30.7                         9.5    7.33  )-----------( 244      ( 26 Aug 2022 02:38 )             30.0     CAPILLARY BLOOD GLUCOSE          RADIOLOGY & ADDITIONAL TESTS:    Imaging Personally Reviewed:  [x ] YES  [ ] NO    Consultants involved in case:   Consultant(s) Notes Reviewed:  [ x] YES  [ ] NO:   Care Discussed with Consultants/Other Providers [x ] YES  [ ] NO         ***************************************************************  Candi Valle, PGY2  Internal Medicine   pager: LISONNY: [phone], [facility]: [phone]  ***************************************************************    SCOOTER MAZARIEGOS  57y  MRN: [id number]    Patient is a 57y old  Female who presents with a chief complaint of abdominal pain & nausea/vomiting (27 Aug 2022 08:42)      Subjective: no events ON. Tube feeds started O/N, tolerated well. Had nonbloody BM last night. Still complaining of R low back pain and LUQ pain. States the dilaudid helps.     MEDICATIONS  (STANDING):  enoxaparin Injectable 40 milliGRAM(s) SubCutaneous every 24 hours  HYDROmorphone  Injectable 1 milliGRAM(s) IV Push every 4 hours  lidocaine   4% Patch 1 Patch Transdermal daily  metoclopramide Injectable 5 milliGRAM(s) IV Push every 4 hours  multivitamin 1 Tablet(s) Oral daily  pantoprazole  Injectable 40 milliGRAM(s) IV Push every 12 hours  polyethylene glycol 3350 17 Gram(s) Oral daily  sodium chloride 0.9%. 1000 milliLiter(s) (75 mL/Hr) IV Continuous <Continuous>    MEDICATIONS  (PRN):  acetaminophen     Tablet .. 650 milliGRAM(s) Oral every 6 hours PRN Temp greater or equal to 38C (100.4F), Mild Pain (1 - 3)  calcium carbonate    500 mG (Tums) Chewable 1 Tablet(s) Chew three times a day PRN Dyspepsia  HYDROmorphone  Injectable 0.3 milliGRAM(s) IV Push every 2 hours PRN Severe Pain (7 - 10)      Objective:    Vitals: Vital Signs Last 24 Hrs  T(C): 37.1 (08-28-22 @ 05:37), Max: 37.1 (08-28-22 @ 05:37)  T(F): 98.7 (08-28-22 @ 05:37), Max: 98.7 (08-28-22 @ 05:37)  HR: 87 (08-28-22 @ 05:37) (77 - 95)  BP: 137/81 (08-28-22 @ 05:37) (136/75 - 148/62)  BP(mean): --  RR: 17 (08-28-22 @ 05:37) (16 - 17)  SpO2: 99% (08-28-22 @ 05:37) (98% - 100%)            I&O's Summary      GENERAL: awake, sitting up in bed   HEAD:  Atraumatic, Normocephalic  EYES: EOMI, PERRLA, conjunctiva and sclera clear, no nystagmus noted  ENT: MMM  NECK: Supple, No JVD, trachea midline  CHEST/LUNG: Clear to auscultation bilaterally; No rales, rhonchi, wheezing, or rubs. Unlabored respirations  HEART: Regular rate and rhythm; No murmurs, rubs, or gallops, normal S1/S2  ABDOMEN: normal bowel sounds; abdominal distension LUQ pain that radiates across mid abdomen to R low back,. J tube site c/d/i, sutures in place.  EXTREMITIES:  2+ Peripheral Pulses, brisk capillary refill. No clubbing, cyanosis, or edema  MSK: No gross deformities noted   Neurological:  A&Ox3, no focal deficits   SKIN: No rashes or lesions  PSYCH: Normal mood, affect       LABS:  08-27    142  |  109<H>  |  8   ----------------------------<  105<H>  4.1   |  17<L>  |  0.59  08-26    139  |  106  |  8   ----------------------------<  93  3.6   |  21<L>  |  0.48<L>    Ca    8.2<L>      27 Aug 2022 06:55  Ca    8.2<L>      26 Aug 2022 02:38  Phos  3.4     08-27  Mg     1.60     08-27    TPro  6.1  /  Alb  2.7<L>  /  TBili  0.2  /  DBili  x   /  AST  29  /  ALT  18  /  AlkPhos  83  08-27  TPro  6.3  /  Alb  2.8<L>  /  TBili  0.2  /  DBili  x   /  AST  36<H>  /  ALT  22  /  AlkPhos  83  08-26                                              9.6    10.77 )-----------( 284      ( 27 Aug 2022 06:55 )             30.7                         9.5    7.33  )-----------( 244      ( 26 Aug 2022 02:38 )             30.0     CAPILLARY BLOOD GLUCOSE          RADIOLOGY & ADDITIONAL TESTS:    Imaging Personally Reviewed:  [x ] YES  [ ] NO    Consultants involved in case:   Consultant(s) Notes Reviewed:  [ x] YES  [ ] NO:   Care Discussed with Consultants/Other Providers [x ] YES  [ ] NO

## 2022-08-28 NOTE — PROGRESS NOTE ADULT - PROBLEM SELECTOR PLAN 4
Impression: Stable anemia. May be combination of Iron deficiency anemia from poor PO intake and normocytic anemia from Anemia of Chronic Disease w/ this presentation c/f gastric malignancy.  - Total Iron 22, TIBC 165, transferrin 149    8/27 - Hb 9.6, H/H stable    continue to monitor with CBCs

## 2022-08-28 NOTE — PROGRESS NOTE ADULT - PROBLEM SELECTOR PLAN 3
Impression: 72 hour caloric count to evaluate for malnutrition - indicates malnutrition. Pt unable to eat due to pain. Will need to assess for adequate nutrition methods.    J-tube placed, nutrition consult placed regarding tube feeds

## 2022-08-28 NOTE — PROGRESS NOTE ADULT - ASSESSMENT
57F PMH of renal stones p/w gastric adenocarcinoma w/ mets to LN c/b DEVI, now resolved, and nephrolithiasis.   57F PMH of renal stones p/w gastric adenocarcinoma w/ mets to LN c/b DEVI, now resolved, and nephrolithiasis. S/p j tube placement, started on tube feeds

## 2022-08-28 NOTE — PROGRESS NOTE ADULT - PROBLEM SELECTOR PLAN 1
Impression: The patient's abdominal pain, persistent nausea/vomiting and weight loss iso of CT A/P findings indicating gastric body thickening and gastrocolic ligament nodularity are concerning for gastric adenocarcinoma. EGD biopsy confirmed.  - GI, onc, surg/onc following  - EGD:  Infiltrative changes in gastric body concerning gastric malignancy, Congested duodenal mucosa, biopsy = poorly differentiated adenocarcinoma  - staging CT chest - Mediastinal/bilateral hilar/R internal mammary LAD  - mediastinal lymph node biopsy results - Metastatic adenocarcinoma   - pantoprazole  40 mg BID  - OR 8/26 with J tube placement   8/27- pain management - changed to IV Dilaudid 1 mg Q4 ATC with 0.3 mg q2h PRN for severe/breakthrough  - OR yesterday - surgery will get imaging today re: j tube.  - keep NPO for now - surgery with f/u tomorrow with imaging with J tube capped Impression: The patient's abdominal pain, persistent nausea/vomiting and weight loss iso of CT A/P findings indicating gastric body thickening and gastrocolic ligament nodularity are concerning for gastric adenocarcinoma. EGD biopsy confirmed.  - GI, onc, surg/onc following  - EGD:  Infiltrative changes in gastric body concerning gastric malignancy, Congested duodenal mucosa, biopsy = poorly differentiated adenocarcinoma  - staging CT chest - Mediastinal/bilateral hilar/R internal mammary LAD  - mediastinal lymph node biopsy results - Metastatic adenocarcinoma   - pantoprazole  40 mg BID  - OR 8/26 with J tube placement   8/27- pain management - changed to IV Dilaudid 1 mg Q4 ATC with 0.3 mg q2h PRN for severe/breakthrough  - OR yesterday - surgery will get imaging today re: j tube.  - Tube feeds to 30 cc/hr ok with surgery, further recs per nutrition.

## 2022-08-28 NOTE — PROGRESS NOTE ADULT - ASSESSMENT
57F with gastric CA, s/p EGD and robotic feeding J tube placement     Recommendation:  - Advance tube feeds to 30cc/hr  -Sips and chips    Surgery D team  [id number]

## 2022-08-28 NOTE — PROGRESS NOTE ADULT - NUTRITIONAL ASSESSMENT
This patient has been assessed with a concern for Malnutrition and has been determined to have a diagnosis/diagnoses of Severe protein-calorie malnutrition.    This patient is being managed with:   Diet NPO with Tube Feed-  Tube Feeding Modality: Jejunostomy  Jevity 1.2 Joseluis (JEVITY1.2RTH)  Total Volume for 24 Hours (mL): 240  Continuous  Starting Tube Feed Rate {mL per Hour}: 10  Increase Tube Feed Rate by (mL): 0  Until Goal Tube Feed Rate (mL per Hour): 10  Tube Feed Duration (in Hours): 24  Tube Feed Start Time: 16:00  Entered: Aug 27 2022  2:53PM

## 2022-08-28 NOTE — PROGRESS NOTE ADULT - PROBLEM SELECTOR PLAN 2
Impression: Pt has vomiting and intermittent episodes. Vomiting associated with eating meals. Likely related to gastric adenocarcinoma.     Pt complaining of zofran being ineffective. Pt switched to IV reglan 5 mg Q4 today.

## 2022-08-29 NOTE — PROGRESS NOTE ADULT - ASSESSMENT
57F with gastric CA, s/p EGD and robotic feeding J tube placement 8/26.    Impression/plan:  - Pt reporting pain overnight and this morning after TFs were advanced to 30cc/hr yesterday  - Please hold tube feeds for 4 hours. After this, can restart tube feeds at 10cc/hr for today  - Remaining care per primary team    D Team Surgery  [id number]

## 2022-08-29 NOTE — PROGRESS NOTE ADULT - PROBLEM SELECTOR PLAN 2
- Pain located in epigastric area (area of gastric cancer) and reporting now diffuse abdominal pain since procedures on 8/26   - Pain was reasonably controlled on  IV Dilaudid 0.6 mg q4h ATC with 0.3 mg q2h PRN for severe/breakthrough pain  - Patient has required escalating doses, reports feeling improved with 1.5 mg IV dilaudid   - Unclear cause of why pain has been worsening, now with greater pain med requirement   - Continue with 1.5 mg IV dilaudid q4h ATC and 0.9 mg PRN for severe pain

## 2022-08-29 NOTE — PROGRESS NOTE ADULT - PROBLEM SELECTOR PLAN 3
Impression: 72 hour caloric count to evaluate for malnutrition - indicates malnutrition. Pt unable to eat due to pain. Will need to assess for adequate nutrition methods.  tube feed - 30 cc/hr  J-tube placed, nutrition consult placed regarding tube feeds

## 2022-08-29 NOTE — PROGRESS NOTE ADULT - ASSESSMENT
57 yr old F with poorly differentiated non-resectable gastric adenocarcinoma, status post laparoscopy and J tube placement on 8/26/2022. Palliative following for pain management.

## 2022-08-29 NOTE — PROGRESS NOTE ADULT - SUBJECTIVE AND OBJECTIVE BOX
Subjective:   Patient seen at bedside this AM. Pt reporting abdominal pain/discomfort since last night. Denies n/v or any other sx    Objective:  Vital Signs  T(C): 36.7 (08-29 @ 06:09), Max: 36.9 (08-28 @ 22:07)  HR: 91 (08-29 @ 06:09) (84 - 92)  BP: 153/85 (08-29 @ 06:09) (131/76 - 158/87)  RR: 18 (08-29 @ 06:09) (16 - 19)  SpO2: 98% (08-29 @ 06:09) (96% - 100%)    Physical Exam:  GEN: resting in bed comfortably in NAD  RESP: no increased WOB  ABD: soft, mildly distended. TTP (nonspecific). J-tube in place without drainage or surrounding erythema  EXTR: warm, well-perfused  NEURO: awake, alert    Labs:                        9.8    7.51  )-----------( 336      ( 29 Aug 2022 06:30 )             31.9   08-28    140  |  108<H>  |  8   ----------------------------<  128<H>  3.5   |  20<L>  |  0.48<L>    Ca    8.1<L>      28 Aug 2022 07:27  Phos  2.4     08-28  Mg     1.80     08-28      CAPILLARY BLOOD GLUCOSE          Imaging:

## 2022-08-29 NOTE — PROGRESS NOTE ADULT - SUBJECTIVE AND OBJECTIVE BOX
SUBJECTIVE AND OBJECTIVE:  Indication for Geriatrics and Palliative Care Services/INTERVAL HPI: Patient seen and examined at bedside, she was slightly uncomfortable from undergoing surgery, slight abdominal discomfort.     INTERVAL EVENTS: Over the weekend, patient complained of increased pain, dilaudid dose increase to 1 mg IV. This morning patient was seen, received 1 mg recently however endorsed 10/10 pain, looking uncomfortable. Stat 1.5 mg dilaudid dose given, patient reported feeling much improved after this dose.     DNR on chart:  Allergies    No Known Allergies    Intolerances    MEDICATIONS  (STANDING):  HYDROmorphone  Injectable 0.6 milliGRAM(s) IV Push every 4 hours  lidocaine   4% Patch 1 Patch Transdermal daily  multivitamin 1 Tablet(s) Oral daily  ondansetron Injectable 4 milliGRAM(s) IV Push three times a day  pantoprazole    Tablet 40 milliGRAM(s) Oral two times a day  polyethylene glycol 3350 17 Gram(s) Oral daily  sodium chloride 0.9%. 1000 milliLiter(s) (75 mL/Hr) IV Continuous <Continuous>    MEDICATIONS  (PRN):  acetaminophen     Tablet .. 650 milliGRAM(s) Oral every 6 hours PRN Temp greater or equal to 38C (100.4F), Mild Pain (1 - 3)  calcium carbonate    500 mG (Tums) Chewable 1 Tablet(s) Chew three times a day PRN Dyspepsia  HYDROmorphone  Injectable 0.3 milliGRAM(s) IV Push every 2 hours PRN Severe Pain (7 - 10)  melatonin 3 milliGRAM(s) Oral at bedtime PRN Sleep      ITEMS UNCHECKED ARE NOT PRESENT    PRESENT SYMPTOMS: [ ]Unable to self-report - see [ ] CPOT [ ] PAINADS [ ] RDOS  Source if other than patient:  [ ]Family   [ ]Team     Pain:  [x ]yes [ ]no  QOL impact - moderately affecting.  Location -    epigastric area               Aggravating factors - none  Quality - sharp  Radiation - to the back  Timing- constant  Severity (0-10 scale): 10/10  Minimal acceptable level (0-10 scale): 4/10    Dyspnea:                           [ ]Mild [ ]Moderate [ ]Severe    RDOS:  0 to 2  minimal or no respiratory distress   3  mild distress  4 to 6 moderate distress  >7 severe distress  https://homecareinformation.net/handouts/hen/Respiratory_Distress_Observation_Scale.pdf (Ctrl +  left click to view)     Anxiety:                             [ ]Mild [ ]Moderate [ ]Severe  Fatigue:                             [ ]Mild [ ]Moderate [ ]Severe  Nausea:                             [ ]Mild [ ]Moderate [ ]Severe  Loss of appetite:              [ ]Mild [ ]Moderate [ ]Severe  Constipation:                    [ ]Mild [ ]Moderate [ ]Severe    PCSSQ[Palliative Care Spiritual Screening Question]   Severity (0-10):  Score of 4 or > indicate consideration of Chaplaincy referral.  Chaplaincy Referral: [ ] yes [ ] refused [ ] following    Other Symptoms:  [ ]All other review of systems negative     Palliative Performance Status Version 2:    80     %      http://npcrc.org/files/news/palliative_performance_scale_ppsv2.pdf  PHYSICAL EXAM:  Vital Signs Last 24 Hrs  T(C): 36.9 (26 Aug 2022 06:59), Max: 37.2 (26 Aug 2022 01:30)  T(F): 98.4 (26 Aug 2022 06:59), Max: 99 (26 Aug 2022 01:30)  HR: 102 (26 Aug 2022 06:59) (88 - 102)  BP: 141/86 (26 Aug 2022 06:59) (125/67 - 155/89)  BP(mean): --  RR: 16 (26 Aug 2022 05:38) (16 - 18)  SpO2: 95% (26 Aug 2022 06:59) (95% - 100%)    Parameters below as of 26 Aug 2022 05:38  Patient On (Oxygen Delivery Method): room air     I&O's Summary     GENERAL: [ ]Cachexia    [x ]Alert  [x ]Oriented    [ ]Lethargic  [ ]Unarousable  [ x]Verbal  [ ]Non-Verbal  Behavioral:   [ ]Anxiety  [ ]Delirium [ ]Agitation [ ]Other  HEENT:  [x ]Normal   [ ]Dry mouth   [ ]ET Tube/Trach  [ ]Oral lesions  PULMONARY:   [x ]Clear [ ]Tachypnea  [ ]Audible excessive secretions   [ ]Rhonchi        [ ]Right [ ]Left [ ]Bilateral  [ ]Crackles        [ ]Right [ ]Left [ ]Bilateral  [ ]Wheezing     [ ]Right [ ]Left [ ]Bilateral  [ ]Diminished BS [ ] Right [ ]Left [ ]Bilateral  CARDIOVASCULAR:    [x ]Regular [ ]Irregular [ ]Tachy  [ ]Luis Miguel [ ]Murmur [ ]Other  GASTROINTESTINAL:  [x ]Soft  [ ]Distended   [ ]+BS  [ ]Non tender [ ]Tender  [ ]Other [ ]PEG [ ]OGT/ NGT Patient has laparoscopic surgical incisions covered by  gauze    GENITOURINARY:  [ ]Normal [ ]Incontinent   [ ]Oliguria/Anuria   [x ]Patterson  MUSCULOSKELETAL:   [ x]Normal   [ ]Weakness  [ ]Bed/Wheelchair bound [ ]Edema  NEUROLOGIC:   [x ]No focal deficits  [ ] Cognitive impairment  [ ] Dysphagia [ ]Dysarthria [ ] Paresis [ ]Other   SKIN:   [x ]Normal  [ ]Rash  [ ]Other  [ ]Pressure ulcer(s) [ ]y [x ]n present on admission    CRITICAL CARE:  [ ]Shock Present  [ ]Septic [ ]Cardiogenic [ ]Neurologic [ ]Hypovolemic  [ ]Vasopressors [ ]Inotropes  [ ]Respiratory failure present [ ]Mechanical Ventilation [ ]Non-invasive ventilatory support [ ]High-Flow   [ ]Acute  [ ]Chronic [ ]Hypoxic  [ ]Hypercarbic [ ]Other  [ ]Other organ failure     LABS:                          9.8    7.51  )-----------( 336      ( 29 Aug 2022 06:30 )             31.9   08-29    142  |  109<H>  |  6<L>  ----------------------------<  141<H>  3.4<L>   |  22  |  0.43<L>    Ca    7.9<L>      29 Aug 2022 06:30  Phos  2.3     08-29  Mg     1.70     08-29      RADIOLOGY & ADDITIONAL STUDIES:< from: Xray Chest 1 View- PORTABLE-Urgent (Xray Chest 1 View- PORTABLE-Urgent .) (08.25.22 @ 17:28) >  Clear lungs.    < from: Xray Abdomen 1 View PORTABLE -Routine (Xray Abdomen 1 View PORTABLE -Routine .) (08.27.22 @ 13:01) >    IMPRESSION: Status post jejunostomy placement with contrast in the   jejunostomy tube and normal loop of jejunum.    < end of copied text >      Protein Calorie Malnutrition Present: [ ]mild [ ]moderate [ ]severe [ ]underweight [ ]morbid obesity  https://www.andeal.org/vault/5058/web/files/ONC/Table_Clinical%20Characteristics%20to%20Document%20Malnutrition-White%20JV%20et%20al%202012.pdf    Height (cm): 157.5 (08-26-22 @ 07:10)  Weight (kg): 71.1 (08-26-22 @ 07:11)  BMI (kg/m2): 28.7 (08-26-22 @ 07:11)    [ ]PPSV2 < or = 30%  [ ]significant weight loss [ ]poor nutritional intake [ ]anasarca[X ]Artificial Nutrition - J tube     Other REFERRALS:  [ ]Hospice  [ ]Child Life  [ ]Social Work  [ ]Case management [ ]Holistic Therapy

## 2022-08-29 NOTE — PROGRESS NOTE ADULT - NUTRITIONAL ASSESSMENT
This patient has been assessed with a concern for Malnutrition and has been determined to have a diagnosis/diagnoses of Severe protein-calorie malnutrition.    This patient is being managed with:   Diet NPO with Tube Feed-  Tube Feeding Modality: Jejunostomy  Jevity 1.2 Joseluis (JEVITY1.2RTH)  Total Volume for 24 Hours (mL): 720  Continuous  Starting Tube Feed Rate {mL per Hour}: 10  Increase Tube Feed Rate by (mL): 10     Every 4 hours  Until Goal Tube Feed Rate (mL per Hour): 30  Tube Feed Duration (in Hours): 24  Tube Feed Start Time: 16:00  Entered: Aug 28 2022 10:16AM

## 2022-08-29 NOTE — PROGRESS NOTE ADULT - PROBLEM SELECTOR PLAN 4
Impression: Stable anemia. May be combination of Iron deficiency anemia from poor PO intake and normocytic anemia from Anemia of Chronic Disease w/ this presentation c/f gastric malignancy.  - Total Iron 22, TIBC 165, transferrin 149  continue to monitor with CBCs

## 2022-08-29 NOTE — PROGRESS NOTE ADULT - PROBLEM SELECTOR PLAN 4
Impression: Stable anemia. May be combination of Iron deficiency anemia from poor PO intake and normocytic anemia from Anemia of Chronic Disease w/ this presentation c/f gastric malignancy.  - Total Iron 22, TIBC 165, transferrin 149  H/H stable    continue to monitor with CBCs

## 2022-08-29 NOTE — PROGRESS NOTE ADULT - PROBLEM SELECTOR PLAN 1
Biopsy proven poorly differentiated gastric adenocarcinoma.   Status post laparoscopic evaluation on 8/26/2022 , non resectable tumor. Status post J tube placement.   No plans for IP chemo, outpatient follow up

## 2022-08-29 NOTE — PROGRESS NOTE ADULT - ATTENDING COMMENTS
57F From [facility], w/ PMHx of nephrolithiasis p/w abd pain and n/v found to have gastric adenocarcinoma (on bx from 8/15 EGD) w/ mets to LN c/b DEVI. DEVI now resolved s/p ivfs. Now s/p EBUS w/ LN bx on 8/23 w/results sig for metastatic adenocarcinoma. No plan for inpatient chemotherapy at this time, recs outpatient follow up. Also s/p EGD and robotic feeding J tube placement per surg w/ bx of peritoneum on 8/26 that also revealed advanced disease not amenable to resection.     -F/u peritoneal bx   -C/w Pain control. Pt w/ increased pain recently unclear if in setting of recent procedure and feeds. Abdomen TTP but without signs of acute abdomen. Palliative recs appreciated and pain regimen increased as stated above. Surge recs also appreciated- holding feeds for 4hours w/ plan to resume at low rate of 10cc/hr for today. Serial abdominal exams   -C/w antiemetics PRN.

## 2022-08-29 NOTE — PROGRESS NOTE ADULT - SUBJECTIVE AND OBJECTIVE BOX
OVERNIGHT EVENTS: No acute overnight events.      SUBJECTIVE:       MEDICATIONS  (STANDING):  enoxaparin Injectable 40 milliGRAM(s) SubCutaneous every 24 hours  HYDROmorphone  Injectable 1 milliGRAM(s) IV Push every 4 hours  lidocaine   4% Patch 1 Patch Transdermal daily  metoclopramide Injectable 5 milliGRAM(s) IV Push every 4 hours  pantoprazole  Injectable 40 milliGRAM(s) IV Push every 12 hours  sodium chloride 0.9%. 1000 milliLiter(s) (75 mL/Hr) IV Continuous <Continuous>    MEDICATIONS  (PRN):  HYDROmorphone  Injectable 0.3 milliGRAM(s) IV Push every 2 hours PRN Severe Pain (7 - 10)        T(F): 98 (08-29-22 @ 06:09), Max: 98.4 (08-28-22 @ 22:07)  HR: 91 (08-29-22 @ 06:09) (84 - 92)  BP: 153/85 (08-29-22 @ 06:09) (131/76 - 158/87)  BP(mean): --  RR: 18 (08-29-22 @ 06:09) (16 - 19)  SpO2: 98% (08-29-22 @ 06:09) (96% - 100%)    PHYSICAL EXAM:     GENERAL: lethargic, laying in bed   HEAD:  Atraumatic, Normocephalic  EYES: EOMI, PERRLA, conjunctiva and sclera clear, no nystagmus noted  ENT: Slightly dry mucous membranes,   NECK: Supple, No JVD, trachea midline  CHEST/LUNG: Clear to auscultation bilaterally; No rales, rhonchi, wheezing, or rubs. Unlabored respirations  HEART: Regular rate and rhythm; No murmurs, rubs, or gallops, normal S1/S2  ABDOMEN: normal bowel sounds; abdominal distension, 8/10 epigastric pain that radiates to RUQ upon palpation and at rest, no rebound tenderness or guarding. Surgical dressings c/d/i    EXTREMITIES:  2+ Peripheral Pulses, brisk capillary refill. No clubbing, cyanosis, or edema  MSK: No gross deformities noted   Neurological:  A&Ox3, no focal deficits   SKIN: No rashes or lesions  PSYCH: Normal mood, affect       TELEMETRY:    LABS:                        9.4    9.72  )-----------( 291      ( 28 Aug 2022 07:27 )             30.0     08-28    140  |  108<H>  |  8   ----------------------------<  128<H>  3.5   |  20<L>  |  0.48<L>    Ca    8.1<L>      28 Aug 2022 07:27  Phos  2.4     08-28  Mg     1.80     08-28              Creatinine Trend: 0.48<--, 0.59<--, 0.48<--, 0.53<--, 0.58<--, 0.55<--  I&O's Summary    BNP    RADIOLOGY & ADDITIONAL STUDIES:

## 2022-08-29 NOTE — PROGRESS NOTE ADULT - PROBLEM SELECTOR PLAN 2
Impression: Pt has vomiting and intermittent episodes. Vomiting associated with eating meals. Likely related to gastric adenocarcinoma.     Pt complaining of zofran being ineffective. Pt switched to IV reglan 5 mg Q4 today. Impression: Pt has vomiting and intermittent episodes. Vomiting associated with eating meals. Likely related to gastric adenocarcinoma.     Pt complaining of zofran being ineffective. Pt switched to IV reglan 5 mg Q4. Pt is stating Reglan ineffective in managing her nausea but is refusing doses. Will speak with patient regarding nausea management.

## 2022-08-29 NOTE — PROGRESS NOTE ADULT - PROBLEM SELECTOR PLAN 3
Impression: 72 hour caloric count to evaluate for malnutrition - indicates malnutrition. Pt unable to eat due to pain. Will need to assess for adequate nutrition methods.  Tube feeds to 30 cc/hr ok with surgery  J-tube placed, nutrition consult placed regarding tube feeds Impression: 72 hour caloric count to evaluate for malnutrition - indicates malnutrition. Pt unable to eat due to pain. Will need to assess for adequate nutrition methods.  Tube feeds were at 30 cc/hr, patient felt increasing pain, tube feeds stopped for four hours, and restarted at 10 cc/hr  J-tube placed, nutrition consult placed regarding tube feeds

## 2022-08-29 NOTE — PROGRESS NOTE ADULT - SUBJECTIVE AND OBJECTIVE BOX
OVERNIGHT EVENTS: No acute overnight events.      SUBJECTIVE:       MEDICATIONS  (STANDING):  enoxaparin Injectable 40 milliGRAM(s) SubCutaneous every 24 hours  HYDROmorphone  Injectable 1 milliGRAM(s) IV Push every 4 hours  lidocaine   4% Patch 1 Patch Transdermal daily  metoclopramide Injectable 5 milliGRAM(s) IV Push every 4 hours  multivitamin 1 Tablet(s) Oral daily  pantoprazole  Injectable 40 milliGRAM(s) IV Push every 12 hours  polyethylene glycol 3350 17 Gram(s) Oral daily  sodium chloride 0.9%. 1000 milliLiter(s) (75 mL/Hr) IV Continuous <Continuous>    MEDICATIONS  (PRN):  acetaminophen     Tablet .. 650 milliGRAM(s) Oral every 6 hours PRN Temp greater or equal to 38C (100.4F), Mild Pain (1 - 3)  calcium carbonate    500 mG (Tums) Chewable 1 Tablet(s) Chew three times a day PRN Dyspepsia  HYDROmorphone  Injectable 0.3 milliGRAM(s) IV Push every 2 hours PRN Severe Pain (7 - 10)        T(F): 98 (08-29-22 @ 06:09), Max: 98.4 (08-28-22 @ 22:07)  HR: 91 (08-29-22 @ 06:09) (84 - 92)  BP: 153/85 (08-29-22 @ 06:09) (131/76 - 158/87)  BP(mean): --  RR: 18 (08-29-22 @ 06:09) (16 - 19)  SpO2: 98% (08-29-22 @ 06:09) (96% - 100%)    PHYSICAL EXAM:     GENERAL: lethargic, laying in bed   HEAD:  Atraumatic, Normocephalic  EYES: EOMI, PERRLA, conjunctiva and sclera clear, no nystagmus noted  ENT: Slightly dry mucous membranes,   NECK: Supple, No JVD, trachea midline  CHEST/LUNG: Clear to auscultation bilaterally; No rales, rhonchi, wheezing, or rubs. Unlabored respirations  HEART: Regular rate and rhythm; No murmurs, rubs, or gallops, normal S1/S2  ABDOMEN: normal bowel sounds; abdominal distension, 8/10 epigastric pain that radiates to RUQ upon palpation and at rest, no rebound tenderness or guarding. Surgical dressings c/d/i    EXTREMITIES:  2+ Peripheral Pulses, brisk capillary refill. No clubbing, cyanosis, or edema  MSK: No gross deformities noted   Neurological:  A&Ox3, no focal deficits   SKIN: No rashes or lesions  PSYCH: Normal mood, affect     TELEMETRY:    LABS:                        9.4    9.72  )-----------( 291      ( 28 Aug 2022 07:27 )             30.0     08-28    140  |  108<H>  |  8   ----------------------------<  128<H>  3.5   |  20<L>  |  0.48<L>    Ca    8.1<L>      28 Aug 2022 07:27  Phos  2.4     08-28  Mg     1.80     08-28              Creatinine Trend: 0.48<--, 0.59<--, 0.48<--, 0.53<--, 0.58<--, 0.55<--  I&O's Summary    BNP    RADIOLOGY & ADDITIONAL STUDIES:                 OVERNIGHT EVENTS: No acute overnight events.      SUBJECTIVE: Patient endorsing 10/10 pain that radiates through her RUQ and around toward her back. Also stating that she feels nauseous and that the Reglan is ineffective in managing his nausea.   Denies SOB, chest pain, HA, changes in bowel movements, leg pain, rash, paresthesias, and fever/chills      MEDICATIONS  (STANDING):  enoxaparin Injectable 40 milliGRAM(s) SubCutaneous every 24 hours  HYDROmorphone  Injectable 1 milliGRAM(s) IV Push every 4 hours  lidocaine   4% Patch 1 Patch Transdermal daily  metoclopramide Injectable 5 milliGRAM(s) IV Push every 4 hours  multivitamin 1 Tablet(s) Oral daily  pantoprazole  Injectable 40 milliGRAM(s) IV Push every 12 hours  polyethylene glycol 3350 17 Gram(s) Oral daily  sodium chloride 0.9%. 1000 milliLiter(s) (75 mL/Hr) IV Continuous <Continuous>    MEDICATIONS  (PRN):  acetaminophen     Tablet .. 650 milliGRAM(s) Oral every 6 hours PRN Temp greater or equal to 38C (100.4F), Mild Pain (1 - 3)  calcium carbonate    500 mG (Tums) Chewable 1 Tablet(s) Chew three times a day PRN Dyspepsia  HYDROmorphone  Injectable 0.3 milliGRAM(s) IV Push every 2 hours PRN Severe Pain (7 - 10)        T(F): 98 (08-29-22 @ 06:09), Max: 98.4 (08-28-22 @ 22:07)  HR: 91 (08-29-22 @ 06:09) (84 - 92)  BP: 153/85 (08-29-22 @ 06:09) (131/76 - 158/87)  BP(mean): --  RR: 18 (08-29-22 @ 06:09) (16 - 19)  SpO2: 98% (08-29-22 @ 06:09) (96% - 100%)    PHYSICAL EXAM:     GENERAL: lethargic, laying in bed   HEAD:  Atraumatic, Normocephalic  EYES: EOMI, PERRLA, conjunctiva and sclera clear, no nystagmus noted  ENT: Slightly dry mucous membranes,   NECK: Supple, No JVD, trachea midline  CHEST/LUNG: Clear to auscultation bilaterally; No rales, rhonchi, wheezing, or rubs. Unlabored respirations  HEART: Regular rate and rhythm; No murmurs, rubs, or gallops, normal S1/S2  ABDOMEN: normal bowel sounds; abdominal distension, 8/10 epigastric pain that radiates to RUQ upon palpation and at rest, no rebound tenderness or guarding. Surgical dressings c/d/i    EXTREMITIES:  2+ Peripheral Pulses, brisk capillary refill. No clubbing, cyanosis, or edema  MSK: No gross deformities noted   Neurological:  A&Ox3, no focal deficits   SKIN: No rashes or lesions  PSYCH: Normal mood, affect     TELEMETRY:    LABS:                        9.4    9.72  )-----------( 291      ( 28 Aug 2022 07:27 )             30.0     08-28    140  |  108<H>  |  8   ----------------------------<  128<H>  3.5   |  20<L>  |  0.48<L>    Ca    8.1<L>      28 Aug 2022 07:27  Phos  2.4     08-28  Mg     1.80     08-28              Creatinine Trend: 0.48<--, 0.59<--, 0.48<--, 0.53<--, 0.58<--, 0.55<--  I&O's Summary    BNP    RADIOLOGY & ADDITIONAL STUDIES:

## 2022-08-29 NOTE — CHART NOTE - NSCHARTNOTEFT_GEN_A_CORE
Pt seen for nutrition consult- Assessment, Education, Tube Feeding.    Medical Course:  - Per chart, pt is 57 year old female PMH renal stones presenting with gastric adenocarcinoma with mets to LN complicated by DEVI and nephrolithiasis. S/p J-tube placement with surgery (8/26). Social work assisting as pt uninsured. Palliative on board.    Nutrition Course:  - Pt was initiated on EN 8/27. Pt reported pain overnight after tube feeds were advanced to 30 mL/hr. Surgery recommending holding EN for 4 hours then restarting at 10 mL/hr.   - Current EN prescription provides 720 mL formula, 864 kcal, 39.96 gm protein, 581.04 mL free water (meets 17 kcal/kg, 0.8 gm protein/kg @IBW 50 kg).   - Last BM 8/28 per flowsheets, liquid/loose. Pt ordered for IV Reglan, previously ordered for Zofran.   - Labs notable for low P/K+, repleted.    Diet Prescription:   - NPO with Tube Feed: Jevity 1.2 via Jejunostomy initiated at 10 mL/hr increased by 10 mL q4 hrs until goal rate 30 mL/hr x24 hrs    Pertinent Medications:   - HYDROmorphone IV, magnesium sulfate IV, metoclopramide IV, pantoprazole IV, potassium chloride IV, sodium chloride 0.9% IV Continuous    Pertinent Labs:   - (8/29) Na 142 mmol/L Glu 141 mg/dL<H> K+ 3.4 mmol/L<L> Cr 0.43 mg/dL<L> BUN 6 mg/dL<L> Phos 2.3 mg/dL<L>   - (8/15) HbA1c 5.8%    Weight: (8/26 dosing) 156.7 lbs / 71.1 kg, (8/15 dosing) 156.7 lbs / 71.1 kg  Weight Assessment:  Height: 62 in / 157.5 cm  IBW: 110 lbs / 50 kg +/-10%  BMI: kg/m^2    Physical Assessment, per flowsheets:  Edema/Pressure Injury: none noted    Estimated Needs:   [X] Recalculated, based on ideal body weight 110 lbs / 50 kg  8304-2572 kcal daily @30-35 kcal/kg, 60-75 gm protein daily @1.2-1.5 gm/kg     Previous Nutrition Diagnosis: [X] Severe malnutrition in the context of chronic illness, ongoing  New Nutrition Diagnosis: [X] not applicable     Interventions:   1)   2)  3)     Monitor & Evaluate:  PO intake, tolerance to diet/supplement, nutrition related lab values, weight trends, BMs/GI distress, hydration status, skin integrity.  Jeanette Ramírez RDN, CDN #[id number]  Also available on [employer] Teams Pt seen for nutrition consult- Assessment, Education, Tube Feeding.    Medical Course:  - Per chart, pt is 57 year old female PMH renal stones presenting with gastric adenocarcinoma with mets to LN complicated by DEVI and nephrolithiasis. S/p J-tube placement with surgery (8/26). Social work assisting as pt uninsured. Palliative on board. No plans for inpatient chemotherapy.     Nutrition Course:  - Limited subjective assessment as pt complaining of pain (worsening since procedure) at time of visit.   - Pt was initiated on EN 8/27. Pt reported pain overnight after tube feeds were advanced to 30 mL/hr. Surgery recommending holding EN for 4 hours then restarting at 10 mL/hr. At time of visit, Jevity 1.2 visibly running via pump @30mL/hr. Pt denies current nausea but endorses diffuse abdominal pain.   - Current EN prescription provides 720 mL formula, 864 kcal, 39.96 gm protein, 581.04 mL free water (meets 17 kcal/kg, 0.8 gm protein/kg @IBW 50 kg).   - Last BM 8/28 per flowsheets, liquid/loose. Pt ordered for IV Reglan, previously ordered for Zofran.   - Labs notable for low P/K+, repleted.    Diet Prescription:   - NPO with Tube Feed: Jevity 1.2 via Jejunostomy initiated at 10 mL/hr increased by 10 mL q4 hrs until goal rate 30 mL/hr x24 hrs    Pertinent Medications:   - HYDROmorphone IV, magnesium sulfate IV, metoclopramide IV, pantoprazole IV, potassium chloride IV, sodium chloride 0.9% IV Continuous    Pertinent Labs:   - (8/29) Na 142 mmol/L Glu 141 mg/dL<H> K+ 3.4 mmol/L<L> Cr 0.43 mg/dL<L> BUN 6 mg/dL<L> Phos 2.3 mg/dL<L>   - (8/15) HbA1c 5.8%    Weight: (8/26 dosing) 156.7 lbs / 71.1 kg, (8/15 dosing) 156.7 lbs / 71.1 kg  Height: 62 in / 157.5 cm  IBW: 110 lbs / 50 kg +/-10%  BMI: 28.7 kg/m^2    Physical Assessment, per flowsheets:  Edema/Pressure Injury: none noted    Estimated Needs:   [X] Recalculated, based on ideal body weight 110 lbs / 50 kg  6360-2080 kcal daily @30-35 kcal/kg, 60-75 gm protein daily @1.2-1.5 gm/kg     Previous Nutrition Diagnosis: [X] Severe malnutrition in the context of chronic illness, ongoing  New Nutrition Diagnosis: [X] not applicable     Interventions:   1) Recommend TwoCal HN via J-tube initiated at 10 mL/hr increased by 10mL q4 hrs until goal rate 35 mL/hr x24 hrs to provide 840 mL formula, 1680 kcal, 70.14 gm protein, 588 mL free water (meets 33.6 kcal/kg, 1.4 gm protein/kg).  2) Monitor hydration, additional provision of free water per medical discretion.   3) Reglan per MD.  4) Please obtain weekly weights.  5) Monitor electrolytes (K+, Mg, P) and replete to within desired limits as clinically indicated.     Monitor & Evaluate:  Tolerance to diet, nutrition related lab values, weight trends, BMs/GI distress, hydration status, skin integrity.  Jeanette Ramírez RDN, CDN #[id number]  Also available on [employer] Teams

## 2022-08-29 NOTE — PROGRESS NOTE ADULT - PROBLEM SELECTOR PLAN 1
Impression: The patient's abdominal pain, persistent nausea/vomiting and weight loss iso of CT A/P findings indicating gastric body thickening and gastrocolic ligament nodularity are concerning for gastric adenocarcinoma. EGD biopsy confirmed.  - GI, onc, surg/onc following  - EGD:  Infiltrative changes in gastric body concerning gastric malignancy, Congested duodenal mucosa, biopsy = poorly differentiated adenocarcinoma  - staging CT chest - Mediastinal/bilateral hilar/R internal mammary LAD  - mediastinal lymph node biopsy results - Metastatic adenocarcinoma   - pantoprazole  40 mg BID  - OR 8/26 with J tube placement   8/27- pain management - changed to IV Dilaudid 1 mg Q4 ATC with 0.3 mg q2h PRN for severe/breakthrough  - f/u with palliative regarding pain management - pt getting all PRNs and all ATC dilaudid doses   - Tube feeds to 30 cc/hr ok with surgery, further recs per nutrition.

## 2022-08-29 NOTE — PROGRESS NOTE ADULT - ASSESSMENT
57F PMH of renal stones p/w gastric adenocarcinoma w/ mets to LN c/b DEVI, now resolved, and nephrolithiasis. S/p j tube placement, started on tube feeds

## 2022-08-29 NOTE — PROGRESS NOTE ADULT - PROBLEM SELECTOR PLAN 1
Impression: The patient's abdominal pain, persistent nausea/vomiting and weight loss iso of CT A/P findings indicating gastric body thickening and gastrocolic ligament nodularity are concerning for gastric adenocarcinoma. EGD biopsy confirmed.  - GI, onc, surg/onc following  - EGD:  Infiltrative changes in gastric body concerning gastric malignancy, Congested duodenal mucosa, biopsy = poorly differentiated adenocarcinoma  - staging CT chest - Mediastinal/bilateral hilar/R internal mammary LAD  - mediastinal lymph node biopsy results - Metastatic adenocarcinoma   - pantoprazole  40 mg BID  - OR 8/26 with J tube placement     8/29- IV Dilaudid 1 mg Q4 ATC with 0.3 mg q2h PRN for severe/breakthrough  - Tube feeds to 30 cc/hr ok with surgery, further recs per nutrition  - Pt getting all standing dilaudid doses, and all PRNs, f/u with palliative regarding PCA pump Impression: The patient's abdominal pain, persistent nausea/vomiting and weight loss iso of CT A/P findings indicating gastric body thickening and gastrocolic ligament nodularity are concerning for gastric adenocarcinoma. EGD biopsy confirmed.  - GI, onc, surg/onc following  - EGD:  Infiltrative changes in gastric body concerning gastric malignancy, Congested duodenal mucosa, biopsy = poorly differentiated adenocarcinoma  - staging CT chest - Mediastinal/bilateral hilar/R internal mammary LAD  - mediastinal lymph node biopsy results - Metastatic adenocarcinoma   - pantoprazole  40 mg BID  - OR 8/26 with J tube placement     8/29- IV Dilaudid 1.5 mg Q4 ATC with 0.9 mg q2h PRN for severe/breakthrough  - Tube feeds stopped for four hours as pe surgery recs due to inc pain, will restart tube feeds at 10 cc/hr  - will f/u recs regarding outpatient management Impression: The patient's abdominal pain, persistent nausea/vomiting and weight loss iso of CT A/P findings indicating gastric body thickening and gastrocolic ligament nodularity are concerning for gastric adenocarcinoma. EGD biopsy confirmed.  - GI, onc, surg/onc following  - EGD:  Infiltrative changes in gastric body concerning gastric malignancy, Congested duodenal mucosa, biopsy = poorly differentiated adenocarcinoma  - staging CT chest - Mediastinal/bilateral hilar/R internal mammary LAD  - mediastinal lymph node biopsy results - Metastatic adenocarcinoma   - pantoprazole  40 mg BID  - OR 8/26 with J tube placement   -8/29- IV Dilaudid 1.5 mg Q4 ATC with 0.9 mg q2h PRN for severe/breakthrough  - Tube feeds stopped for four hours as pe surgery recs due to inc pain, will restart tube feeds at 10 cc/hr  - will f/u recs regarding outpatient management

## 2022-08-30 NOTE — PROGRESS NOTE ADULT - PROBLEM SELECTOR PLAN 2
Impression: Pt has vomiting and intermittent episodes. Vomiting associated with eating meals. Likely related to gastric adenocarcinoma.     Pt complaining of zofran being ineffective. Pt switched to IV reglan 5 mg Q4. Pt is stating Reglan ineffective in managing her nausea but is refusing doses. Will speak with patient regarding nausea management. Impression: Pt has vomiting and intermittent episodes. Vomiting associated with eating meals. Likely related to gastric adenocarcinoma.     Pt complaining of zofran being ineffective. Pt switched to IV reglan 5 mg Q4. Pt is stating Reglan ineffective in managing her nausea and refusing some doses. Will speak with patient regarding nausea management. Impression: Pt has vomiting and intermittent episodes. Vomiting associated with eating meals. Likely related to gastric adenocarcinoma.     Pt stating that reglan is helping with the nasuea and dizziness  IV reglan 5 mg Q4

## 2022-08-30 NOTE — PROGRESS NOTE ADULT - ATTENDING COMMENTS
57F From [facility], w/ PMHx of nephrolithiasis p/w abd pain and n/v found to have gastric adenocarcinoma (on bx from 8/15 EGD) w/ mets to LN c/b DEVI. DEVI now resolved s/p ivfs. Now s/p EBUS w/ LN bx on 8/23 w/results sig for metastatic adenocarcinoma. No plan for inpatient chemotherapy at this time, recs outpatient follow up. Also s/p EGD, bx of peritoneum, and robotic feeding J tube placement per surg. Surgery was not able to do any resection as exploration revealed advanced disease.       -F/u peritoneal bx   -C/w Pain control regimen as stated above. Rate of feeds could have been contributing as well- now titrating rate slowly- held feeds for 4hours resumed at low rate of 10cc/hr on 8/29. Plan to increase to 20cc/hr for 24hours today. Serial abdominal exams   -C/w antiemetics PRN.    Dc planning. Dispo challenge as pt w/ no insurance. Pt will need home care for J tube but does not have insurance. Will f/u w/ SW/CM daily in re to progress.

## 2022-08-30 NOTE — PROGRESS NOTE ADULT - PROBLEM SELECTOR PLAN 1
Impression: The patient's abdominal pain, persistent nausea/vomiting and weight loss iso of CT A/P findings indicating gastric body thickening and gastrocolic ligament nodularity are concerning for gastric adenocarcinoma. EGD biopsy confirmed.  - GI, onc, surg/onc following  - EGD:  Infiltrative changes in gastric body concerning gastric malignancy, Congested duodenal mucosa, biopsy = poorly differentiated adenocarcinoma  - staging CT chest - Mediastinal/bilateral hilar/R internal mammary LAD  - mediastinal lymph node biopsy results - Metastatic adenocarcinoma   - pantoprazole  40 mg BID  - OR 8/26 with J tube placement     -8/30- IV Dilaudid 1.5 mg Q4 ATC with 0.9 mg q2h PRN for severe/breakthrough  - tube feeds at 10 cc/hr  - will f/u recs regarding outpatient management Impression: The patient's abdominal pain, persistent nausea/vomiting and weight loss iso of CT A/P findings indicating gastric body thickening and gastrocolic ligament nodularity are concerning for gastric adenocarcinoma. EGD biopsy confirmed.  - GI, onc, surg/onc following  - EGD:  Infiltrative changes in gastric body concerning gastric malignancy, Congested duodenal mucosa, biopsy = poorly differentiated adenocarcinoma  - staging CT chest - Mediastinal/bilateral hilar/R internal mammary LAD  - mediastinal lymph node biopsy results - Metastatic adenocarcinoma   - pantoprazole  40 mg BID  - OR 8/26 with J tube placement   -For pain control IV Dilaudid 1.5 mg Q4 ATC with 0.9 mg q2h PRN for severe/breakthrough  - tube feeds at 10 cc/hr Impression: The patient's abdominal pain, persistent nausea/vomiting and weight loss iso of CT A/P findings indicating gastric body thickening and gastrocolic ligament nodularity are concerning for gastric adenocarcinoma. EGD biopsy confirmed.  - GI, onc, surg/onc following  - EGD:  Infiltrative changes in gastric body concerning gastric malignancy, Congested duodenal mucosa, biopsy = poorly differentiated adenocarcinoma  - staging CT chest - Mediastinal/bilateral hilar/R internal mammary LAD  - mediastinal lymph node biopsy results - Metastatic adenocarcinoma   - pantoprazole  40 mg BID  - OR 8/26 with J tube placement     -For pain control IV Dilaudid 1.5 mg Q4 ATC with 0.9 mg q2h PRN for severe/breakthrough - pt feels pain has improved with this regimen  - As per surgery recs, tube feeds are 20 cc/hr  -

## 2022-08-30 NOTE — PROGRESS NOTE ADULT - SUBJECTIVE AND OBJECTIVE BOX
SURGICAL ONCOLOGY PROGRESS NOTE        Vital Signs Last 24 Hrs  T(C): 36.7 (30 Aug 2022 05:30), Max: 36.8 (29 Aug 2022 14:19)  T(F): 98 (30 Aug 2022 05:30), Max: 98.2 (29 Aug 2022 14:19)  HR: 100 (30 Aug 2022 05:30) (82 - 100)  BP: 146/84 (30 Aug 2022 05:30) (139/78 - 154/85)  BP(mean): --  RR: 18 (30 Aug 2022 05:30) (18 - 18)  SpO2: 100% (30 Aug 2022 05:30) (98% - 100%)    Parameters below as of 30 Aug 2022 05:30  Patient On (Oxygen Delivery Method): room air      I&O's Detail      PE:    A&A  NAD    soft, NT, ND, No peritoneal signs    inc  cdi                          9.8    7.51  )-----------( 336      ( 29 Aug 2022 06:30 )             31.9     08-29    142  |  109<H>  |  6<L>  ----------------------------<  141<H>  3.4<L>   |  22  |  0.43<L>    Ca    7.9<L>      29 Aug 2022 06:30  Phos  2.3     08-29  Mg     1.70     08-29

## 2022-08-30 NOTE — PROGRESS NOTE ADULT - SUBJECTIVE AND OBJECTIVE BOX
SUBJECTIVE AND OBJECTIVE:  Indication for Geriatrics and Palliative Care Services/INTERVAL HPI: Patient seen and examined at bedside, she was slightly uncomfortable from undergoing surgery, slight abdominal discomfort.     INTERVAL EVENTS:     DNR on chart:  Allergies    No Known Allergies    Intolerances    MEDICATIONS  (STANDING):  HYDROmorphone  Injectable 0.6 milliGRAM(s) IV Push every 4 hours  lidocaine   4% Patch 1 Patch Transdermal daily  multivitamin 1 Tablet(s) Oral daily  ondansetron Injectable 4 milliGRAM(s) IV Push three times a day  pantoprazole    Tablet 40 milliGRAM(s) Oral two times a day  polyethylene glycol 3350 17 Gram(s) Oral daily  sodium chloride 0.9%. 1000 milliLiter(s) (75 mL/Hr) IV Continuous <Continuous>    MEDICATIONS  (PRN):  acetaminophen     Tablet .. 650 milliGRAM(s) Oral every 6 hours PRN Temp greater or equal to 38C (100.4F), Mild Pain (1 - 3)  calcium carbonate    500 mG (Tums) Chewable 1 Tablet(s) Chew three times a day PRN Dyspepsia  HYDROmorphone  Injectable 0.3 milliGRAM(s) IV Push every 2 hours PRN Severe Pain (7 - 10)  melatonin 3 milliGRAM(s) Oral at bedtime PRN Sleep      ITEMS UNCHECKED ARE NOT PRESENT    PRESENT SYMPTOMS: [ ]Unable to self-report - see [ ] CPOT [ ] PAINADS [ ] RDOS  Source if other than patient:  [ ]Family   [ ]Team     Pain:  [x ]yes [ ]no  QOL impact - moderately affecting.  Location -    epigastric area               Aggravating factors - none  Quality - sharp  Radiation - to the back  Timing- constant  Severity (0-10 scale): 10/10  Minimal acceptable level (0-10 scale): 4/10    Dyspnea:                           [ ]Mild [ ]Moderate [ ]Severe    RDOS:  0 to 2  minimal or no respiratory distress   3  mild distress  4 to 6 moderate distress  >7 severe distress  [email or web address]_Distress_Observation_[email or web address] (Ctrl +  left click to view)     Anxiety:                             [ ]Mild [ ]Moderate [ ]Severe  Fatigue:                             [ ]Mild [ ]Moderate [ ]Severe  Nausea:                             [ ]Mild [ ]Moderate [ ]Severe  Loss of appetite:              [ ]Mild [ ]Moderate [ ]Severe  Constipation:                    [ ]Mild [ ]Moderate [ ]Severe    PCSSQ[Palliative Care Spiritual Screening Question]   Severity (0-10):  Score of 4 or > indicate consideration of Chaplaincy referral.  Chaplaincy Referral: [ ] yes [ ] refused [ ] following    Other Symptoms:  [ ]All other review of systems negative     Vital Signs Last 24 Hrs  T(C): 36.7 (30 Aug 2022 05:30), Max: 36.8 (29 Aug 2022 14:19)  T(F): 98 (30 Aug 2022 05:30), Max: 98.2 (29 Aug 2022 14:19)  HR: 100 (30 Aug 2022 05:30) (82 - 100)  BP: 146/84 (30 Aug 2022 05:30) (139/78 - 154/85)  BP(mean): --  RR: 18 (30 Aug 2022 05:30) (18 - 18)  SpO2: 100% (30 Aug 2022 05:30) (98% - 100%)    Parameters below as of 30 Aug 2022 05:30  Patient On (Oxygen Delivery Method): room air       GENERAL: [ ]Cachexia    [x ]Alert  [x ]Oriented    [ ]Lethargic  [ ]Unarousable  [ x]Verbal  [ ]Non-Verbal  Behavioral:   [ ]Anxiety  [ ]Delirium [ ]Agitation [ ]Other  HEENT:  [x ]Normal   [ ]Dry mouth   [ ]ET Tube/Trach  [ ]Oral lesions  PULMONARY:   [x ]Clear [ ]Tachypnea  [ ]Audible excessive secretions   [ ]Rhonchi        [ ]Right [ ]Left [ ]Bilateral  [ ]Crackles        [ ]Right [ ]Left [ ]Bilateral  [ ]Wheezing     [ ]Right [ ]Left [ ]Bilateral  [ ]Diminished BS [ ] Right [ ]Left [ ]Bilateral  CARDIOVASCULAR:    [x ]Regular [ ]Irregular [ ]Tachy  [ ]Luis Miguel [ ]Murmur [ ]Other  GASTROINTESTINAL:  [x ]Soft  [ ]Distended   [ ]+BS  [ ]Non tender [ ]Tender  [ ]Other [ ]PEG [ ]OGT/ NGT Patient has laparoscopic surgical incisions covered by  gauze    GENITOURINARY:  [ ]Normal [ ]Incontinent   [ ]Oliguria/Anuria   [x ]Patterson  MUSCULOSKELETAL:   [ x]Normal   [ ]Weakness  [ ]Bed/Wheelchair bound [ ]Edema  NEUROLOGIC:   [x ]No focal deficits  [ ] Cognitive impairment  [ ] Dysphagia [ ]Dysarthria [ ] Paresis [ ]Other   SKIN:   [x ]Normal  [ ]Rash  [ ]Other  [ ]Pressure ulcer(s) [ ]y [x ]n present on admission    CRITICAL CARE:  [ ]Shock Present  [ ]Septic [ ]Cardiogenic [ ]Neurologic [ ]Hypovolemic  [ ]Vasopressors [ ]Inotropes  [ ]Respiratory failure present [ ]Mechanical Ventilation [ ]Non-invasive ventilatory support [ ]High-Flow   [ ]Acute  [ ]Chronic [ ]Hypoxic  [ ]Hypercarbic [ ]Other  [ ]Other organ failure     LABS:                          9.9    7.12  )-----------( 338      ( 30 Aug 2022 07:15 )             31.8   08-30    143  |  109<H>  |  8   ----------------------------<  125<H>  3.8   |  22  |  0.48<L>    Ca    8.2<L>      30 Aug 2022 07:15  Phos  2.3     08-29  Mg     2.00     08-30    TPro  6.6  /  Alb  2.9<L>  /  TBili  0.2  /  DBili  x   /  AST  18  /  ALT  8   /  AlkPhos  83  08-30      RADIOLOGY & ADDITIONAL STUDIES:< from: Xray Chest 1 View- PORTABLE-Urgent (Xray Chest 1 View- PORTABLE-Urgent .) (08.25.22 @ 17:28) >  Clear lungs.    < from: Xray Abdomen 1 View PORTABLE -Routine (Xray Abdomen 1 View PORTABLE -Routine .) (08.27.22 @ 13:01) >    IMPRESSION: Status post jejunostomy placement with contrast in the   jejunostomy tube and normal loop of jejunum.    < end of copied text >      Protein Calorie Malnutrition Present: [ ]mild [ ]moderate [ ]severe [ ]underweight [ ]morbid obesity  https://www.andeal.org/vault/2440/web/files/ONC/Table_Clinical%20Characteristics%20to%20Document%20Malnutrition-White%20JV%20et%20al%202012.pdf    Height (cm): 157.5 (08-26-22 @ 07:10)  Weight (kg): 71.1 (08-26-22 @ 07:11)  BMI (kg/m2): 28.7 (08-26-22 @ 07:11)    [ ]PPSV2 < or = 30%  [ ]significant weight loss [ ]poor nutritional intake [ ]anasarca[X ]Artificial Nutrition - J tube     Other REFERRALS:  [ ]Hospice  [ ]Child Life  [ ]Social Work  [ ]Case management [ ]Holistic Therapy      SUBJECTIVE AND OBJECTIVE:  Indication for Geriatrics and Palliative Care Services/INTERVAL HPI: Pain management     INTERVAL EVENTS: Patient seen this PM, in no distress. Difficult to ascertain from patient pain history, as ATC medication has been helping but she seemed to suggest she has pain in between the 4 hour doses. She was educated again that in between prn doses are available if pain increases, encouraged to take if needed. Denying nausea vomiting.     DNR on chart:  Allergies    No Known Allergies    Intolerances    MEDICATIONS  (STANDING):  HYDROmorphone  Injectable 0.6 milliGRAM(s) IV Push every 4 hours  lidocaine   4% Patch 1 Patch Transdermal daily  multivitamin 1 Tablet(s) Oral daily  ondansetron Injectable 4 milliGRAM(s) IV Push three times a day  pantoprazole    Tablet 40 milliGRAM(s) Oral two times a day  polyethylene glycol 3350 17 Gram(s) Oral daily  sodium chloride 0.9%. 1000 milliLiter(s) (75 mL/Hr) IV Continuous <Continuous>    MEDICATIONS  (PRN):  acetaminophen     Tablet .. 650 milliGRAM(s) Oral every 6 hours PRN Temp greater or equal to 38C (100.4F), Mild Pain (1 - 3)  calcium carbonate    500 mG (Tums) Chewable 1 Tablet(s) Chew three times a day PRN Dyspepsia  HYDROmorphone  Injectable 0.3 milliGRAM(s) IV Push every 2 hours PRN Severe Pain (7 - 10)  melatonin 3 milliGRAM(s) Oral at bedtime PRN Sleep      ITEMS UNCHECKED ARE NOT PRESENT    PRESENT SYMPTOMS: [ ]Unable to self-report - see [ ] CPOT [ ] PAINADS [ ] RDOS  Source if other than patient:  [ ]Family   [ ]Team     Pain:  [x ]yes [ ]no  QOL impact - moderately affecting.  Location -    epigastric area               Aggravating factors - none  Quality - sharp  Radiation - to the back  Timing- constant  Severity (0-10 scale): 10/10  Minimal acceptable level (0-10 scale): 4/10    Dyspnea:                           [ ]Mild [ ]Moderate [ ]Severe    RDOS:  0 to 2  minimal or no respiratory distress   3  mild distress  4 to 6 moderate distress  >7 severe distress  https://[email or web address]/handouts/[email or web address]_Distress_Observation_Scale.[email or web address] (Ctrl +  left click to view)     Anxiety:                             [ ]Mild [ ]Moderate [ ]Severe  Fatigue:                             [ ]Mild [ ]Moderate [ ]Severe  Nausea:                             [ ]Mild [ ]Moderate [ ]Severe  Loss of appetite:              [ ]Mild [ ]Moderate [ ]Severe  Constipation:                    [ ]Mild [ ]Moderate [ ]Severe    PCSSQ[Palliative Care Spiritual Screening Question]   Severity (0-10):  Score of 4 or > indicate consideration of Chaplaincy referral.  Chaplaincy Referral: [ ] yes [ ] refused [ ] following    Other Symptoms:  [ ]All other review of systems negative     Vital Signs Last 24 Hrs  T(C): 36.7 (30 Aug 2022 05:30), Max: 36.8 (29 Aug 2022 14:19)  T(F): 98 (30 Aug 2022 05:30), Max: 98.2 (29 Aug 2022 14:19)  HR: 100 (30 Aug 2022 05:30) (82 - 100)  BP: 146/84 (30 Aug 2022 05:30) (139/78 - 154/85)  BP(mean): --  RR: 18 (30 Aug 2022 05:30) (18 - 18)  SpO2: 100% (30 Aug 2022 05:30) (98% - 100%)    Parameters below as of 30 Aug 2022 05:30  Patient On (Oxygen Delivery Method): room air       GENERAL: [ ]Cachexia    [x ]Alert  [x ]Oriented    [ ]Lethargic  [ ]Unarousable  [ x]Verbal  [ ]Non-Verbal  Behavioral:   [ ]Anxiety  [ ]Delirium [ ]Agitation [ ]Other  HEENT:  [x ]Normal   [ ]Dry mouth   [ ]ET Tube/Trach  [ ]Oral lesions  PULMONARY:   [x ]Clear [ ]Tachypnea  [ ]Audible excessive secretions   [ ]Rhonchi        [ ]Right [ ]Left [ ]Bilateral  [ ]Crackles        [ ]Right [ ]Left [ ]Bilateral  [ ]Wheezing     [ ]Right [ ]Left [ ]Bilateral  [ ]Diminished BS [ ] Right [ ]Left [ ]Bilateral  CARDIOVASCULAR:    [x ]Regular [ ]Irregular [ ]Tachy  [ ]Luis Miguel [ ]Murmur [ ]Other  GASTROINTESTINAL:  [x ]Soft  [ ]Distended   [ ]+BS  [ ]Non tender [ ]Tender  [ ]Other [ ]PEG [ ]OGT/ NGT Patient has laparoscopic surgical incisions covered by  gauze    GENITOURINARY:  [ ]Normal [ ]Incontinent   [ ]Oliguria/Anuria   [x ]Patterson  MUSCULOSKELETAL:   [ x]Normal   [ ]Weakness  [ ]Bed/Wheelchair bound [ ]Edema  NEUROLOGIC:   [x ]No focal deficits  [ ] Cognitive impairment  [ ] Dysphagia [ ]Dysarthria [ ] Paresis [ ]Other   SKIN:   [x ]Normal  [ ]Rash  [ ]Other  [ ]Pressure ulcer(s) [ ]y [x ]n present on admission    CRITICAL CARE:  [ ]Shock Present  [ ]Septic [ ]Cardiogenic [ ]Neurologic [ ]Hypovolemic  [ ]Vasopressors [ ]Inotropes  [ ]Respiratory failure present [ ]Mechanical Ventilation [ ]Non-invasive ventilatory support [ ]High-Flow   [ ]Acute  [ ]Chronic [ ]Hypoxic  [ ]Hypercarbic [ ]Other  [ ]Other organ failure     LABS:                          9.9    7.12  )-----------( 338      ( 30 Aug 2022 07:15 )             31.8   08-30    143  |  109<H>  |  8   ----------------------------<  125<H>  3.8   |  22  |  0.48<L>    Ca    8.2<L>      30 Aug 2022 07:15  Phos  2.3     08-29  Mg     2.00     08-30    TPro  6.6  /  Alb  2.9<L>  /  TBili  0.2  /  DBili  x   /  AST  18  /  ALT  8   /  AlkPhos  83  08-30      RADIOLOGY & ADDITIONAL STUDIES:< from: Xray Chest 1 View- PORTABLE-Urgent (Xray Chest 1 View- PORTABLE-Urgent .) (08.25.22 @ 17:28) >  Clear lungs.    < from: Xray Abdomen 1 View PORTABLE -Routine (Xray Abdomen 1 View PORTABLE -Routine .) (08.27.22 @ 13:01) >    IMPRESSION: Status post jejunostomy placement with contrast in the   jejunostomy tube and normal loop of jejunum.    < end of copied text >      Protein Calorie Malnutrition Present: [ ]mild [ ]moderate [ ]severe [ ]underweight [ ]morbid obesity  https://www.andeal.org/vault/2440/web/files/ONC/Table_Clinical%20Characteristics%20to%20Document%20Malnutrition-White%20JV%20et%20al%202012.pdf    Height (cm): 157.5 (08-26-22 @ 07:10)  Weight (kg): 71.1 (08-26-22 @ 07:11)  BMI (kg/m2): 28.7 (08-26-22 @ 07:11)    [ ]PPSV2 < or = 30%  [ ]significant weight loss [ ]poor nutritional intake [ ]anasarca[X ]Artificial Nutrition - J tube     Other REFERRALS:  [ ]Hospice  [ ]Child Life  [ ]Social Work  [ ]Case management [ ]Holistic Therapy

## 2022-08-30 NOTE — PROGRESS NOTE ADULT - PROBLEM SELECTOR PLAN 3
Add 52 Modifier (Optional): no - Patient reported zofran was not helping   - Over the weekend, was started on reglan 5 mg q6h Number Of Freeze-Thaw Cycles: 1 freeze-thaw cycle Medical Necessity Information: It is in your best interest to select a reason for this procedure from the list below. All of these items fulfill various CMS LCD requirements except the new and changing color options. Post-Care Instructions: I reviewed with the patient in detail post-care instructions. Patient is to wear sunprotection, and avoid picking at any of the treated lesions. Pt may apply Vaseline to crusted or scabbing areas. Consent: The patient's consent was obtained including but not limited to risks of crusting, scabbing, blistering, scarring, darker or lighter pigmentary change, recurrence, incomplete removal and infection. Patient understands multiple treatments may be necessary. Medical Necessity Clause: This procedure was medically necessary because the lesions that were treated were: Detail Level: Detailed - Biopsy proven poorly differentiated gastric adenocarcinoma.   - Status post laparoscopic evaluation on 8/26/2022 , non resectable tumor. Status post J tube placement.   - No plans for IP chemo, outpatient follow up

## 2022-08-30 NOTE — PROGRESS NOTE ADULT - SUBJECTIVE AND OBJECTIVE BOX
OVERNIGHT EVENTS: No acute overnight events.      SUBJECTIVE:       MEDICATIONS  (STANDING):  enoxaparin Injectable 40 milliGRAM(s) SubCutaneous every 24 hours  HYDROmorphone  Injectable 1.5 milliGRAM(s) IV Push every 4 hours  lidocaine   4% Patch 1 Patch Transdermal daily  metoclopramide Injectable 5 milliGRAM(s) IV Push every 4 hours  pantoprazole  Injectable 40 milliGRAM(s) IV Push every 12 hours  sodium chloride 0.9%. 1000 milliLiter(s) (75 mL/Hr) IV Continuous <Continuous>    MEDICATIONS  (PRN):  HYDROmorphone  Injectable 0.9 milliGRAM(s) IV Push every 2 hours PRN Severe Pain (7 - 10)        T(F): 98 (08-30-22 @ 05:30), Max: 98.2 (08-29-22 @ 14:19)  HR: 100 (08-30-22 @ 05:30) (82 - 100)  BP: 146/84 (08-30-22 @ 05:30) (139/78 - 154/85)  BP(mean): --  RR: 18 (08-30-22 @ 05:30) (18 - 18)  SpO2: 100% (08-30-22 @ 05:30) (98% - 100%)    PHYSICAL EXAM:     GENERAL: lethargic, laying in bed   HEAD:  Atraumatic, Normocephalic  EYES: EOMI, PERRLA, conjunctiva and sclera clear, no nystagmus noted  ENT: Slightly dry mucous membranes,   NECK: Supple, No JVD, trachea midline  CHEST/LUNG: Clear to auscultation bilaterally; No rales, rhonchi, wheezing, or rubs. Unlabored respirations  HEART: Regular rate and rhythm; No murmurs, rubs, or gallops, normal S1/S2  ABDOMEN: normal bowel sounds; abdominal distension, 9/10 epigastric pain that radiates to RUQ upon palpation and at rest, no rebound tenderness or guarding. Surgical dressings c/d/i    EXTREMITIES:  2+ Peripheral Pulses, brisk capillary refill. No clubbing, cyanosis, or edema  MSK: No gross deformities noted   Neurological:  A&Ox3, no focal deficits   SKIN: No rashes or lesions  PSYCH: Normal mood, affect     TELEMETRY:    LABS:                        9.8    7.51  )-----------( 336      ( 29 Aug 2022 06:30 )             31.9     08-29    142  |  109<H>  |  6<L>  ----------------------------<  141<H>  3.4<L>   |  22  |  0.43<L>    Ca    7.9<L>      29 Aug 2022 06:30  Phos  2.3     08-29  Mg     1.70     08-29              Creatinine Trend: 0.43<--, 0.48<--, 0.59<--, 0.48<--, 0.53<--, 0.58<--  I&O's Summary    BNP    RADIOLOGY & ADDITIONAL STUDIES:                 OVERNIGHT EVENTS: No acute overnight events.      SUBJECTIVE: Pt complaining of epigastric pain 7/10. Patient states that the pain radiated from the epigastric area toward the RUQ and the back. Patient states that the reglan has been helpful in controlling her nausea at this time. No other complaints.  Denies chest pain, SOB, rash, diarrhea, fever, chills, n/v, HA, paresthesias       MEDICATIONS  (STANDING):  enoxaparin Injectable 40 milliGRAM(s) SubCutaneous every 24 hours  HYDROmorphone  Injectable 1.5 milliGRAM(s) IV Push every 4 hours  lidocaine   4% Patch 1 Patch Transdermal daily  metoclopramide Injectable 5 milliGRAM(s) IV Push every 4 hours  pantoprazole  Injectable 40 milliGRAM(s) IV Push every 12 hours  sodium chloride 0.9%. 1000 milliLiter(s) (75 mL/Hr) IV Continuous <Continuous>    MEDICATIONS  (PRN):  HYDROmorphone  Injectable 0.9 milliGRAM(s) IV Push every 2 hours PRN Severe Pain (7 - 10)        T(F): 98 (08-30-22 @ 05:30), Max: 98.2 (08-29-22 @ 14:19)  HR: 100 (08-30-22 @ 05:30) (82 - 100)  BP: 146/84 (08-30-22 @ 05:30) (139/78 - 154/85)  BP(mean): --  RR: 18 (08-30-22 @ 05:30) (18 - 18)  SpO2: 100% (08-30-22 @ 05:30) (98% - 100%)    PHYSICAL EXAM:     GENERAL: lethargic, laying in bed   HEAD:  Atraumatic, Normocephalic  EYES: EOMI, conjunctiva and sclera clear, no nystagmus noted  ENT: Slightly dry mucous membranes,   NECK: Supple, No JVD, trachea midline  CHEST/LUNG: Clear to auscultation bilaterally; No rales, rhonchi, wheezing, or rubs. Unlabored respirations  HEART: Regular rate and rhythm; No murmurs, rubs, or gallops, normal S1/S2  ABDOMEN: normal bowel sounds; abdominal distension, 9/10 epigastric pain that radiates to RUQ upon palpation and at rest, no rebound tenderness or guarding. Surgical dressings c/d/i    EXTREMITIES:  2+ Peripheral Pulses, brisk capillary refill. No clubbing, cyanosis, or edema  MSK: No gross deformities noted   Neurological:  A&Ox3, no focal deficits   SKIN: No rashes or lesions  PSYCH: Normal mood, affect     TELEMETRY:    LABS:                        9.8    7.51  )-----------( 336      ( 29 Aug 2022 06:30 )             31.9     08-29    142  |  109<H>  |  6<L>  ----------------------------<  141<H>  3.4<L>   |  22  |  0.43<L>    Ca    7.9<L>      29 Aug 2022 06:30  Phos  2.3     08-29  Mg     1.70     08-29              Creatinine Trend: 0.43<--, 0.48<--, 0.59<--, 0.48<--, 0.53<--, 0.58<--  I&O's Summary    BNP    RADIOLOGY & ADDITIONAL STUDIES:

## 2022-08-30 NOTE — PROGRESS NOTE ADULT - PROBLEM SELECTOR PLAN 2
- Pain located in epigastric area (area of gastric cancer) and reporting now diffuse abdominal pain since procedures on 8/26   - Pain was reasonably controlled on  IV Dilaudid 0.6 mg q4h ATC with 0.3 mg q2h PRN for severe/breakthrough pain  - Patient has required escalating doses, reports feeling improved with 1.5 mg IV dilaudid   - Unclear cause of why pain has been worsening, now with greater pain med requirement   - Continue with 1.5 mg IV dilaudid q4h ATC and 0.9 mg PRN for severe pain - Patient reported zofran was not helping   - Was started on reglan 5 mg q6h

## 2022-08-30 NOTE — PROGRESS NOTE ADULT - PROBLEM SELECTOR PLAN 5
DVT ppx: lovenox      Dispo planning: complex dispo planning due to lack of insurance DVT ppx: lovenox      Dispo planning: complex dispo planning due to lack of insurance  Emergency medicaid can cover outpatient chemo/transport.

## 2022-08-30 NOTE — PROGRESS NOTE ADULT - PROBLEM SELECTOR PLAN 1
Biopsy proven poorly differentiated gastric adenocarcinoma.   Status post laparoscopic evaluation on 8/26/2022 , non resectable tumor. Status post J tube placement.   No plans for IP chemo, outpatient follow up - Pain located in epigastric area (area of gastric cancer) and reporting now diffuse abdominal pain since procedures on 8/26   - Pain was reasonably controlled on  IV Dilaudid 0.6 mg q4h ATC with 0.3 mg q2h PRN for severe/breakthrough pain  - Patient has required escalating doses, reports feeling improved with 1.5 mg IV dilaudid   - Unclear cause of why pain has been worsening, now with greater pain med requirement   - Continue with 1.5 mg IV dilaudid q4h ATC and 0.9 mg PRN for severe pain  - Educated and encouraged patient that she can take prn doses for pain

## 2022-08-30 NOTE — PROGRESS NOTE ADULT - NUTRITIONAL ASSESSMENT
This patient has been assessed with a concern for Malnutrition and has been determined to have a diagnosis/diagnoses of Severe protein-calorie malnutrition.    This patient is being managed with:   Diet NPO with Tube Feed-  Tube Feeding Modality: Jejunostomy  TwoCal HN (TWOCALHNRTH)  Total Volume for 24 Hours (mL): 240  Continuous  Starting Tube Feed Rate {mL per Hour}: 10  Increase Tube Feed Rate by (mL): 0  Until Goal Tube Feed Rate (mL per Hour): 10  Tube Feed Duration (in Hours): 24  Tube Feed Start Time: 16:00  Entered: Aug 29 2022  2:38PM

## 2022-08-31 NOTE — PROGRESS NOTE ADULT - PROBLEM SELECTOR PLAN 3
Impression: 72 hour caloric count to evaluate for malnutrition - indicates malnutrition. Pt unable to eat due to pain.   Now J tube placement    Nutrition consult - Recommend TwoCal HN via J-tube initiated at 10 mL/hr increased by 10mL q4 hrs until goal rate 35 mL/hr x24 hrs to provide 840 mL formula, 1680 kcal, 70.14 gm protein, 588 mL free water (meets 33.6 kcal/kg, 1.4 gm protein/kg).   - pt tolerating tube feeds well today

## 2022-08-31 NOTE — PROGRESS NOTE ADULT - SUBJECTIVE AND OBJECTIVE BOX
Subjective:   Patient seen at bedside this AM. Still reporting some abdominal pain and bloating. Denies n/v    Objective:  Vital Signs  T(C): 36.7 (08-31 @ 05:56), Max: 37.1 (08-30 @ 21:20)  HR: 102 (08-31 @ 05:56) (94 - 102)  BP: 144/97 (08-31 @ 05:56) (130/84 - 152/88)  RR: 18 (08-31 @ 05:56) (18 - 18)  SpO2: 95% (08-31 @ 05:56) (95% - 100%)    Physical Exam:  GEN: resting in bed comfortably in NAD  RESP: no increased WOB  ABD: soft, moderately tender. TTP  EXTR: warm, well-perfused, no edema  NEURO: awake, alert    Labs:                        10.2   8.12  )-----------( 351      ( 31 Aug 2022 06:00 )             32.0   08-31    x   |  x   |  11  ----------------------------<  130<H>  x    |  24  |  0.46<L>    Ca    8.1<L>      31 Aug 2022 06:00  Phos  3.2     08-31  Mg     2.00     08-31    TPro  6.3  /  Alb  2.9<L>  /  TBili  0.3  /  DBili  x   /  AST  33<H>  /  ALT  18  /  AlkPhos  109  08-31    CAPILLARY BLOOD GLUCOSE          Imaging:

## 2022-08-31 NOTE — PROGRESS NOTE ADULT - SUBJECTIVE AND OBJECTIVE BOX
OVERNIGHT EVENTS: No acute overnight events.      SUBJECTIVE:       MEDICATIONS  (STANDING):  enoxaparin Injectable 40 milliGRAM(s) SubCutaneous every 24 hours  HYDROmorphone  Injectable 1.5 milliGRAM(s) IV Push every 4 hours  lidocaine   4% Patch 1 Patch Transdermal daily  metoclopramide Injectable 5 milliGRAM(s) IV Push every 4 hours  pantoprazole  Injectable 40 milliGRAM(s) IV Push every 12 hours  sodium chloride 0.9%. 1000 milliLiter(s) (75 mL/Hr) IV Continuous <Continuous>    MEDICATIONS  (PRN):  HYDROmorphone  Injectable 0.9 milliGRAM(s) IV Push every 2 hours PRN Severe Pain (7 - 10)        T(F): 98.1 (08-31-22 @ 05:56), Max: 98.7 (08-30-22 @ 21:20)  HR: 102 (08-31-22 @ 05:56) (94 - 110)  BP: 144/97 (08-31-22 @ 05:56) (130/84 - 152/88)  BP(mean): --  RR: 18 (08-31-22 @ 05:56) (18 - 18)  SpO2: 95% (08-31-22 @ 05:56) (95% - 100%)    PHYSICAL EXAM:     GENERAL: lethargic, laying in bed   HEAD:  Atraumatic, Normocephalic  EYES: EOMI, conjunctiva and sclera clear, no nystagmus noted  ENT: Slightly dry mucous membranes,   NECK: Supple, No JVD, trachea midline  CHEST/LUNG: Clear to auscultation bilaterally; No rales, rhonchi, wheezing, or rubs. Unlabored respirations  HEART: Regular rate and rhythm; No murmurs, rubs, or gallops, normal S1/S2  ABDOMEN: normal bowel sounds; abdominal distension, 9/10 epigastric pain that radiates to RUQ upon palpation and at rest, no rebound tenderness or guarding. Surgical dressings c/d/i    EXTREMITIES:  2+ Peripheral Pulses, brisk capillary refill. No clubbing, cyanosis, or edema  MSK: No gross deformities noted   Neurological:  A&Ox3, no focal deficits   SKIN: No rashes or lesions  PSYCH: Normal mood, affect     TELEMETRY:    LABS:                        9.9    7.12  )-----------( 338      ( 30 Aug 2022 07:15 )             31.8     08-30    143  |  109<H>  |  8   ----------------------------<  125<H>  3.8   |  22  |  0.48<L>    Ca    8.2<L>      30 Aug 2022 07:15  Phos  3.4     08-30  Mg     2.00     08-30    TPro  6.6  /  Alb  2.9<L>  /  TBili  0.2  /  DBili  x   /  AST  18  /  ALT  8   /  AlkPhos  83  08-30            Creatinine Trend: 0.48<--, 0.43<--, 0.48<--, 0.59<--, 0.48<--, 0.53<--  I&O's Summary    BNP    RADIOLOGY & ADDITIONAL STUDIES:                 OVERNIGHT EVENTS: No acute overnight events.      SUBJECTIVE: Pt complaining off epigastric pain, but states it is well controlled by the pain regimen. Patient also endorsing suprapubic bloating that is not painful.  Denies chest pain, SOB, fever, chills, diarrhea, n/v, rash, HA      MEDICATIONS  (STANDING):  enoxaparin Injectable 40 milliGRAM(s) SubCutaneous every 24 hours  HYDROmorphone  Injectable 1.5 milliGRAM(s) IV Push every 4 hours  lidocaine   4% Patch 1 Patch Transdermal daily  metoclopramide Injectable 5 milliGRAM(s) IV Push every 4 hours  pantoprazole  Injectable 40 milliGRAM(s) IV Push every 12 hours  sodium chloride 0.9%. 1000 milliLiter(s) (75 mL/Hr) IV Continuous <Continuous>    MEDICATIONS  (PRN):  HYDROmorphone  Injectable 0.9 milliGRAM(s) IV Push every 2 hours PRN Severe Pain (7 - 10)        T(F): 98.1 (08-31-22 @ 05:56), Max: 98.7 (08-30-22 @ 21:20)  HR: 102 (08-31-22 @ 05:56) (94 - 110)  BP: 144/97 (08-31-22 @ 05:56) (130/84 - 152/88)  BP(mean): --  RR: 18 (08-31-22 @ 05:56) (18 - 18)  SpO2: 95% (08-31-22 @ 05:56) (95% - 100%)    PHYSICAL EXAM:     GENERAL: lethargic, laying in bed   HEAD:  Atraumatic, Normocephalic  EYES: EOMI, conjunctiva and sclera clear, no nystagmus noted  ENT: Slightly dry mucous membranes,   NECK: Supple, No JVD, trachea midline  CHEST/LUNG: Clear to auscultation bilaterally; No rales, rhonchi, wheezing, or rubs. Unlabored respirations  HEART: Regular rate and rhythm; No murmurs, rubs, or gallops, normal S1/S2  ABDOMEN: normal bowel sounds; abdominal distension, 6/10 epigastric pain that radiates to RUQ upon palpation and at rest, no rebound tenderness or guarding. Surgical dressings c/d/i, mild suprapubic bloating  EXTREMITIES:  2+ Peripheral Pulses, brisk capillary refill. No clubbing, cyanosis, or edema  MSK: No gross deformities noted   Neurological:  A&Ox3, no focal deficits   SKIN: No rashes or lesions  PSYCH: Normal mood, affect     TELEMETRY:    LABS:                        9.9    7.12  )-----------( 338      ( 30 Aug 2022 07:15 )             31.8     08-30    143  |  109<H>  |  8   ----------------------------<  125<H>  3.8   |  22  |  0.48<L>    Ca    8.2<L>      30 Aug 2022 07:15  Phos  3.4     08-30  Mg     2.00     08-30    TPro  6.6  /  Alb  2.9<L>  /  TBili  0.2  /  DBili  x   /  AST  18  /  ALT  8   /  AlkPhos  83  08-30            Creatinine Trend: 0.48<--, 0.43<--, 0.48<--, 0.59<--, 0.48<--, 0.53<--  I&O's Summary    BNP    RADIOLOGY & ADDITIONAL STUDIES:

## 2022-08-31 NOTE — PROGRESS NOTE ADULT - ATTENDING COMMENTS
57F From [facility], w/ PMHx of nephrolithiasis p/w abd pain and n/v found to have gastric adenocarcinoma (on bx from 8/15 EGD) w/ mets to LN c/b DEVI. DEVI now resolved s/p ivfs. Now s/p EBUS w/ LN bx on 8/23 w/results sig for metastatic adenocarcinoma. No plan for inpatient chemotherapy at this time, recs outpatient follow up. Also s/p EGD, bx of peritoneum, and robotic feeding J tube placement per surg on 8/26. Surgery was not able to do any resection as exploration revealed advanced disease.       -8/26 Peritoneal bx now back and shows Adenocarcinoma with signet ring cells.  -C/w Pain control regimen as stated above.  Will need to d/w palliative med regimen in anticipation of discharge.   -Serial abd exams. Feeds could be contributing to some of her abd pain as well- now titrating rate slowly- Will c/w rate of 20cc/hr for 24hours for now per surgery.   -C/w antiemetics PRN.    Dc planning. Dispo challenge as pt w/ no insurance. Pt will need home care for J tube but does not have insurance. Will f/u w/ SW/CM daily in re to progress.

## 2022-08-31 NOTE — PROGRESS NOTE ADULT - PROBLEM SELECTOR PLAN 1
Impression: The patient's abdominal pain, persistent nausea/vomiting and weight loss iso of CT A/P findings indicating gastric body thickening and gastrocolic ligament nodularity are concerning for gastric adenocarcinoma. EGD biopsy confirmed.  - GI, onc, surg/onc following  - EGD:  Infiltrative changes in gastric body concerning gastric malignancy, Congested duodenal mucosa, biopsy = poorly differentiated adenocarcinoma  - staging CT chest - Mediastinal/bilateral hilar/R internal mammary LAD  - mediastinal lymph node biopsy results - Metastatic adenocarcinoma   - pantoprazole  40 mg BID  - OR 8/26 with J tube placement     -For pain control IV Dilaudid 1.5 mg Q4 ATC with 0.9 mg q2h PRN for severe/breakthrough - pt feels pain has improved with this regimen  - As per surgery recs, tube feeds are 20 cc/hr  - f/u with palliative regarding switch from IV dilaudid to suspension through J tube

## 2022-08-31 NOTE — PROGRESS NOTE ADULT - PROBLEM SELECTOR PLAN 5
DVT ppx: lovenox      Dispo planning: complex dispo planning due to lack of insurance  Emergency medicaid can cover outpatient chemo/transport.

## 2022-08-31 NOTE — PROGRESS NOTE ADULT - PROBLEM SELECTOR PLAN 2
Impression: Pt has vomiting and intermittent episodes. Vomiting associated with eating meals. Likely related to gastric adenocarcinoma.     Pt stating that reglan is helping with the nasuea and dizziness  IV reglan 5 mg Q4  - speak with palliative regarding switch from IV to anti-emetics through the J tube

## 2022-08-31 NOTE — CHART NOTE - NSCHARTNOTEFT_GEN_A_CORE
Patient will require tube feeds via jejunostomy tube, as is only source of nutrition, due to gastric adenocarcinoma confirmed via biopsy. She will need tube feeds for life.    Philip Lofton MD  PGY-2 Internal Medicine

## 2022-08-31 NOTE — PROGRESS NOTE ADULT - ASSESSMENT
57F with gastric CA, s/p EGD and robotic feeding J tube placement 8/26.    Impression/plan:  - Pt still reporting some pain, please keep tube feeds at 20cc/hr today  - Remaining care per primary team    D Team Surgery  [id number]

## 2022-08-31 NOTE — PROGRESS NOTE ADULT - NUTRITIONAL ASSESSMENT
This patient has been assessed with a concern for Malnutrition and has been determined to have a diagnosis/diagnoses of Severe protein-calorie malnutrition.    This patient is being managed with:   Diet NPO with Tube Feed-  Tube Feeding Modality: Jejunostomy  Jevity 1.2 Joseluis (JEVITY1.2RTH)  Total Volume for 24 Hours (mL): 480  Continuous  Starting Tube Feed Rate {mL per Hour}: 20  Increase Tube Feed Rate by (mL): 0  Until Goal Tube Feed Rate (mL per Hour): 20  Tube Feed Duration (in Hours): 24  Tube Feed Start Time: 14:00  Entered: Aug 30 2022  2:36PM

## 2022-09-01 NOTE — PROGRESS NOTE ADULT - ASSESSMENT
57F PMH of renal stones p/w gastric adenocarcinoma w/ mets to LN c/b DEVI, now resolved, and nephrolithiasis. S/p j tube placement, started on tube feeds. Palliative following.

## 2022-09-01 NOTE — PROGRESS NOTE ADULT - ATTENDING COMMENTS
Pt seen examined and d/w fellow. Pt c/o epigastric discomfort / burning. Getting feeds from J tube - reports tolerating it well - no diarrhea.  She c/o new LLE swelling - denies related tenderness. PE scleare anicteric Lungs clear Heart RRR S1 S2 ABd NABS soft / mild epigastric tenderness Ext distal LLE edema w/o palp cord or tenderness. A/P Met gastric ca - await her 2 yoanna testing. Awaiting for detrmination of f/u treatment options based on insurance and eligibility . Agree with duplex doppler LLE to r/o DVT despite Ac

## 2022-09-01 NOTE — PROGRESS NOTE ADULT - ASSESSMENT
56 yo F with a PMH of nephrolithiasis who p/w progressive abdominal pain, N/V, and weight loss, found to have poorly differentiated metastatic gastric adenocarcinoma.    #Metastatic Gastric Adenocarcinoma  - Presented with abdominal pain, N/V, and weight loss  - Admission CT C/A/P shows abdominal and thoracic (subdiaphragmatic, mediastinal, subcarinal and bilateral hilar) LAD, 3 mm RML nodule, and gastric body thickening and gastrocolic ligament nodularity  - CEA elevated (1544);  mildly elevated, CA 19-9 normal  - S/p EUS by GI on 8/16 showing infiltrative gastric body changes w/pathology showing poorly differentiated gastric adenocarcinoma (+AE1/3, CK7, CK20, CDX2, neg for ER), MS-stable  - S/p EBUS with level 7 mediastinal LN bx on 8/23 confirming metastasis of gastric adenocarcinoma, and ascitic fluid (trace ascites) also confirming disease there with signet-ring cells  - S/p J-tube placement by surgery; 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 Tube feeds per primary team/surgery  - C/w PPI BID  - No plan for inpatient chemotherapy at this time. Ideally will follow up at [facility], but currently does not have insurance. She is currently being applied for emergency Medicaid, but ongoing discussions to determine if patient would still be able to be seen at [facility]. If not, she would have to be referred to another health system such as [facility]  - Discussed with Pathology, HER2 stains are pending but do not need to prevent discharge    #Left Lower Extremity Edema  - Patient has trace RLE but more prominent LLE edema, newly developed  - Please check dopplers bilaterally to r/o DVT      Aryles Hedjar, MD, PGY-5  Hematology/Oncology Fellow  [facility]  Pager: [phone]  After 5PM and on weekends and holidays, please call the inpatient fellow on call.

## 2022-09-01 NOTE — PROGRESS NOTE ADULT - SUBJECTIVE AND OBJECTIVE BOX
SURGICAL ONCOLOGY PROGRESS NOTE    No complaints.  (+) fl, (+) BM    Vital Signs Last 24 Hrs  T(C): 36.4 (01 Sep 2022 06:10), Max: 37.1 (31 Aug 2022 14:15)  T(F): 97.6 (01 Sep 2022 06:10), Max: 98.8 (31 Aug 2022 14:15)  HR: 96 (01 Sep 2022 06:10) (86 - 100)  BP: 128/76 (01 Sep 2022 06:10) (119/77 - 151/82)  BP(mean): --  RR: 16 (01 Sep 2022 06:10) (16 - 18)  SpO2: 97% (01 Sep 2022 06:10) (94% - 98%)    Parameters below as of 01 Sep 2022 06:10  Patient On (Oxygen Delivery Method): room air      I&O's Detail      PE:    A&A  NAD    soft, NT, ND, No peritoneal signs    inc  cdi                          10.2   8.12  )-----------( 351      ( 31 Aug 2022 06:00 )             32.0     08-31    138  |  103  |  11  ----------------------------<  130<H>  3.6   |  24  |  0.46<L>    Ca    8.1<L>      31 Aug 2022 06:00  Phos  3.2     08-31  Mg     2.00     08-31    TPro  6.3  /  Alb  2.9<L>  /  TBili  0.3  /  DBili  x   /  AST  33<H>  /  ALT  18  /  AlkPhos  109  08-31

## 2022-09-01 NOTE — PROGRESS NOTE ADULT - PROBLEM SELECTOR PLAN 1
Impression: The patient's abdominal pain, persistent nausea/vomiting and weight loss iso of CT A/P findings indicating gastric body thickening and gastrocolic ligament nodularity are concerning for gastric adenocarcinoma. EGD biopsy confirmed.  - GI, onc, surg/onc following  - EGD:  Infiltrative changes in gastric body concerning gastric malignancy, Congested duodenal mucosa, biopsy = poorly differentiated adenocarcinoma  - staging CT chest - Mediastinal/bilateral hilar/R internal mammary LAD  - mediastinal lymph node biopsy results - Metastatic adenocarcinoma   - pantoprazole  40 mg BID  - OR 8/26 with J tube placement     -For pain control IV Dilaudid 1.5 mg Q4 ATC with 0.9 mg q2h PRN for severe/breakthrough - pt feels pain has improved with this regimen  - As per surgery recs, tube feeds are 20 cc/hr  - f/u with palliative regarding switch from IV dilaudid to suspension through J tube    Encourage use of PRN pain meds.  Increased feeds as per Surgery and nutrition. F/U with nutrition regarding DC reccs of feeds & rate.

## 2022-09-01 NOTE — PROGRESS NOTE ADULT - ATTENDING COMMENTS
57F From [facility], w/ PMHx of nephrolithiasis p/w abd pain and n/v found to have gastric adenocarcinoma (on bx from 8/15 EGD) w/ mets to LN c/b DEVI. DEVI now resolved s/p ivfs. Now s/p EBUS w/ LN bx on 8/23 w/results sig for metastatic adenocarcinoma. No plan for inpatient chemotherapy at this time, recs outpatient follow up. Also s/p EGD, bx of peritoneum, and robotic feeding J tube placement per surg on 8/26 ( Peritoneal bx now back and shows Adenocarcinoma with signet ring cells). Surgery was not able to do any resection as exploration revealed advanced disease.       -C/w Pain control regimen as stated above.  Following up w/ palliative in re to med regimen in anticipation of discharge.   -Serial abd exams.   -Feeds could be contributing to some of her abd pain as well- have been titrating rate slowly. Increasing rate to 30cc/hr for 24hours for now per surgery. Nutrition recs for goal rate also appreciated.   -C/w antiemetics PRN.    Dc planning. Dispo challenge as pt w/ no insurance. Pt will need home care for J tube but does not have insurance. Will f/u w/ SW/CM daily in re to progress.

## 2022-09-01 NOTE — CHART NOTE - NSCHARTNOTEFT_GEN_A_CORE
Pt seen for malnutrition follow up.     Medical Course:  - Per chart, pt is 57 year old female PMH renal stones presenting with gastric adenocarcinoma with mets to LN complicated by DEVI and nephrolithiasis. S/p J-tube placement with surgery (8/26). Social work assisting as pt uninsured. Palliative on board. No plans for inpatient chemotherapy.     Nutrition Course:  - Pt was initiated on EN 8/27. Pt has since been transitioned from Jevity 1.2 to TwoCal HN. Tube feeds on hold at time of visit, pt states she requested it to be disconnected so she may clean up. Pump read 20 mL/hr.   - Pt denies nausea/vomiting but endorses continued abdominal pain. Palliative assisting with pain management. Pt without BM for past 2 days, no bowel regimen ordered at this time. Pt ordered for standing IV Reglan.     Diet Prescription:   - NPO with Tube Feed: TwoCal HN via Jejunostomy at a goal rate of 20 mL/hr x24 hrs    Pertinent Medications:   - metoclopramide IV, pantoprazole IV, sodium chloride 0.9% IV Continuous    Pertinent Labs:   - (9/1) Na 141 mmol/L Glu 139 mg/dL<H> K+ 3.5 mmol/L Cr 0.45 mg/dL<L> BUN 12 mg/dL Phos 3.2 mg/dL Alb 2.6 g/dL<L>  - (8/15) HbA1c 5.8%    Weight: (8/26 dosing) 156.7 lbs / 71.1 kg, (8/15 dosing) 156.7 lbs / 71.1 kg  Height: 62 in / 157.5 cm  IBW: 110 lbs / 50 kg +/-10%  BMI: 28.7 kg/m^2    Physical Assessment, per flowsheets:  Edema/Pressure Injury: none noted    Estimated Needs:   [X] Recalculated, based on ideal body weight 110 lbs / 50 kg  4859-8242 kcal daily @30-35 kcal/kg, 60-75 gm protein daily @1.2-1.5 gm/kg     Previous Nutrition Diagnosis: [X] Severe malnutrition in the context of chronic illness, ongoing  New Nutrition Diagnosis: [X] not applicable     Interventions:   1) Recommend increase TwoCal HN via J-tube by 5 mL q4 hrs, or as tolerated, until goal rate 35 mL/hr x24 hrs to provide 840 mL formula, 1680 kcal, 70.14 gm protein, 588 mL free water (meets 33.6 kcal/kg, 1.4 gm protein/kg).  2) Consider addition of bowel regimen.  3) Monitor hydration, additional provision of free water per medical discretion.   4) Reglan per MD.  5) Please obtain weekly weights.  6) Continue to monitor electrolytes (K+, Mg, P) and replete to within desired limits as clinically indicated.     Monitor & Evaluate:  Tolerance to diet, nutrition related lab values, weight trends, BMs/GI distress, hydration status, skin integrity.  Jeanette Ramírez, CAMRYNN, CDN #[id number]  Also available on [employer] Teams.

## 2022-09-01 NOTE — PROGRESS NOTE ADULT - SUBJECTIVE AND OBJECTIVE BOX
***********************************************  Goyo Mosqueda MD  Internal Medicine   * After 7pm please contact Night Float Covering Resident   ***********************************************      PROGRESS NOTE:     Patient is a 57y old  Female who presents with a chief complaint of abdominal pain & nausea/vomiting (31 Aug 2022 08:10)      SUBJECTIVE / OVERNIGHT EVENTS:    OVERNIGHT:       Patient examined at bedside        MEDICATIONS  (STANDING):  enoxaparin Injectable 40 milliGRAM(s) SubCutaneous every 24 hours  HYDROmorphone  Injectable 1.5 milliGRAM(s) IV Push every 4 hours  lidocaine   4% Patch 1 Patch Transdermal daily  melatonin 3 milliGRAM(s) Oral once  metoclopramide Injectable 5 milliGRAM(s) IV Push every 4 hours  pantoprazole  Injectable 40 milliGRAM(s) IV Push every 12 hours  sodium chloride 0.9%. 1000 milliLiter(s) (75 mL/Hr) IV Continuous <Continuous>    MEDICATIONS  (PRN):  HYDROmorphone  Injectable 0.9 milliGRAM(s) IV Push every 2 hours PRN Severe Pain (7 - 10)      CAPILLARY BLOOD GLUCOSE        I&O's Summary      PHYSICAL EXAM:  Vital Signs Last 24 Hrs  T(C): 36.6 (01 Sep 2022 05:34), Max: 37.1 (31 Aug 2022 14:15)  T(F): 97.8 (01 Sep 2022 05:34), Max: 98.8 (31 Aug 2022 14:15)  HR: 100 (01 Sep 2022 05:34) (86 - 100)  BP: 139/90 (01 Sep 2022 05:34) (119/77 - 151/82)  BP(mean): --  RR: 18 (01 Sep 2022 05:34) (16 - 18)  SpO2: 96% (01 Sep 2022 05:34) (94% - 98%)    Parameters below as of 01 Sep 2022 05:34  Patient On (Oxygen Delivery Method): room air        CONSTITUTIONAL: NAD; well-developed  HEENT: PERRL, clear conjunctiva  RESPIRATORY: Normal respiratory effort; lungs are clear to auscultation bilaterally; No Crackles/Rhonchi/Wheezing  CARDIOVASCULAR: Regular rate and rhythm, normal S1 and S2, no murmur/rub/gallop; No lower extremity edema; Peripheral pulses are 2+ bilaterally  ABDOMEN: Nontender to palpation, normoactive bowel sounds, no rebound/guarding; No hepatosplenomegaly  MUSCULOSKELETAL: no clubbing or cyanosis of digits; no joint swelling or tenderness to palpation  EXTREMITY: Lower extremities Non-tender to palpation; non-erythematous B/L  NEURO: A&Ox3; no focal deficits   PSYCH: normal mood; Affect appropirate    LABS:                        10.2   8.12  )-----------( 351      ( 31 Aug 2022 06:00 )             32.0     08-31    138  |  103  |  11  ----------------------------<  130<H>  3.6   |  24  |  0.46<L>    Ca    8.1<L>      31 Aug 2022 06:00  Phos  3.2     08-31  Mg     2.00     08-31    TPro  6.3  /  Alb  2.9<L>  /  TBili  0.3  /  DBili  x   /  AST  33<H>  /  ALT  18  /  AlkPhos  109  08-31                RADIOLOGY & ADDITIONAL TESTS:  Results Reviewed:   Imaging Personally Reviewed:  Electrocardiogram Personally Reviewed:    COORDINATION OF CARE:  Care Discussed with Consultants/Other Providers [Y/N]:  Prior or Outpatient Records Reviewed [Y/N]:   ***********************************************  Goyo Mosqueda MD  Internal Medicine   * After 7pm please contact Night Float Covering Resident   ***********************************************      PROGRESS NOTE:     Patient is a 57y old  Female who presents with a chief complaint of abdominal pain & nausea/vomiting (31 Aug 2022 08:10)      SUBJECTIVE / OVERNIGHT EVENTS:    OVERNIGHT:   - No major events     Patient examined at bedside complaining of abd pain. She tolerated feeds well day prior        MEDICATIONS  (STANDING):  enoxaparin Injectable 40 milliGRAM(s) SubCutaneous every 24 hours  HYDROmorphone  Injectable 1.5 milliGRAM(s) IV Push every 4 hours  lidocaine   4% Patch 1 Patch Transdermal daily  melatonin 3 milliGRAM(s) Oral once  metoclopramide Injectable 5 milliGRAM(s) IV Push every 4 hours  pantoprazole  Injectable 40 milliGRAM(s) IV Push every 12 hours  sodium chloride 0.9%. 1000 milliLiter(s) (75 mL/Hr) IV Continuous <Continuous>    MEDICATIONS  (PRN):  HYDROmorphone  Injectable 0.9 milliGRAM(s) IV Push every 2 hours PRN Severe Pain (7 - 10)      CAPILLARY BLOOD GLUCOSE        I&O's Summary      PHYSICAL EXAM:  Vital Signs Last 24 Hrs  T(C): 36.6 (01 Sep 2022 05:34), Max: 37.1 (31 Aug 2022 14:15)  T(F): 97.8 (01 Sep 2022 05:34), Max: 98.8 (31 Aug 2022 14:15)  HR: 100 (01 Sep 2022 05:34) (86 - 100)  BP: 139/90 (01 Sep 2022 05:34) (119/77 - 151/82)  BP(mean): --  RR: 18 (01 Sep 2022 05:34) (16 - 18)  SpO2: 96% (01 Sep 2022 05:34) (94% - 98%)    Parameters below as of 01 Sep 2022 05:34  Patient On (Oxygen Delivery Method): room air        GENERAL: laying in bed ; receiving feeds through J tube   HEAD:  Atraumatic, Normocephalic  EYES: EOMI, conjunctiva and sclera clear, no nystagmus noted  ENT: Slightly dry mucous membranes,   NECK: Supple, No JVD, trachea midline  CHEST/LUNG: Clear to auscultation bilaterally; No rales, rhonchi, wheezing, or rubs. Unlabored respirations  HEART: Regular rate and rhythm; No murmurs, rubs, or gallops, normal S1/S2  ABDOMEN: normal bowel sounds; abdominal distension,  + Epigastric ABD pain; no rebound tenderness or guarding.   EXTREMITIES:  2+ Peripheral Pulses, brisk capillary refill. No clubbing, cyanosis, or edema  MSK: No gross deformities noted   Neurological:  A&Ox3, no focal deficits   SKIN: No rashes or lesions  PSYCH: Normal mood, affect     LABS:                        10.2   8.12  )-----------( 351      ( 31 Aug 2022 06:00 )             32.0     08-31    138  |  103  |  11  ----------------------------<  130<H>  3.6   |  24  |  0.46<L>    Ca    8.1<L>      31 Aug 2022 06:00  Phos  3.2     08-31  Mg     2.00     08-31    TPro  6.3  /  Alb  2.9<L>  /  TBili  0.3  /  DBili  x   /  AST  33<H>  /  ALT  18  /  AlkPhos  109  08-31                RADIOLOGY & ADDITIONAL TESTS:  Results Reviewed:   Imaging Personally Reviewed:  Electrocardiogram Personally Reviewed:    COORDINATION OF CARE:  Care Discussed with Consultants/Other Providers [Y/N]:  Prior or Outpatient Records Reviewed [Y/N]:

## 2022-09-01 NOTE — PROGRESS NOTE ADULT - NUTRITIONAL ASSESSMENT
This patient has been assessed with a concern for Malnutrition and has been determined to have a diagnosis/diagnoses of Severe protein-calorie malnutrition.    This patient is being managed with:   Diet NPO with Tube Feed-  Tube Feeding Modality: Jejunostomy  TwoCal HN (TWOCALHNRTH)  Total Volume for 24 Hours (mL): 480  Continuous  Starting Tube Feed Rate {mL per Hour}: 20  Increase Tube Feed Rate by (mL): 0  Until Goal Tube Feed Rate (mL per Hour): 20  Tube Feed Duration (in Hours): 24  Tube Feed Start Time: 14:00  Entered: Aug 31 2022  1:53PM

## 2022-09-01 NOTE — PROGRESS NOTE ADULT - SUBJECTIVE AND OBJECTIVE BOX
INTERVAL HPI/OVERNIGHT EVENTS:  Patient S&E at bedside. She complains of burning epigastric pain. She also notes her left leg swelled up today. She denies F/C, CP, SOB, N/V, or rash. She is having BMs.      VITAL SIGNS:  T(F): 98.3 (09-01-22 @ 14:35)  HR: 98 (09-01-22 @ 17:25)  BP: 134/87 (09-01-22 @ 17:25)  RR: 19 (09-01-22 @ 17:25)  SpO2: 96% (09-01-22 @ 17:25)  Wt(kg): --    PHYSICAL EXAM:    Constitutional: NAD  Eyes: EOMI, sclera non-icteric  Neck: supple  Respiratory: no increased WOB, CTAB/L  Cardiovascular: RRR, S1, S2, no m/g/r  Gastrointestinal: soft, slightly distended, mild epigastric TTP, no masses palpable, no HSM. + J tube in place, no leakage  Extremities: + trace RLE edema and 1+ LLE edema. No c/c  Neurological: AAOx3    MEDICATIONS  (STANDING):  enoxaparin Injectable 40 milliGRAM(s) SubCutaneous every 24 hours  HYDROmorphone  Injectable 1.5 milliGRAM(s) IV Push every 4 hours  lidocaine   4% Patch 1 Patch Transdermal daily  metoclopramide Injectable 5 milliGRAM(s) IV Push every 4 hours  pantoprazole  Injectable 40 milliGRAM(s) IV Push every 12 hours  sodium chloride 0.9%. 1000 milliLiter(s) (75 mL/Hr) IV Continuous <Continuous>    MEDICATIONS  (PRN):  bisacodyl Suppository 10 milliGRAM(s) Rectal daily PRN Constipation  HYDROmorphone  Injectable 0.9 milliGRAM(s) IV Push every 2 hours PRN Severe Pain (7 - 10)      LABS:                        10.1   9.48  )-----------( 324      ( 01 Sep 2022 07:15 )             31.9     09-01    141  |  107  |  12  ----------------------------<  139<H>  3.5   |  24  |  0.45<L>    Ca    7.9<L>      01 Sep 2022 07:15  Phos  3.2     09-01  Mg     1.80     09-01    TPro  6.1  /  Alb  2.6<L>  /  TBili  0.3  /  DBili  x   /  AST  38<H>  /  ALT  26  /  AlkPhos  125<H>  09-01          RADIOLOGY & ADDITIONAL TESTS:

## 2022-09-02 NOTE — PROGRESS NOTE ADULT - PROBLEM SELECTOR PLAN 4
Impression: Stable anemia. May be combination of Iron deficiency anemia from poor PO intake and normocytic anemia from Anemia of Chronic Disease w/ this presentation c/f gastric malignancy.  - Total Iron 22, TIBC 165, transferrin 149  H/H stable    continue to monitor with CBCs Impression: 72 hour caloric count to evaluate for malnutrition - indicates malnutrition. Pt unable to eat due to pain.   Now J tube placement    Nutrition consult - Recommend TwoCal HN via J-tube initiated at 10 mL/hr increased by 10mL q4 hrs until goal rate 35 mL/hr x24 hrs to provide 840 mL formula, 1680 kcal, 70.14 gm protein, 588 mL free water (meets 33.6 kcal/kg, 1.4 gm protein/kg).   - pt tolerating tube feeds well today Impression: 72 hour caloric count to evaluate for malnutrition - indicates malnutrition. Pt unable to eat due to pain.   Now J tube placement    Nutrition consult - Recommend TwoCal HN via J-tube initiated at 10 mL/hr increased by 10mL q4 hrs until goal rate 35 mL/hr x24 hrs to provide 840 mL formula, 1680 kcal, 70.14 gm protein, 588 mL free water (meets 33.6 kcal/kg, 1.4 gm protein/kg).

## 2022-09-02 NOTE — PROGRESS NOTE ADULT - ASSESSMENT
57F with gastric CA, s/p EGD and robotic feeding J tube placement 8/26.    Impression/plan:  - Pt again reported pain after tube feeds were increased to 30cc/hr yesterday - please continue to hold TFs until the afternoon. If pt's pain is improved, can restart tube feeds at 20cc/hr  - Remaining care per primary team    D Team Surgery  [id number]

## 2022-09-02 NOTE — PROGRESS NOTE ADULT - SUBJECTIVE AND OBJECTIVE BOX
Subjective:   Overnight pt had increasing pain so tube feeds were held. Patient seen at bedside this AM. Reports that pain is slightly improved from overnight, but still present.    Objective:  Vital Signs  T(C): 36.7 (09-02 @ 05:25), Max: 36.8 (09-01 @ 14:35)  HR: 97 (09-02 @ 05:25) (95 - 102)  BP: 142/88 (09-02 @ 05:25) (127/87 - 142/88)  RR: 16 (09-02 @ 05:25) (16 - 19)  SpO2: 98% (09-02 @ 05:25) (96% - 100%)    Physical Exam:  GEN: resting in bed comfortably in NAD  RESP: no increased WOB  ABD: soft, moderately distended, TTP in epigastrium. No leakage noted around J-tube or kinking of tube  EXTR: warm, well-perfused, no edema  NEURO: awake, alert    Labs:                        9.9    8.64  )-----------( 326      ( 02 Sep 2022 06:25 )             31.2   09-02    143  |  108<H>  |  13  ----------------------------<  118<H>  3.8   |  26  |  0.47<L>    Ca    8.0<L>      02 Sep 2022 06:25  Phos  3.3     09-02  Mg     1.80     09-02    TPro  6.2  /  Alb  2.8<L>  /  TBili  0.3  /  DBili  x   /  AST  30  /  ALT  22  /  AlkPhos  116  09-02    CAPILLARY BLOOD GLUCOSE          Imaging:

## 2022-09-02 NOTE — PROGRESS NOTE ADULT - SUBJECTIVE AND OBJECTIVE BOX
OVERNIGHT EVENTS: No acute overnight events.      SUBJECTIVE:       MEDICATIONS  (STANDING):  enoxaparin Injectable 40 milliGRAM(s) SubCutaneous every 24 hours  HYDROmorphone  Injectable 1.5 milliGRAM(s) IV Push every 4 hours  lidocaine   4% Patch 1 Patch Transdermal daily  metoclopramide Injectable 5 milliGRAM(s) IV Push every 4 hours  pantoprazole  Injectable 40 milliGRAM(s) IV Push every 12 hours  sodium chloride 0.9%. 1000 milliLiter(s) (75 mL/Hr) IV Continuous <Continuous>    MEDICATIONS  (PRN):  bisacodyl Suppository 10 milliGRAM(s) Rectal daily PRN Constipation  HYDROmorphone  Injectable 0.9 milliGRAM(s) IV Push every 2 hours PRN Severe Pain (7 - 10)        T(F): 98.1 (09-02-22 @ 05:25), Max: 98.3 (09-01-22 @ 14:35)  HR: 97 (09-02-22 @ 05:25) (95 - 102)  BP: 142/88 (09-02-22 @ 05:25) (127/87 - 142/88)  BP(mean): --  RR: 16 (09-02-22 @ 05:25) (16 - 19)  SpO2: 98% (09-02-22 @ 05:25) (96% - 100%)    PHYSICAL EXAM:     GENERAL: laying in bed ; receiving feeds through J tube   HEAD:  Atraumatic, Normocephalic  EYES: EOMI, conjunctiva and sclera clear, no nystagmus noted  ENT: Slightly dry mucous membranes,   NECK: Supple, No JVD, trachea midline  CHEST/LUNG: Clear to auscultation bilaterally; No rales, rhonchi, wheezing, or rubs. Unlabored respirations  HEART: Regular rate and rhythm; No murmurs, rubs, or gallops, normal S1/S2  ABDOMEN: normal bowel sounds; abdominal distension,  + Epigastric ABD pain; no rebound tenderness or guarding.   EXTREMITIES:  2+ Peripheral Pulses, brisk capillary refill. No clubbing, cyanosis, or edema  MSK: No gross deformities noted   Neurological:  A&Ox3, no focal deficits   SKIN: No rashes or lesions  PSYCH: Normal mood, affect     TELEMETRY:    LABS:                        9.9    8.64  )-----------( 326      ( 02 Sep 2022 06:25 )             31.2     09-02    143  |  108<H>  |  13  ----------------------------<  118<H>  3.8   |  26  |  0.47<L>    Ca    8.0<L>      02 Sep 2022 06:25  Phos  3.3     09-02  Mg     1.80     09-02    TPro  6.2  /  Alb  2.8<L>  /  TBili  0.3  /  DBili  x   /  AST  30  /  ALT  22  /  AlkPhos  116  09-02            Creatinine Trend: 0.47<--, 0.45<--, 0.46<--, 0.48<--, 0.43<--, 0.48<--  I&O's Summary    BNP    RADIOLOGY & ADDITIONAL STUDIES:                 OVERNIGHT EVENTS: No acute overnight events.      SUBJECTIVE: Patient stated she had LLE swelling yesterday, which she feels is better today.       MEDICATIONS  (STANDING):  enoxaparin Injectable 40 milliGRAM(s) SubCutaneous every 24 hours  HYDROmorphone  Injectable 1.5 milliGRAM(s) IV Push every 4 hours  lidocaine   4% Patch 1 Patch Transdermal daily  metoclopramide Injectable 5 milliGRAM(s) IV Push every 4 hours  pantoprazole  Injectable 40 milliGRAM(s) IV Push every 12 hours  sodium chloride 0.9%. 1000 milliLiter(s) (75 mL/Hr) IV Continuous <Continuous>    MEDICATIONS  (PRN):  bisacodyl Suppository 10 milliGRAM(s) Rectal daily PRN Constipation  HYDROmorphone  Injectable 0.9 milliGRAM(s) IV Push every 2 hours PRN Severe Pain (7 - 10)        T(F): 98.1 (09-02-22 @ 05:25), Max: 98.3 (09-01-22 @ 14:35)  HR: 97 (09-02-22 @ 05:25) (95 - 102)  BP: 142/88 (09-02-22 @ 05:25) (127/87 - 142/88)  BP(mean): --  RR: 16 (09-02-22 @ 05:25) (16 - 19)  SpO2: 98% (09-02-22 @ 05:25) (96% - 100%)    PHYSICAL EXAM:     GENERAL: laying in bed ; receiving feeds through J tube   HEAD:  Atraumatic, Normocephalic  EYES: EOMI, conjunctiva and sclera clear, no nystagmus noted  ENT: Slightly dry mucous membranes,   NECK: Supple, No JVD, trachea midline  CHEST/LUNG: Clear to auscultation bilaterally; No rales, rhonchi, wheezing, or rubs. Unlabored respirations  HEART: Regular rate and rhythm; No murmurs, rubs, or gallops, normal S1/S2  ABDOMEN: normal bowel sounds; abdominal distension,  + Epigastric ABD pain; no rebound tenderness or guarding.   EXTREMITIES:  2+ Peripheral Pulses, brisk capillary refill. No clubbing, cyanosis, or edema  MSK: No gross deformities noted   Neurological:  A&Ox3, no focal deficits   SKIN: No rashes or lesions  PSYCH: Normal mood, affect     TELEMETRY:    LABS:                        9.9    8.64  )-----------( 326      ( 02 Sep 2022 06:25 )             31.2     09-02    143  |  108<H>  |  13  ----------------------------<  118<H>  3.8   |  26  |  0.47<L>    Ca    8.0<L>      02 Sep 2022 06:25  Phos  3.3     09-02  Mg     1.80     09-02    TPro  6.2  /  Alb  2.8<L>  /  TBili  0.3  /  DBili  x   /  AST  30  /  ALT  22  /  AlkPhos  116  09-02            Creatinine Trend: 0.47<--, 0.45<--, 0.46<--, 0.48<--, 0.43<--, 0.48<--  I&O's Summary    BNP    RADIOLOGY & ADDITIONAL STUDIES:                 OVERNIGHT EVENTS: Patient feeling "full" and distended overnight. Tube feeds stopped.      SUBJECTIVE: Patient stated she had LLE swelling yesterday, which she feels is better today. Patient endorsing moderate epigastric pain that radiates to the back. Patient stating that she would like to eat or drink something.  Denies chest pain, SOB, diarrhea, n/v, HA, fever, chills, rash      MEDICATIONS  (STANDING):  enoxaparin Injectable 40 milliGRAM(s) SubCutaneous every 24 hours  HYDROmorphone  Injectable 1.5 milliGRAM(s) IV Push every 4 hours  lidocaine   4% Patch 1 Patch Transdermal daily  metoclopramide Injectable 5 milliGRAM(s) IV Push every 4 hours  pantoprazole  Injectable 40 milliGRAM(s) IV Push every 12 hours  sodium chloride 0.9%. 1000 milliLiter(s) (75 mL/Hr) IV Continuous <Continuous>    MEDICATIONS  (PRN):  bisacodyl Suppository 10 milliGRAM(s) Rectal daily PRN Constipation  HYDROmorphone  Injectable 0.9 milliGRAM(s) IV Push every 2 hours PRN Severe Pain (7 - 10)        T(F): 98.1 (09-02-22 @ 05:25), Max: 98.3 (09-01-22 @ 14:35)  HR: 97 (09-02-22 @ 05:25) (95 - 102)  BP: 142/88 (09-02-22 @ 05:25) (127/87 - 142/88)  BP(mean): --  RR: 16 (09-02-22 @ 05:25) (16 - 19)  SpO2: 98% (09-02-22 @ 05:25) (96% - 100%)    PHYSICAL EXAM:     GENERAL: laying in bed ; receiving feeds through J tube   HEAD:  Atraumatic, Normocephalic  EYES: EOMI, conjunctiva and sclera clear, no nystagmus noted  ENT: Slightly dry mucous membranes,   NECK: Supple, No JVD, trachea midline  CHEST/LUNG: Clear to auscultation bilaterally; No rales, rhonchi, wheezing, or rubs. Unlabored respirations  HEART: Regular rate and rhythm; No murmurs, rubs, or gallops, normal S1/S2  ABDOMEN: normal bowel sounds; abdominal distension,  + Epigastric ABD pain; no rebound tenderness or guarding.   EXTREMITIES:  2+ Peripheral Pulses, brisk capillary refill. No clubbing, cyanosis, or edema  MSK: No gross deformities noted   Neurological:  A&Ox3, no focal deficits   SKIN: No rashes or lesions  PSYCH: Normal mood, affect     TELEMETRY:    LABS:                        9.9    8.64  )-----------( 326      ( 02 Sep 2022 06:25 )             31.2     09-02    143  |  108<H>  |  13  ----------------------------<  118<H>  3.8   |  26  |  0.47<L>    Ca    8.0<L>      02 Sep 2022 06:25  Phos  3.3     09-02  Mg     1.80     09-02    TPro  6.2  /  Alb  2.8<L>  /  TBili  0.3  /  DBili  x   /  AST  30  /  ALT  22  /  AlkPhos  116  09-02            Creatinine Trend: 0.47<--, 0.45<--, 0.46<--, 0.48<--, 0.43<--, 0.48<--  I&O's Summary    BNP    RADIOLOGY & ADDITIONAL STUDIES:

## 2022-09-02 NOTE — PROGRESS NOTE ADULT - ATTENDING COMMENTS
57F From [facility], w/ PMHx of nephrolithiasis p/w abd pain and n/v found to have gastric adenocarcinoma (on bx from 8/15 EGD) w/ mets to LN c/b DEVI. DEVI now resolved s/p ivfs. Now s/p EBUS w/ LN bx on 8/23 w/results sig for metastatic adenocarcinoma. No plan for inpatient chemotherapy at this time, recs outpatient follow up. Also s/p EGD, bx of peritoneum, and robotic feeding J tube placement per surg on 8/26 ( Peritoneal bx now back and shows Adenocarcinoma with signet ring cells). Surgery was not able to do any resection as exploration revealed advanced disease.       Tube feeds held this AM given patient with abdominal pain. Surgery recommending hold feeds till afternoon, restarted at 1pm. Patient now wanting to eat by mouth. Discussed with surgery stating that patient will have difficulty given extent of disease and will have high risk of aspiration.

## 2022-09-02 NOTE — PROGRESS NOTE ADULT - PROBLEM SELECTOR PLAN 2
Impression: Pt has vomiting and intermittent episodes. Vomiting associated with eating meals. Likely related to gastric adenocarcinoma.     Pt stating that reglan is helping with the nasuea and dizziness  IV reglan 5 mg Q4  - speak with palliative regarding switch from IV to anti-emetics through the J tube Impression: Patient having L leg swelling yesterday, resolved today. Low suspicion for a DVT - (no tachycardia, no pain).     Surgery recommended getting a bilateral LE doppler US to r/o DVT.  US - negative for DVT Impression: Patient having L leg swelling yesterday, resolved today. Low suspicion for a DVT - (no tachycardia, no pain).     Surgery recommended getting a bilateral LE doppler US to r/o DVT.  US - negative for DVT    - continue to monitor Impression: Patient having L leg swelling yesterday, resolved today. Low suspicion for a DVT - (no tachycardia, no pain).     Heme/onc recommended getting a bilateral LE doppler US to r/o DVT.  US - negative for DVT    - continue to monitor

## 2022-09-02 NOTE — PROGRESS NOTE ADULT - PROBLEM SELECTOR PLAN 1
Impression: The patient's abdominal pain, persistent nausea/vomiting and weight loss iso of CT A/P findings indicating gastric body thickening and gastrocolic ligament nodularity are concerning for gastric adenocarcinoma. EGD biopsy confirmed.  - GI, onc, surg/onc following  - EGD:  Infiltrative changes in gastric body concerning gastric malignancy, Congested duodenal mucosa, biopsy = poorly differentiated adenocarcinoma  - staging CT chest - Mediastinal/bilateral hilar/R internal mammary LAD  - mediastinal lymph node biopsy results - Metastatic adenocarcinoma   - pantoprazole  40 mg BID  - OR 8/26 with J tube placement     -For pain control IV Dilaudid 1.5 mg Q4 ATC with 0.9 mg q2h PRN for severe/breakthrough - pt feels pain has improved with this regimen  - As per surgery recs, tube feeds are 20 cc/hr  - f/u with palliative regarding switch from IV dilaudid to suspension through J tube    Encourage use of PRN pain meds.  Increased feeds as per Surgery and nutrition. F/U with nutrition regarding DC reccs of feeds & rate. Impression: The patient's abdominal pain, persistent nausea/vomiting and weight loss iso of CT A/P findings indicating gastric body thickening and gastrocolic ligament nodularity are concerning for gastric adenocarcinoma. EGD biopsy confirmed.  - GI, onc, surg/onc following  - EGD:  Infiltrative changes in gastric body concerning gastric malignancy, Congested duodenal mucosa, biopsy = poorly differentiated adenocarcinoma  - staging CT chest - Mediastinal/bilateral hilar/R internal mammary LAD  - mediastinal lymph node biopsy results - Metastatic adenocarcinoma   - pantoprazole  40 mg BID  - OR 8/26 with J tube placement     -For pain control IV Dilaudid 1.5 mg Q4 ATC with 0.9 mg q2h PRN for severe/breakthrough - pt feels pain has improved with this regimen  - As per surgery recs, tube feeds are 20 cc/hr  - f/u with palliative regarding switch from IV dilaudid to suspension through J tube    Encourage use of PRN pain meds.  Patient feeling full and distended overnight. Impression: The patient's abdominal pain, persistent nausea/vomiting and weight loss iso of CT A/P findings indicating gastric body thickening and gastrocolic ligament nodularity are concerning for gastric adenocarcinoma. EGD biopsy confirmed.  - GI, onc, surg/onc following  - EGD:  Infiltrative changes in gastric body concerning gastric malignancy, Congested duodenal mucosa, biopsy = poorly differentiated adenocarcinoma  - staging CT chest - Mediastinal/bilateral hilar/R internal mammary LAD  - mediastinal lymph node biopsy results - Metastatic adenocarcinoma   - pantoprazole  40 mg BID  - OR 8/26 with J tube placement     -For pain control IV Dilaudid 1.5 mg Q4 ATC with 0.9 mg q2h PRN for severe/breakthrough - pt feels pain has improved with this regimen  - As per surgery recs, tube feeds are 20 cc/hr  - f/u with palliative regarding switch from IV dilaudid to suspension through J tube    Encourage use of PRN pain meds.  Patient feeling full and distended overnight. Tube feeds stopped. Will resume at 2 pm at 20 cc/hr

## 2022-09-02 NOTE — PROGRESS NOTE ADULT - PROBLEM SELECTOR PLAN 3
Impression: 72 hour caloric count to evaluate for malnutrition - indicates malnutrition. Pt unable to eat due to pain.   Now J tube placement    Nutrition consult - Recommend TwoCal HN via J-tube initiated at 10 mL/hr increased by 10mL q4 hrs until goal rate 35 mL/hr x24 hrs to provide 840 mL formula, 1680 kcal, 70.14 gm protein, 588 mL free water (meets 33.6 kcal/kg, 1.4 gm protein/kg).   - pt tolerating tube feeds well today Impression: Pt has vomiting and intermittent episodes. Vomiting associated with eating meals. Likely related to gastric adenocarcinoma.     Pt stating that reglan is helping with the nasuea and dizziness  IV reglan 5 mg Q4  - speak with palliative regarding switch from IV to anti-emetics through the J tube

## 2022-09-03 NOTE — PROGRESS NOTE ADULT - PROBLEM SELECTOR PLAN 2
Impression: Patient having L leg swelling yesterday, resolved today. Low suspicion for a DVT - (no tachycardia, no pain).     Heme/onc recommended getting a bilateral LE doppler US to r/o DVT.  US - negative for DVT    - continue to monitor Impression: Patient having L leg swelling yesterday, resolved today. Low suspicion for a DVT - (no tachycardia, no pain).   US - negative for DVT    - continue to monitor

## 2022-09-03 NOTE — PROGRESS NOTE ADULT - ASSESSMENT
57F with gastric CA, s/p EGD and robotic feeding J tube placement 8/26.    Impression/plan:  - Keep tube feeds at 20cc  - Remaining care per primary team

## 2022-09-03 NOTE — PROGRESS NOTE ADULT - SUBJECTIVE AND OBJECTIVE BOX
Patient is a 57y old  Female who presents with a chief complaint of abdominal pain & nausea/vomiting (02 Sep 2022 07:53)      SUBJECTIVE / OVERNIGHT EVENTS:    MEDICATIONS  (STANDING):  enoxaparin Injectable 40 milliGRAM(s) SubCutaneous every 24 hours  HYDROmorphone  Injectable 1.5 milliGRAM(s) IV Push every 4 hours  lidocaine   4% Patch 1 Patch Transdermal daily  metoclopramide Injectable 5 milliGRAM(s) IV Push every 4 hours  pantoprazole  Injectable 40 milliGRAM(s) IV Push every 12 hours  sodium chloride 0.9%. 1000 milliLiter(s) (75 mL/Hr) IV Continuous <Continuous>    MEDICATIONS  (PRN):  bisacodyl Suppository 10 milliGRAM(s) Rectal daily PRN Constipation  HYDROmorphone  Injectable 0.9 milliGRAM(s) IV Push every 2 hours PRN Severe Pain (7 - 10)      CAPILLARY BLOOD GLUCOSE        I&O's Summary      Vital Signs Last 24 Hrs  T(C): 36.7 (03 Sep 2022 06:03), Max: 37 (03 Sep 2022 04:52)  T(F): 98 (03 Sep 2022 06:03), Max: 98.6 (03 Sep 2022 04:52)  HR: 86 (03 Sep 2022 06:20) (86 - 104)  BP: 122/74 (03 Sep 2022 06:20) (121/71 - 144/79)  BP(mean): --  RR: 16 (03 Sep 2022 06:20) (16 - 18)  SpO2: 94% (03 Sep 2022 06:20) (94% - 99%)    Parameters below as of 03 Sep 2022 06:20  Patient On (Oxygen Delivery Method): room air        PHYSICAL EXAM:  GENERAL: laying in bed ; receiving feeds through J tube   HEAD:  Atraumatic, Normocephalic  EYES: EOMI, conjunctiva and sclera clear, no nystagmus noted  ENT: Slightly dry mucous membranes,   NECK: Supple, No JVD, trachea midline  CHEST/LUNG: Clear to auscultation bilaterally; No rales, rhonchi, wheezing, or rubs. Unlabored respirations  HEART: Regular rate and rhythm; No murmurs, rubs, or gallops, normal S1/S2  ABDOMEN: normal bowel sounds; abdominal distension,  + Epigastric ABD pain; no rebound tenderness or guarding.   EXTREMITIES:  2+ Peripheral Pulses, brisk capillary refill. No clubbing, cyanosis, or edema  MSK: No gross deformities noted   Neurological:  A&Ox3, no focal deficits   SKIN: No rashes or lesions  PSYCH: Normal mood, affect     LABS:                        9.9    8.64  )-----------( 326      ( 02 Sep 2022 06:25 )             31.2      09-02    143  |  108<H>  |  13  ----------------------------<  118<H>  3.8   |  26  |  0.47<L>    Ca    8.0<L>      02 Sep 2022 06:25  Phos  3.3     09-02  Mg     1.80     09-02    TPro  6.2  /  Alb  2.8<L>  /  TBili  0.3  /  DBili  x   /  AST  30  /  ALT  22  /  AlkPhos  116  09-02              RADIOLOGY & ADDITIONAL TESTS:    Imaging Personally Reviewed:    Consultant(s) Notes Reviewed:      Care Discussed with Consultants/Other Providers:   Patient is a 57y old  Female who presents with a chief complaint of abdominal pain & nausea/vomiting (02 Sep 2022 07:53)      SUBJECTIVE / OVERNIGHT EVENTS: Patient was unable to tolerate feed rate of 30, so decreased back to 20. Patient seen and examined at bedside. Notes that her mouth is sore, and is uncomfortable with short J-tube.     MEDICATIONS  (STANDING):  enoxaparin Injectable 40 milliGRAM(s) SubCutaneous every 24 hours  HYDROmorphone  Injectable 1.5 milliGRAM(s) IV Push every 4 hours  lidocaine   4% Patch 1 Patch Transdermal daily  metoclopramide Injectable 5 milliGRAM(s) IV Push every 4 hours  pantoprazole  Injectable 40 milliGRAM(s) IV Push every 12 hours  sodium chloride 0.9%. 1000 milliLiter(s) (75 mL/Hr) IV Continuous <Continuous>    MEDICATIONS  (PRN):  bisacodyl Suppository 10 milliGRAM(s) Rectal daily PRN Constipation  HYDROmorphone  Injectable 0.9 milliGRAM(s) IV Push every 2 hours PRN Severe Pain (7 - 10)      CAPILLARY BLOOD GLUCOSE        I&O's Summary      Vital Signs Last 24 Hrs  T(C): 36.7 (03 Sep 2022 06:03), Max: 37 (03 Sep 2022 04:52)  T(F): 98 (03 Sep 2022 06:03), Max: 98.6 (03 Sep 2022 04:52)  HR: 86 (03 Sep 2022 06:20) (86 - 104)  BP: 122/74 (03 Sep 2022 06:20) (121/71 - 144/79)  BP(mean): --  RR: 16 (03 Sep 2022 06:20) (16 - 18)  SpO2: 94% (03 Sep 2022 06:20) (94% - 99%)    Parameters below as of 03 Sep 2022 06:20  Patient On (Oxygen Delivery Method): room air        PHYSICAL EXAM:  GENERAL: Moving around bed (sitting, standing next to bed); receiving feeds through J tube   HEAD:  Atraumatic, Normocephalic  EYES: EOMI, conjunctiva and sclera clear  ENT: Slightly dry mucous membranes,   NECK: Supple, No JVD, trachea midline  CHEST/LUNG: Clear to auscultation bilaterally; No rales, rhonchi, wheezing, or rubs. Unlabored respirations  HEART: Regular rate and rhythm; No murmurs, rubs, or gallops, normal S1/S2  ABDOMEN: normal bowel sounds; abdominal distension,  + Epigastric ABD pain; no rebound tenderness or guarding.   EXTREMITIES:  2+ Peripheral Pulses, brisk capillary refill. No clubbing, cyanosis, or edema  MSK: No gross deformities noted   Neurological:  A&Ox3, no focal deficits   SKIN: No rashes or lesions  PSYCH: Normal mood, affect     LABS:                        9.9    8.64  )-----------( 326      ( 02 Sep 2022 06:25 )             31.2      09-02    143  |  108<H>  |  13  ----------------------------<  118<H>  3.8   |  26  |  0.47<L>    Ca    8.0<L>      02 Sep 2022 06:25  Phos  3.3     09-02  Mg     1.80     09-02    TPro  6.2  /  Alb  2.8<L>  /  TBili  0.3  /  DBili  x   /  AST  30  /  ALT  22  /  AlkPhos  116  09-02              RADIOLOGY & ADDITIONAL TESTS:    Imaging Personally Reviewed:    Consultant(s) Notes Reviewed:      Care Discussed with Consultants/Other Providers:

## 2022-09-03 NOTE — PROGRESS NOTE ADULT - ASSESSMENT
POD #8.  Feeding jejunostomy tube placement.    Patient is awake, alert, and comfortable.    Tolerating tube feeds.    Afebrile with stable vital signs.    Abdomen is soft and nontender.    Satisfactory postoperative course.

## 2022-09-03 NOTE — PROGRESS NOTE ADULT - PROBLEM SELECTOR PLAN 3
Impression: Pt has vomiting and intermittent episodes. Vomiting associated with eating meals. Likely related to gastric adenocarcinoma.     Pt stating that reglan is helping with the nasuea and dizziness  IV reglan 5 mg Q4  - speak with palliative regarding switch from IV to anti-emetics through the J tube Impression: Pt has vomiting and intermittent episodes. Vomiting associated with eating meals. Likely related to gastric adenocarcinoma.     Pt stating that reglan is helping with the nasuea and dizziness  IV reglan 5 mg Q4

## 2022-09-03 NOTE — PROGRESS NOTE ADULT - SUBJECTIVE AND OBJECTIVE BOX
TEAM Surgery Progress Note  Patient is a 57y old  Female who presents with a chief complaint of abdominal pain & nausea/vomiting (03 Sep 2022 08:02)      INTERVAL EVENTS: Episode of vomiting last night.  SUBJECTIVE: Patient seen and examined at bedside with surgical team, patient without complaints. Denies fever, chills, CP, SOB nausea, vomiting, abdominal pain.    REVIEW OF SYSTEMS:  Constitutional: No fevers or chills. No malaise or weakness.  EENT: No vision changes. No ear pain. No nasal congestion or rhinitis. No throat pain or dysphagia.  Respiratory: No cough, wheezing, or SOB. No hemoptysis.  Cardiovascular: No chest pain or palpitations.  Gastrointestinal: No abdominal pain. No nausea, vomiting, diarrhea or constipation. No hematochezia. No melena.  Genitourinary: No dysuria, hematuria, or frequency.  Neurologic: No numbness or tingling. No weakness.  Skin: No rashes or pruritus.     OBJECTIVE:    Vital Signs Last 24 Hrs  T(C): 36.7 (03 Sep 2022 06:03), Max: 37 (03 Sep 2022 04:52)  T(F): 98 (03 Sep 2022 06:03), Max: 98.6 (03 Sep 2022 04:52)  HR: 86 (03 Sep 2022 06:20) (86 - 104)  BP: 122/74 (03 Sep 2022 06:20) (121/71 - 138/78)  BP(mean): --  RR: 16 (03 Sep 2022 06:20) (16 - 18)  SpO2: 94% (03 Sep 2022 06:20) (94% - 99%)    Parameters below as of 03 Sep 2022 06:20  Patient On (Oxygen Delivery Method): room air    I&O's Detail  MEDICATIONS  (STANDING):  enoxaparin Injectable 40 milliGRAM(s) SubCutaneous every 24 hours  HYDROmorphone  Injectable 1.5 milliGRAM(s) IV Push every 4 hours  lidocaine   4% Patch 1 Patch Transdermal daily  metoclopramide Injectable 5 milliGRAM(s) IV Push every 4 hours  pantoprazole  Injectable 40 milliGRAM(s) IV Push every 12 hours  sodium chloride 0.9%. 1000 milliLiter(s) (75 mL/Hr) IV Continuous <Continuous>    MEDICATIONS  (PRN):  bisacodyl Suppository 10 milliGRAM(s) Rectal daily PRN Constipation  HYDROmorphone  Injectable 0.9 milliGRAM(s) IV Push every 2 hours PRN Severe Pain (7 - 10)      PHYSICAL EXAM:  Constitutional: A&Ox3, NAD  Respiratory: Unlabored breathing  Abdomen: Soft, nondistended, NTTP. No rebound or guarding.  Extremities: WWP, LEY spontaneously    LABS:                        9.9    8.64  )-----------( 326      ( 02 Sep 2022 06:25 )             31.2     09-02    143  |  108<H>  |  13  ----------------------------<  118<H>  3.8   |  26  |  0.47<L>    Ca    8.0<L>      02 Sep 2022 06:25  Phos  3.3     09-02  Mg     1.80     09-02    TPro  6.2  /  Alb  2.8<L>  /  TBili  0.3  /  DBili  x   /  AST  30  /  ALT  22  /  AlkPhos  116  09-02      LIVER FUNCTIONS - ( 02 Sep 2022 06:25 )  Alb: 2.8 g/dL / Pro: 6.2 g/dL / ALK PHOS: 116 U/L / ALT: 22 U/L / AST: 30 U/L / GGT: x                 IMAGING:

## 2022-09-03 NOTE — PROGRESS NOTE ADULT - PROBLEM SELECTOR PLAN 4
Impression: 72 hour caloric count to evaluate for malnutrition - indicates malnutrition. Pt unable to eat due to pain.   Now J tube placement    Nutrition consult - Recommend TwoCal HN via J-tube initiated at 10 mL/hr increased by 10mL q4 hrs until goal rate 35 mL/hr x24 hrs to provide 840 mL formula, 1680 kcal, 70.14 gm protein, 588 mL free water (meets 33.6 kcal/kg, 1.4 gm protein/kg). Impression: 72 hour caloric count to evaluate for malnutrition - indicates malnutrition. Pt unable to eat due to pain.   Now J tube placement    Nutrition consult - Recommend TwoCal HN via J-tube initiated at 10 mL/hr increased by 10mL q4 hrs until goal rate 35 mL/hr x24 hrs to provide 840 mL formula, 1680 kcal, 70.14 gm protein, 588 mL free water (meets 33.6 kcal/kg, 1.4 gm protein/kg).  - Patient was unable to tolerate tube feed rate of 30, therefore decreased back to 20, will follow up with nutrition regarding updated recommendations

## 2022-09-03 NOTE — PROGRESS NOTE ADULT - ATTENDING COMMENTS
57F From [facility], w/ PMHx of nephrolithiasis p/w abd pain and n/v found to have gastric adenocarcinoma (on bx from 8/15 EGD) w/ mets to LN c/b DEVI. DEVI now resolved s/p ivfs. Now s/p EBUS w/ LN bx on 8/23 w/results sig for metastatic adenocarcinoma. No plan for inpatient chemotherapy at this time, recs outpatient follow up. Also s/p EGD, bx of peritoneum, and robotic feeding J tube placement per surg on 8/26 ( Peritoneal bx now back and shows Adenocarcinoma with signet ring cells). Surgery was not able to do any resection as exploration revealed advanced disease.       Patient had nausea overnight 2/2 to drinking small sips of water. Patient feels down about inability to tolerate foods.     - c/w tube feeds at 20 cc/hr  - surgery following patient and want to continue at current rate to decrease abdominal distension and pain   - pending emergency medicaid for tube feeds, given that patient is uninsured   - will followup with CM and SW  - started on biotene, as patient feels like mouth is dry since she is unable to tolerate small amount of fluids

## 2022-09-03 NOTE — PROGRESS NOTE ADULT - PROBLEM SELECTOR PLAN 1
Impression: The patient's abdominal pain, persistent nausea/vomiting and weight loss iso of CT A/P findings indicating gastric body thickening and gastrocolic ligament nodularity are concerning for gastric adenocarcinoma. EGD biopsy confirmed.  - GI, onc, surg/onc following  - EGD:  Infiltrative changes in gastric body concerning gastric malignancy, Congested duodenal mucosa, biopsy = poorly differentiated adenocarcinoma  - staging CT chest - Mediastinal/bilateral hilar/R internal mammary LAD  - mediastinal lymph node biopsy results - Metastatic adenocarcinoma   - pantoprazole  40 mg BID  - OR 8/26 with J tube placement     -For pain control IV Dilaudid 1.5 mg Q4 ATC with 0.9 mg q2h PRN for severe/breakthrough - pt feels pain has improved with this regimen  - As per surgery recs, tube feeds are 20 cc/hr  - f/u with palliative regarding switch from IV dilaudid to suspension through J tube    Encourage use of PRN pain meds.  Patient feeling full and distended overnight. Tube feeds stopped. Will resume at 2 pm at 20 cc/hr Impression: The patient's abdominal pain, persistent nausea/vomiting and weight loss iso of CT A/P findings indicating gastric body thickening and gastrocolic ligament nodularity are concerning for gastric adenocarcinoma. EGD biopsy confirmed.  - GI, onc, surg/onc following  - EGD:  Infiltrative changes in gastric body concerning gastric malignancy, Congested duodenal mucosa, biopsy = poorly differentiated adenocarcinoma  - staging CT chest - Mediastinal/bilateral hilar/R internal mammary LAD  - mediastinal lymph node biopsy results - Metastatic adenocarcinoma   - pantoprazole  40 mg BID  - OR 8/26 with J tube placement     -For pain control IV Dilaudid 1.5 mg Q4 ATC with 0.9 mg q2h PRN for severe/breakthrough - pt feels pain has improved with this regimen  - As per surgery recs, tube feeds are 20 cc/hr  Encourage use of PRN pain meds.

## 2022-09-03 NOTE — PROGRESS NOTE ADULT - NUTRITIONAL ASSESSMENT
This patient has been assessed with a concern for Malnutrition and has been determined to have a diagnosis/diagnoses of Severe protein-calorie malnutrition.    This patient is being managed with:   Diet NPO with Tube Feed-  Tube Feeding Modality: Jejunostomy  TwoCal HN (TWOCALHNRTH)  Total Volume for 24 Hours (mL): 600  Continuous  Starting Tube Feed Rate {mL per Hour}: 20  Increase Tube Feed Rate by (mL): 0  Until Goal Tube Feed Rate (mL per Hour): 25  Tube Feed Duration (in Hours): 24  Tube Feed Start Time: 14:00  Entered: Sep  2 2022  6:35PM

## 2022-09-04 NOTE — PROGRESS NOTE ADULT - PROBLEM SELECTOR PLAN 1
Impression: The patient's abdominal pain, persistent nausea/vomiting and weight loss iso of CT A/P findings indicating gastric body thickening and gastrocolic ligament nodularity are concerning for gastric adenocarcinoma. EGD biopsy confirmed.  - GI, onc, surg/onc following  - EGD:  Infiltrative changes in gastric body concerning gastric malignancy, Congested duodenal mucosa, biopsy = poorly differentiated adenocarcinoma  - staging CT chest - Mediastinal/bilateral hilar/R internal mammary LAD  - mediastinal lymph node biopsy results - Metastatic adenocarcinoma   - pantoprazole  40 mg BID  - OR 8/26 with J tube placement     -For pain control IV Dilaudid 1.5 mg Q4 ATC with 0.9 mg q2h PRN for severe/breakthrough - pt feels pain has improved with this regimen  - As per surgery recs, tube feeds are 20 cc/hr  Encourage use of PRN pain meds.

## 2022-09-04 NOTE — PROGRESS NOTE ADULT - PROBLEM SELECTOR PLAN 4
Impression: 72 hour caloric count to evaluate for malnutrition - indicates malnutrition. Pt unable to eat due to pain.   Now J tube placement    Nutrition consult - Recommend TwoCal HN via J-tube initiated at 10 mL/hr increased by 10mL q4 hrs until goal rate 35 mL/hr x24 hrs to provide 840 mL formula, 1680 kcal, 70.14 gm protein, 588 mL free water (meets 33.6 kcal/kg, 1.4 gm protein/kg).  - Patient was unable to tolerate tube feed rate of 30, therefore decreased back to 20, will follow up with nutrition regarding updated recommendations

## 2022-09-04 NOTE — PROGRESS NOTE ADULT - SUBJECTIVE AND OBJECTIVE BOX
Patient is a 57y old  Female who presents with a chief complaint of abdominal pain & nausea/vomiting (03 Sep 2022 09:22)      SUBJECTIVE / OVERNIGHT EVENTS:    MEDICATIONS  (STANDING):  Biotene Dry Mouth Oral Rinse 5 milliLiter(s) Swish and Spit two times a day  enoxaparin Injectable 40 milliGRAM(s) SubCutaneous every 24 hours  HYDROmorphone  Injectable 1.5 milliGRAM(s) IV Push every 4 hours  lidocaine   4% Patch 1 Patch Transdermal daily  metoclopramide Injectable 5 milliGRAM(s) IV Push every 4 hours  pantoprazole  Injectable 40 milliGRAM(s) IV Push every 12 hours  sodium chloride 0.9%. 1000 milliLiter(s) (75 mL/Hr) IV Continuous <Continuous>    MEDICATIONS  (PRN):  bisacodyl Suppository 10 milliGRAM(s) Rectal daily PRN Constipation  HYDROmorphone  Injectable 0.9 milliGRAM(s) IV Push every 2 hours PRN Severe Pain (7 - 10)      CAPILLARY BLOOD GLUCOSE        I&O's Summary    03 Sep 2022 07:01  -  04 Sep 2022 07:00  --------------------------------------------------------  IN: 160 mL / OUT: 465 mL / NET: -305 mL        Vital Signs Last 24 Hrs  T(C): 36.8 (04 Sep 2022 06:13), Max: 36.8 (04 Sep 2022 06:13)  T(F): 98.3 (04 Sep 2022 06:13), Max: 98.3 (04 Sep 2022 06:13)  HR: 91 (04 Sep 2022 06:30) (82 - 107)  BP: 115/70 (04 Sep 2022 06:30) (107/68 - 135/87)  BP(mean): --  RR: 18 (04 Sep 2022 06:30) (16 - 18)  SpO2: 94% (04 Sep 2022 06:30) (94% - 99%)    Parameters below as of 04 Sep 2022 06:30  Patient On (Oxygen Delivery Method): room air        PHYSICAL EXAM:  GENERAL: Moving around bed (sitting, standing next to bed); receiving feeds through J tube   HEAD:  Atraumatic, Normocephalic  EYES: EOMI, conjunctiva and sclera clear  ENT: Slightly dry mucous membranes,   NECK: Supple, No JVD, trachea midline  CHEST/LUNG: Clear to auscultation bilaterally; No rales, rhonchi, wheezing, or rubs. Unlabored respirations  HEART: Regular rate and rhythm; No murmurs, rubs, or gallops, normal S1/S2  ABDOMEN: normal bowel sounds; abdominal distension,  + Epigastric ABD pain; no rebound tenderness or guarding.   EXTREMITIES:  2+ Peripheral Pulses, brisk capillary refill. No clubbing, cyanosis, or edema  MSK: No gross deformities noted   Neurological:  A&Ox3, no focal deficits   SKIN: No rashes or lesions  PSYCH: Normal mood, affect     LABS:                     RADIOLOGY & ADDITIONAL TESTS:    Imaging Personally Reviewed:    Consultant(s) Notes Reviewed:      Care Discussed with Consultants/Other Providers:

## 2022-09-04 NOTE — PROGRESS NOTE ADULT - ATTENDING COMMENTS
57F From [facility], w/ PMHx of nephrolithiasis p/w abd pain and n/v found to have gastric adenocarcinoma (on bx from 8/15 EGD) w/ mets to LN c/b DEVI. DEVI now resolved s/p ivfs. Now s/p EBUS w/ LN bx on 8/23 w/results sig for metastatic adenocarcinoma. No plan for inpatient chemotherapy at this time, recs outpatient follow up. Also s/p EGD, bx of peritoneum, and robotic feeding J tube placement per surg on 8/26 ( Peritoneal bx now back and shows Adenocarcinoma with signet ring cells). Surgery was not able to do any resection as exploration revealed advanced disease.       Patient had nausea overnight 2/2 to drinking small sips of water. Patient feels down about inability to tolerate foods.     - c/w tube feeds at 20 cc/hr  - surgery following, appreicate recs;  patient and want to continue at current rate to decrease abdominal distension and pain   - patient with continued pain despite increase done by palliative, will touch base with palliative on further options for pain management   - pending emergency medicaid for tube feeds, given that patient is uninsured   - will followup with CM and SW  - started on biotene, as patient feels like mouth is dry since she is unable to tolerate small amount of fluids.    Rest of plan as above, Discussed with HS7

## 2022-09-04 NOTE — PROGRESS NOTE ADULT - NUTRITIONAL ASSESSMENT
This patient has been assessed with a concern for Malnutrition and has been determined to have a diagnosis/diagnoses of Severe protein-calorie malnutrition.    This patient is being managed with:   Diet NPO with Tube Feed-  Tube Feeding Modality: Jejunostomy  TwoCal HN (TWOCALHNRTH)  Total Volume for 24 Hours (mL): 480  Continuous  Starting Tube Feed Rate {mL per Hour}: 20  Increase Tube Feed Rate by (mL): 0  Until Goal Tube Feed Rate (mL per Hour): 20  Tube Feed Duration (in Hours): 24  Tube Feed Start Time: 14:00  Entered: Sep  3 2022 10:18AM

## 2022-09-04 NOTE — PROGRESS NOTE ADULT - PROBLEM SELECTOR PLAN 3
Impression: Pt has vomiting and intermittent episodes. Vomiting associated with eating meals. Likely related to gastric adenocarcinoma.     Pt stating that reglan is helping with the nasuea and dizziness  IV reglan 5 mg Q4

## 2022-09-04 NOTE — PROGRESS NOTE ADULT - PROBLEM SELECTOR PLAN 3
Impression: Pt has vomiting and intermittent episodes. Vomiting associated with eating meals. Likely related to gastric adenocarcinoma.     Pt stating that reglan is helping with the nausea and dizziness  IV reglan 5 mg Q4

## 2022-09-04 NOTE — PROGRESS NOTE ADULT - PROBLEM SELECTOR PLAN 2
Impression: Patient having L leg swelling yesterday, resolved today. Low suspicion for a DVT - (no tachycardia, no pain).   US - negative for DVT    - continue to monitor

## 2022-09-04 NOTE — PROGRESS NOTE ADULT - ASSESSMENT
57F with gastric CA, s/p EGD and robotic feeding J tube placement 8/26.    Impression/plan:  - Pt still experiencing abdominal pain and requesting decreased intervals for pain meds - would defer to primary team for pain management.  - Continue with TFs at 20cc/hr - if pt tolerates today, can consider increasing tomorrow   - Remaining care per primary team    D Team Surgery  [id number]

## 2022-09-04 NOTE — PROGRESS NOTE ADULT - PROBLEM SELECTOR PLAN 1
Impression: The patient's abdominal pain, persistent nausea/vomiting and weight loss iso of CT A/P findings indicating gastric body thickening and gastrocolic ligament nodularity are concerning for gastric adenocarcinoma. EGD biopsy confirmed.  - GI, onc, surg/onc following  - EGD:  Infiltrative changes in gastric body concerning gastric malignancy, Congested duodenal mucosa, biopsy = poorly differentiated adenocarcinoma  - staging CT chest - Mediastinal/bilateral hilar/R internal mammary LAD  - mediastinal lymph node biopsy results - Metastatic adenocarcinoma   - pantoprazole  40 mg BID  - OR 8/26 with J tube placement     -For pain control IV Dilaudid 1.5 mg Q4 ATC with 0.9 mg q2h PRN for severe/breakthrough - pt feels pain has improved with this regimen  - As per surgery recs, tube feeds are 20 cc/hr  Encourage use of PRN pain meds - patient has not used PRN Dilaudid and feels pain is not well controlled

## 2022-09-04 NOTE — PROGRESS NOTE ADULT - ASSESSMENT
Seen with RN at bedside.  Patient is recuperating from feeding jejunostomy tube placement.    She is awake and alert.  She has some nausea and emesis, but the character is bilious, not the appearance of her tube feeds.  Presently tolerating the tube feeds at 20 cc/hour.    Abdomen is distended, but nontender.    No labs yesterday.    Clinically stable.  Will continue to follow

## 2022-09-04 NOTE — PROGRESS NOTE ADULT - SUBJECTIVE AND OBJECTIVE BOX
Patient is a 57y old  Female who presents with a chief complaint of abdominal pain & nausea/vomiting (04 Sep 2022 09:29)      SUBJECTIVE / OVERNIGHT EVENTS: No acute events overnight. Patient seen and examined at bedside. Tolerated tube feeds overnight. Patient notes severe back pain, radiating to the front. She is frustrated that eating ice chips makes her nauseous.     MEDICATIONS  (STANDING):  Biotene Dry Mouth Oral Rinse 5 milliLiter(s) Swish and Spit two times a day  enoxaparin Injectable 40 milliGRAM(s) SubCutaneous every 24 hours  HYDROmorphone  Injectable 1.5 milliGRAM(s) IV Push every 4 hours  lidocaine   4% Patch 1 Patch Transdermal daily  metoclopramide Injectable 5 milliGRAM(s) IV Push every 4 hours  pantoprazole  Injectable 40 milliGRAM(s) IV Push every 12 hours  sodium chloride 0.9%. 1000 milliLiter(s) (75 mL/Hr) IV Continuous <Continuous>    MEDICATIONS  (PRN):  bisacodyl Suppository 10 milliGRAM(s) Rectal daily PRN Constipation  HYDROmorphone  Injectable 0.9 milliGRAM(s) IV Push every 2 hours PRN Severe Pain (7 - 10)      CAPILLARY BLOOD GLUCOSE        I&O's Summary    03 Sep 2022 07:01  -  04 Sep 2022 07:00  --------------------------------------------------------  IN: 240 mL / OUT: 590 mL / NET: -350 mL        Vital Signs Last 24 Hrs  T(C): 36.8 (04 Sep 2022 06:13), Max: 36.8 (04 Sep 2022 06:13)  T(F): 98.3 (04 Sep 2022 06:13), Max: 98.3 (04 Sep 2022 06:13)  HR: 91 (04 Sep 2022 06:30) (82 - 107)  BP: 115/70 (04 Sep 2022 06:30) (115/70 - 135/87)  BP(mean): --  RR: 18 (04 Sep 2022 06:30) (16 - 18)  SpO2: 94% (04 Sep 2022 06:30) (94% - 99%)    Parameters below as of 04 Sep 2022 06:30  Patient On (Oxygen Delivery Method): room air        PHYSICAL EXAM:  GENERAL: Moving around bed 2/2 back pain  HEAD:  Atraumatic, Normocephalic  EYES: EOMI, conjunctiva and sclera clear  ENT: Slightly dry mucous membranes,   NECK: Supple, No JVD, trachea midline  CHEST/LUNG: No increased work of breathing, CTAB  HEART: Regular rate and rhythm; No murmurs, rubs, or gallops, normal S1/S2  ABDOMEN: No abdominal distension  EXTREMITIES:  2+ Peripheral Pulses, brisk capillary refill. No clubbing, cyanosis, or edema  MSK: No gross deformities noted   Neurological:  A&Ox3, no focal deficits   SKIN: No rashes or lesions  PSYCH: Normal mood, affect     LABS:                     RADIOLOGY & ADDITIONAL TESTS:    Imaging Personally Reviewed:    Consultant(s) Notes Reviewed:      Care Discussed with Consultants/Other Providers:   Patient is a 57y old  Female who presents with a chief complaint of abdominal pain & nausea/vomiting (04 Sep 2022 09:29)      SUBJECTIVE / OVERNIGHT EVENTS: No acute events overnight. Patient seen and examined at bedside. Tolerated tube feeds overnight, with only small volume emesis. Patient notes severe back pain, radiating to the front. She is frustrated that eating ice chips makes her nauseous.     MEDICATIONS  (STANDING):  Biotene Dry Mouth Oral Rinse 5 milliLiter(s) Swish and Spit two times a day  enoxaparin Injectable 40 milliGRAM(s) SubCutaneous every 24 hours  HYDROmorphone  Injectable 1.5 milliGRAM(s) IV Push every 4 hours  lidocaine   4% Patch 1 Patch Transdermal daily  metoclopramide Injectable 5 milliGRAM(s) IV Push every 4 hours  pantoprazole  Injectable 40 milliGRAM(s) IV Push every 12 hours  sodium chloride 0.9%. 1000 milliLiter(s) (75 mL/Hr) IV Continuous <Continuous>    MEDICATIONS  (PRN):  bisacodyl Suppository 10 milliGRAM(s) Rectal daily PRN Constipation  HYDROmorphone  Injectable 0.9 milliGRAM(s) IV Push every 2 hours PRN Severe Pain (7 - 10)      CAPILLARY BLOOD GLUCOSE        I&O's Summary    03 Sep 2022 07:01  -  04 Sep 2022 07:00  --------------------------------------------------------  IN: 240 mL / OUT: 590 mL / NET: -350 mL        Vital Signs Last 24 Hrs  T(C): 36.8 (04 Sep 2022 06:13), Max: 36.8 (04 Sep 2022 06:13)  T(F): 98.3 (04 Sep 2022 06:13), Max: 98.3 (04 Sep 2022 06:13)  HR: 91 (04 Sep 2022 06:30) (82 - 107)  BP: 115/70 (04 Sep 2022 06:30) (115/70 - 135/87)  BP(mean): --  RR: 18 (04 Sep 2022 06:30) (16 - 18)  SpO2: 94% (04 Sep 2022 06:30) (94% - 99%)    Parameters below as of 04 Sep 2022 06:30  Patient On (Oxygen Delivery Method): room air        PHYSICAL EXAM:  GENERAL: Moving around bed 2/2 back pain  HEAD:  Atraumatic, Normocephalic  EYES: EOMI, conjunctiva and sclera clear  ENT: Slightly dry mucous membranes,   NECK: Supple, No JVD, trachea midline  CHEST/LUNG: No increased work of breathing, CTAB  HEART: Regular rate and rhythm; No murmurs, rubs, or gallops, normal S1/S2  ABDOMEN: No abdominal distension  EXTREMITIES:  2+ Peripheral Pulses, brisk capillary refill. No clubbing, cyanosis, or edema  MSK: No gross deformities noted   Neurological:  A&Ox3, no focal deficits   SKIN: No rashes or lesions  PSYCH: Normal mood, affect     LABS:                     RADIOLOGY & ADDITIONAL TESTS:    Imaging Personally Reviewed:    Consultant(s) Notes Reviewed:      Care Discussed with Consultants/Other Providers:

## 2022-09-05 NOTE — PROGRESS NOTE ADULT - ATTENDING COMMENTS
57F From [facility], w/ PMHx of nephrolithiasis p/w abd pain and n/v found to have gastric adenocarcinoma (on bx from 8/15 EGD) w/ mets to LN c/b DEVI. DEVI now resolved s/p ivfs. Now s/p EBUS w/ LN bx on 8/23 w/results sig for metastatic adenocarcinoma. No plan for inpatient chemotherapy at this time, recs outpatient follow up. Also s/p EGD, bx of peritoneum, and robotic feeding J tube placement per surg on 8/26 ( Peritoneal bx now back and shows Adenocarcinoma with signet ring cells). Surgery was not able to do any resection as exploration revealed advanced disease.       Feels much better today, Pain better controlled this AM.    - c/w tube feeds at 20 cc/hr, per surgery will increase to gaol of 35 gradually   - c/w Dilaudid 1.5mg IV standing and  0.9mg IV Dilaudid for breakthrough pain, f/u palliative recs    - pending emergency medicaid for tube feeds, given that patient is uninsured   - will followup with CM and SW  - c/w biotene, as patient feels like mouth is dry since she is unable to tolerate small amount of fluids.  - patient educated on current anatomy that eating food PO will cause worsened abdominal pain due to extent disease within stomach, she expressed understanding     Rest of plan as above, Discussed with HS7 .

## 2022-09-05 NOTE — PROGRESS NOTE ADULT - SUBJECTIVE AND OBJECTIVE BOX
INCOMPLETE NOTE    Armen Gill | PGY1| Pager: [phone]  Interval Events:    REVIEW OF SYSTEMS:  CONSTITUTIONAL: No weakness, fevers or chills  EYES/ENT: No visual changes;  No vertigo or throat pain   NECK: No pain or stiffness  RESPIRATORY: No cough, wheezing, hemoptysis; No shortness of breath  CARDIOVASCULAR: No chest pain or palpitations  GASTROINTESTINAL: No abdominal or epigastric pain. No nausea, vomiting, or hematemesis; No diarrhea or constipation. No melena or hematochezia.  GENITOURINARY: No dysuria, frequency or hematuria  NEUROLOGICAL: No numbness or weakness  SKIN: No itching, burning, rashes, or lesions   All other review of systems is negative unless indicated above.    OBJECTIVE:  ICU Vital Signs Last 24 Hrs  T(C): 37.2 (05 Sep 2022 05:54), Max: 37.2 (05 Sep 2022 05:54)  T(F): 99 (05 Sep 2022 05:54), Max: 99 (05 Sep 2022 05:54)  HR: 100 (05 Sep 2022 05:54) (98 - 101)  BP: 128/73 (05 Sep 2022 05:54) (125/74 - 131/72)  BP(mean): --  ABP: --  ABP(mean): --  RR: 18 (05 Sep 2022 05:54) (16 - 18)  SpO2: 96% (05 Sep 2022 05:54) (92% - 96%)    O2 Parameters below as of 05 Sep 2022 05:54  Patient On (Oxygen Delivery Method): room air              09-04 @ 07:01  -  09-05 @ 07:00  --------------------------------------------------------  IN: 240 mL / OUT: 350 mL / NET: -110 mL      CAPILLARY BLOOD GLUCOSE          PHYSICAL EXAM:  General: WN/WD NAD  Neurology: A&Ox3, nonfocal, LEY x 4  Eyes: PERRLA/ EOMI, Gross vision intact  ENT/Neck: Neck supple, trachea midline, No JVD, Gross hearing intact  Respiratory: CTA B/L, No wheezing, rales, rhonchi  CV: RRR, +S1/S2, -S3/S4, no murmurs, rubs or gallops  Abdominal: Soft, NT, ND +BS, No HSM  MSK: 5/5 strength UE/LE bilaterally  Extremities: No edema, 2+ peripheral pulses  Skin: No Rashes, Hematoma, Ecchymosis  Incisions:   Tubes:    HOSPITAL MEDICATIONS:  MEDICATIONS  (STANDING):  Biotene Dry Mouth Oral Rinse 5 milliLiter(s) Swish and Spit two times a day  enoxaparin Injectable 40 milliGRAM(s) SubCutaneous every 24 hours  HYDROmorphone  Injectable 1.5 milliGRAM(s) IV Push every 4 hours  lidocaine   4% Patch 1 Patch Transdermal daily  metoclopramide Injectable 5 milliGRAM(s) IV Push every 4 hours  pantoprazole  Injectable 40 milliGRAM(s) IV Push every 12 hours  sodium chloride 0.9%. 1000 milliLiter(s) (75 mL/Hr) IV Continuous <Continuous>    MEDICATIONS  (PRN):  bisacodyl Suppository 10 milliGRAM(s) Rectal daily PRN Constipation  HYDROmorphone  Injectable 0.9 milliGRAM(s) IV Push every 2 hours PRN Severe Pain (7 - 10)      LABS:                        10.0   10.18 )-----------( 357      ( 05 Sep 2022 06:33 )             33.3     Hgb Trend: 10.0<--, 9.9<--, 10.1<--, 10.2<--, 9.9<--  09-05    140  |  105  |  14  ----------------------------<  130<H>  3.9   |  25  |  0.52    Ca    8.4      05 Sep 2022 06:33  Phos  3.6     09-05  Mg     1.90     09-05      Creatinine Trend: 0.52<--, 0.47<--, 0.45<--, 0.46<--, 0.48<--, 0.43<--            MICROBIOLOGY:      INCOMPLETE NOTE    Armen Gill | PGY1| Pager: [phone]  Interval Events: No acute events overnight    REVIEW OF SYSTEMS:  CONSTITUTIONAL: No weakness, fevers or chills  RESPIRATORY: No cough, wheezing, hemoptysis; No shortness of breath  CARDIOVASCULAR: No chest pain or palpitations  GASTROINTESTINAL: upper midline abdominal pain along with midline back pain in band parallel to frontal pain, no radiation reported  GENITOURINARY: No dysuria, frequency or hematuria  NEUROLOGICAL: No numbness or weakness  All other review of systems is negative unless indicated above.    OBJECTIVE:  ICU Vital Signs Last 24 Hrs  T(C): 37.2 (05 Sep 2022 05:54), Max: 37.2 (05 Sep 2022 05:54)  T(F): 99 (05 Sep 2022 05:54), Max: 99 (05 Sep 2022 05:54)  HR: 100 (05 Sep 2022 05:54) (98 - 101)  BP: 128/73 (05 Sep 2022 05:54) (125/74 - 131/72)  BP(mean): --  ABP: --  ABP(mean): --  RR: 18 (05 Sep 2022 05:54) (16 - 18)  SpO2: 96% (05 Sep 2022 05:54) (92% - 96%)    O2 Parameters below as of 05 Sep 2022 05:54  Patient On (Oxygen Delivery Method): room air              09-04 @ 07:01  -  09-05 @ 07:00  --------------------------------------------------------  IN: 240 mL / OUT: 350 mL / NET: -110 mL      CAPILLARY BLOOD GLUCOSE          PHYSICAL EXAM:  General: WN/WD NAD  Neurology: A&Ox3, nonfocal, LEY x 4  Eyes: PERRLA/ EOMI, Gross vision intact  ENT/Neck: Neck supple, trachea midline, No JVD, Gross hearing intact  Respiratory: CTA B/L, No wheezing, rales, rhonchi  CV: RRR, +S1/S2, -S3/S4, no murmurs, rubs or gallops  Abdominal: Soft, NT, ND +BS, No HSM  MSK: 5/5 strength UE/LE bilaterally  Extremities: No edema, 2+ peripheral pulses  Skin: No Rashes, Hematoma, Ecchymosis  Incisions:   Tubes:    HOSPITAL MEDICATIONS:  MEDICATIONS  (STANDING):  Biotene Dry Mouth Oral Rinse 5 milliLiter(s) Swish and Spit two times a day  enoxaparin Injectable 40 milliGRAM(s) SubCutaneous every 24 hours  HYDROmorphone  Injectable 1.5 milliGRAM(s) IV Push every 4 hours  lidocaine   4% Patch 1 Patch Transdermal daily  metoclopramide Injectable 5 milliGRAM(s) IV Push every 4 hours  pantoprazole  Injectable 40 milliGRAM(s) IV Push every 12 hours  sodium chloride 0.9%. 1000 milliLiter(s) (75 mL/Hr) IV Continuous <Continuous>    MEDICATIONS  (PRN):  bisacodyl Suppository 10 milliGRAM(s) Rectal daily PRN Constipation  HYDROmorphone  Injectable 0.9 milliGRAM(s) IV Push every 2 hours PRN Severe Pain (7 - 10)      LABS:                        10.0   10.18 )-----------( 357      ( 05 Sep 2022 06:33 )             33.3     Hgb Trend: 10.0<--, 9.9<--, 10.1<--, 10.2<--, 9.9<--  09-05    140  |  105  |  14  ----------------------------<  130<H>  3.9   |  25  |  0.52    Ca    8.4      05 Sep 2022 06:33  Phos  3.6     09-05  Mg     1.90     09-05      Creatinine Trend: 0.52<--, 0.47<--, 0.45<--, 0.46<--, 0.48<--, 0.43<--            MICROBIOLOGY:      INCOMPLETE NOTE    Armen Gill | PGY1| Pager: [phone]  Interval Events: No acute events overnight    REVIEW OF SYSTEMS:  CONSTITUTIONAL: No weakness, fevers or chills  RESPIRATORY: No cough, wheezing, hemoptysis; No shortness of breath  CARDIOVASCULAR: No chest pain or palpitations  GASTROINTESTINAL: upper midline abdominal pain along with midline back pain in band parallel to frontal pain, no radiation reported  GENITOURINARY: No dysuria, frequency or hematuria  NEUROLOGICAL: No numbness or weakness  All other review of systems is negative unless indicated above.    OBJECTIVE:  ICU Vital Signs Last 24 Hrs  T(C): 37.2 (05 Sep 2022 05:54), Max: 37.2 (05 Sep 2022 05:54)  T(F): 99 (05 Sep 2022 05:54), Max: 99 (05 Sep 2022 05:54)  HR: 100 (05 Sep 2022 05:54) (98 - 101)  BP: 128/73 (05 Sep 2022 05:54) (125/74 - 131/72)  BP(mean): --  ABP: --  ABP(mean): --  RR: 18 (05 Sep 2022 05:54) (16 - 18)  SpO2: 96% (05 Sep 2022 05:54) (92% - 96%)    O2 Parameters below as of 05 Sep 2022 05:54  Patient On (Oxygen Delivery Method): room air              09-04 @ 07:01  -  09-05 @ 07:00  --------------------------------------------------------  IN: 240 mL / OUT: 350 mL / NET: -110 mL      CAPILLARY BLOOD GLUCOSE          PHYSICAL EXAM:  General: WN/WD NAD  Neurology: A&Ox3, nonfocal, LEY x 4  Eyes: PERRLA/ EOMI, Gross vision intact  ENT/Neck: Neck supple, trachea midline, No JVD, Gross hearing intact  Respiratory: CTA B/L, No wheezing, rales, rhonchi  CV: RRR, +S1/S2, -S3/S4, no murmurs, rubs or gallops  Abdominal: Tenderness in Midline; BS+ abdomen nondistended  Extremities: No edema, 2+ peripheral pulses  Skin: No Rashes, Hematoma, Ecchymosis      HOSPITAL MEDICATIONS:  MEDICATIONS  (STANDING):  Biotene Dry Mouth Oral Rinse 5 milliLiter(s) Swish and Spit two times a day  enoxaparin Injectable 40 milliGRAM(s) SubCutaneous every 24 hours  HYDROmorphone  Injectable 1.5 milliGRAM(s) IV Push every 4 hours  lidocaine   4% Patch 1 Patch Transdermal daily  metoclopramide Injectable 5 milliGRAM(s) IV Push every 4 hours  pantoprazole  Injectable 40 milliGRAM(s) IV Push every 12 hours  sodium chloride 0.9%. 1000 milliLiter(s) (75 mL/Hr) IV Continuous <Continuous>    MEDICATIONS  (PRN):  bisacodyl Suppository 10 milliGRAM(s) Rectal daily PRN Constipation  HYDROmorphone  Injectable 0.9 milliGRAM(s) IV Push every 2 hours PRN Severe Pain (7 - 10)      LABS:                        10.0   10.18 )-----------( 357      ( 05 Sep 2022 06:33 )             33.3     Hgb Trend: 10.0<--, 9.9<--, 10.1<--, 10.2<--, 9.9<--  09-05    140  |  105  |  14  ----------------------------<  130<H>  3.9   |  25  |  0.52    Ca    8.4      05 Sep 2022 06:33  Phos  3.6     09-05  Mg     1.90     09-05      Creatinine Trend: 0.52<--, 0.47<--, 0.45<--, 0.46<--, 0.48<--, 0.43<--            MICROBIOLOGY:

## 2022-09-05 NOTE — PROGRESS NOTE ADULT - PROBLEM SELECTOR PLAN 1
Impression: The patient's abdominal pain, persistent nausea/vomiting and weight loss iso of CT A/P findings indicating gastric body thickening and gastrocolic ligament nodularity are concerning for gastric adenocarcinoma. EGD biopsy confirmed.  - GI, onc, surg/onc following  - EGD:  Infiltrative changes in gastric body concerning gastric malignancy, Congested duodenal mucosa, biopsy = poorly differentiated adenocarcinoma  - staging CT chest - Mediastinal/bilateral hilar/R internal mammary LAD  - mediastinal lymph node biopsy results - Metastatic adenocarcinoma   - pantoprazole  40 mg BID  - OR 8/26 with J tube placement     -For pain control IV Dilaudid 1.5 mg Q4 ATC with 0.9 mg q2h PRN for severe/breakthrough - pt feels pain has improved with this regimen  - As per surgery recs, tube feeds are 20 cc/hr  Encourage use of PRN pain meds - patient has not used PRN Dilaudid and feels pain is not well controlled Impression: The patient's abdominal pain, persistent nausea/vomiting and weight loss iso of CT A/P findings indicating gastric body thickening and gastrocolic ligament nodularity are concerning for gastric adenocarcinoma. EGD biopsy confirmed.  - GI, onc, surg/onc following  - EGD:  Infiltrative changes in gastric body concerning gastric malignancy, Congested duodenal mucosa, biopsy = poorly differentiated adenocarcinoma  - staging CT chest - Mediastinal/bilateral hilar/R internal mammary LAD  - mediastinal lymph node biopsy results - Metastatic adenocarcinoma   - pantoprazole  40 mg BID  - OR 8/26 with J tube placement     -For pain control IV Dilaudid 1.5 mg Q4 ATC with 0.9 mg q2h PRN for severe/breakthrough - pt feels pain has improved with this regimen  - As per surgery recs, tube feeds are 20 cc/hr  Encourage use of PRN pain meds - patient has not used PRN Dilaudid and feels pain is not well controlled  - a/p surgery notes, No intervention with J tube.

## 2022-09-05 NOTE — PROGRESS NOTE ADULT - ASSESSMENT
- please keep patient NPO as she reports vomiting after she drinks water or takes anything by mouth  - please gradually increase rate of tube feeds to 35 ml/hr as tolerated  - will continue to follow  - rest of care as per primary team     D team [id number]

## 2022-09-05 NOTE — PROGRESS NOTE ADULT - SUBJECTIVE AND OBJECTIVE BOX
Vital Signs Last 24 Hrs  T(C): 37.2 (05 Sep 2022 05:54), Max: 37.2 (05 Sep 2022 05:54)  T(F): 99 (05 Sep 2022 05:54), Max: 99 (05 Sep 2022 05:54)  HR: 100 (05 Sep 2022 05:54) (98 - 101)  BP: 128/73 (05 Sep 2022 05:54) (125/74 - 131/72)  BP(mean): --  RR: 18 (05 Sep 2022 05:54) (16 - 18)  SpO2: 96% (05 Sep 2022 05:54) (92% - 96%)    Parameters below as of 05 Sep 2022 05:54  Patient On (Oxygen Delivery Method): room air        I&O's Summary    04 Sep 2022 07:01  -  05 Sep 2022 07:00  --------------------------------------------------------  IN: 240 mL / OUT: 350 mL / NET: -110 mL        SUBJECTIVE: Pt seen at bedside. Not actively nauseous but states that whenever she drinks water, feels nauseous    Physical Exam:  General Appearance: Appears well, NAD  Chest: Equal expansion bilaterally, equal breath sounds  CV: Pulse regular presently  Abdomen: Soft, nondistended, nontender  Extremities: Grossly symmetric    LABS:                        10.0   10.18 )-----------( 357      ( 05 Sep 2022 06:33 )             33.3     09-05    140  |  105  |  14  ----------------------------<  130<H>  3.9   |  25  |  0.52    Ca    8.4      05 Sep 2022 06:33  Phos  3.6     09-05  Mg     1.90     09-05            RADIOLOGY & ADDITIONAL STUDIES:

## 2022-09-05 NOTE — PROGRESS NOTE ADULT - ASSESSMENT
POD #10.  Feeding jejunostomy.    Awake and alert.  No emesis overnight.    Tolerating jejunostomy tube feeds at 20 cc/hour.    Afebrile with stable vital signs    Abdomen is nontender.    Normal WBC count, stable H&H.  One electrolyte still pending.    Clinically stable.

## 2022-09-06 NOTE — PROGRESS NOTE ADULT - PROBLEM SELECTOR PLAN 3
- Biopsy proven poorly differentiated gastric adenocarcinoma.   - Status post laparoscopic evaluation on 8/26/2022 , non resectable tumor. Status post J tube placement.   - No plans for IP chemo, outpatient follow up

## 2022-09-06 NOTE — PROGRESS NOTE ADULT - ASSESSMENT
57F with gastric CA, s/p EGD and robotic feeding J tube placement 8/26.    Impression/plan:  - Can increase TFs to 30cc/hr as pt is tolerating 20cc/hr currently  - Remaining care per primary team    D Team Surgery  [id number]

## 2022-09-06 NOTE — PROGRESS NOTE ADULT - SUBJECTIVE AND OBJECTIVE BOX
SUBJECTIVE AND OBJECTIVE:  Indication for Geriatrics and Palliative Care Services/INTERVAL HPI: Pain management     INTERVAL EVENTS: Patient seen this PM, in no distress. Spoke to patient about transitioning to oral medications to facilitate discharge, which she was agreeable to. Patient feels unsure about the future, was not sure how she would be able to continue cancer care. She asked for "the truth," if she is going to live or die, I advised her to take things a day at a time and we would be helping to see how she can start treatment outpatient.     DNR on chart:  Allergies    No Known Allergies    Intolerances    MEDICATIONS  (STANDING):  HYDROmorphone  Injectable 0.6 milliGRAM(s) IV Push every 4 hours  lidocaine   4% Patch 1 Patch Transdermal daily  multivitamin 1 Tablet(s) Oral daily  ondansetron Injectable 4 milliGRAM(s) IV Push three times a day  pantoprazole    Tablet 40 milliGRAM(s) Oral two times a day  polyethylene glycol 3350 17 Gram(s) Oral daily  sodium chloride 0.9%. 1000 milliLiter(s) (75 mL/Hr) IV Continuous <Continuous>    MEDICATIONS  (PRN):  acetaminophen     Tablet .. 650 milliGRAM(s) Oral every 6 hours PRN Temp greater or equal to 38C (100.4F), Mild Pain (1 - 3)  calcium carbonate    500 mG (Tums) Chewable 1 Tablet(s) Chew three times a day PRN Dyspepsia  HYDROmorphone  Injectable 0.3 milliGRAM(s) IV Push every 2 hours PRN Severe Pain (7 - 10)  melatonin 3 milliGRAM(s) Oral at bedtime PRN Sleep      ITEMS UNCHECKED ARE NOT PRESENT    PRESENT SYMPTOMS: [ ]Unable to self-report - see [ ] CPOT [ ] PAINADS [ ] RDOS  Source if other than patient:  [ ]Family   [ ]Team     Pain:  [x ]yes [ ]no  QOL impact - moderately affecting.  Location -    epigastric area               Aggravating factors - none  Quality - sharp  Radiation - to the back  Timing- constant  Severity (0-10 scale): 10/10  Minimal acceptable level (0-10 scale): 4/10    Dyspnea:                           [ ]Mild [ ]Moderate [ ]Severe    RDOS:  0 to 2  minimal or no respiratory distress   3  mild distress  4 to 6 moderate distress  >7 severe distress  https://[email or web address]/handouts/[email or web address]_Distress_Observation_[email or web address] (Ctrl +  left click to view)     Anxiety:                             [ ]Mild [ ]Moderate [ ]Severe  Fatigue:                             [ ]Mild [ ]Moderate [ ]Severe  Nausea:                             [ ]Mild [ ]Moderate [ ]Severe  Loss of appetite:              [ ]Mild [ ]Moderate [ ]Severe  Constipation:                    [ ]Mild [ ]Moderate [ ]Severe    PCSSQ[Palliative Care Spiritual Screening Question]   Severity (0-10):  Score of 4 or > indicate consideration of Chaplaincy referral.  Chaplaincy Referral: [ ] yes [ ] refused [ ] following    Other Symptoms:  [ ]All other review of systems negative     Vital Signs Last 24 Hrs  Vital Signs Last 24 Hrs  T(C): 36.7 (06 Sep 2022 12:57), Max: 36.8 (06 Sep 2022 00:13)  T(F): 98 (06 Sep 2022 12:57), Max: 98.3 (06 Sep 2022 00:13)  HR: 100 (06 Sep 2022 12:57) (76 - 100)  BP: 140/71 (06 Sep 2022 12:57) (125/79 - 140/71)  BP(mean): --  RR: 18 (06 Sep 2022 12:57) (17 - 18)  SpO2: 98% (06 Sep 2022 12:57) (96% - 100%)    Parameters below as of 06 Sep 2022 12:57  Patient On (Oxygen Delivery Method): room air    GENERAL: [ ]Cachexia    [x ]Alert  [x ]Oriented    [ ]Lethargic  [ ]Unarousable  [ x]Verbal  [ ]Non-Verbal  Behavioral:   [ ]Anxiety  [ ]Delirium [ ]Agitation [ ]Other  HEENT:  [x ]Normal   [ ]Dry mouth   [ ]ET Tube/Trach  [ ]Oral lesions  PULMONARY:   [x ]Clear [ ]Tachypnea  [ ]Audible excessive secretions   [ ]Rhonchi        [ ]Right [ ]Left [ ]Bilateral  [ ]Crackles        [ ]Right [ ]Left [ ]Bilateral  [ ]Wheezing     [ ]Right [ ]Left [ ]Bilateral  [ ]Diminished BS [ ] Right [ ]Left [ ]Bilateral  CARDIOVASCULAR:    [x ]Regular [ ]Irregular [ ]Tachy  [ ]Luis Miguel [ ]Murmur [ ]Other  GASTROINTESTINAL:  [x ]Soft  [ ]Distended   [ ]+BS  [ ]Non tender [ ]Tender  [ ]Other [ ]PEG [ ]OGT/ NGT Patient has laparoscopic surgical incisions covered by  gauze    GENITOURINARY:  [ ]Normal [ ]Incontinent   [ ]Oliguria/Anuria   [x ]Patterson  MUSCULOSKELETAL:   [ x]Normal   [ ]Weakness  [ ]Bed/Wheelchair bound [ ]Edema  NEUROLOGIC:   [x ]No focal deficits  [ ] Cognitive impairment  [ ] Dysphagia [ ]Dysarthria [ ] Paresis [ ]Other   SKIN:   [x ]Normal  [ ]Rash  [ ]Other  [ ]Pressure ulcer(s) [ ]y [x ]n present on admission    CRITICAL CARE:  [ ]Shock Present  [ ]Septic [ ]Cardiogenic [ ]Neurologic [ ]Hypovolemic  [ ]Vasopressors [ ]Inotropes  [ ]Respiratory failure present [ ]Mechanical Ventilation [ ]Non-invasive ventilatory support [ ]High-Flow   [ ]Acute  [ ]Chronic [ ]Hypoxic  [ ]Hypercarbic [ ]Other  [ ]Other organ failure     LABS:               10.0   10.18 )-----------( 357      ( 05 Sep 2022 06:33 )             33.3     09-05    140  |  105  |  14  ----------------------------<  130<H>  3.9   |  25  |  0.52    Ca    8.4      05 Sep 2022 06:33  Phos  3.6     09-05  Mg     1.90     09-05      RADIOLOGY & ADDITIONAL STUDIES:< from: Xray Chest 1 View- PORTABLE-Urgent (Xray Chest 1 View- PORTABLE-Urgent .) (08.25.22 @ 17:28) >  Clear lungs.    < from: Xray Abdomen 1 View PORTABLE -Routine (Xray Abdomen 1 View PORTABLE -Routine .) (08.27.22 @ 13:01) >    IMPRESSION: Status post jejunostomy placement with contrast in the   jejunostomy tube and normal loop of jejunum.    < end of copied text >      Protein Calorie Malnutrition Present: [ ]mild [ ]moderate [ ]severe [ ]underweight [ ]morbid obesity  https://www.andeal.org/vault/2440/web/files/ONC/Table_Clinical%20Characteristics%20to%20Document%20Malnutrition-White%20JV%20et%20al%202012.pdf    Height (cm): 157.5 (08-26-22 @ 07:10)  Weight (kg): 71.1 (08-26-22 @ 07:11)  BMI (kg/m2): 28.7 (08-26-22 @ 07:11)    [ ]PPSV2 < or = 30%  [ ]significant weight loss [ ]poor nutritional intake [ ]anasarca[X ]Artificial Nutrition - J tube     Other REFERRALS:  [ ]Hospice  [ ]Child Life  [ ]Social Work  [ ]Case management [ ]Holistic Therapy

## 2022-09-06 NOTE — PROGRESS NOTE ADULT - PROBLEM SELECTOR PLAN 1
- Pain located in epigastric area (area of gastric cancer) and reporting now diffuse abdominal pain since procedures on 8/26   - Pain was reasonably controlled on IV Dilaudid 0.6 mg q4h ATC with 0.3 mg q2h PRN for severe/breakthrough pain  - Patient has required escalating doses, reports feeling improved with 1.5 mg IV dilaudid   - Unclear cause of why pain has been worsening, now with greater pain med requirement   - In preparation for discharge, transitioning from IV dilaudid or oral morphine solution to be given via J tube   - Started on PO morphine solution 20 mg q4h ATC and 10 mg q2h PRN for severe pain

## 2022-09-06 NOTE — PROGRESS NOTE ADULT - PROBLEM SELECTOR PLAN 4
Impression: 72 hour caloric count to evaluate for malnutrition - indicates malnutrition. Pt unable to eat due to pain.   Now J tube placement    Nutrition consult - Recommend TwoCal HN via J-tube initiated at 10 mL/hr increased by 10mL q4 hrs until goal rate 35 mL/hr x24 hrs to provide 840 mL formula, 1680 kcal, 70.14 gm protein, 588 mL free water (meets 33.6 kcal/kg, 1.4 gm protein/kg).  - Patient was unable to tolerate tube feed rate of 30, therefore decreased back to 20, will follow up with nutrition regarding updated recommendations Impression: 72 hour caloric count to evaluate for malnutrition - indicates malnutrition. Pt unable to eat due to pain.   Now J tube placement    Nutrition consult - Recommend TwoCal HN via J-tube initiated at 10 mL/hr increased by 10mL q4 hrs until goal rate 35 mL/hr x24 hrs to provide 840 mL formula, 1680 kcal, 70.14 gm protein, 588 mL free water (meets 33.6 kcal/kg, 1.4 gm protein/kg).  - Patient was unable to tolerate tube feed rate of 30, therefore decreased back to 25, will follow up with nutrition regarding updated recommendations

## 2022-09-06 NOTE — PROGRESS NOTE ADULT - ATTENDING COMMENTS
Patient seen and examined by me. Case discussed with resident and agree with the resident's findings and plan as documented in the resident's note. 57F from [facility], h/o nephrolithiasis p/w abd pain and n/v found to have gastric adenocarcinoma (on bx from 8/15 EGD) w/ mets to LN c/b DEVI. DEVI now resolved s/p ivfs. Now s/p EBUS w/ LN bx on 8/23 w/results sig for metastatic adenocarcinoma. No plan for inpatient chemotherapy at this time, recs outpatient follow up. Also s/p EGD, bx of peritoneum, and robotic feeding J tube placement per surg on 8/26 ( Peritoneal bx now back and shows Adenocarcinoma with signet ring cells). Surgery was not able to do any resection as exploration revealed advanced disease.       - c/w tube feeds at 20 cc/hr, per surgery will increase to gaol of 35 gradually   - c/w Dilaudid 1.5mg IV standing and  0.9mg IV Dilaudid for breakthrough pain, f/u palliative recs    - pending emergency medicaid for tube feeds, given that patient is uninsured   - will followup with CM and SW  - PT seen at bedside and patient able to stand her per PT. Patient seen and examined by me. Case discussed with resident and agree with the resident's findings and plan as documented in the resident's note. 57F from [facility], h/o nephrolithiasis p/w abd pain and n/v found to have gastric adenocarcinoma (on bx from 8/15 EGD) w/ mets to LN c/b DEVI. DEVI now resolved s/p IVFs. Now s/p EBUS w/ LN bx on 8/23 w/results sig for metastatic adenocarcinoma. No plan for inpatient chemotherapy at this time, recs outpatient follow up. Also s/p EGD, bx of peritoneum, and robotic feeding J tube placement per surg on 8/26 (peritoneal bx now back and shows Adenocarcinoma with signet ring cells). Surgery was not able to do any resection as exploration revealed advanced disease.       - c/w tube feeds at 20 cc/hr, per surgery will increase to gaol of 35 gradually   - c/w Dilaudid 1.5mg IV standing and  0.9mg IV Dilaudid for breakthrough pain, f/u palliative recs    - pending emergency medicaid for tube feeds, given that patient is uninsured   - will follow-up with CM and SW  - PT seen at bedside and patient able to stand her per PT - f/u PT note recs

## 2022-09-06 NOTE — PROGRESS NOTE ADULT - ASSESSMENT
57F PMH of renal stones p/w gastric adenocarcinoma w/ mets to LN c/b DEVI, now resolved, and nephrolithiasis. S/p j tube placement, started on tube feeds. Palliative following.   57F PMH of renal stones p/w gastric adenocarcinoma w/ mets to LN c/b DEVI, now resolved, and nephrolithiasis. S/p j tube placement, started on tube feeds, slowly titrating rate of feeds as tolerated and managing pain with palliative following.

## 2022-09-06 NOTE — PROGRESS NOTE ADULT - PROBLEM SELECTOR PLAN 2
Impression: Patient having L leg swelling yesterday, resolved today. Low suspicion for a DVT - (no tachycardia, no pain).   US - negative for DVT    - continue to monitor Impression: Patient having L leg swelling yesterday, resolved today. Low suspicion for a DVT - (no tachycardia, no pain).   US - negative for DVT  Swelling limited to ankle

## 2022-09-06 NOTE — PROGRESS NOTE ADULT - PROBLEM SELECTOR PLAN 1
Impression: The patient's abdominal pain, persistent nausea/vomiting and weight loss iso of CT A/P findings indicating gastric body thickening and gastrocolic ligament nodularity are concerning for gastric adenocarcinoma. EGD biopsy confirmed.  - GI, onc, surg/onc following  - EGD:  Infiltrative changes in gastric body concerning gastric malignancy, Congested duodenal mucosa, biopsy = poorly differentiated adenocarcinoma  - staging CT chest - Mediastinal/bilateral hilar/R internal mammary LAD  - mediastinal lymph node biopsy results - Metastatic adenocarcinoma   - pantoprazole  40 mg BID  - OR 8/26 with J tube placement     -For pain control IV Dilaudid 1.5 mg Q4 ATC with 0.9 mg q2h PRN for severe/breakthrough - pt feels pain has improved with this regimen  - As per surgery recs, tube feeds are 20 cc/hr  Encourage use of PRN pain meds - patient has not used PRN Dilaudid and feels pain is not well controlled  - a/p surgery notes, No intervention with J tube. Impression: The patient's abdominal pain, persistent nausea/vomiting and weight loss iso of CT A/P findings indicating gastric body thickening and gastrocolic ligament nodularity are concerning for gastric adenocarcinoma. EGD biopsy confirmed.  - GI, onc, surg/onc following  - EGD:  Infiltrative changes in gastric body concerning gastric malignancy, Congested duodenal mucosa, biopsy = poorly differentiated adenocarcinoma  - staging CT chest - Mediastinal/bilateral hilar/R internal mammary LAD  - mediastinal lymph node biopsy results - Metastatic adenocarcinoma   - pantoprazole  40 mg BID  - OR 8/26 with J tube placement     -For pain control IV Dilaudid 1.5 mg Q4 ATC with 0.9 mg q2h PRN for severe/breakthrough - pt feels pain has improved with this regimen  - As per surgery recs, tube feeds are 25 cc/hr  Encourage use of PRN pain meds, pain better managed; follow with palliative  - a/p surgery notes, No intervention with J tube.

## 2022-09-06 NOTE — PROGRESS NOTE ADULT - SUBJECTIVE AND OBJECTIVE BOX
INCOMPLETE NOTE    Armen Gill | PGY1| Pager: [phone]  Interval Events:    REVIEW OF SYSTEMS:  CONSTITUTIONAL: No weakness, fevers or chills  EYES/ENT: No visual changes;  No vertigo or throat pain   NECK: No pain or stiffness  RESPIRATORY: No cough, wheezing, hemoptysis; No shortness of breath  CARDIOVASCULAR: No chest pain or palpitations  GASTROINTESTINAL: No abdominal or epigastric pain. No nausea, vomiting, or hematemesis; No diarrhea or constipation. No melena or hematochezia.  GENITOURINARY: No dysuria, frequency or hematuria  NEUROLOGICAL: No numbness or weakness  SKIN: No itching, burning, rashes, or lesions   All other review of systems is negative unless indicated above.    OBJECTIVE:  ICU Vital Signs Last 24 Hrs  T(C): 36.7 (06 Sep 2022 02:12), Max: 37.2 (05 Sep 2022 05:54)  T(F): 98.1 (06 Sep 2022 02:12), Max: 99 (05 Sep 2022 05:54)  HR: 97 (06 Sep 2022 02:12) (76 - 100)  BP: 130/81 (06 Sep 2022 02:12) (126/75 - 130/81)  BP(mean): --  ABP: --  ABP(mean): --  RR: 17 (06 Sep 2022 02:12) (17 - 18)  SpO2: 96% (06 Sep 2022 02:12) (96% - 97%)    O2 Parameters below as of 06 Sep 2022 02:12  Patient On (Oxygen Delivery Method): room air              09-04 @ 07:01  -  09-05 @ 07:00  --------------------------------------------------------  IN: 240 mL / OUT: 350 mL / NET: -110 mL    09-05 @ 07:01  -  09-06 @ 05:32  --------------------------------------------------------  IN: 650 mL / OUT: 0 mL / NET: 650 mL      CAPILLARY BLOOD GLUCOSE          PHYSICAL EXAM:  General: WN/WD NAD  Neurology: A&Ox3, nonfocal, LEY x 4  Eyes: PERRLA/ EOMI, Gross vision intact  ENT/Neck: Neck supple, trachea midline, No JVD, Gross hearing intact  Respiratory: CTA B/L, No wheezing, rales, rhonchi  CV: RRR, +S1/S2, -S3/S4, no murmurs, rubs or gallops  Abdominal: Soft, NT, ND +BS, No HSM  MSK: 5/5 strength UE/LE bilaterally  Extremities: No edema, 2+ peripheral pulses  Skin: No Rashes, Hematoma, Ecchymosis  Incisions:   Tubes:    HOSPITAL MEDICATIONS:  MEDICATIONS  (STANDING):  Biotene Dry Mouth Oral Rinse 5 milliLiter(s) Swish and Spit two times a day  enoxaparin Injectable 40 milliGRAM(s) SubCutaneous every 24 hours  HYDROmorphone  Injectable 1.5 milliGRAM(s) IV Push every 4 hours  lidocaine   4% Patch 1 Patch Transdermal daily  metoclopramide Injectable 5 milliGRAM(s) IV Push every 4 hours  pantoprazole  Injectable 40 milliGRAM(s) IV Push every 12 hours  sodium chloride 0.9%. 1000 milliLiter(s) (75 mL/Hr) IV Continuous <Continuous>    MEDICATIONS  (PRN):  bisacodyl Suppository 10 milliGRAM(s) Rectal daily PRN Constipation  HYDROmorphone  Injectable 0.9 milliGRAM(s) IV Push every 2 hours PRN For Breakthrough Pain      LABS:                        10.0   10.18 )-----------( 357      ( 05 Sep 2022 06:33 )             33.3     Hgb Trend: 10.0<--, 9.9<--, 10.1<--, 10.2<--, 9.9<--  09-05    140  |  105  |  14  ----------------------------<  130<H>  3.9   |  25  |  0.52    Ca    8.4      05 Sep 2022 06:33  Phos  3.6     09-05  Mg     1.90     09-05      Creatinine Trend: 0.52<--, 0.47<--, 0.45<--, 0.46<--, 0.48<--, 0.43<--            MICROBIOLOGY:      Armen Gill | PGY1| Pager: [phone]  Interval Events: No acute events overnight, patient report pain waxes and wains    REVIEW OF SYSTEMS:  CONSTITUTIONAL: No weakness, fevers or chills  RESPIRATORY: No cough, wheezing, hemoptysis; No shortness of breath  CARDIOVASCULAR: No chest pain or palpitations  GASTROINTESTINAL: Abdominal pain is less intense, controlled on medication; same as prior days`  NEUROLOGICAL: No numbness or weakness  SKIN: No itching, burning, rashes, or lesions   All other review of systems is negative unless indicated above.    OBJECTIVE:  ICU Vital Signs Last 24 Hrs  T(C): 36.7 (06 Sep 2022 02:12), Max: 37.2 (05 Sep 2022 05:54)  T(F): 98.1 (06 Sep 2022 02:12), Max: 99 (05 Sep 2022 05:54)  HR: 97 (06 Sep 2022 02:12) (76 - 100)  BP: 130/81 (06 Sep 2022 02:12) (126/75 - 130/81)  BP(mean): --  ABP: --  ABP(mean): --  RR: 17 (06 Sep 2022 02:12) (17 - 18)  SpO2: 96% (06 Sep 2022 02:12) (96% - 97%)    O2 Parameters below as of 06 Sep 2022 02:12  Patient On (Oxygen Delivery Method): room air              09-04 @ 07:01  -  09-05 @ 07:00  --------------------------------------------------------  IN: 240 mL / OUT: 350 mL / NET: -110 mL    09-05 @ 07:01  -  09-06 @ 05:32  --------------------------------------------------------  IN: 650 mL / OUT: 0 mL / NET: 650 mL      CAPILLARY BLOOD GLUCOSE      PHYSICAL EXAM:  General: WN/WD NAD  Neurology: A&Ox3, nonfocal, LEY x 4  Eyes: PERRLA/ EOMI, Gross vision intact  ENT/Neck: Neck supple, trachea midline, No JVD, Gross hearing intact  Respiratory: CTA B/L, No wheezing, rales, rhonchi  CV: RRR, +S1/S2, -S3/S4, no murmurs, rubs or gallops  Abdominal: Tenderness in Midline; BS+ abdomen nondistended  Extremities: Edema in lower right foot to the ankles, 2+ peripheral pulses  Skin: No Rashes, Hematoma, Ecchymosis    HOSPITAL MEDICATIONS:  MEDICATIONS  (STANDING):  Biotene Dry Mouth Oral Rinse 5 milliLiter(s) Swish and Spit two times a day  enoxaparin Injectable 40 milliGRAM(s) SubCutaneous every 24 hours  HYDROmorphone  Injectable 1.5 milliGRAM(s) IV Push every 4 hours  lidocaine   4% Patch 1 Patch Transdermal daily  metoclopramide Injectable 5 milliGRAM(s) IV Push every 4 hours  pantoprazole  Injectable 40 milliGRAM(s) IV Push every 12 hours  sodium chloride 0.9%. 1000 milliLiter(s) (75 mL/Hr) IV Continuous <Continuous>    MEDICATIONS  (PRN):  bisacodyl Suppository 10 milliGRAM(s) Rectal daily PRN Constipation  HYDROmorphone  Injectable 0.9 milliGRAM(s) IV Push every 2 hours PRN For Breakthrough Pain      LABS:                        10.0   10.18 )-----------( 357      ( 05 Sep 2022 06:33 )             33.3     Hgb Trend: 10.0<--, 9.9<--, 10.1<--, 10.2<--, 9.9<--  09-05    140  |  105  |  14  ----------------------------<  130<H>  3.9   |  25  |  0.52    Ca    8.4      05 Sep 2022 06:33  Phos  3.6     09-05  Mg     1.90     09-05      Creatinine Trend: 0.52<--, 0.47<--, 0.45<--, 0.46<--, 0.48<--, 0.43<--            MICROBIOLOGY:

## 2022-09-06 NOTE — PROGRESS NOTE ADULT - NUTRITIONAL ASSESSMENT
This patient has been assessed with a concern for Malnutrition and has been determined to have a diagnosis/diagnoses of Severe protein-calorie malnutrition.    This patient is being managed with:   Diet NPO with Tube Feed-  Tube Feeding Modality: Jejunostomy  TwoCal HN (TWOCALHNRTH)  Total Volume for 24 Hours (mL): 840  Continuous  Starting Tube Feed Rate {mL per Hour}: 20  Increase Tube Feed Rate by (mL): 5     Every 6 hours  Until Goal Tube Feed Rate (mL per Hour): 35  Tube Feed Duration (in Hours): 24  Tube Feed Start Time: 18:00  Entered: Sep  5 2022  4:57PM

## 2022-09-06 NOTE — PROGRESS NOTE ADULT - SUBJECTIVE AND OBJECTIVE BOX
Subjective:   Patient seen at bedside this AM. Still reporting pain. Denies n/v    Objective:  Vital Signs  T(C): 36.5 (09-06 @ 05:50), Max: 36.8 (09-06 @ 00:13)  HR: 95 (09-06 @ 05:50) (76 - 100)  BP: 125/79 (09-06 @ 05:50) (125/79 - 130/81)  RR: 18 (09-06 @ 05:50) (17 - 18)  SpO2: 100% (09-06 @ 05:50) (96% - 100%)    Physical Exam:  GEN: resting in bed comfortably in NAD  RESP: no increased WOB  ABD: soft, moderately distended, TTP in epigastrium. No leakage noted around J-tube or kinking of tube  EXTR: warm, well-perfused, no edema  NEURO: awake, alert    Labs:                        10.0   10.18 )-----------( 357      ( 05 Sep 2022 06:33 )             33.3   09-05    140  |  105  |  14  ----------------------------<  130<H>  3.9   |  25  |  0.52    Ca    8.4      05 Sep 2022 06:33  Phos  3.6     09-05  Mg     1.90     09-05      CAPILLARY BLOOD GLUCOSE          Imaging:

## 2022-09-07 NOTE — PROGRESS NOTE ADULT - PROBLEM SELECTOR PLAN 1
Impression: The patient's abdominal pain, persistent nausea/vomiting and weight loss iso of CT A/P findings indicating gastric body thickening and gastrocolic ligament nodularity are concerning for gastric adenocarcinoma. EGD biopsy confirmed.  - GI, onc, surg/onc following  - EGD:  Infiltrative changes in gastric body concerning gastric malignancy, Congested duodenal mucosa, biopsy = poorly differentiated adenocarcinoma  - staging CT chest - Mediastinal/bilateral hilar/R internal mammary LAD  - mediastinal lymph node biopsy results - Metastatic adenocarcinoma   - pantoprazole  40 mg BID  - OR 8/26 with J tube placement     -For pain control IV Dilaudid 1.5 mg Q4 ATC with 0.9 mg q2h PRN for severe/breakthrough - pt feels pain has improved with this regimen  - As per surgery recs, tube feeds are 25 cc/hr  Encourage use of PRN pain meds, pain better managed; follow with palliative  - a/p surgery notes, No intervention with J tube. Impression: The patient's abdominal pain, persistent nausea/vomiting and weight loss iso of CT A/P findings indicating gastric body thickening and gastrocolic ligament nodularity are concerning for gastric adenocarcinoma. EGD biopsy confirmed.  - GI, onc, surg/onc following  - EGD:  Infiltrative changes in gastric body concerning gastric malignancy, Congested duodenal mucosa, biopsy = poorly differentiated adenocarcinoma  - staging CT chest - Mediastinal/bilateral hilar/R internal mammary LAD  - mediastinal lymph node biopsy results - Metastatic adenocarcinoma   - pantoprazole  40 mg BID  - OR 8/26 with J tube placement     -For pain control IV Dilaudid 1.5 mg Q4 ATC with 0.9 mg q2h PRN for severe/breakthrough - pt feels pain has improved with this regimen  - Transitioned to oral PO morphine q4h ATC and 10mg q2h severe pain  - As per surgery recs, tube feeds are 25 cc/hr  Encourage use of PRN pain meds, pain better managed; follow with palliative  - a/p surgery notes, No intervention with J tube.

## 2022-09-07 NOTE — PROGRESS NOTE ADULT - ASSESSMENT
57F PMH of renal stones p/w gastric adenocarcinoma w/ mets to LN c/b DEVI, now resolved, and nephrolithiasis. S/p j tube placement, started on tube feeds, slowly titrating rate of feeds as tolerated and managing pain with palliative following. 57F PMH of renal stones p/w gastric adenocarcinoma w/ mets to LN c/b DEVI, now resolved, and nephrolithiasis. S/p j tube placement, started on tube feeds, slowly titrating rate of feeds as tolerated and managing pain with palliative following. Awaiting emergency medicaid approval for o/p treatment assistance.

## 2022-09-07 NOTE — PROGRESS NOTE ADULT - PROBLEM SELECTOR PLAN 4
Impression: 72 hour caloric count to evaluate for malnutrition - indicates malnutrition. Pt unable to eat due to pain.   Now J tube placement    Nutrition consult - Recommend TwoCal HN via J-tube initiated at 10 mL/hr increased by 10mL q4 hrs until goal rate 35 mL/hr x24 hrs to provide 840 mL formula, 1680 kcal, 70.14 gm protein, 588 mL free water (meets 33.6 kcal/kg, 1.4 gm protein/kg).  - Patient was unable to tolerate tube feed rate of 30, therefore decreased back to 25, will follow up with nutrition regarding updated recommendations

## 2022-09-07 NOTE — CHART NOTE - NSCHARTNOTEFT_GEN_A_CORE
Surgery was paged for erythema around the feeding tube. Patient endorsed that when she eats, it gives her some abdominal pain and can make her nauseous. Not itciness. No push or drainage. Abdominal exam unchanged from yesterday. Will continue to monitor. Can still use for feeds.

## 2022-09-07 NOTE — PROGRESS NOTE ADULT - PROBLEM SELECTOR PLAN 1
- Pain located in epigastric area (area of gastric cancer) and reporting now diffuse abdominal pain since procedures on 8/26   - Pain was reasonably controlled on IV Dilaudid 0.6 mg q4h ATC with 0.3 mg q2h PRN for severe/breakthrough pain  - Patient has required escalating doses, reports feeling improved with 1.5 mg IV dilaudid   - Unclear cause of why pain has been worsening, now with greater pain med requirement   - Transitioned from IV dilaudid to PO morphine solution   - c/w PO morphine solution 20 mg q4h ATC and 10 mg q2h PRN for severe pain

## 2022-09-07 NOTE — CHART NOTE - NSCHARTNOTEFT_GEN_A_CORE
Confidential Drug Utilization Report  Search Terms: stephanie churchill, 1965Search Date: 09/07/2022 13:36:19 PM  Searching on behalf of: [facility]  The Drug Utilization Report below displays all of the controlled substance prescriptions, if any, that your patient has filled in the last twelve months. The information displayed on this report is compiled from pharmacy submissions to the Department, and accurately reflects the information as submitted by the pharmacies.    This report was requested by: Philip Lofton | Reference #: [id number]    Others' Prescriptions  Patient Name: Stephanie ChurchillBirth Date: 1965  Address: [address] [place], [place] [address]Rhu: Female  Rx Written	Rx Dispensed	Drug	Quantity	Days Supply	Prescriber Name	Prescriber Bruna #	Payment Method	Dispenser  07/28/2022	08/06/2022	oxycodone-acetaminophen 5-325 mg tablet	57	7	[facility]	[id number]	Insurance	[id number]  * - Drugs marked with an asterisk are compound drugs. If the compound drug is made up of more than one controlled substance, then each controlled substance will be a separate row in the table.

## 2022-09-07 NOTE — PROGRESS NOTE ADULT - ATTENDING COMMENTS
Patient seen and examined by me. Case discussed with resident and agree with the resident's findings and plan as documented in the resident's note. 57F from [facility], h/o nephrolithiasis p/w abd pain and n/v found to have gastric adenocarcinoma (on bx from 8/15 EGD) w/ mets to LN c/b DEVI (resolved) s/p EBUS w/ LN bx on 8/23 with findings c/w metastatic adenocarcinoma.     1. Metastatic Gastric Adenocarcinoma:   -No plan for inpatient chemotherapy at this time, recs outpatient follow up.   -Also s/p EGD, bx of peritoneum, and robotic feeding J tube placement per surg on 8/26 (peritoneal bx now back and shows Adenocarcinoma with signet ring cells). Surgery was not able to do any resection as exploration revealed advanced disease.     - c/w pain regimen -- palliative on board and following    2. Severe protein calorie malnutrition:   - c/w tube feeds at 25 cc/hr which seems to be right rate working for the patient     3. Disposition:   - pending emergency medicaid for tube feeds, given that patient is uninsured   - awaiting emergency medicaid approval for o/p treatment assistance.  - will follow-up with CM and SW   - PT recommending Home

## 2022-09-07 NOTE — PROGRESS NOTE ADULT - SUBJECTIVE AND OBJECTIVE BOX
SUBJECTIVE AND OBJECTIVE:  Indication for Geriatrics and Palliative Care Services/INTERVAL HPI: Pain management     INTERVAL EVENTS: Patient seen this AM. Patient continues to complain of pain, grimaces from sharp pain when moving in bed. She shows a small area of redness and swelling around J tube site.     DNR on chart:  Allergies    No Known Allergies    Intolerances    MEDICATIONS  (STANDING):  Biotene Dry Mouth Oral Rinse 5 milliLiter(s) Swish and Spit two times a day  enoxaparin Injectable 40 milliGRAM(s) SubCutaneous every 24 hours  lidocaine   4% Patch 1 Patch Transdermal daily  metoclopramide Injectable 5 milliGRAM(s) IV Push every 4 hours  morphine   Solution 20 milliGRAM(s) Oral every 4 hours  pantoprazole   Suspension 40 milliGRAM(s) Oral daily  sodium chloride 0.9%. 1000 milliLiter(s) (75 mL/Hr) IV Continuous <Continuous>    MEDICATIONS  (PRN):  bisacodyl Suppository 10 milliGRAM(s) Rectal daily PRN Constipation  morphine   Solution 10 milliGRAM(s) Oral every 2 hours PRN Severe Pain (7 - 10)      ITEMS UNCHECKED ARE NOT PRESENT    PRESENT SYMPTOMS: [ ]Unable to self-report - see [ ] CPOT [ ] PAINADS [ ] RDOS  Source if other than patient:  [ ]Family   [ ]Team     Pain:  [x ]yes [ ]no  QOL impact - moderately affecting.  Location -    epigastric area               Aggravating factors - none  Quality - sharp  Radiation - to the back  Timing- constant  Severity (0-10 scale): 10/10  Minimal acceptable level (0-10 scale): 4/10    Dyspnea:                           [ ]Mild [ ]Moderate [ ]Severe    RDOS:  0 to 2  minimal or no respiratory distress   3  mild distress  4 to 6 moderate distress  >7 severe distress  https://[email or web address]/handouts/[email or web address]_Distress_Observation_Scale.pdf (Ctrl +  left click to view)     Anxiety:                             [ ]Mild [ ]Moderate [ ]Severe  Fatigue:                             [ ]Mild [ ]Moderate [ ]Severe  Nausea:                             [ ]Mild [ ]Moderate [ ]Severe  Loss of appetite:              [ ]Mild [ ]Moderate [ ]Severe  Constipation:                    [ ]Mild [ ]Moderate [ ]Severe    PCSSQ[Palliative Care Spiritual Screening Question]   Severity (0-10):  Score of 4 or > indicate consideration of Chaplaincy referral.  Chaplaincy Referral: [ ] yes [ ] refused [ ] following    Other Symptoms:  [ ]All other review of systems negative     Vital Signs Last 24 Hrs  Vital Signs Last 24 Hrs  T(C): 36.7 (06 Sep 2022 12:57), Max: 36.8 (06 Sep 2022 00:13)  T(F): 98 (06 Sep 2022 12:57), Max: 98.3 (06 Sep 2022 00:13)  HR: 100 (06 Sep 2022 12:57) (76 - 100)  BP: 140/71 (06 Sep 2022 12:57) (125/79 - 140/71)  BP(mean): --  RR: 18 (06 Sep 2022 12:57) (17 - 18)  SpO2: 98% (06 Sep 2022 12:57) (96% - 100%)    Parameters below as of 06 Sep 2022 12:57  Patient On (Oxygen Delivery Method): room air    GENERAL: [ ]Cachexia    [x ]Alert  [x ]Oriented    [ ]Lethargic  [ ]Unarousable  [ x]Verbal  [ ]Non-Verbal  Behavioral:   [ ]Anxiety  [ ]Delirium [ ]Agitation [ ]Other  HEENT:  [x ]Normal   [ ]Dry mouth   [ ]ET Tube/Trach  [ ]Oral lesions  PULMONARY:   [x ]Clear [ ]Tachypnea  [ ]Audible excessive secretions   [ ]Rhonchi        [ ]Right [ ]Left [ ]Bilateral  [ ]Crackles        [ ]Right [ ]Left [ ]Bilateral  [ ]Wheezing     [ ]Right [ ]Left [ ]Bilateral  [ ]Diminished BS [ ] Right [ ]Left [ ]Bilateral  CARDIOVASCULAR:    [x ]Regular [ ]Irregular [ ]Tachy  [ ]Luis Miguel [ ]Murmur [ ]Other  GASTROINTESTINAL:  [x ]Soft  [ ]Distended   [ ]+BS  [ ]Non tender [ ]Tender  [ ]Other [ ]PEG [ ]OGT/ NGT Patient has laparoscopic surgical incisions covered by  gauze    GENITOURINARY:  [ ]Normal [ ]Incontinent   [ ]Oliguria/Anuria   [x ]Patterson  MUSCULOSKELETAL:   [ x]Normal   [ ]Weakness  [ ]Bed/Wheelchair bound [ ]Edema  NEUROLOGIC:   [x ]No focal deficits  [ ] Cognitive impairment  [ ] Dysphagia [ ]Dysarthria [ ] Paresis [ ]Other   SKIN:   [x ]Normal  [ ]Rash  [ ]Other  [ ]Pressure ulcer(s) [ ]y [x ]n present on admission    CRITICAL CARE:  [ ]Shock Present  [ ]Septic [ ]Cardiogenic [ ]Neurologic [ ]Hypovolemic  [ ]Vasopressors [ ]Inotropes  [ ]Respiratory failure present [ ]Mechanical Ventilation [ ]Non-invasive ventilatory support [ ]High-Flow   [ ]Acute  [ ]Chronic [ ]Hypoxic  [ ]Hypercarbic [ ]Other  [ ]Other organ failure     LABS:               10.0   10.18 )-----------( 357      ( 05 Sep 2022 06:33 )             33.3     09-05    140  |  105  |  14  ----------------------------<  130<H>  3.9   |  25  |  0.52    Ca    8.4      05 Sep 2022 06:33  Phos  3.6     09-05  Mg     1.90     09-05      RADIOLOGY & ADDITIONAL STUDIES:< from: Xray Chest 1 View- PORTABLE-Urgent (Xray Chest 1 View- PORTABLE-Urgent .) (08.25.22 @ 17:28) >  Clear lungs.    < from: Xray Abdomen 1 View PORTABLE -Routine (Xray Abdomen 1 View PORTABLE -Routine .) (08.27.22 @ 13:01) >    IMPRESSION: Status post jejunostomy placement with contrast in the   jejunostomy tube and normal loop of jejunum.    < end of copied text >      Protein Calorie Malnutrition Present: [ ]mild [ ]moderate [ ]severe [ ]underweight [ ]morbid obesity  https://www.andeal.org/vault/2440/web/files/ONC/Table_Clinical%20Characteristics%20to%20Document%20Malnutrition-White%20JV%20et%20al%202012.pdf    Height (cm): 157.5 (08-26-22 @ 07:10)  Weight (kg): 71.1 (08-26-22 @ 07:11)  BMI (kg/m2): 28.7 (08-26-22 @ 07:11)    [ ]PPSV2 < or = 30%  [ ]significant weight loss [ ]poor nutritional intake [ ]anasarca[X ]Artificial Nutrition - J tube     Other REFERRALS:  [ ]Hospice  [ ]Child Life  [ ]Social Work  [ ]Case management [ ]Holistic Therapy

## 2022-09-07 NOTE — PROGRESS NOTE ADULT - NUTRITIONAL ASSESSMENT
This patient has been assessed with a concern for Malnutrition and has been determined to have a diagnosis/diagnoses of Severe protein-calorie malnutrition.    This patient is being managed with:   Diet NPO with Tube Feed-  Tube Feeding Modality: Jejunostomy  TwoCal HN (TWOCALHNRTH)  Total Volume for 24 Hours (mL): 600  Continuous  Starting Tube Feed Rate {mL per Hour}: 20  Increase Tube Feed Rate by (mL): 5     Every 6 hours  Until Goal Tube Feed Rate (mL per Hour): 25  Tube Feed Duration (in Hours): 24  Tube Feed Start Time: 16:00  Entered: Sep  6 2022  3:40PM

## 2022-09-07 NOTE — PROGRESS NOTE ADULT - SUBJECTIVE AND OBJECTIVE BOX
INCOMPLETE NOTE    Armen Gill | PGY1| Pager: [phone]  Interval Events:    REVIEW OF SYSTEMS:  CONSTITUTIONAL: No weakness, fevers or chills  EYES/ENT: No visual changes;  No vertigo or throat pain   NECK: No pain or stiffness  RESPIRATORY: No cough, wheezing, hemoptysis; No shortness of breath  CARDIOVASCULAR: No chest pain or palpitations  GASTROINTESTINAL: No abdominal or epigastric pain. No nausea, vomiting, or hematemesis; No diarrhea or constipation. No melena or hematochezia.  GENITOURINARY: No dysuria, frequency or hematuria  NEUROLOGICAL: No numbness or weakness  SKIN: No itching, burning, rashes, or lesions   All other review of systems is negative unless indicated above.    OBJECTIVE:  ICU Vital Signs Last 24 Hrs  T(C): 36.7 (07 Sep 2022 01:33), Max: 36.8 (06 Sep 2022 21:42)  T(F): 98.1 (07 Sep 2022 01:33), Max: 98.2 (06 Sep 2022 21:42)  HR: 95 (07 Sep 2022 01:33) (95 - 100)  BP: 131/75 (07 Sep 2022 01:33) (125/79 - 140/71)  BP(mean): --  ABP: --  ABP(mean): --  RR: 17 (07 Sep 2022 01:33) (17 - 18)  SpO2: 96% (07 Sep 2022 01:33) (96% - 100%)    O2 Parameters below as of 07 Sep 2022 01:33  Patient On (Oxygen Delivery Method): room air              09-05 @ 07:01  -  09-06 @ 07:00  --------------------------------------------------------  IN: 650 mL / OUT: 0 mL / NET: 650 mL      CAPILLARY BLOOD GLUCOSE          PHYSICAL EXAM:  General: WN/WD NAD  Neurology: A&Ox3, nonfocal, LEY x 4  Eyes: PERRLA/ EOMI, Gross vision intact  ENT/Neck: Neck supple, trachea midline, No JVD, Gross hearing intact  Respiratory: CTA B/L, No wheezing, rales, rhonchi  CV: RRR, +S1/S2, -S3/S4, no murmurs, rubs or gallops  Abdominal: Soft, NT, ND +BS, No HSM  MSK: 5/5 strength UE/LE bilaterally  Extremities: No edema, 2+ peripheral pulses  Skin: No Rashes, Hematoma, Ecchymosis  Incisions:   Tubes:    HOSPITAL MEDICATIONS:  MEDICATIONS  (STANDING):  Biotene Dry Mouth Oral Rinse 5 milliLiter(s) Swish and Spit two times a day  enoxaparin Injectable 40 milliGRAM(s) SubCutaneous every 24 hours  lidocaine   4% Patch 1 Patch Transdermal daily  metoclopramide Injectable 5 milliGRAM(s) IV Push every 4 hours  morphine   Solution 20 milliGRAM(s) Oral every 4 hours  pantoprazole   Suspension 40 milliGRAM(s) Oral daily  sodium chloride 0.9%. 1000 milliLiter(s) (75 mL/Hr) IV Continuous <Continuous>    MEDICATIONS  (PRN):  bisacodyl Suppository 10 milliGRAM(s) Rectal daily PRN Constipation  morphine   Solution 10 milliGRAM(s) Oral every 2 hours PRN Severe Pain (7 - 10)      LABS:                        10.0   10.18 )-----------( 357      ( 05 Sep 2022 06:33 )             33.3     Hgb Trend: 10.0<--, 9.9<--, 10.1<--, 10.2<--  09-05    140  |  105  |  14  ----------------------------<  130<H>  3.9   |  25  |  0.52    Ca    8.4      05 Sep 2022 06:33  Phos  3.6     09-05  Mg     1.90     09-05      Creatinine Trend: 0.52<--, 0.47<--, 0.45<--, 0.46<--, 0.48<--, 0.43<--            MICROBIOLOGY:      Armen Gill | PGY1| Pager: [phone]  Interval Events: No acute events overnight; Patient appears to have improved pain management compared with yesterday    REVIEW OF SYSTEMS:  CONSTITUTIONAL: Reports some mild nausea; spitting up occasionally after water accidentally enters orally.  EYES/ENT: No visual changes;  No vertigo or throat pain   RESPIRATORY: No shortness of breath  CARDIOVASCULAR: No chest pain or palpitations  GASTROINTESTINAL: Patient reports pain around her J Tube site; and pain with movement  GENITOURINARY: No dysuria, frequency or hematuria  All other review of systems is negative unless indicated above.    OBJECTIVE:  ICU Vital Signs Last 24 Hrs  T(C): 36.7 (07 Sep 2022 01:33), Max: 36.8 (06 Sep 2022 21:42)  T(F): 98.1 (07 Sep 2022 01:33), Max: 98.2 (06 Sep 2022 21:42)  HR: 95 (07 Sep 2022 01:33) (95 - 100)  BP: 131/75 (07 Sep 2022 01:33) (125/79 - 140/71)  BP(mean): --  ABP: --  ABP(mean): --  RR: 17 (07 Sep 2022 01:33) (17 - 18)  SpO2: 96% (07 Sep 2022 01:33) (96% - 100%)    O2 Parameters below as of 07 Sep 2022 01:33  Patient On (Oxygen Delivery Method): room air              09-05 @ 07:01  -  09-06 @ 07:00  --------------------------------------------------------  IN: 650 mL / OUT: 0 mL / NET: 650 mL      CAPILLARY BLOOD GLUCOSE      PHYSICAL EXAM:  General: NAD  Neurology: A&Ox3, nonfocal, LEY x 4  ENT/Neck: Neck supple, trachea midline, Gross hearing intact  Respiratory: CTA B/L, No wheezing, rales, rhonchi  CV: RRR, +S1/S2, -S3/S4, no murmurs, rubs or gallops  Abdominal: BS+ tenderness around J Tube site similar to day prior  Extremities: No edema, 2+ peripheral pulses  Skin: No Rashes, Hematoma, Ecchymosis  Tubes: J Tube site appears well-dressed    HOSPITAL MEDICATIONS:  MEDICATIONS  (STANDING):  Biotene Dry Mouth Oral Rinse 5 milliLiter(s) Swish and Spit two times a day  enoxaparin Injectable 40 milliGRAM(s) SubCutaneous every 24 hours  lidocaine   4% Patch 1 Patch Transdermal daily  metoclopramide Injectable 5 milliGRAM(s) IV Push every 4 hours  morphine   Solution 20 milliGRAM(s) Oral every 4 hours  pantoprazole   Suspension 40 milliGRAM(s) Oral daily  sodium chloride 0.9%. 1000 milliLiter(s) (75 mL/Hr) IV Continuous <Continuous>    MEDICATIONS  (PRN):  bisacodyl Suppository 10 milliGRAM(s) Rectal daily PRN Constipation  morphine   Solution 10 milliGRAM(s) Oral every 2 hours PRN Severe Pain (7 - 10)      LABS:                        10.0   10.18 )-----------( 357      ( 05 Sep 2022 06:33 )             33.3     Hgb Trend: 10.0<--, 9.9<--, 10.1<--, 10.2<--  09-05    140  |  105  |  14  ----------------------------<  130<H>  3.9   |  25  |  0.52    Ca    8.4      05 Sep 2022 06:33  Phos  3.6     09-05  Mg     1.90     09-05      Creatinine Trend: 0.52<--, 0.47<--, 0.45<--, 0.46<--, 0.48<--, 0.43<--            MICROBIOLOGY:

## 2022-09-07 NOTE — PROGRESS NOTE ADULT - PROBLEM SELECTOR PLAN 2
Impression: Patient having L leg swelling yesterday, resolved today. Low suspicion for a DVT - (no tachycardia, no pain).   US - negative for DVT  Swelling limited to ankle Impression: Patient having L leg swelling yesterday, resolved today. Low suspicion for a DVT - (no tachycardia, no pain).   US - negative for DVT  Swelling limited to LLE to the ankle

## 2022-09-08 NOTE — PROGRESS NOTE ADULT - ATTENDING COMMENTS
Patient seen and examined by me. Case discussed with resident and agree with the resident's findings and plan as documented in the resident's note. 57F from [facility], h/o nephrolithiasis p/w abd pain and n/v found to have gastric adenocarcinoma (on bx from 8/15 EGD) w/ mets to LN c/b DEVI (resolved) s/p EBUS w/ LN bx on 8/23 with findings c/w metastatic adenocarcinoma.     1. Metastatic Gastric Adenocarcinoma:   -No plan for inpatient chemotherapy at this time, recs outpatient follow up.   -Also s/p EGD, bx of peritoneum, and robotic feeding J tube placement per surg on 8/26 (peritoneal bx now back and shows Adenocarcinoma with signet ring cells). Surgery was not able to do any resection as exploration revealed advanced disease.     - c/w pain regimen -- palliative on board and following    2. Severe protein calorie malnutrition:   - c/w tube feeds at 25 cc/hr which seems to be right rate working for the patient     3. Disposition:   - pending emergency medicaid for tube feeds, given that patient is uninsured   - awaiting emergency medicaid approval for o/p treatment assistance.  - will follow-up with CM and SW   - PT recommending Home

## 2022-09-08 NOTE — PROGRESS NOTE ADULT - PROBLEM SELECTOR PLAN 1
- Pain located in epigastric area (area of gastric cancer) and reporting now diffuse abdominal pain since procedures on 8/26   - Pain was reasonably controlled on IV Dilaudid 0.6 mg q4h ATC with 0.3 mg q2h PRN for severe/breakthrough pain  - Patient has required escalating doses, reports feeling improved with 1.5 mg IV dilaudid   - Unclear cause of why pain has been worsening, now with greater pain med requirement   - Transitioned from IV dilaudid to PO morphine solution. However patient will be unable to be discharge with PO morphine due to prohibitive cost (as she has no insurance)   - Discontinued morphine solution 20 mg q4h ATC   - Started on methadone solution 5 mg BID   - c/w morphine solution 10 mg q4h for PRN coverage of severe pain   - bowel regimen, goal BM every 2 to 3 days - Pain located in epigastric area (area of gastric cancer) and reporting now diffuse abdominal pain since procedures on 8/26   - Transitioned from IV dilaudid to PO morphine solution. However patient will be unable to be discharged with PO morphine due to prohibitive cost (as she has no insurance)   - Discontinued morphine solution 20 mg q4h ATC   - Started on methadone solution 5 mg BID   - c/w morphine solution 10 mg q4h for PRN coverage of severe pain   - bowel regimen, goal BM every 2 to 3 days

## 2022-09-08 NOTE — PROGRESS NOTE ADULT - ASSESSMENT
57F PMH of renal stones p/w gastric adenocarcinoma w/ mets to LN c/b DEVI, now resolved, and nephrolithiasis. S/p j tube placement, started on tube feeds, slowly titrating rate of feeds as tolerated and managing pain with palliative following. Awaiting emergency medicaid approval for o/p treatment assistance.

## 2022-09-08 NOTE — PROGRESS NOTE ADULT - PROBLEM SELECTOR PLAN 2
Impression: Patient having L leg swelling yesterday, resolved today. Low suspicion for a DVT - (no tachycardia, no pain).   US - negative for DVT  Swelling limited to LLE to the ankle

## 2022-09-08 NOTE — PROGRESS NOTE ADULT - PROBLEM SELECTOR PLAN 3
Impression: Pt has vomiting and intermittent episodes. Vomiting associated with eating meals. Likely related to gastric adenocarcinoma.     Pt stating that reglan is helping with the nausea and dizziness  IV reglan 5 mg Q4 Impression: Pt has vomiting and intermittent episodes. Vomiting associated with eating meals. Likely related to gastric adenocarcinoma.     IV reglan 5 mg Q4

## 2022-09-08 NOTE — PROGRESS NOTE ADULT - PROBLEM SELECTOR PLAN 4
Impression: 72 hour caloric count to evaluate for malnutrition - indicates malnutrition. Pt unable to eat due to pain.   Now J tube placement    Nutrition consult - Recommend TwoCal HN via J-tube initiated at 10 mL/hr increased by 10mL q4 hrs until goal rate 35 mL/hr x24 hrs to provide 840 mL formula, 1680 kcal, 70.14 gm protein, 588 mL free water (meets 33.6 kcal/kg, 1.4 gm protein/kg).  - Patient was unable to tolerate tube feed rate of 30, therefore decreased back to 25, will follow up with nutrition regarding updated recommendations Impression: 72 hour caloric count to evaluate for malnutrition - indicates malnutrition. Pt unable to eat due to pain.   Now J tube placement    Nutrition consult - Recommend TwoCal HN via J-tube initiated at 10 mL/hr increased by 10mL q4 hrs until goal rate 35 mL/hr x24 hrs to provide 840 mL formula, 1680 kcal, 70.14 gm protein, 588 mL free water (meets 33.6 kcal/kg, 1.4 gm protein/kg).  - Patient was unable to tolerate tube feed rate of 30, therefore decreased back to 25

## 2022-09-08 NOTE — CHART NOTE - NSCHARTNOTEFT_GEN_A_CORE
Surgery was paged about patient endorsing chills, now febrile and concern for erythema around JTube site. On exam, J Tube site does not show signs of infection, nonerythematous, nonindurated, patient denies pain around site, no drainage observed.   Rec:   - Can resume tube feeds per primary team   - fever workup to rule out possible source of infection (UA, CXR, blood cx)  - monitor vitals  - surgery will follow Surgery was paged about patient endorsing chills, now febrile and concern for erythema around JTube site. On exam, J Tube site does not show signs of infection, nonerythematous, nonindurated, patient denies pain around site, no drainage observed.   Rec:   - Resume tube feeds per primary team, if tolerating current rate can increase to 30cc/hr  - fever workup to rule out possible source of infection (UA, CXR, blood cx)  - monitor vitals  - surgery will follow

## 2022-09-08 NOTE — CHART NOTE - NSCHARTNOTEFT_GEN_A_CORE
called to bedside ~4pm for sudden onset fever/chills.     Patient w report of coughing/wheezing prior to arrival. Evaluated patient, she was going to bathroom and came back and sat in bed and felt fever and chills. Had some slight cough that is not present on exam. Denies diarrhea, last BM ~2d prior. No change in abdominal pain, though J-tube site slightly tender to palp. NO rhinorrhea, no congestion, no sore throat. NO dysuria. No CP or SOB. No n/v/d.     Vitals: T 102 rectal, HR tachycardic ~120, /98, RR 20, saturating 92-93% on RA.     Physical Exam:  Heart: tachy rate, regular rhythm  Lungs: CTAB, no wheezes  Abdomen: SLightly distended, mildly tender to palp, unchanged from prior exam. J-tube slight slightly erythematous, slightly tender to palpation.  LE: No LE edema, No TTP in calves b/l.     Assessment:  Patient with fever, chills, techycardia. Meeting SIRS criteria suddenly after uneventful past few days admitted pending sw issues regarding insurance. S/p Dx lap and jejunostomy on 8/26/22.  DDx:  Viral: Potentially viral URI as patient in hospital setting.  Bacterial: Intra-abdominal vs. cutaneous from J-tube site, has erythema and slight tenderness. Was evaluated by surgery and deemed to be clear however, they have no concern for infection, exam appreciated  Other: Patient has confirmed metastatic malignancy, poor ambulation due to j-tube and continuous tube feeds. Is post-op. Potential for PE given fever, tachy, slight hypoxia.     Plan:  - BCx x2  - supplemental O2 as needed, goal O2 > 92%  - CBC, CMP, coags, VBG w lactate  - RVP  - CXR and AXR given recent surg and cough  - CTA chest PE protocol; consider CT AP since going for CT to r/o intra-abdominal abscess post op   - 1g IV tylenol for fever  - will start zosyn empirically until BCx result, concern for intra-abdominal infection

## 2022-09-08 NOTE — PROGRESS NOTE ADULT - SUBJECTIVE AND OBJECTIVE BOX
INCOMPLETE NOTE    Armen Gill | PGY1| Pager: [phone]  Interval Events:    REVIEW OF SYSTEMS:  CONSTITUTIONAL: No weakness, fevers or chills  EYES/ENT: No visual changes;  No vertigo or throat pain   NECK: No pain or stiffness  RESPIRATORY: No cough, wheezing, hemoptysis; No shortness of breath  CARDIOVASCULAR: No chest pain or palpitations  GASTROINTESTINAL: No abdominal or epigastric pain. No nausea, vomiting, or hematemesis; No diarrhea or constipation. No melena or hematochezia.  GENITOURINARY: No dysuria, frequency or hematuria  NEUROLOGICAL: No numbness or weakness  SKIN: No itching, burning, rashes, or lesions   All other review of systems is negative unless indicated above.    OBJECTIVE:  ICU Vital Signs Last 24 Hrs  T(C): 36.7 (08 Sep 2022 05:21), Max: 37.2 (07 Sep 2022 21:39)  T(F): 98.1 (08 Sep 2022 05:21), Max: 98.9 (07 Sep 2022 21:39)  HR: 95 (08 Sep 2022 05:21) (95 - 109)  BP: 135/90 (08 Sep 2022 05:21) (127/83 - 141/90)  BP(mean): --  ABP: --  ABP(mean): --  RR: 17 (08 Sep 2022 05:21) (17 - 18)  SpO2: 100% (08 Sep 2022 05:21) (96% - 100%)    O2 Parameters below as of 08 Sep 2022 05:21  Patient On (Oxygen Delivery Method): room air              09-07 @ 07:01  -  09-08 @ 05:31  --------------------------------------------------------  IN: 300 mL / OUT: 0 mL / NET: 300 mL      CAPILLARY BLOOD GLUCOSE          PHYSICAL EXAM:  General: WN/WD NAD  Neurology: A&Ox3, nonfocal, LEY x 4  Eyes: PERRLA/ EOMI, Gross vision intact  ENT/Neck: Neck supple, trachea midline, No JVD, Gross hearing intact  Respiratory: CTA B/L, No wheezing, rales, rhonchi  CV: RRR, +S1/S2, -S3/S4, no murmurs, rubs or gallops  Abdominal: Soft, NT, ND +BS, No HSM  MSK: 5/5 strength UE/LE bilaterally  Extremities: No edema, 2+ peripheral pulses  Skin: No Rashes, Hematoma, Ecchymosis  Incisions:   Tubes:    HOSPITAL MEDICATIONS:  MEDICATIONS  (STANDING):  Biotene Dry Mouth Oral Rinse 5 milliLiter(s) Swish and Spit two times a day  enoxaparin Injectable 40 milliGRAM(s) SubCutaneous every 24 hours  lidocaine   4% Patch 1 Patch Transdermal daily  metoclopramide Injectable 5 milliGRAM(s) IV Push every 4 hours  morphine   Solution 20 milliGRAM(s) Oral every 4 hours  pantoprazole   Suspension 40 milliGRAM(s) Oral daily  sodium chloride 0.9%. 1000 milliLiter(s) (75 mL/Hr) IV Continuous <Continuous>    MEDICATIONS  (PRN):  bisacodyl Suppository 10 milliGRAM(s) Rectal daily PRN Constipation  morphine   Solution 10 milliGRAM(s) Oral every 2 hours PRN Severe Pain (7 - 10)      LABS:    Hgb Trend: 10.0<--, 9.9<--, 10.1<--        Creatinine Trend: 0.52<--, 0.47<--, 0.45<--, 0.46<--, 0.48<--, 0.43<--            MICROBIOLOGY:      Armen Gill | PGY1| Pager: [phone]  Interval Events: Patient reports she generally feels better, denied any acute complaints    REVIEW OF SYSTEMS:  CONSTITUTIONAL: No weakness, fevers or chills; mild nausea with minimal incidental fluid intake  RESPIRATORY: No cough, No shortness of breath  CARDIOVASCULAR: No chest pain or palpitations  GASTROINTESTINAL: Reports abdominal pain is slightly improved; reports some discomfort around j tube site  GENITOURINARY: No dysuria, frequency or hematuria  NEUROLOGICAL: No numbness or weakness  All other review of systems is negative unless indicated above.    OBJECTIVE:  ICU Vital Signs Last 24 Hrs  T(C): 36.7 (08 Sep 2022 05:21), Max: 37.2 (07 Sep 2022 21:39)  T(F): 98.1 (08 Sep 2022 05:21), Max: 98.9 (07 Sep 2022 21:39)  HR: 95 (08 Sep 2022 05:21) (95 - 109)  BP: 135/90 (08 Sep 2022 05:21) (127/83 - 141/90)  BP(mean): --  ABP: --  ABP(mean): --  RR: 17 (08 Sep 2022 05:21) (17 - 18)  SpO2: 100% (08 Sep 2022 05:21) (96% - 100%)    O2 Parameters below as of 08 Sep 2022 05:21  Patient On (Oxygen Delivery Method): room air              09-07 @ 07:01  -  09-08 @ 05:31  --------------------------------------------------------  IN: 300 mL / OUT: 0 mL / NET: 300 mL      CAPILLARY BLOOD GLUCOSE          PHYSICAL EXAM:  General: NAD  Neurology: A&Ox3, LEY x 4  ENT/Neck: Neck supple, trachea midline,   Respiratory: CTA B/L, No wheezing, rales, rhonchi  CV: Tachycardic; low 100s, regular rhythm, +S1/S2, -S3/S4, no murmurs, rubs or gallops  Abdominal: distended, tenderness appreciated in midline upper quadrant, J tube site is bandaged with no erythema, continued tenderness.  Extremities: 2+ edema in LLE to the ankle 2+ peripheral pulses  Skin: No Rashes, Hematoma, Ecchymosis  Tubes: J Tube; site is clean and intact 25cc/hr    HOSPITAL MEDICATIONS:  MEDICATIONS  (STANDING):  Biotene Dry Mouth Oral Rinse 5 milliLiter(s) Swish and Spit two times a day  enoxaparin Injectable 40 milliGRAM(s) SubCutaneous every 24 hours  lidocaine   4% Patch 1 Patch Transdermal daily  metoclopramide Injectable 5 milliGRAM(s) IV Push every 4 hours  morphine   Solution 20 milliGRAM(s) Oral every 4 hours  pantoprazole   Suspension 40 milliGRAM(s) Oral daily  sodium chloride 0.9%. 1000 milliLiter(s) (75 mL/Hr) IV Continuous <Continuous>    MEDICATIONS  (PRN):  bisacodyl Suppository 10 milliGRAM(s) Rectal daily PRN Constipation  morphine   Solution 10 milliGRAM(s) Oral every 2 hours PRN Severe Pain (7 - 10)      LABS:    Hgb Trend: 10.0<--, 9.9<--, 10.1<--        Creatinine Trend: 0.52<--, 0.47<--, 0.45<--, 0.46<--, 0.48<--, 0.43<--            MICROBIOLOGY:

## 2022-09-08 NOTE — PROGRESS NOTE ADULT - PROBLEM SELECTOR PLAN 1
Impression: The patient's abdominal pain, persistent nausea/vomiting and weight loss iso of CT A/P findings indicating gastric body thickening and gastrocolic ligament nodularity are concerning for gastric adenocarcinoma. EGD biopsy confirmed.  - GI, onc, surg/onc following  - EGD:  Infiltrative changes in gastric body concerning gastric malignancy, Congested duodenal mucosa, biopsy = poorly differentiated adenocarcinoma  - staging CT chest - Mediastinal/bilateral hilar/R internal mammary LAD  - mediastinal lymph node biopsy results - Metastatic adenocarcinoma   - pantoprazole  40 mg BID  - OR 8/26 with J tube placement     -For pain control IV Dilaudid 1.5 mg Q4 ATC with 0.9 mg q2h PRN for severe/breakthrough - pt feels pain has improved with this regimen  - Transitioned to oral PO morphine q4h ATC and 10mg q2h severe pain  - As per surgery recs, tube feeds are 25 cc/hr  Encourage use of PRN pain meds, pain better managed; follow with palliative  - a/p surgery notes, No intervention with J tube.

## 2022-09-08 NOTE — PROGRESS NOTE ADULT - SUBJECTIVE AND OBJECTIVE BOX
SUBJECTIVE AND OBJECTIVE:  Indication for Geriatrics and Palliative Care Services/INTERVAL HPI: Pain management     INTERVAL EVENTS: Patient seen this AM. Discussed about changing to another medication that would be more affordable for her. She is concerned about how she will obtain pain meds after discharge. Reports not having a BM for 2 days. Patient has usually been having BM without any bowel regimen.     DNR on chart:  Allergies    No Known Allergies    Intolerances    MEDICATIONS  (STANDING):  Biotene Dry Mouth Oral Rinse 5 milliLiter(s) Swish and Spit two times a day  enoxaparin Injectable 40 milliGRAM(s) SubCutaneous every 24 hours  lidocaine   4% Patch 1 Patch Transdermal daily  methadone   Solution 5 milliGRAM(s) Enteral Tube every 12 hours  pantoprazole   Suspension 40 milliGRAM(s) Oral daily  polyethylene glycol 3350 17 Gram(s) Oral daily  senna 2 Tablet(s) Oral at bedtime  sodium chloride 0.9%. 1000 milliLiter(s) (75 mL/Hr) IV Continuous <Continuous>    MEDICATIONS  (PRN):  bisacodyl Suppository 10 milliGRAM(s) Rectal daily PRN Constipation  metoclopramide   Syrup 5 milliGRAM(s) Oral every 4 hours PRN Nausea/Vomiting  morphine   Solution 10 milliGRAM(s) Enteral Tube every 4 hours PRN Severe Pain (7 - 10)      ITEMS UNCHECKED ARE NOT PRESENT    PRESENT SYMPTOMS: [ ]Unable to self-report - see [ ] CPOT [ ] PAINADS [ ] RDOS  Source if other than patient:  [ ]Family   [ ]Team     Pain:  [x ]yes [ ]no  QOL impact - moderately affecting.  Location -    epigastric area               Aggravating factors - none  Quality - sharp  Radiation - to the back  Timing- constant  Severity (0-10 scale): 10/10  Minimal acceptable level (0-10 scale): 4/10    Dyspnea:                           [ ]Mild [ ]Moderate [ ]Severe    RDOS:  0 to 2  minimal or no respiratory distress   3  mild distress  4 to 6 moderate distress  >7 severe distress  [email or web address]_Distress_[email or web address] (Ctrl +  left click to view)     Anxiety:                             [ ]Mild [ ]Moderate [ ]Severe  Fatigue:                             [ ]Mild [ ]Moderate [ ]Severe  Nausea:                             [ ]Mild [ ]Moderate [ ]Severe  Loss of appetite:              [ ]Mild [ ]Moderate [ ]Severe  Constipation:                    [ ]Mild [ ]Moderate [ ]Severe    PCSSQ[Palliative Care Spiritual Screening Question]   Severity (0-10):  Score of 4 or > indicate consideration of Chaplaincy referral.  Chaplaincy Referral: [ ] yes [ ] refused [ ] following    Other Symptoms:  [ ]All other review of systems negative     Vital Signs Last 24 Hrs  Vital Signs Last 24 Hrs  T(C): 36.7 (06 Sep 2022 12:57), Max: 36.8 (06 Sep 2022 00:13)  T(F): 98 (06 Sep 2022 12:57), Max: 98.3 (06 Sep 2022 00:13)  HR: 100 (06 Sep 2022 12:57) (76 - 100)  BP: 140/71 (06 Sep 2022 12:57) (125/79 - 140/71)  BP(mean): --  RR: 18 (06 Sep 2022 12:57) (17 - 18)  SpO2: 98% (06 Sep 2022 12:57) (96% - 100%)    Parameters below as of 06 Sep 2022 12:57  Patient On (Oxygen Delivery Method): room air    GENERAL: [ ]Cachexia    [x ]Alert  [x ]Oriented    [ ]Lethargic  [ ]Unarousable  [ x]Verbal  [ ]Non-Verbal  Behavioral:   [ ]Anxiety  [ ]Delirium [ ]Agitation [ ]Other  HEENT:  [x ]Normal   [ ]Dry mouth   [ ]ET Tube/Trach  [ ]Oral lesions  PULMONARY:   [x ]Clear [ ]Tachypnea  [ ]Audible excessive secretions   [ ]Rhonchi        [ ]Right [ ]Left [ ]Bilateral  [ ]Crackles        [ ]Right [ ]Left [ ]Bilateral  [ ]Wheezing     [ ]Right [ ]Left [ ]Bilateral  [ ]Diminished BS [ ] Right [ ]Left [ ]Bilateral  CARDIOVASCULAR:    [x ]Regular [ ]Irregular [ ]Tachy  [ ]Luis Miguel [ ]Murmur [ ]Other  GASTROINTESTINAL:  [x ]Soft  [ ]Distended   [ ]+BS  [ ]Non tender [ ]Tender  [ ]Other [X ]J tube [ ]OGT/ NGT Patient has laparoscopic surgical incisions covered by  gauze    GENITOURINARY:  [ ]Normal [ ]Incontinent   [ ]Oliguria/Anuria   [x ]Patterson  MUSCULOSKELETAL:   [ x]Normal   [ ]Weakness  [ ]Bed/Wheelchair bound [ ]Edema  NEUROLOGIC:   [x ]No focal deficits  [ ] Cognitive impairment  [ ] Dysphagia [ ]Dysarthria [ ] Paresis [ ]Other   SKIN:   [x ]Normal  [ ]Rash  [ ]Other  [ ]Pressure ulcer(s) [ ]y [x ]n present on admission    CRITICAL CARE:  [ ]Shock Present  [ ]Septic [ ]Cardiogenic [ ]Neurologic [ ]Hypovolemic  [ ]Vasopressors [ ]Inotropes  [ ]Respiratory failure present [ ]Mechanical Ventilation [ ]Non-invasive ventilatory support [ ]High-Flow   [ ]Acute  [ ]Chronic [ ]Hypoxic  [ ]Hypercarbic [ ]Other  [ ]Other organ failure     LABS:    no recent labs     RADIOLOGY & ADDITIONAL STUDIES:< from: Xray Chest 1 View- PORTABLE-Urgent (Xray Chest 1 View- PORTABLE-Urgent .) (08.25.22 @ 17:28) >  Clear lungs.    < from: Xray Abdomen 1 View PORTABLE -Routine (Xray Abdomen 1 View PORTABLE -Routine .) (08.27.22 @ 13:01) >    IMPRESSION: Status post jejunostomy placement with contrast in the   jejunostomy tube and normal loop of jejunum.    < end of copied text >      Protein Calorie Malnutrition Present: [ ]mild [ ]moderate [ ]severe [ ]underweight [ ]morbid obesity  https://www.andeal.org/vault/2440/web/files/ONC/Table_Clinical%20Characteristics%20to%20Document%20Malnutrition-White%20JV%20et%20al%266618.pdf    Height (cm): 157.5 (08-26-22 @ 07:10)  Weight (kg): 71.1 (08-26-22 @ 07:11)  BMI (kg/m2): 28.7 (08-26-22 @ 07:11)    [ ]PPSV2 < or = 30%  [ ]significant weight loss [ ]poor nutritional intake [ ]anasarca[X ]Artificial Nutrition - J tube     Other REFERRALS:  [ ]Hospice  [ ]Child Life  [ ]Social Work  [ ]Case management [ ]Holistic Therapy

## 2022-09-09 NOTE — PROGRESS NOTE ADULT - ATTENDING COMMENTS
Patient seen and examined, care d/w HS7 team.    In summary 57F from [facility], h/o nephrolithiasis p/w abd pain and n/v found to have gastric adenocarcinoma (on bx from 8/15 EGD) w/ mets to LN c/b DEVI (resolved) s/p EBUS w/ LN bx on 8/23 with findings c/w metastatic adenocarcinoma.     #  Metastatic Gastric Adenocarcinoma:   - no plan for inpatient chemotherapy at this time, recs outpatient follow up.   - s/p EGD, bx of peritoneum, and robotic feeding J tube placement per surg on 8/26 (peritoneal bx now back and shows Adenocarcinoma with signet ring cells). Surgery was not able to do any resection as exploration revealed advanced disease.     - c/w pain regimen, seen by palliative, f/u as reports poor pain control    # SIRS:  Fever 102.8 on 9/8/22 w/ tachycardia to 130s; only complaint is abdominal pain  - RVP neg  - blood cx 9/8 pending  - CXR and abdominal xray: no consolidation or obstruction  - pending CTAP and CTPA  - on empiric zosyn     # Severe protein calorie malnutrition: due to cancer   - c/w tube feeds at 25 cc/hr   - c/w symptom management w/ antiemetics     # Disposition:   - emergency medicaid approval for OP treatment   - per CM/SW TFs will not be covered, trying to get [employer] TFs from company Patient seen and examined, care d/w HS7 team.    In summary 57F from [facility], h/o nephrolithiasis p/w abd pain and n/v found to have gastric adenocarcinoma (on bx from 8/15 EGD) w/ mets to LN c/b DEVI (resolved) s/p EBUS w/ LN bx on 8/23 with findings c/w metastatic adenocarcinoma.     #  Metastatic Gastric Adenocarcinoma:   - no plan for inpatient chemotherapy at this time, recs outpatient follow up.   - s/p EGD, bx of peritoneum, and robotic feeding J tube placement per surg on 8/26 (peritoneal bx now back and shows Adenocarcinoma with signet ring cells). Surgery was not able to do any resection as exploration revealed advanced disease.     - c/w pain regimen, seen by palliative, f/u as reports poor pain control    # SIRS:  Fever 102.8 on 9/8/22 w/ tachycardia to 130s; only complaint is abdominal pain  - RVP neg  - blood cx 9/8 pending  - CXR and abdominal xray: no consolidation or obstruction  - pending CTAP and CTPA; LFTs/bili uptrending, ?gallbladder  - on empiric zosyn     # Severe protein calorie malnutrition: due to cancer   - c/w tube feeds at 25 cc/hr   - c/w symptom management w/ antiemetics     # Disposition:   - emergency medicaid approval for OP treatment   - per CM/SW TFs will not be covered, trying to get [employer] TFs from company

## 2022-09-09 NOTE — PROGRESS NOTE ADULT - PROBLEM SELECTOR PLAN 4
Problem: Cardiac Surgery  Goal: Hemodynamic stability achieved/maintained  Description: Hemodynamic instability may include VS or rhythm changes or s/s FVE.  AHA guidelines: Keep BP>90 mm Hg.  Outcome: Outcome Not Met, Continue to Monitor  Goal: Extubation criteria met (Goal within 6 hours post-op)  Description: Choose this goal for [facility] patients only  Outcome: Outcome Not Met, Continue to Monitor  Goal: Extubation criteria met (Goal within 24 hours post-op)  Outcome: Outcome Not Met, Continue to Monitor  Goal: Neurological status returned to baseline  Outcome: Outcome Not Met, Continue to Monitor  Goal: Elimination status is maintained/returned to baseline  Outcome: Outcome Not Met, Continue to Monitor  Goal: Activity level is maintained/returned to level needed for d/c  Outcome: Outcome Not Met, Continue to Monitor  Goal: Verbalizes/demonstrates understanding of heart disease, risk factors, treatment plan, and d/c instructions  Description: Document on Patient Education Activity   Outcome: Outcome Not Met, Continue to Monitor     Problem: At Risk for Falls  Goal: # Patient does not fall  Outcome: Outcome Not Met, Continue to Monitor  Goal: # Takes action to control fall-related risks  Outcome: Outcome Not Met, Continue to Monitor  Goal: # Verbalizes understanding of fall risk/precautions  Description: Document education using the patient education activity  Outcome: Outcome Not Met, Continue to Monitor     Problem: At Risk for Injury Due to Fall  Goal: # Patient does not fall  Outcome: Outcome Not Met, Continue to Monitor  Goal: # Takes action to control condition specific risks  Outcome: Outcome Not Met, Continue to Monitor  Goal: # Verbalizes understanding of fall-related injury personal risks  Description: Document education using the patient education activity  Outcome: Outcome Not Met, Continue to Monitor      - Palliative consulted for pain management   - Patient is uninsured, pending insurance approval to be able to go to [facility] for treatment   - On discharge, patient must have follow up with outpatient palliative to continue prescribing methadone for pain control (will reach out to clinic)

## 2022-09-09 NOTE — PROGRESS NOTE ADULT - SUBJECTIVE AND OBJECTIVE BOX
SUBJECTIVE AND OBJECTIVE:  Indication for Geriatrics and Palliative Care Services/INTERVAL HPI: Pain management     INTERVAL EVENTS: Patient seen this PM complaining of 10/10 pain, says the 10 mg prn dose made no change to pain. I explained the change to a slower acting medication since it is more affordable. Patient also required reglan for vomiting today.     DNR on chart:  Allergies    No Known Allergies    Intolerances    MEDICATIONS  (STANDING):  Biotene Dry Mouth Oral Rinse 5 milliLiter(s) Swish and Spit two times a day  enoxaparin Injectable 40 milliGRAM(s) SubCutaneous every 24 hours  lidocaine   4% Patch 1 Patch Transdermal daily  methadone   Solution 5 milliGRAM(s) Enteral Tube every 12 hours  pantoprazole   Suspension 40 milliGRAM(s) Oral daily  polyethylene glycol 3350 17 Gram(s) Oral daily  senna 2 Tablet(s) Oral at bedtime  sodium chloride 0.9%. 1000 milliLiter(s) (75 mL/Hr) IV Continuous <Continuous>    MEDICATIONS  (PRN):  bisacodyl Suppository 10 milliGRAM(s) Rectal daily PRN Constipation  metoclopramide   Syrup 5 milliGRAM(s) Oral every 4 hours PRN Nausea/Vomiting  morphine   Solution 10 milliGRAM(s) Enteral Tube every 4 hours PRN Severe Pain (7 - 10)      ITEMS UNCHECKED ARE NOT PRESENT    PRESENT SYMPTOMS: [ ]Unable to self-report - see [ ] CPOT [ ] PAINADS [ ] RDOS  Source if other than patient:  [ ]Family   [ ]Team     Pain:  [x ]yes [ ]no  QOL impact - moderately affecting.  Location -    epigastric area               Aggravating factors - none  Quality - sharp  Radiation - to the back  Timing- constant  Severity (0-10 scale): 10/10  Minimal acceptable level (0-10 scale): 4/10    Dyspnea:                           [ ]Mild [ ]Moderate [ ]Severe    RDOS:  0 to 2  minimal or no respiratory distress   3  mild distress  4 to 6 moderate distress  >7 severe distress  [email or web address]/[email or web address]_Distress_Observation_Scale.[email or web address] (Ctrl +  left click to view)     Anxiety:                             [ ]Mild [ ]Moderate [ ]Severe  Fatigue:                             [ ]Mild [ ]Moderate [ ]Severe  Nausea:                             [ ]Mild [ ]Moderate [ ]Severe  Loss of appetite:              [ ]Mild [ ]Moderate [ ]Severe  Constipation:                    [ ]Mild [ ]Moderate [ ]Severe    PCSSQ[Palliative Care Spiritual Screening Question]   Severity (0-10):  Score of 4 or > indicate consideration of Chaplaincy referral.  Chaplaincy Referral: [ ] yes [ ] refused [ ] following    Other Symptoms:  [ ]All other review of systems negative     Vital Signs Last 24 Hrs  Vital Signs Last 24 Hrs  T(C): 36.7 (06 Sep 2022 12:57), Max: 36.8 (06 Sep 2022 00:13)  T(F): 98 (06 Sep 2022 12:57), Max: 98.3 (06 Sep 2022 00:13)  HR: 100 (06 Sep 2022 12:57) (76 - 100)  BP: 140/71 (06 Sep 2022 12:57) (125/79 - 140/71)  BP(mean): --  RR: 18 (06 Sep 2022 12:57) (17 - 18)  SpO2: 98% (06 Sep 2022 12:57) (96% - 100%)    Parameters below as of 06 Sep 2022 12:57  Patient On (Oxygen Delivery Method): room air    GENERAL: [ ]Cachexia    [x ]Alert  [x ]Oriented    [ ]Lethargic  [ ]Unarousable  [ x]Verbal  [ ]Non-Verbal  Behavioral:   [ ]Anxiety  [ ]Delirium [ ]Agitation [ ]Other  HEENT:  [x ]Normal   [ ]Dry mouth   [ ]ET Tube/Trach  [ ]Oral lesions  PULMONARY:   [x ]Clear [ ]Tachypnea  [ ]Audible excessive secretions   [ ]Rhonchi        [ ]Right [ ]Left [ ]Bilateral  [ ]Crackles        [ ]Right [ ]Left [ ]Bilateral  [ ]Wheezing     [ ]Right [ ]Left [ ]Bilateral  [ ]Diminished BS [ ] Right [ ]Left [ ]Bilateral  CARDIOVASCULAR:    [x ]Regular [ ]Irregular [ ]Tachy  [ ]Luis Miguel [ ]Murmur [ ]Other  GASTROINTESTINAL:  [x ]Soft  [ ]Distended   [ ]+BS  [ ]Non tender [ ]Tender  [ ]Other [X ]J tube [ ]OGT/ NGT Patient has laparoscopic surgical incisions covered by  gauze    GENITOURINARY:  [ ]Normal [ ]Incontinent   [ ]Oliguria/Anuria   [x ]Patterson  MUSCULOSKELETAL:   [ x]Normal   [ ]Weakness  [ ]Bed/Wheelchair bound [ ]Edema  NEUROLOGIC:   [x ]No focal deficits  [ ] Cognitive impairment  [ ] Dysphagia [ ]Dysarthria [ ] Paresis [ ]Other   SKIN:   [x ]Normal  [ ]Rash  [ ]Other  [ ]Pressure ulcer(s) [ ]y [x ]n present on admission    CRITICAL CARE:  [ ]Shock Present  [ ]Septic [ ]Cardiogenic [ ]Neurologic [ ]Hypovolemic  [ ]Vasopressors [ ]Inotropes  [ ]Respiratory failure present [ ]Mechanical Ventilation [ ]Non-invasive ventilatory support [ ]High-Flow   [ ]Acute  [ ]Chronic [ ]Hypoxic  [ ]Hypercarbic [ ]Other  [ ]Other organ failure     LABS:    no recent labs     RADIOLOGY & ADDITIONAL STUDIES:< from: Xray Chest 1 View- PORTABLE-Urgent (Xray Chest 1 View- PORTABLE-Urgent .) (08.25.22 @ 17:28) >  Clear lungs.    < from: Xray Abdomen 1 View PORTABLE -Routine (Xray Abdomen 1 View PORTABLE -Routine .) (08.27.22 @ 13:01) >    IMPRESSION: Status post jejunostomy placement with contrast in the   jejunostomy tube and normal loop of jejunum.    < end of copied text >      Protein Calorie Malnutrition Present: [ ]mild [ ]moderate [ ]severe [ ]underweight [ ]morbid obesity  https://www.andeal.org/vault/2440/web/files/ONC/Table_Clinical%20Characteristics%20to%20Document%20Malnutrition-White%20JV%20et%20al%693763.pdf    Height (cm): 157.5 (08-26-22 @ 07:10)  Weight (kg): 71.1 (08-26-22 @ 07:11)  BMI (kg/m2): 28.7 (08-26-22 @ 07:11)    [ ]PPSV2 < or = 30%  [ ]significant weight loss [ ]poor nutritional intake [ ]anasarca[X ]Artificial Nutrition - J tube     Other REFERRALS:  [ ]Hospice  [ ]Child Life  [ ]Social Work  [ ]Case management [ ]Holistic Therapy

## 2022-09-09 NOTE — PROGRESS NOTE ADULT - PROBLEM SELECTOR PLAN 4
Impression: 72 hour caloric count to evaluate for malnutrition - indicates malnutrition. Pt unable to eat due to pain.   Now J tube placement    Nutrition consult - Recommend TwoCal HN via J-tube initiated at 10 mL/hr increased by 10mL q4 hrs until goal rate 35 mL/hr x24 hrs to provide 840 mL formula, 1680 kcal, 70.14 gm protein, 588 mL free water (meets 33.6 kcal/kg, 1.4 gm protein/kg).  - Patient was unable to tolerate tube feed rate of 30, therefore decreased back to 25

## 2022-09-09 NOTE — PROGRESS NOTE ADULT - PROBLEM SELECTOR PLAN 1
- Pain located in epigastric area (area of gastric cancer) and reporting now diffuse abdominal pain since procedures on 8/26   - Transitioned from IV dilaudid to PO morphine solution. However patient will be unable to be discharged with PO morphine due to prohibitive cost (as she has no insurance). Started instead on methadone, which is affordable.   - However methadone is slow acting, and takes ~5 days to reach steady state   - c/w methadone solution 5 mg BID   - Patient is complaining of pain with methadone. Patient can use PRNs in the meantime while methadone takes time to take effect   - Increased PO morphine IR to 15 mg q2h PRN for severe pain   - bowel regimen, goal BM every 2 to 3 days

## 2022-09-09 NOTE — CHART NOTE - NSCHARTNOTEFT_GEN_A_CORE
Pt seen for malnutrition follow up.    Medical Course:  - Per chart, pt is 57 year old female PMH renal stones presenting with gastric adenocarcinoma with mets to LN complicated by DEVI and nephrolithiasis. S/p J-tube placement with surgery (8/26). Social work assisting as pt uninsured. Palliative following for pain management. Awaiting emergency medicaid approval for outpatient treatment assistance.     Nutrition Course:  - Pt was initiated on EN 8/27. Pt was transitioned from Jevity 1.2 to TwoCal HN, as pt unable to tolerate high rate feeds. Pt has been unable to reach goal rate of 35 mL/hr x24 hrs given abdominal discomfort/pain. Tube feeds currently on hold per MD as pt with sudden onset fever/chills with concern for intraabdominal infection. Pt reports pump was most recently running at 25 mL/hr. Current EN regimen provides 600 mL formula, 1200 kcal, 49.5 gm protein, 1167 mL free water (meets 24 kcal/kg, 1.0 gm protein/kg @ ideal body weight 50 kg). This does not meet pt's estimated protein-energy needs. Pt vocalizes concern regarding inability to eat/drink; ordered for IVF at this time.   - Pt requires tube feeding formula dense in calories/protein given catabolic state and inability to tolerate large volume of enteral nutrition, complicated by pain associated with gastric cancer.  - Pt denies nausea/vomiting but endorses continued abdominal pain; ordered for Reglan. Last BM 9/6 per flowshets; ordered for senna 2 tablets qHS, Miralax 17 gm qD and bisacodyl suppository 10 mg qD PRN.     Diet Prescription:   - NPO with Tube Feed: TwoCal HN via Jejunostomy at a goal rate of 25 mL/hr x24 hrs    Pertinent Medications:   - Biotene Dry Mouth Oral Rinse, metoclopramide IV, pantoprazole Suspension, piperacillin/tazobactam IV, polyethylene glycol, senna, sodium chloride 0.9% IV Continuous, bisacodyl Suppository PRN, metoclopramide Syrup PRN    Pertinent Labs:   - (9/9) Na 136 mmol/L Glu 113 mg/dL<H> K+ 3.9 mmol/L Cr 0.65 mg/dL BUN 17 mg/dL Phos 3.8 mg/dL Alb 2.7 g/dL<L>  - (8/15) HbA1c 5.8%    Weight: (9/9 obtained at time of visit, unable to tare therefore likely inaccurate) 171.8 lbs / 78.1 kg, (8/26 dosing) 156.7 lbs / 71.1 kg, (8/15 dosing) 156.7 lbs / 71.1 kg  Height: 62 in / 157.5 cm  IBW: 110 lbs / 50 kg +/-10%  BMI: 28.7 kg/m^2 (at dosing weight)    Physical Assessment, per flowsheets:  Edema: 1+ bilateral ankle  Pressure Injury: none noted    Estimated Needs:   [X] No changes since previous assessment, based on ideal body weight 110 lbs / 50 kg  9533-7870 kcal daily @30-35 kcal/kg, 60-75 gm protein daily @1.2-1.5 gm/kg     Previous Nutrition Diagnosis: [X] Severe malnutrition in the context of chronic illness, ongoing  New Nutrition Diagnosis: [X] not applicable     Interventions:   1) Recommend increase TwoCal HN via J-tube as tolerated to goal rate 35 mL/hr x24 hrs to provide 840 mL formula, 1680 kcal, 70.14 gm protein, 588 mL free water (meets 33.6 kcal/kg, 1.4 gm protein/kg).   2) Monitor BMs, adjust bowel regimen as appropriate.  3) Monitor hydration, additional provision of free water per medical discretion.   4) Reglan per MD.  5) Please obtain weekly weights.  6) Continue to monitor electrolytes (K+, Mg, P) and replete to within desired limits as clinically indicated.   7) For discharge, may consider Nutren 2.0 via J-tube at a goal rate of 35 mL/hr x24 hrs to provide 840 mL formula, 1680 kcal, 70.56 gm protein, 484.4 mL free water (meets 33.6 kcal/kg, 1.4 gm protein/kg).     Monitor & Evaluate:  Tolerance to diet, nutrition related lab values, weight trends, BMs/GI distress, hydration status, skin integrity.  Jeanette Ramírez RDN, CDN #[id number]  Also available on [employer] Teams.

## 2022-09-09 NOTE — CONSULT NOTE ADULT - SUBJECTIVE AND OBJECTIVE BOX
HPI:  Ms. Cali Gordillo is a 56 Y/O F from [facility] Nephrolithiasis who presents to the hospital for abdominal pain lasting about 2 months, associated with nausea/vomiting. The abdominal pain is described as midepigastric and left flank > r flank, 10/10 pain unable to describe consistency of pain. Pain is worsened by lying flat and is worse in general at night, is better during day. Flank pain is worsened with breathing. Nausea & vomiting always with eating food & drinking, denies any red color or blood she noticed. Pt has also lost 20 pounds in the last month, which she associates with inability to eat and feels fatigued in general because of it. Patient went to [facility] for abdominal pain recently, was prescribed pain medications and discharged, unable to remember name of pain meds or when she went to hospital. Is having fewer bowel movements because is eating less, says probably constipated. Denies headaches, dizziness, weakness, diarrhea, blood in stool or urine. Denies fevers, chills, night sweats, rashes.    She was  found to have poorly differentiated metastatic gastric adenocarcinoma. Her hospital course was complicated by acute pulmonary emboli and concern for infection. She is now  s/p j tube placement, has been started on tube feeds, and being pain managed by palliative care.  She had a fever, (102),  yesterday and underwent an Abd CT and was found to have L hydronephrosis    PAST MEDICAL & SURGICAL HISTORY:    Nephrolithiasis      MEDICATIONS  (STANDING):  Biotene Dry Mouth Oral Rinse 5 milliLiter(s) Swish and Spit two times a day  enoxaparin Injectable 70 milliGRAM(s) SubCutaneous every 12 hours  lidocaine   4% Patch 1 Patch Transdermal daily  methadone   Solution 5 milliGRAM(s) Enteral Tube every 12 hours  metoclopramide   Syrup 5 milliGRAM(s) Oral every 4 hours  pantoprazole   Suspension 40 milliGRAM(s) Oral daily  piperacillin/tazobactam IVPB.. 3.375 Gram(s) IV Intermittent every 8 hours  polyethylene glycol 3350 17 Gram(s) Oral daily  senna 2 Tablet(s) Oral at bedtime    MEDICATIONS  (PRN):  bisacodyl Suppository 10 milliGRAM(s) Rectal daily PRN Constipation  morphine   Solution 15 milliGRAM(s) Enteral Tube every 2 hours PRN Severe Pain (7 - 10)      FAMILY HISTORY:  Patient's family history difficult to assess for colorectal cancer or gastric etiologies, as she never knew father and mother passed away at age 23, had no medical conditions.    Allergies    No Known Allergies            SOCIAL HISTORY / FAM HX:     Has three children, aged 40, 30 and 29. Lives with son in apartment. All children healthy w/o medical conditions.       REVIEW OF SYSTEMS: Otherwise negative as stated in HPI      Vital Signs Last 24 Hrs  T(C): 36.6 (09 Sep 2022 09:27), Max: 36.7 (08 Sep 2022 21:54)  T(F): 97.9 (09 Sep 2022 09:27), Max: 98 (08 Sep 2022 21:54)  HR: 100 (09 Sep 2022 16:00) (93 - 106)  BP: 129/85 (09 Sep 2022 16:00) (111/77 - 134/92)  BP(mean): --  RR: 18 (09 Sep 2022 16:00) (17 - 19)  SpO2: 96% (09 Sep 2022 16:00) (96% - 100%)    Parameters below as of 09 Sep 2022 16:00  Patient On (Oxygen Delivery Method): room air        PHYSICAL EXAM:    General: [x] Awake and Alert in no acute distress; with depressed affect    Respiratory and Thorax: [ x ] no resp distress   	  Cardiovascular: [x ] Reg Rate    Gastrointestinal: [x ] soft, +distended, +mild epigastric tenderness to palpation,   CVAT [x ]Y  [ ]N  /    [x ]L  [ ]R     Ext: No C/C/E  	    LABS:                        9.7    10.76 )-----------( 281      ( 09 Sep 2022 05:55 )             31.2     09-09    136  |  101  |  17  ----------------------------<  113<H>  3.9   |  23  |  0.65    Ca    7.9<L>      09 Sep 2022 05:55  Phos  3.8     09-09  Mg     2.00     09-09    TPro  6.2  /  Alb  2.7<L>  /  TBili  1.0  /  DBili  x   /  AST  161<H>  /  ALT  147<H>  /  AlkPhos  312<H>  09-09    PT/INR - ( 08 Sep 2022 17:32 )   PT: 13.4 sec;   INR: 1.15 ratio         PTT - ( 08 Sep 2022 17:32 )  PTT:25.0 sec      Urinalysis Basic - ( 09 Sep 2022 15:51 )    Color: Yellow / Appearance: Clear / SG: >1.050 / pH: x  Gluc: x / Ketone: Small  / Bili: Negative / Urobili: <2 mg/dL   Blood: x / Protein: 30 mg/dL / Nitrite: Positive   Leuk Esterase: Large / RBC: 7 /HPF /  /HPF   Sq Epi: x / Non Sq Epi: 1 /HPF / Bacteria: Moderate      URINE CX:  pending    RADIOLOGY:    < from: CT Abdomen and Pelvis w/ IV Cont (09.09.22 @ 12:42) >    ACC: [id number] EXAM:  CT ABDOMEN AND PELVIS IC                        ACC: [id number] EXAM:  CT ANGIO CHEST PULM ART [facility]                          PROCEDURE DATE:  09/09/2022          INTERPRETATION:  Clinical indication: Status post jejunostomy withfever   and chills, tachycardia, hypoxia, gastric cancer.    CT angiogram of the chest was performed following intravenous   administration of 70 cc of Omnipaque-350. Follow-up contrast-enhanced CT   of the abdomen and pelvis was also performed. MIP images are submitted.    Correlation is made with the prior chest CT of August 16, 2022 and   abdominal CT of August 14, 2022.    Left upper lobe subsegmental pulmonary arterial emboli. Right upper lobe   few segmental and subsegmental pulmonary arterial emboli. Right middle   lobe subsegmental pulmonary arterial emboli. Pulmonary embolus within the   right lower lobe segmental and subsegmental pulmonary arterial branches.    No enlarged axillary lymph nodes. Prominent right supraclavicular   asymmetric lymph nodes largest measuring about 9 mm demonstrate minimal   interval increase in size. Multiple enlarged mediastinal and bilateral   hilar lymph nodes demonstrate overall interval mild to moderate increase   in size since August 16, 2022. For reference an enlarged azygoesophageal   recess lymph node measures about 1.9 cm in its shortest dimension when it   previously measured about 1.1 cm. Mildly enlarged right cardiophrenic   angle lymph node measuring about 1.1 cm in long axis is without   significant interval change.    Heart size is normal. Coronary artery calcifications. Minimal pericardial   fluid. Small bilateral pleural effusions with overall interval decrease   in size since August 16, 2022. Mild intimal noncalcified plaqueswithin   the descending thoracic aorta.    Evaluation of the lungs demonstrate 5 mm left lower lobe calcified   granuloma. Bilateral mid to upper lung predominant interlobular septal   thickening with interval progression. Superimposed nodularity with tiny   nodularity along the fissures demonstrate overall interval increase in   conspicuity since August 16, 2022. Mild bilateral lower lung areas of   subsegmental dependent or linear atelectasis with overall interval   improvement    Wall enhancement of the common bile duct. The gallbladder is mildly   distended. The liver is otherwise unremarkable.    The spleen, pancreas and the right adrenal gland are unremarkable. Left   adrenal gland thickening is unchanged. 3 mm nonobstructing right   intrarenal stone. Left renal cyst and subcentimeter hypodensities which   are too small to characterize.    Mild to moderate left hydroureteronephrosis new since August 14, 2022   with dilatation of the left proximal ureter to a focus of enhancement   within the proximal to mid portion of the left ureter which is collapsed.   No obstructing stone.    The urinary bladder and the uterus are unremarkable. The ovaries are   without significant interval change. Percutaneous jejunostomy catheter is   noted. No bowel obstruction or free intraperitoneal air.    Small to moderate amount of ascites has increased in size since August 14, 2022. Previously described 3 cm mass anterior to the stomach is   unchanged. Diffuse gastric wall thickening with smaller nodules adjacent   to the stomach are without significant interval change since August 14, 2022.    Enlarged lymph nodes within the retroperitoneum and the root of the   mesentery are either unchanged or demonstrate minimal interval increase   in size since August 14, 2022.    Bones are unremarkable.    IMPRESSION: Bilateral pulmonary arterial emboli as detailed above.    Bilateral interlobular septal thickening with superimposed tiny   nodularity with overall interval increase in conspicuity since August 16, 2022 associated with progression of mediastinal and bilateral hilar   lymphadenopathy worrisome for lymphangitic spread of neoplasm or   metastatic disease possibly superimposed on some element of pulmonary   edema given small pleural effusions.    Enhancement of the common bile duct worrisome for cholangitis given the   above history.    Mild to moderate left hydroureteronephrosis to the level of the proximal   to mid left ureter with overall interval progression since August14, 2022 likely sequela of neoplasm or metastatic disease given the   constellation of findings.    Small to moderate-sized ascites progressed since August 14, 2022.    Extensive retroperitoneal and upper abdominal lymphadenopathy with   gastric wall thickening and adjacent nodules as noted on August 14, 2022.    Findings discussed with Dr. Hayes on September 9, 2022 at 2:40 PM   with read back. cyst    --- End of Report ---            NELSON RIVERA MD; Attending Radiologist  This document has been electronically signed. Sep  9 2022  3:00PM    < end of copied text >

## 2022-09-09 NOTE — PROGRESS NOTE ADULT - NUTRITIONAL ASSESSMENT
This patient has been assessed with a concern for Malnutrition and has been determined to have a diagnosis/diagnoses of Severe protein-calorie malnutrition.    This patient is being managed with:   Diet NPO with Tube Feed-  Tube Feeding Modality: Jejunostomy  TwoCal HN (TWOCALHNRTH)  Continuous  Starting Tube Feed Rate {mL per Hour}: 25  Increase Tube Feed Rate by (mL): 5     Every 6 hours  Until Goal Tube Feed Rate (mL per Hour): 30  Tube Feed Duration (in Hours): 24  Tube Feed Start Time: 11:30  Entered: Sep  8 2022 11:32AM

## 2022-09-09 NOTE — CONSULT NOTE ADULT - PROBLEM SELECTOR RECOMMENDATION 9
- Presently pt is stable and afebrile x 24h so urgent urologic intervention is not warranted  - Should f/u Urine Cx and continue abx in the meantime  - Based on pt's clinical condition and multiple comorbidities would recommend IR consult for L nephrostomy tube to decompress L renal collecting system in presence of fever, flank pain, and UTI  - Would not recommend ureteral stent placement since stents are more likely to fail in setting of external compression and tortuous ureters.   - Discussed with Dr. Arredondo. [phone]
- Diagnosed this admission from EGD  - Possibly metastasized, pending lymph node biopsy  - Surgery tentatively scheduled for Friday

## 2022-09-09 NOTE — PROGRESS NOTE ADULT - ATTENDING COMMENTS
Pt seen examined an dd/w fellow. Pt cont to c/o abd pain with rad to back. Had fever / chill sin interim and recent CT showed POD, poss cholangitis, PEs,  and diffuse mets as noted above. PE Abd mildly distended NABS soft with mild tenderness epigastrium w/o guarding or rebound. A/P PEs secondary to maligancy - agree with lovenox as no evidence of bleeding . Agree with abx in light of fever/ chills for abd coverage in ligh tof poss cholangitic CT- cxs neg to date. Has met gastric cancer and further pall treatment will occur at the [facility] post d/c

## 2022-09-09 NOTE — PROGRESS NOTE ADULT - ASSESSMENT
58 yo F with a PMH of nephrolithiasis who p/w progressive abdominal pain, N/V, and weight loss, found to have poorly differentiated metastatic gastric adenocarcinoma, course complicated by acute pulmonary emboli and concern for infection.    #Metastatic Gastric Adenocarcinoma  - Presented with abdominal pain, N/V, and weight loss  - Admission CT C/A/P shows abdominal and thoracic (subdiaphragmatic, mediastinal, subcarinal and bilateral hilar) LAD, 3 mm RML nodule, and gastric body thickening and gastrocolic ligament nodularity  - CEA elevated (1544);  mildly elevated, CA 19-9 normal  - S/p EUS by GI on 8/16 showing infiltrative gastric body changes w/pathology showing poorly differentiated gastric adenocarcinoma (+AE1/3, CK7, CK20, CDX2, neg for ER), MS-stable  - S/p EBUS with level 7 mediastinal LN bx on 8/23 confirming metastasis of gastric adenocarcinoma, and ascitic fluid (trace ascites) also confirming disease there with signet-ring cells  - S/p J-tube placement by surgery; 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 Tube feeds per primary team/surgery  - C/w PPI BID  - CTA chest and CT A/P 9/9/22 concerning for b/l PE as well as progression of mediastinal and b/l hilar lymphadenopathy  enhancement of CBD concerning for possible ?cholangitis, also with left hydroureteronephrosis likely sequela of neoplasm/metastatic disease, small to moderate ascites progressed since August  - Agree with broad spectrum antibiotics and ongoing infectious workup due to concern for infection, possible cholangitis on CT  - Appreciate Palliative Care and Primary team optimizing pain regimen, initiating methadone, and adjusting PRN pain medications as needed to adequately control pain until methadone reaches steady state  - No plan for inpatient chemotherapy at this time. Ideally will follow up at [facility], but currently does not have insurance. She is currently being applied for emergency Medicaid, but ongoing discussions to determine if patient would still be able to be seen at [facility]. If not, she would have to be referred to another health system such as [facility]  - Discussed with Pathology, HER2 stains are pending but do not need to prevent discharge    #Bilateral Pulmonary Emboli  - CTA chest 9/9/22 showed bilateral pulmonary emboli  - agree with increasing Lovenox dosing to therapeutic dose Lovenox 70mg BID (Noted patient was on prophylactic dose Lovenox prior to PE, so not a failure of Lovenox, agree with continuing therapeutic dose Lovenox)    #Left Lower Extremity Edema  - Patient has trace RLE but more prominent LLE edema, newly developed  - Duplex US 9/2 negative DVT    Nader Magallanes PGY-3  Available on TEAMS  After 5PM and on weekends and holidays, please call the inpatient fellow on call.

## 2022-09-09 NOTE — CONSULT NOTE ADULT - NS ATTEND AMEND GEN_ALL_CORE FT
She was seen and examined, CT findings reviewed with her and possibility of malignant renal obstruction. Generally nephrostomy or nephroureterostomy tube is preferred in malignant obstruction. Agree with plan for IR consultation.

## 2022-09-09 NOTE — PROGRESS NOTE ADULT - SUBJECTIVE AND OBJECTIVE BOX
INCOMPLETE NOTE    Armen Gill | PGY1| Pager: [phone]  Interval Events:    REVIEW OF SYSTEMS:  CONSTITUTIONAL: No weakness, fevers or chills  EYES/ENT: No visual changes;  No vertigo or throat pain   NECK: No pain or stiffness  RESPIRATORY: No cough, wheezing, hemoptysis; No shortness of breath  CARDIOVASCULAR: No chest pain or palpitations  GASTROINTESTINAL: No abdominal or epigastric pain. No nausea, vomiting, or hematemesis; No diarrhea or constipation. No melena or hematochezia.  GENITOURINARY: No dysuria, frequency or hematuria  NEUROLOGICAL: No numbness or weakness  SKIN: No itching, burning, rashes, or lesions   All other review of systems is negative unless indicated above.    OBJECTIVE:  ICU Vital Signs Last 24 Hrs  T(C): 36.5 (09 Sep 2022 05:26), Max: 39.3 (08 Sep 2022 16:18)  T(F): 97.7 (09 Sep 2022 05:26), Max: 102.8 (08 Sep 2022 16:18)  HR: 93 (09 Sep 2022 05:26) (93 - 132)  BP: 124/82 (09 Sep 2022 05:26) (111/77 - 164/100)  BP(mean): --  ABP: --  ABP(mean): --  RR: 17 (09 Sep 2022 05:26) (17 - 19)  SpO2: 97% (09 Sep 2022 05:26) (92% - 100%)    O2 Parameters below as of 09 Sep 2022 05:26  Patient On (Oxygen Delivery Method): room air              09-07 @ 07:01  -  09-08 @ 07:00  --------------------------------------------------------  IN: 300 mL / OUT: 0 mL / NET: 300 mL      CAPILLARY BLOOD GLUCOSE          PHYSICAL EXAM:  General: WN/WD NAD  Neurology: A&Ox3, nonfocal, LEY x 4  Eyes: PERRLA/ EOMI, Gross vision intact  ENT/Neck: Neck supple, trachea midline, No JVD, Gross hearing intact  Respiratory: CTA B/L, No wheezing, rales, rhonchi  CV: RRR, +S1/S2, -S3/S4, no murmurs, rubs or gallops  Abdominal: Soft, NT, ND +BS, No HSM  MSK: 5/5 strength UE/LE bilaterally  Extremities: No edema, 2+ peripheral pulses  Skin: No Rashes, Hematoma, Ecchymosis  Incisions:   Tubes:    HOSPITAL MEDICATIONS:  MEDICATIONS  (STANDING):  Biotene Dry Mouth Oral Rinse 5 milliLiter(s) Swish and Spit two times a day  enoxaparin Injectable 40 milliGRAM(s) SubCutaneous every 24 hours  lidocaine   4% Patch 1 Patch Transdermal daily  methadone   Solution 5 milliGRAM(s) Enteral Tube every 12 hours  pantoprazole   Suspension 40 milliGRAM(s) Oral daily  piperacillin/tazobactam IVPB.. 3.375 Gram(s) IV Intermittent every 8 hours  polyethylene glycol 3350 17 Gram(s) Oral daily  senna 2 Tablet(s) Oral at bedtime  sodium chloride 0.9%. 1000 milliLiter(s) (75 mL/Hr) IV Continuous <Continuous>    MEDICATIONS  (PRN):  bisacodyl Suppository 10 milliGRAM(s) Rectal daily PRN Constipation  metoclopramide   Syrup 5 milliGRAM(s) Oral every 4 hours PRN Nausea/Vomiting  morphine   Solution 10 milliGRAM(s) Enteral Tube every 4 hours PRN Severe Pain (7 - 10)      LABS:                        10.6   9.75  )-----------( 309      ( 08 Sep 2022 17:32 )             34.1     Hgb Trend: 10.6<--, 10.0<--, 9.9<--  09-08    138  |  104  |  16  ----------------------------<  112<H>  4.2   |  23  |  0.53    Ca    8.2<L>      08 Sep 2022 17:32  Phos  3.6     09-08  Mg     1.90     09-08    TPro  6.6  /  Alb  2.9<L>  /  TBili  1.4<H>  /  DBili  x   /  AST  178<H>  /  ALT  139<H>  /  AlkPhos  328<H>  09-08    Creatinine Trend: 0.53<--, 0.52<--, 0.47<--, 0.45<--, 0.46<--, 0.48<--  PT/INR - ( 08 Sep 2022 17:32 )   PT: 13.4 sec;   INR: 1.15 ratio         PTT - ( 08 Sep 2022 17:32 )  PTT:25.0 sec      Venous Blood Gas:  09-08 @ 17:32  7.44/38/60/26/90.1  VBG Lactate: 1.8      MICROBIOLOGY:      Armen Gill | PGY1| Pager: [phone]  Interval Events: Patient spiked fever last afternoon, found to have PE on CT Chest, Progression of disease, as well as hydronephrosis. Patient also unable to tolerate increase in feeds to 30cc/hr with emesis.    REVIEW OF SYSTEMS:  CONSTITUTIONAL: No weakness, fevers or chills  EYES/ENT: No visual changes;  No vertigo or throat pain   RESPIRATORY: No cough, wheezing, hemoptysis; No shortness of breath  CARDIOVASCULAR: No chest pain or palpitations  GASTROINTESTINAL: continued epigastric pain   GENITOURINARY: No dysuria, frequency or hematuria  NEUROLOGICAL: No numbness or weakness  SKIN: No itching, burning, rashes, or lesions   All other review of systems is negative unless indicated above.    OBJECTIVE:  ICU Vital Signs Last 24 Hrs  T(C): 36.5 (09 Sep 2022 05:26), Max: 39.3 (08 Sep 2022 16:18)  T(F): 97.7 (09 Sep 2022 05:26), Max: 102.8 (08 Sep 2022 16:18)  HR: 93 (09 Sep 2022 05:26) (93 - 132)  BP: 124/82 (09 Sep 2022 05:26) (111/77 - 164/100)  BP(mean): --  ABP: --  ABP(mean): --  RR: 17 (09 Sep 2022 05:26) (17 - 19)  SpO2: 97% (09 Sep 2022 05:26) (92% - 100%)    O2 Parameters below as of 09 Sep 2022 05:26  Patient On (Oxygen Delivery Method): room air              09-07 @ 07:01  -  09-08 @ 07:00  --------------------------------------------------------  IN: 300 mL / OUT: 0 mL / NET: 300 mL      CAPILLARY BLOOD GLUCOSE          PHYSICAL EXAM:  General: WN/WD NAD  Neurology: A&Ox3, nonfocal, LEY x 4  Eyes: PERRLA/ EOMI, Gross vision intact  ENT/Neck: Neck supple, trachea midline, No JVD, Gross hearing intact  Respiratory: CTA B/L, No wheezing, rales, rhonchi  CV: RRR, +S1/S2, -S3/S4, no murmurs, rubs or gallops  Abdominal: Soft, NT, ND +BS, No HSM  MSK: 5/5 strength UE/LE bilaterally  Extremities: No edema, 2+ peripheral pulses  Skin: No Rashes, Hematoma, Ecchymosis  Incisions:   Tubes:    HOSPITAL MEDICATIONS:  MEDICATIONS  (STANDING):  Biotene Dry Mouth Oral Rinse 5 milliLiter(s) Swish and Spit two times a day  enoxaparin Injectable 40 milliGRAM(s) SubCutaneous every 24 hours  lidocaine   4% Patch 1 Patch Transdermal daily  methadone   Solution 5 milliGRAM(s) Enteral Tube every 12 hours  pantoprazole   Suspension 40 milliGRAM(s) Oral daily  piperacillin/tazobactam IVPB.. 3.375 Gram(s) IV Intermittent every 8 hours  polyethylene glycol 3350 17 Gram(s) Oral daily  senna 2 Tablet(s) Oral at bedtime  sodium chloride 0.9%. 1000 milliLiter(s) (75 mL/Hr) IV Continuous <Continuous>    MEDICATIONS  (PRN):  bisacodyl Suppository 10 milliGRAM(s) Rectal daily PRN Constipation  metoclopramide   Syrup 5 milliGRAM(s) Oral every 4 hours PRN Nausea/Vomiting  morphine   Solution 10 milliGRAM(s) Enteral Tube every 4 hours PRN Severe Pain (7 - 10)      LABS:                        10.6   9.75  )-----------( 309      ( 08 Sep 2022 17:32 )             34.1     Hgb Trend: 10.6<--, 10.0<--, 9.9<--  09-08    138  |  104  |  16  ----------------------------<  112<H>  4.2   |  23  |  0.53    Ca    8.2<L>      08 Sep 2022 17:32  Phos  3.6     09-08  Mg     1.90     09-08    TPro  6.6  /  Alb  2.9<L>  /  TBili  1.4<H>  /  DBili  x   /  AST  178<H>  /  ALT  139<H>  /  AlkPhos  328<H>  09-08    Creatinine Trend: 0.53<--, 0.52<--, 0.47<--, 0.45<--, 0.46<--, 0.48<--  PT/INR - ( 08 Sep 2022 17:32 )   PT: 13.4 sec;   INR: 1.15 ratio         PTT - ( 08 Sep 2022 17:32 )  PTT:25.0 sec      Venous Blood Gas:  09-08 @ 17:32  7.44/38/60/26/90.1  VBG Lactate: 1.8      MICROBIOLOGY:

## 2022-09-09 NOTE — PROGRESS NOTE ADULT - PROBLEM SELECTOR PLAN 3
Impression: Pt has vomiting and intermittent episodes. Vomiting associated with eating meals. Likely related to gastric adenocarcinoma.     IV reglan 5 mg Q4

## 2022-09-09 NOTE — PROGRESS NOTE ADULT - SUBJECTIVE AND OBJECTIVE BOX
INTERVAL HPI/OVERNIGHT EVENTS:  Patient S&E at bedside. No o/n events, though she reports persisting abdominal pain radiating to her back which is being managed with medication. She went for her CT scan today    PAST MEDICAL & SURGICAL HISTORY:  Nephrolithiasis      REVIEW OF SYSTEMS  General: No fevers, no chills, no fatigue  Skin: No rash  ENMT: No sore throat, no dysphagia, no mouth sores	  Respiratory and Thorax: No dyspnea, no cough, no wheezing  Cardiovascular: No chest pain, no palpitations  Gastrointestinal:	No N/V/D, no abdominal pain  Genitourinary: No dysuria  Musculoskeletal:	No joint pain, no muscle pain  Neurological:	No dizziness, no neuropathy  Psychiatric: No depression or anxiety  Hematology/Lymphatics: No easy bleeding or bruising, no swollen nodes noticed.       VITAL SIGNS:  T(F): 97.9 (09-09-22 @ 09:27)  HR: 95 (09-09-22 @ 09:27)  BP: 134/92 (09-09-22 @ 09:27)  RR: 19 (09-09-22 @ 09:27)  SpO2: 96% (09-09-22 @ 09:27)  Wt(kg): --    PHYSICAL EXAM:    Constitutional: NAD  Eyes: EOMI, sclera non-icteric  Neck: supple, no masses, no JVD  Respiratory: CTA b/l, good air entry b/l  Cardiovascular: RRR, no M/R/G  Gastrointestinal: soft, +distended, +mild tenderness to palpation,+ BS  Extremities: no c/c/e  Neurological: AAOx3      MEDICATIONS  (STANDING):  Biotene Dry Mouth Oral Rinse 5 milliLiter(s) Swish and Spit two times a day  enoxaparin Injectable 40 milliGRAM(s) SubCutaneous every 24 hours  lidocaine   4% Patch 1 Patch Transdermal daily  methadone   Solution 5 milliGRAM(s) Enteral Tube every 12 hours  metoclopramide   Syrup 5 milliGRAM(s) Oral every 4 hours  pantoprazole   Suspension 40 milliGRAM(s) Oral daily  piperacillin/tazobactam IVPB.. 3.375 Gram(s) IV Intermittent every 8 hours  polyethylene glycol 3350 17 Gram(s) Oral daily  senna 2 Tablet(s) Oral at bedtime    MEDICATIONS  (PRN):  bisacodyl Suppository 10 milliGRAM(s) Rectal daily PRN Constipation  morphine   Solution 10 milliGRAM(s) Enteral Tube every 4 hours PRN Severe Pain (7 - 10)      Allergies    No Known Allergies    Intolerances        LABS:                        9.7    10.76 )-----------( 281      ( 09 Sep 2022 05:55 )             31.2     09-09    136  |  101  |  17  ----------------------------<  113<H>  3.9   |  23  |  0.65    Ca    7.9<L>      09 Sep 2022 05:55  Phos  3.8     09-09  Mg     2.00     09-09    TPro  6.2  /  Alb  2.7<L>  /  TBili  1.0  /  DBili  x   /  AST  161<H>  /  ALT  147<H>  /  AlkPhos  312<H>  09-09    PT/INR - ( 08 Sep 2022 17:32 )   PT: 13.4 sec;   INR: 1.15 ratio         PTT - ( 08 Sep 2022 17:32 )  PTT:25.0 sec      RADIOLOGY & ADDITIONAL TESTS:  Studies reviewed.   INTERVAL HPI/OVERNIGHT EVENTS:  Patient S&E at bedside. No o/n events, though she reports persisting abdominal pain radiating to her back which is being managed with medication. She went for her CT scan today    PAST MEDICAL & SURGICAL HISTORY:  Nephrolithiasis    REVIEW OF SYSTEMS  General: +fevers, +chills, +fatigue  Skin: No rash  ENMT: No sore throat, no dysphagia, no mouth sores	  Respiratory and Thorax: No dyspnea, no cough, no wheezing  Cardiovascular: No chest pain, no palpitations  Gastrointestinal:	No N/V/D, +abdominal pain  Genitourinary: No dysuria  Musculoskeletal:	No joint pain, no muscle pain  Neurological:	No dizziness, no neuropathy  Psychiatric: No depression or anxiety  Hematology/Lymphatics: No easy bleeding or bruising, no swollen nodes noticed.       VITAL SIGNS:  T(F): 97.9 (09-09-22 @ 09:27)  HR: 95 (09-09-22 @ 09:27)  BP: 134/92 (09-09-22 @ 09:27)  RR: 19 (09-09-22 @ 09:27)  SpO2: 96% (09-09-22 @ 09:27)  Wt(kg): --    PHYSICAL EXAM:    Constitutional: NAD  Eyes: EOMI, sclera non-icteric  Neck: supple, no masses, no JVD  Respiratory: CTA b/l, good air entry b/l  Cardiovascular: +tachycardic rate, no Murmurs  Gastrointestinal: soft, +distended, +mild epigastric tenderness to palpation, no guarding + BS  Extremities: 2+ peripheral pulses   Neurological: AAOx3      MEDICATIONS  (STANDING):  Biotene Dry Mouth Oral Rinse 5 milliLiter(s) Swish and Spit two times a day  enoxaparin Injectable 40 milliGRAM(s) SubCutaneous every 24 hours  lidocaine   4% Patch 1 Patch Transdermal daily  methadone   Solution 5 milliGRAM(s) Enteral Tube every 12 hours  metoclopramide   Syrup 5 milliGRAM(s) Oral every 4 hours  pantoprazole   Suspension 40 milliGRAM(s) Oral daily  piperacillin/tazobactam IVPB.. 3.375 Gram(s) IV Intermittent every 8 hours  polyethylene glycol 3350 17 Gram(s) Oral daily  senna 2 Tablet(s) Oral at bedtime    MEDICATIONS  (PRN):  bisacodyl Suppository 10 milliGRAM(s) Rectal daily PRN Constipation  morphine   Solution 10 milliGRAM(s) Enteral Tube every 4 hours PRN Severe Pain (7 - 10)      Allergies    No Known Allergies    Intolerances        LABS:                        9.7    10.76 )-----------( 281      ( 09 Sep 2022 05:55 )             31.2     09-09    136  |  101  |  17  ----------------------------<  113<H>  3.9   |  23  |  0.65    Ca    7.9<L>      09 Sep 2022 05:55  Phos  3.8     09-09  Mg     2.00     09-09    TPro  6.2  /  Alb  2.7<L>  /  TBili  1.0  /  DBili  x   /  AST  161<H>  /  ALT  147<H>  /  AlkPhos  312<H>  09-09    PT/INR - ( 08 Sep 2022 17:32 )   PT: 13.4 sec;   INR: 1.15 ratio         PTT - ( 08 Sep 2022 17:32 )  PTT:25.0 sec      RADIOLOGY & ADDITIONAL TESTS:  Studies reviewed.    < from: CT Angio Chest PE Protocol w/ IV Cont (09.09.22 @ 12:44) >  IMPRESSION: Bilateral pulmonary arterial emboli as detailed above.    Bilateral interlobular septal thickening with superimposed tiny   nodularity with overall interval increase in conspicuity since August 16, 2022 associated with progression of mediastinal and bilateral hilar   lymphadenopathy worrisome for lymphangitic spread of neoplasm or   metastatic disease possibly superimposed on some element of pulmonary   edema given small pleural effusions.    Enhancement of the common bile duct worrisome for cholangitis given the   above history.    Mild to moderate left hydroureteronephrosis to the level of the proximal   to mid left ureter with overall interval progression since August14, 2022 likely sequela of neoplasm or metastatic disease given the   constellation of findings.    Small to moderate-sized ascites progressed since August 14, 2022.    Extensive retroperitoneal and upper abdominal lymphadenopathy with   gastric wall thickening and adjacent nodules as noted on August 14, 2022.    Findings discussed with Dr. Hayes on September 9, 2022 at 2:40 PM   with read back. cyst    --- End of Report ---    < end of copied text >

## 2022-09-10 NOTE — PROGRESS NOTE ADULT - NUTRITIONAL ASSESSMENT
This patient has been assessed with a concern for Malnutrition and has been determined to have a diagnosis/diagnoses of Severe protein-calorie malnutrition.    This patient is being managed with:   Diet NPO with Tube Feed-  Tube Feeding Modality: Jejunostomy  TwoCal HN (TWOCALHNRTH)  Total Volume for 24 Hours (mL): 600  Continuous  Starting Tube Feed Rate {mL per Hour}: 20  Increase Tube Feed Rate by (mL): 5     Every 6 hours  Until Goal Tube Feed Rate (mL per Hour): 25  Tube Feed Duration (in Hours): 24  Tube Feed Start Time: 10:00  Entered: Sep  9 2022  7:58AM

## 2022-09-10 NOTE — PROGRESS NOTE ADULT - SUBJECTIVE AND OBJECTIVE BOX
PROGRESS NOTE:   Authored by RICKIE Childress PGY1 Pager: [phone]    Patient is a 57y old  Female who presents with a chief complaint of abdominal pain & nausea/vomiting (09 Sep 2022 18:16)      SUBJECTIVE / OVERNIGHT EVENTS:    ADDITIONAL REVIEW OF SYSTEMS:    MEDICATIONS  (STANDING):  Biotene Dry Mouth Oral Rinse 5 milliLiter(s) Swish and Spit two times a day  enoxaparin Injectable 70 milliGRAM(s) SubCutaneous every 12 hours  lidocaine   4% Patch 1 Patch Transdermal daily  methadone   Solution 5 milliGRAM(s) Enteral Tube every 12 hours  metoclopramide   Syrup 5 milliGRAM(s) Oral every 4 hours  pantoprazole   Suspension 40 milliGRAM(s) Oral daily  piperacillin/tazobactam IVPB.. 3.375 Gram(s) IV Intermittent every 8 hours  polyethylene glycol 3350 17 Gram(s) Oral daily  senna 2 Tablet(s) Oral at bedtime    MEDICATIONS  (PRN):  bisacodyl Suppository 10 milliGRAM(s) Rectal daily PRN Constipation  melatonin 3 milliGRAM(s) Oral at bedtime PRN Insomnia  morphine   Solution 15 milliGRAM(s) Enteral Tube every 2 hours PRN Severe Pain (7 - 10)      CAPILLARY BLOOD GLUCOSE        I&O's Summary    09 Sep 2022 07:01  -  10 Sep 2022 07:00  --------------------------------------------------------  IN: 340 mL / OUT: 350 mL / NET: -10 mL        Vital Signs Last 24 Hrs  T(C): 36.9 (10 Sep 2022 05:21), Max: 37.3 (09 Sep 2022 21:26)  T(F): 98.5 (10 Sep 2022 05:21), Max: 99.1 (09 Sep 2022 21:26)  HR: 96 (10 Sep 2022 05:21) (95 - 107)  BP: 116/66 (10 Sep 2022 05:21) (116/66 - 134/92)  BP(mean): --  RR: 17 (10 Sep 2022 05:21) (17 - 19)  SpO2: 97% (10 Sep 2022 05:21) (96% - 97%)    Parameters below as of 10 Sep 2022 05:21  Patient On (Oxygen Delivery Method): room air        CONSTITUTIONAL: NAD, well-developed, well-groomed  EYES: PERRLA; conjunctiva and sclera clear  ENMT: Moist oral mucosa, no pharyngeal injection or exudates; normal dentition  NECK: Supple, no palpable masses; no thyromegaly  RESPIRATORY: Normal respiratory effort; lungs are clear to auscultation bilaterally  CARDIOVASCULAR: Regular rate and rhythm, normal S1 and S2, no murmur/rub/gallop; No lower extremity edema; Peripheral pulses are 2+ bilaterally  ABDOMEN: Nontender to palpation, normoactive bowel sounds, no rebound/guarding; No hepatosplenomegaly  MUSCULOSKELETAL:  Normal gait; no clubbing or cyanosis of digits; no joint swelling or tenderness to palpation  PSYCH: A+O to person, place, and time; affect appropriate  NEUROLOGY: CN 2-12 are intact and symmetric; no gross sensory deficits   SKIN: No rashes; no palpable lesions  PHYSICAL EXAM:    LABS:                        9.3    9.69  )-----------( 304      ( 10 Sep 2022 07:50 )             29.9     09-09    136  |  101  |  17  ----------------------------<  113<H>  3.9   |  23  |  0.65    Ca    7.9<L>      09 Sep 2022 05:55  Phos  3.8     09-09  Mg     2.00     09-09    TPro  6.2  /  Alb  2.7<L>  /  TBili  1.0  /  DBili  x   /  AST  161<H>  /  ALT  147<H>  /  AlkPhos  312<H>  09-09    PT/INR - ( 08 Sep 2022 17:32 )   PT: 13.4 sec;   INR: 1.15 ratio         PTT - ( 08 Sep 2022 17:32 )  PTT:25.0 sec      Urinalysis Basic - ( 09 Sep 2022 15:51 )    Color: Yellow / Appearance: Clear / SG: >1.050 / pH: x  Gluc: x / Ketone: Small  / Bili: Negative / Urobili: <2 mg/dL   Blood: x / Protein: 30 mg/dL / Nitrite: Positive   Leuk Esterase: Large / RBC: 7 /HPF /  /HPF   Sq Epi: x / Non Sq Epi: 1 /HPF / Bacteria: Moderate        Culture - Blood (collected 08 Sep 2022 17:30)  Source: .Blood Blood-Venous  Preliminary Report (09 Sep 2022 22:01):    No growth to date.    Culture - Blood (collected 08 Sep 2022 17:20)  Source: .Blood Blood-Peripheral  Preliminary Report (09 Sep 2022 22:01):    No growth to date.        RADIOLOGY & ADDITIONAL TESTS:  Results Reviewed:   Imaging Personally Reviewed:  Electrocardiogram Personally Reviewed:    COORDINATION OF CARE:  Care Discussed with Consultants/Other Providers [Y/N]:  Prior or Outpatient Records Reviewed [Y/N]:   PROGRESS NOTE:   Authored by RICKIE Childress PGY1 Pager: [phone]    Patient is a 57y old  Female who presents with a chief complaint of abdominal pain & nausea/vomiting (09 Sep 2022 18:16)      SUBJECTIVE / OVERNIGHT EVENTS:  1NBNB emesis overnight with improvement on Reglan. This morning, pt states sxs unchanged. Continued abdominal pain. Denies F/C/chest pain/ SOB.     MEDICATIONS  (STANDING):  Biotene Dry Mouth Oral Rinse 5 milliLiter(s) Swish and Spit two times a day  enoxaparin Injectable 70 milliGRAM(s) SubCutaneous every 12 hours  lidocaine   4% Patch 1 Patch Transdermal daily  methadone   Solution 5 milliGRAM(s) Enteral Tube every 12 hours  metoclopramide   Syrup 5 milliGRAM(s) Oral every 4 hours  pantoprazole   Suspension 40 milliGRAM(s) Oral daily  piperacillin/tazobactam IVPB.. 3.375 Gram(s) IV Intermittent every 8 hours  polyethylene glycol 3350 17 Gram(s) Oral daily  senna 2 Tablet(s) Oral at bedtime    MEDICATIONS  (PRN):  bisacodyl Suppository 10 milliGRAM(s) Rectal daily PRN Constipation  melatonin 3 milliGRAM(s) Oral at bedtime PRN Insomnia  morphine   Solution 15 milliGRAM(s) Enteral Tube every 2 hours PRN Severe Pain (7 - 10)      CAPILLARY BLOOD GLUCOSE        I&O's Summary    09 Sep 2022 07:01  -  10 Sep 2022 07:00  --------------------------------------------------------  IN: 340 mL / OUT: 350 mL / NET: -10 mL        Vital Signs Last 24 Hrs  T(C): 36.9 (10 Sep 2022 05:21), Max: 37.3 (09 Sep 2022 21:26)  T(F): 98.5 (10 Sep 2022 05:21), Max: 99.1 (09 Sep 2022 21:26)  HR: 96 (10 Sep 2022 05:21) (95 - 107)  BP: 116/66 (10 Sep 2022 05:21) (116/66 - 134/92)  BP(mean): --  RR: 17 (10 Sep 2022 05:21) (17 - 19)  SpO2: 97% (10 Sep 2022 05:21) (96% - 97%)    Parameters below as of 10 Sep 2022 05:21  Patient On (Oxygen Delivery Method): room air      PHYSICAL EXAM:  General: WN/WD NAD  Neurology: A&Ox3, nonfocal  Eyes: EOMI, Gross vision intact  ENT/Neck: Neck supple, trachea midline  Respiratory: CTA B/L, No wheezing, rales, rhonchi  CV: RRR, +S1/S2, -S3/S4, no murmurs, rubs or gallops  Abdominal: Soft, mildly tender RUQ, ND +BS  Extremities: No edema, 2+ peripheral pulses  Skin: No Rashes, Hematoma, Ecchymosis    LABS:                        9.3    9.69  )-----------( 304      ( 10 Sep 2022 07:50 )             29.9     09-09    136  |  101  |  17  ----------------------------<  113<H>  3.9   |  23  |  0.65    Ca    7.9<L>      09 Sep 2022 05:55  Phos  3.8     09-09  Mg     2.00     09-09    TPro  6.2  /  Alb  2.7<L>  /  TBili  1.0  /  DBili  x   /  AST  161<H>  /  ALT  147<H>  /  AlkPhos  312<H>  09-09    PT/INR - ( 08 Sep 2022 17:32 )   PT: 13.4 sec;   INR: 1.15 ratio         PTT - ( 08 Sep 2022 17:32 )  PTT:25.0 sec      Urinalysis Basic - ( 09 Sep 2022 15:51 )    Color: Yellow / Appearance: Clear / SG: >1.050 / pH: x  Gluc: x / Ketone: Small  / Bili: Negative / Urobili: <2 mg/dL   Blood: x / Protein: 30 mg/dL / Nitrite: Positive   Leuk Esterase: Large / RBC: 7 /HPF /  /HPF   Sq Epi: x / Non Sq Epi: 1 /HPF / Bacteria: Moderate        Culture - Blood (collected 08 Sep 2022 17:30)  Source: .Blood Blood-Venous  Preliminary Report (09 Sep 2022 22:01):    No growth to date.    Culture - Blood (collected 08 Sep 2022 17:20)  Source: .Blood Blood-Peripheral  Preliminary Report (09 Sep 2022 22:01):    No growth to date.        RADIOLOGY & ADDITIONAL TESTS:  Results Reviewed:   Imaging Personally Reviewed:  Electrocardiogram Personally Reviewed:    COORDINATION OF CARE:  Care Discussed with Consultants/Other Providers [Y/N]:  Prior or Outpatient Records Reviewed [Y/N]:

## 2022-09-10 NOTE — PROGRESS NOTE ADULT - PROBLEM SELECTOR PLAN 3
Impression: Pt has vomiting and intermittent episodes. Vomiting associated with eating meals. Likely related to gastric adenocarcinoma.     IV reglan 5 mg Q4 Impression: Patient having L leg swelling yesterday, resolved today. Low suspicion for a DVT - (no tachycardia, no pain).   US - negative for DVT  Swelling limited to LLE to the ankle

## 2022-09-10 NOTE — PROGRESS NOTE ADULT - SUBJECTIVE AND OBJECTIVE BOX
Subjective  Patient states that her back pain is improving. No other complaints    Objective    Vital signs  T(F): , Max: 99.1 (09-09-22 @ 21:26)  HR: 91 (09-10-22 @ 12:14)  BP: 125/71 (09-10-22 @ 12:14)  SpO2: 96% (09-10-22 @ 12:14)  Wt(kg): --    Output     OUT:    Voided (mL): 350 mL  Total OUT: 350 mL    Total NET: -350 mL      OUT:    Voided (mL): 150 mL  Total OUT: 150 mL    Total NET: -150 mL          Gen: NAD  Abd: soft, nontender, nondistended  : Voiding CYU    Labs      09-10 @ 07:50    WBC 9.69  / Hct 29.9  / SCr 0.62     09-09 @ 05:55    WBC 10.76 / Hct 31.2  / SCr 0.65         Culture - Blood (collected 09-08-22 @ 17:30)  Source: .Blood Blood-Venous  Preliminary Report (09-09-22 @ 22:01):    No growth to date.    Culture - Blood (collected 09-08-22 @ 17:20)  Source: .Blood Blood-Peripheral  Preliminary Report (09-09-22 @ 22:01):    No growth to date.        Urine Cx: pending    Imaging

## 2022-09-10 NOTE — PROGRESS NOTE ADULT - PROBLEM SELECTOR PLAN 5
Impression: Stable anemia. May be combination of Iron deficiency anemia from poor PO intake and normocytic anemia from Anemia of Chronic Disease w/ this presentation c/f gastric malignancy.  - Total Iron 22, TIBC 165, transferrin 149  H/H stable    continue to monitor with CBCs Impression: 72 hour caloric count to evaluate for malnutrition - indicates malnutrition. Pt unable to eat due to pain.   Now J tube placement    Nutrition consult - Recommend TwoCal HN via J-tube initiated at 10 mL/hr increased by 10mL q4 hrs until goal rate 35 mL/hr x24 hrs to provide 840 mL formula, 1680 kcal, 70.14 gm protein, 588 mL free water (meets 33.6 kcal/kg, 1.4 gm protein/kg).  - Patient was unable to tolerate tube feed rate of 30, therefore decreased back to 25

## 2022-09-10 NOTE — PROGRESS NOTE ADULT - PROBLEM SELECTOR PLAN 2
Impression: Patient having L leg swelling yesterday, resolved today. Low suspicion for a DVT - (no tachycardia, no pain).   US - negative for DVT  Swelling limited to LLE to the ankle b/l PEs seen on CT 9/9. Denies pleuritic chest pain, SOB. Stable on RA    Cont with lovenox 70 BID

## 2022-09-10 NOTE — PROGRESS NOTE ADULT - ASSESSMENT
57y Female w/ metastatic gastric adenocarcinoma and L hydro. Also PEs now on T. Lovenox. Afebrile, no leukocytosis, SCr normal.  - Presently pt is stable and afebrile x 24h so urgent urologic intervention is not warranted  - Should f/u Urine Cx and continue abx in the meantime  - Based on pt's clinical condition and multiple comorbidities would recommend IR for L nephrostomy tube to decompress L renal collecting system in presence of fever, flank pain, and UTI  - Would not recommend ureteral stent placement since stents are more likely to fail in setting of external compression and tortuous ureters.   - Seen and discussed with Dr. Arredondo.    Urology  [id number]

## 2022-09-10 NOTE — PROGRESS NOTE ADULT - ATTENDING COMMENTS
Patient seen and examined, care d/w [facility]7 team.    In summary 57F from [facility], h/o nephrolithiasis p/w abd pain and n/v found to have gastric adenocarcinoma (on bx from 8/15 EGD) w/ mets to LN c/b DEVI (resolved) s/p EBUS w/ LN bx on 8/23 with findings c/w metastatic adenocarcinoma.     #  Metastatic Gastric Adenocarcinoma:   - no plan for inpatient chemotherapy at this time, recs outpatient follow up.   - s/p EGD, bx of peritoneum, and robotic feeding J tube placement per surg on 8/26 (peritoneal bx now back and shows adenocarcinoma with signet ring cells). Surgery was not able to do any resection as exploration revealed advanced disease.     - c/w pain regimen, seen by palliative, f/u as reports poor pain control however methadone will take time to kick in    # Sepsis due to urine vs cholangitis: Fever 102.8 on 9/8/22 w/ tachycardia to 130s; only complaint is abdominal pain s/p CTAP on 9/9 and sources can be PE vs cholangitis vs UTI/pyelo   - blood cx 9/8 NG  - on empiric zosyn w/ resolution of fevers  -  and IR called re: L hydro, per IR poor candidate for NT and per  poor candidate for stent    # PE: noted on CTPA, hypercoagulable 2/2 cancer  - on Lovenox, cost may be prohibitive, may need conversion to coumadin     # Severe protein calorie malnutrition: due to cancer   - c/w tube feeds at 25 cc/hr   - c/w symptom management w/ antiemetics     # Disposition:   - emergency medicaid approval for OP treatment; may need referral to [facility]  - per CM/SW TFs will not be covered, trying to get [employer] TFs from company

## 2022-09-10 NOTE — PROGRESS NOTE ADULT - PROBLEM SELECTOR PLAN 6
DVT ppx: lovenox      Dispo planning: complex dispo planning due to lack of insurance  Emergency medicaid can cover outpatient chemo/transport. Impression: Stable anemia. May be combination of Iron deficiency anemia from poor PO intake and normocytic anemia from Anemia of Chronic Disease w/ this presentation c/f gastric malignancy.  - Total Iron 22, TIBC 165, transferrin 149  H/H stable    continue to monitor with CBCs

## 2022-09-10 NOTE — CONSULT NOTE ADULT - SUBJECTIVE AND OBJECTIVE BOX
Vascular & Interventional Radiology    HPI: 57y Female with PMH Nephrolithiasis who presents to the hospital for abdominal pain lasting about 2 months, associated with nausea/vomiting. The abdominal pain is described as midepigastric and left flank > r flank, 10/10 pain unable to describe consistency of pain. Pain is worsened by lying flat and is worse in general at night, is better during day. Flank pain is worsened with breathing. Nausea & vomiting always with eating food & drinking, denies any red color or blood she noticed. Pt has also lost 20 pounds in the last month, which she associates with inability to eat and feels fatigued in general because of it. Patient went to [facility] for abdominal pain recently, was prescribed pain medications and discharged, unable to remember name of pain meds or when she went to hospital. She was  found to have poorly differentiated metastatic gastric adenocarcinoma. Her hospital course was complicated by acute pulmonary emboli and concern for infection. She is now  s/p j tube placement, has been started on tube feeds, and being pain managed by palliative care. She had a fever on 9/9/22 and underwent an Abd CT and was found to have L hydronephrosis.    She was started on T. Lovenox for her PEs. Currently afebrile and hemodynamically stable.      Data:  T(C): 36.9  HR: 96  BP: 116/66  RR: 17  SpO2: 97%    -WBC 9.69 / HgB 9.3 / Hct 29.9 / Plt 304  -Na 136 / Cl 101 / BUN 17 / Glucose 113  -K 3.9 / CO2 23 / Cr 0.65  - / Alk Phos 312 / T.Bili 1.0  -INR1.15    Imaging: reviewed    Assessment:   57y Female with_______    Plan:  Vascular & Interventional Radiology    HPI: 57y Female with PMH Nephrolithiasis who presents to the hospital for abdominal pain lasting about 2 months, associated with nausea/vomiting. The abdominal pain is described as midepigastric and left flank > r flank, 10/10 pain unable to describe consistency of pain. Pain is worsened by lying flat and is worse in general at night, is better during day. Flank pain is worsened with breathing. Nausea & vomiting always with eating food & drinking, denies any red color or blood she noticed. Pt has also lost 20 pounds in the last month, which she associates with inability to eat and feels fatigued in general because of it. Patient went to [facility] for abdominal pain recently, was prescribed pain medications and discharged, unable to remember name of pain meds or when she went to hospital. She was found to have poorly differentiated metastatic gastric adenocarcinoma. Her hospital course was complicated by acute pulmonary emboli and concern for infection. She is now  s/p j tube placement, has been started on tube feeds, and being pain managed by palliative care. She had a fever on 9/9/22 and underwent an Abd CT and was found to have L hydronephrosis.    She was started on T. Lovenox for her PEs. Currently afebrile and hemodynamically stable.      Data:  T(C): 36.9  HR: 96  BP: 116/66  RR: 17  SpO2: 97%    -WBC 9.69 / HgB 9.3 / Hct 29.9 / Plt 304  -Na 136 / Cl 101 / BUN 17 / Glucose 113  -K 3.9 / CO2 23 / Cr 0.65  - / Alk Phos 312 / T.Bili 1.0  -INR1.15    Imaging: reviewed    Assessment:   57y Female w/ metastatic gastric adenocarcinoma and L hydro. Also PEs now on T. Lovenox. Afebrile, no leukocytosis, SCr normal.    Plan:   - Patient is not a good candidate for percutaneous nephrostomy given they were just started on therapeutic AC for the treatment of acute PEs. Can discuss with heme/onc the timing of holding AC, as at this time the treatment of their PEs is likely of greater importance given their labs.  - Would reach out to urology to reconsider an endourologic approach. Although there is a risk of stent failure in malignancy, they are not a good candidate for PCN at this time, as above. If the stent was to fail, could re-evaluate for PCN at that time.   - If patient becomes septic, page for re-evaluation.  - Trend labs.      --  Grey Mondragon MD, PGY-6  Vascular and Interventional Radiology  Available on Microsoft Teams    - Nonemergent consults:  place sunrise order "Consult- Interventional Radiology"  - Emergent issues (pager): [facility] [phone]; [facility] [phone]; [phone]  - Scheduling questions: [facility] [phone]; [facility] [phone]  - Clinic/outpatient booking: [facility] [phone]; [facility] [phone]

## 2022-09-10 NOTE — PROGRESS NOTE ADULT - PROBLEM SELECTOR PLAN 4
Impression: 72 hour caloric count to evaluate for malnutrition - indicates malnutrition. Pt unable to eat due to pain.   Now J tube placement    Nutrition consult - Recommend TwoCal HN via J-tube initiated at 10 mL/hr increased by 10mL q4 hrs until goal rate 35 mL/hr x24 hrs to provide 840 mL formula, 1680 kcal, 70.14 gm protein, 588 mL free water (meets 33.6 kcal/kg, 1.4 gm protein/kg).  - Patient was unable to tolerate tube feed rate of 30, therefore decreased back to 25 Impression: Pt has vomiting and intermittent episodes. Vomiting associated with eating meals. Likely related to gastric adenocarcinoma.     IV reglan 5 mg Q4

## 2022-09-11 NOTE — PROGRESS NOTE ADULT - PROBLEM SELECTOR PLAN 3
Impression: Patient having L leg swelling yesterday, resolved today. Low suspicion for a DVT - (no tachycardia, no pain).   US - negative for DVT  Swelling limited to LLE to the ankle Impression: Patient having L leg swelling yesterday, resolved today. Low suspicion for a DVT - (no tachycardia, no pain).   Swelling limited to LLE to the ankle

## 2022-09-11 NOTE — PROGRESS NOTE ADULT - PROBLEM SELECTOR PLAN 2
b/l PEs seen on CT 9/9. Denies pleuritic chest pain, SOB. Stable on RA    Cont with lovenox 70 BID b/l PEs seen on CT 9/9. Denies pleuritic chest pain, SOB. Stable on RA  Cont with lovenox 70 BID

## 2022-09-11 NOTE — PROGRESS NOTE ADULT - ATTENDING COMMENTS
Patient seen and examined, care d/w HS7 team.    In summary 57F from [facility], h/o nephrolithiasis p/w abd pain and n/v found to have gastric adenocarcinoma (on bx from 8/15 EGD) w/ mets to LN c/b DEVI (resolved) s/p EBUS w/ LN bx on 8/23 with findings c/w metastatic adenocarcinoma.     #  Metastatic Gastric Adenocarcinoma:   - no plan for inpatient chemotherapy at this time, recs outpatient follow up.   - s/p EGD, bx of peritoneum, and robotic feeding J tube placement per surg on 8/26 (peritoneal bx now back and shows adenocarcinoma with signet ring cells). Surgery was not able to do any resection as exploration revealed advanced disease.     - c/w pain regimen, seen by palliative, f/u as reports poor pain control however methadone will take time to kick in, gettng better     # Sepsis due to urine vs cholangitis: Fever 102.8 on 9/8/22 w/ tachycardia to 130s; only complaint is abdominal pain s/p CTAP on 9/9 and sources can be PE vs cholangitis vs UTI/pyelo   - blood cx 9/8 NG  - on empiric zosyn w/ resolution of fevers, plan for 7d course for urine/biliary however unable to determine source   -  and IR called re: L hydro, per IR poor candidate for NT and per  poor candidate for stent    # PE: noted on CTPA, hypercoagulable 2/2 cancer  - on Lovenox, cost may be prohibitive, may need conversion to coumadin, will need to ascertain angus coverage, lovenox teaching in interim     # Severe protein calorie malnutrition: due to cancer   - c/w tube feeds at 25 cc/hr, now vomiting for >36 hours   - c/w symptom management w/ antiemetics     # Disposition:   - emergency medicaid approval for OP treatment; may need referral to [facility]  - per CM/SW TFs will not be covered, trying to get ang[employer] TFs from company

## 2022-09-11 NOTE — CHART NOTE - NSCHARTNOTEFT_GEN_A_CORE
57y F with Gastric CA with mets and new onset L hydronephrosis. She had an isolated fever more than 48hrs ago which has since resolved and has been afebrile since w/ normal WBC. SCr has been at her baseline.     --IR consulted. Defer on NT at this time given stable SCr and patient need for AC given recent PEs.   --As her SCr has been normal, afebrile for 24+ hours, and largely asymptomatic from the hydronephrosis there is no acute indication for ureteral stent placement at this time. Furthermore, if renal decompression was needed would suspect NT would be better suited given this patient and pathology. However agree w/ IR that no intervention is needed at this time  --Urology to sign off.   --If patient begins to have a rise in SCr, becomes septic, and/or have worsening left flank pain please reconsult  --Can f/u with Dr Amauri Arredondo as an outpatient for monitoring of hydronephrosis  [facility]    [address],   [place] [address]  [phone]     Available via Teams (Melvin Sher) from 6a-6p, M-F. Please page [phone] for issues that arise on nights and weekends

## 2022-09-11 NOTE — PROGRESS NOTE ADULT - SUBJECTIVE AND OBJECTIVE BOX
Armen Gill | PGY1| Pager: [phone]  Interval Events:    REVIEW OF SYSTEMS:  CONSTITUTIONAL: No weakness, fevers or chills  EYES/ENT: No visual changes;  No vertigo or throat pain   NECK: No pain or stiffness  RESPIRATORY: No cough, wheezing, hemoptysis; No shortness of breath  CARDIOVASCULAR: No chest pain or palpitations  GASTROINTESTINAL: No abdominal or epigastric pain. No nausea, vomiting, or hematemesis; No diarrhea or constipation. No melena or hematochezia.  GENITOURINARY: No dysuria, frequency or hematuria  NEUROLOGICAL: No numbness or weakness  SKIN: No itching, burning, rashes, or lesions   All other review of systems is negative unless indicated above.    OBJECTIVE:  ICU Vital Signs Last 24 Hrs  T(C): 36.9 (11 Sep 2022 02:16), Max: 37.4 (10 Sep 2022 22:15)  T(F): 98.5 (11 Sep 2022 02:16), Max: 99.3 (10 Sep 2022 22:15)  HR: 94 (11 Sep 2022 02:16) (91 - 101)  BP: 135/72 (11 Sep 2022 02:16) (125/71 - 137/87)  BP(mean): --  ABP: --  ABP(mean): --  RR: 17 (11 Sep 2022 02:16) (17 - 18)  SpO2: 96% (11 Sep 2022 02:16) (95% - 96%)    O2 Parameters below as of 11 Sep 2022 02:16  Patient On (Oxygen Delivery Method): room air              09-09 @ 07:01  -  09-10 @ 07:00  --------------------------------------------------------  IN: 340 mL / OUT: 350 mL / NET: -10 mL    09-10 @ 07:01 - 09-11 @ 05:52  --------------------------------------------------------  IN: 275 mL / OUT: 350 mL / NET: -75 mL      CAPILLARY BLOOD GLUCOSE          PHYSICAL EXAM:  General: WN/WD NAD  Neurology: A&Ox3, nonfocal, LEY x 4  Eyes: PERRLA/ EOMI, Gross vision intact  ENT/Neck: Neck supple, trachea midline, No JVD, Gross hearing intact  Respiratory: CTA B/L, No wheezing, rales, rhonchi  CV: RRR, +S1/S2, -S3/S4, no murmurs, rubs or gallops  Abdominal: Soft, NT, ND +BS, No HSM  MSK: 5/5 strength UE/LE bilaterally  Extremities: No edema, 2+ peripheral pulses  Skin: No Rashes, Hematoma, Ecchymosis  Incisions:   Tubes:    HOSPITAL MEDICATIONS:  MEDICATIONS  (STANDING):  Biotene Dry Mouth Oral Rinse 5 milliLiter(s) Swish and Spit two times a day  enoxaparin Injectable 70 milliGRAM(s) SubCutaneous every 12 hours  lidocaine   4% Patch 1 Patch Transdermal daily  methadone   Solution 5 milliGRAM(s) Enteral Tube every 12 hours  metoclopramide   Syrup 5 milliGRAM(s) Oral every 4 hours  pantoprazole   Suspension 40 milliGRAM(s) Oral daily  piperacillin/tazobactam IVPB.. 3.375 Gram(s) IV Intermittent every 8 hours  polyethylene glycol 3350 17 Gram(s) Oral daily  senna 2 Tablet(s) Oral at bedtime    MEDICATIONS  (PRN):  bisacodyl Suppository 10 milliGRAM(s) Rectal daily PRN Constipation  melatonin 3 milliGRAM(s) Oral at bedtime PRN Insomnia  morphine   Solution 15 milliGRAM(s) Enteral Tube every 2 hours PRN Severe Pain (7 - 10)      LABS:                        9.3    9.69  )-----------( 304      ( 10 Sep 2022 07:50 )             29.9     Hgb Trend: 9.3<--, 9.7<--, 10.6<--, 10.0<--  09-10    145  |  108<H>  |  13  ----------------------------<  141<H>  4.0   |  23  |  0.62    Ca    7.8<L>      10 Sep 2022 07:50  Phos  3.8     09-09  Mg     2.00     09-09    TPro  6.1  /  Alb  2.5<L>  /  TBili  0.7  /  DBili  x   /  AST  93<H>  /  ALT  110<H>  /  AlkPhos  288<H>  09-10    Creatinine Trend: 0.62<--, 0.65<--, 0.53<--, 0.52<--, 0.47<--, 0.45<--    Urinalysis Basic - ( 09 Sep 2022 15:51 )    Color: Yellow / Appearance: Clear / SG: >1.050 / pH: x  Gluc: x / Ketone: Small  / Bili: Negative / Urobili: <2 mg/dL   Blood: x / Protein: 30 mg/dL / Nitrite: Positive   Leuk Esterase: Large / RBC: 7 /HPF /  /HPF   Sq Epi: x / Non Sq Epi: 1 /HPF / Bacteria: Moderate            MICROBIOLOGY:     Culture - Blood (collected 08 Sep 2022 17:30)  Source: .Blood Blood-Venous  Preliminary Report (09 Sep 2022 22:01):    No growth to date.    Culture - Blood (collected 08 Sep 2022 17:20)  Source: .Blood Blood-Peripheral  Preliminary Report (09 Sep 2022 22:01):    No growth to date.       Armen Gill | PGY1| Pager: [phone]  Interval Events: No acute events overnight; patient appears better    REVIEW OF SYSTEMS:  CONSTITUTIONAL: No weakness, fevers or chills  EYES/ENT: No visual changes;  No vertigo or throat pain   RESPIRATORY: No cough, wheezing, hemoptysis; No shortness of breath  CARDIOVASCULAR: No chest pain or palpitations  GASTROINTESTINAL: continued mid-abdominal pain with back pain, pain is better managed  SKIN: No itching, burning, rashes, or lesions   All other review of systems is negative unless indicated above.    OBJECTIVE:  ICU Vital Signs Last 24 Hrs  T(C): 36.9 (11 Sep 2022 02:16), Max: 37.4 (10 Sep 2022 22:15)  T(F): 98.5 (11 Sep 2022 02:16), Max: 99.3 (10 Sep 2022 22:15)  HR: 94 (11 Sep 2022 02:16) (91 - 101)  BP: 135/72 (11 Sep 2022 02:16) (125/71 - 137/87)  BP(mean): --  ABP: --  ABP(mean): --  RR: 17 (11 Sep 2022 02:16) (17 - 18)  SpO2: 96% (11 Sep 2022 02:16) (95% - 96%)    O2 Parameters below as of 11 Sep 2022 02:16  Patient On (Oxygen Delivery Method): room air              09-09 @ 07:01  -  09-10 @ 07:00  --------------------------------------------------------  IN: 340 mL / OUT: 350 mL / NET: -10 mL    09-10 @ 07:01  -  09-11 @ 05:52  --------------------------------------------------------  IN: 275 mL / OUT: 350 mL / NET: -75 mL      CAPILLARY BLOOD GLUCOSE          PHYSICAL EXAM:   General: NAD Mood is improved  Neurology: A&Ox3, LEY x 4  Eyes: PERRLA/ EOMI, Gross vision intact  Respiratory: CTA B/L, No wheezing, rales, rhonchi  CV: RRR, +S1/S2, -S3/S4, no murmurs, rubs or gallops  Abdominal: tenderness around mid upper quadrant, improved relative to prior days  Extremities: 2+ edema RLE to ankle; no edema in LLE  Skin: No Rashes, Hematoma, Ecchymosis  Tubes: J Tube in Left middle stomach; appears clean, nonerythematous    HOSPITAL MEDICATIONS:  MEDICATIONS  (STANDING):  Biotene Dry Mouth Oral Rinse 5 milliLiter(s) Swish and Spit two times a day  enoxaparin Injectable 70 milliGRAM(s) SubCutaneous every 12 hours  lidocaine   4% Patch 1 Patch Transdermal daily  methadone   Solution 5 milliGRAM(s) Enteral Tube every 12 hours  metoclopramide   Syrup 5 milliGRAM(s) Oral every 4 hours  pantoprazole   Suspension 40 milliGRAM(s) Oral daily  piperacillin/tazobactam IVPB.. 3.375 Gram(s) IV Intermittent every 8 hours  polyethylene glycol 3350 17 Gram(s) Oral daily  senna 2 Tablet(s) Oral at bedtime    MEDICATIONS  (PRN):  bisacodyl Suppository 10 milliGRAM(s) Rectal daily PRN Constipation  melatonin 3 milliGRAM(s) Oral at bedtime PRN Insomnia  morphine   Solution 15 milliGRAM(s) Enteral Tube every 2 hours PRN Severe Pain (7 - 10)      LABS:                        9.3    9.69  )-----------( 304      ( 10 Sep 2022 07:50 )             29.9     Hgb Trend: 9.3<--, 9.7<--, 10.6<--, 10.0<--  09-10    145  |  108<H>  |  13  ----------------------------<  141<H>  4.0   |  23  |  0.62    Ca    7.8<L>      10 Sep 2022 07:50  Phos  3.8     09-09  Mg     2.00     09-09    TPro  6.1  /  Alb  2.5<L>  /  TBili  0.7  /  DBili  x   /  AST  93<H>  /  ALT  110<H>  /  AlkPhos  288<H>  09-10    Creatinine Trend: 0.62<--, 0.65<--, 0.53<--, 0.52<--, 0.47<--, 0.45<--    Urinalysis Basic - ( 09 Sep 2022 15:51 )    Color: Yellow / Appearance: Clear / SG: >1.050 / pH: x  Gluc: x / Ketone: Small  / Bili: Negative / Urobili: <2 mg/dL   Blood: x / Protein: 30 mg/dL / Nitrite: Positive   Leuk Esterase: Large / RBC: 7 /HPF /  /HPF   Sq Epi: x / Non Sq Epi: 1 /HPF / Bacteria: Moderate            MICROBIOLOGY:     Culture - Blood (collected 08 Sep 2022 17:30)  Source: .Blood Blood-Venous  Preliminary Report (09 Sep 2022 22:01):    No growth to date.    Culture - Blood (collected 08 Sep 2022 17:20)  Source: .Blood Blood-Peripheral  Preliminary Report (09 Sep 2022 22:01):    No growth to date.

## 2022-09-11 NOTE — PROGRESS NOTE ADULT - PROBLEM SELECTOR PLAN 4
Impression: Pt has vomiting and intermittent episodes. Vomiting associated with eating meals. Likely related to gastric adenocarcinoma.     IV reglan 5 mg Q4 Impression: Pt has vomiting and intermittent episodes. Vomiting associated with eating meals. Likely related to gastric adenocarcinoma.     Reglan 5mg q4h; Standing

## 2022-09-11 NOTE — PROGRESS NOTE ADULT - PROBLEM SELECTOR PLAN 1
Impression: The patient's abdominal pain, persistent nausea/vomiting and weight loss iso of CT A/P findings indicating gastric body thickening and gastrocolic ligament nodularity are concerning for gastric adenocarcinoma. EGD biopsy confirmed.  - GI, onc, surg/onc following  - EGD:  Infiltrative changes in gastric body concerning gastric malignancy, Congested duodenal mucosa, biopsy = poorly differentiated adenocarcinoma  - staging CT chest - Mediastinal/bilateral hilar/R internal mammary LAD  - mediastinal lymph node biopsy results - Metastatic adenocarcinoma   - pantoprazole  40 mg BID  - OR 8/26 with J tube placement     -For pain control IV Dilaudid 1.5 mg Q4 ATC with 0.9 mg q2h PRN for severe/breakthrough - pt feels pain has improved with this regimen  - Transitioned to oral PO morphine q4h ATC and 10mg q2h severe pain  - As per surgery recs, tube feeds are 25 cc/hr  Encourage use of PRN pain meds, pain better managed; follow with palliative  - a/p surgery notes, No intervention with J tube. Impression: The patient's abdominal pain, persistent nausea/vomiting and weight loss iso of CT A/P findings indicating gastric body thickening and gastrocolic ligament nodularity are concerning for gastric adenocarcinoma. EGD biopsy confirmed.  - GI, onc, surg/onc following  - EGD:  Infiltrative changes in gastric body concerning gastric malignancy, Congested duodenal mucosa, biopsy = poorly differentiated adenocarcinoma  - staging CT chest - Mediastinal/bilateral hilar/R internal mammary LAD  - mediastinal lymph node biopsy results - Metastatic adenocarcinoma   - pantoprazole  40 mg BID  - OR 8/26 with J tube placement; no further change in management    pain Control  -Morphine 15mg q2h for severe Pain 7-10  -Methadone 5mg q12h standing

## 2022-09-11 NOTE — PROGRESS NOTE ADULT - ASSESSMENT
57F PMH of renal stones p/w gastric adenocarcinoma w/ mets to LN c/b DEVI, now resolved, and nephrolithiasis. S/p j tube placement, started on tube feeds, slowly titrating rate of feeds as tolerated and managing pain with palliative following. Awaiting emergency medicaid approval for o/p treatment assistance. 57F PMH of renal stones p/w gastric adenocarcinoma w/ mets to LN c/b DEVI, now resolved, and nephrolithiasis. S/p j tube placement, with stable tube feed regiment and improved pain control. Awaiting emergency medicaid approval for o/p treatment assistance.

## 2022-09-12 NOTE — PROGRESS NOTE ADULT - ASSESSMENT
58 yo F with a PMH of nephrolithiasis who p/w progressive abdominal pain, N/V, and weight loss, found to have poorly differentiated metastatic gastric adenocarcinoma, course complicated by acute pulmonary emboli and concern for infection.    #Metastatic Gastric Adenocarcinoma  - Presented with abdominal pain, N/V, and weight loss  - Admission CT C/A/P shows abdominal and thoracic (subdiaphragmatic, mediastinal, subcarinal and bilateral hilar) LAD, 3 mm RML nodule, and gastric body thickening and gastrocolic ligament nodularity  - CEA elevated (1544);  mildly elevated, CA 19-9 normal  - S/p EUS by GI on 8/16 showing infiltrative gastric body changes w/pathology showing poorly differentiated gastric adenocarcinoma (+AE1/3, CK7, CK20, CDX2, neg for ER), MS-stable  - S/p EBUS with level 7 mediastinal LN bx on 8/23 confirming metastasis of gastric adenocarcinoma, and ascitic fluid (trace ascites) also confirming disease there with signet-ring cells  - S/p J-tube placement by surgery; 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 Tube feeds per primary team/surgery  - C/w PPI BID  - CTA chest and CT A/P 9/9/22 concerning for b/l PE as well as progression of mediastinal and b/l hilar lymphadenopathy  enhancement of CBD concerning for possible ?cholangitis, also with left hydroureteronephrosis likely sequela of neoplasm/metastatic disease, small to moderate ascites progressed since August  - Remains on broad spectrum abx; however, suspect fevers due to PE +/- malignancy  - Antibiotic use and discharge as per primary team  - Appreciate Palliative Care and Primary team optimizing pain regimen, initiating methadone, and adjusting PRN pain medications as needed to adequately control pain until methadone reaches steady state  - No plan for inpatient chemotherapy at this time. If patient is discharged this week, she will f/u with Dr. Hernandez on 9/19 at 8:30AM at [facility]  - HER2 2+ (equivocal) with RIAN pending, but does not need to prevent discharge    #Bilateral Pulmonary Emboli  - CTA chest 9/9/22 showed bilateral pulmonary emboli  - Increased Lovenox dose from PPX dose to 1 mg/kg BID (not considered a failure of Lovenox given she was on a prophylactic dose)    #Transaminitis  - Unclear etiology  - With mild CBD dilation  - No obvious hepatic metastatic lesions  - Recommend checking hepatitis and autoimmune hepatiatie    #Left Lower Extremity Edema  - Patient has trace RLE but more prominent LLE edema, newly developed  - Duplex US 9/2 negative for DVT      Aryles Hedjar, MD, PGY-5  Hematology/Oncology Fellow  [facility]  Pager: [phone]  After 5PM and on weekends and holidays, please call the inpatient fellow on call.

## 2022-09-12 NOTE — PROGRESS NOTE ADULT - SUBJECTIVE AND OBJECTIVE BOX
Subjective:   Patient seen at bedside this AM. Feels that abdominal pain is improved, does not feel as bloated. Denies n/v    Objective:  Vital Signs  T(C): 37.3 (09-12 @ 05:39), Max: 37.3 (09-12 @ 05:39)  HR: 92 (09-12 @ 05:39) (92 - 95)  BP: 128/82 (09-12 @ 05:39) (121/78 - 128/82)  RR: 17 (09-12 @ 05:39) (17 - 18)  SpO2: 99% (09-12 @ 05:39) (96% - 99%)    Physical Exam:  GEN: resting in bed comfortably in NAD  RESP: no increased WOB  ABD: soft, mildly distended, mildly TTP. J-tube site c/d/i, no discharge or surrounding erythema  EXTR: warm, well-perfused, no edema  NEURO: awake, alert    Labs:                        10.0   8.77  )-----------( 357      ( 12 Sep 2022 05:00 )             33.8   09-12    139  |  103  |  11  ----------------------------<  123<H>  3.9   |  22  |  0.66    Ca    8.1<L>      12 Sep 2022 05:00  Phos  3.2     09-12  Mg     2.20     09-12    TPro  6.8  /  Alb  2.8<L>  /  TBili  0.6  /  DBili  x   /  AST  181<H>  /  ALT  157<H>  /  AlkPhos  436<H>  09-12    CAPILLARY BLOOD GLUCOSE          Imaging:

## 2022-09-12 NOTE — PROGRESS NOTE ADULT - ATTENDING COMMENTS
Agree with Dr. Gill's note as above.  Ongoing discussion with case management re: arrangement of tube feeds  Lovenox was priced out and is covered under emergency medicaid

## 2022-09-12 NOTE — PROGRESS NOTE ADULT - PROBLEM SELECTOR PLAN 3
Impression: Patient having L leg swelling yesterday, resolved today. Low suspicion for a DVT - (no tachycardia, no pain).   Swelling limited to LLE to the ankle b/l PEs seen on CT 9/9. Denies pleuritic chest pain, SOB. Stable on RA  Cont with lovenox 70 BID  Evaluate for outpatient cost of lovenox Impression: The patient's abdominal pain, persistent nausea/vomiting and weight loss iso of CT A/P findings indicating gastric body thickening and gastrocolic ligament nodularity are concerning for gastric adenocarcinoma. EGD biopsy confirmed.  - GI, onc, surg/onc following  - EGD:  Infiltrative changes in gastric body concerning gastric malignancy, Congested duodenal mucosa, biopsy = poorly differentiated adenocarcinoma  - staging CT chest - Mediastinal/bilateral hilar/R internal mammary LAD  - mediastinal lymph node biopsy results - Metastatic adenocarcinoma   - pantoprazole  40 mg BID  - OR 8/26 with J tube placement; no further change in management    pain Control  -Morphine 15mg q2h for severe Pain 7-10  -Methadone 5mg q12h standing

## 2022-09-12 NOTE — PROGRESS NOTE ADULT - SUBJECTIVE AND OBJECTIVE BOX
INCOMPLETE NOTE    Armen Gill | PGY1| Pager: [phone]  Interval Events:    REVIEW OF SYSTEMS:  CONSTITUTIONAL: No weakness, fevers or chills  EYES/ENT: No visual changes;  No vertigo or throat pain   NECK: No pain or stiffness  RESPIRATORY: No cough, wheezing, hemoptysis; No shortness of breath  CARDIOVASCULAR: No chest pain or palpitations  GASTROINTESTINAL: No abdominal or epigastric pain. No nausea, vomiting, or hematemesis; No diarrhea or constipation. No melena or hematochezia.  GENITOURINARY: No dysuria, frequency or hematuria  NEUROLOGICAL: No numbness or weakness  SKIN: No itching, burning, rashes, or lesions   All other review of systems is negative unless indicated above.    OBJECTIVE:  ICU Vital Signs Last 24 Hrs  T(C): 37.3 (12 Sep 2022 05:39), Max: 37.3 (12 Sep 2022 05:39)  T(F): 99.2 (12 Sep 2022 05:39), Max: 99.2 (12 Sep 2022 05:39)  HR: 92 (12 Sep 2022 05:39) (92 - 95)  BP: 128/82 (12 Sep 2022 05:39) (121/78 - 128/82)  BP(mean): --  ABP: --  ABP(mean): --  RR: 17 (12 Sep 2022 05:39) (17 - 18)  SpO2: 99% (12 Sep 2022 05:39) (96% - 99%)    O2 Parameters below as of 12 Sep 2022 05:39  Patient On (Oxygen Delivery Method): room air              09-10 @ 07:01  -  09-11 @ 07:00  --------------------------------------------------------  IN: 275 mL / OUT: 650 mL / NET: -375 mL      CAPILLARY BLOOD GLUCOSE          PHYSICAL EXAM:  General: WN/WD NAD  Neurology: A&Ox3, nonfocal, LEY x 4  Eyes: PERRLA/ EOMI, Gross vision intact  ENT/Neck: Neck supple, trachea midline, No JVD, Gross hearing intact  Respiratory: CTA B/L, No wheezing, rales, rhonchi  CV: RRR, +S1/S2, -S3/S4, no murmurs, rubs or gallops  Abdominal: Soft, NT, ND +BS, No HSM  MSK: 5/5 strength UE/LE bilaterally  Extremities: No edema, 2+ peripheral pulses  Skin: No Rashes, Hematoma, Ecchymosis  Incisions:   Tubes:    HOSPITAL MEDICATIONS:  MEDICATIONS  (STANDING):  Biotene Dry Mouth Oral Rinse 5 milliLiter(s) Swish and Spit two times a day  enoxaparin Injectable 70 milliGRAM(s) SubCutaneous every 12 hours  lidocaine   4% Patch 1 Patch Transdermal daily  methadone   Solution 5 milliGRAM(s) Enteral Tube every 12 hours  metoclopramide   Syrup 5 milliGRAM(s) Oral every 4 hours  pantoprazole   Suspension 40 milliGRAM(s) Oral daily  piperacillin/tazobactam IVPB.. 3.375 Gram(s) IV Intermittent every 8 hours    MEDICATIONS  (PRN):  bisacodyl Suppository 10 milliGRAM(s) Rectal daily PRN Constipation  melatonin 3 milliGRAM(s) Oral at bedtime PRN Insomnia  morphine   Solution 15 milliGRAM(s) Enteral Tube every 2 hours PRN Severe Pain (7 - 10)  polyethylene glycol 3350 17 Gram(s) Oral daily PRN Constipation      LABS:                        10.0   8.77  )-----------( 357      ( 12 Sep 2022 05:00 )             33.8     Hgb Trend: 10.0<--, 9.8<--, 9.3<--, 9.7<--, 10.6<--  09-12    139  |  103  |  11  ----------------------------<  123<H>  3.9   |  22  |  0.66    Ca    8.1<L>      12 Sep 2022 05:00  Phos  3.2     09-12  Mg     2.20     09-12    TPro  6.8  /  Alb  2.8<L>  /  TBili  0.6  /  DBili  x   /  AST  181<H>  /  ALT  157<H>  /  AlkPhos  436<H>  09-12    Creatinine Trend: 0.66<--, 0.63<--, 0.62<--, 0.65<--, 0.53<--, 0.52<--  PT/INR - ( 11 Sep 2022 06:32 )   PT: 12.8 sec;   INR: 1.10 ratio         PTT - ( 11 Sep 2022 06:32 )  PTT:30.4 sec          MICROBIOLOGY:      Armen Gill | PGY1| Pager: [phone]  Interval Events: No acute events overnight, patient reports pain is well controlled; is anxious over how the next steps will proceed    REVIEW OF SYSTEMS:  CONSTITUTIONAL: No weakness, fevers or chills  RESPIRATORY: No cough, wheezing, hemoptysis; No shortness of breath  CARDIOVASCULAR: No chest pain or palpitations; continued swelling in LLE  GASTROINTESTINAL: Same epigastric pain and back pain as prior days  GENITOURINARY: No dysuria, frequency or hematuria  All other review of systems is negative unless indicated above.    OBJECTIVE:  ICU Vital Signs Last 24 Hrs  T(C): 37.3 (12 Sep 2022 05:39), Max: 37.3 (12 Sep 2022 05:39)  T(F): 99.2 (12 Sep 2022 05:39), Max: 99.2 (12 Sep 2022 05:39)  HR: 92 (12 Sep 2022 05:39) (92 - 95)  BP: 128/82 (12 Sep 2022 05:39) (121/78 - 128/82)  BP(mean): --  ABP: --  ABP(mean): --  RR: 17 (12 Sep 2022 05:39) (17 - 18)  SpO2: 99% (12 Sep 2022 05:39) (96% - 99%)    O2 Parameters below as of 12 Sep 2022 05:39  Patient On (Oxygen Delivery Method): room air        09-10 @ 07:01  -  09-11 @ 07:00  --------------------------------------------------------  IN: 275 mL / OUT: 650 mL / NET: -375 mL      CAPILLARY BLOOD GLUCOSE          PHYSICAL EXAM:  General: NAD  Neurology: A&Ox3, LEY x 4  Eyes: PERRLA, Gross vision intact  ENT/Neck: Neck supple, trachea midline, Gross hearing intact  Respiratory: CTA B/L,   CV: RRR, +S1/S2, -S3/S4, no murmurs, rubs or gallops  Abdominal: distended, J tube site intact, no significant fluid appreciated  Extremities: 2+ edema in LLE to ankles, 2+ peripheral pulses    Tubes: J Tube site is non-erythematous    HOSPITAL MEDICATIONS:  MEDICATIONS  (STANDING):  Biotene Dry Mouth Oral Rinse 5 milliLiter(s) Swish and Spit two times a day  enoxaparin Injectable 70 milliGRAM(s) SubCutaneous every 12 hours  lidocaine   4% Patch 1 Patch Transdermal daily  methadone   Solution 5 milliGRAM(s) Enteral Tube every 12 hours  metoclopramide   Syrup 5 milliGRAM(s) Oral every 4 hours  pantoprazole   Suspension 40 milliGRAM(s) Oral daily  piperacillin/tazobactam IVPB.. 3.375 Gram(s) IV Intermittent every 8 hours    MEDICATIONS  (PRN):  bisacodyl Suppository 10 milliGRAM(s) Rectal daily PRN Constipation  melatonin 3 milliGRAM(s) Oral at bedtime PRN Insomnia  morphine   Solution 15 milliGRAM(s) Enteral Tube every 2 hours PRN Severe Pain (7 - 10)  polyethylene glycol 3350 17 Gram(s) Oral daily PRN Constipation      LABS:                        10.0   8.77  )-----------( 357      ( 12 Sep 2022 05:00 )             33.8     Hgb Trend: 10.0<--, 9.8<--, 9.3<--, 9.7<--, 10.6<--  09-12    139  |  103  |  11  ----------------------------<  123<H>  3.9   |  22  |  0.66    Ca    8.1<L>      12 Sep 2022 05:00  Phos  3.2     09-12  Mg     2.20     09-12    TPro  6.8  /  Alb  2.8<L>  /  TBili  0.6  /  DBili  x   /  AST  181<H>  /  ALT  157<H>  /  AlkPhos  436<H>  09-12    Creatinine Trend: 0.66<--, 0.63<--, 0.62<--, 0.65<--, 0.53<--, 0.52<--  PT/INR - ( 11 Sep 2022 06:32 )   PT: 12.8 sec;   INR: 1.10 ratio         PTT - ( 11 Sep 2022 06:32 )  PTT:30.4 sec          MICROBIOLOGY:

## 2022-09-12 NOTE — PROGRESS NOTE ADULT - ASSESSMENT
57F PMH of renal stones p/w gastric adenocarcinoma w/ mets to LN c/b DEVI, now resolved, and nephrolithiasis. S/p j tube placement, with stable tube feed regiment and improved pain control. Awaiting emergency medicaid approval for o/p treatment assistance.

## 2022-09-12 NOTE — PROGRESS NOTE ADULT - ASSESSMENT
57F with gastric CA, s/p EGD and robotic feeding J tube placement 8/26.    Impression/plan:  - Pt seems to have issues whenever TFs are increased from 25cc to 30cc/hr. However, currently she is tolerating 25cc/hr without n/v or increased pain. Can attempt increasing TFs to 28cc/hr today  - Appreciate nutrition input  - Remaining care per primary team    D Team Surgery  [id number]

## 2022-09-12 NOTE — PROGRESS NOTE ADULT - PROBLEM SELECTOR PLAN 7
DVT ppx: lovenox      Dispo planning: complex dispo planning due to lack of insurance  Emergency medicaid can cover outpatient chemo/transport. Impression: Stable anemia. May be combination of Iron deficiency anemia from poor PO intake and normocytic anemia from Anemia of Chronic Disease w/ this presentation c/f gastric malignancy.  - Total Iron 22, TIBC 165, transferrin 149  H/H stable    continue to monitor with CBCs Impression: 72 hour caloric count to evaluate for malnutrition - indicates malnutrition. Pt unable to eat due to pain.   Now J tube placement    Nutrition consult - Recommend TwoCal HN via J-tube initiated at 10 mL/hr increased by 10mL q4 hrs until goal rate 35 mL/hr x24 hrs to provide 840 mL formula, 1680 kcal, 70.14 gm protein, 588 mL free water (meets 33.6 kcal/kg, 1.4 gm protein/kg).  - Patient was unable to tolerate tube feed rate of 30, therefore decreased back to 25  - Attempt to increase rate of tube feeds

## 2022-09-12 NOTE — PROGRESS NOTE ADULT - SUBJECTIVE AND OBJECTIVE BOX
INTERVAL HPI/OVERNIGHT EVENTS:  Patient S&E at bedside. Her abdominal pain is improving and she denies N/V, further fevers, chills, CP, SOB, N/V, or rash. She is having BMs.      VITAL SIGNS:  T(F): 98.3 (09-12-22 @ 11:52)  HR: 87 (09-12-22 @ 11:52)  BP: 124/80 (09-12-22 @ 11:52)  RR: 18 (09-12-22 @ 11:52)  SpO2: 100% (09-12-22 @ 11:52)  Wt(kg): --    PHYSICAL EXAM:    Constitutional: NAD  Eyes: EOMI, sclera non-icteric  Neck: supple  Respiratory: no increased WOB, CTAB/L  Cardiovascular: RRR, S1, S2, no m/g/r  Gastrointestinal: soft, NTND, no masses palpable, no HSM  Extremities: no c/c. Trace RLE and 1+ LLE edema  Neurological: AAOx3    MEDICATIONS  (STANDING):  Biotene Dry Mouth Oral Rinse 5 milliLiter(s) Swish and Spit two times a day  enoxaparin Injectable 70 milliGRAM(s) SubCutaneous every 12 hours  lidocaine   4% Patch 1 Patch Transdermal daily  methadone   Solution 5 milliGRAM(s) Enteral Tube every 12 hours  metoclopramide   Syrup 5 milliGRAM(s) Oral every 4 hours  pantoprazole   Suspension 40 milliGRAM(s) Oral daily  piperacillin/tazobactam IVPB.. 3.375 Gram(s) IV Intermittent every 8 hours    MEDICATIONS  (PRN):  bisacodyl Suppository 10 milliGRAM(s) Rectal daily PRN Constipation  melatonin 3 milliGRAM(s) Oral at bedtime PRN Insomnia  morphine   Solution 15 milliGRAM(s) Enteral Tube every 2 hours PRN Severe Pain (7 - 10)  polyethylene glycol 3350 17 Gram(s) Oral daily PRN Constipation      LABS:                        10.0   8.77  )-----------( 357      ( 12 Sep 2022 05:00 )             33.8     09-12    139  |  103  |  11  ----------------------------<  123<H>  3.9   |  22  |  0.66    Ca    8.1<L>      12 Sep 2022 05:00  Phos  3.2     09-12  Mg     2.20     09-12    TPro  6.8  /  Alb  2.8<L>  /  TBili  0.6  /  DBili  x   /  AST  181<H>  /  ALT  157<H>  /  AlkPhos  436<H>  09-12    PT/INR - ( 11 Sep 2022 06:32 )   PT: 12.8 sec;   INR: 1.10 ratio         PTT - ( 11 Sep 2022 06:32 )  PTT:30.4 sec      RADIOLOGY & ADDITIONAL TESTS:

## 2022-09-12 NOTE — PROGRESS NOTE ADULT - PROBLEM SELECTOR PLAN 2
b/l PEs seen on CT 9/9. Denies pleuritic chest pain, SOB. Stable on RA  Cont with lovenox 70 BID b/l PEs seen on CT 9/9. Denies pleuritic chest pain, SOB. Stable on RA  Cont with lovenox 70 BID  Evaluate for outpatient cost of lovenox Impression: The patient's abdominal pain, persistent nausea/vomiting and weight loss iso of CT A/P findings indicating gastric body thickening and gastrocolic ligament nodularity are concerning for gastric adenocarcinoma. EGD biopsy confirmed.  - GI, onc, surg/onc following  - EGD:  Infiltrative changes in gastric body concerning gastric malignancy, Congested duodenal mucosa, biopsy = poorly differentiated adenocarcinoma  - staging CT chest - Mediastinal/bilateral hilar/R internal mammary LAD  - mediastinal lymph node biopsy results - Metastatic adenocarcinoma   - pantoprazole  40 mg BID  - OR 8/26 with J tube placement; no further change in management    pain Control  -Morphine 15mg q2h for severe Pain 7-10  -Methadone 5mg q12h standing Urology/IR believe there is no need for urgent intervention  Will reconsult if there appears to be signs of UTI    UCx with normal joselyn

## 2022-09-12 NOTE — CHART NOTE - NSCHARTNOTEFT_GEN_A_CORE
Pt seen for nutrition consult- tube feeding, education, assessment.     Medical Course:  - Per chart, pt is 57 year old female PMH renal stones presenting with gastric adenocarcinoma with mets to LN complicated by DEVI and nephrolithiasis. S/p J-tube placement with surgery (8/26). Social work assisting as pt uninsured. Palliative following for pain management. Course complicated by L hydronephrosis, Urology and IR were consulted, nephrostomy tubes deferred at this time.    Nutrition Course:  -     - Pt was initiated on EN 8/27. Pt was transitioned from Jevity 1.2 to TwoCal HN, as pt unable to tolerate high rate feeds. Pt has been unable to reach goal rate of 35 mL/hr x24 hrs given abdominal discomfort/pain. Tube feeds currently on hold per MD as pt with sudden onset fever/chills with concern for intraabdominal infection. Pt reports pump was most recently running at 25 mL/hr. Current EN regimen provides 600 mL formula, 1200 kcal, 49.5 gm protein, 1167 mL free water (meets 24 kcal/kg, 1.0 gm protein/kg @ ideal body weight 50 kg). This does not meet pt's estimated protein-energy needs. Pt vocalizes concern regarding inability to eat/drink; ordered for IVF at this time.   - Pt requires tube feeding formula dense in calories/protein given catabolic state and inability to tolerate large volume of enteral nutrition, complicated by pain associated with gastric cancer.  - Pt denies nausea/vomiting but endorses continued abdominal pain; ordered for Reglan. Last BM 9/6 per flowshets; ordered for senna 2 tablets qHS, Miralax 17 gm qD and bisacodyl suppository 10 mg qD PRN.     Diet Prescription:   - NPO with Tube Feed: TwoCal HN via Jejunostomy at a goal rate of 25 mL/hr x24 hrs    Pertinent Medications:   - Biotene Dry Mouth Oral Rinse, metoclopramide Syrup, pantoprazole Suspension, piperacillin/tazobactam IV, bisacodyl Suppository PRN, polyethylene glycol PRN    Pertinent Labs:   - (9/12) Na 139 mmol/L Glu 123 mg/dL<H> K+ 3.9 mmol/L Cr 0.66 mg/dL BUN 11 mg/dL Phos 3.2 mg/dL Alb 2.8 g/dL<L>  - (8/15) HbA1c 5.8%    Weight: (9/9 obtained at time of visit, unable to tare therefore likely inaccurate) 171.8 lbs / 78.1 kg, (8/26 dosing) 156.7 lbs / 71.1 kg, (8/15 dosing) 156.7 lbs / 71.1 kg  Height: 62 in / 157.5 cm  IBW: 110 lbs / 50 kg +/-10%  BMI: 28.7 kg/m^2 (at dosing weight)    Physical Assessment, per flowsheets:  Edema: 1+ bilateral ankle  Pressure Injury: none noted    Estimated Needs:   [X] No changes since previous assessment, based on ideal body weight 110 lbs / 50 kg  4856-5444 kcal daily @30-35 kcal/kg, 60-75 gm protein daily @1.2-1.5 gm/kg     Previous Nutrition Diagnosis: [X] Severe malnutrition in the context of chronic illness, ongoing  New Nutrition Diagnosis: [X] not applicable     Interventions:   1) Recommend increase TwoCal HN via J-tube as tolerated to goal rate 35 mL/hr x24 hrs to provide 840 mL formula, 1680 kcal, 70.14 gm protein, 588 mL free water (meets 33.6 kcal/kg, 1.4 gm protein/kg).   2) Monitor BMs, adjust bowel regimen as appropriate.  3) Monitor hydration, additional provision of free water per medical discretion.   4) Reglan per MD.  5) Please obtain weekly weights.  6) Continue to monitor electrolytes (K+, Mg, P) and replete to within desired limits as clinically indicated.   7) For discharge, may consider Nutren 2.0 via J-tube at a goal rate of 35 mL/hr x24 hrs to provide 840 mL formula, 1680 kcal, 70.56 gm protein, 484.4 mL free water (meets 33.6 kcal/kg, 1.4 gm protein/kg).     Monitor & Evaluate:  Tolerance to diet, nutrition related lab values, weight trends, BMs/GI distress, hydration status, skin integrity.  Jeanette Ramírez, MADDY, CDN #[id number]  Also available on [employer] Teams. Pt seen for nutrition consult- tube feeding, education, assessment.     Medical Course:  - Per chart, pt is 57 year old female PMH renal stones presenting with gastric adenocarcinoma with mets to LN complicated by DEIV and nephrolithiasis. S/p J-tube placement with surgery (8/26). Social work and case management assisting as pt uninsured. Palliative following for pain management. Course complicated by L hydronephrosis, Urology and IR were consulted, nephrostomy tubes deferred at this time.    Nutrition Course:  - TwoCal HN visibly running at 25 mL/hr at time of visit. Pt reports improvement in abdominal pain, denies nausea/vomiting. Pt amenable to increase of EN to 27 mL/hr.   - Last BM 9/11 per flowshets; ordered for Miralax 17 gm qD PRN and bisacodyl suppository 10 mg qD PRN. Pt continues on Reglan.     Diet Prescription:   - NPO with Tube Feed: TwoCal HN via Jejunostomy at a goal rate of 25 mL/hr x24 hrs    Pertinent Medications:   - Biotene Dry Mouth Oral Rinse, metoclopramide Syrup, pantoprazole Suspension, piperacillin/tazobactam IV, bisacodyl Suppository PRN, polyethylene glycol PRN    Pertinent Labs:   - (9/12) Na 139 mmol/L Glu 123 mg/dL<H> K+ 3.9 mmol/L Cr 0.66 mg/dL BUN 11 mg/dL Phos 3.2 mg/dL Alb 2.8 g/dL<L>  - (8/15) HbA1c 5.8%    Weight: (9/12 obtained via bed scale at time of visit, unable to tare) 166.3 lbs / 75.6 kg, (9/9 obtained via bed scale at time of visit, unable to tare) 171.8 lbs / 78.1 kg, (8/26 dosing) 156.7 lbs / 71.1 kg, (8/15 dosing) 156.7 lbs / 71.1 kg  Weight Assessment: Appears pt with weight gain since admission, likely inaccurate as unable to obtain accurate weight via bed scale.  Height: 62 in / 157.5 cm  IBW: 110 lbs / 50 kg +/-10%  BMI: 28.7 kg/m^2 (at dosing weight)    Physical Assessment, per flowsheets:  Edema: 1+ bilateral ankle/bilateral foot  Pressure Injury: none noted    Estimated Needs:   [X] No changes since previous assessment, based on ideal body weight 110 lbs / 50 kg  5814-7563 kcal daily @30-35 kcal/kg, 60-75 gm protein daily @1.2-1.5 gm/kg     Previous Nutrition Diagnosis: [X] Severe malnutrition in the context of chronic illness, ongoing  New Nutrition Diagnosis: [X] not applicable     Interventions:   1) Recommend trial TwoCal HN via J-tube @27 mL/hr x24 hrs; continue to increase as tolerated to goal rate 35 mL/hr x24 hrs to provide 840 mL formula, 1680 kcal, 70.14 gm protein, 588 mL free water (meets 33.6 kcal/kg, 1.4 gm protein/kg).   2) Please obtain current weight via tared bed scale.  3) Monitor BMs, adjust bowel regimen as appropriate.  4) Monitor hydration, additional provision of free water per medical discretion.   5) Reglan per MD.  6) Continue to monitor electrolytes (K+, Mg, P) and replete to within desired limits as clinically indicated.     Monitor & Evaluate:  Tolerance to EN, nutrition related lab values, weight trends, BMs/GI distress, hydration status, skin integrity.  Jeanette Ramírez RDN, CDN #[id number]  Also available on [employer] Teams.

## 2022-09-12 NOTE — PROGRESS NOTE ADULT - ATTENDING COMMENTS
57 year old F with metastatic gastric adenocarcinoma with course complicated by recent fever and found to have b/l PE now on Lovenox full dose as well as IV Zosyn. Once acute issues resolved and patient discharged can be seen in [facility] to discuss further cancer directed treatment.

## 2022-09-12 NOTE — PROGRESS NOTE ADULT - PROBLEM SELECTOR PLAN 5
Impression: 72 hour caloric count to evaluate for malnutrition - indicates malnutrition. Pt unable to eat due to pain.   Now J tube placement    Nutrition consult - Recommend TwoCal HN via J-tube initiated at 10 mL/hr increased by 10mL q4 hrs until goal rate 35 mL/hr x24 hrs to provide 840 mL formula, 1680 kcal, 70.14 gm protein, 588 mL free water (meets 33.6 kcal/kg, 1.4 gm protein/kg).  - Patient was unable to tolerate tube feed rate of 30, therefore decreased back to 25 Impression: 72 hour caloric count to evaluate for malnutrition - indicates malnutrition. Pt unable to eat due to pain.   Now J tube placement    Nutrition consult - Recommend TwoCal HN via J-tube initiated at 10 mL/hr increased by 10mL q4 hrs until goal rate 35 mL/hr x24 hrs to provide 840 mL formula, 1680 kcal, 70.14 gm protein, 588 mL free water (meets 33.6 kcal/kg, 1.4 gm protein/kg).  - Patient was unable to tolerate tube feed rate of 30, therefore decreased back to 25  - Attempt to increase rate of tube feeds Impression: Pt has vomiting and intermittent episodes. Vomiting associated with eating meals. Likely related to gastric adenocarcinoma.     Reglan 5mg q4h; Standing Impression: Patient having L leg swelling yesterday, resolved today. Low suspicion for a DVT - (no tachycardia, no pain).   Swelling limited to LLE to the ankle

## 2022-09-12 NOTE — PROGRESS NOTE ADULT - PROBLEM SELECTOR PLAN 1
Impression: The patient's abdominal pain, persistent nausea/vomiting and weight loss iso of CT A/P findings indicating gastric body thickening and gastrocolic ligament nodularity are concerning for gastric adenocarcinoma. EGD biopsy confirmed.  - GI, onc, surg/onc following  - EGD:  Infiltrative changes in gastric body concerning gastric malignancy, Congested duodenal mucosa, biopsy = poorly differentiated adenocarcinoma  - staging CT chest - Mediastinal/bilateral hilar/R internal mammary LAD  - mediastinal lymph node biopsy results - Metastatic adenocarcinoma   - pantoprazole  40 mg BID  - OR 8/26 with J tube placement; no further change in management    pain Control  -Morphine 15mg q2h for severe Pain 7-10  -Methadone 5mg q12h standing LFTs are elevating without a clear source; no eveidence of mets to liver  RUQ U/S results are equivocal; shows some mild bile duct dilatation similar to CT on 9/9; new from CT on 8/14    Continue to trend; unclear etiology

## 2022-09-12 NOTE — PROGRESS NOTE ADULT - PROBLEM SELECTOR PLAN 6
Impression: Stable anemia. May be combination of Iron deficiency anemia from poor PO intake and normocytic anemia from Anemia of Chronic Disease w/ this presentation c/f gastric malignancy.  - Total Iron 22, TIBC 165, transferrin 149  H/H stable    continue to monitor with CBCs Impression: 72 hour caloric count to evaluate for malnutrition - indicates malnutrition. Pt unable to eat due to pain.   Now J tube placement    Nutrition consult - Recommend TwoCal HN via J-tube initiated at 10 mL/hr increased by 10mL q4 hrs until goal rate 35 mL/hr x24 hrs to provide 840 mL formula, 1680 kcal, 70.14 gm protein, 588 mL free water (meets 33.6 kcal/kg, 1.4 gm protein/kg).  - Patient was unable to tolerate tube feed rate of 30, therefore decreased back to 25  - Attempt to increase rate of tube feeds Impression: Pt has vomiting and intermittent episodes. Vomiting associated with eating meals. Likely related to gastric adenocarcinoma.     Reglan 5mg q4h; Standing

## 2022-09-12 NOTE — PROGRESS NOTE ADULT - PROBLEM SELECTOR PLAN 4
Impression: Pt has vomiting and intermittent episodes. Vomiting associated with eating meals. Likely related to gastric adenocarcinoma.     Reglan 5mg q4h; Standing Impression: Patient having L leg swelling yesterday, resolved today. Low suspicion for a DVT - (no tachycardia, no pain).   Swelling limited to LLE to the ankle b/l PEs seen on CT 9/9. Denies pleuritic chest pain, SOB. Stable on RA  Cont with lovenox 70 BID  Evaluate for outpatient cost of lovenox

## 2022-09-13 NOTE — PROGRESS NOTE ADULT - PROBLEM SELECTOR PLAN 3
Impression: The patient's abdominal pain, persistent nausea/vomiting and weight loss iso of CT A/P findings indicating gastric body thickening and gastrocolic ligament nodularity are concerning for gastric adenocarcinoma. EGD biopsy confirmed.  - GI, onc, surg/onc following  - EGD:  Infiltrative changes in gastric body concerning gastric malignancy, Congested duodenal mucosa, biopsy = poorly differentiated adenocarcinoma  - staging CT chest - Mediastinal/bilateral hilar/R internal mammary LAD  - mediastinal lymph node biopsy results - Metastatic adenocarcinoma   - pantoprazole  40 mg BID  - OR 8/26 with J tube placement; no further change in management    pain Control  -Morphine 15mg q2h for severe Pain 7-10  -Methadone 5mg q12h standing

## 2022-09-13 NOTE — CONSULT NOTE ADULT - ASSESSMENT
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 Hospital course complicated by acute pulmonary emboli and DEVI. Hepatology consulted for rising liver enzymes.     Impression:  #Elevated liver enzymes: rising cholestatic pattern of liver injury since 9/8 (Tbili peaked at 1.4 on 9/8 now downtrending to 0.7, ALP rising to 440, AST 30 --> 190s, ALT 22 --> 168). CT A/P (9/9/22): enhancement of the common bile duct worrisome for cholangitis given the   above history (no reports of hepatic mets). Ddx evaluate for infiltrative liver disease/hepatic metastatic disease vs biliary obstruction, possibly in setting of LAD (no known GB stones seen or hepatic mets for extrinsic compression of bile duct, low c/f cholangitis at this time) vs ?DILI (?zosyn) vs ?cholestasis of sepsis vs r/o viral hepatitis vs AIH.       Recommendations:   - Obtain MR abd/MRCP w/ and w/o IV contrast   - Obtain HBsAb, HBsAg, HBcAb, HCV Ab, HAV IgG/IgM, Hep E IgM  - Obtain DONITA, ASMA, LKM Ab, IgG subsets, quant IgM, IgA, AMA to r/o autoimmune disease  - Obtain EBV, CMV, HSV serologies + PCR   - Trend CMP, CBC, PT/INR daily       *Note not final unless signed by attending    Thank you for involving us in the care of this patient, please reach out if any further questions.     Keegan Escalera MD  Gastroenterology/Hepatology Fellow, PGY6    Available on Microsoft Teams  [phone] ([facility])  [id number] ([facility])  Please contact on call fellow weekdays after 5pm-7am and weekends: [phone]

## 2022-09-13 NOTE — PROGRESS NOTE ADULT - PROBLEM SELECTOR PLAN 5
Impression: Patient having L leg swelling yesterday, resolved today. Low suspicion for a DVT - (no tachycardia, no pain).   Swelling limited to LLE to the ankle

## 2022-09-13 NOTE — PROGRESS NOTE ADULT - PROBLEM SELECTOR PLAN 4
b/l PEs seen on CT 9/9. Denies pleuritic chest pain, SOB. Stable on RA  Cont with lovenox 70 BID  Lovenox will cost approx 150/month

## 2022-09-13 NOTE — PROGRESS NOTE ADULT - PROBLEM SELECTOR PLAN 6
Impression: Pt has vomiting and intermittent episodes. Vomiting associated with eating meals. Likely related to gastric adenocarcinoma.     Reglan 5mg q4h; Standing

## 2022-09-13 NOTE — PROGRESS NOTE ADULT - SUBJECTIVE AND OBJECTIVE BOX
Armen Bridget | PGY1| Pager: [phone]  Interval Events: No acute events overnight, patient reports pain in infrasternal region is improved    REVIEW OF SYSTEMS:  CONSTITUTIONAL: No weakness, fevers or chills  RESPIRATORY: No cough, wheezing, hemoptysis; No shortness of breath  CARDIOVASCULAR: No chest pain or palpitations; continued swelling in LLE  GASTROINTESTINAL: Same epigastric pain and back pain as prior days  GENITOURINARY: No dysuria, frequency or hematuria  All other review of systems is negative unless indicated above.    OBJECTIVE:  ICU Vital Signs Last 24 Hrs  T(C): 36.6 (13 Sep 2022 06:09), Max: 36.8 (12 Sep 2022 11:52)  T(F): 97.8 (13 Sep 2022 06:09), Max: 98.3 (12 Sep 2022 11:52)  HR: 102 (13 Sep 2022 06:09) (87 - 107)  BP: 136/92 (13 Sep 2022 06:09) (124/80 - 136/92)  BP(mean): --  ABP: --  ABP(mean): --  RR: 18 (13 Sep 2022 06:09) (18 - 18)  SpO2: 97% (13 Sep 2022 06:09) (97% - 100%)    O2 Parameters below as of 13 Sep 2022 06:09  Patient On (Oxygen Delivery Method): room air              CAPILLARY BLOOD GLUCOSE      PHYSICAL EXAM:  General: NAD  Neurology: A&Ox3, LEY x 4  Eyes: PERRLA, Gross vision intact  ENT/Neck: Neck supple, trachea midline, Gross hearing intact  Respiratory: CTA B/L,   CV: RRR, +S1/S2, -S3/S4, no murmurs, rubs or gallops  Abdominal: distended, J tube site intact, no significant fluid appreciated  Extremities: 2+ edema in LLE to ankles, No edema in RLE 2+ peripheral pulses    Tubes: J Tube site is non-erythematous      HOSPITAL MEDICATIONS:  MEDICATIONS  (STANDING):  Biotene Dry Mouth Oral Rinse 5 milliLiter(s) Swish and Spit two times a day  enoxaparin Injectable 70 milliGRAM(s) SubCutaneous every 12 hours  lidocaine   4% Patch 1 Patch Transdermal daily  methadone   Solution 5 milliGRAM(s) Enteral Tube every 12 hours  metoclopramide   Syrup 5 milliGRAM(s) Oral every 4 hours  pantoprazole   Suspension 40 milliGRAM(s) Oral daily  piperacillin/tazobactam IVPB.. 3.375 Gram(s) IV Intermittent every 8 hours    MEDICATIONS  (PRN):  bisacodyl Suppository 10 milliGRAM(s) Rectal daily PRN Constipation  morphine   Solution 15 milliGRAM(s) Enteral Tube every 2 hours PRN Severe Pain (7 - 10)  polyethylene glycol 3350 17 Gram(s) Oral daily PRN Constipation      LABS:                        10.0   9.45  )-----------( 344      ( 13 Sep 2022 06:33 )             31.8     Hgb Trend: 10.0<--, 10.0<--, 9.8<--, 9.3<--, 9.7<--  09-13    137  |  102  |  11  ----------------------------<  121<H>  3.9   |  25  |  0.62    Ca    8.2<L>      13 Sep 2022 06:33  Phos  3.5     09-13  Mg     2.10     09-13    TPro  6.8  /  Alb  2.8<L>  /  TBili  0.7  /  DBili  x   /  AST  195<H>  /  ALT  168<H>  /  AlkPhos  440<H>  09-13    Creatinine Trend: 0.62<--, 0.66<--, 0.63<--, 0.62<--, 0.65<--, 0.53<--            MICROBIOLOGY:     Culture - Urine (collected 10 Sep 2022 14:28)  Source: Clean Catch Clean Catch (Midstream)  Final Report (12 Sep 2022 08:11):    <10,000 CFU/mL Normal Urogenital Kiesha

## 2022-09-13 NOTE — CONSULT NOTE ADULT - SUBJECTIVE AND OBJECTIVE BOX
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 Hospital course complicated by acute pulmonary emboli and DEVI. Hepatology consulted for rising liver enzymes.     Pt currently reported more controlled abd pain, mild SOB (improving). Denies n/v/f/c. Denies significant ETOH use, Hx of liver disease.     Febrile to 102 on 9/8 w/ tachycardia. Found to have b/l PE on CT angio --> placed on therapeutic lovenox.     CT A/P (9/9/22): notable for "enhancement of the common bile duct worrisome for cholangitis" otherwise did not report evidence of hepatic metastases.     Liver enzymes: Tbili / ALP / AST / ALT   9/2/22: 0.3 / 116 / 30 / 22  9/8/22: 1.4 / 328 / 178 / 139   9/9/22: 1.0 / 312 / 161 / 147   9/10/22: 0.7 / 288 / 93 / 110   9/11/22: 0.5 / 355 / 115 / 117   9/12/22: 0.6 / 436 / 181 / 157   9/13/22: 0.7 / 440 / 195 / 168     Allergies:  No Known Allergies      Home Medications:    Hospital Medications:  Biotene Dry Mouth Oral Rinse 5 milliLiter(s) Swish and Spit two times a day  bisacodyl Suppository 10 milliGRAM(s) Rectal daily PRN  enoxaparin Injectable 70 milliGRAM(s) SubCutaneous every 12 hours  lidocaine   4% Patch 1 Patch Transdermal daily  metoclopramide   Syrup 5 milliGRAM(s) Oral every 4 hours  morphine   Solution 15 milliGRAM(s) Enteral Tube every 2 hours PRN  pantoprazole   Suspension 40 milliGRAM(s) Oral daily  piperacillin/tazobactam IVPB.. 3.375 Gram(s) IV Intermittent every 8 hours  polyethylene glycol 3350 17 Gram(s) Oral daily PRN      PMHX/PSHX:  Nephrolithiasis        Family history:      Denies family history of colon cancer/polyps, stomach cancer/polyps, pancreatic cancer/masses, liver cancer/disease, ovarian cancer and endometrial cancer.    Social History:     Tob: Denies  EtOH: Denies  Illicit Drugs: Denies    ROS:     General:  +wt loss, fevers, chills, night sweats, +fatigue  Eyes:  Good vision, no reported pain  ENT:  No sore throat, pain, runny nose, dysphagia  CV:  No pain, palpitations, hypo/hypertension  Pulm:  +dyspnea, cough, tachypnea, wheezing  GI:  see above  :  No pain, bleeding, incontinence, nocturia  Muscle:  No pain, weakness  Neuro:  No weakness, tingling, memory problems  Psych:  No fatigue, insomnia, mood problems, depression  Endocrine:  No polyuria, polydipsia, cold/heat intolerance  Heme:  No petechiae, ecchymosis, easy bruisability  Skin:  No rash, tattoos, scars, edema    PHYSICAL EXAM:     GENERAL:  No acute distress  HEENT:  Normocephalic/atraumatic, no scleral icterus  CHEST: intermittent accessory muscle use during interview   HEART:  Regular rate and rhythm, no murmurs/rubs/gallops  ABDOMEN:  Soft, non-tender, +mildly distended, +J tube w/ TF   EXTREMITIES: No cyanosis, clubbing, or edema  SKIN:  No rash/warm/dry  NEURO:  Alert and oriented x 3        Vital Signs:  Vital Signs Last 24 Hrs  T(C): 36.9 (13 Sep 2022 15:32), Max: 36.9 (13 Sep 2022 15:32)  T(F): 98.4 (13 Sep 2022 15:32), Max: 98.4 (13 Sep 2022 15:32)  HR: 101 (13 Sep 2022 15:32) (98 - 107)  BP: 142/91 (13 Sep 2022 15:32) (128/88 - 142/91)  BP(mean): --  RR: 18 (13 Sep 2022 15:32) (18 - 18)  SpO2: 96% (13 Sep 2022 15:32) (96% - 98%)    Parameters below as of 13 Sep 2022 15:32  Patient On (Oxygen Delivery Method): room air      Daily     Daily     LABS:                        10.0   9.45  )-----------( 344      ( 13 Sep 2022 06:33 )             31.8     Mean Cell Volume: 79.1 fL (09-13-22 @ 06:33)    09-13    137  |  102  |  11  ----------------------------<  121<H>  3.9   |  25  |  0.62    Ca    8.2<L>      13 Sep 2022 06:33  Phos  3.5     09-13  Mg     2.10     09-13    TPro  6.8  /  Alb  2.8<L>  /  TBili  0.7  /  DBili  x   /  AST  195<H>  /  ALT  168<H>  /  AlkPhos  440<H>  09-13    LIVER FUNCTIONS - ( 13 Sep 2022 06:33 )  Alb: 2.8 g/dL / Pro: 6.8 g/dL / ALK PHOS: 440 U/L / ALT: 168 U/L / AST: 195 U/L / GGT: x                                       10.0   9.45  )-----------( 344      ( 13 Sep 2022 06:33 )             31.8                         10.0   8.77  )-----------( 357      ( 12 Sep 2022 05:00 )             33.8                         9.8    8.40  )-----------( 329      ( 11 Sep 2022 06:32 )             32.3       Imaging:    ACC: [id number] EXAM:  CT ABDOMEN AND PELVIS IC                        ACC: [id number] [facility]                          PROCEDURE DATE:  09/09/2022          INTERPRETATION:  Clinical indication: Status post jejunostomy withfever   and chills, tachycardia, hypoxia, gastric cancer.    CT angiogram of the chest was performed following intravenous   administration of 70 cc of Omnipaque-350. Follow-up contrast-enhanced CT   of the abdomen and pelvis was also performed. MIP images are submitted.    Correlation is made with the prior chest CT of August 16, 2022 and   abdominal CT of August 14, 2022.    Left upper lobe subsegmental pulmonary arterial emboli. Right upper lobe   few segmental and subsegmental pulmonary arterial emboli. Right middle   lobe subsegmental pulmonary arterial emboli. Pulmonary embolus within the   right lower lobe segmental and subsegmental pulmonary arterial branches.    No enlarged axillary lymph nodes. Prominent right supraclavicular   asymmetric lymph nodes largest measuring about 9 mm demonstrate minimal   interval increase in size. Multiple enlarged mediastinal and bilateral   hilar lymph nodes demonstrate overall interval mild to moderate increase   in size since August 16, 2022. For reference an enlarged azygoesophageal   recess lymph node measures about 1.9 cm in its shortest dimension when it   previously measured about 1.1 cm. Mildly enlarged right cardiophrenic   angle lymph node measuring about 1.1 cm in long axis is without   significant interval change.    Heart size is normal. Coronary artery calcifications. Minimal pericardial   fluid. Small bilateral pleural effusions with overall interval decrease   in size since August 16, 2022. Mild intimal noncalcified plaqueswithin   the descending thoracic aorta.    Evaluation of the lungs demonstrate 5 mm left lower lobe calcified   granuloma. Bilateral mid to upper lung predominant interlobular septal   thickening with interval progression. Superimposed nodularity with tiny   nodularity along the fissures demonstrate overall interval increase in   conspicuity since August 16, 2022. Mild bilateral lower lung areas of   subsegmental dependent or linear atelectasis with overall interval   improvement    Wall enhancement of the common bile duct. The gallbladder is mildly   distended. The liver is otherwise unremarkable.    The spleen, pancreas and the right adrenal gland are unremarkable. Left   adrenal gland thickening is unchanged. 3 mm nonobstructing right   intrarenal stone. Left renal cyst and subcentimeter hypodensities which   are too small to characterize.    Mild to moderate left hydroureteronephrosis new since August 14, 2022   with dilatation of the left proximal ureter to a focus of enhancement   within the proximal to mid portion of the left ureter which is collapsed.   No obstructing stone.    The urinary bladder and the uterus are unremarkable. The ovaries are   without significant interval change. Percutaneous jejunostomy catheter is   noted. No bowel obstruction or free intraperitoneal air.    Small to moderate amount of ascites has increased in size since August 14, 2022. Previously described 3 cm mass anterior to the stomach is   unchanged. Diffuse gastric wall thickening with smaller nodules adjacent   to the stomach are without significant interval change since August 14, 2022.    Enlarged lymph nodes within the retroperitoneum and the root of the   mesentery are either unchanged or demonstrate minimal interval increase   in size since August 14, 2022.    Bones are unremarkable.    IMPRESSION: Bilateral pulmonary arterial emboli as detailed above.    Bilateral interlobular septal thickening with superimposed tiny   nodularity with overall interval increase in conspicuity since August 16, 2022 associated with progression of mediastinal and bilateral hilar   lymphadenopathy worrisome for lymphangitic spread of neoplasm or   metastatic disease possibly superimposed on some element of pulmonary   edema given small pleural effusions.    Enhancement of the common bile duct worrisome for cholangitis given the   above history.    Mild to moderate left hydroureteronephrosis to the level of the proximal   to mid left ureter with overall interval progression since August14, 2022 likely sequela of neoplasm or metastatic disease given the   constellation of findings.    Small to moderate-sized ascites progressed since August 14, 2022.    Extensive retroperitoneal and upper abdominal lymphadenopathy with   gastric wall thickening and adjacent nodules as noted on August 14, 2022.    Findings discussed with Dr. Hayes on September 9, 2022 at 2:40 PM   with read back.

## 2022-09-13 NOTE — PROGRESS NOTE ADULT - SUBJECTIVE AND OBJECTIVE BOX
SUBJECTIVE AND OBJECTIVE:  Indication for Geriatrics and Palliative Care Services/INTERVAL HPI: Pain management     INTERVAL EVENTS: Patient seen this PM, saying pain controlled with current regimen, still requiring prns at times. Currently saying there is no pain. Discussed with patient about using methadone.   However afterward notified by primary that morphine can now be covered by emergency insurance.   LFTs increasing     DNR on chart:  Allergies    No Known Allergies    Intolerances    MEDICATIONS  (STANDING):  Biotene Dry Mouth Oral Rinse 5 milliLiter(s) Swish and Spit two times a day  enoxaparin Injectable 40 milliGRAM(s) SubCutaneous every 24 hours  lidocaine   4% Patch 1 Patch Transdermal daily  methadone   Solution 5 milliGRAM(s) Enteral Tube every 12 hours  pantoprazole   Suspension 40 milliGRAM(s) Oral daily  polyethylene glycol 3350 17 Gram(s) Oral daily  senna 2 Tablet(s) Oral at bedtime  sodium chloride 0.9%. 1000 milliLiter(s) (75 mL/Hr) IV Continuous <Continuous>    MEDICATIONS  (PRN):  bisacodyl Suppository 10 milliGRAM(s) Rectal daily PRN Constipation  metoclopramide   Syrup 5 milliGRAM(s) Oral every 4 hours PRN Nausea/Vomiting  morphine   Solution 10 milliGRAM(s) Enteral Tube every 4 hours PRN Severe Pain (7 - 10)      ITEMS UNCHECKED ARE NOT PRESENT    PRESENT SYMPTOMS: [ ]Unable to self-report - see [ ] CPOT [ ] PAINADS [ ] RDOS  Source if other than patient:  [ ]Family   [ ]Team     Pain:  [x ]yes [ ]no  QOL impact - moderately affecting.  Location -    epigastric area               Aggravating factors - none  Quality - sharp  Radiation - to the back  Timing- constant  Severity (0-10 scale): 10/10  Minimal acceptable level (0-10 scale): 4/10    Dyspnea:                           [ ]Mild [ ]Moderate [ ]Severe    RDOS:  0 to 2  minimal or no respiratory distress   3  mild distress  4 to 6 moderate distress  >7 severe distress  https://[email or web address]/handouts/[email or web address]_Distress_Observation_Scale.[email or web address] (Ctrl +  left click to view)     Anxiety:                             [ ]Mild [ ]Moderate [ ]Severe  Fatigue:                             [ ]Mild [ ]Moderate [ ]Severe  Nausea:                             [ ]Mild [ ]Moderate [ ]Severe  Loss of appetite:              [ ]Mild [ ]Moderate [ ]Severe  Constipation:                    [ ]Mild [ ]Moderate [ ]Severe    PCSSQ[Palliative Care Spiritual Screening Question]   Severity (0-10):  Score of 4 or > indicate consideration of Chaplaincy referral.  Chaplaincy Referral: [ ] yes [ ] refused [ ] following    Other Symptoms:  [ ]All other review of systems negative     Vital Signs Last 24 Hrs  T(C): 37.1 (13 Sep 2022 21:31), Max: 37.1 (13 Sep 2022 21:31)  T(F): 98.7 (13 Sep 2022 21:31), Max: 98.7 (13 Sep 2022 21:31)  HR: 96 (13 Sep 2022 21:31) (96 - 102)  BP: 141/81 (13 Sep 2022 21:31) (128/88 - 142/91)  BP(mean): --  RR: 18 (13 Sep 2022 21:31) (18 - 18)  SpO2: 96% (13 Sep 2022 21:31) (96% - 98%)    Parameters below as of 13 Sep 2022 21:31  Patient On (Oxygen Delivery Method): room air      GENERAL: [ ]Cachexia    [x ]Alert  [x ]Oriented    [ ]Lethargic  [ ]Unarousable  [ x]Verbal  [ ]Non-Verbal  Behavioral:   [ ]Anxiety  [ ]Delirium [ ]Agitation [ ]Other  HEENT:  [x ]Normal   [ ]Dry mouth   [ ]ET Tube/Trach  [ ]Oral lesions  PULMONARY:   [x ]Clear [ ]Tachypnea  [ ]Audible excessive secretions   [ ]Rhonchi        [ ]Right [ ]Left [ ]Bilateral  [ ]Crackles        [ ]Right [ ]Left [ ]Bilateral  [ ]Wheezing     [ ]Right [ ]Left [ ]Bilateral  [ ]Diminished BS [ ] Right [ ]Left [ ]Bilateral  CARDIOVASCULAR:    [x ]Regular [ ]Irregular [ ]Tachy  [ ]Luis Miguel [ ]Murmur [ ]Other  GASTROINTESTINAL:  [x ]Soft  [ ]Distended   [ ]+BS  [ ]Non tender [ ]Tender  [ ]Other [X ]J tube [ ]OGT/ NGT Patient has laparoscopic surgical incisions covered by  gauze    GENITOURINARY:  [ ]Normal [ ]Incontinent   [ ]Oliguria/Anuria   [x ]Patterson  MUSCULOSKELETAL:   [ x]Normal   [ ]Weakness  [ ]Bed/Wheelchair bound [ ]Edema  NEUROLOGIC:   [x ]No focal deficits  [ ] Cognitive impairment  [ ] Dysphagia [ ]Dysarthria [ ] Paresis [ ]Other   SKIN:   [x ]Normal  [ ]Rash  [ ]Other  [ ]Pressure ulcer(s) [ ]y [x ]n present on admission    CRITICAL CARE:  [ ]Shock Present  [ ]Septic [ ]Cardiogenic [ ]Neurologic [ ]Hypovolemic  [ ]Vasopressors [ ]Inotropes  [ ]Respiratory failure present [ ]Mechanical Ventilation [ ]Non-invasive ventilatory support [ ]High-Flow   [ ]Acute  [ ]Chronic [ ]Hypoxic  [ ]Hypercarbic [ ]Other  [ ]Other organ failure     LABS:    no recent labs     RADIOLOGY & ADDITIONAL STUDIES:< from: Xray Chest 1 View- PORTABLE-Urgent (Xray Chest 1 View- PORTABLE-Urgent .) (08.25.22 @ 17:28) >  Clear lungs.    < from: Xray Abdomen 1 View PORTABLE -Routine (Xray Abdomen 1 View PORTABLE -Routine .) (08.27.22 @ 13:01) >    IMPRESSION: Status post jejunostomy placement with contrast in the   jejunostomy tube and normal loop of jejunum.    < end of copied text >      Protein Calorie Malnutrition Present: [ ]mild [ ]moderate [ ]severe [ ]underweight [ ]morbid obesity  https://www.andeal.org/vault/2440/web/files/ONC/Table_Clinical%20Characteristics%20to%20Document%20Malnutrition-White%20JV%20et%20al%212803.pdf    Height (cm): 157.5 (08-26-22 @ 07:10)  Weight (kg): 71.1 (08-26-22 @ 07:11)  BMI (kg/m2): 28.7 (08-26-22 @ 07:11)    [ ]PPSV2 < or = 30%  [ ]significant weight loss [ ]poor nutritional intake [ ]anasarca[X ]Artificial Nutrition - J tube     Other REFERRALS:  [ ]Hospice  [ ]Child Life  [ ]Social Work  [ ]Case management [ ]Holistic Therapy

## 2022-09-13 NOTE — PROGRESS NOTE ADULT - NUTRITIONAL ASSESSMENT
This patient has been assessed with a concern for Malnutrition and has been determined to have a diagnosis/diagnoses of Severe protein-calorie malnutrition.    This patient is being managed with:   Diet NPO with Tube Feed-  Tube Feeding Modality: Jejunostomy  TwoCal HN (TWOCALHNRTH)  Total Volume for 24 Hours (mL): 648  Continuous  Starting Tube Feed Rate {mL per Hour}: 27  Increase Tube Feed Rate by (mL): 0     Every 6 hours  Until Goal Tube Feed Rate (mL per Hour): 27  Tube Feed Duration (in Hours): 24  Tube Feed Start Time: 13:00  Entered: Sep 12 2022 12:53PM

## 2022-09-13 NOTE — PROGRESS NOTE ADULT - PROBLEM SELECTOR PLAN 4
- Palliative consulted for pain management   - Patient is uninsured, pending insurance approval to be able to go to [facility] for treatment   - On discharge, patient must have follow up with outpatient palliative to continue prescribing methadone for pain control (will reach out to clinic) - Palliative consulted for pain management   - Patient is uninsured, pending insurance approval to be able to go to [facility] for treatment   - On discharge, patient must have follow up with outpatient palliative to continue prescribing pain meds

## 2022-09-13 NOTE — PROGRESS NOTE ADULT - ATTENDING COMMENTS
Pt seen and examined, chart and labs reviewed.  Briefly a 57F hx of nephrolithiasis who presents with abd pain and found to have metastatic gastric CA to lymph nodes, course c/b DEVI (now improving).  Course c/b poor PO tolerance , requiring J tube insertion and new PEs now on therapeutic Lovenox.  Pt's pain is getting better controlled however has rising transaminases, which on initial CT imaging was suggestive of CBD dilation and possible cholangitis. Pt has been on IV Zosyn since 9/8 and Bcx remain negative, pending MRCP to better evaluate liver/biliary tract.    #Transaminitis, rising:  - Consult hepatology, Hep panel from prior admission negative  - AMA, Smooth muscle ab sent however low suspicion for autoimmune hepatitis  - MRCP ordered to evaluate for biliary pathology  - Pt has been on empiric IV Zosyn which might be masking a cholangitic picture, role for ?IR rupesh tube   - Imaging showing no evidence of metastatic involvement of liver     #Metastatic gastric adenoCA to lymph nodes:  - Oncology following, no plans for inpatient chemo, pt has f/u appt scheduled for 9/19    #Severe protein calorie malnutrition s/p J tube insertion:  - Rate increased to 27cc/hr, unable to tolerate faster rates of tube feeds  - CM has been able to arrange for lower pricing of tube feeds and pump, however family expressing concern over affordability  - Will need to arrange for f/u with [facility] (pt has been to this clinic in the past)    #Hypercoagulable state with new PEs:   - Remains on therapeutic Lovenox, priced out 1$/month which pt can afford    #Neoplasm related pain:  - Pain better controlled on current regimen  - Will send methadone script for pricing    #Dispo: awaiting MRCP, ultimately home once tube feeds/follow up care arranged

## 2022-09-13 NOTE — PROGRESS NOTE ADULT - PROBLEM SELECTOR PLAN 1
- Pain located in epigastric area (area of gastric cancer) and reporting now diffuse abdominal pain since procedures on 8/26   - Transitioned from IV dilaudid to PO morphine solution. However initially patient  unable to be discharged with PO morphine due to prohibitive cost (as she has no insurance). Started instead on methadone, which is affordable  - However patient would be able to receive morphine   - Methad  - c/w methadone solution 5 mg BID   - Patient is complaining of pain with methadone. Patient can use PRNs in the meantime while methadone takes time to take effect   - Increased PO morphine IR to 15 mg q2h PRN for severe pain   - bowel regimen, goal BM every 2 to 3 days - Pain located in epigastric area (area of gastric cancer) and reporting now diffuse abdominal pain since procedures on 8/26   - Transitioned from IV dilaudid to PO morphine solution. However initially patient  unable to be discharged with PO morphine due to prohibitive cost (as she has no insurance). Started instead on methadone, which is affordable  - However patient now would be able to receive morphine   - Methadone started 9/8 now day 5  - Discontinued methadone but will have prolonged effects; transition back to morphine IR   - c/w PO morphine IR to 15 mg q2h PRN for severe pain   - bowel regimen, goal BM every 2 to 3 days

## 2022-09-13 NOTE — PROGRESS NOTE ADULT - PROBLEM SELECTOR PLAN 2
Urology/IR believe there is no need for urgent intervention  Will reconsult if there appears to be signs of UTI    Day 6/7 Zosyn  UCx with normal joselyn

## 2022-09-13 NOTE — PROGRESS NOTE ADULT - PROBLEM SELECTOR PLAN 1
LFTs are elevating without a clear source; no evidence of mets to liver  RUQ U/S results are equivocal; shows some mild bile duct dilatation similar to CT on 9/9; new from CT on 8/14  CT shows no mets to the liver at the moment    Hepatitis labs from June and July 2022 show no signs of HepB/C infection  Appreciated Heme/Onc Recs    Continue to trend; unclear etiology

## 2022-09-13 NOTE — PROGRESS NOTE ADULT - ASSESSMENT
57F PMH of renal stones p/w gastric adenocarcinoma w/ mets to LN c/b DEVI, now resolved, and nephrolithiasis. S/p j tube placement, with stable tube feed fewregiment and improved pain control. Awaiting emergency medicaid approval for o/p treatment assistance.

## 2022-09-13 NOTE — PROGRESS NOTE ADULT - PROBLEM SELECTOR PLAN 7
Impression: 72 hour caloric count to evaluate for malnutrition - indicates malnutrition. Pt unable to eat due to pain.   Now J tube placement    Nutrition consult - Recommend TwoCal HN via J-tube initiated at 10 mL/hr increased by 10mL q4 hrs until goal rate 35 mL/hr x24 hrs to provide 840 mL formula, 1680 kcal, 70.14 gm protein, 588 mL free water (meets 33.6 kcal/kg, 1.4 gm protein/kg).    Patient able to tolerate rate of 27cc/hr

## 2022-09-14 NOTE — PROGRESS NOTE ADULT - SUBJECTIVE AND OBJECTIVE BOX
INCOMPLETE NOTE    Armen Gill | PGY1| Pager: [phone]  Interval Events:    REVIEW OF SYSTEMS:  CONSTITUTIONAL: No weakness, fevers or chills  EYES/ENT: No visual changes;  No vertigo or throat pain   NECK: No pain or stiffness  RESPIRATORY: No cough, wheezing, hemoptysis; No shortness of breath  CARDIOVASCULAR: No chest pain or palpitations  GASTROINTESTINAL: No abdominal or epigastric pain. No nausea, vomiting, or hematemesis; No diarrhea or constipation. No melena or hematochezia.  GENITOURINARY: No dysuria, frequency or hematuria  NEUROLOGICAL: No numbness or weakness  SKIN: No itching, burning, rashes, or lesions   All other review of systems is negative unless indicated above.    OBJECTIVE:  ICU Vital Signs Last 24 Hrs  T(C): 37 (14 Sep 2022 05:41), Max: 37.1 (13 Sep 2022 21:31)  T(F): 98.6 (14 Sep 2022 05:41), Max: 98.7 (13 Sep 2022 21:31)  HR: 101 (14 Sep 2022 05:41) (96 - 102)  BP: 128/77 (14 Sep 2022 05:41) (128/77 - 142/91)  BP(mean): --  ABP: --  ABP(mean): --  RR: 18 (14 Sep 2022 05:41) (18 - 18)  SpO2: 97% (14 Sep 2022 05:41) (96% - 98%)    O2 Parameters below as of 14 Sep 2022 05:41  Patient On (Oxygen Delivery Method): room air              CAPILLARY BLOOD GLUCOSE          PHYSICAL EXAM:  General: WN/WD NAD  Neurology: A&Ox3, nonfocal, LEY x 4  Eyes: PERRLA/ EOMI, Gross vision intact  ENT/Neck: Neck supple, trachea midline, No JVD, Gross hearing intact  Respiratory: CTA B/L, No wheezing, rales, rhonchi  CV: RRR, +S1/S2, -S3/S4, no murmurs, rubs or gallops  Abdominal: Soft, NT, ND +BS, No HSM  MSK: 5/5 strength UE/LE bilaterally  Extremities: No edema, 2+ peripheral pulses  Skin: No Rashes, Hematoma, Ecchymosis  Incisions:   Tubes:    HOSPITAL MEDICATIONS:  MEDICATIONS  (STANDING):  Biotene Dry Mouth Oral Rinse 5 milliLiter(s) Swish and Spit two times a day  enoxaparin Injectable 70 milliGRAM(s) SubCutaneous every 12 hours  lidocaine   4% Patch 1 Patch Transdermal daily  metoclopramide   Syrup 5 milliGRAM(s) Oral every 4 hours  pantoprazole   Suspension 40 milliGRAM(s) Oral daily  piperacillin/tazobactam IVPB.. 3.375 Gram(s) IV Intermittent every 8 hours    MEDICATIONS  (PRN):  bisacodyl Suppository 10 milliGRAM(s) Rectal daily PRN Constipation  morphine   Solution 15 milliGRAM(s) Enteral Tube every 2 hours PRN Severe Pain (7 - 10)  polyethylene glycol 3350 17 Gram(s) Oral daily PRN Constipation      LABS:                        10.0   9.45  )-----------( 344      ( 13 Sep 2022 06:33 )             31.8     Hgb Trend: 10.0<--, 10.0<--, 9.8<--, 9.3<--, 9.7<--  09-13    137  |  102  |  11  ----------------------------<  121<H>  3.9   |  25  |  0.62    Ca    8.2<L>      13 Sep 2022 06:33  Phos  3.5     09-13  Mg     2.10     09-13    TPro  6.8  /  Alb  2.8<L>  /  TBili  0.7  /  DBili  x   /  AST  195<H>  /  ALT  168<H>  /  AlkPhos  440<H>  09-13    Creatinine Trend: 0.62<--, 0.66<--, 0.63<--, 0.62<--, 0.65<--, 0.53<--            MICROBIOLOGY:      Armen Bridget | PGY1| Pager: [phone]  Interval Events: No acute events overnight    REVIEW OF SYSTEMS:  CONSTITUTIONAL: No weakness, fevers or chills  RESPIRATORY: No cough, wheezing, hemoptysis; No shortness of breath  CARDIOVASCULAR: No chest pain or palpitations; continued swelling in LLE  GASTROINTESTINAL: Same epigastric pain and back pain as prior days  GENITOURINARY: No dysuria, frequency or hematuria  All other review of systems is negative unless indicated above.    OBJECTIVE:  ICU Vital Signs Last 24 Hrs  T(C): 37 (14 Sep 2022 05:41), Max: 37.1 (13 Sep 2022 21:31)  T(F): 98.6 (14 Sep 2022 05:41), Max: 98.7 (13 Sep 2022 21:31)  HR: 101 (14 Sep 2022 05:41) (96 - 102)  BP: 128/77 (14 Sep 2022 05:41) (128/77 - 142/91)  BP(mean): --  ABP: --  ABP(mean): --  RR: 18 (14 Sep 2022 05:41) (18 - 18)  SpO2: 97% (14 Sep 2022 05:41) (96% - 98%)    O2 Parameters below as of 14 Sep 2022 05:41  Patient On (Oxygen Delivery Method): room air              CAPILLARY BLOOD GLUCOSE        PHYSICAL EXAM:  General: NAD  Neurology: A&Ox3, LEY x 4  Eyes: PERRLA, Gross vision intact  ENT/Neck: Neck supple, trachea midline, Gross hearing intact  Respiratory: CTA B/L,   CV: RRR, +S1/S2, -S3/S4, no murmurs, rubs or gallops  Abdominal: mildly distended, J tube site intact, no significant fluid appreciated  Extremities: 2+ edema in LLE to ankles, No edema in RLE 2+ peripheral pulses    Tubes: J Tube site is non-erythematous    HOSPITAL MEDICATIONS:  MEDICATIONS  (STANDING):  Biotene Dry Mouth Oral Rinse 5 milliLiter(s) Swish and Spit two times a day  enoxaparin Injectable 70 milliGRAM(s) SubCutaneous every 12 hours  lidocaine   4% Patch 1 Patch Transdermal daily  metoclopramide   Syrup 5 milliGRAM(s) Oral every 4 hours  pantoprazole   Suspension 40 milliGRAM(s) Oral daily  piperacillin/tazobactam IVPB.. 3.375 Gram(s) IV Intermittent every 8 hours    MEDICATIONS  (PRN):  bisacodyl Suppository 10 milliGRAM(s) Rectal daily PRN Constipation  morphine   Solution 15 milliGRAM(s) Enteral Tube every 2 hours PRN Severe Pain (7 - 10)  polyethylene glycol 3350 17 Gram(s) Oral daily PRN Constipation      LABS:                        10.0   9.45  )-----------( 344      ( 13 Sep 2022 06:33 )             31.8     Hgb Trend: 10.0<--, 10.0<--, 9.8<--, 9.3<--, 9.7<--  09-13    137  |  102  |  11  ----------------------------<  121<H>  3.9   |  25  |  0.62    Ca    8.2<L>      13 Sep 2022 06:33  Phos  3.5     09-13  Mg     2.10     09-13    TPro  6.8  /  Alb  2.8<L>  /  TBili  0.7  /  DBili  x   /  AST  195<H>  /  ALT  168<H>  /  AlkPhos  440<H>  09-13    Creatinine Trend: 0.62<--, 0.66<--, 0.63<--, 0.62<--, 0.65<--, 0.53<--            MICROBIOLOGY:      Armen Bridget | PGY1| Pager: [phone]  Interval Events: No acute events overnight    REVIEW OF SYSTEMS:  CONSTITUTIONAL: No weakness, fevers or chills  RESPIRATORY: No cough, wheezing, hemoptysis; No shortness of breath  CARDIOVASCULAR: No chest pain or palpitations; continued swelling in LLE  GASTROINTESTINAL: Reported improvement in epigastric pain and back pain  GENITOURINARY: No dysuria, frequency or hematuria  All other review of systems is negative unless indicated above.    OBJECTIVE:  ICU Vital Signs Last 24 Hrs  T(C): 37 (14 Sep 2022 05:41), Max: 37.1 (13 Sep 2022 21:31)  T(F): 98.6 (14 Sep 2022 05:41), Max: 98.7 (13 Sep 2022 21:31)  HR: 101 (14 Sep 2022 05:41) (96 - 102)  BP: 128/77 (14 Sep 2022 05:41) (128/77 - 142/91)  BP(mean): --  ABP: --  ABP(mean): --  RR: 18 (14 Sep 2022 05:41) (18 - 18)  SpO2: 97% (14 Sep 2022 05:41) (96% - 98%)    O2 Parameters below as of 14 Sep 2022 05:41  Patient On (Oxygen Delivery Method): room air              CAPILLARY BLOOD GLUCOSE        PHYSICAL EXAM:  General: NAD  Neurology: A&Ox3, LEY x 4  Eyes: PERRLA, Gross vision intact  ENT/Neck: Neck supple, trachea midline, Gross hearing intact  Respiratory: CTA B/L,   CV: RRR, +S1/S2, -S3/S4, no murmurs, rubs or gallops  Abdominal: mildly distended, J tube site intact, no significant fluid appreciated  Extremities: 2+ edema in LLE to ankles, No edema in RLE 2+ peripheral pulses    Tubes: J Tube site is non-erythematous    HOSPITAL MEDICATIONS:  MEDICATIONS  (STANDING):  Biotene Dry Mouth Oral Rinse 5 milliLiter(s) Swish and Spit two times a day  enoxaparin Injectable 70 milliGRAM(s) SubCutaneous every 12 hours  lidocaine   4% Patch 1 Patch Transdermal daily  metoclopramide   Syrup 5 milliGRAM(s) Oral every 4 hours  pantoprazole   Suspension 40 milliGRAM(s) Oral daily  piperacillin/tazobactam IVPB.. 3.375 Gram(s) IV Intermittent every 8 hours    MEDICATIONS  (PRN):  bisacodyl Suppository 10 milliGRAM(s) Rectal daily PRN Constipation  morphine   Solution 15 milliGRAM(s) Enteral Tube every 2 hours PRN Severe Pain (7 - 10)  polyethylene glycol 3350 17 Gram(s) Oral daily PRN Constipation      LABS:                        10.0   9.45  )-----------( 344      ( 13 Sep 2022 06:33 )             31.8     Hgb Trend: 10.0<--, 10.0<--, 9.8<--, 9.3<--, 9.7<--  09-13    137  |  102  |  11  ----------------------------<  121<H>  3.9   |  25  |  0.62    Ca    8.2<L>      13 Sep 2022 06:33  Phos  3.5     09-13  Mg     2.10     09-13    TPro  6.8  /  Alb  2.8<L>  /  TBili  0.7  /  DBili  x   /  AST  195<H>  /  ALT  168<H>  /  AlkPhos  440<H>  09-13    Creatinine Trend: 0.62<--, 0.66<--, 0.63<--, 0.62<--, 0.65<--, 0.53<--            MICROBIOLOGY:

## 2022-09-14 NOTE — PROGRESS NOTE ADULT - PROBLEM SELECTOR PLAN 3
Impression: The patient's abdominal pain, persistent nausea/vomiting and weight loss iso of CT A/P findings indicating gastric body thickening and gastrocolic ligament nodularity are concerning for gastric adenocarcinoma. EGD biopsy confirmed.  - GI, onc, surg/onc following  - EGD:  Infiltrative changes in gastric body concerning gastric malignancy, Congested duodenal mucosa, biopsy = poorly differentiated adenocarcinoma  - staging CT chest - Mediastinal/bilateral hilar/R internal mammary LAD  - mediastinal lymph node biopsy results - Metastatic adenocarcinoma   - pantoprazole  40 mg BID  - OR 8/26 with J tube placement; no further change in management    pain Control  -Morphine 15mg q2h for severe Pain 7-10  -Methadone 5mg q12h standing Impression: The patient's abdominal pain, persistent nausea/vomiting and weight loss iso of CT A/P findings indicating gastric body thickening and gastrocolic ligament nodularity are concerning for gastric adenocarcinoma. EGD biopsy confirmed.  - GI, onc, surg/onc following  - EGD:  Infiltrative changes in gastric body concerning gastric malignancy, Congested duodenal mucosa, biopsy = poorly differentiated adenocarcinoma  - staging CT chest - Mediastinal/bilateral hilar/R internal mammary LAD  - mediastinal lymph node biopsy results - Metastatic adenocarcinoma   - pantoprazole  40 mg BID  - OR 8/26 with J tube placement; no further change in management    pain Control; Palliative will attempt to transition to morphine for cost control  -Morphine 15mg q2h for severe Pain 7-10  -Methadone 5mg q12h standing    PRICING UPDATES:  Lovenox: $1/month covered under [facility] medicaid  Tube Feeds and pump: ~$120/month  Methadone: 36$/month  Reglan: $60/month  Morphine: $1 COVERED    Family meeting today for management and goals

## 2022-09-14 NOTE — PROGRESS NOTE ADULT - PROBLEM SELECTOR PLAN 6
Impression: Pt has vomiting and intermittent episodes. Vomiting associated with eating meals. Likely related to gastric adenocarcinoma.     Reglan 5mg q4h; Standing not applicable

## 2022-09-14 NOTE — PROGRESS NOTE ADULT - PROBLEM SELECTOR PLAN 1
LFTs are elevating without a clear source; no evidence of mets to liver  RUQ U/S results are equivocal; shows some mild bile duct dilatation similar to CT on 9/9; new from CT on 8/14  CT shows no mets to the liver at the moment    Hepatitis labs from June and July 2022 show no signs of HepB/C infection  Appreciated Heme/Onc Recs    Continue to trend; unclear etiology LFTs are elevating without a clear source; no evidence of mets to liver  RUQ U/S results are equivocal; shows some mild bile duct dilatation similar to CT on 9/9; new from CT on 8/14  CT shows no mets to the liver at the moment    Hepatitis labs from June and July 2022 show no signs of HepB/C infection  Appreciated Heme/Onc Recs    Collect autoimmune/hepatits labs  F/U MRCP  Appreciate Hepatology Recs

## 2022-09-14 NOTE — PROGRESS NOTE ADULT - SUBJECTIVE AND OBJECTIVE BOX
SUBJECTIVE AND OBJECTIVE:  Indication for Geriatrics and Palliative Care Services/INTERVAL HPI: Pain management     INTERVAL EVENTS: Patient seen this AM, pain has been well controlled.     DNR on chart:  Allergies    No Known Allergies    Intolerances    MEDICATIONS  (STANDING):  MEDICATIONS  (STANDING):  Biotene Dry Mouth Oral Rinse 5 milliLiter(s) Swish and Spit two times a day  enoxaparin Injectable 70 milliGRAM(s) SubCutaneous every 12 hours  lidocaine   4% Patch 1 Patch Transdermal daily  methadone   Solution 5 milliGRAM(s) Enteral Tube every 12 hours  metoclopramide   Syrup 5 milliGRAM(s) Oral every 4 hours  pantoprazole   Suspension 40 milliGRAM(s) Oral daily  piperacillin/tazobactam IVPB.. 3.375 Gram(s) IV Intermittent every 8 hours    MEDICATIONS  (PRN):  bisacodyl Suppository 10 milliGRAM(s) Rectal daily PRN Constipation  morphine   Solution 15 milliGRAM(s) Enteral Tube every 2 hours PRN Severe Pain (7 - 10)  polyethylene glycol 3350 17 Gram(s) Oral daily PRN Constipation      ITEMS UNCHECKED ARE NOT PRESENT    PRESENT SYMPTOMS: [ ]Unable to self-report - see [ ] CPOT [ ] PAINADS [ ] RDOS  Source if other than patient:  [ ]Family   [ ]Team     Pain:  [x ]yes [ ]no  QOL impact - moderately affecting.  Location -    epigastric area               Aggravating factors - none  Quality - sharp  Radiation - to the back  Timing- constant  Severity (0-10 scale): 10/10  Minimal acceptable level (0-10 scale): 4/10    Dyspnea:                           [ ]Mild [ ]Moderate [ ]Severe    RDOS:  0 to 2  minimal or no respiratory distress   3  mild distress  4 to 6 moderate distress  >7 severe distress  https://homecareinformation.net/handouts/hen/Respiratory_Distress_Observation_Scale.pdf (Ctrl +  left click to view)     Anxiety:                             [ ]Mild [ ]Moderate [ ]Severe  Fatigue:                             [ ]Mild [ ]Moderate [ ]Severe  Nausea:                             [ ]Mild [ ]Moderate [ ]Severe  Loss of appetite:              [ ]Mild [ ]Moderate [ ]Severe  Constipation:                    [ ]Mild [ ]Moderate [ ]Severe    PCSSQ[Palliative Care Spiritual Screening Question]   Severity (0-10):  Score of 4 or > indicate consideration of Chaplaincy referral.  Chaplaincy Referral: [ ] yes [ ] refused [ ] following    Other Symptoms:  [ ]All other review of systems negative     Vital Signs Last 24 Hrs  T(C): 37.1 (13 Sep 2022 21:31), Max: 37.1 (13 Sep 2022 21:31)  T(F): 98.7 (13 Sep 2022 21:31), Max: 98.7 (13 Sep 2022 21:31)  HR: 96 (13 Sep 2022 21:31) (96 - 102)  BP: 141/81 (13 Sep 2022 21:31) (128/88 - 142/91)  BP(mean): --  RR: 18 (13 Sep 2022 21:31) (18 - 18)  SpO2: 96% (13 Sep 2022 21:31) (96% - 98%)    Parameters below as of 13 Sep 2022 21:31  Patient On (Oxygen Delivery Method): room air      GENERAL: [ ]Cachexia    [x ]Alert  [x ]Oriented    [ ]Lethargic  [ ]Unarousable  [ x]Verbal  [ ]Non-Verbal  Behavioral:   [ ]Anxiety  [ ]Delirium [ ]Agitation [ ]Other  HEENT:  [x ]Normal   [ ]Dry mouth   [ ]ET Tube/Trach  [ ]Oral lesions  PULMONARY:   [x ]Clear [ ]Tachypnea  [ ]Audible excessive secretions   [ ]Rhonchi        [ ]Right [ ]Left [ ]Bilateral  [ ]Crackles        [ ]Right [ ]Left [ ]Bilateral  [ ]Wheezing     [ ]Right [ ]Left [ ]Bilateral  [ ]Diminished BS [ ] Right [ ]Left [ ]Bilateral  CARDIOVASCULAR:    [x ]Regular [ ]Irregular [ ]Tachy  [ ]Luis Miguel [ ]Murmur [ ]Other  GASTROINTESTINAL:  [x ]Soft  [ ]Distended   [ ]+BS  [ ]Non tender [ ]Tender  [ ]Other [X ]J tube [ ]OGT/ NGT Patient has laparoscopic surgical incisions covered by  gauze    GENITOURINARY:  [ ]Normal [ ]Incontinent   [ ]Oliguria/Anuria   [x ]Patterson  MUSCULOSKELETAL:   [ x]Normal   [ ]Weakness  [ ]Bed/Wheelchair bound [ ]Edema  NEUROLOGIC:   [x ]No focal deficits  [ ] Cognitive impairment  [ ] Dysphagia [ ]Dysarthria [ ] Paresis [ ]Other   SKIN:   [x ]Normal  [ ]Rash  [ ]Other  [ ]Pressure ulcer(s) [ ]y [x ]n present on admission    CRITICAL CARE:  [ ]Shock Present  [ ]Septic [ ]Cardiogenic [ ]Neurologic [ ]Hypovolemic  [ ]Vasopressors [ ]Inotropes  [ ]Respiratory failure present [ ]Mechanical Ventilation [ ]Non-invasive ventilatory support [ ]High-Flow   [ ]Acute  [ ]Chronic [ ]Hypoxic  [ ]Hypercarbic [ ]Other  [ ]Other organ failure     LABS:    no recent labs     RADIOLOGY & ADDITIONAL STUDIES:< from: Xray Chest 1 View- PORTABLE-Urgent (Xray Chest 1 View- PORTABLE-Urgent .) (08.25.22 @ 17:28) >  Clear lungs.    < from: Xray Abdomen 1 View PORTABLE -Routine (Xray Abdomen 1 View PORTABLE -Routine .) (08.27.22 @ 13:01) >    IMPRESSION: Status post jejunostomy placement with contrast in the   jejunostomy tube and normal loop of jejunum.    < end of copied text >      Protein Calorie Malnutrition Present: [ ]mild [ ]moderate [ ]severe [ ]underweight [ ]morbid obesity  https://www.andeal.org/vault/2440/web/files/ONC/Table_Clinical%20Characteristics%20to%20Document%20Malnutrition-White%20JV%20et%20al%343157.pdf    Height (cm): 157.5 (08-26-22 @ 07:10)  Weight (kg): 71.1 (08-26-22 @ 07:11)  BMI (kg/m2): 28.7 (08-26-22 @ 07:11)    [ ]PPSV2 < or = 30%  [ ]significant weight loss [ ]poor nutritional intake [ ]anasarca[X ]Artificial Nutrition - J tube     Other REFERRALS:  [ ]Hospice  [ ]Child Life  [ ]Social Work  [ ]Case management [ ]Holistic Therapy

## 2022-09-14 NOTE — PROGRESS NOTE ADULT - PROBLEM SELECTOR PLAN 9
DVT ppx: lovenox      Dispo planning: complex dispo planning due to lack of insurance  Emergency medicaid can cover outpatient chemo/transport. DVT ppx: lovenox    Dispo planning: complex dispo planning due to lack of insurance  Emergency medicaid can cover outpatient chemo/transport.

## 2022-09-14 NOTE — PROGRESS NOTE ADULT - PROBLEM SELECTOR PLAN 4
- Palliative consulted for pain management   - Patient is uninsured, pending insurance approval to be able to go to [facility] for treatment   - On discharge, patient must have follow up with outpatient palliative to continue prescribing pain meds

## 2022-09-14 NOTE — PROGRESS NOTE ADULT - ASSESSMENT
57F PMH of renal stones p/w gastric adenocarcinoma w/ mets to LN c/b DEVI, now resolved, and nephrolithiasis. S/p j tube placement, with stable tube feed fewregiment and improved pain control. Awaiting emergency medicaid approval for o/p treatment assistance. 57F PMH of renal stones p/w nonresectable gastric adenocarcinoma w/ mets to LN c/b DEVI, now resolved, and nephrolithiasis with L hydronephrosis currently S/p j tube placement, with stable tube feed and stable pain regiment. Now with transaminitis with unclear etiology now undergoing hepatology workup for possible causes.

## 2022-09-14 NOTE — PROGRESS NOTE ADULT - PROBLEM SELECTOR PLAN 2
Nursing Note by Tommy Iglesias RN at 17 07:37 AM     Author:  Tommy Iglesias RN Service:  (none) Author Type:  Registered Nurse     Filed:  17 07:37 AM Encounter Date:  2017 Status:  Signed     :  Tommy Iglesias RN (Registered Nurse)            7:37 AM  Chief Complaint     Patient presents with     • Pain      fell down the stairs yesterday. Pain to right shoulder and left wrist. States he's unable to raise arm above waistline.      Patient brought to exam room.  Electronically Signed by:    Tommy Iglesias RN , 2017  Patient's name,  and allergies verified with[DB1.1T] patient[DB1.1M].  Last visit with PAUL ÓSCAR was on 05/15/2017 at  5:20 PM in [facility] SEQ  Last visit with [facility] was on 10/06/2017 at  8:15 AM in [facility] SEQ  Match done based on reference date of today 17  Aurelio Crabtree was offered treatment for pain control:[DB1.1T] Yes.  Patient refused comfort measures to reduce pain.[DB1.1M]       Revision History        User Key Date/Time User Provider Type Action    > DB1.1 17 07:37 AM Tommy Iglesias RN Registered Nurse Sign    M - Manual, T - Template             Urology/IR believe there is no need for urgent intervention  Will reconsult if there appears to be signs of UTI    Day 6/7 Zosyn  UCx with normal joselyn Urology/IR believe there is no need for urgent intervention  Will reconsult if there appears to be signs of UTI    Questionable need to continue prophylactic antibiotics  UCx with normal joselyn

## 2022-09-14 NOTE — PROGRESS NOTE ADULT - PROBLEM SELECTOR PLAN 1
- Pain located in epigastric area (area of gastric cancer) and reporting now diffuse abdominal pain since procedures on 8/26   - Opioid regimen changed at times due to insurance issues   - Pain has been well controlled on methadone and BID dosing will be easier for patient  - Restarted methadone 5 mg BID (missed one day dose today, but restarting this PM)   - c/w PO morphine IR to 15 mg q2h PRN for severe pain   - bowel regimen, goal BM every 2 to 3 days

## 2022-09-14 NOTE — PROGRESS NOTE ADULT - PROBLEM SELECTOR PLAN 4
b/l PEs seen on CT 9/9. Denies pleuritic chest pain, SOB. Stable on RA  Cont with lovenox 70 BID  Lovenox will cost approx 150/month b/l PEs seen on CT 9/9. Denies pleuritic chest pain, SOB. Stable on RA  Cont with lovenox 70 BID  Lovenox will cost approx 1/month

## 2022-09-15 NOTE — PROGRESS NOTE ADULT - PROBLEM SELECTOR PLAN 2
Urology/IR believe there is no need for urgent intervention  Will reconsult if there appears to be signs of UTI    Questionable need to continue prophylactic antibiotics  UCx with normal joselyn Urology/IR believe there is no need for urgent intervention  Will reconsult if there appears to be signs of UTI    ppx abx course finished

## 2022-09-15 NOTE — CONSULT NOTE ADULT - ATTENDING COMMENTS
Patient seen and examined with the GI fellow. I agree with the above assessment and plan. Thank you for allowing us to care for your patient.
#Abdominal pain, nausea, vomiting  #CT with gastric wall thickening and gastrocolic ligament nodularity, trace ascites concerning for malignancy  #Elevated CEA    --EGD tomorrow  --PPI for now  --Clear liquid diet today, NPO at midnight  --Pending EGD, will discuss need/timing of possible colonoscopy. Given upper GI symptoms, CT findings, and patient's hesitancy to trial colon prep, will defer colonoscopy at this time.  --Pain control per primary team, bowel regimen as needed/tolerated
Patient with mestatic gastric adenocarcinoma now with elevation of liver tests, suspicious for metastatic disease to liver. Suggest abdominal MRI with MRCP.  This imaging will evaluate liver substance as well as biliary anatomy.  DILI is possible but no obvious hepatotoxic agent being taken.
Patient seen and examined at the bedside. Diagnosis and staging discussed. Discussed the proposed procedure and indications that include staging for newly diagnosed gastric malignancy. Risks and benefits discussed. Planned for OR tuesday. Agree with assessment and plan as stated above. Please inform IP team if any change in clinical status. Call with questions.
Ms. Cali Gordillo is a 56 Y/O F from [facility] w/ [facility] Nephrolithiasis who presents to the hospital for abdominal pain lasting about 2 months, associated with nausea/vomiting found to have gastric cancer with possible metastatic disease. Surgical oncology consulted for possible resection vs palliative procedure.     -f/u full staging workup and pending staging workup will discuss resection vs palliative procedure  -as pt tolerating liquids would supplement diet with ensures to better optimize nutrition  -will follow

## 2022-09-15 NOTE — PROGRESS NOTE ADULT - ASSESSMENT
57F PMH of renal stones p/w nonresectable gastric adenocarcinoma w/ mets to LN c/b DEVI, now resolved, and nephrolithiasis with L hydronephrosis currently S/p j tube placement, with stable tube feed and stable pain regiment. Now with transaminitis with unclear etiology now undergoing hepatology workup for possible causes. 57F PMH of renal stones p/w nonresectable gastric adenocarcinoma w/ mets to LN c/b DEVI, now resolved, and nephrolithiasis with L hydronephrosis currently S/p j tube placement, with stable tube feed and stable pain regiment. Now with transaminitis undergoing ERCP tomorrow.

## 2022-09-15 NOTE — PROGRESS NOTE ADULT - ASSESSMENT
57F with metastatic gastric CA, s/p EGD and robotic feeding J tube placement 8/26.    Impression/plan:  - Pt's n/v and pain is chronic in nature related to underlying disease process and may never completely resolve. Pt has been on 27cc/hr TFs for a few days, can now attempt increasing to 30cc/hr.  - Appreciate nutrition input  - Remaining care per primary team    D Team Surgery  [id number]

## 2022-09-15 NOTE — PROGRESS NOTE ADULT - ATTENDING COMMENTS
Patient with metastatic gastric carcinoma. Now with abnormal liver tests with prominent alk phos. Imaging shows bile duct narrowing and area of enhancement. Bilirubin not elevated . Patient on antibiotics and liver tests showing some decline. Will review imaging results to question if liver abnormalities are possibly related to infiltrating carcinoma and may be best defined with biopsy.

## 2022-09-15 NOTE — PROGRESS NOTE ADULT - ATTENDING COMMENTS
Pt seen and examined, chart and labs reviewed.  Briefly a 57F hx of nephrolithiasis who presents with abd pain and found to have metastatic gastric CA to lymph nodes, course c/b DEVI (now improving).  Course c/b poor PO tolerance , requiring J tube insertion and new PEs now on therapeutic Lovenox.  Pt's pain is getting better controlled however has rising transaminases, which on initial CT imaging was suggestive of CBD dilation and possible cholangitis. MRCP reviewed, again showing possible cholangitis and CBD stricture.      #Transaminitis, rising:  - Hep panel from prior admission negative, repeat panel sent and negative for acute infection   - AMA, Smooth muscle ab negative low suspicion for autoimmune hepatitis, other autoimmune serology pending   - MRCP reviewed and appreciate hepatology/advanced GI evaluation.  For now, no plans for ERCP for decompression.    - Pt has been on empiric IV Zosyn which might be masking a cholangitic picture, role for ?IR rupesh tube, will resume IV Zosyn for now    - Imaging showing no evidence of metastatic involvement of liver     #Metastatic gastric adenoCA to lymph nodes:  - Oncology following, no plans for inpatient chemo, pt has f/u appt scheduled for 9/19, may need to reschedule appt if discharge is delayed     #Severe protein calorie malnutrition s/p J tube insertion:  - Rate increased to 27cc/hr, currently tolerating   - CM has been able to arrange for lower pricing of tube feeds and pump and family has agreed to pay for costs     #Hypercoagulable state with new PEs:   - Remains on therapeutic Lovenox, priced out 1$/month which pt can afford    #Neoplasm related pain:  - Pain better controlled on current regimen    #Dispo: awaiting final GI/hepatology recs given rising transaminases  Family meeting held with pt's family 9/14, all on board with plan

## 2022-09-15 NOTE — PROGRESS NOTE ADULT - ASSESSMENT
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 Hospital course complicated by acute pulmonary emboli and DEVI. Hepatology consulted for rising liver enzymes.     Impression:  #Elevated liver enzymes: rising cholestatic pattern of liver injury since 9/8 (Tbili peaked at 1.4 on 9/8 now downtrending to 0.7, ALP rising to 440, AST 30 --> 190s, ALT 22 --> 168). CT A/P (9/9/22): enhancement of the common bile duct worrisome for cholangitis given the above history (no reports of hepatic mets). MR abd/MRCP (9/14/22: "a smooth stricture of mid to distal CBD with findings suspicious for cholangitis. Abnormal signal and delayed enhancement in the left hepatic and caudate lobes along the biliary radicles. This can be infectious/inflammatory,  although infiltrative neoplasm like cholangiocarcinoma is also possible." Ddx may be 2/2 biliary obstruction in setting of CBD stricture (low clinical c/f cholangitis at this time considering afebrile, w/o leukocytosis) vs ?cholestasis of sepsis (no infectious source at this time) vs ?DILI (?zosyn)   Work up: negative acute hep A/B/C, AMA, ASMA, EBV IgM. Elevated IgG, IgA.     Recommendations:   - Discussed case with advanced GI team; at this time unclear if significant utility regarding ERCP / stenting of CBD stricture (will discuss with oncology re: how elevation of liver enzymes may affect patient's management plans)  - Obtain Hep E IgM  - Obtain LKM Ab r/o autoimmune disease  - Obtain CMV, HSV serologies + PCR   - Trend CMP, CBC, PT/INR daily       *Note not final unless signed by attending    Thank you for involving us in the care of this patient, please reach out if any further questions.     Keegan Escalera MD  Gastroenterology/Hepatology Fellow, PGY6    Available on [employer] Teams  [phone] ([facility])  [id number] ([facility])  Please contact on call fellow weekdays after 5pm-7am and weekends: [phone]

## 2022-09-15 NOTE — PROGRESS NOTE ADULT - SUBJECTIVE AND OBJECTIVE BOX
INCOMPLETE NOTE    Armen Gill | PGY1| Pager: [phone]  Interval Events:    REVIEW OF SYSTEMS:  CONSTITUTIONAL: No weakness, fevers or chills  EYES/ENT: No visual changes;  No vertigo or throat pain   NECK: No pain or stiffness  RESPIRATORY: No cough, wheezing, hemoptysis; No shortness of breath  CARDIOVASCULAR: No chest pain or palpitations  GASTROINTESTINAL: No abdominal or epigastric pain. No nausea, vomiting, or hematemesis; No diarrhea or constipation. No melena or hematochezia.  GENITOURINARY: No dysuria, frequency or hematuria  NEUROLOGICAL: No numbness or weakness  SKIN: No itching, burning, rashes, or lesions   All other review of systems is negative unless indicated above.    OBJECTIVE:  ICU Vital Signs Last 24 Hrs  T(C): 36.8 (15 Sep 2022 05:25), Max: 36.9 (14 Sep 2022 12:18)  T(F): 98.3 (15 Sep 2022 05:25), Max: 98.4 (14 Sep 2022 12:18)  HR: 100 (15 Sep 2022 05:25) (95 - 100)  BP: 141/80 (15 Sep 2022 05:25) (121/77 - 141/80)  BP(mean): --  ABP: --  ABP(mean): --  RR: 18 (15 Sep 2022 05:25) (17 - 18)  SpO2: 97% (15 Sep 2022 05:25) (94% - 100%)    O2 Parameters below as of 15 Sep 2022 05:25  Patient On (Oxygen Delivery Method): room air              09-14 @ 07:01  -  09-15 @ 07:00  --------------------------------------------------------  IN: 393 mL / OUT: 400 mL / NET: -7 mL      CAPILLARY BLOOD GLUCOSE          PHYSICAL EXAM:  General: WN/WD NAD  Neurology: A&Ox3, nonfocal, LEY x 4  Eyes: PERRLA/ EOMI, Gross vision intact  ENT/Neck: Neck supple, trachea midline, No JVD, Gross hearing intact  Respiratory: CTA B/L, No wheezing, rales, rhonchi  CV: RRR, +S1/S2, -S3/S4, no murmurs, rubs or gallops  Abdominal: Soft, NT, ND +BS, No HSM  MSK: 5/5 strength UE/LE bilaterally  Extremities: No edema, 2+ peripheral pulses  Skin: No Rashes, Hematoma, Ecchymosis  Incisions:   Tubes:    HOSPITAL MEDICATIONS:  MEDICATIONS  (STANDING):  Biotene Dry Mouth Oral Rinse 5 milliLiter(s) Swish and Spit two times a day  enoxaparin Injectable 70 milliGRAM(s) SubCutaneous every 12 hours  lidocaine   4% Patch 1 Patch Transdermal daily  methadone   Solution 5 milliGRAM(s) Enteral Tube every 12 hours  metoclopramide   Syrup 5 milliGRAM(s) Oral every 4 hours  pantoprazole   Suspension 40 milliGRAM(s) Oral daily  piperacillin/tazobactam IVPB.. 3.375 Gram(s) IV Intermittent every 8 hours    MEDICATIONS  (PRN):  bisacodyl Suppository 10 milliGRAM(s) Rectal daily PRN Constipation  morphine   Solution 15 milliGRAM(s) Enteral Tube every 2 hours PRN Severe Pain (7 - 10)  polyethylene glycol 3350 17 Gram(s) Oral daily PRN Constipation      LABS:                        10.0   9.88  )-----------( 354      ( 15 Sep 2022 07:30 )             33.6     Hgb Trend: 10.0<--, 9.8<--, 10.0<--, 10.0<--, 9.8<--  09-15    134<L>  |  99  |  12  ----------------------------<  148<H>  3.7   |  23  |  0.65    Ca    8.3<L>      15 Sep 2022 07:30  Phos  3.0     09-15  Mg     2.20     09-15    TPro  7.0  /  Alb  2.7<L>  /  TBili  1.3<H>  /  DBili  x   /  AST  294<H>  /  ALT  242<H>  /  AlkPhos  534<H>  09-15    Creatinine Trend: 0.65<--, 0.68<--, 0.62<--, 0.66<--, 0.63<--, 0.62<--  PT/INR - ( 15 Sep 2022 07:30 )   PT: 12.0 sec;   INR: 1.03 ratio         PTT - ( 14 Sep 2022 06:00 )  PTT:30.7 sec          MICROBIOLOGY:      Armen Gill | PGY1| Pager: [phone]  Interval Events: No acute events overnight    REVIEW OF SYSTEMS: Denied any acute events overnight  CONSTITUTIONAL: No weakness, fevers or chills  RESPIRATORY: No cough, wheezing, hemoptysis; No shortness of breath  CARDIOVASCULAR: No chest pain or palpitations; continued swelling in LLE  GASTROINTESTINAL: Reported continued improvement in epigastric pain and back pain  GENITOURINARY: No dysuria, frequency or hematuria  All other review of systems is negative unless indicated above.    OBJECTIVE:  ICU Vital Signs Last 24 Hrs  T(C): 36.8 (15 Sep 2022 05:25), Max: 36.9 (14 Sep 2022 12:18)  T(F): 98.3 (15 Sep 2022 05:25), Max: 98.4 (14 Sep 2022 12:18)  HR: 100 (15 Sep 2022 05:25) (95 - 100)  BP: 141/80 (15 Sep 2022 05:25) (121/77 - 141/80)  BP(mean): --  ABP: --  ABP(mean): --  RR: 18 (15 Sep 2022 05:25) (17 - 18)  SpO2: 97% (15 Sep 2022 05:25) (94% - 100%)    O2 Parameters below as of 15 Sep 2022 05:25  Patient On (Oxygen Delivery Method): room air              09-14 @ 07:01  -  09-15 @ 07:00  --------------------------------------------------------  IN: 393 mL / OUT: 400 mL / NET: -7 mL      CAPILLARY BLOOD GLUCOSE      PHYSICAL EXAM:  General: NAD  Neurology: A&Ox3, LEY x 4  Eyes: PERRLA, Gross vision intact  ENT/Neck: Neck supple, trachea midline, Gross hearing intact  Respiratory: CTA B/L,   CV: RRR, +S1/S2, -S3/S4, no murmurs, rubs or gallops  Abdominal: mildly distended, J tube site intact, no significant fluid appreciated  Extremities: 2+ edema in LLE to ankles, No edema in RLE 2+ peripheral pulses    Tubes: J Tube site is non-erythematous    HOSPITAL MEDICATIONS:  MEDICATIONS  (STANDING):  Biotene Dry Mouth Oral Rinse 5 milliLiter(s) Swish and Spit two times a day  enoxaparin Injectable 70 milliGRAM(s) SubCutaneous every 12 hours  lidocaine   4% Patch 1 Patch Transdermal daily  methadone   Solution 5 milliGRAM(s) Enteral Tube every 12 hours  metoclopramide   Syrup 5 milliGRAM(s) Oral every 4 hours  pantoprazole   Suspension 40 milliGRAM(s) Oral daily  piperacillin/tazobactam IVPB.. 3.375 Gram(s) IV Intermittent every 8 hours    MEDICATIONS  (PRN):  bisacodyl Suppository 10 milliGRAM(s) Rectal daily PRN Constipation  morphine   Solution 15 milliGRAM(s) Enteral Tube every 2 hours PRN Severe Pain (7 - 10)  polyethylene glycol 3350 17 Gram(s) Oral daily PRN Constipation      LABS:                        10.0   9.88  )-----------( 354      ( 15 Sep 2022 07:30 )             33.6     Hgb Trend: 10.0<--, 9.8<--, 10.0<--, 10.0<--, 9.8<--  09-15    134<L>  |  99  |  12  ----------------------------<  148<H>  3.7   |  23  |  0.65    Ca    8.3<L>      15 Sep 2022 07:30  Phos  3.0     09-15  Mg     2.20     09-15    TPro  7.0  /  Alb  2.7<L>  /  TBili  1.3<H>  /  DBili  x   /  AST  294<H>  /  ALT  242<H>  /  AlkPhos  534<H>  09-15    Creatinine Trend: 0.65<--, 0.68<--, 0.62<--, 0.66<--, 0.63<--, 0.62<--  PT/INR - ( 15 Sep 2022 07:30 )   PT: 12.0 sec;   INR: 1.03 ratio         PTT - ( 14 Sep 2022 06:00 )  PTT:30.7 sec          MICROBIOLOGY:

## 2022-09-15 NOTE — CONSULT NOTE ADULT - SUBJECTIVE AND OBJECTIVE BOX
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 Hospital course complicated by acute pulmonary emboli dx after febrile to 102 on 9/8 and DEVI. Noted to have rising LFTs starting on 9/9 with hepatocellular pattern. MR done with CBD stricture and abn enhancement of left lobe of liver.     Allergies:  No Known Allergies      Home Medications:    Hospital Medications:  Biotene Dry Mouth Oral Rinse 5 milliLiter(s) Swish and Spit two times a day  bisacodyl Suppository 10 milliGRAM(s) Rectal daily PRN  enoxaparin Injectable 70 milliGRAM(s) SubCutaneous every 12 hours  lidocaine   4% Patch 1 Patch Transdermal daily  methadone   Solution 5 milliGRAM(s) Enteral Tube every 12 hours  metoclopramide   Syrup 5 milliGRAM(s) Oral every 4 hours  morphine   Solution 15 milliGRAM(s) Enteral Tube every 2 hours PRN  pantoprazole   Suspension 40 milliGRAM(s) Oral daily  piperacillin/tazobactam IVPB.. 3.375 Gram(s) IV Intermittent every 8 hours  polyethylene glycol 3350 17 Gram(s) Oral daily PRN      PMHX/PSHX:  Nephrolithiasis        Family history:      Social History:     ROS:     General:  No weight loss, fevers, chills, night sweats, fatigue  Eyes:  No vision changes, no yellowing of eyes   ENT:  No throat pain, runny nose  CV:  No chest pain, palpitations  Resp:  No SOB, cough, wheezing  GI:  See HPI  :  No burning with urination, no hematuria   Muscle:  No muscle pain, weakness  Neuro:  No numbness/tingling, memory problems  Psych:  No fatigue, insomnia, mood problems  Heme:  No easy bruisability  Skin:  No rash, itching       PHYSICAL EXAM:     GENERAL:  Appears stated age, well-groomed, well-nourished, no distress  HEENT:  NC/AT,  conjunctivae clear and pink,  no JVD  CHEST:  Full & symmetric excursion, no increased effort, breath sounds clear  HEART:  Regular rhythm, S1, S2, no murmur/rub/S3/S4, no abdominal bruit, no edema  ABDOMEN:  Soft, non-tender, non-distended, normoactive bowel sounds,  no masses ,  EXTREMITIES:  no cyanosis,clubbing or edema  SKIN:  No rash/erythema/ecchymoses/petechiae/wounds/abscess/warm/dry  NEURO:  Alert, oriented    Vital Signs:  Vital Signs Last 24 Hrs  T(C): 37 (15 Sep 2022 13:20), Max: 37 (15 Sep 2022 13:20)  T(F): 98.6 (15 Sep 2022 13:20), Max: 98.6 (15 Sep 2022 13:20)  HR: 95 (15 Sep 2022 13:20) (95 - 100)  BP: 123/79 (15 Sep 2022 13:20) (123/79 - 141/80)  BP(mean): --  RR: 18 (15 Sep 2022 13:20) (17 - 18)  SpO2: 98% (15 Sep 2022 13:20) (94% - 98%)    Parameters below as of 15 Sep 2022 13:20  Patient On (Oxygen Delivery Method): room air      Daily     Daily     LABS:                        10.0   9.88  )-----------( 354      ( 15 Sep 2022 07:30 )             33.6     09-15    134<L>  |  99  |  12  ----------------------------<  148<H>  3.7   |  23  |  0.65    Ca    8.3<L>      15 Sep 2022 07:30  Phos  3.0     09-15  Mg     2.20     09-15    TPro  7.0  /  Alb  2.7<L>  /  TBili  1.3<H>  /  DBili  x   /  AST  294<H>  /  ALT  242<H>  /  AlkPhos  534<H>  09-15    LIVER FUNCTIONS - ( 15 Sep 2022 07:30 )  Alb: 2.7 g/dL / Pro: 7.0 g/dL / ALK PHOS: 534 U/L / ALT: 242 U/L / AST: 294 U/L / GGT: x           PT/INR - ( 15 Sep 2022 07:30 )   PT: 12.0 sec;   INR: 1.03 ratio         PTT - ( 14 Sep 2022 06:00 )  PTT:30.7 sec        Imaging:    < from: MR Abdomen w/wo IV Cont (09.14.22 @ 22:26) >  FINDINGS:  LOWER CHEST: Trace bilateral pleural effusions. Enlarged right   juxtadiaphragmatic lymph node..    LIVER: Abnormal T2 signal and restricted diffusion in the left hepatic   lobe along the biliary radicles with minimal involvement of the caudate   lobe. Delayed enhancement in this region.  BILE DUCTS: Narrowing of the mid to distal CBD with mural enhancement. No   choledocholithiasis.  GALLBLADDER: Within normal limits.  SPLEEN: Within normal limits.  PANCREAS: Within normal limits.  ADRENALS: Small left adrenal nodule.  KIDNEYS/URETERS: Left renal cysts. Moderate left hydronephrosis to the   level of left retroperitoneal adenopathy.    VISUALIZED PORTIONS:  BOWEL: Contiguous mural thickening of the gastric fundus and body.   Percutaneous jejunostomy.  PERITONEUM: Small ascites. Implants in the omentum along the greater   curvature of the stomach.  VESSELS: Retroaortic left renal vein. Replaced right hepatic artery   arising from separately from the celiac axis. The portal vein is narrowed   from perihilar lymphadenopathy but patent.  RETROPERITONEUM/LYMPH NODES: Stable lymphadenopathy in the   retroperitoneum, gastrohepatic ligament and in the upper abdomen.  ABDOMINAL WALL: Within normal limits.  BONES: Degenerative changes.    IMPRESSION:  Metastatic gastric cancer as previously visualized.  A smooth stricture of mid to distal CBD with findings suspicious for   cholangitis.  Abnormal signal and delayed enhancement in the left hepatic and caudate   lobes along the biliary radicles. This canbe infectious/inflammatory,   although infiltrative neoplasm like cholangiocarcinoma is also possible.    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 Hospital course complicated by acute pulmonary emboli dx after febrile to 102 on 9/8 and DEVI. Noted to have rising LFTs starting on 9/9 with hepatocellular pattern. MR done with CBD stricture and abn enhancement of left lobe of liver.     Allergies:  No Known Allergies      Home Medications:    Hospital Medications:  Biotene Dry Mouth Oral Rinse 5 milliLiter(s) Swish and Spit two times a day  bisacodyl Suppository 10 milliGRAM(s) Rectal daily PRN  enoxaparin Injectable 70 milliGRAM(s) SubCutaneous every 12 hours  lidocaine   4% Patch 1 Patch Transdermal daily  methadone   Solution 5 milliGRAM(s) Enteral Tube every 12 hours  metoclopramide   Syrup 5 milliGRAM(s) Oral every 4 hours  morphine   Solution 15 milliGRAM(s) Enteral Tube every 2 hours PRN  pantoprazole   Suspension 40 milliGRAM(s) Oral daily  piperacillin/tazobactam IVPB.. 3.375 Gram(s) IV Intermittent every 8 hours  polyethylene glycol 3350 17 Gram(s) Oral daily PRN      PMHX/PSHX:  Nephrolithiasis        Family history:      Social History:     ROS:     General:  No weight loss, fevers, chills, night sweats, fatigue  Eyes:  No vision changes, no yellowing of eyes   ENT:  No throat pain, runny nose  CV:  No chest pain, palpitations  Resp:  No SOB, cough, wheezing  GI:  See HPI  :  No burning with urination, no hematuria   Muscle:  No muscle pain, weakness  Neuro:  No numbness/tingling, memory problems  Psych:  No fatigue, insomnia, mood problems  Heme:  No easy bruisability  Skin:  No rash, itching       PHYSICAL EXAM:     GENERAL:  Appears stated age, well-groomed, well-nourished, no distress  HEENT:  NC/AT,  conjunctivae clear and pink,  no JVD  CHEST:  Full & symmetric excursion, no increased effort, breath sounds clear  HEART:  Regular rhythm, S1, S2, no murmur/rub/S3/S4, no abdominal bruit, no edema  ABDOMEN:  Soft, distended, J tube in place  EXTREMITIES:  no cyanosis,clubbing or edema  SKIN:  No rash/erythema/ecchymoses/petechiae/wounds/abscess/warm/dry  NEURO:  Alert, oriented    Vital Signs:  Vital Signs Last 24 Hrs  T(C): 37 (15 Sep 2022 13:20), Max: 37 (15 Sep 2022 13:20)  T(F): 98.6 (15 Sep 2022 13:20), Max: 98.6 (15 Sep 2022 13:20)  HR: 95 (15 Sep 2022 13:20) (95 - 100)  BP: 123/79 (15 Sep 2022 13:20) (123/79 - 141/80)  BP(mean): --  RR: 18 (15 Sep 2022 13:20) (17 - 18)  SpO2: 98% (15 Sep 2022 13:20) (94% - 98%)    Parameters below as of 15 Sep 2022 13:20  Patient On (Oxygen Delivery Method): room air      Daily     Daily     LABS:                        10.0   9.88  )-----------( 354      ( 15 Sep 2022 07:30 )             33.6     09-15    134<L>  |  99  |  12  ----------------------------<  148<H>  3.7   |  23  |  0.65    Ca    8.3<L>      15 Sep 2022 07:30  Phos  3.0     09-15  Mg     2.20     09-15    TPro  7.0  /  Alb  2.7<L>  /  TBili  1.3<H>  /  DBili  x   /  AST  294<H>  /  ALT  242<H>  /  AlkPhos  534<H>  09-15    LIVER FUNCTIONS - ( 15 Sep 2022 07:30 )  Alb: 2.7 g/dL / Pro: 7.0 g/dL / ALK PHOS: 534 U/L / ALT: 242 U/L / AST: 294 U/L / GGT: x           PT/INR - ( 15 Sep 2022 07:30 )   PT: 12.0 sec;   INR: 1.03 ratio         PTT - ( 14 Sep 2022 06:00 )  PTT:30.7 sec        Imaging:    < from: MR Abdomen w/wo IV Cont (09.14.22 @ 22:26) >  FINDINGS:  LOWER CHEST: Trace bilateral pleural effusions. Enlarged right   juxtadiaphragmatic lymph node..    LIVER: Abnormal T2 signal and restricted diffusion in the left hepatic   lobe along the biliary radicles with minimal involvement of the caudate   lobe. Delayed enhancement in this region.  BILE DUCTS: Narrowing of the mid to distal CBD with mural enhancement. No   choledocholithiasis.  GALLBLADDER: Within normal limits.  SPLEEN: Within normal limits.  PANCREAS: Within normal limits.  ADRENALS: Small left adrenal nodule.  KIDNEYS/URETERS: Left renal cysts. Moderate left hydronephrosis to the   level of left retroperitoneal adenopathy.    VISUALIZED PORTIONS:  BOWEL: Contiguous mural thickening of the gastric fundus and body.   Percutaneous jejunostomy.  PERITONEUM: Small ascites. Implants in the omentum along the greater   curvature of the stomach.  VESSELS: Retroaortic left renal vein. Replaced right hepatic artery   arising from separately from the celiac axis. The portal vein is narrowed   from perihilar lymphadenopathy but patent.  RETROPERITONEUM/LYMPH NODES: Stable lymphadenopathy in the   retroperitoneum, gastrohepatic ligament and in the upper abdomen.  ABDOMINAL WALL: Within normal limits.  BONES: Degenerative changes.    IMPRESSION:  Metastatic gastric cancer as previously visualized.  A smooth stricture of mid to distal CBD with findings suspicious for   cholangitis.  Abnormal signal and delayed enhancement in the left hepatic and caudate   lobes along the biliary radicles. This canbe infectious/inflammatory,   although infiltrative neoplasm like cholangiocarcinoma is also possible.    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 Hospital course complicated by acute pulmonary emboli dx after febrile to 102 on 9/8 and DEVI. Noted to have rising LFTs starting on 9/9. MR done with CBD stricture and abn enhancement of left lobe of liver.     Allergies:  No Known Allergies      Home Medications:    Hospital Medications:  Biotene Dry Mouth Oral Rinse 5 milliLiter(s) Swish and Spit two times a day  bisacodyl Suppository 10 milliGRAM(s) Rectal daily PRN  enoxaparin Injectable 70 milliGRAM(s) SubCutaneous every 12 hours  lidocaine   4% Patch 1 Patch Transdermal daily  methadone   Solution 5 milliGRAM(s) Enteral Tube every 12 hours  metoclopramide   Syrup 5 milliGRAM(s) Oral every 4 hours  morphine   Solution 15 milliGRAM(s) Enteral Tube every 2 hours PRN  pantoprazole   Suspension 40 milliGRAM(s) Oral daily  piperacillin/tazobactam IVPB.. 3.375 Gram(s) IV Intermittent every 8 hours  polyethylene glycol 3350 17 Gram(s) Oral daily PRN      PMHX/PSHX:  Nephrolithiasis        Family history:      Social History:     ROS:     General:  No weight loss, fevers, chills, night sweats, fatigue  Eyes:  No vision changes, no yellowing of eyes   ENT:  No throat pain, runny nose  CV:  No chest pain, palpitations  Resp:  No SOB, cough, wheezing  GI:  See HPI  :  No burning with urination, no hematuria   Muscle:  No muscle pain, weakness  Neuro:  No numbness/tingling, memory problems  Psych:  No fatigue, insomnia, mood problems  Heme:  No easy bruisability  Skin:  No rash, itching       PHYSICAL EXAM:     GENERAL:  Appears stated age, well-groomed, well-nourished, no distress  HEENT:  NC/AT,  conjunctivae clear and pink,  no JVD  CHEST:  Full & symmetric excursion, no increased effort, breath sounds clear  HEART:  Regular rhythm, S1, S2, no murmur/rub/S3/S4, no abdominal bruit, no edema  ABDOMEN:  Soft, distended, J tube in place  EXTREMITIES:  no cyanosis,clubbing or edema  SKIN:  No rash/erythema/ecchymoses/petechiae/wounds/abscess/warm/dry  NEURO:  Alert, oriented    Vital Signs:  Vital Signs Last 24 Hrs  T(C): 37 (15 Sep 2022 13:20), Max: 37 (15 Sep 2022 13:20)  T(F): 98.6 (15 Sep 2022 13:20), Max: 98.6 (15 Sep 2022 13:20)  HR: 95 (15 Sep 2022 13:20) (95 - 100)  BP: 123/79 (15 Sep 2022 13:20) (123/79 - 141/80)  BP(mean): --  RR: 18 (15 Sep 2022 13:20) (17 - 18)  SpO2: 98% (15 Sep 2022 13:20) (94% - 98%)    Parameters below as of 15 Sep 2022 13:20  Patient On (Oxygen Delivery Method): room air      Daily     Daily     LABS:                        10.0   9.88  )-----------( 354      ( 15 Sep 2022 07:30 )             33.6     09-15    134<L>  |  99  |  12  ----------------------------<  148<H>  3.7   |  23  |  0.65    Ca    8.3<L>      15 Sep 2022 07:30  Phos  3.0     09-15  Mg     2.20     09-15    TPro  7.0  /  Alb  2.7<L>  /  TBili  1.3<H>  /  DBili  x   /  AST  294<H>  /  ALT  242<H>  /  AlkPhos  534<H>  09-15    LIVER FUNCTIONS - ( 15 Sep 2022 07:30 )  Alb: 2.7 g/dL / Pro: 7.0 g/dL / ALK PHOS: 534 U/L / ALT: 242 U/L / AST: 294 U/L / GGT: x           PT/INR - ( 15 Sep 2022 07:30 )   PT: 12.0 sec;   INR: 1.03 ratio         PTT - ( 14 Sep 2022 06:00 )  PTT:30.7 sec        Imaging:    < from: MR Abdomen w/wo IV Cont (09.14.22 @ 22:26) >  FINDINGS:  LOWER CHEST: Trace bilateral pleural effusions. Enlarged right   juxtadiaphragmatic lymph node..    LIVER: Abnormal T2 signal and restricted diffusion in the left hepatic   lobe along the biliary radicles with minimal involvement of the caudate   lobe. Delayed enhancement in this region.  BILE DUCTS: Narrowing of the mid to distal CBD with mural enhancement. No   choledocholithiasis.  GALLBLADDER: Within normal limits.  SPLEEN: Within normal limits.  PANCREAS: Within normal limits.  ADRENALS: Small left adrenal nodule.  KIDNEYS/URETERS: Left renal cysts. Moderate left hydronephrosis to the   level of left retroperitoneal adenopathy.    VISUALIZED PORTIONS:  BOWEL: Contiguous mural thickening of the gastric fundus and body.   Percutaneous jejunostomy.  PERITONEUM: Small ascites. Implants in the omentum along the greater   curvature of the stomach.  VESSELS: Retroaortic left renal vein. Replaced right hepatic artery   arising from separately from the celiac axis. The portal vein is narrowed   from perihilar lymphadenopathy but patent.  RETROPERITONEUM/LYMPH NODES: Stable lymphadenopathy in the   retroperitoneum, gastrohepatic ligament and in the upper abdomen.  ABDOMINAL WALL: Within normal limits.  BONES: Degenerative changes.    IMPRESSION:  Metastatic gastric cancer as previously visualized.  A smooth stricture of mid to distal CBD with findings suspicious for   cholangitis.  Abnormal signal and delayed enhancement in the left hepatic and caudate   lobes along the biliary radicles. This canbe infectious/inflammatory,   although infiltrative neoplasm like cholangiocarcinoma is also possible.    --- End of Report ---    < end of copied text >

## 2022-09-15 NOTE — CONSULT NOTE ADULT - ASSESSMENT
57F Hx nephrolithiasis who p/w progressive abdominal pain, N/V, and weight loss, found to have poorly differentiated metastatic gastric adenocarcinoma to LNs s/p J-tube placement (8/26/22) course complicated by acute pulmonary emboli, increasing LFTs w/ hepatocellular pattern and MR done with CBD stricture.    Impression:  #CBD stricture - mild with slightly elev bili. Less likely cause of elevated transaminases.  #Abnormal liver enhancement on MRI - unclear etiology. Infectious vs inflammatory vs infiltrative    Recommendation:  - discussed with Hepatology - will hold off for now on ERCP, if uptrending bili and concern for biliary obstruction, will re-eval for endoscopic decompression  - trend CMP  - rest of care per primary team    Note not finalized until signed by attending.    Brabara Nickerson PGY-6  Gastroenterology/Hepatology Fellow  Pager #[phone] ([facility]) or [phone] ([facility])  Available on [employer] Teams.  Please contact on-call GI fellow via answering service ([phone]) after 5pm and before 8am, and on weekends.               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 MR done with CBD stricture and abn enhancement of left lobe of liver.    Impression:  #CBD stricture - mild with slightly elev bili. Less likely cause of elevated transaminases.  #Abnormal liver enhancement on MRI - unclear etiology. Infectious vs inflammatory vs infiltrative    Recommendation:  - discussed with Hepatology - will hold off for now on ERCP, if uptrending bili and concern for biliary obstruction, will re-eval for endoscopic decompression  - trend CMP  - rest of care per primary team    Note not finalized until signed by attending.    Barbara Nickerson PGY-6  Gastroenterology/Hepatology Fellow  Pager #[phone] ([facility]) or [phone] ([facility])  Available on [employer] Teams.  Please contact on-call GI fellow via answering service ([phone]) after 5pm and before 8am, and on weekends.               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 MR done with CBD stricture and abn enhancement of left lobe of liver.    Impression:  #CBD stricture - mild with slightly elev bili. Less likely cause of elevated transaminases.  #Abnormal liver enhancement on MRI - unclear etiology. Infectious vs inflammatory vs infiltrative    Recommendation:  - discussed with Hepatology and heme/onc - chemo restricted with transaminases 5x ULN; therefore can try ERCP for stenting tomorrow given findings on MR pending endoscopy schedule  - NPO after midnight  - hold lovenox starting with tonights dose  - trend CMP  - rest of care per primary team    Note not finalized until signed by attending.    Barbara Nickerson PGY-6  Gastroenterology/Hepatology Fellow  Pager #[phone] ([facility]) or [phone] ([facility])  Available on [employer] Teams.  Please contact on-call GI fellow via answering service ([phone]) after 5pm and before 8am, and on weekends.

## 2022-09-15 NOTE — PROGRESS NOTE ADULT - PROBLEM SELECTOR PLAN 3
Impression: The patient's abdominal pain, persistent nausea/vomiting and weight loss iso of CT A/P findings indicating gastric body thickening and gastrocolic ligament nodularity are concerning for gastric adenocarcinoma. EGD biopsy confirmed.  - GI, onc, surg/onc following  - EGD:  Infiltrative changes in gastric body concerning gastric malignancy, Congested duodenal mucosa, biopsy = poorly differentiated adenocarcinoma  - staging CT chest - Mediastinal/bilateral hilar/R internal mammary LAD  - mediastinal lymph node biopsy results - Metastatic adenocarcinoma   - pantoprazole  40 mg BID  - OR 8/26 with J tube placement; no further change in management    pain Control; Palliative will attempt to transition to morphine for cost control  -Morphine 15mg q2h for severe Pain 7-10  -Methadone 5mg q12h standing    PRICING UPDATES:  Lovenox: $1/month covered under [facility] medicaid  Tube Feeds and pump: ~$120/month  Methadone: 36$/month  Reglan: $60/month  Morphine: $1 COVERED    Family meeting today for management and goals

## 2022-09-15 NOTE — PROGRESS NOTE ADULT - PROBLEM SELECTOR PLAN 1
LFTs are elevating without a clear source; no evidence of mets to liver  RUQ U/S results are equivocal; shows some mild bile duct dilatation similar to CT on 9/9; new from CT on 8/14  CT shows no mets to the liver at the moment    Hepatitis labs from June and July 2022 show no signs of HepB/C infection  Appreciated Heme/Onc Recs    Collect autoimmune/hepatits labs  F/U MRCP  Appreciate Hepatology Recs LFTs are elevating without a clear source; no evidence of mets to liver  RUQ U/S results are equivocal; shows some mild bile duct dilatation similar to CT on 9/9; new from CT on 8/14  CT shows no mets to the liver at the moment    Hepatitis labs from June and July 2022 show no signs of HepB/C infection  Appreciated Heme/Onc Recs    Collect autoimmune/hepatits labs  MRCP showed Stricture of CBD  Consulted Advanced End[facility]; ERCP Tomorrow  Onc Weighed in; said elevated transaminitis and new elevated T Bili could interfere with chemo  Appreciate Hepatology recs

## 2022-09-15 NOTE — PROGRESS NOTE ADULT - SUBJECTIVE AND OBJECTIVE BOX
Subjective:   Patient seen at bedside this AM. Pt still reporting intermittent nausea and some spit up.    Objective:  Vital Signs  T(C): 36.8 (09-15 @ 05:25), Max: 36.9 (09-14 @ 12:18)  HR: 100 (09-15 @ 05:25) (95 - 100)  BP: 141/80 (09-15 @ 05:25) (121/77 - 141/80)  RR: 18 (09-15 @ 05:25) (17 - 18)  SpO2: 97% (09-15 @ 05:25) (94% - 100%)  09-14-22 @ 07:01  -  09-15-22 @ 07:00  --------------------------------------------------------  IN:  Total IN: 0 mL    OUT:    Voided (mL): 400 mL  Total OUT: 400 mL    Total NET: -400 mL      Physical Exam:  GEN: resting in bed NAD  RESP: no increased WOB  ABD: soft, mildly distended, minimally tender. J-tube site c/d/i   EXTR: warm, well-perfused, no edema  NEURO: awake, alert    Labs:                        10.0   9.88  )-----------( 354      ( 15 Sep 2022 07:30 )             33.6   09-14    135  |  101  |  12  ----------------------------<  122<H>  4.2   |  21<L>  |  0.68    Ca    8.2<L>      14 Sep 2022 06:00  Phos  3.1     09-14  Mg     2.10     09-14    TPro  6.5  /  Alb  2.6<L>  /  TBili  1.1  /  DBili  x   /  AST  273<H>  /  ALT  221<H>  /  AlkPhos  480<H>  09-14    CAPILLARY BLOOD GLUCOSE          Imaging:

## 2022-09-15 NOTE — PROGRESS NOTE ADULT - SUBJECTIVE AND OBJECTIVE BOX
Interval Events:   NAEON, afebrile, HD stable   On-going abd pain, more controlled compared to prior  Liver enzymes rising  MR Abd/MRCP with IV contrast (9/14/22): metastatic gastric cancer as previously visualized. A smooth stricture of mid to distal CBD with findings suspicious for cholangitis. Abnormal signal and delayed enhancement in the left hepatic and caudate lobes along the biliary radicles. This can be infectious/inflammatory,  although infiltrative neoplasm like cholangiocarcinoma is also possible.        Allergies:  No Known Allergies        Hospital Medications:  Biotene Dry Mouth Oral Rinse 5 milliLiter(s) Swish and Spit two times a day  bisacodyl Suppository 10 milliGRAM(s) Rectal daily PRN  enoxaparin Injectable 70 milliGRAM(s) SubCutaneous every 12 hours  lidocaine   4% Patch 1 Patch Transdermal daily  methadone   Solution 5 milliGRAM(s) Enteral Tube every 12 hours  metoclopramide   Syrup 5 milliGRAM(s) Oral every 4 hours  morphine   Solution 15 milliGRAM(s) Enteral Tube every 2 hours PRN  pantoprazole   Suspension 40 milliGRAM(s) Oral daily  piperacillin/tazobactam IVPB.. 3.375 Gram(s) IV Intermittent every 8 hours  polyethylene glycol 3350 17 Gram(s) Oral daily PRN      PMHX/PSHX:  Nephrolithiasis        Family history:      ROS:     General:  No wt loss, fevers, chills, night sweats, fatigue,   Eyes:  Good vision, no reported pain  ENT:  No sore throat, pain, runny nose, dysphagia  CV:  No pain, palpitations, hypo/hypertension  Pulm:  No dyspnea, cough, tachypnea, wheezing  GI:  see above  :  No pain, bleeding, incontinence, nocturia  Muscle:  No pain, weakness  Neuro:  No weakness, tingling, memory problems  Psych:  No fatigue, insomnia, mood problems, depression  Endocrine:  No polyuria, polydipsia, cold/heat intolerance  Heme:  No petechiae, ecchymosis, easy bruisability  Skin:  No rash, tattoos, scars, edema      PHYSICAL EXAM:   Vital Signs:  Vital Signs Last 24 Hrs  T(C): 37 (15 Sep 2022 13:20), Max: 37 (15 Sep 2022 13:20)  T(F): 98.6 (15 Sep 2022 13:20), Max: 98.6 (15 Sep 2022 13:20)  HR: 95 (15 Sep 2022 13:20) (95 - 100)  BP: 123/79 (15 Sep 2022 13:20) (123/79 - 141/80)  BP(mean): --  RR: 18 (15 Sep 2022 13:20) (17 - 18)  SpO2: 98% (15 Sep 2022 13:20) (94% - 98%)    Parameters below as of 15 Sep 2022 13:20  Patient On (Oxygen Delivery Method): room air      Daily     Daily     GENERAL:  No acute distress  HEENT:  Normocephalic/atraumatic, no scleral icterus  CHEST: intermittent accessory muscle use during interview   HEART:  Regular rate and rhythm, no murmurs/rubs/gallops  ABDOMEN:  Soft, non-tender, +mildly distended, +J tube w/ TF   EXTREMITIES: No cyanosis, clubbing, or edema  SKIN:  No rash/warm/dry  NEURO:  Alert and oriented x 3    LABS:                        10.0   9.88  )-----------( 354      ( 15 Sep 2022 07:30 )             33.6     Mean Cell Volume: 81.0 fL (09-15-22 @ 07:30)    09-15    134<L>  |  99  |  12  ----------------------------<  148<H>  3.7   |  23  |  0.65    Ca    8.3<L>      15 Sep 2022 07:30  Phos  3.0     09-15  Mg     2.20     09-15    TPro  7.0  /  Alb  2.7<L>  /  TBili  1.3<H>  /  DBili  x   /  AST  294<H>  /  ALT  242<H>  /  AlkPhos  534<H>  09-15    LIVER FUNCTIONS - ( 15 Sep 2022 07:30 )  Alb: 2.7 g/dL / Pro: 7.0 g/dL / ALK PHOS: 534 U/L / ALT: 242 U/L / AST: 294 U/L / GGT: x           PT/INR - ( 15 Sep 2022 07:30 )   PT: 12.0 sec;   INR: 1.03 ratio         PTT - ( 14 Sep 2022 06:00 )  PTT:30.7 sec                            10.0   9.88  )-----------( 354      ( 15 Sep 2022 07:30 )             33.6                         9.8    9.54  )-----------( 344      ( 14 Sep 2022 06:00 )             32.7                         10.0   9.45  )-----------( 344      ( 13 Sep 2022 06:33 )             31.8       Imaging:

## 2022-09-15 NOTE — PROGRESS NOTE ADULT - PROBLEM SELECTOR PLAN 4
b/l PEs seen on CT 9/9. Denies pleuritic chest pain, SOB. Stable on RA  Cont with lovenox 70 BID  Lovenox will cost approx 1/month b/l PEs seen on CT 9/9. Denies pleuritic chest pain, SOB. Stable on RA  Cont with lovenox 70 BID    Restart Lovenox Tomorrow

## 2022-09-16 NOTE — CHART NOTE - NSCHARTNOTEFT_GEN_A_CORE
Pt seen for malnutrition follow up.    Medical Course:  - Per chart, pt is 57 year old female PMH renal stones presenting with gastric adenocarcinoma with mets to LN complicated by DEVI and nephrolithiasis. S/p J-tube placement with surgery (8/26). Social work and case management assisting as pt uninsured. Palliative following for pain management. Course complicated by L hydronephrosis, acute pulmonary emboli and DEVI. Urology, IR and Hepatology were consulted. Now with transaminitis undergoing ERCP tomorrow.    Nutrition Course:  - TwoCal HN visibly running at 25 mL/hr at time of visit. Pt reports improvement in abdominal pain, denies nausea/vomiting. Pt amenable to increase of EN to 27 mL/hr.   - Last BM 9/11 per flowshets; ordered for Miralax 17 gm qD PRN and bisacodyl suppository 10 mg qD PRN. Pt continues on Reglan.     Diet Prescription:   - NPO with Tube Feed: TwoCal HN via Jejunostomy at a goal rate of 25 mL/hr x24 hrs    Diet Prescription: Diet, NPO after Midnight:      NPO Start Date: 15-Sep-2022,   NPO Start Time: 23:59 (09-15-22 @ 17:42)  Diet, NPO with Tube Feed:   Tube Feeding Modality: Jejunostomy  TwoCal HN (TWOCALHNRTH)  Total Volume for 24 Hours (mL): 648  Continuous  Starting Tube Feed Rate {mL per Hour}: 27  Increase Tube Feed Rate by (mL): 0     Every 6 hours  Until Goal Tube Feed Rate (mL per Hour): 27  Tube Feed Duration (in Hours): 24  Tube Feed Start Time: 13:00 (09-12-22 @ 12:52)    Pertinent Medications: MEDICATIONS  (STANDING):  Biotene Dry Mouth Oral Rinse 5 milliLiter(s) Swish and Spit two times a day  lidocaine   4% Patch 1 Patch Transdermal daily  methadone   Solution 5 milliGRAM(s) Enteral Tube every 12 hours  metoclopramide   Syrup 5 milliGRAM(s) Oral every 4 hours  pantoprazole   Suspension 40 milliGRAM(s) Oral daily  piperacillin/tazobactam IVPB.. 3.375 Gram(s) IV Intermittent every 8 hours    MEDICATIONS  (PRN):  bisacodyl Suppository 10 milliGRAM(s) Rectal daily PRN Constipation  morphine   Solution 15 milliGRAM(s) Enteral Tube every 2 hours PRN Severe Pain (7 - 10)  polyethylene glycol 3350 17 Gram(s) Oral daily PRN Constipation    Pertinent Labs: 09-16 Na136 mmol/L Glu 104 mg/dL<H> K+ 4.0 mmol/L Cr  0.67 mg/dL BUN 12 mg/dL 09-16 Phos 3.5 mg/dL 09-16 Alb 2.7 g/dL<L>      Pertinent Medications:   - Biotene Dry Mouth Oral Rinse, metoclopramide Syrup, pantoprazole Suspension, piperacillin/tazobactam IV, bisacodyl Suppository PRN, polyethylene glycol PRN    Pertinent Labs:   - (9/12) Na 139 mmol/L Glu 123 mg/dL<H> K+ 3.9 mmol/L Cr 0.66 mg/dL BUN 11 mg/dL Phos 3.2 mg/dL Alb 2.8 g/dL<L>  - (8/15) HbA1c 5.8%    Weight: (9/12 obtained via bed scale at time of visit, unable to tare) 166.3 lbs / 75.6 kg, (9/9 obtained via bed scale at time of visit, unable to tare) 171.8 lbs / 78.1 kg, (8/26 dosing) 156.7 lbs / 71.1 kg, (8/15 dosing) 156.7 lbs / 71.1 kg  Weight Assessment: Appears pt with weight gain since admission, likely inaccurate as unable to obtain accurate weight via bed scale.  Height: 62 in / 157.5 cm  IBW: 110 lbs / 50 kg +/-10%  BMI: 28.7 kg/m^2 (at dosing weight)    Physical Assessment, per flowsheets:  Edema: 1+ bilateral ankle/bilateral foot  Pressure Injury: none noted    Estimated Needs:   [X] No changes since previous assessment, based on ideal body weight 110 lbs / 50 kg  2186-6728 kcal daily @30-35 kcal/kg, 60-75 gm protein daily @1.2-1.5 gm/kg     Previous Nutrition Diagnosis: [X] Severe malnutrition in the context of chronic illness, ongoing  New Nutrition Diagnosis: [X] not applicable     Interventions:   1) Recommend trial TwoCal HN via J-tube @27 mL/hr x24 hrs; continue to increase as tolerated to goal rate 35 mL/hr x24 hrs to provide 840 mL formula, 1680 kcal, 70.14 gm protein, 588 mL free water (meets 33.6 kcal/kg, 1.4 gm protein/kg).   2) Please obtain current weight via tared bed scale.  3) Monitor BMs, adjust bowel regimen as appropriate.  4) Monitor hydration, additional provision of free water per medical discretion.   5) Reglan per MD.  6) Continue to monitor electrolytes (K+, Mg, P) and replete to within desired limits as clinically indicated.     Monitor & Evaluate:  Tolerance to EN, nutrition related lab values, weight trends, BMs/GI distress, hydration status, skin integrity.  Jeanette Ramírez, MADDY, CDN #[id number]  Also available on [employer] Teams. Pt seen for malnutrition follow up.    Medical Course:  - Per chart, pt is 57 year old female PMH renal stones presenting with gastric adenocarcinoma with mets to LN complicated by DEVI and nephrolithiasis. S/p J-tube placement with surgery (8/26). Social work and case management assisting as pt uninsured. Palliative following for pain management. Course complicated by L hydronephrosis, acute pulmonary emboli and DEVI. Urology, IR, GI and Hepatology were consulted. Now with transaminitis pending ERCP.     Nutrition Course:  - EN rate was increased from 25 mL/hr to 27 mL/hr, pt tolerated well and does not wish to further increase. EN currently on hold for procedure.   - Last BM 9/12 per flowshets; ordered for Miralax 17 gm qD PRN and bisacodyl suppository 10 mg qD PRN. Pt continues on standing Reglan.     Diet Prescription:   - NPO with Tube Feed: TwoCal HN via Jejunostomy at a goal rate of 27 mL/hr x24 hrs  - NPO after Midnight: Start Date: 15-Sep-2022, Start Time: 23:59  - Provides: 648 mL formula, 1296 kcal, 54.108 gm protein, 453.6 mL free water    Pertinent Medications:   - Biotene Dry Mouth Oral Rinse, metoclopramide Syrup, pantoprazole Suspension, piperacillin/tazobactam IV, bisacodyl Suppository PRN, polyethylene glycol PRN    Pertinent Labs:   - (9/16) Na 136 mmol/L Glu 104 mg/dL<H> K+ 4.0 mmol/L Cr 0.67 mg/dL BUN 12 mg/dL Phos 3.5 mg/dL Alb 2.7 g/dL<L>  - (8/15) HbA1c 5.8%    Weight: (9/12 obtained via bed scale at time of visit, unable to tare) 166.3 lbs / 75.6 kg, (9/9 obtained via bed scale at time of visit, unable to tare) 171.8 lbs / 78.1 kg, (8/26 dosing) 156.7 lbs / 71.1 kg, (8/15 dosing) 156.7 lbs / 71.1 kg  Weight Assessment: Appears pt with weight gain since admission, likely inaccurate as unable to obtain accurate weight via bed scale.  Height: 62 in / 157.5 cm  IBW: 110 lbs / 50 kg +/-10%  BMI: 28.7 kg/m^2 (at dosing weight)    Physical Assessment, per flowsheets:  Edema/Pressure Injury: none noted    Estimated Needs:   [X] No changes since previous assessment, based on ideal body weight 110 lbs / 50 kg  2996-4461 kcal daily @30-35 kcal/kg, 60-75 gm protein daily @1.2-1.5 gm/kg     Previous Nutrition Diagnosis: [X] Severe malnutrition in the context of chronic illness, ongoing  New Nutrition Diagnosis: [X] not applicable     Interventions:   1) Recommend continue TwoCal HN via J-tube @27 mL/hr x24 hrs; continue to increase as tolerated to goal rate 35 mL/hr x24 hrs to provide 840 mL formula, 1680 kcal, 70.14 gm protein, 588 mL free water (meets 33.6 kcal/kg, 1.4 gm protein/kg).   2) Please obtain current weight via tared bed scale.  3) Monitor BMs, adjust bowel regimen as appropriate.  4) Monitor hydration, additional provision of free water per medical discretion.   5) Reglan per MD.  6) Continue to monitor electrolytes (K+, Mg, P) and replete to within desired limits as clinically indicated.     Monitor & Evaluate:  Tolerance to EN, nutrition related lab values, weight trends, BMs/GI distress, hydration status, skin integrity.  Jeanette Ramírez, MADDY, CDN #[id number]  Also available on [employer] Teams.

## 2022-09-16 NOTE — PROGRESS NOTE ADULT - ATTENDING COMMENTS
57F hx of nephrolithiasis who presents with abd pain and found to have metastatic gastric CA to lymph nodes, course c/b DEVI (now resolved).  Course c/b poor PO tolerance , requiring J tube insertion and new PEs now on therapeutic Lovenox. Rising transaminases, which on initial CT imaging was suggestive of CBD dilation and possible cholangitis. MRCP was done and showed possible cholangitis and CBD stricture.      #Transaminitis  #Possible cholangitis and CBD stricture  - Hep panel from prior admission negative, repeat panel sent and negative for acute infection   - AMA, Smooth muscle ab negative low suspicion for autoimmune hepatitis, other autoimmune serology pending   - MRCP reviewed and appreciate hepatology/advanced GI evaluation. Plan for ERCP today    - Pt has been on empiric IV Zosyn, continue  - Imaging showing no evidence of metastatic involvement of liver     #Metastatic gastric adenoCA to lymph nodes:  - Oncology following, no plans for inpatient chemo, pt has f/u appt scheduled for 9/19, may need to reschedule appt if discharge is delayed     #Severe protein calorie malnutrition s/p J tube insertion:  - Rate increased to 27cc/hr, currently on hold for procedure  - CM has been able to arrange for lower pricing of tube feeds and pump and family has agreed to pay for costs     #Hypercoagulable state with new PEs:   - Remains on therapeutic Lovenox, priced out 1$/month which pt can afford    #Neoplasm related pain:  - Pain better controlled on current regimen    #Dispo: awaiting final GI recs given rising transaminases  Family meeting held with pt's family 9/14, all on board with plan.

## 2022-09-16 NOTE — PROGRESS NOTE ADULT - NUTRITIONAL ASSESSMENT
This patient has been assessed with a concern for Malnutrition and has been determined to have a diagnosis/diagnoses of Severe protein-calorie malnutrition.    This patient is being managed with:   Diet NPO after Midnight-     NPO Start Date: 15-Sep-2022   NPO Start Time: 23:59  Entered: Sep 15 2022  5:40PM    Diet NPO with Tube Feed-  Tube Feeding Modality: Jejunostomy  TwoCal HN (TWOCALHNRTH)  Total Volume for 24 Hours (mL): 648  Continuous  Starting Tube Feed Rate {mL per Hour}: 27  Increase Tube Feed Rate by (mL): 0     Every 6 hours  Until Goal Tube Feed Rate (mL per Hour): 27  Tube Feed Duration (in Hours): 24  Tube Feed Start Time: 13:00  Entered: Sep 12 2022 12:53PM

## 2022-09-16 NOTE — PROGRESS NOTE ADULT - PROBLEM SELECTOR PLAN 1
LFTs are elevating without a clear source; no evidence of mets to liver  RUQ U/S results are equivocal; shows some mild bile duct dilatation similar to CT on 9/9; new from CT on 8/14  CT shows no mets to the liver at the moment    Hepatitis labs from June and July 2022 show no signs of HepB/C infection  Appreciated Heme/Onc Recs    Collect autoimmune/hepatits labs  MRCP showed Stricture of CBD  Consulted Advanced Endoscopy; ERCP Tomorrow  Onc Weighed in; said elevated transaminitis and new elevated T Bili could interfere with chemo  Appreciate Hepatology recs LFTs are elevating without a clear source; no evidence of mets to liver  RUQ U/S results are equivocal; shows some mild bile duct dilatation similar to CT on 9/9; new from CT on 8/14  CT shows no mets to the liver at the moment    Hepatitis labs from June and July 2022 show no signs of HepB/C infection  Appreciated Heme/Onc Recs    MRCP showed Stricture of CBD  Consulted Advanced Endoscopy; ERCP today  Onc Weighed in; said elevated transaminitis and new elevated T Bili could interfere with chemo  Appreciate Hepatology recs

## 2022-09-16 NOTE — PROGRESS NOTE ADULT - ASSESSMENT
57F PMH of renal stones p/w nonresectable gastric adenocarcinoma w/ mets to LN c/b DEVI, now resolved, and nephrolithiasis with L hydronephrosis currently S/p j tube placement, with stable tube feed and stable pain regiment. Now with transaminitis undergoing ERCP tomorrow. 57F PMH of renal stones p/w nonresectable gastric adenocarcinoma w/ mets to LN c/b DEVI, now resolved, and nephrolithiasis with L hydronephrosis currently S/p j tube placement, with stable tube feed and stable pain regiment with course c/b transaminitis and elevated bilirubin will undergo ERCP today.

## 2022-09-16 NOTE — PROGRESS NOTE ADULT - PROBLEM SELECTOR PLAN 3
Impression: The patient's abdominal pain, persistent nausea/vomiting and weight loss iso of CT A/P findings indicating gastric body thickening and gastrocolic ligament nodularity are concerning for gastric adenocarcinoma. EGD biopsy confirmed.  - GI, onc, surg/onc following  - EGD:  Infiltrative changes in gastric body concerning gastric malignancy, Congested duodenal mucosa, biopsy = poorly differentiated adenocarcinoma  - staging CT chest - Mediastinal/bilateral hilar/R internal mammary LAD  - mediastinal lymph node biopsy results - Metastatic adenocarcinoma   - pantoprazole  40 mg BID  - OR 8/26 with J tube placement; no further change in management    pain Control; Palliative will attempt to transition to morphine for cost control  -Morphine 15mg q2h for severe Pain 7-10  -Methadone 5mg q12h standing    PRICING UPDATES:  Lovenox: $1/month covered under [facility] medicaid  Tube Feeds and pump: ~$120/month  Methadone: 36$/month  Reglan: $60/month  Morphine: $1 COVERED    Family meeting today for management and goals Impression: The patient's abdominal pain, persistent nausea/vomiting and weight loss iso of CT A/P findings indicating gastric body thickening and gastrocolic ligament nodularity are concerning for gastric adenocarcinoma. EGD biopsy confirmed.  - GI, onc, surg/onc following  - EGD:  Infiltrative changes in gastric body concerning gastric malignancy, Congested duodenal mucosa, biopsy = poorly differentiated adenocarcinoma  - staging CT chest - Mediastinal/bilateral hilar/R internal mammary LAD  - mediastinal lymph node biopsy results - Metastatic adenocarcinoma   - pantoprazole  40 mg BID  - OR 8/26 with J tube placement; no further change in management    pain Control; Palliative will attempt to transition to morphine for cost control  -Morphine 15mg q2h for severe Pain 7-10  -Methadone 5mg q12h standing    PRICING UPDATES:  Lovenox: $1/month covered under [facility] medicaid  Tube Feeds and pump: ~$120/month  Methadone: 36$/month  Reglan: $60/month  Morphine: $1 COVERED    Family meeting held, everyone understands steps moving forward

## 2022-09-16 NOTE — PROGRESS NOTE ADULT - PROBLEM SELECTOR PLAN 4
b/l PEs seen on CT 9/9. Denies pleuritic chest pain, SOB. Stable on RA  Cont with lovenox 70 BID    Restart Lovenox Tomorrow b/l PEs seen on CT 9/9. Denies pleuritic chest pain, SOB. Stable on RA  Cont with lovenox 70 BID    Restart Lovenox after ERCP

## 2022-09-16 NOTE — PROGRESS NOTE ADULT - ATTENDING COMMENTS
57 year old female with newly diagnosed metastatic gastric adenocarcinoma who remains hospitalized secondary to recent fever with CTA chest and CT A/P 9/9/22 concerning for b/l PE as well as progression of mediastinal and b/l hilar lymphadenopathy, enhancement of CBD concerning for possible ?cholangitis, thus planned for ERCP today.  Patient evaluated at bedside, she appears to be clinically improving although with fatigue. Plan to begin cancer directed therapy as an outpatient, has appt at [facility] on discharge.

## 2022-09-16 NOTE — PROGRESS NOTE ADULT - ASSESSMENT
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 Hospital course complicated by acute pulmonary emboli and DEVI. Hepatology consulted for rising liver enzymes.     Impression:  #Elevated liver enzymes: rising cholestatic pattern of liver injury since 9/8 (Tbili peaked at 1.4 on 9/8 now downtrending to 0.7, ALP rising to 440, AST 30 --> 190s, ALT 22 --> 168). CT A/P (9/9/22): enhancement of the common bile duct worrisome for cholangitis given the above history (no reports of hepatic mets). MR abd/MRCP (9/14/22: "a smooth stricture of mid to distal CBD with findings suspicious for cholangitis. Abnormal signal and delayed enhancement in the left hepatic and caudate lobes along the biliary radicles. This can be infectious/inflammatory,  although infiltrative neoplasm like cholangiocarcinoma is also possible." Ddx may be 2/2 biliary obstruction in setting of CBD stricture (low clinical c/f cholangitis at this time considering afebrile, w/o leukocytosis) vs ?cholestasis of sepsis (no infectious source at this time) vs ?DILI (?zosyn)   Work up: negative acute hep A/B/C, CMV, HSV, AMA, ASMA, EBV IgM. Elevated IgG, IgA.     Recommendations:   - Discussed case with advanced GI team; at this time unclear if significant utility regarding ERCP / stenting of CBD stricture. Advanced GI to follow  - Obtain Hep E IgM  - Obtain LKM Ab r/o autoimmune disease  - Trend CMP, CBC, PT/INR daily     Please note that the recommendations are not final until attested by an attending.    Thank you for involving us in the care of this patient. Please reach out if any further questions.    Andrey Barajas, PGY-6  Gastroenterology/Hepatology Fellow    Available on [employer] Teams  After 5PM/Weekends, please contact the on-call GI fellow: [phone]       57F Hx nephrolithiasis who p/w progressive abdominal pain, N/V, and weight loss, found to have poorly differentiated metastatic gastric adenocarcinoma to LNs s/p J-tube placement (8/26/22) for enteral nutrition. Hospital course complicated by acute pulmonary emboli and DEVI. Hepatology consulted for rising liver enzymes.     Impression:  #Elevated liver enzymes: rising cholestatic pattern of liver injury since 9/8 (Tbili peaked at 1.4 on 9/8 now downtrending to 0.7, ALP rising to 440, AST 30 --> 190s, ALT 22 --> 168). CT A/P (9/9/22): enhancement of the common bile duct worrisome for cholangitis given the above history (no reports of hepatic mets). MR abd/MRCP (9/14/22: "a smooth stricture of mid to distal CBD with findings suspicious for cholangitis. Abnormal signal and delayed enhancement in the left hepatic and caudate lobes along the biliary radicles. This can be infectious/inflammatory,  although infiltrative neoplasm like cholangiocarcinoma is also possible." Ddx may be 2/2 biliary obstruction in setting of CBD stricture (low clinical c/f cholangitis at this time considering afebrile, w/o leukocytosis) vs ?cholestasis of sepsis (no infectious source at this time) vs ?DILI (?zosyn)   Work up: negative acute hep A/B/C, CMV, HSV, AMA, ASMA, EBV IgM. Elevated IgG, IgA.     Recommendations:   - Discussed case with advanced GI team; plan for ERCP, timing per advanced GI  - Obtain Hep E IgM  - Obtain LKM Ab r/o autoimmune disease  - Trend CMP, CBC, PT/INR daily   - Hepatology to sign off, advanced GI will continue to follow    Please note that the recommendations are not final until attested by an attending.    Thank you for involving us in the care of this patient. Please reach out if any further questions.    Andrey Barajas, PGY-6  Gastroenterology/Hepatology Fellow    Available on [employer] Teams  After 5PM/Weekends, please contact the on-call GI fellow: [phone]

## 2022-09-16 NOTE — PROGRESS NOTE ADULT - PROBLEM SELECTOR PLAN 2
Urology/IR believe there is no need for urgent intervention  Will reconsult if there appears to be signs of UTI    ppx abx course finished

## 2022-09-16 NOTE — PROGRESS NOTE ADULT - SUBJECTIVE AND OBJECTIVE BOX
INCOMPLETE NOTE    Armen Gill | PGY1| Pager: [phone]  Interval Events:    REVIEW OF SYSTEMS:  CONSTITUTIONAL: No weakness, fevers or chills  EYES/ENT: No visual changes;  No vertigo or throat pain   NECK: No pain or stiffness  RESPIRATORY: No cough, wheezing, hemoptysis; No shortness of breath  CARDIOVASCULAR: No chest pain or palpitations  GASTROINTESTINAL: No abdominal or epigastric pain. No nausea, vomiting, or hematemesis; No diarrhea or constipation. No melena or hematochezia.  GENITOURINARY: No dysuria, frequency or hematuria  NEUROLOGICAL: No numbness or weakness  SKIN: No itching, burning, rashes, or lesions   All other review of systems is negative unless indicated above.    OBJECTIVE:  ICU Vital Signs Last 24 Hrs  T(C): 37.1 (16 Sep 2022 05:58), Max: 37.1 (16 Sep 2022 05:58)  T(F): 98.7 (16 Sep 2022 05:58), Max: 98.7 (16 Sep 2022 05:58)  HR: 94 (16 Sep 2022 05:58) (94 - 100)  BP: 132/88 (16 Sep 2022 05:58) (123/79 - 140/82)  BP(mean): --  ABP: --  ABP(mean): --  RR: 18 (16 Sep 2022 05:58) (18 - 18)  SpO2: 98% (16 Sep 2022 05:58) (97% - 98%)    O2 Parameters below as of 16 Sep 2022 05:58  Patient On (Oxygen Delivery Method): room air              09-14 @ 07:01  -  09-15 @ 07:00  --------------------------------------------------------  IN: 393 mL / OUT: 400 mL / NET: -7 mL    09-15 @ 07:01 - 09-16 @ 06:08  --------------------------------------------------------  IN: 324 mL / OUT: 0 mL / NET: 324 mL      CAPILLARY BLOOD GLUCOSE          PHYSICAL EXAM:  General: WN/WD NAD  Neurology: A&Ox3, nonfocal, LEY x 4  Eyes: PERRLA/ EOMI, Gross vision intact  ENT/Neck: Neck supple, trachea midline, No JVD, Gross hearing intact  Respiratory: CTA B/L, No wheezing, rales, rhonchi  CV: RRR, +S1/S2, -S3/S4, no murmurs, rubs or gallops  Abdominal: Soft, NT, ND +BS, No HSM  MSK: 5/5 strength UE/LE bilaterally  Extremities: No edema, 2+ peripheral pulses  Skin: No Rashes, Hematoma, Ecchymosis  Incisions:   Tubes:    HOSPITAL MEDICATIONS:  MEDICATIONS  (STANDING):  Biotene Dry Mouth Oral Rinse 5 milliLiter(s) Swish and Spit two times a day  lidocaine   4% Patch 1 Patch Transdermal daily  methadone   Solution 5 milliGRAM(s) Enteral Tube every 12 hours  metoclopramide   Syrup 5 milliGRAM(s) Oral every 4 hours  pantoprazole   Suspension 40 milliGRAM(s) Oral daily  piperacillin/tazobactam IVPB.. 3.375 Gram(s) IV Intermittent every 8 hours    MEDICATIONS  (PRN):  bisacodyl Suppository 10 milliGRAM(s) Rectal daily PRN Constipation  morphine   Solution 15 milliGRAM(s) Enteral Tube every 2 hours PRN Severe Pain (7 - 10)  polyethylene glycol 3350 17 Gram(s) Oral daily PRN Constipation      LABS:                        10.2   10.04 )-----------( 335      ( 16 Sep 2022 03:12 )             33.8     Hgb Trend: 10.2<--, 10.0<--, 9.8<--, 10.0<--, 10.0<--  09-16    136  |  102  |  12  ----------------------------<  104<H>  4.0   |  25  |  0.67    Ca    8.3<L>      16 Sep 2022 03:12  Phos  3.5     09-16  Mg     2.10     09-16    TPro  6.8  /  Alb  2.7<L>  /  TBili  1.8<H>  /  DBili  x   /  AST  283<H>  /  ALT  242<H>  /  AlkPhos  545<H>  09-16    Creatinine Trend: 0.67<--, 0.65<--, 0.68<--, 0.62<--, 0.66<--, 0.63<--  PT/INR - ( 16 Sep 2022 03:12 )   PT: 12.0 sec;   INR: 1.03 ratio                   MICROBIOLOGY:      Armen Gill | PGY1| Pager: [phone]  Interval Events: No acute events overnight; tube feeds stopped iso ERCP this AM    REVIEW OF SYSTEMS: Denied any acute events overnight  CONSTITUTIONAL: No weakness, fevers or chills  RESPIRATORY: No cough, wheezing, hemoptysis; No shortness of breath  CARDIOVASCULAR: No chest pain or palpitations; continued swelling in LLE  GASTROINTESTINAL: Reported continued improvement in epigastric pain and back pain  GENITOURINARY: No dysuria, frequency or hematuria  All other review of systems is negative unless indicated above.    OBJECTIVE:  ICU Vital Signs Last 24 Hrs  T(C): 37.1 (16 Sep 2022 05:58), Max: 37.1 (16 Sep 2022 05:58)  T(F): 98.7 (16 Sep 2022 05:58), Max: 98.7 (16 Sep 2022 05:58)  HR: 94 (16 Sep 2022 05:58) (94 - 100)  BP: 132/88 (16 Sep 2022 05:58) (123/79 - 140/82)  BP(mean): --  ABP: --  ABP(mean): --  RR: 18 (16 Sep 2022 05:58) (18 - 18)  SpO2: 98% (16 Sep 2022 05:58) (97% - 98%)    O2 Parameters below as of 16 Sep 2022 05:58  Patient On (Oxygen Delivery Method): room air              09-14 @ 07:01  -  09-15 @ 07:00  --------------------------------------------------------  IN: 393 mL / OUT: 400 mL / NET: -7 mL    09-15 @ 07:01  -  09-16 @ 06:08  --------------------------------------------------------  IN: 324 mL / OUT: 0 mL / NET: 324 mL      CAPILLARY BLOOD GLUCOSE      PHYSICAL EXAM:  General: NAD  Neurology: A&Ox3, LEY x 4  Eyes: PERRLA, Gross vision intact  ENT/Neck: Neck supple, trachea midline, Gross hearing intact  Respiratory: CTA B/L,   CV: RRR, +S1/S2, -S3/S4, no murmurs, rubs or gallops  Abdominal: mildly distended, J tube site intact, no significant fluid appreciated  Extremities: 2+ edema in LLE to ankles, No edema in RLE 2+ peripheral pulses    Tubes: J Tube site is non-erythematous    HOSPITAL MEDICATIONS:  MEDICATIONS  (STANDING):  Biotene Dry Mouth Oral Rinse 5 milliLiter(s) Swish and Spit two times a day  lidocaine   4% Patch 1 Patch Transdermal daily  methadone   Solution 5 milliGRAM(s) Enteral Tube every 12 hours  metoclopramide   Syrup 5 milliGRAM(s) Oral every 4 hours  pantoprazole   Suspension 40 milliGRAM(s) Oral daily  piperacillin/tazobactam IVPB.. 3.375 Gram(s) IV Intermittent every 8 hours  PHYSICAL EXAM:  General: NAD  Neurology: A&Ox3, LEY x 4  Eyes: PERRLA, Gross vision intact  ENT/Neck: Neck supple, trachea midline, Gross hearing intact  Respiratory: CTA B/L,   CV: RRR, +S1/S2, -S3/S4, no murmurs, rubs or gallops  Abdominal: mildly distended, J tube site intact, no significant fluid appreciated  Extremities: 2+ edema in LLE to ankles, No edema in RLE 2+ peripheral pulses    Tubes: J Tube site is non-erythematous  MEDICATIONS  (PRN):  bisacodyl Suppository 10 milliGRAM(s) Rectal daily PRN Constipation  morphine   Solution 15 milliGRAM(s) Enteral Tube every 2 hours PRN Severe Pain (7 - 10)  polyethylene glycol 3350 17 Gram(s) Oral daily PRN Constipation      LABS:                        10.2   10.04 )-----------( 335      ( 16 Sep 2022 03:12 )             33.8     Hgb Trend: 10.2<--, 10.0<--, 9.8<--, 10.0<--, 10.0<--  09-16    136  |  102  |  12  ----------------------------<  104<H>  4.0   |  25  |  0.67    Ca    8.3<L>      16 Sep 2022 03:12  Phos  3.5     09-16  Mg     2.10     09-16    TPro  6.8  /  Alb  2.7<L>  /  TBili  1.8<H>  /  DBili  x   /  AST  283<H>  /  ALT  242<H>  /  AlkPhos  545<H>  09-16    Creatinine Trend: 0.67<--, 0.65<--, 0.68<--, 0.62<--, 0.66<--, 0.63<--  PT/INR - ( 16 Sep 2022 03:12 )   PT: 12.0 sec;   INR: 1.03 ratio                   MICROBIOLOGY:

## 2022-09-16 NOTE — PROGRESS NOTE ADULT - ATTENDING COMMENTS
Patient with widespread gastric carcinoma. MRI/MRCP shows enhancing collar around bile duct with narrow but not obstructed CBD. the left lobe of the liver is slightly heterogeneous compared to right.  The biliary abnormality is concerning for hypervascularity caused by cancer ( gastric cancer) or possibly bacterial cholangitis , but antibiotics have not resolved liver abnormalities. To define biliary pathology, ERCP with brush/ biopsy could clarify etiology. Patient will be followed by advanced endoscopy to evaluate these issues.

## 2022-09-16 NOTE — PROGRESS NOTE ADULT - ASSESSMENT
58 yo F with a PMH of nephrolithiasis who p/w progressive abdominal pain, N/V, and weight loss, found to have poorly differentiated metastatic gastric adenocarcinoma, course complicated by acute pulmonary emboli, fever, transaminitis, and r/o cholangitis.    #Metastatic Gastric Adenocarcinoma  - Presented with abdominal pain, N/V, and weight loss  - Admission CT C/A/P shows abdominal and thoracic (subdiaphragmatic, mediastinal, subcarinal and bilateral hilar) LAD, 3 mm RML nodule, and gastric body thickening and gastrocolic ligament nodularity  - CEA elevated (1544);  mildly elevated, CA 19-9 normal  - S/p EUS by GI on 8/16 showing infiltrative gastric body changes w/pathology showing poorly differentiated gastric adenocarcinoma (+AE1/3, CK7, CK20, CDX2, neg for ER), MS-stable  - S/p EBUS with level 7 mediastinal LN bx on 8/23 confirming metastasis of gastric adenocarcinoma, and ascitic fluid (trace ascites) also confirming disease there with signet-ring cells  - S/p J-tube placement by surgery; 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 Tube feeds per primary team/surgery  - C/w PPI BID  - CTA chest and CT A/P 9/9/22 concerning for b/l PE as well as progression of mediastinal and b/l hilar lymphadenopathy, enhancement of CBD concerning for possible ?cholangitis, also with left hydroureteronephrosis likely sequela of neoplasm/metastatic disease, small to moderate ascites progressed since August  - Remains on broad spectrum abx; R/o cholangitis, but suspect fevers due to PE +/- malignancy  - Antibiotic use and discharge as per primary team  - Appreciate Palliative Care and Primary team optimizing pain regimen, initiating methadone, and adjusting PRN pain medications as needed to adequately control pain until methadone reaches steady state  - No plan for inpatient chemotherapy at this time. If patient is discharged this weekend, she will f/u with Dr. Hernandez on 9/19 at 8:30AM at [facility]  - HER2 2+ (equivocal) with RIAN pending, but does not need to prevent discharge    #Transaminitis  - Unclear etiology  - With mild CBD dilation  - No obvious hepatic metastatic lesions  - Abnormal T2 signaling in L hepatic lobe, unclear if due to metastatic cancer vs infectious/inflammatory  - Hepatitis panel and autoimmune panel negative. Appreciate hepatology recs  - Marked transaminitis could potentially affect some treatments such as Docetaxel (would not give if AP/AST/ALT > 5x ULN) or severe hepatic impairment (5-FU)  - F/u ERCP 9/16    #Bilateral Pulmonary Emboli  - CTA chest 9/9/22 showed bilateral pulmonary emboli  - Increased Lovenox dose from PPX dose to 1 mg/kg BID (not considered a failure of Lovenox given she was on a prophylactic dose)      Aryles Hedjar, MD, PGY-5  Hematology/Oncology Fellow  [facility]  Pager: [phone]  After 5PM and on weekends and holidays, please call the inpatient fellow on call.

## 2022-09-16 NOTE — PROGRESS NOTE ADULT - SUBJECTIVE AND OBJECTIVE BOX
Interval Events:   - No acute events  - Liver tests uptrending    Allergies:  No Known Allergies        Hospital Medications:  Biotene Dry Mouth Oral Rinse 5 milliLiter(s) Swish and Spit two times a day  bisacodyl Suppository 10 milliGRAM(s) Rectal daily PRN  lidocaine   4% Patch 1 Patch Transdermal daily  methadone   Solution 5 milliGRAM(s) Enteral Tube every 12 hours  metoclopramide   Syrup 5 milliGRAM(s) Oral every 4 hours  morphine   Solution 15 milliGRAM(s) Enteral Tube every 2 hours PRN  pantoprazole   Suspension 40 milliGRAM(s) Oral daily  piperacillin/tazobactam IVPB.. 3.375 Gram(s) IV Intermittent every 8 hours  polyethylene glycol 3350 17 Gram(s) Oral daily PRN      PMHX/PSHX:  Nephrolithiasis        Family history:      ROS: As per HPI, otherwise 14-point ROS reviewed and negative.      PHYSICAL EXAM:   Vital Signs:  Vital Signs Last 24 Hrs  T(C): 37.1 (16 Sep 2022 05:58), Max: 37.1 (16 Sep 2022 05:58)  T(F): 98.7 (16 Sep 2022 05:58), Max: 98.7 (16 Sep 2022 05:58)  HR: 94 (16 Sep 2022 05:58) (94 - 100)  BP: 132/88 (16 Sep 2022 05:58) (123/79 - 140/82)  BP(mean): --  RR: 18 (16 Sep 2022 05:58) (18 - 18)  SpO2: 98% (16 Sep 2022 05:58) (97% - 98%)    Parameters below as of 16 Sep 2022 05:58  Patient On (Oxygen Delivery Method): room air      Daily     Daily       09-15-22 @ 07:01  -  09-16-22 @ 07:00  --------------------------------------------------------  IN: 324 mL / OUT: 0 mL / NET: 324 mL        GENERAL:  No acute distress  HEENT:  Normocephalic/atraumatic, no scleral icterus  CHEST: intermittent accessory muscle use during interview   HEART:  Regular rate and rhythm, no murmurs/rubs/gallops  ABDOMEN:  Soft, non-tender, +mildly distended, +J tube w/ TF   EXTREMITIES: No cyanosis, clubbing, or edema  SKIN:  No rash/warm/dry  NEURO:  Alert and oriented x 3      LABS:                        10.2   10.04 )-----------( 335      ( 16 Sep 2022 03:12 )             33.8     Mean Cell Volume: 80.1 fL (09-16-22 @ 03:12)    09-16    136  |  102  |  12  ----------------------------<  104<H>  4.0   |  25  |  0.67    Ca    8.3<L>      16 Sep 2022 03:12  Phos  3.5     09-16  Mg     2.10     09-16    TPro  6.8  /  Alb  2.7<L>  /  TBili  1.8<H>  /  DBili  x   /  AST  283<H>  /  ALT  242<H>  /  AlkPhos  545<H>  09-16    LIVER FUNCTIONS - ( 16 Sep 2022 03:12 )  Alb: 2.7 g/dL / Pro: 6.8 g/dL / ALK PHOS: 545 U/L / ALT: 242 U/L / AST: 283 U/L / GGT: x           PT/INR - ( 16 Sep 2022 03:12 )   PT: 12.0 sec;   INR: 1.03 ratio                                     10.2   10.04 )-----------( 335      ( 16 Sep 2022 03:12 )             33.8                         10.0   9.88  )-----------( 354      ( 15 Sep 2022 07:30 )             33.6                         9.8    9.54  )-----------( 344      ( 14 Sep 2022 06:00 )             32.7       Imaging:

## 2022-09-16 NOTE — PROGRESS NOTE ADULT - SUBJECTIVE AND OBJECTIVE BOX
INTERVAL HPI/OVERNIGHT EVENTS:  Patient S&E at bedside. She is having epigastric pain but it is controlled. She is having small BMs. She denies F/C, CP, SOB, N/V, rash, or edema      VITAL SIGNS:  T(F): 98.4 (09-16-22 @ 16:52)  HR: 107 (09-16-22 @ 16:52)  BP: 128/98 (09-16-22 @ 16:52)  RR: 24 (09-16-22 @ 16:52)  SpO2: 96% (09-16-22 @ 16:52)  Wt(kg): --    PHYSICAL EXAM:    Constitutional: NAD  Eyes: EOMI, sclera non-icteric  Neck: supple  Respiratory: no increased WOB, CTAB/L  Cardiovascular: RRR, S1, S2, no m/g/r  Gastrointestinal: soft, + epigastric pain, slightly distended, no masses palpable, no HSM  Extremities: no c/c/e  Neurological: AAOx3    MEDICATIONS  (STANDING):  Biotene Dry Mouth Oral Rinse 5 milliLiter(s) Swish and Spit two times a day  lidocaine   4% Patch 1 Patch Transdermal daily  methadone   Solution 5 milliGRAM(s) Enteral Tube every 12 hours  metoclopramide   Syrup 5 milliGRAM(s) Oral every 4 hours  pantoprazole   Suspension 40 milliGRAM(s) Oral daily  piperacillin/tazobactam IVPB.. 3.375 Gram(s) IV Intermittent every 8 hours    MEDICATIONS  (PRN):  bisacodyl Suppository 10 milliGRAM(s) Rectal daily PRN Constipation  morphine   Solution 15 milliGRAM(s) Enteral Tube every 2 hours PRN Severe Pain (7 - 10)  polyethylene glycol 3350 17 Gram(s) Oral daily PRN Constipation      LABS:                        10.2   10.04 )-----------( 335      ( 16 Sep 2022 03:12 )             33.8     09-16    136  |  102  |  12  ----------------------------<  104<H>  4.0   |  25  |  0.67    Ca    8.3<L>      16 Sep 2022 03:12  Phos  3.5     09-16  Mg     2.10     09-16    TPro  6.8  /  Alb  2.7<L>  /  TBili  1.8<H>  /  DBili  x   /  AST  283<H>  /  ALT  242<H>  /  AlkPhos  545<H>  09-16    PT/INR - ( 16 Sep 2022 03:12 )   PT: 12.0 sec;   INR: 1.03 ratio               RADIOLOGY & ADDITIONAL TESTS:

## 2022-09-17 NOTE — PROGRESS NOTE ADULT - PROBLEM SELECTOR PLAN 4
b/l PEs seen on CT 9/9. Denies pleuritic chest pain, SOB. Stable on RA  Cont with lovenox 70 BID    Restart Lovenox after ERCP

## 2022-09-17 NOTE — CONSULT NOTE ADULT - ASSESSMENT
-----------------------------------------------------------  Interventional Radiology Brief Consult Note  -----------------------------------------------------------    Reason for Referral: Percutaneous biliary tube placement     Clinical Summary: 57y Female with pmh of nephrolithiasis and progressive abdominal pain, N/V and weight loss found to have a poorly differentiated metastatic gastric adenocarcinoma. Patient was found to have elevated liver enzymes with cbd stenosis status post unsuccessful ERCP attempt on 9/16 due to duodenal stenosis. IR is consulted for PBC placement.     Vitals:  T(F): 98.2 (09-17-22 @ 08:50), Max: 98.6 (09-16-22 @ 18:57)  HR: 89 (09-17-22 @ 08:50) (89 - 107)  BP: 138/80 (09-17-22 @ 08:50) (122/82 - 138/80)  RR: 18 (09-17-22 @ 08:50) (16 - 24)  SpO2: 100% (09-17-22 @ 08:50) (95% - 100%)    Labs:           10.2  10.86)-----(381     (09-17-22 @ 08:43)         32.8     133 | 98 | 12  --------------------< 110     (09-17-22 @ 08:43)  4.5 | 22 | 0.66       PT: 12.0 09-17-22 @ 08:43  aPTT: -- 09-17-22 @ 08:43   INR: 1.03 09-17-22 @ 08:43    Imaging: Reviewed.     Assessment: 57y Female with metastatic gastric carcinoma found to have CBD stenosis. IR is consulted for PBCT placement.     Recommendations:  - Case and imaging was reviewed. No biliary ductal dilatation om imaging with tbili below 2 at this time. No indication for PBCT placement at this time.     --  Lynn Alarcon MD  Interventional Radiology Resident (PGY-4)  Available on [employer] TEAMS    For EMERGENT inquiries/questions:  IR Pager ([facility]): [phone]  IR Pager ([facility]): [phone] ; [phone]    For non-emergent consults/questions:   Please place a sunrise order "Consult- Interventional Radiology" with an appropriate callback number    For questions about scheduling during appropriate work hours, call IR :  [facility]: [phone]  [facility]: [phone]    For outpatient IR booking:  [facility]: [phone]  [facility]: [phone]     -----------------------------------------------------------  Interventional Radiology Brief Consult Note  -----------------------------------------------------------    Reason for Referral: Percutaneous biliary tube placement     Clinical Summary: 57y Female with pmh of nephrolithiasis and progressive abdominal pain, N/V and weight loss found to have a poorly differentiated metastatic gastric adenocarcinoma. Patient was found to have elevated liver enzymes with cbd stenosis status post unsuccessful ERCP attempt on 9/16 due to duodenal stenosis. IR is consulted for PBC placement.     Vitals:  T(F): 98.2 (09-17-22 @ 08:50), Max: 98.6 (09-16-22 @ 18:57)  HR: 89 (09-17-22 @ 08:50) (89 - 107)  BP: 138/80 (09-17-22 @ 08:50) (122/82 - 138/80)  RR: 18 (09-17-22 @ 08:50) (16 - 24)  SpO2: 100% (09-17-22 @ 08:50) (95% - 100%)    Labs:           10.2  10.86)-----(381     (09-17-22 @ 08:43)         32.8     133 | 98 | 12  --------------------< 110     (09-17-22 @ 08:43)  4.5 | 22 | 0.66       PT: 12.0 09-17-22 @ 08:43  aPTT: -- 09-17-22 @ 08:43   INR: 1.03 09-17-22 @ 08:43    Imaging: Reviewed.     Assessment: 57y Female with metastatic gastric carcinoma found to have CBD stenosis. IR is consulted for PBCT placement.     Recommendations:  - Case and imaging was reviewed. No intrahepatic biliary ductal dilatation on imaging with Tbili below 1.6 trending down. No indication for percutaneous biliary drainage  placement at this time.     --  Lynn Alarcon MD  Interventional Radiology Resident (PGY-4)  Available on [employer] TEAMS    For EMERGENT inquiries/questions:  IR Pager ([facility]): [phone]  IR Pager ([facility]): [phone] ; [id number]    For non-emergent consults/questions:   Please place a sunrise order "Consult- Interventional Radiology" with an appropriate callback number    For questions about scheduling during appropriate work hours, call IR :  [facility]: [phone]  [facility]: [phone]    For outpatient IR booking:  [facility]: [phone]  [facility]: [phone]

## 2022-09-17 NOTE — PROGRESS NOTE ADULT - PROBLEM SELECTOR PLAN 3
Impression: The patient's abdominal pain, persistent nausea/vomiting and weight loss iso of CT A/P findings indicating gastric body thickening and gastrocolic ligament nodularity are concerning for gastric adenocarcinoma. EGD biopsy confirmed.  - GI, onc, surg/onc following  - EGD:  Infiltrative changes in gastric body concerning gastric malignancy, Congested duodenal mucosa, biopsy = poorly differentiated adenocarcinoma  - staging CT chest - Mediastinal/bilateral hilar/R internal mammary LAD  - mediastinal lymph node biopsy results - Metastatic adenocarcinoma   - pantoprazole  40 mg BID  - OR 8/26 with J tube placement; no further change in management    pain Control; Palliative will attempt to transition to morphine for cost control  -Morphine 15mg q2h for severe Pain 7-10  -Methadone 5mg q12h standing    PRICING UPDATES:  Lovenox: $1/month covered under [facility] medicaid  Tube Feeds and pump: ~$120/month  Methadone: 36$/month  Reglan: $60/month  Morphine: $1 COVERED    Family meeting held, everyone understands steps moving forward

## 2022-09-17 NOTE — PROGRESS NOTE ADULT - ATTENDING COMMENTS
57F hx of nephrolithiasis who presents with abd pain and found to have metastatic gastric CA to lymph nodes, course c/b DEVI (now resolved).  Course c/b poor PO tolerance , requiring J tube insertion and new PEs now on therapeutic Lovenox. Rising transaminases, which on initial CT imaging was suggestive of CBD dilation and possible cholangitis. MRCP was done and showed possible cholangitis and CBD stricture. ERCP attempt- unable to be done due to duodenal stenosis. Now awaiting final oncology/GI recs for disposition.    #Transaminitis  #Possible cholangitis and CBD stricture  - Hep panel from prior admission negative, repeat panel sent and negative for acute infection   - AMA, Smooth muscle ab negative low suspicion for autoimmune hepatitis, other autoimmune serology pending   - MRCP reviewed and appreciate hepatology/advanced GI evaluation. ERCP unable to be done due to duodenal stenosis, IR consulted no indication for PBCT at this time.   - Pt has been on empiric IV Zosyn since 9/9- therefore completed >7days for cholangitis, will stop Abx today and monitor off Abx  - Imaging showing no evidence of metastatic involvement of liver   -liver enzymes slightly improved today  - We will discuss with GI/oncology about further recommendations prior to discharge    #Metastatic gastric adenoCA to lymph nodes:  - Oncology following, no plans for inpatient chemo, pt has f/u appt scheduled for 9/19, may need to reschedule appt if discharge is delayed     #Severe protein calorie malnutrition s/p J tube insertion:  - Rate increased to 27cc/hr, currently on hold for procedure  - CM has been able to arrange for lower pricing of tube feeds and pump and family has agreed to pay for costs     #Hypercoagulable state with new PEs:   - Remains on therapeutic Lovenox, priced out 1$/month which pt can afford    #Neoplasm related pain:  - Pain better controlled on current regimen    #Dispo: awaiting final GI recs, potential discharge home 9/18  Family meeting held with pt's family 9/14, all on board with plan.

## 2022-09-17 NOTE — CONSULT NOTE ADULT - CONSULT REQUESTED DATE/TIME
17-Sep-2022 09:57
18-Aug-2022 08:40
10-Sep-2022
15-Aug-2022 10:24
09-Sep-2022
13-Sep-2022
18-Aug-2022 17:42
18-Aug-2022 18:31
23-Aug-2022 14:27
15-Sep-2022 13:38
18-Aug-2022

## 2022-09-17 NOTE — CONSULT NOTE ADULT - CONSULT REQUESTED BY NAME
Freddy
Jennifer Madsen
Medicine
Medicine
Hepatology
Bre Clifton
Medicine
[place]
team
Freddy

## 2022-09-17 NOTE — PROGRESS NOTE ADULT - ASSESSMENT
57F PMH of renal stones p/w nonresectable gastric adenocarcinoma w/ mets to LN c/b DEVI, now resolved, and nephrolithiasis with L hydronephrosis currently S/p j tube placement, with stable tube feed and stable pain regiment with course c/b transaminitis and elevated bilirubin will undergo ERCP today.

## 2022-09-17 NOTE — PROGRESS NOTE ADULT - SUBJECTIVE AND OBJECTIVE BOX
Gastroenterology/Hepatology Progress Note    Interval Events: Patient denies abdominal pain, fevers, chills this am    Allergies:  No Known Allergies    Hospital Medications:  Biotene Dry Mouth Oral Rinse 5 milliLiter(s) Swish and Spit two times a day  bisacodyl Suppository 10 milliGRAM(s) Rectal daily PRN  enoxaparin Injectable 65 milliGRAM(s) SubCutaneous every 12 hours  lidocaine   4% Patch 1 Patch Transdermal daily  methadone   Solution 5 milliGRAM(s) Enteral Tube every 12 hours  metoclopramide   Syrup 5 milliGRAM(s) Oral every 4 hours  morphine   Solution 15 milliGRAM(s) Enteral Tube every 2 hours PRN  pantoprazole   Suspension 40 milliGRAM(s) Oral daily  piperacillin/tazobactam IVPB.. 3.375 Gram(s) IV Intermittent every 8 hours  polyethylene glycol 3350 17 Gram(s) Oral daily PRN      ROS: 14 point ROS negative unless otherwise state in subjective    PHYSICAL EXAM:   Vital Signs:  Vital Signs Last 24 Hrs  T(C): 36.8 (17 Sep 2022 08:50), Max: 37 (16 Sep 2022 18:57)  T(F): 98.2 (17 Sep 2022 08:50), Max: 98.6 (16 Sep 2022 18:57)  HR: 89 (17 Sep 2022 08:50) (89 - 107)  BP: 138/80 (17 Sep 2022 08:50) (122/82 - 138/80)  BP(mean): --  RR: 18 (17 Sep 2022 08:50) (16 - 24)  SpO2: 100% (17 Sep 2022 08:50) (95% - 100%)    Parameters below as of 17 Sep 2022 08:50  Patient On (Oxygen Delivery Method): room air      Daily Height in cm: 157.5 (16 Sep 2022 14:49)    Daily Weight in k.8 (16 Sep 2022 14:30)    GENERAL:  No acute distress  HEENT:  NCAT, no scleral icterus  CHEST: no resp distress  HEART:  RRR  ABDOMEN:  Soft, non-tender, +distended  EXTREMITIES:  No cyanosis, clubbing, or edema  SKIN:  No rash/erythema/ecchymoses/petechiae/wounds/abscess/warm/dry  NEURO:  Alert and oriented x 3, no asterixis, no tremor    LABS:                        10.2   10.86 )-----------( 381      ( 17 Sep 2022 08:43 )             32.8     Mean Cell Volume: 79.8 fL (- @ 08:43)        133<L>  |  98  |  12  ----------------------------<  110<H>  4.5   |  22  |  0.66    Ca    8.4      17 Sep 2022 08:43  Phos  3.9       Mg     2.10         TPro  7.0  /  Alb  2.6<L>  /  TBili  1.6<H>  /  DBili  x   /  AST  173<H>  /  ALT  208<H>  /  AlkPhos  603<H>      LIVER FUNCTIONS - ( 17 Sep 2022 08:43 )  Alb: 2.6 g/dL / Pro: 7.0 g/dL / ALK PHOS: 603 U/L / ALT: 208 U/L / AST: 173 U/L / GGT: x           PT/INR - ( 17 Sep 2022 08:43 )   PT: 12.0 sec;   INR: 1.03 ratio        Imaging:  reviewed

## 2022-09-17 NOTE — PROGRESS NOTE ADULT - ATTENDING COMMENTS
Advanced GI following.   Unable to perform ERCP yesterday due to duodenal stenosis that was not able to be traversed. Please see report for full details.   If continued concern re: biliary obstruction, please consult IR for decompression.   Workup of elevated liver enzymes per hepatology team.   Rest of care per primary team.

## 2022-09-17 NOTE — PROGRESS NOTE ADULT - ASSESSMENT
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 MR done with CBD stricture and abn enhancement of left lobe of liver.    Impression:  #CBD stricture - mild with slightly elev bili. Less likely cause of elevated transaminases.  - s/p EGD 9/16: Malignant gastric tumor in the gastric body. A moderate stenosis secondary to infiltrative disease was found in the distal duodenal bulb and was non-traversed. Given stenosis and large gastric mass, the decision was made to abort the ERCP.  #Abnormal liver enhancement on MRI - unclear etiology. Infectious vs inflammatory vs infiltrative    Recommendation:  - ERCP unable to be completed 2/2 duodenal stenosis, consider IR c/s for biliary decompression  - can restart lovenox    GI will continue to follow.     All recommendations are tentative until note is attested by attending.     Isabell Rudolph, PGY5  Gastroenterology/Hepatology Fellow  Available on Microsoft Teams  [phone] ([facility] Short Range Pager)  [phone] (Long Range Pager)    After 5pm, please contact the on-call GI fellow. [phone]

## 2022-09-17 NOTE — CONSULT NOTE ADULT - CONSULT REASON
CBD stricture
Metastatic gastric adenocarcinoma diagnosis; Consult for thoracic LN biopsy
Pain management
Gastric cancer
New malignancy
Abn CT findings
Mediastinal LAD
elevated liver enzymes
L hydro w/ fever in setting of malignancy
L hydronephrosis
percutaneous biliary tube placement

## 2022-09-17 NOTE — CONSULT NOTE ADULT - PROVIDER SPECIALTY LIST ADULT
Hepatology
Intervent Radiology
Heme/Onc
Intervent Radiology
Gastroenterology
Intervent Radiology
Palliative Care
Surgery
Gastroenterology
Intervent Pulmonology
Urology

## 2022-09-17 NOTE — PROGRESS NOTE ADULT - PROBLEM SELECTOR PLAN 1
LFTs are elevating without a clear source; no evidence of mets to liver  RUQ U/S results are equivocal; shows some mild bile duct dilatation similar to CT on 9/9; new from CT on 8/14  CT shows no mets to the liver at the moment    Hepatitis labs from June and July 2022 show no signs of HepB/C infection  Appreciated Heme/Onc Recs    MRCP showed Stricture of CBD  Consulted Advanced Endoscopy; ERCP 9/16 unsuccessful, will f/u GI  Onc Weighed in; said elevated transaminitis and new elevated T Bili could interfere with chemo  Appreciate Hepatology recs    --will speak with GI and Onc and potentially IR to address this issue and see if it will withhold patient from DC per oncologic plan.   - c/w zosyn for cholangitis coverage.

## 2022-09-17 NOTE — PROGRESS NOTE ADULT - SUBJECTIVE AND OBJECTIVE BOX
Subjective:    INTERVAL HPI/OVERNIGHT EVENTS:   No acute events overnight  Afebrile, hemodynamically stable   - though no note in chart, I was told that the ERCP was unsuccessful yesterday due to duodenal stenosis from malignancy. Will follow up plan with GI and oncology and monitor LFTs. Will consider potential IR procedure instead.     T(F): 98.4 (09-17-22 @ 05:42), Max: 98.6 (09-16-22 @ 18:57)  HR: 101 (09-17-22 @ 05:42) (97 - 107)  BP: 127/85 (09-17-22 @ 05:42) (122/82 - 133/84)  RR: 16 (09-17-22 @ 05:42) (16 - 24)  SpO2: 98% (09-17-22 @ 05:42) (95% - 98%)  I&O's Summary    16 Sep 2022 07:01  -  17 Sep 2022 07:00  --------------------------------------------------------  IN: 316 mL / OUT: 400 mL / NET: -84 mL      Weight (kg): 65.8 (09-16-22 @ 14:49)    Medications:  Biotene Dry Mouth Oral Rinse 5 milliLiter(s) Swish and Spit two times a day  bisacodyl Suppository 10 milliGRAM(s) Rectal daily PRN  lidocaine   4% Patch 1 Patch Transdermal daily  methadone   Solution 5 milliGRAM(s) Enteral Tube every 12 hours  metoclopramide   Syrup 5 milliGRAM(s) Oral every 4 hours  pantoprazole   Suspension 40 milliGRAM(s) Oral daily  piperacillin/tazobactam IVPB.. 3.375 Gram(s) IV Intermittent every 8 hours  polyethylene glycol 3350 17 Gram(s) Oral daily PRN      PHYSICAL EXAM:  Neurology: A&Ox3, LEY x 4  Eyes: PERRLA, Gross vision intact  ENT/Neck: Neck supple, trachea midline, Gross hearing intact  Respiratory: CTA B/L,   CV: RRR, +S1/S2, -S3/S4, no murmurs, rubs or gallops  Abdominal: mildly distended, J tube site intact, no significant fluid appreciated  Extremities: trace-1+ edema in LLE to ankles, No edema in RLE 2+ peripheral pulses    Notable Labs:    Labs:                          10.2   10.04 )-----------( 335      ( 16 Sep 2022 03:12 )             33.8     09-16    136  |  102  |  12  ----------------------------<  104<H>  4.0   |  25  |  0.67    Ca    8.3<L>      16 Sep 2022 03:12  Phos  3.5     09-16  Mg     2.10     09-16    TPro  6.8  /  Alb  2.7<L>  /  TBili  1.8<H>  /  DBili  x   /  AST  283<H>  /  ALT  242<H>  /  AlkPhos  545<H>  09-16    LIVER FUNCTIONS - ( 16 Sep 2022 03:12 )  Alb: 2.7 g/dL / Pro: 6.8 g/dL / ALK PHOS: 545 U/L / ALT: 242 U/L / AST: 283 U/L / GGT: x           PT/INR - ( 16 Sep 2022 03:12 )   PT: 12.0 sec;   INR: 1.03 ratio           CAPILLARY BLOOD GLUCOSE                    Micro:      RADIOLOGY & ADDITIONAL TESTS:    NNI

## 2022-09-17 NOTE — CONSULT NOTE ADULT - REASON FOR ADMISSION
abdominal pain & nausea/vomiting

## 2022-09-18 NOTE — PROGRESS NOTE ADULT - PROBLEM SELECTOR PLAN 6
Impression: Pt has vomiting and intermittent episodes. Vomiting associated with eating meals. Likely related to gastric adenocarcinoma.     Reglan 5mg q4h; Standing Urology/IR believe there is no need for urgent intervention  Will reconsult if there appears to be signs of UTI

## 2022-09-18 NOTE — PROGRESS NOTE ADULT - PROBLEM SELECTOR PLAN 3
Impression: The patient's abdominal pain, persistent nausea/vomiting and weight loss iso of CT A/P findings indicating gastric body thickening and gastrocolic ligament nodularity are concerning for gastric adenocarcinoma. EGD biopsy confirmed.  - GI, onc, surg/onc following  - EGD:  Infiltrative changes in gastric body concerning gastric malignancy, Congested duodenal mucosa, biopsy = poorly differentiated adenocarcinoma  - staging CT chest - Mediastinal/bilateral hilar/R internal mammary LAD  - mediastinal lymph node biopsy results - Metastatic adenocarcinoma   - pantoprazole  40 mg BID  - OR 8/26 with J tube placement; no further change in management    pain Control; Palliative will attempt to transition to morphine for cost control  -Morphine 15mg q2h for severe Pain 7-10  -Methadone 5mg q12h standing    PRICING UPDATES:  Lovenox: $1/month covered under [facility] medicaid  Tube Feeds and pump: ~$120/month  Methadone: 36$/month  Reglan: $60/month  Morphine: $1 COVERED    Family meeting held, everyone understands steps moving forward b/l PEs seen on CT 9/9. Denies pleuritic chest pain, SOB. Stable on RA  Cont with lovenox 70 BID    Restart Lovenox after ERCP

## 2022-09-18 NOTE — PROGRESS NOTE ADULT - PROBLEM SELECTOR PLAN 1
LFTs are elevating without a clear source; no evidence of mets to liver  RUQ U/S results are equivocal; shows some mild bile duct dilatation similar to CT on 9/9; new from CT on 8/14  CT shows no mets to the liver at the moment    Hepatitis labs from June and July 2022 show no signs of HepB/C infection  Appreciated Heme/Onc Recs    MRCP showed Stricture of CBD  Consulted Advanced Endoscopy; ERCP 9/16 unsuccessful, will f/u GI  Onc Weighed in; said elevated transaminitis and new elevated T Bili could interfere with chemo  Appreciate Hepatology recs    --will speak with GI and Onc and potentially IR to address this issue and see if it will withhold patient from DC per oncologic plan.   - c/w zosyn for cholangitis coverage. LFTs are elevating without a clear source; no evidence of mets to liver  RUQ U/S results are equivocal; shows some mild bile duct dilatation similar to CT on 9/9; new from CT on 8/14  CT shows no mets to the liver at the moment    Hepatitis labs from June and July 2022 show no signs of HepB/C infection  Appreciated Heme/Onc Recs    MRCP showed Stricture of CBD  Consulted Advanced Endoscopy; ERCP 9/16 unsuccessful, will f/u GI  Onc Weighed in; said elevated transaminitis and new elevated T Bili could interfere with chemo  Appreciate Hepatology recs    TBili and LFTs are rising  Plan: speak with GI, Onc, IR to address this issue  - c/w zosyn for cholangitis coverage. LFTs are elevating without a clear source; no evidence of mets to liver  RUQ U/S results are equivocal; shows some mild bile duct dilatation similar to CT on 9/9; new from CT on 8/14  CT shows no mets to the liver at the moment    Hepatitis labs from June and July 2022 show no signs of HepB/C infection  Appreciated Heme/Onc Recs    MRCP showed Stricture of CBD  Consulted Advanced Endoscopy; ERCP 9/16 unsuccessful, will f/u GI  Onc Weighed in; said elevated transaminitis and new elevated T Bili could interfere with chemo      TBili and LFTs are rising  Plan: speak with GI, Onc, IR to address this issue  - c/w zosyn for cholangitis coverage.

## 2022-09-18 NOTE — PROGRESS NOTE ADULT - NUTRITIONAL ASSESSMENT
This patient has been assessed with a concern for Malnutrition and has been determined to have a diagnosis/diagnoses of Severe protein-calorie malnutrition.    This patient is being managed with:   Diet NPO with Tube Feed-  Tube Feeding Modality: Jejunostomy  TwoCal HN (TWOCALHNRTH)  Total Volume for 24 Hours (mL): 648  Continuous  Starting Tube Feed Rate {mL per Hour}: 21  Increase Tube Feed Rate by (mL): 3     Every 6 hours  Until Goal Tube Feed Rate (mL per Hour): 27  Tube Feed Duration (in Hours): 24  Tube Feed Start Time: 10:00  Entered: Sep 17 2022  9:13AM

## 2022-09-18 NOTE — PROGRESS NOTE ADULT - ASSESSMENT
57F PMH of renal stones p/w nonresectable gastric adenocarcinoma w/ mets to LN c/b DEVI, now resolved, and nephrolithiasis with L hydronephrosis currently S/p j tube placement, with stable tube feed and stable pain regiment with course c/b transaminitis and elevated bilirubin will undergo ERCP today. 57F PMH of renal stones p/w nonresectable gastric adenocarcinoma w/ mets to LN c/b DEVI, now resolved, and nephrolithiasis with L hydronephrosis currently S/p j tube placement, with stable tube feed and stable pain regiment with course c/b transaminitis with ERCP unable to bypass blockages in duodenum to place stent and IR indicating no need for stenting at the moment.

## 2022-09-18 NOTE — PROGRESS NOTE ADULT - PROBLEM SELECTOR PLAN 2
Urology/IR believe there is no need for urgent intervention  Will reconsult if there appears to be signs of UTI    ppx abx course finished Impression: The patient's abdominal pain, persistent nausea/vomiting and weight loss iso of CT A/P findings indicating gastric body thickening and gastrocolic ligament nodularity are concerning for gastric adenocarcinoma. EGD biopsy confirmed.  - GI, onc, surg/onc following  - EGD:  Infiltrative changes in gastric body concerning gastric malignancy, Congested duodenal mucosa, biopsy = poorly differentiated adenocarcinoma  - staging CT chest - Mediastinal/bilateral hilar/R internal mammary LAD  - mediastinal lymph node biopsy results - Metastatic adenocarcinoma   - pantoprazole  40 mg BID  - OR 8/26 with J tube placement; no further change in management    pain Control; Palliative will attempt to transition to morphine for cost control  -Morphine 15mg q2h for severe Pain 7-10  -Methadone 5mg q12h standing    PRICING UPDATES:  Lovenox: $1/month covered under [facility] medicaid  Tube Feeds and pump: ~$120/month  Methadone: 36$/month  Reglan: $60/month  Morphine: $1 COVERED    Family meeting held, everyone understands steps moving forward

## 2022-09-18 NOTE — PROGRESS NOTE ADULT - ATTENDING COMMENTS
57F hx of nephrolithiasis who presents with abd pain and found to have metastatic gastric CA to lymph nodes, course c/b DEVI (now resolved).  Course c/b poor PO tolerance , requiring J tube insertion and new PEs now on therapeutic Lovenox. Rising transaminases, which on initial CT imaging was suggestive of CBD dilation and possible cholangitis. MRCP was done and showed possible cholangitis and CBD stricture. ERCP attempt- unable to be done due to duodenal stenosis. Now awaiting CT A/P and another trial of ERCP    #Transaminitis  #Possible cholangitis and CBD stricture  - Hep panel from prior admission negative, repeat panel sent and negative for acute infection   - AMA, Smooth muscle ab negative low suspicion for autoimmune hepatitis, other autoimmune serology pending   - MRCP reviewed and appreciate hepatology/advanced GI evaluation. ERCP unable to be done due to duodenal stenosis, IR consulted 9/17 no indication for PBCT at this time.   - s/p IV Zosyn 9/9-9/18  - Imaging showing no evidence of metastatic involvement of liver   - Team discussed with IR 9/18- recommended CT A/P, and advanced GI considering another ERCP trial    #Metastatic gastric adenoCA to lymph nodes:  - Oncology following, no plans for inpatient chemo, pt had f/u appt scheduled for 9/19, sal needs to be reschedule     #Severe protein calorie malnutrition s/p J tube insertion:  - Rate 27cc/hr  - CM has been able to arrange for lower pricing of tube feeds and pump and family has agreed to pay for costs     #Hypercoagulable state with new PEs:   - Remains on therapeutic Lovenox, priced out 1$/month which pt can afford    #Neoplasm related pain:  - Pain better controlled on current regimen    #Dispo: awaiting above workup, plan for discharge home when medically ready  Family meeting held with pt's family 9/14, all on board with plan.

## 2022-09-18 NOTE — PROGRESS NOTE ADULT - PROBLEM SELECTOR PLAN 4
b/l PEs seen on CT 9/9. Denies pleuritic chest pain, SOB. Stable on RA  Cont with lovenox 70 BID    Restart Lovenox after ERCP Impression: Patient having L leg swelling yesterday, resolved today. Low suspicion for a DVT - (no tachycardia, no pain).   Swelling limited to LLE to the ankle

## 2022-09-18 NOTE — PROGRESS NOTE ADULT - PROBLEM SELECTOR PLAN 5
Impression: Patient having L leg swelling yesterday, resolved today. Low suspicion for a DVT - (no tachycardia, no pain).   Swelling limited to LLE to the ankle Impression: Pt has vomiting and intermittent episodes. Vomiting associated with eating meals. Likely related to gastric adenocarcinoma.     Reglan 5mg q4h; Standing

## 2022-09-18 NOTE — PROGRESS NOTE ADULT - SUBJECTIVE AND OBJECTIVE BOX
INCOMPLETE NOTE    Armen Gill | PGY1| Pager: [phone]  Interval Events:    REVIEW OF SYSTEMS:  CONSTITUTIONAL: No weakness, fevers or chills  EYES/ENT: No visual changes;  No vertigo or throat pain   NECK: No pain or stiffness  RESPIRATORY: No cough, wheezing, hemoptysis; No shortness of breath  CARDIOVASCULAR: No chest pain or palpitations  GASTROINTESTINAL: No abdominal or epigastric pain. No nausea, vomiting, or hematemesis; No diarrhea or constipation. No melena or hematochezia.  GENITOURINARY: No dysuria, frequency or hematuria  NEUROLOGICAL: No numbness or weakness  SKIN: No itching, burning, rashes, or lesions   All other review of systems is negative unless indicated above.    OBJECTIVE:  ICU Vital Signs Last 24 Hrs  T(C): 37 (18 Sep 2022 05:37), Max: 37 (18 Sep 2022 05:37)  T(F): 98.6 (18 Sep 2022 05:37), Max: 98.6 (18 Sep 2022 05:37)  HR: 102 (18 Sep 2022 05:37) (89 - 102)  BP: 129/91 (18 Sep 2022 05:37) (129/81 - 138/80)  BP(mean): --  ABP: --  ABP(mean): --  RR: 17 (18 Sep 2022 05:37) (17 - 18)  SpO2: 100% (18 Sep 2022 05:37) (91% - 100%)    O2 Parameters below as of 18 Sep 2022 05:37  Patient On (Oxygen Delivery Method): room air              09-16 @ 07:01  -  09-17 @ 07:00  --------------------------------------------------------  IN: 316 mL / OUT: 400 mL / NET: -84 mL      CAPILLARY BLOOD GLUCOSE          PHYSICAL EXAM:  General: WN/WD NAD  Neurology: A&Ox3, nonfocal, LEY x 4  Eyes: PERRLA/ EOMI, Gross vision intact  ENT/Neck: Neck supple, trachea midline, No JVD, Gross hearing intact  Respiratory: CTA B/L, No wheezing, rales, rhonchi  CV: RRR, +S1/S2, -S3/S4, no murmurs, rubs or gallops  Abdominal: Soft, NT, ND +BS, No HSM  MSK: 5/5 strength UE/LE bilaterally  Extremities: No edema, 2+ peripheral pulses  Skin: No Rashes, Hematoma, Ecchymosis  Incisions:   Tubes:    HOSPITAL MEDICATIONS:  MEDICATIONS  (STANDING):  Biotene Dry Mouth Oral Rinse 5 milliLiter(s) Swish and Spit two times a day  enoxaparin Injectable 65 milliGRAM(s) SubCutaneous every 12 hours  lidocaine   4% Patch 1 Patch Transdermal daily  methadone   Solution 5 milliGRAM(s) Enteral Tube every 12 hours  metoclopramide   Syrup 5 milliGRAM(s) Oral every 4 hours  pantoprazole   Suspension 40 milliGRAM(s) Oral daily    MEDICATIONS  (PRN):  bisacodyl Suppository 10 milliGRAM(s) Rectal daily PRN Constipation  morphine   Solution 15 milliGRAM(s) Enteral Tube every 2 hours PRN Severe Pain (7 - 10)  polyethylene glycol 3350 17 Gram(s) Oral daily PRN Constipation      LABS:                        10.2   10.86 )-----------( 381      ( 17 Sep 2022 08:43 )             32.8     Hgb Trend: 10.2<--, 10.2<--, 10.0<--, 9.8<--, 10.0<--  09-17    133<L>  |  98  |  12  ----------------------------<  110<H>  4.5   |  22  |  0.66    Ca    8.4      17 Sep 2022 08:43  Phos  3.9     09-17  Mg     2.10     09-17    TPro  7.0  /  Alb  2.6<L>  /  TBili  1.6<H>  /  DBili  x   /  AST  173<H>  /  ALT  208<H>  /  AlkPhos  603<H>  09-17    Creatinine Trend: 0.66<--, 0.67<--, 0.65<--, 0.68<--, 0.62<--, 0.66<--  PT/INR - ( 17 Sep 2022 08:43 )   PT: 12.0 sec;   INR: 1.03 ratio                   MICROBIOLOGY:      Armen Gill | PGY1| Pager: [phone]  Interval Events: No acute events overnight, patient's TBili and LFTs continue to rise;      REVIEW OF SYSTEMS: Denied any acute events overnight  CONSTITUTIONAL: No weakness, fevers or chills  RESPIRATORY: No cough, wheezing, hemoptysis; No shortness of breath  CARDIOVASCULAR: No chest pain or palpitations; continued swelling in LLE  GASTROINTESTINAL: Reported continued improvement in epigastric pain and back pain  GENITOURINARY: No dysuria, frequency or hematuria  All other review of systems is negative unless indicated above.      OBJECTIVE:  ICU Vital Signs Last 24 Hrs  T(C): 37 (18 Sep 2022 05:37), Max: 37 (18 Sep 2022 05:37)  T(F): 98.6 (18 Sep 2022 05:37), Max: 98.6 (18 Sep 2022 05:37)  HR: 102 (18 Sep 2022 05:37) (89 - 102)  BP: 129/91 (18 Sep 2022 05:37) (129/81 - 138/80)  BP(mean): --  ABP: --  ABP(mean): --  RR: 17 (18 Sep 2022 05:37) (17 - 18)  SpO2: 100% (18 Sep 2022 05:37) (91% - 100%)    O2 Parameters below as of 18 Sep 2022 05:37  Patient On (Oxygen Delivery Method): room air              09-16 @ 07:01  -  09-17 @ 07:00  --------------------------------------------------------  IN: 316 mL / OUT: 400 mL / NET: -84 mL      CAPILLARY BLOOD GLUCOSE          PHYSICAL EXAM:  General: NAD  Neurology: A&Ox3, LEY x 4  Eyes: PERRLA, Gross vision intact  ENT/Neck: Neck supple, trachea midline, Gross hearing intact  Respiratory: CTA B/L,   CV: RRR, +S1/S2, -S3/S4, no murmurs, rubs or gallops  Abdominal: mildly distended, J tube site intact, no significant fluid appreciated  Extremities: 2+ edema in LLE to ankles, No edema in RLE 2+ peripheral pulses    Tubes: J Tube site is non-erythematous    HOSPITAL MEDICATIONS:  MEDICATIONS  (STANDING):  Biotene Dry Mouth Oral Rinse 5 milliLiter(s) Swish and Spit two times a day  enoxaparin Injectable 65 milliGRAM(s) SubCutaneous every 12 hours  lidocaine   4% Patch 1 Patch Transdermal daily  methadone   Solution 5 milliGRAM(s) Enteral Tube every 12 hours  metoclopramide   Syrup 5 milliGRAM(s) Oral every 4 hours  pantoprazole   Suspension 40 milliGRAM(s) Oral daily    MEDICATIONS  (PRN):  bisacodyl Suppository 10 milliGRAM(s) Rectal daily PRN Constipation  morphine   Solution 15 milliGRAM(s) Enteral Tube every 2 hours PRN Severe Pain (7 - 10)  polyethylene glycol 3350 17 Gram(s) Oral daily PRN Constipation      LABS:                        10.2   10.86 )-----------( 381      ( 17 Sep 2022 08:43 )             32.8     Hgb Trend: 10.2<--, 10.2<--, 10.0<--, 9.8<--, 10.0<--  09-17    133<L>  |  98  |  12  ----------------------------<  110<H>  4.5   |  22  |  0.66    Ca    8.4      17 Sep 2022 08:43  Phos  3.9     09-17  Mg     2.10     09-17    TPro  7.0  /  Alb  2.6<L>  /  TBili  1.6<H>  /  DBili  x   /  AST  173<H>  /  ALT  208<H>  /  AlkPhos  603<H>  09-17    Creatinine Trend: 0.66<--, 0.67<--, 0.65<--, 0.68<--, 0.62<--, 0.66<--  PT/INR - ( 17 Sep 2022 08:43 )   PT: 12.0 sec;   INR: 1.03 ratio                   MICROBIOLOGY:

## 2022-09-19 NOTE — PROGRESS NOTE ADULT - SUBJECTIVE AND OBJECTIVE BOX
Internal Medicine Progress Note    Patient is a 57y old  Female who presents with a chief complaint of abdominal pain & nausea/vomiting (18 Sep 2022 05:46)    OVERNIGHT EVENTS/SUBJECTIVE:    OBJECTIVE:  Vital Signs Last 24 Hrs  T(C): 36.8 (19 Sep 2022 05:02), Max: 36.9 (18 Sep 2022 15:19)  T(F): 98.2 (19 Sep 2022 05:02), Max: 98.5 (18 Sep 2022 15:19)  HR: 116 (19 Sep 2022 05:02) (106 - 116)  BP: 139/99 (19 Sep 2022 05:02) (132/92 - 139/99)  BP(mean): --  RR: 18 (19 Sep 2022 05:02) (18 - 18)  SpO2: 100% (19 Sep 2022 05:02) (96% - 100%)    Parameters below as of 19 Sep 2022 05:02  Patient On (Oxygen Delivery Method): room air      I&O's Detail    18 Sep 2022 07:01  -  19 Sep 2022 07:00  --------------------------------------------------------  IN:    TwoCal HN: 135 mL  Total IN: 135 mL    OUT:    Oral Fluid: 0 mL  Total OUT: 0 mL    Total NET: 135 mL        Daily     Daily   Physical Exam:  General: NAD, resting comfortably in bed  Neuro: A&Ox4, 5/5 strength in all ext  HEENT: NC/AT, EOMI, normal hearing, oral mucosa moist, no oral lesions noted, no pharyngeal erythema, uvula midline  Neck: supple, thyroid not enlarged, no LAD  Resp: Breathing comfortably on RA, LCTA b/l  CV: Normal sinus rhythm, S1 and S2, no r/m/g  Abd: soft, non-distended, non-tender. No HSM.  Ext: ROMIx4, no edema, +2 pulses bilaterally  Skin: Warm and dry, no rashes or discolorations  Psych: Appropriate affect    Medications:  MEDICATIONS  (STANDING):  Biotene Dry Mouth Oral Rinse 5 milliLiter(s) Swish and Spit two times a day  lidocaine   4% Patch 1 Patch Transdermal daily  methadone   Solution 5 milliGRAM(s) Enteral Tube every 12 hours  metoclopramide Injectable 5 milliGRAM(s) IV Push every 4 hours  pantoprazole   Suspension 40 milliGRAM(s) Oral daily    MEDICATIONS  (PRN):  bisacodyl Suppository 10 milliGRAM(s) Rectal daily PRN Constipation  morphine   Solution 15 milliGRAM(s) Enteral Tube every 2 hours PRN Severe Pain (7 - 10)  polyethylene glycol 3350 17 Gram(s) Oral daily PRN Constipation      Labs:                        10.3   12.43 )-----------( 381      ( 19 Sep 2022 04:00 )             32.9     09-19    137  |  103  |  14  ----------------------------<  115<H>  4.4   |  23  |  0.63    Ca    8.5      19 Sep 2022 04:00  Phos  3.3     09-19  Mg     2.00     09-19    TPro  7.1  /  Alb  2.7<L>  /  TBili  3.9<H>  /  DBili  x   /  AST  198<H>  /  ALT  190<H>  /  AlkPhos  693<H>  09-19    PT/INR - ( 19 Sep 2022 04:00 )   PT: 12.1 sec;   INR: 1.04 ratio             COVID-19 PCR: NotDetec (14 Sep 2022 13:05)  SARS-CoV-2: NotDetec (08 Sep 2022 18:15)  COVID-19 PCR: NotDetec (08 Sep 2022 07:31)  COVID-19 PCR: NotDetec (02 Sep 2022 06:00)  COVID-19 PCR: NotDetec (25 Aug 2022 08:02)  COVID-19 PCR: NotDetec (21 Aug 2022 06:49)  COVID-19 PCR: NotDetec (14 Aug 2022 17:47)      Radiology: Internal Medicine Progress Note    Patient is a 57y old  Female who presents with a chief complaint of abdominal pain & nausea/vomiting (18 Sep 2022 05:46)    OVERNIGHT EVENTS/SUBJECTIVE: No overnight events. She reports a continued cough productive of white sputum. She also has band-like pain around her upper abdomen and intermittent nausea. Pt also endorses feeling subjectively hot. She is generally anxious to get to [facility] for cancer therapy but is understanding of the current plan with regards to possible ERCP first.  Denies: headache, CP, SOB, vomiting, constipation/diarrhea.    OBJECTIVE:  Vital Signs Last 24 Hrs  T(C): 36.8 (19 Sep 2022 05:02), Max: 36.9 (18 Sep 2022 15:19)  T(F): 98.2 (19 Sep 2022 05:02), Max: 98.5 (18 Sep 2022 15:19)  HR: 116 (19 Sep 2022 05:02) (106 - 116)  BP: 139/99 (19 Sep 2022 05:02) (132/92 - 139/99)  BP(mean): --  RR: 18 (19 Sep 2022 05:02) (18 - 18)  SpO2: 100% (19 Sep 2022 05:02) (96% - 100%)    Parameters below as of 19 Sep 2022 05:02  Patient On (Oxygen Delivery Method): room air      I&O's Detail    18 Sep 2022 07:01  -  19 Sep 2022 07:00  --------------------------------------------------------  IN:    TwoCal HN: 135 mL  Total IN: 135 mL    OUT:    Oral Fluid: 0 mL  Total OUT: 0 mL    Total NET: 135 mL        Daily     Daily   Physical Exam:  General: NAD, resting comfortably in bed  Neuro: A&Ox4, no gross deficits   HEENT: NC/AT, EOMI, normal hearing, oral mucosa moist  Resp: Breathing comfortably on RA, LCTA b/l  CV: tachycardic, S1 and S2, no r/m/g  Abd: soft, non-distended, mild TTP throughout  Ext: no edema, +2 pulses bilaterally  Skin: Warm and dry, no rashes or discolorations  Psych: Appropriate affect    Medications:  MEDICATIONS  (STANDING):  Biotene Dry Mouth Oral Rinse 5 milliLiter(s) Swish and Spit two times a day  lidocaine   4% Patch 1 Patch Transdermal daily  methadone   Solution 5 milliGRAM(s) Enteral Tube every 12 hours  metoclopramide Injectable 5 milliGRAM(s) IV Push every 4 hours  pantoprazole   Suspension 40 milliGRAM(s) Oral daily    MEDICATIONS  (PRN):  bisacodyl Suppository 10 milliGRAM(s) Rectal daily PRN Constipation  morphine   Solution 15 milliGRAM(s) Enteral Tube every 2 hours PRN Severe Pain (7 - 10)  polyethylene glycol 3350 17 Gram(s) Oral daily PRN Constipation      Labs:                        10.3   12.43 )-----------( 381      ( 19 Sep 2022 04:00 )             32.9     09-19    137  |  103  |  14  ----------------------------<  115<H>  4.4   |  23  |  0.63    Ca    8.5      19 Sep 2022 04:00  Phos  3.3     09-19  Mg     2.00     09-19    TPro  7.1  /  Alb  2.7<L>  /  TBili  3.9<H>  /  DBili  x   /  AST  198<H>  /  ALT  190<H>  /  AlkPhos  693<H>  09-19    PT/INR - ( 19 Sep 2022 04:00 )   PT: 12.1 sec;   INR: 1.04 ratio             COVID-19 PCR: NotDetec (14 Sep 2022 13:05)  SARS-CoV-2: NotDetec (08 Sep 2022 18:15)  COVID-19 PCR: NotDetec (08 Sep 2022 07:31)  COVID-19 PCR: NotDetec (02 Sep 2022 06:00)  COVID-19 PCR: NotDetec (25 Aug 2022 08:02)  COVID-19 PCR: NotDetec (21 Aug 2022 06:49)  COVID-19 PCR: NotDetec (14 Aug 2022 17:47)      Radiology:

## 2022-09-19 NOTE — PROGRESS NOTE ADULT - PROBLEM SELECTOR PLAN 8
Impression: Stable anemia. May be combination of Iron deficiency anemia from poor PO intake and normocytic anemia from Anemia of Chronic Disease w/ this presentation c/f gastric malignancy.  - Total Iron 22, TIBC 165, transferrin 149  H/H stable    continue to monitor with CBCs Impression: Stable anemia. May be combination of Iron deficiency anemia from poor PO intake and normocytic anemia from Anemia of Chronic Disease w/ this presentation c/f gastric malignancy.  - Total Iron 22, TIBC 165, transferrin 149  H/H stable, continue to monitor

## 2022-09-19 NOTE — PROGRESS NOTE ADULT - NUTRITIONAL ASSESSMENT
This patient has been assessed with a concern for Malnutrition and has been determined to have a diagnosis/diagnoses of Severe protein-calorie malnutrition.    This patient is being managed with:   Diet NPO after Midnight-     NPO Start Date: 18-Sep-2022   NPO Start Time: 23:59  Except Medications  Entered: Sep 18 2022 12:37PM    Diet NPO with Tube Feed-  Tube Feeding Modality: Jejunostomy  TwoCal HN (TWOCALHNRTH)  Total Volume for 24 Hours (mL): 648  Continuous  Starting Tube Feed Rate {mL per Hour}: 21  Increase Tube Feed Rate by (mL): 3     Every 6 hours  Until Goal Tube Feed Rate (mL per Hour): 27  Tube Feed Duration (in Hours): 24  Tube Feed Start Time: 10:00  Entered: Sep 17 2022  9:13AM

## 2022-09-19 NOTE — PROGRESS NOTE ADULT - ATTENDING COMMENTS
57 year old female with newly diagnosed metastatic gastric carcinoma complicated by PE and fevers, now with concern for cholangitis with rising transaminitis and hyperbilirubinemia.  S/p ERCP 9/16, unable to pass scope due to duodenal stenosis and not able to have biliary decompression by IR due to lack of intrahepatic ductal dilation. Plan by GI to re-attempt ERCP/EUS tomorrow 9/20. Once her LFTs and bilis are trending down will likely provide cancer directed therapy as inpatient. Patient evaluated at bedside today and made aware of plan.

## 2022-09-19 NOTE — CHART NOTE - NSCHARTNOTEFT_GEN_A_CORE
IR Follow-Up     New CT imaging reviewed today. There is no intrahepatic biliary ductal dilatation. Mild extrahepatic biliary ductal dilatation is noted, similar to the prior exam as well as total bilirubin which is increased today compared to yesterday. Given lack of intrahepatic biliary ductal dilatation, there is no window for percutaneous biliary access.     If repeat ERCP is possible, would recommend that route of intervention.     Discussed with Dr. Kowalski.     --  Isidro Suarez MD  Diagnostic Radiology Resident (PGY-3)  IR Pager: [phone] ([facility]) / [phone] ([facility])

## 2022-09-19 NOTE — PROGRESS NOTE ADULT - ATTENDING COMMENTS
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 Rising transaminases, which on initial CT imaging was suggestive of CBD dilation and possible cholangitis. MRCP was done and showed possible cholangitis and CBD stricture. ERCP attempt- unable to be done due to duodenal stenosis. Now another attempt of ERCP    #Transaminitis  #Possible cholangitis and CBD stricture  - Suspect this is hypervascularity caused by cancer vs. cholangitis  - Hep panel from prior admission negative, repeat panel sent and negative for acute infection   - AMA, Smooth muscle ab negative low suspicion for autoimmune hepatitis, other autoimmune serology pending   - MRCP reviewed and appreciate hepatology/advanced GI evaluation. ERCP unable to be done due to duodenal stenosis, IR consulted 9/17 no indication for PBCT at this time.   - s/p IV Zosyn 9/9-9/18. If clinical decompensation, will restart antibiotics   - Imaging showing no evidence of metastatic involvement of liver   - Team discussed with advanced GI considering another ERCP attempt today     #Metastatic gastric adenoCA to lymph nodes:  - Oncology following, no plans for inpatient chemo however will continue to re-evaluate  - F/u appt scheduled for 9/19, sal needs to be reschedule     #Severe protein calorie malnutrition s/p J tube insertion:  - CM has been able to arrange for lower pricing of tube feeds and pump and family has agreed to pay for costs     #Hypercoagulable state with new PEs:   - Remains on therapeutic Lovenox, priced out 1$/month which pt can afford  - On hold for possible procedure     #Neoplasm related pain:  - Pain better controlled on current regimen    #Dispo: awaiting above workup, plan for discharge home when medically ready    Discussed plan with patient - she states she communicates with her family and did not wish for me to update anyone today   Discussed with housestaff

## 2022-09-19 NOTE — PROGRESS NOTE ADULT - PROBLEM SELECTOR PLAN 6
Urology/IR believe there is no need for urgent intervention  Will reconsult if there appears to be signs of UTI

## 2022-09-19 NOTE — PROGRESS NOTE ADULT - SUBJECTIVE AND OBJECTIVE BOX
INTERVAL HPI/OVERNIGHT EVENTS:  Patient S&E at bedside. Her abdominal pain is still present, but stable. She feels hungry. No F/C, CP, SOB, N/V, rash, or edema. She is having BMs.      VITAL SIGNS:  T(F): 98.6 (09-19-22 @ 16:04)  HR: 104 (09-19-22 @ 16:04)  BP: 118/84 (09-19-22 @ 16:04)  RR: 17 (09-19-22 @ 16:04)  SpO2: 96% (09-19-22 @ 16:04)  Wt(kg): --    PHYSICAL EXAM:    Constitutional: NAD, fatigued  Eyes: EOMI, sclera non-icteric  Neck: supple  Respiratory: no increased WOB, CTAB/L  Cardiovascular: RRR, S1, S2, no m/g/r  Gastrointestinal: soft, + RUQ TTP, ND, no masses palpable, no HSM  Extremities: no c/c/e  Neurological: AAOx3    MEDICATIONS  (STANDING):  Biotene Dry Mouth Oral Rinse 5 milliLiter(s) Swish and Spit two times a day  lidocaine   4% Patch 1 Patch Transdermal daily  methadone   Solution 5 milliGRAM(s) Enteral Tube every 12 hours  pantoprazole  Injectable 40 milliGRAM(s) IV Push daily    MEDICATIONS  (PRN):  bisacodyl Suppository 10 milliGRAM(s) Rectal daily PRN Constipation  morphine   Solution 15 milliGRAM(s) Enteral Tube every 2 hours PRN Severe Pain (7 - 10)  polyethylene glycol 3350 17 Gram(s) Oral daily PRN Constipation      LABS:                        10.3   12.43 )-----------( 381      ( 19 Sep 2022 04:00 )             32.9     09-19    137  |  103  |  14  ----------------------------<  115<H>  4.4   |  23  |  0.63    Ca    8.5      19 Sep 2022 04:00  Phos  3.3     09-19  Mg     2.00     09-19    TPro  7.1  /  Alb  2.7<L>  /  TBili  3.9<H>  /  DBili  x   /  AST  198<H>  /  ALT  190<H>  /  AlkPhos  693<H>  09-19    PT/INR - ( 19 Sep 2022 04:00 )   PT: 12.1 sec;   INR: 1.04 ratio               RADIOLOGY & ADDITIONAL TESTS:

## 2022-09-19 NOTE — PROGRESS NOTE ADULT - ASSESSMENT
57F PMH of renal stones p/w nonresectable gastric adenocarcinoma w/ mets to LN c/b DEVI, now resolved, and nephrolithiasis with L hydronephrosis currently S/p j tube placement, with stable tube feed and stable pain regiment with course c/b transaminitis with ERCP unable to bypass blockages in duodenum to place stent and IR indicating no need for stenting at the moment. 57F PMH of renal stones p/w nonresectable gastric adenocarcinoma w/ mets to LN c/b DEVI, now resolved, and nephrolithiasis with L hydronephrosis currently S/p j tube placement, with stable tube feed and stable pain regimen with course c/b transaminitis with ERCP unable to bypass blockages in duodenum to place stent, pending repeat ERCP.

## 2022-09-19 NOTE — PROGRESS NOTE ADULT - PROBLEM SELECTOR PLAN 4
Impression: Patient having L leg swelling yesterday, resolved today. Low suspicion for a DVT - (no tachycardia, no pain).   Swelling limited to LLE to the ankle Impression: Patient having L leg swelling yesterday, resolved now. Low suspicion for a DVT - (no tachycardia, no pain).   Swelling limited to LLE to the ankle

## 2022-09-19 NOTE — PROGRESS NOTE ADULT - PROBLEM SELECTOR PLAN 2
Impression: The patient's abdominal pain, persistent nausea/vomiting and weight loss iso of CT A/P findings indicating gastric body thickening and gastrocolic ligament nodularity are concerning for gastric adenocarcinoma. EGD biopsy confirmed.  - GI, onc, surg/onc following  - EGD:  Infiltrative changes in gastric body concerning gastric malignancy, Congested duodenal mucosa, biopsy = poorly differentiated adenocarcinoma  - staging CT chest - Mediastinal/bilateral hilar/R internal mammary LAD  - mediastinal lymph node biopsy results - Metastatic adenocarcinoma   - pantoprazole  40 mg BID  - OR 8/26 with J tube placement; no further change in management    pain Control; Palliative will attempt to transition to morphine for cost control  -Morphine 15mg q2h for severe Pain 7-10  -Methadone 5mg q12h standing    PRICING UPDATES:  Lovenox: $1/month covered under [facility] medicaid  Tube Feeds and pump: ~$120/month  Methadone: 36$/month  Reglan: $60/month  Morphine: $1 COVERED    Family meeting held, everyone understands steps moving forward - GI, onc, surg/onc following  - EGD:  Infiltrative changes in gastric body concerning gastric malignancy, Congested duodenal mucosa, biopsy = poorly differentiated adenocarcinoma  - staging CT chest - Mediastinal/bilateral hilar/R internal mammary LAD  - mediastinal lymph node biopsy results - Metastatic adenocarcinoma   - pantoprazole  40 mg BID  - OR 8/26 with J tube placement; no further change in management    pain Control; Palliative will attempt to transition to morphine for cost control  -Morphine 15mg q2h for severe Pain 7-10  -Methadone 5mg q12h standing    PRICING UPDATES:  Lovenox: $1/month covered under [facility] medicaid  Tube Feeds and pump: ~$120/month  Methadone: 36$/month  Reglan: $60/month  Morphine: $1 COVERED    Family meeting held, everyone understands steps moving forward

## 2022-09-19 NOTE — PROGRESS NOTE ADULT - PROBLEM SELECTOR PLAN 1
LFTs are elevating without a clear source; no evidence of mets to liver  RUQ U/S results are equivocal; shows some mild bile duct dilatation similar to CT on 9/9; new from CT on 8/14  CT shows no mets to the liver at the moment    Hepatitis labs from June and July 2022 show no signs of HepB/C infection  Appreciated Heme/Onc Recs    MRCP showed Stricture of CBD  Consulted Advanced Endoscopy; ERCP 9/16 unsuccessful, will f/u GI  Onc Weighed in; said elevated transaminitis and new elevated T Bili could interfere with chemo      TBili and LFTs are rising  Plan: speak with GI, Onc, IR to address this issue  - c/w zosyn for cholangitis coverage. LFTs are elevating without a clear source; no evidence of mets to liver  RUQ U/S results are equivocal; shows some mild bile duct dilatation similar to CT on 9/9; new from CT on 8/14  CT shows no mets to the liver at the moment    Hepatitis labs from June and July 2022 show no signs of HepB/C infection  Appreciated Heme/Onc Recs    MRCP showed Stricture of CBD  Consulted Advanced Endoscopy; ERCP 9/16 unsuccessful, repeat ERCP 9/19  Onc said elevated transaminitis and new elevated T Bili could interfere with chemo      TBili and LFTs are rising  Plan: speak with GI, Onc, IR to address this issue  - c/w zosyn for cholangitis coverage.

## 2022-09-19 NOTE — PROGRESS NOTE ADULT - ASSESSMENT
58 yo F with a PMH of nephrolithiasis who p/w progressive abdominal pain, N/V, and weight loss, found to have poorly differentiated metastatic gastric adenocarcinoma, course complicated by acute pulmonary emboli, fever, transaminitis, and r/o cholangitis.    #Metastatic Gastric Adenocarcinoma  - Presented with abdominal pain, N/V, and weight loss  - Admission CT C/A/P shows abdominal and thoracic (subdiaphragmatic, mediastinal, subcarinal and bilateral hilar) LAD, 3 mm RML nodule, and gastric body thickening and gastrocolic ligament nodularity  - CEA elevated (1544);  mildly elevated, CA 19-9 normal  - S/p EUS by GI on 8/16 showing infiltrative gastric body changes w/pathology showing poorly differentiated gastric adenocarcinoma (+AE1/3, CK7, CK20, CDX2, neg for ER), MS-stable  - S/p EBUS with level 7 mediastinal LN bx on 8/23 confirming metastasis of gastric adenocarcinoma, and ascitic fluid (trace ascites) also confirming disease there with signet-ring cells  - S/p J-tube placement by surgery; 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 Tube feeds per primary team/surgery  - C/w PPI BID  - CTA chest and CT A/P 9/9/22 show progression of mediastinal and b/l hilar lymphadenopathy, enhancement of CBD concerning for possible ?cholangitis, also with left hydroureteronephrosis likely sequela of neoplasm/metastatic disease, small to moderate progressive ascites  - Completed 10 days of Zosyn  - Appreciate Palliative Care and Primary team optimizing pain regimen, initiating methadone, and adjusting PRN pain medications as needed to adequately control pain until methadone reaches steady state  - Given delays in treatment, if patient stabilizes (particularly from a hepatobiliary standpoint with improving LFTs/bilirubin, please see below) and infection continues to be ruled out, would recommend receiving inpatient chemotherapy  - HER2 2+ (equivocal) with RIAN pending (will f/u with pathology)  - Upon discharge, to f/u with Dr. Hernandez outpatient at [facility]    #Transaminitis/Hyperbilirubinemia  - Unclear etiology, L hepatic lobe T2 signaling on MRI, but unclear and unlikely to cause findings. Otherwise, no metastatic lesion seen there  - With no intrahepatic biliary ductal dilation but mild CBD dilation  - Hepatitis panel and autoimmune panel negative. Appreciate hepatology recs  - Marked transaminitis could potentially affect some treatments such as Docetaxel (would not give if AP/AST/ALT > 5x ULN) or severe hepatic impairment (5-FU)  - S/p ERCP 9/16, unable to pass scope due to duodenal stenosis and not able to have biliary decompression by IR due to lack of intrahepatic ductal dilation. Plan by GI to re-attempt ERCP/EUS tomorrow 9/20    #Bilateral Pulmonary Emboli  - CTA chest 9/9/22 showed bilateral pulmonary emboli  - Increased Lovenox dose from PPX dose to 1 mg/kg BID (not considered a failure of Lovenox given she was on a prophylactic dose)      Aryles Hedjar, MD, PGY-5  Hematology/Oncology Fellow  [facility]  Pager: [phone]  After 5PM and on weekends and holidays, please call the inpatient fellow on call.

## 2022-09-19 NOTE — CHART NOTE - NSCHARTNOTEFT_GEN_A_CORE
CT reviewed. Can plan to try EUS with possible choledochoduodenal axios placement for biliary decompression tomorrow.  - NPO after midnight  - discussed with primary team      Barbara Nickerson PGY-6  Gastroenterology/Hepatology Fellow  Pager #[phone] ([facility]) or [phone] ([facility])  Available on [employer] Teams.  Please contact on-call GI fellow via answering service ([phone]) after 5pm and before 8am, and on weekends.

## 2022-09-20 NOTE — PROGRESS NOTE ADULT - PROBLEM SELECTOR PLAN 1
LFTs are elevating without a clear source; no evidence of mets to liver  RUQ U/S results are equivocal; shows some mild bile duct dilatation similar to CT on 9/9; new from CT on 8/14  CT shows no mets to the liver at the moment    Hepatitis labs from June and July 2022 show no signs of HepB/C infection  Appreciated Heme/Onc Recs    MRCP showed Stricture of CBD  Consulted Advanced Endoscopy; ERCP 9/16 unsuccessful, repeat ERCP 9/19  Onc said elevated transaminitis and new elevated T Bili could interfere with chemo      TBili and LFTs are rising  Plan: speak with GI, Onc, IR to address this issue  - c/w zosyn for cholangitis coverage. LFTs are elevating without a clear source; no evidence of mets to liver  RUQ U/S results are equivocal; shows some mild bile duct dilatation similar to CT on 9/9; new from CT on 8/14  CT shows no mets to the liver at the moment    Hepatitis labs from June and July 2022 show no signs of HepB/C infection  Appreciated Heme/Onc Recs    MRCP showed Stricture of CBD  Consulted Advanced Endoscopy; ERCP 9/16 unsuccessful, repeat ERCP 9/20  Onc said elevated transaminitis and new elevated T Bili could interfere with chemo      TBili and LFTs are rising  Plan: speak with GI, Onc, IR to address this issue  - c/w zosyn for cholangitis coverage.

## 2022-09-20 NOTE — PRE-OP CHECKLIST - TEMPERATURE IN CELSIUS (DEGREES C)
Eben Craig   11/24/2017   Anticoagulation Therapy Visit    Description:  82 year old male   Provider:  Juliano Forbes MD   Department:  Ph Anticoag           INR as of 11/24/2017     Today's INR 4.9!      Anticoagulation Summary as of 11/24/2017     INR goal 2.0-3.0   Today's INR 4.9!   Full instructions 11/24: Hold; 11/25: 2.5 mg; Otherwise 5 mg every day   Next INR check 11/27/2017    Indications   Long-term (current) use of anticoagulants [Z79.01] [Z79.01]  A-fib (H) [I48.91]         Contact Numbers     Clinic Number:         November 2017 Details    Sun Mon Tue Wed Thu Fri Sat        1               2               3               4                 5               6               7               8               9               10               11                 12               13               14               15               16               17               18                 19               20               21               22               23               24      Hold   See details      25      2.5 mg           26      5 mg         27            28               29               30                  Date Details   11/24 This INR check       Date of next INR:  11/27/2017         How to take your warfarin dose     To take:  2.5 mg Take 0.5 of a 5 mg tablet.    To take:  5 mg Take 1 of the 5 mg tablets.    Hold Do not take your warfarin dose. See the Details table to the right for additional instructions.                
36.9
37
36.7
37.1
36.4
36.9

## 2022-09-20 NOTE — PROGRESS NOTE ADULT - ASSESSMENT
57F PMH of renal stones p/w nonresectable gastric adenocarcinoma w/ mets to LN c/b DEVI, now resolved, and nephrolithiasis with L hydronephrosis currently S/p j tube placement, with stable tube feed and stable pain regimen with course c/b transaminitis with ERCP unable to bypass blockages in duodenum to place stent, pending repeat ERCP.

## 2022-09-20 NOTE — PROGRESS NOTE ADULT - NUTRITIONAL ASSESSMENT
This patient has been assessed with a concern for Malnutrition and has been determined to have a diagnosis/diagnoses of Severe protein-calorie malnutrition.    This patient is being managed with:   Diet NPO after Midnight-     NPO Start Date: 19-Sep-2022   NPO Start Time: 23:59  Entered: Sep 19 2022  4:30PM    Diet NPO with Tube Feed-  Tube Feeding Modality: Jejunostomy  TwoCal HN (TWOCALHNRTH)  Total Volume for 24 Hours (mL): 648  Continuous  Starting Tube Feed Rate {mL per Hour}: 21  Increase Tube Feed Rate by (mL): 3     Every 6 hours  Until Goal Tube Feed Rate (mL per Hour): 27  Tube Feed Duration (in Hours): 24  Tube Feed Start Time: 10:00  Entered: Sep 17 2022  9:13AM

## 2022-09-20 NOTE — PROGRESS NOTE ADULT - PROBLEM SELECTOR PLAN 4
Impression: Patient having L leg swelling yesterday, resolved now. Low suspicion for a DVT - (no tachycardia, no pain).   Swelling limited to LLE to the ankle

## 2022-09-20 NOTE — PROGRESS NOTE ADULT - SUBJECTIVE AND OBJECTIVE BOX
Internal Medicine Progress Note    Patient is a 57y old  Female who presents with a chief complaint of abdominal pain & nausea/vomiting (19 Sep 2022 19:11)    OVERNIGHT EVENTS/SUBJECTIVE:    OBJECTIVE:  Vital Signs Last 24 Hrs  T(C): 36.9 (20 Sep 2022 05:10), Max: 37.2 (20 Sep 2022 01:09)  T(F): 98.4 (20 Sep 2022 05:10), Max: 98.9 (20 Sep 2022 01:09)  HR: 110 (20 Sep 2022 05:10) (103 - 110)  BP: 139/92 (20 Sep 2022 05:10) (118/84 - 142/96)  BP(mean): --  RR: 18 (20 Sep 2022 05:10) (17 - 18)  SpO2: 97% (20 Sep 2022 05:10) (94% - 98%)    Parameters below as of 20 Sep 2022 05:10  Patient On (Oxygen Delivery Method): room air      I&O's Detail    19 Sep 2022 07:01  -  20 Sep 2022 07:00  --------------------------------------------------------  IN:    Enteral Tube Flush: 100 mL    Oral Fluid: 118 mL  Total IN: 218 mL    OUT:    Voided (mL): 0 mL  Total OUT: 0 mL    Total NET: 218 mL        Daily     Daily   Physical Exam:  General: NAD, resting comfortably in bed  Neuro: A&Ox4, 5/5 strength in all ext  HEENT: NC/AT, EOMI, normal hearing, oral mucosa moist, no oral lesions noted, no pharyngeal erythema, uvula midline  Neck: supple, thyroid not enlarged, no LAD  Resp: Breathing comfortably on RA, LCTA b/l  CV: Normal sinus rhythm, S1 and S2, no r/m/g  Abd: soft, non-distended, non-tender. No HSM.  Ext: ROMIx4, no edema, +2 pulses bilaterally  Skin: Warm and dry, no rashes or discolorations  Psych: Appropriate affect    Medications:  MEDICATIONS  (STANDING):  Biotene Dry Mouth Oral Rinse 5 milliLiter(s) Swish and Spit two times a day  lidocaine   4% Patch 1 Patch Transdermal daily  methadone   Solution 5 milliGRAM(s) Enteral Tube every 12 hours  pantoprazole  Injectable 40 milliGRAM(s) IV Push daily    MEDICATIONS  (PRN):  bisacodyl Suppository 10 milliGRAM(s) Rectal daily PRN Constipation  morphine   Solution 15 milliGRAM(s) Enteral Tube every 2 hours PRN Severe Pain (7 - 10)  polyethylene glycol 3350 17 Gram(s) Oral daily PRN Constipation      Labs:                        10.3   12.95 )-----------( 389      ( 20 Sep 2022 06:01 )             33.5     09-19    137  |  103  |  14  ----------------------------<  115<H>  4.4   |  23  |  0.63    Ca    8.5      19 Sep 2022 04:00  Phos  3.3     09-19  Mg     2.00     09-19    TPro  7.1  /  Alb  2.7<L>  /  TBili  3.9<H>  /  DBili  x   /  AST  198<H>  /  ALT  190<H>  /  AlkPhos  693<H>  09-19    PT/INR - ( 19 Sep 2022 04:00 )   PT: 12.1 sec;   INR: 1.04 ratio             COVID-19 PCR: NotDetec (19 Sep 2022 12:50)  COVID-19 PCR: NotDetec (14 Sep 2022 13:05)  SARS-CoV-2: NotDetec (08 Sep 2022 18:15)  COVID-19 PCR: NotDetec (08 Sep 2022 07:31)  COVID-19 PCR: NotDetec (02 Sep 2022 06:00)  COVID-19 PCR: NotDetec (25 Aug 2022 08:02)  COVID-19 PCR: NotDetec (21 Aug 2022 06:49)  COVID-19 PCR: NotDetec (14 Aug 2022 17:47)      Radiology: Internal Medicine Progress Note    Patient is a 57y old  Female who presents with a chief complaint of abdominal pain & nausea/vomiting (19 Sep 2022 19:11)    OVERNIGHT EVENTS/SUBJECTIVE: Pt did not go for ERCP yest due to GI scheduling, she understands plan is for today. She is uncomfortabe NPO and says she has a dry mouth. Otherwise, denies CP, palps, SOB, abd pain, n/v/d.     OBJECTIVE:  Vital Signs Last 24 Hrs  T(C): 36.9 (20 Sep 2022 05:10), Max: 37.2 (20 Sep 2022 01:09)  T(F): 98.4 (20 Sep 2022 05:10), Max: 98.9 (20 Sep 2022 01:09)  HR: 110 (20 Sep 2022 05:10) (103 - 110)  BP: 139/92 (20 Sep 2022 05:10) (118/84 - 142/96)  BP(mean): --  RR: 18 (20 Sep 2022 05:10) (17 - 18)  SpO2: 97% (20 Sep 2022 05:10) (94% - 98%)    Parameters below as of 20 Sep 2022 05:10  Patient On (Oxygen Delivery Method): room air      I&O's Detail    19 Sep 2022 07:01  -  20 Sep 2022 07:00  --------------------------------------------------------  IN:    Enteral Tube Flush: 100 mL    Oral Fluid: 118 mL  Total IN: 218 mL    OUT:    Voided (mL): 0 mL  Total OUT: 0 mL    Total NET: 218 mL        Daily     Daily   Physical Exam:  General: NAD, resting comfortably in bed  Neuro: A&Ox4  HEENT: NC/AT, EOMI, normal hearing  Resp: Breathing comfortably on RA, LCTA b/l  CV: Normal sinus rhythm, S1 and S2, no r/m/g  Abd: soft, non-distended, non-tender  Ext:  no edema, +2 pulses bilaterally  Skin: Warm and dry, no rashes or discolorations  Psych: Appropriate affect    Medications:  MEDICATIONS  (STANDING):  Biotene Dry Mouth Oral Rinse 5 milliLiter(s) Swish and Spit two times a day  lidocaine   4% Patch 1 Patch Transdermal daily  methadone   Solution 5 milliGRAM(s) Enteral Tube every 12 hours  pantoprazole  Injectable 40 milliGRAM(s) IV Push daily    MEDICATIONS  (PRN):  bisacodyl Suppository 10 milliGRAM(s) Rectal daily PRN Constipation  morphine   Solution 15 milliGRAM(s) Enteral Tube every 2 hours PRN Severe Pain (7 - 10)  polyethylene glycol 3350 17 Gram(s) Oral daily PRN Constipation      Labs:                        10.3   12.95 )-----------( 389      ( 20 Sep 2022 06:01 )             33.5     09-19    137  |  103  |  14  ----------------------------<  115<H>  4.4   |  23  |  0.63    Ca    8.5      19 Sep 2022 04:00  Phos  3.3     09-19  Mg     2.00     09-19    TPro  7.1  /  Alb  2.7<L>  /  TBili  3.9<H>  /  DBili  x   /  AST  198<H>  /  ALT  190<H>  /  AlkPhos  693<H>  09-19    PT/INR - ( 19 Sep 2022 04:00 )   PT: 12.1 sec;   INR: 1.04 ratio             COVID-19 PCR: NotDetec (19 Sep 2022 12:50)  COVID-19 PCR: NotDetec (14 Sep 2022 13:05)  SARS-CoV-2: NotDetec (08 Sep 2022 18:15)  COVID-19 PCR: NotDetec (08 Sep 2022 07:31)  COVID-19 PCR: NotDetec (02 Sep 2022 06:00)  COVID-19 PCR: NotDetec (25 Aug 2022 08:02)  COVID-19 PCR: NotDetec (21 Aug 2022 06:49)  COVID-19 PCR: NotDetec (14 Aug 2022 17:47)      Radiology:

## 2022-09-20 NOTE — PROGRESS NOTE ADULT - ASSESSMENT
56 yo F with a PMH of nephrolithiasis who p/w progressive abdominal pain, N/V, and weight loss, found to have poorly differentiated metastatic gastric adenocarcinoma, course complicated by acute pulmonary emboli, fever, transaminitis, and r/o cholangitis.    #Metastatic Gastric Adenocarcinoma  - Presented with abdominal pain, N/V, and weight loss  - Admission CT C/A/P shows abdominal and thoracic (subdiaphragmatic, mediastinal, subcarinal and bilateral hilar) LAD, 3 mm RML nodule, and gastric body thickening and gastrocolic ligament nodularity  - CEA elevated (1544);  mildly elevated, CA 19-9 normal  - S/p EUS by GI on 8/16 showing infiltrative gastric body changes w/pathology showing poorly differentiated gastric adenocarcinoma (+AE1/3, CK7, CK20, CDX2, neg for ER), MS-stable  - HER2 2+ (equivocal) with RIAN pending (will f/u with pathology)  - S/p EBUS with level 7 mediastinal LN bx on 8/23 confirming metastasis of gastric adenocarcinoma, and ascitic fluid (trace ascites) also confirming disease there with signet-ring cells  - S/p J-tube placement by surgery; 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 Tube feeds per primary team/surgery  - C/w PPI BID  - CTA chest and CT A/P 9/9/22 show progression of mediastinal and b/l hilar lymphadenopathy, enhancement of CBD concerning for possible ?cholangitis, also with left hydroureteronephrosis likely sequela of neoplasm/metastatic disease, small to moderate progressive ascites  - Completed 10 days of Zosyn  - Appreciate Palliative Care and Primary team optimizing pain regimen, initiating methadone, and adjusting PRN pain medications as needed to adequately control pain until methadone reaches steady state  - Ideally, given delays in treatment, would give inpatient chemotheapy. However, given that patient's hepatobiliary function is worsening (distal CBD thickening and slight abnormal T2 L hepatic lobe enhancement on MRI, but no clear etiology, no obvious metastatic lesions in the hepatobiliary tract) and GI unable to perform ERCP/EUS to investigate further, patient likely will not be able to receive systemic treatment. Would re-consult Surgical Oncology to see if there are options for or improvement of obstruction to allow for GI investigation, or if direct surgical options for biliary duct decompression    #Transaminitis/Hyperbilirubinemia  - Unclear etiology, L hepatic lobe T2 signaling on MRI, but unclear and unlikely to cause findings. Otherwise, no obvious metastatic lesion seen there  - With no intrahepatic biliary ductal dilation but mild distal CBD dilation  - Hepatitis panel and autoimmune panel negative. Appreciate hepatology recs  - Overall LFTs and now bilirubin are worsening  - S/p ERCP attempts on 9/16 and 9/20, both unable to pass scope due to duodenal stenosis and not able to have biliary decompression by IR due to lack of intrahepatic ductal dilation  - Recommend Surgical Oncology consult as noted above     #Bilateral Pulmonary Emboli  - CTA chest 9/9/22 showed bilateral pulmonary emboli  - Increased Lovenox dose from PPX dose to 1 mg/kg BID (not considered a failure of Lovenox given she was on a prophylactic dose)      Aryles Hedjar, MD, PGY-5  Hematology/Oncology Fellow  [facility]  Pager: [phone]  After 5PM and on weekends and holidays, please call the inpatient fellow on call.

## 2022-09-20 NOTE — PROGRESS NOTE ADULT - PROBLEM SELECTOR PLAN 2
- GI, onc, surg/onc following  - EGD:  Infiltrative changes in gastric body concerning gastric malignancy, Congested duodenal mucosa, biopsy = poorly differentiated adenocarcinoma  - staging CT chest - Mediastinal/bilateral hilar/R internal mammary LAD  - mediastinal lymph node biopsy results - Metastatic adenocarcinoma   - pantoprazole  40 mg BID  - OR 8/26 with J tube placement; no further change in management    pain Control; Palliative will attempt to transition to morphine for cost control  -Morphine 15mg q2h for severe Pain 7-10  -Methadone 5mg q12h standing    PRICING UPDATES:  Lovenox: $1/month covered under [facility] medicaid  Tube Feeds and pump: ~$120/month  Methadone: 36$/month  Reglan: $60/month  Morphine: $1 COVERED    Family meeting held, everyone understands steps moving forward

## 2022-09-20 NOTE — PROGRESS NOTE ADULT - PROBLEM SELECTOR PLAN 8
Impression: Stable anemia. May be combination of Iron deficiency anemia from poor PO intake and normocytic anemia from Anemia of Chronic Disease w/ this presentation c/f gastric malignancy.  - Total Iron 22, TIBC 165, transferrin 149  H/H stable, continue to monitor

## 2022-09-20 NOTE — PROGRESS NOTE ADULT - ATTENDING COMMENTS
58 y/o F presented ~one month ago with abdominal pain, n/v and CT C/A/P showed abdominal and thoracic LAD, 3 mm RML nodule, and gastric body thickening s/p EUS by GI on 8/16 showing infiltrative gastric body changes w/pathology showing poorly differentiated gastric adenocarcinoma (+AE1/3, CK7, CK20, CDX2, neg for ER), MS-stable, HER 2 equivocal FISH pending. 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  Then course c/b fevers, found to have PE on AC and also started on Zosyn 10 day course completed over the weekend. Unfortunately, she now has rising bilirubin. Unclear etiology s/p ERCP attempts on 9/16 and 9/20, both times unable to pass scope due to duodenal stenosis and not able to have biliary decompression by IR due to lack of intrahepatic ductal dilation (there is some mild CBD thickening). Imaging with hypodensity in left lobe of liver, but no significant liver mets. Asking surgery to weigh in but unlikely any intervention.     Her bili went from 1.6 to 2.9 to 3.9 to 5.3 today. Difficult to provide systemic chemotherapy with rising bili although there are some case series of FOLFOX given with hyperbilirubinemia. Will discuss with upper GI med onc at [facility] to determine if patient could be eligible in an attempt to provide her with some cancer directed therapy.

## 2022-09-20 NOTE — PACU DISCHARGE NOTE - PAIN:
Controlled with current regime
Stable

## 2022-09-20 NOTE — PROGRESS NOTE ADULT - PROBLEM SELECTOR PLAN 5
Impression: Pt has vomiting and intermittent episodes. Vomiting associated with eating meals. Likely related to gastric adenocarcinoma.     Reglan 5mg q4h; Standing Resolved   Vomiting was associated with eating meals. Likely related to gastric adenocarcinoma.     Reglan 5mg q4h; Standing

## 2022-09-20 NOTE — PRE-OP CHECKLIST - NS PREOP CHK HIBICLENS NA
Head , normocephalic , atraumatic , Face , Face within normal limits , Ears , External ears within normal limits , Nose/Nasopharynx , External nose  normal appearance , Mouth and Throat , Oral cavity appearance normal
N/A

## 2022-09-20 NOTE — PROGRESS NOTE ADULT - ATTENDING COMMENTS
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 Rising transaminases, which on initial CT imaging was suggestive of CBD dilation and possible cholangitis. MRCP was done and showed possible cholangitis and CBD stricture. ERCP attempted x2 however unsuccessful due to tumor burden     #Transaminitis  #Possible cholangitis and CBD stricture  - Suspect this is hypervascularity caused by cancer vs. cholangitis  - Hep panel from prior admission negative, repeat panel sent and negative for acute infection   - AMA, Smooth muscle ab negative low suspicion for autoimmune hepatitis, other autoimmune serology pending   - MRCP reviewed and appreciate hepatology/advanced GI evaluation. ERCP attempted x2 and unsuccessful due to tumor burden. Per IR, there is no window for percutaneous biliary access given lack of intrahepatic biliary ductal dilatation,    - s/p IV Zosyn 9/9-9/18. If clinical decompensation, will restart antibiotics   - Imaging showing no evidence of metastatic involvement of liver   - Unclear next steps - can she receive chemo with LFTs? Following up with oncology     #Metastatic gastric adenoCA to lymph nodes:  - Possible plan for inpatient chemo, however will need to readdress as LFTs remain elevated and unsuccessful ERCP   - F/u appt scheduled for 9/19, sal needs to be reschedule     #Severe protein calorie malnutrition s/p J tube insertion:  - CM has been able to arrange for lower pricing of tube feeds and pump and family has agreed to pay for costs     #Hypercoagulable state with new PEs:   - Remains on therapeutic Lovenox, priced out 1$/month which pt can afford  - On hold for possible procedure     #Neoplasm related pain:  - Pain better controlled on current regimen    #Dispo: awaiting above workup, plan for discharge home when medically ready    Discussed plan with patient - she states she communicates with her family and did not wish for me to update anyone today   Discussed with housestaff 57F found to have metastatic gastric CA to lymph nodes, course c/b DEVI (now resolved), poor PO tolerance s/p J tube insertion and new PEs now on therapeutic Lovenox. Rising transaminases, which on initial CT imaging was suggestive of CBD dilation and possible cholangitis. MRCP was done and showed possible cholangitis and CBD stricture. ERCP attempted x2 however unsuccessful due to tumor burden     #Transaminitis  #Possible cholangitis and CBD stricture  - Suspect this is hypervascularity caused by cancer vs. cholangitis  - Hep panel from prior admission negative, repeat panel sent and negative for acute infection   - AMA, Smooth muscle ab negative low suspicion for autoimmune hepatitis, other autoimmune serology pending   - MRCP reviewed and appreciate hepatology/advanced GI evaluation. ERCP attempted x2 and unsuccessful due to tumor burden. Per IR, there is no window for percutaneous biliary access given lack of intrahepatic biliary ductal dilatation,    - s/p IV Zosyn 9/9-9/18. If clinical decompensation, will restart antibiotics   - Imaging showing no evidence of metastatic involvement of liver   - Unclear next steps - can she receive chemo with LFTs? Following up with oncology     #Metastatic gastric adenoCA to lymph nodes:  - Possible plan for inpatient chemo, however will need to readdress as LFTs remain elevated and unsuccessful ERCP   - F/u appt scheduled for 9/19, sal needs to be reschedule     #Severe protein calorie malnutrition s/p J tube insertion:  - CM has been able to arrange for lower pricing of tube feeds and pump and family has agreed to pay for costs     #Hypercoagulable state with new PEs:   - Remains on therapeutic Lovenox, priced out 1$/month which pt can afford    #Neoplasm related pain:  - Pain better controlled on current regimen    #Dispo: awaiting above workup, plan for discharge home when medically ready    Discussed plan with patient - she states she communicates with her family and did not wish for me to update anyone today   Discussed with housestaff 57F found to have metastatic gastric CA to lymph nodes, course c/b DEVI (now resolved), poor PO tolerance s/p J tube insertion and new PEs now on therapeutic Lovenox. Rising transaminases, which on initial CT imaging was suggestive of CBD dilation and possible cholangitis. MRCP was done and showed possible cholangitis and CBD stricture. ERCP attempted x2 however unsuccessful due to tumor burden     #Transaminitis  #Possible cholangitis and CBD stricture  - Suspect this is hypervascularity caused by cancer vs. cholangitis  - Hep panel from prior admission negative, repeat panel sent and negative for acute infection   - AMA, Smooth muscle ab negative low suspicion for autoimmune hepatitis, other autoimmune serology pending   - MRCP reviewed and appreciate hepatology/advanced GI evaluation. ERCP attempted x2 and unsuccessful due to tumor burden. Per IR, there is no window for percutaneous biliary access given lack of intrahepatic biliary ductal dilatation --> will see if there are any surgical options, surg-onc to follow-up ? stenting vs. resection to allow GI scope to pass?   - s/p IV Zosyn 9/9-9/18. If clinical decompensation, will restart antibiotics   - Imaging showing no evidence of metastatic involvement of liver       #Metastatic gastric adenoCA to lymph nodes:  - Discussed with onc fellow Dr. Subramanian --> given elevated LFTs, patient is not a chemo candidate at this time - will see if there are any surgical options available as attempts at ERCP failed. Seen by surg-onc earlier in course and was not a candidate for resection given extensive disease.  - F/u appt scheduled for 9/19 @ [facility] will be rescheduled once discharge date is made clearer     #Severe protein calorie malnutrition s/p J tube insertion:  - CM has been able to arrange for lower pricing of tube feeds and pump and family has agreed to pay for costs     #Hypercoagulable state with new PEs:   - Remains on therapeutic Lovenox, priced out 1$/month which pt can afford    #Neoplasm related pain:  - Pain better controlled on current regimen    #Dispo: awaiting above workup, plan for discharge home when medically ready    Discussed plan with patient - she states she communicates with her family and did not wish for me to update anyone today   Discussed with housestaff

## 2022-09-20 NOTE — PROGRESS NOTE ADULT - SUBJECTIVE AND OBJECTIVE BOX
INTERVAL HPI/OVERNIGHT EVENTS:  Patient S&E at bedside. She still notes pain, decreased intake. She denies N/V. No F/C, CP, SOB, rash, or edema      VITAL SIGNS:  T(F): 97.9 (09-20-22 @ 13:00)  HR: 99 (09-20-22 @ 13:00)  BP: 122/83 (09-20-22 @ 13:00)  RR: 17 (09-20-22 @ 13:00)  SpO2: 97% (09-20-22 @ 13:00)  Wt(kg): --    PHYSICAL EXAM:    Constitutional: NAD but weak  Eyes: EOMI, sclera non-icteric  Neck: supple  Respiratory: no increased WOB, CTAB/L  Cardiovascular: RRR, S1, S2, no m/g/r  Gastrointestinal: soft, NTND, no masses palpable, no HSM  Extremities: no c/c/e  Neurological: AAOx3    MEDICATIONS  (STANDING):  Biotene Dry Mouth Oral Rinse 5 milliLiter(s) Swish and Spit two times a day  enoxaparin Injectable 65 milliGRAM(s) SubCutaneous every 12 hours  lidocaine   4% Patch 1 Patch Transdermal daily  methadone   Solution 5 milliGRAM(s) Enteral Tube every 12 hours  pantoprazole  Injectable 40 milliGRAM(s) IV Push daily    MEDICATIONS  (PRN):  bisacodyl Suppository 10 milliGRAM(s) Rectal daily PRN Constipation  morphine   Solution 15 milliGRAM(s) Enteral Tube every 2 hours PRN Severe Pain (7 - 10)  polyethylene glycol 3350 17 Gram(s) Oral daily PRN Constipation      LABS:                        10.3   12.95 )-----------( 389      ( 20 Sep 2022 06:01 )             33.5     09-20    133<L>  |  99  |  16  ----------------------------<  114<H>  4.4   |  23  |  0.69    Ca    8.4      20 Sep 2022 06:01  Phos  3.6     09-20  Mg     2.00     09-20    TPro  7.1  /  Alb  2.7<L>  /  TBili  5.3<H>  /  DBili  x   /  AST  168<H>  /  ALT  160<H>  /  AlkPhos  796<H>  09-20    PT/INR - ( 19 Sep 2022 04:00 )   PT: 12.1 sec;   INR: 1.04 ratio               RADIOLOGY & ADDITIONAL TESTS:

## 2022-09-21 NOTE — PROGRESS NOTE ADULT - PROBLEM SELECTOR PLAN 3
- Patient originally named her cousin Last Gonzales as main HCP and her son as alternate   - Patient changed mind- wants her son Dmitry Umanzor to be primary HCP, Last Gonzales as alternate   - New HCP form in chart

## 2022-09-21 NOTE — PROGRESS NOTE ADULT - PROBLEM SELECTOR PLAN 5
Resolved   Vomiting was associated with eating meals. Likely related to gastric adenocarcinoma.     Reglan 5mg q4h; Standing

## 2022-09-21 NOTE — PROGRESS NOTE ADULT - ASSESSMENT
57F with metastatic gastric CA, s/p EGD and robotic feeding J tube placement 8/26.    Impression/plan:  - Pt has been on 27cc/hr TFs for a few days, can now attempt increasing to 30cc/hr.  - Rising TBili: appears to be related to CBD stricture however unclear if other sites of disease - at this time there are no viable surgical options that would be beneficial within this patient's goals of care. Would recommend discussion with IR about percutaneous options  - Appreciate nutrition input  - Remaining care per primary team    D Team Surgery  [id number]  57F with metastatic gastric CA, s/p EGD and robotic feeding J tube placement 8/26.    Impression/plan:  - S/p ERCP attempt by GI yesterday however unable to pass scope past duodenum  - Rising TBili: appears to be related to CBD stricture however unclear if other sites of disease - at this time there are no viable surgical options that would be beneficial within this patient's goals of care. Would recommend discussion with IR about percutaneous options  - Pt has been on 27cc/hr TFs for a few days, can now attempt increasing to 30cc/hr.  - Appreciate nutrition input  - Remaining care per primary team    D Team Surgery  [id number]

## 2022-09-21 NOTE — PROGRESS NOTE ADULT - ATTENDING COMMENTS
58 y/o F presented ~one month ago with abdominal pain, n/v and CT C/A/P showed abdominal and thoracic LAD, 3 mm RML nodule, and gastric body thickening s/p EUS by GI on 8/16 showing infiltrative gastric body changes w/pathology showing poorly differentiated gastric adenocarcinoma (+AE1/3, CK7, CK20, CDX2, neg for ER), MS-stable, HER 2 equivocal FISH pending. 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  Then course c/b fevers, found to have PE on AC and also started on Zosyn 10 day course completed over the weekend. Unfortunately, she now has rising bilirubin. Unclear etiology s/p ERCP attempts on 9/16 and 9/20, both times unable to pass scope due to duodenal stenosis and not able to have biliary decompression by IR due to lack of intrahepatic ductal dilation (there is some mild CBD thickening). Imaging with hypodensity in left lobe of liver, but no significant liver mets. No role for surgical intervention either.      Her bili continues to rise and is 6.2 today. While not ideal to provide systemic chemotherapy with rising bili, there are instances where FOLFOX can be given with hyperbilirubinemia if considered to be secondary to cancer involvement which is likely in this patient's case. After lengthy discussion with patient, her son Dmitry who was at bedside, and daughter Ann via telephone, risks and benefits were explained to patient and she elected to pursue chemotherapy. In an effort to improve her clinical status will provide her with cancer directed therapy while inpatient. Primary team to arrange PICC line and once access obtained can begin FOLFOX C1D1.

## 2022-09-21 NOTE — PROGRESS NOTE ADULT - ATTENDING COMMENTS
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 Rising transaminases, which on initial CT imaging was suggestive of CBD dilation and possible cholangitis. MRCP was done and showed possible cholangitis and CBD stricture. ERCP attempted x2 however unsuccessful due to tumor burden     #Transaminitis  #Possible cholangitis and CBD stricture  - Suspect this is hypervascularity caused by cancer vs. cholangitis  - Hep panel from prior admission negative, repeat panel sent and negative for acute infection   - AMA, Smooth muscle ab negative low suspicion for autoimmune hepatitis, other autoimmune serology pending   - MRCP reviewed and appreciate hepatology/advanced GI evaluation.   - ERCP attempted x2 and unsuccessful due to tumor burden. Per IR, there is no window for percutaneous biliary access given lack of intrahepatic biliary ductal dilatation. Surg-onc re-eval appreciated and there are no surgical options available either.   - Discussed with onc fellow Dr. Subramanian - plan is to offer patient inpatient chemo as obstructive patten may be due to infiltrative cancer   - s/p IV Zosyn 9/9-9/18. 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 Subramanian - plan is to offer patient inpatient chemo --> TTE and PICC pending   - F/u appt scheduled for 9/19 @ [facility] will be rescheduled once discharge date is made clearer     #Severe protein calorie malnutrition s/p J tube insertion:  - CM has been able to arrange for lower pricing of tube feeds and pump and family has agreed to pay for costs     #Hypercoagulable state with new PEs:   - Remains on therapeutic Lovenox, priced out 1$/month which pt can afford    #Neoplasm related pain:  - Pall care following and recs appreciated regarding pain regimen     #Dispo: awaiting above workup, plan for discharge home when medically ready    Discussed plan with patient and son son Dmitry Umanzor at bedside   Discussed with housestaff

## 2022-09-21 NOTE — PROGRESS NOTE ADULT - PROBLEM SELECTOR PLAN 1
- Epigastric pain 2/2 gastric cancer (linitis plastica), hematemesis   - Patient requiring 3 to 4 PRNS daily   - Increased methadone to 7.5 mg BID (increased 9/21)   - c/w PO morphine IR to 15 mg q4h PRN for breakthrough  - Patient reporting she is sensitive to laxatives; has been having regular BM without a daily bowel regimen; educated to be aware of constipation   - Bowel regimen prn, goal BM every 2 to 3 days

## 2022-09-21 NOTE — PROGRESS NOTE ADULT - PROBLEM SELECTOR PLAN 3
b/l PEs seen on CT 9/9. Denies pleuritic chest pain, SOB. Stable on RA  Cont with lovenox 70 BID    Restart Lovenox after ERCP b/l PEs seen on CT 9/9. Denies pleuritic chest pain, SOB. Stable on RA  Cont with lovenox 70 BID

## 2022-09-21 NOTE — PROGRESS NOTE ADULT - PROBLEM SELECTOR PLAN 2
- Biopsy proven poorly differentiated gastric adenocarcinoma  - Hospital course complicated by PE and hyperbilirubinemia   - Status post laparoscopic evaluation on 8/26/2022 , non resectable tumor. Status post J tube placement.   - Was a candidate for inpatient chemo, however now with worsening hyperbilirubinemia from CBD stricture, unclear origin

## 2022-09-21 NOTE — PROGRESS NOTE ADULT - SUBJECTIVE AND OBJECTIVE BOX
Chief Complaint:  Patient is a 57y old  Female who presents with a chief complaint of abdominal pain & nausea/vomiting (21 Sep 2022 07:18)      Interval Events: s/p attempted ERCP yesterday however due to gastric body mass, stomach is stiff and fixed and unable to pass scope into duodenum  - noted to have some regurgitation of blood today      Hospital Medications:  Biotene Dry Mouth Oral Rinse 5 milliLiter(s) Swish and Spit two times a day  bisacodyl Suppository 10 milliGRAM(s) Rectal daily PRN  enoxaparin Injectable 65 milliGRAM(s) SubCutaneous every 12 hours  lidocaine   4% Patch 1 Patch Transdermal daily  methadone   Solution 7.5 milliGRAM(s) Enteral Tube every 12 hours  morphine   Solution 15 milliGRAM(s) Enteral Tube every 4 hours PRN  pantoprazole  Injectable 40 milliGRAM(s) IV Push daily  polyethylene glycol 3350 17 Gram(s) Oral daily PRN      PMHX/PSHX:  Nephrolithiasis            ROS:     General:  No weight loss, fevers, chills, night sweats, fatigue   Eyes:  No vision changes  ENT:  No sore throat, pain, runny nose  CV:  No chest pain, palpitations, dizziness   Resp:  No SOB, cough, wheezing  GI:  See HPI  :  No burning with urination, hematuria  Muscle:  No pain, weakness  Neuro:  No weakness/tingling, memory problems  Psych:  No fatigue, insomnia, mood problems, depression  Heme:  No easy bruisability  Skin:  No rash, edema      PHYSICAL EXAM:     GENERAL:  Well developed, no distress  HEENT:  NC/AT,  conjunctivae clear, sclera anicteric  CHEST:  Full & symmetric excursion, no increased effort w/ respirations  HEART:  Regular rhythm & rate  ABDOMEN:  Soft, non-tender, non-distended, Jtube in place  EXTREMITIES:  no LE  edema  SKIN:  No rash/erythema/ecchymoses/petechiae/wounds/jaundice  NEURO:  Alert, oriented    Vital Signs:  Vital Signs Last 24 Hrs  T(C): 36.8 (21 Sep 2022 09:08), Max: 36.8 (20 Sep 2022 17:00)  T(F): 98.2 (21 Sep 2022 09:08), Max: 98.3 (21 Sep 2022 01:02)  HR: 105 (21 Sep 2022 09:08) (99 - 108)  BP: 130/88 (21 Sep 2022 09:08) (121/81 - 136/89)  BP(mean): --  RR: 19 (21 Sep 2022 09:08) (16 - 19)  SpO2: 99% (21 Sep 2022 09:08) (95% - 99%)    Parameters below as of 21 Sep 2022 09:08  Patient On (Oxygen Delivery Method): room air      Daily     Daily     LABS:                        10.3   16.39 )-----------( 448      ( 21 Sep 2022 06:40 )             33.5     09-21    133<L>  |  99  |  20  ----------------------------<  160<H>  4.5   |  22  |  0.78    Ca    8.5      21 Sep 2022 06:40  Phos  3.3     09-21  Mg     2.20     09-21    TPro  7.1  /  Alb  2.8<L>  /  TBili  6.2<H>  /  DBili  x   /  AST  180<H>  /  ALT  154<H>  /  AlkPhos  877<H>  09-21    LIVER FUNCTIONS - ( 21 Sep 2022 06:40 )  Alb: 2.8 g/dL / Pro: 7.1 g/dL / ALK PHOS: 877 U/L / ALT: 154 U/L / AST: 180 U/L / GGT: x                   Imaging:    < from: Upper EUS (09.20.22 @ 10:30) >  Findings:       EGD:       -The esophagus was normal.       -There was large friable infiltrative tumor in the entire stomach and        intestine. The gastroscope could not go beyond the bulb due to tumor.       EUS: A linear scope was inserted to determine if there was a window for        choledochoduodenostomy.       -The large caliber scope could not traverse the gastric body due to        infiltrative tumor that created a stiff immobile stomach (fixed        resistance of the scope). Therefore EUS guided choledochoduodenostomy        was not possible.    < end of copied text >

## 2022-09-21 NOTE — PROGRESS NOTE ADULT - SUBJECTIVE AND OBJECTIVE BOX
INTERVAL HPI/OVERNIGHT EVENTS:  Patient S&E at bedside. She still has persistent abdominal pain, but controlled. Has been spitting up blood recently. No F/C, CP, SOB, N/V, rash, or edema.      VITAL SIGNS:  T(F): 97.7 (09-21-22 @ 14:18)  HR: 98 (09-21-22 @ 14:18)  BP: 129/77 (09-21-22 @ 14:18)  RR: 17 (09-21-22 @ 14:18)  SpO2: 97% (09-21-22 @ 14:18)  Wt(kg): --    PHYSICAL EXAM:    Constitutional: NAD, fatigued but alert  Eyes: EOMI, sclera non-icteric  Neck: supple  Respiratory: no increased WOB, CTAB/L  Cardiovascular: RRR, S1, S2, no m/g/r  Gastrointestinal: soft, NTND, no masses palpable, no HSM  Extremities: no c/c/e  Neurological: AAOx3    MEDICATIONS  (STANDING):  Biotene Dry Mouth Oral Rinse 5 milliLiter(s) Swish and Spit two times a day  enoxaparin Injectable 65 milliGRAM(s) SubCutaneous every 12 hours  lidocaine   4% Patch 1 Patch Transdermal daily  methadone   Solution 7.5 milliGRAM(s) Enteral Tube every 12 hours  pantoprazole  Injectable 40 milliGRAM(s) IV Push daily    MEDICATIONS  (PRN):  bisacodyl Suppository 10 milliGRAM(s) Rectal daily PRN Constipation  morphine   Solution 15 milliGRAM(s) Enteral Tube every 4 hours PRN Breakthrough pain  polyethylene glycol 3350 17 Gram(s) Oral daily PRN Constipation      LABS:                        10.3   16.39 )-----------( 448      ( 21 Sep 2022 06:40 )             33.5     09-21    133<L>  |  99  |  20  ----------------------------<  160<H>  4.5   |  22  |  0.78    Ca    8.5      21 Sep 2022 06:40  Phos  3.3     09-21  Mg     2.20     09-21    TPro  7.1  /  Alb  2.8<L>  /  TBili  6.2<H>  /  DBili  x   /  AST  180<H>  /  ALT  154<H>  /  AlkPhos  877<H>  09-21          RADIOLOGY & ADDITIONAL TESTS:

## 2022-09-21 NOTE — PROGRESS NOTE ADULT - SUBJECTIVE AND OBJECTIVE BOX
Internal Medicine Progress Note    Patient is a 57y old  Female who presents with a chief complaint of abdominal pain & nausea/vomiting (20 Sep 2022 17:25)    OVERNIGHT EVENTS/SUBJECTIVE:    OBJECTIVE:  Vital Signs Last 24 Hrs  T(C): 36.6 (21 Sep 2022 05:08), Max: 37.1 (20 Sep 2022 09:04)  T(F): 97.8 (21 Sep 2022 05:08), Max: 98.8 (20 Sep 2022 09:04)  HR: 101 (21 Sep 2022 05:08) (90 - 110)  BP: 123/83 (21 Sep 2022 05:08) (120/90 - 148/93)  BP(mean): --  RR: 16 (21 Sep 2022 05:08) (12 - 20)  SpO2: 97% (21 Sep 2022 05:08) (95% - 100%)    Parameters below as of 21 Sep 2022 05:08  Patient On (Oxygen Delivery Method): room air      I&O's Detail    Daily Height in cm: 157 (20 Sep 2022 09:04)    Daily   Physical Exam:  General: NAD, resting comfortably in bed  Neuro: A&Ox4, 5/5 strength in all ext  HEENT: NC/AT, EOMI, normal hearing, oral mucosa moist, no oral lesions noted, no pharyngeal erythema, uvula midline  Neck: supple, thyroid not enlarged, no LAD  Resp: Breathing comfortably on RA, LCTA b/l  CV: Normal sinus rhythm, S1 and S2, no r/m/g  Abd: soft, non-distended, non-tender. No HSM.  Ext: ROMIx4, no edema, +2 pulses bilaterally  Skin: Warm and dry, no rashes or discolorations  Psych: Appropriate affect    Medications:  MEDICATIONS  (STANDING):  Biotene Dry Mouth Oral Rinse 5 milliLiter(s) Swish and Spit two times a day  enoxaparin Injectable 65 milliGRAM(s) SubCutaneous every 12 hours  lidocaine   4% Patch 1 Patch Transdermal daily  methadone   Solution 5 milliGRAM(s) Enteral Tube every 12 hours  pantoprazole  Injectable 40 milliGRAM(s) IV Push daily    MEDICATIONS  (PRN):  bisacodyl Suppository 10 milliGRAM(s) Rectal daily PRN Constipation  morphine   Solution 15 milliGRAM(s) Enteral Tube every 2 hours PRN Severe Pain (7 - 10)  polyethylene glycol 3350 17 Gram(s) Oral daily PRN Constipation      Labs:                        10.3   12.95 )-----------( 389      ( 20 Sep 2022 06:01 )             33.5     09-20    133<L>  |  99  |  16  ----------------------------<  114<H>  4.4   |  23  |  0.69    Ca    8.4      20 Sep 2022 06:01  Phos  3.6     09-20  Mg     2.00     09-20    TPro  7.1  /  Alb  2.7<L>  /  TBili  5.3<H>  /  DBili  x   /  AST  168<H>  /  ALT  160<H>  /  AlkPhos  796<H>  09-20        COVID-19 PCR: NotDetec (19 Sep 2022 12:50)  COVID-19 PCR: NotDetec (14 Sep 2022 13:05)  SARS-CoV-2: NotDetec (08 Sep 2022 18:15)  COVID-19 PCR: NotDetec (08 Sep 2022 07:31)  COVID-19 PCR: NotDetec (02 Sep 2022 06:00)  COVID-19 PCR: NotDetec (25 Aug 2022 08:02)  COVID-19 PCR: NotDetec (21 Aug 2022 06:49)  COVID-19 PCR: NotDetec (14 Aug 2022 17:47)      Radiology: Internal Medicine Progress Note    Patient is a 57y old  Female who presents with a chief complaint of abdominal pain & nausea/vomiting (20 Sep 2022 17:25)    OVERNIGHT EVENTS/SUBJECTIVE: Pt reports spitting up some blood. She still has some abd pain but the morphine helps. Denies HA, CP, palps, diarrhea, fever.    OBJECTIVE:  Vital Signs Last 24 Hrs  T(C): 36.6 (21 Sep 2022 05:08), Max: 37.1 (20 Sep 2022 09:04)  T(F): 97.8 (21 Sep 2022 05:08), Max: 98.8 (20 Sep 2022 09:04)  HR: 101 (21 Sep 2022 05:08) (90 - 110)  BP: 123/83 (21 Sep 2022 05:08) (120/90 - 148/93)  BP(mean): --  RR: 16 (21 Sep 2022 05:08) (12 - 20)  SpO2: 97% (21 Sep 2022 05:08) (95% - 100%)    Parameters below as of 21 Sep 2022 05:08  Patient On (Oxygen Delivery Method): room air      I&O's Detail    Daily Height in cm: 157 (20 Sep 2022 09:04)    Daily   Physical Exam:  General: NAD, resting comfortably in bed  Neuro: A&Ox4,  no gross deficits   HEENT: NC/AT, EOMI, normal hearing, oral mucosa moist, no oral lesions noted  Resp: Breathing comfortably on RA, LCTA b/l  CV: Normal sinus rhythm, S1 and S2, no r/m/g  Abd: soft, non-distended, non-tender. J tube in place   Ext: no edema, +2 pulses bilaterally  Skin: Warm and dry, no rashes or discolorations  Psych: Appropriate affect    Medications:  MEDICATIONS  (STANDING):  Biotene Dry Mouth Oral Rinse 5 milliLiter(s) Swish and Spit two times a day  enoxaparin Injectable 65 milliGRAM(s) SubCutaneous every 12 hours  lidocaine   4% Patch 1 Patch Transdermal daily  methadone   Solution 5 milliGRAM(s) Enteral Tube every 12 hours  pantoprazole  Injectable 40 milliGRAM(s) IV Push daily    MEDICATIONS  (PRN):  bisacodyl Suppository 10 milliGRAM(s) Rectal daily PRN Constipation  morphine   Solution 15 milliGRAM(s) Enteral Tube every 2 hours PRN Severe Pain (7 - 10)  polyethylene glycol 3350 17 Gram(s) Oral daily PRN Constipation      Labs:                        10.3   12.95 )-----------( 389      ( 20 Sep 2022 06:01 )             33.5     09-20    133<L>  |  99  |  16  ----------------------------<  114<H>  4.4   |  23  |  0.69    Ca    8.4      20 Sep 2022 06:01  Phos  3.6     09-20  Mg     2.00     09-20    TPro  7.1  /  Alb  2.7<L>  /  TBili  5.3<H>  /  DBili  x   /  AST  168<H>  /  ALT  160<H>  /  AlkPhos  796<H>  09-20        COVID-19 PCR: NotDetec (19 Sep 2022 12:50)  COVID-19 PCR: NotDetec (14 Sep 2022 13:05)  SARS-CoV-2: NotDetec (08 Sep 2022 18:15)  COVID-19 PCR: NotDetec (08 Sep 2022 07:31)  COVID-19 PCR: NotDetec (02 Sep 2022 06:00)  COVID-19 PCR: NotDetec (25 Aug 2022 08:02)  COVID-19 PCR: NotDetec (21 Aug 2022 06:49)  COVID-19 PCR: NotDetec (14 Aug 2022 17:47)      Radiology:

## 2022-09-21 NOTE — PROGRESS NOTE ADULT - PROBLEM SELECTOR PLAN 4
- Palliative consulted for pain management   - Patient is uninsured, pending insurance approval to be able to go to [facility] for treatment   - On discharge, patient must have follow up with outpatient palliative to continue prescribing pain meds

## 2022-09-21 NOTE — PROGRESS NOTE ADULT - PROBLEM SELECTOR PLAN 1
LFTs are elevating without a clear source; no evidence of mets to liver  RUQ U/S results are equivocal; shows some mild bile duct dilatation similar to CT on 9/9; new from CT on 8/14  CT shows no mets to the liver at the moment    Hepatitis labs from June and July 2022 show no signs of HepB/C infection  Appreciated Heme/Onc Recs    MRCP showed Stricture of CBD  Consulted Advanced Endoscopy; ERCP 9/16 unsuccessful, repeat ERCP 9/20  Onc said elevated transaminitis and new elevated T Bili could interfere with chemo      TBili and LFTs are rising  Plan: speak with GI, Onc, IR to address this issue  - c/w zosyn for cholangitis coverage. LFTs are elevating without a clear source; no evidence of mets to liver  RUQ U/S results are equivocal; shows some mild bile duct dilatation similar to CT on 9/9; new from CT on 8/14  CT shows no mets to the liver at the moment    Hepatitis labs from June and July 2022 show no signs of HepB/C infection  MRCP showed Stricture of CBD  Consulted Advanced Endoscopy; ERCP 9/16 unsuccessful, repeat ERCP 9/20 unsuccessful    TBili improving  Bloody sputum likely due to trauma from scope and friable mass in stomach

## 2022-09-21 NOTE — PROGRESS NOTE ADULT - SUBJECTIVE AND OBJECTIVE BOX
SUBJECTIVE AND OBJECTIVE:  Indication for Geriatrics and Palliative Care Services/INTERVAL HPI: Pain management     INTERVAL EVENTS:     LFTs continue to rise, unable to decompress biliary stricture, hard to access.     Patient seen this AM, with son Dmitry at bedside. Pain controlled, but still requiring 3 to 4 prns throughout the day. Patient is bringing up bloody secretions. Patient says that she would like her HCP changed to her son Dmitry, and make her cousin [employer]. Son works at a [employer] and is usually not able to  the phone. He was on the way to work today when he was on the phone with the patient, and she had been talking about "giving up" so son decided to stay with patient today, forgoing work. Son was deeply emotional and cried, saying "I can find a million jobs, but not another mom." It has been hard to see his mother's decline so suddenly. Emotional support provided and chaplaincy called.     DNR on chart:  Allergies    No Known Allergies    Intolerances    MEDICATIONS  (STANDING):  MEDICATIONS  (STANDING):  Biotene Dry Mouth Oral Rinse 5 milliLiter(s) Swish and Spit two times a day  enoxaparin Injectable 70 milliGRAM(s) SubCutaneous every 12 hours  lidocaine   4% Patch 1 Patch Transdermal daily  methadone   Solution 5 milliGRAM(s) Enteral Tube every 12 hours  metoclopramide   Syrup 5 milliGRAM(s) Oral every 4 hours  pantoprazole   Suspension 40 milliGRAM(s) Oral daily  piperacillin/tazobactam IVPB.. 3.375 Gram(s) IV Intermittent every 8 hours    MEDICATIONS  (PRN):  bisacodyl Suppository 10 milliGRAM(s) Rectal daily PRN Constipation  morphine   Solution 15 milliGRAM(s) Enteral Tube every 2 hours PRN Severe Pain (7 - 10)  polyethylene glycol 3350 17 Gram(s) Oral daily PRN Constipation      ITEMS UNCHECKED ARE NOT PRESENT    PRESENT SYMPTOMS: [ ]Unable to self-report - see [ ] CPOT [ ] PAINADS [ ] RDOS  Source if other than patient:  [ ]Family   [ ]Team     Pain:  [x ]yes [ ]no  QOL impact - moderately affecting.  Location -    epigastric area               Aggravating factors - none  Quality - sharp  Radiation - to the back  Timing- constant  Severity (0-10 scale): 10/10  Minimal acceptable level (0-10 scale): 4/10    Dyspnea:                           [ ]Mild [ ]Moderate [ ]Severe    RDOS:  0 to 2  minimal or no respiratory distress   3  mild distress  4 to 6 moderate distress  >7 severe distress  [email or web address] (Ctrl +  left click to view)     Anxiety:                             [ ]Mild [ ]Moderate [ ]Severe  Fatigue:                             [ ]Mild [ ]Moderate [ ]Severe  Nausea:                             [ ]Mild [ ]Moderate [ ]Severe  Loss of appetite:              [ ]Mild [ ]Moderate [ ]Severe  Constipation:                    [ ]Mild [ ]Moderate [ ]Severe    PCSSQ[Palliative Care Spiritual Screening Question]   Severity (0-10):  Score of 4 or > indicate consideration of Chaplaincy referral.  Chaplaincy Referral: [ ] yes [ ] refused [ ] following    Other Symptoms:  [ ]All other review of systems negative     Vital Signs Last 24 Hrs  T(C): 37.1 (13 Sep 2022 21:31), Max: 37.1 (13 Sep 2022 21:31)  T(F): 98.7 (13 Sep 2022 21:31), Max: 98.7 (13 Sep 2022 21:31)  HR: 96 (13 Sep 2022 21:31) (96 - 102)  BP: 141/81 (13 Sep 2022 21:31) (128/88 - 142/91)  BP(mean): --  RR: 18 (13 Sep 2022 21:31) (18 - 18)  SpO2: 96% (13 Sep 2022 21:31) (96% - 98%)    Parameters below as of 13 Sep 2022 21:31  Patient On (Oxygen Delivery Method): room air      GENERAL: [ ]Cachexia    [x ]Alert  [x ]Oriented    [ ]Lethargic  [ ]Unarousable  [ x]Verbal  [ ]Non-Verbal  Behavioral:   [ ]Anxiety  [ ]Delirium [ ]Agitation [ ]Other  HEENT:  [x ]Normal   [ ]Dry mouth   [ ]ET Tube/Trach  [ ]Oral lesions  PULMONARY:   [x ]Clear [ ]Tachypnea  [ ]Audible excessive secretions   [ ]Rhonchi        [ ]Right [ ]Left [ ]Bilateral  [ ]Crackles        [ ]Right [ ]Left [ ]Bilateral  [ ]Wheezing     [ ]Right [ ]Left [ ]Bilateral  [ ]Diminished BS [ ] Right [ ]Left [ ]Bilateral  CARDIOVASCULAR:    [x ]Regular [ ]Irregular [ ]Tachy  [ ]Luis Miguel [ ]Murmur [ ]Other  GASTROINTESTINAL:  [x ]Soft  [ ]Distended   [ ]+BS  [ ]Non tender [ ]Tender  [ ]Other [X ]J tube [ ]OGT/ NGT Patient has laparoscopic surgical incisions covered by  gauze    GENITOURINARY:  [ ]Normal [ ]Incontinent   [ ]Oliguria/Anuria   [x ]Patterson  MUSCULOSKELETAL:   [ x]Normal   [ ]Weakness  [ ]Bed/Wheelchair bound [ ]Edema  NEUROLOGIC:   [x ]No focal deficits  [ ] Cognitive impairment  [ ] Dysphagia [ ]Dysarthria [ ] Paresis [ ]Other   SKIN:   [x ]Normal  [ ]Rash  [ ]Other  [ ]Pressure ulcer(s) [ ]y [x ]n present on admission    CRITICAL CARE:  [ ]Shock Present  [ ]Septic [ ]Cardiogenic [ ]Neurologic [ ]Hypovolemic  [ ]Vasopressors [ ]Inotropes  [ ]Respiratory failure present [ ]Mechanical Ventilation [ ]Non-invasive ventilatory support [ ]High-Flow   [ ]Acute  [ ]Chronic [ ]Hypoxic  [ ]Hypercarbic [ ]Other  [ ]Other organ failure     LABS:                          10.3   16.39 )-----------( 448      ( 21 Sep 2022 06:40 )             33.5     09-21    133<L>  |  99  |  20  ----------------------------<  160<H>  4.5   |  22  |  0.78    Ca    8.5      21 Sep 2022 06:40  Phos  3.3     09-21  Mg     2.20     09-21    TPro  7.1  /  Alb  2.8<L>  /  TBili  6.2<H>  /  DBili  x   /  AST  180<H>  /  ALT  154<H>  /  AlkPhos  877<H>  09-21      Protein Calorie Malnutrition Present: [ ]mild [ ]moderate [ ]severe [ ]underweight [ ]morbid obesity  https://www.andeal.org/vault/2440/web/files/ONC/Table_Clinical%20Characteristics%20to%20Document%20Malnutrition-White%20JV%20et%20al%202012.pdf    Height (cm): 157.5 (08-26-22 @ 07:10)  Weight (kg): 71.1 (08-26-22 @ 07:11)  BMI (kg/m2): 28.7 (08-26-22 @ 07:11)    [ ]PPSV2 < or = 30%  [ ]significant weight loss [ ]poor nutritional intake [ ]anasarca[X ]Artificial Nutrition - J tube     Other REFERRALS:  [ ]Hospice  [ ]Child Life  [ ]Social Work  [ ]Case management [ ]Holistic Therapy

## 2022-09-21 NOTE — CHART NOTE - NSCHARTNOTEFT_GEN_A_CORE
Source: Patient [x ]    Family [ ]     other [ x] chart review    Patient seen for f/u for severe malnutrition. 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    Patient continues on Two Joseluis via GJ @ 27 mL/hr. x 24 hrs. for total volume 648 mL and noted to be @ goal rate per observation. Provides 1,296 kcal, 54.1 g pro and 454 mL of fluid (TF free water). Patient reports N/V at times since last assessment. Discussed Jevity 1.5 as it's lower in fat, which patient was amenable to. No edema or pressure injuries noted per RN flow sheet.     Diet : Diet, NPO with Tube Feed:   Tube Feeding Modality: Jejunostomy  TwoCal HN (TWOCALHNRTH)  Total Volume for 24 Hours (mL): 648  Continuous  Starting Tube Feed Rate {mL per Hour}: 21  Increase Tube Feed Rate by (mL): 3     Every 6 hours  Until Goal Tube Feed Rate (mL per Hour): 27  Tube Feed Duration (in Hours): 24  Tube Feed Start Time: 10:00 (09-20-22 @ 15:45)    Current Weight: Weight (kg): 65 kg (9/20)  65.8 kg (9/16)  71.1 kg (8/23)  71.1 kg (8/15)  Weight Change: -8.6% weight loss x 1 month, will continue to monitor    Pertinent Medications: MEDICATIONS  (STANDING):  Biotene Dry Mouth Oral Rinse 5 milliLiter(s) Swish and Spit two times a day  enoxaparin Injectable 65 milliGRAM(s) SubCutaneous every 12 hours  lidocaine   4% Patch 1 Patch Transdermal daily  methadone   Solution 7.5 milliGRAM(s) Enteral Tube every 12 hours  pantoprazole  Injectable 40 milliGRAM(s) IV Push daily    MEDICATIONS  (PRN):  bisacodyl Suppository 10 milliGRAM(s) Rectal daily PRN Constipation  morphine   Solution 15 milliGRAM(s) Enteral Tube every 4 hours PRN Breakthrough pain  polyethylene glycol 3350 17 Gram(s) Oral daily PRN Constipation    Pertinent Labs:  09-21 Na133 mmol/L<L> Glu 160 mg/dL<H> K+ 4.5 mmol/L Cr  0.78 mg/dL BUN 20 mg/dL 09-21 Phos 3.3 mg/dL 09-21 Alb 2.8 g/dL<L>    Estimated Needs: [x ] no change since previous assessment: based on ideal body weight 110 lbs / 50 kg  6142-4219 kcal daily @ 30-35 kcal/kg, 60-75 gm protein daily @ 1.2-1.5 gm/kg     Previous Nutrition Diagnosis: Severe malnutrition    Nutrition Diagnosis is [x ] ongoing  [ ] resolved [ ] not applicable     Interventions:   - managed with Two Joseluis tube feeds    Recommend  - Consider adjusting tube feed provision to Jevity 1.5 via GJ @ 45 mL/hr. x 24 hrs. for total volume 1,080 mL. Provides 1,620 kcal (32.4 kcal/kg), 69 g pro (1.4 g/kg) based on IBW 50 kg and 821 mL of fluid (TF free water), defer additional free water flushes to team. Start tube feeds @ 25 mL/hr. and increase by 10 mL/hr. every 6 hrs. to goal rate @ 45 mL/hr.  - obtain weekly weight to monitor trend    Monitoring and Evaluation:   [x ] Tolerance to diet prescription [x ] weights [x ] follow up per protocol Source: Patient [x ]    Family [ ]     other [ x] chart review    Patient seen for f/u for severe malnutrition. 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    Patient continues on Two Joseluis via GJ @ 27 mL/hr. x 24 hrs. for total volume 648 mL and noted to be @ goal rate per observation. Provides 1,296 kcal, 54.1 g pro and 454 mL of fluid (TF free water). Patient reports N/V at times since last assessment. Discussed Jevity 1.5 as it's lower in fat, which patient was amenable to. No edema or pressure injuries noted per RN flow sheet.     Diet : Diet, NPO with Tube Feed:   Tube Feeding Modality: Jejunostomy  TwoCal HN (TWOCALHNRTH)  Total Volume for 24 Hours (mL): 648  Continuous  Starting Tube Feed Rate {mL per Hour}: 21  Increase Tube Feed Rate by (mL): 3     Every 6 hours  Until Goal Tube Feed Rate (mL per Hour): 27  Tube Feed Duration (in Hours): 24  Tube Feed Start Time: 10:00 (09-20-22 @ 15:45)    Current Weight: Weight (kg): 65 kg (9/20)  65.8 kg (9/16)  71.1 kg (8/23)  71.1 kg (8/15)  Weight Change: -8.6% weight loss x 1 month, will continue to monitor    Pertinent Medications: MEDICATIONS  (STANDING):  Biotene Dry Mouth Oral Rinse 5 milliLiter(s) Swish and Spit two times a day  enoxaparin Injectable 65 milliGRAM(s) SubCutaneous every 12 hours  lidocaine   4% Patch 1 Patch Transdermal daily  methadone   Solution 7.5 milliGRAM(s) Enteral Tube every 12 hours  pantoprazole  Injectable 40 milliGRAM(s) IV Push daily    MEDICATIONS  (PRN):  bisacodyl Suppository 10 milliGRAM(s) Rectal daily PRN Constipation  morphine   Solution 15 milliGRAM(s) Enteral Tube every 4 hours PRN Breakthrough pain  polyethylene glycol 3350 17 Gram(s) Oral daily PRN Constipation    Pertinent Labs:  09-21 Na133 mmol/L<L> Glu 160 mg/dL<H> K+ 4.5 mmol/L Cr  0.78 mg/dL BUN 20 mg/dL 09-21 Phos 3.3 mg/dL 09-21 Alb 2.8 g/dL<L>    Estimated Needs: [x ] no change since previous assessment: based on ideal body weight 110 lbs / 50 kg  6322-3598 kcal daily @ 30-35 kcal/kg, 60-75 gm protein daily @ 1.2-1.5 gm/kg     Previous Nutrition Diagnosis: Severe malnutrition    Nutrition Diagnosis is [x ] ongoing  [ ] resolved [ ] not applicable     Interventions:   - managed with Two Joseluis tube feeds    Recommend  - To better meet estimated needs, recommend increase tube feed provision to Two Joseluis @ 35 mL/hr. x 24 hrs. for total volume 840 mL. Provides 1,680 kcal (33.6 kcal/kg), 70.1 g pro (1.4 g/kg) based on weight 50 kg and 588 mL of fluid (TF free water), defer additional free water flushes to team.  - obtain weekly weight to monitor trend    Monitoring and Evaluation:   [x ] Tolerance to diet prescription [x ] weights [x ] follow up per protocol

## 2022-09-21 NOTE — PROGRESS NOTE ADULT - NUTRITIONAL ASSESSMENT
This patient has been assessed with a concern for Malnutrition and has been determined to have a diagnosis/diagnoses of Severe protein-calorie malnutrition.    This patient is being managed with:   Diet NPO with Tube Feed-  Tube Feeding Modality: Jejunostomy  TwoCal HN (TWOCALHNRTH)  Total Volume for 24 Hours (mL): 648  Continuous  Starting Tube Feed Rate {mL per Hour}: 21  Increase Tube Feed Rate by (mL): 3     Every 6 hours  Until Goal Tube Feed Rate (mL per Hour): 27  Tube Feed Duration (in Hours): 24  Tube Feed Start Time: 10:00  Entered: Sep 20 2022  3:46PM

## 2022-09-21 NOTE — PROGRESS NOTE ADULT - ATTENDING COMMENTS
57F progressive abdominal pain, N/V, and weight loss, found to have poorly differentiated metastatic gastric adenocarcinoma to LNs s/p J-tube placement (8/26/22) course complicated by acute pulmonary emboli, increasing transaminases, alk phos. MR done with CBD stricture and abn enhancement of left lobe of liver.    Impression:  #CBD stricture - with rising transaminases.  - s/p EGD 9/16: Malignant gastric tumor in the gastric body. A moderate stenosis secondary to infiltrative disease was found in the distal duodenal bulb and was non-traversed. Given stenosis and large gastric mass, the decision was made to abort the ERCP.

## 2022-09-21 NOTE — PROGRESS NOTE ADULT - SUBJECTIVE AND OBJECTIVE BOX
Subjective:   Patient seen at bedside this AM. Appears to be tolerating feeds. Denies significant pain    Objective:  Vital Signs  T(C): 36.5 (09-21 @ 14:18), Max: 36.8 (09-20 @ 17:00)  HR: 98 (09-21 @ 14:18) (98 - 108)  BP: 129/77 (09-21 @ 14:18) (121/81 - 136/89)  RR: 17 (09-21 @ 14:18) (16 - 19)  SpO2: 97% (09-21 @ 14:18) (95% - 99%)    Physical Exam:  GEN: resting in bed NAD  RESP: no increased WOB  ABD: soft, mildly distended, minimally tender. J-tube site c/d/i   EXTR: warm, well-perfused, no edema  NEURO: awake, alert    Labs:                        10.3   16.39 )-----------( 448      ( 21 Sep 2022 06:40 )             33.5   09-21    133<L>  |  99  |  20  ----------------------------<  160<H>  4.5   |  22  |  0.78    Ca    8.5      21 Sep 2022 06:40  Phos  3.3     09-21  Mg     2.20     09-21    TPro  7.1  /  Alb  2.8<L>  /  TBili  6.2<H>  /  DBili  x   /  AST  180<H>  /  ALT  154<H>  /  AlkPhos  877<H>  09-21    CAPILLARY BLOOD GLUCOSE          Imaging:

## 2022-09-21 NOTE — PROGRESS NOTE ADULT - ASSESSMENT
56 yo F with a PMH of nephrolithiasis who p/w progressive abdominal pain, N/V, and weight loss, found to have poorly differentiated metastatic gastric adenocarcinoma, course complicated by acute pulmonary emboli, fever, transaminitis, and r/o cholangitis.    #Metastatic Gastric Adenocarcinoma/Transaminitis/Hyperbilirubinemia  - Presented with abdominal pain, N/V, and weight loss  - Admission CT C/A/P shows abdominal and thoracic (subdiaphragmatic, mediastinal, subcarinal and bilateral hilar) LAD, 3 mm RML nodule, and gastric body thickening and gastrocolic ligament nodularity  - CEA elevated (1544);  mildly elevated, CA 19-9 normal  - S/p EUS by GI on 8/16 showing infiltrative gastric body changes w/pathology showing poorly differentiated gastric adenocarcinoma (+AE1/3, CK7, CK20, CDX2, neg for ER), MS-stable  - HER2 2+ (equivocal) but RIAN negative, not a candidate for Trastuzumab  - S/p EBUS with level 7 mediastinal LN bx on 8/23 confirming metastasis of gastric adenocarcinoma, and ascitic fluid (trace ascites) also confirming disease there with signet-ring cells  - S/p J-tube placement by surgery; 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 Tube feeds per primary team/surgery  - Pain management by primary team and palliative care, appreciate recs  - C/w PPI BID  - CT C/A/P 9/9/22 show progression of mediastinal and b/l hilar lymphadenopathy, enhancement of CBD concerning for possible ?cholangitis, and left hydroureteronephrosis likely sequela of neoplasm/metastatic disease, small to moderate progressive ascites  - Completed 10 days of Zosyn  - Developed transaminitis/hyperbilirubinemia, L hepatic lobe T2 signaling on MRI, but unclear and without obvious gross metastatic lesions. No intrahepatic biliary ductal dilation but does have mild distal CBD dilation/thickening  - Hepatitis panel and autoimmune panel negative. Appreciate hepatology recs  - GI attempted ERCP on 9/16 and 9/20 but could not pass scope through gastric mass into duodenum due to obstruction  - No surgical options; not able to perform per rupesh drain by IR due to lack of intrahepatic biliary dilation  - Clinically suspect that patient has metastatic disease causing hepato-biliary dysfunction particularly including CBD dilation. Although not ideal given hyperbilirubinemia, will plan to give FOLFOX inpatient for the goal of improved hepatobiliary function by tumor shrinkage. Discussed with primary team, patient, her son Dmitry, and daughter Ann. Patient and family are in agreement and consent signed  - Likely plan for treatment either tomorrow 9/22 or Friday 9/23 pending PICC placement    #Bilateral Pulmonary Emboli  - CTA chest 9/9/22 showed bilateral pulmonary emboli  - Increased Lovenox dose from PPX dose to 1 mg/kg BID (not considered a failure of Lovenox given she was on a prophylactic dose)  - Patient does note spitting up blood recently. Likely in the setting of known malignancy. However, would continue to monitor closely for now given stable counts. However, discussed with patient if bleeding or counts worsen, then she would likely have to be removed off A/C      Aryles Hedjar, MD, PGY-5  Hematology/Oncology Fellow  [facility]  Pager: [phone]  After 5PM and on weekends and holidays, please call the inpatient fellow on call.

## 2022-09-21 NOTE — PROGRESS NOTE ADULT - ASSESSMENT
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 MR done with CBD stricture and abn enhancement of left lobe of liver.    Impression:  #CBD stricture - with rising transaminases.  - s/p EGD 9/16: Malignant gastric tumor in the gastric body. A moderate stenosis secondary to infiltrative disease was found in the distal duodenal bulb and was non-traversed. Given stenosis and large gastric mass, the decision was made to abort the ERCP.  - s/p EGD 9/20: due to stiff immobile stomach from tumor, unable to pass scope into duodenum  #Abnormal liver enhancement on MRI - unclear etiology. Infectious vs inflammatory vs infiltrative  #Regurgitation of blood - 2/2 gastric cancer    Recommendation:  - diet as tolerated  - PPI daily  - will likely continue to have intermittent bleeding from friable gastric cancer, not amenable to endoscopic intervention  - no further GI w/u indicated at this time, please call back with questions    Note not finalized until signed by attending.    Barbara Nickerson PGY-6  Gastroenterology/Hepatology Fellow  Pager #[phone] (BRAYAN) or [phone] (NS)  Available on [employer] Teams.  Please contact on-call GI fellow via answering service ([phone]) after 5pm and before 8am, and on weekends.

## 2022-09-22 NOTE — PROGRESS NOTE ADULT - PROBLEM SELECTOR PLAN 3
b/l PEs seen on CT 9/9. Denies pleuritic chest pain, SOB. Stable on RA  Cont with lovenox 70 BID Fever to 100.6, HR to 125  - fu blood cultures  - c/w empiric zosyn.

## 2022-09-22 NOTE — CHART NOTE - NSCHARTNOTEFT_GEN_A_CORE
Paged for recommendations as patient is having nausea, IV zofran 4 mg did not help.     Recommend giving a trial  of:   - IV Zofran 8 mg q8h PRN for nausea. If no improvement can try ->   - IV Reglan 10 mg q6h PRN for nausea. If no improvement with this can also try ->   - IV Compazine 10 mg q6h PRN for nausea Paged for recommendations as patient is having nausea, IV zofran 4 mg did not help.     Recommend giving a trial  of:   - IV Zofran 8 mg q8h PRN for nausea/vomiting. If no improvement can try ->   - IV Reglan 10 mg q6h PRN for nausea/vomiting. If no improvement with this can also try ->   - IV Compazine 10 mg q6h PRN for nausea/vomiting

## 2022-09-22 NOTE — PROGRESS NOTE ADULT - PROBLEM SELECTOR PLAN 2
- GI, onc, surg/onc following  - PICC ordered, then onc will start chemo inpt  - EGD:  Infiltrative changes in gastric body concerning gastric malignancy, Congested duodenal mucosa, biopsy = poorly differentiated adenocarcinoma  - staging CT chest - Mediastinal/bilateral hilar/R internal mammary LAD  - mediastinal lymph node biopsy results - Metastatic adenocarcinoma   - pantoprazole  40 mg BID  - OR 8/26 with J tube placement; no further change in management    pain Control; Palliative will attempt to transition to morphine for cost control  -Morphine 15mg q2h for severe Pain 7-10  -Methadone 5mg q12h standing    PRICING UPDATES:  Lovenox: $1/month covered under [facility] medicaid  Tube Feeds and pump: ~$120/month  Methadone: 36$/month  Reglan: $60/month  Morphine: $1 COVERED    Family meeting held, everyone understands steps moving forward Since second ERCP. Per GI, no intervention indicated unless pt becomes HD unstable.   - standing iv zofran, can give prn iv reglan  - can pause j tube feeds  - ppi  - lovenox held   - fu palliative recs   - urgently call GI if large volume or HD unstable.

## 2022-09-22 NOTE — PROGRESS NOTE ADULT - SUBJECTIVE AND OBJECTIVE BOX
INTERVAL HPI/OVERNIGHT EVENTS:  Patient S&E at bedside. She is fatigued, N/V, in persistent pain, particularly in the epigastric area. She had a fever this morning. She denies rash or edema    VITAL SIGNS:  T(F): 98.3 (09-22-22 @ 13:39)  HR: 103 (09-22-22 @ 13:39)  BP: 124/84 (09-22-22 @ 13:39)  RR: 20 (09-22-22 @ 13:39)  SpO2: 95% (09-22-22 @ 13:39)  Wt(kg): --    PHYSICAL EXAM:    Constitutional: NAD, very fatigued  Eyes: EOMI, sclera slightly icteric  Neck: supple  Respiratory: no increased WOB, CTAB/L  Cardiovascular: RRR, S1, S2, no m/g/r  Gastrointestinal: soft, epigastric and RUQ TTP, ND, no masses palpable  Extremities: no c/c/e  Neurological: AAOx3    MEDICATIONS  (STANDING):  Biotene Dry Mouth Oral Rinse 5 milliLiter(s) Swish and Spit two times a day  lidocaine   4% Patch 1 Patch Transdermal daily  methadone   Solution 7.5 milliGRAM(s) Enteral Tube every 12 hours  pantoprazole  Injectable 40 milliGRAM(s) IV Push daily  piperacillin/tazobactam IVPB.. 3.375 Gram(s) IV Intermittent every 8 hours    MEDICATIONS  (PRN):  bisacodyl Suppository 10 milliGRAM(s) Rectal daily PRN Constipation  morphine   Solution 15 milliGRAM(s) Enteral Tube every 4 hours PRN Breakthrough pain  ondansetron Injectable 8 milliGRAM(s) IV Push every 8 hours PRN Nausea and/or Vomiting  polyethylene glycol 3350 17 Gram(s) Oral daily PRN Constipation      LABS:                        10.1   13.69 )-----------( 413      ( 22 Sep 2022 06:35 )             32.7     09-22    133<L>  |  99  |  20  ----------------------------<  133<H>  4.5   |  22  |  0.72    Ca    8.5      22 Sep 2022 06:35  Phos  2.9     09-22  Mg     2.10     09-22    TPro  6.9  /  Alb  2.6<L>  /  TBili  7.1<H>  /  DBili  x   /  AST  245<H>  /  ALT  194<H>  /  AlkPhos  896<H>  09-22          RADIOLOGY & ADDITIONAL TESTS:

## 2022-09-22 NOTE — PROGRESS NOTE ADULT - PROBLEM SELECTOR PLAN 4
Impression: Patient having L leg swelling yesterday, resolved now. Low suspicion for a DVT - (no tachycardia, no pain).   Swelling limited to LLE to the ankle LFTs are elevating without a clear source; no evidence of mets to liver  RUQ U/S results are equivocal; shows some mild bile duct dilatation similar to CT on 9/9; new from CT on 8/14  CT shows no mets to the liver at the moment    Hepatitis labs from June and July 2022 show no signs of HepB/C infection  MRCP showed Stricture of CBD  Consulted Advanced Endoscopy; ERCP 9/16 unsuccessful, repeat ERCP 9/20 unsuccessful    TBili improving  Bloody sputum likely due to trauma from scope and friable mass in stomach

## 2022-09-22 NOTE — PROGRESS NOTE ADULT - PROBLEM SELECTOR PLAN 5
Resolved   Vomiting was associated with eating meals. Likely related to gastric adenocarcinoma.     Reglan 5mg q4h; Standing b/l PEs seen on CT 9/9. Denies pleuritic chest pain, SOB. Stable on RA  Hold  lovenox 70 BID for now for hematemesis

## 2022-09-22 NOTE — PROGRESS NOTE ADULT - ASSESSMENT
56 yo F with a PMH of nephrolithiasis who p/w progressive abdominal pain, N/V, and weight loss, found to have poorly differentiated metastatic gastric adenocarcinoma, course complicated by acute pulmonary emboli, fever, transaminitis, and r/o cholangitis.    #Metastatic Gastric Adenocarcinoma/Transaminitis/Hyperbilirubinemia  - Presented with abdominal pain, N/V, and weight loss  - Admission CT C/A/P shows abdominal and thoracic (subdiaphragmatic, mediastinal, subcarinal and bilateral hilar) LAD, 3 mm RML nodule, and gastric body thickening and gastrocolic ligament nodularity  - CEA elevated (1544);  mildly elevated, CA 19-9 normal  - S/p EUS by GI on 8/16 showing infiltrative gastric body changes w/pathology showing poorly differentiated gastric adenocarcinoma (+AE1/3, CK7, CK20, CDX2, neg for ER), MS-stable  - HER2 2+ (equivocal) but RIAN negative, not a candidate for Trastuzumab  - S/p EBUS with level 7 mediastinal LN bx on 8/23 confirming metastasis of gastric adenocarcinoma, and ascitic fluid (trace ascites) also confirming disease there with signet-ring cells  - S/p J-tube placement by surgery; 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 Tube feeds per primary team/surgery  - Pain management by primary team and palliative care, appreciate recs  - C/w PPI BID  - CT C/A/P 9/9/22 show progression of mediastinal and b/l hilar lymphadenopathy, enhancement of CBD concerning for possible ?cholangitis, and left hydroureteronephrosis likely sequela of neoplasm/metastatic disease, small to moderate progressive ascites  - Completed 10 days of Zosyn  - Developed transaminitis/hyperbilirubinemia, L hepatic lobe T2 signaling on MRI, but unclear and without obvious gross metastatic lesions. No intrahepatic biliary ductal dilation but does have mild distal CBD dilation/thickening  - Hepatitis panel and autoimmune panel negative. Appreciate hepatology recs  - GI attempted ERCP on 9/16 and 9/20 but could not pass scope through gastric mass into duodenum due to obstruction  - No surgical options; not able to perform per rupesh drain by IR due to lack of intrahepatic biliary dilation  - Clinically suspect that patient has metastatic disease causing hepato-biliary dysfunction particularly including CBD dilation. Although not ideal given hyperbilirubinemia, was planned and consented to give FOLFOX inpatient for the goal of improved hepatobiliary function by tumor shrinkage. Was also planned for PICC line, but developed a fever this morning. Although fever may be tumor fever, infectious workup per primary team and PICC line placement held for now  - Will still want to have PICC line placed and start chemotherapy as soon as possible when cleared from an infectious standpoint, given worsening hepato-biliary function    #Bilateral Pulmonary Emboli  - CTA chest 9/9/22 showed bilateral pulmonary emboli during admission while on PPX dose of Lovenox  - Increased Lovenox dose to 1 mg/kg BID (full A/C dose), but now having hematemesis, and A/C therefore stopped for now. Potentially when stable, could be a candidate for IVC filter      Aryles Hedjar, MD, PGY-5  Hematology/Oncology Fellow  [facility]  Pager: [phone]  After 5PM and on weekends and holidays, please call the inpatient fellow on call.

## 2022-09-22 NOTE — PROGRESS NOTE ADULT - NUTRITIONAL ASSESSMENT
This patient has been assessed with a concern for Malnutrition and has been determined to have a diagnosis/diagnoses of Severe protein-calorie malnutrition.    This patient is being managed with:   Diet NPO with Tube Feed-  Tube Feeding Modality: Jejunostomy  TwoCal HN (TWOCALHNRTH)  Total Volume for 24 Hours (mL): 720  Continuous  Starting Tube Feed Rate {mL per Hour}: 21  Increase Tube Feed Rate by (mL): 3     Every 6 hours  Until Goal Tube Feed Rate (mL per Hour): 30  Tube Feed Duration (in Hours): 24  Tube Feed Start Time: 10:00  Entered: Sep 21 2022  5:32PM

## 2022-09-22 NOTE — PROGRESS NOTE ADULT - ATTENDING COMMENTS
58 y/o F presented ~one month ago with abdominal pain, n/v and CT C/A/P showed abdominal and thoracic LAD, 3 mm RML nodule, and gastric body thickening s/p EUS by GI on 8/16 showing infiltrative gastric body changes w/pathology showing poorly differentiated gastric adenocarcinoma (+AE1/3, CK7, CK20, CDX2, neg for ER), MS-stable, HER 2 equivocal FISH pending. 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  Then course c/b fevers, found to have PE on AC and also started on Zosyn 10 day course completed over the weekend. Unfortunately, she now has rising bilirubin. Unclear etiology s/p ERCP attempts on 9/16 and 9/20, both times unable to pass scope due to duodenal stenosis and not able to have biliary decompression by IR due to lack of intrahepatic ductal dilation (there is some mild CBD thickening). Imaging with hypodensity in left lobe of liver, but no significant liver mets. No role for surgical intervention either.      Her bili continues to rise and is 7.1 today with elevated transaminases. Although initial plan after discussion with patient and family on 9/21/22 was for PICC line and to initiate FOLFOX C1D1 inpatient, Ms. Gordillo had a fever over night to 100.6 and placed back on Zosyn, therefore PICC line on hold. Additionally she experienced hematemesis 150-300cc and so anticoagulation held, counts are stable. Patient evaluated at bedside, she is withdrawn and has admitted to feeling like she wants to give up. Consider inpatient counselling/psych referral. Once she has PICC will consider chemo.

## 2022-09-22 NOTE — PROGRESS NOTE ADULT - PROBLEM SELECTOR PLAN 9
DVT ppx: lovenox    Dispo planning: complex dispo planning due to lack of insurance  Emergency medicaid can cover outpatient chemo/transport. DVT ppx: lovenox held for now, SCDs instead     Dispo planning: complex dispo planning due to lack of insurance  Emergency medicaid can cover outpatient chemo/transport.

## 2022-09-22 NOTE — PROGRESS NOTE ADULT - SUBJECTIVE AND OBJECTIVE BOX
Internal Medicine Progress Note    Patient is a 57y old  Female who presents with a chief complaint of abdominal pain & nausea/vomiting (21 Sep 2022 18:39)    OVERNIGHT EVENTS/SUBJECTIVE:    OBJECTIVE:  Vital Signs Last 24 Hrs  T(C): 38.1 (22 Sep 2022 05:24), Max: 38.1 (22 Sep 2022 05:24)  T(F): 100.6 (22 Sep 2022 05:24), Max: 100.6 (22 Sep 2022 05:24)  HR: 125 (22 Sep 2022 05:24) (98 - 125)  BP: 132/85 (22 Sep 2022 05:24) (116/84 - 139/78)  BP(mean): --  RR: 16 (22 Sep 2022 05:24) (16 - 19)  SpO2: 97% (22 Sep 2022 05:24) (97% - 99%)    Parameters below as of 22 Sep 2022 05:24  Patient On (Oxygen Delivery Method): room air      I&O's Detail    Daily     Daily   Physical Exam:  General: NAD, resting comfortably in bed  Neuro: A&Ox4, 5/5 strength in all ext  HEENT: NC/AT, EOMI, normal hearing, oral mucosa moist, no oral lesions noted, no pharyngeal erythema, uvula midline  Neck: supple, thyroid not enlarged, no LAD  Resp: Breathing comfortably on RA, LCTA b/l  CV: Normal sinus rhythm, S1 and S2, no r/m/g  Abd: soft, non-distended, non-tender. No HSM.  Ext: ROMIx4, no edema, +2 pulses bilaterally  Skin: Warm and dry, no rashes or discolorations  Psych: Appropriate affect    Medications:  MEDICATIONS  (STANDING):  Biotene Dry Mouth Oral Rinse 5 milliLiter(s) Swish and Spit two times a day  enoxaparin Injectable 65 milliGRAM(s) SubCutaneous every 12 hours  lidocaine   4% Patch 1 Patch Transdermal daily  methadone   Solution 7.5 milliGRAM(s) Enteral Tube every 12 hours  pantoprazole  Injectable 40 milliGRAM(s) IV Push daily    MEDICATIONS  (PRN):  bisacodyl Suppository 10 milliGRAM(s) Rectal daily PRN Constipation  morphine   Solution 15 milliGRAM(s) Enteral Tube every 4 hours PRN Breakthrough pain  polyethylene glycol 3350 17 Gram(s) Oral daily PRN Constipation      Labs:                        10.3   16.39 )-----------( 448      ( 21 Sep 2022 06:40 )             33.5     09-21    133<L>  |  99  |  20  ----------------------------<  160<H>  4.5   |  22  |  0.78    Ca    8.5      21 Sep 2022 06:40  Phos  3.3     09-21  Mg     2.20     09-21    TPro  7.1  /  Alb  2.8<L>  /  TBili  6.2<H>  /  DBili  x   /  AST  180<H>  /  ALT  154<H>  /  AlkPhos  877<H>  09-21        COVID-19 PCR: NotDetec (19 Sep 2022 12:50)  COVID-19 PCR: NotDetec (14 Sep 2022 13:05)  SARS-CoV-2: NotDetec (08 Sep 2022 18:15)  COVID-19 PCR: NotDetec (08 Sep 2022 07:31)  COVID-19 PCR: NotDetec (02 Sep 2022 06:00)  COVID-19 PCR: NotDetec (25 Aug 2022 08:02)  COVID-19 PCR: NotDetec (21 Aug 2022 06:49)  COVID-19 PCR: NotDetec (14 Aug 2022 17:47)      Radiology: Internal Medicine Progress Note    Patient is a 57y old  Female who presents with a chief complaint of abdominal pain & nausea/vomiting (21 Sep 2022 18:39)    OVERNIGHT EVENTS/SUBJECTIVE: Overnight, pt spit up 100-150 estimated cc bloody vomitus. Temp spiked to 100.6F, HR to 125. She feels very sad and overwhelemed about her situation and occasionally mentions giving up. She says she doesn't think the zofran helps. Endorses nausea, hematemesis. Denies HA, CP, SOB, palps.      OBJECTIVE:  Vital Signs Last 24 Hrs  T(C): 38.1 (22 Sep 2022 05:24), Max: 38.1 (22 Sep 2022 05:24)  T(F): 100.6 (22 Sep 2022 05:24), Max: 100.6 (22 Sep 2022 05:24)  HR: 125 (22 Sep 2022 05:24) (98 - 125)  BP: 132/85 (22 Sep 2022 05:24) (116/84 - 139/78)  BP(mean): --  RR: 16 (22 Sep 2022 05:24) (16 - 19)  SpO2: 97% (22 Sep 2022 05:24) (97% - 99%)    Parameters below as of 22 Sep 2022 05:24  Patient On (Oxygen Delivery Method): room air      I&O's Detail    Daily     Physical Exam:  General: NAD, resting comfortably in bed  Neuro: A&Ox4, no gross deficits   HEENT: NC/AT, EOMI, normal hearing  Resp: Breathing comfortably on RA, LCTA b/l  CV: tachycardic, S1 and S2, no r/m/g  Abd: soft, non-distended, non-tender  Ext: no edema, +2 pulses bilaterally  Skin: Warm and dry, no rashes or discolorations  Psych: despondent     Medications:  MEDICATIONS  (STANDING):  Biotene Dry Mouth Oral Rinse 5 milliLiter(s) Swish and Spit two times a day  enoxaparin Injectable 65 milliGRAM(s) SubCutaneous every 12 hours  lidocaine   4% Patch 1 Patch Transdermal daily  methadone   Solution 7.5 milliGRAM(s) Enteral Tube every 12 hours  pantoprazole  Injectable 40 milliGRAM(s) IV Push daily    MEDICATIONS  (PRN):  bisacodyl Suppository 10 milliGRAM(s) Rectal daily PRN Constipation  morphine   Solution 15 milliGRAM(s) Enteral Tube every 4 hours PRN Breakthrough pain  polyethylene glycol 3350 17 Gram(s) Oral daily PRN Constipation      Labs:                        10.3   16.39 )-----------( 448      ( 21 Sep 2022 06:40 )             33.5     09-21    133<L>  |  99  |  20  ----------------------------<  160<H>  4.5   |  22  |  0.78    Ca    8.5      21 Sep 2022 06:40  Phos  3.3     09-21  Mg     2.20     09-21    TPro  7.1  /  Alb  2.8<L>  /  TBili  6.2<H>  /  DBili  x   /  AST  180<H>  /  ALT  154<H>  /  AlkPhos  877<H>  09-21        COVID-19 PCR: NotDetec (19 Sep 2022 12:50)  COVID-19 PCR: NotDetec (14 Sep 2022 13:05)  SARS-CoV-2: NotDetec (08 Sep 2022 18:15)  COVID-19 PCR: NotDetec (08 Sep 2022 07:31)  COVID-19 PCR: NotDetec (02 Sep 2022 06:00)  COVID-19 PCR: NotDetec (25 Aug 2022 08:02)  COVID-19 PCR: NotDetec (21 Aug 2022 06:49)  COVID-19 PCR: NotDetec (14 Aug 2022 17:47)      Radiology:

## 2022-09-22 NOTE — PROGRESS NOTE ADULT - ATTENDING COMMENTS
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 Rising transaminases, which on initial CT imaging was suggestive of CBD dilation and possible cholangitis. MRCP was done and showed possible cholangitis and CBD stricture. ERCP attempted x2 however unsuccessful due to tumor burden. Planning for inpatient chemo, but on hold for now due to fevers     #Sepsis  - Febrile and tachycardia, meeting sepsis criteria   - Unclear etiology     #Transaminitis  #Possible cholangitis and CBD stricture  - Suspect this is hypervascularity caused by cancer vs. cholangitis  - Hep panel from prior admission negative, repeat panel sent and negative for acute infection   - AMA, Smooth muscle ab negative low suspicion for autoimmune hepatitis, other autoimmune serology pending   - MRCP reviewed and appreciate hepatology/advanced GI evaluation.   - ERCP attempted x2 and unsuccessful due to tumor burden. Per IR, there is no window for percutaneous biliary access given lack of intrahepatic biliary ductal dilatation. Surg-onc re-eval appreciated and there are no surgical options available either.   - Discussed with onc fellow Dr. Subramanian - plan is to offer patient inpatient chemo as obstructive patten may be due to infiltrative cancer   - s/p IV Zosyn 9/9-9/18. 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 Subramanian - plan is to offer patient inpatient chemo --> TTE and PICC pending   - F/u appt scheduled for 9/19 @ [facility] will be rescheduled once discharge date is made clearer     #Severe protein calorie malnutrition s/p J tube insertion:  - CM has been able to arrange for lower pricing of tube feeds and pump and family has agreed to pay for costs     #Hypercoagulable state with new PEs:   - Remains on therapeutic Lovenox, priced out 1$/month which pt can afford    #Neoplasm related pain:  - Pall care following and recs appreciated regarding pain regimen     #Dispo: awaiting above workup, plan for discharge home when medically ready    Discussed plan with patient and son son Dmitry Umanzor at bedside   Discussed with housestaff 57F found to have metastatic gastric CA to lymph nodes, course c/b DEVI (now resolved), poor PO tolerance s/p J tube insertion and new PEs now on therapeutic Lovenox. Rising transaminases, which on initial CT imaging was suggestive of CBD dilation and possible cholangitis. MRCP was done and showed possible cholangitis and CBD stricture. ERCP attempted x2 however unsuccessful due to tumor burden. Planning for inpatient chemo, but on hold for now due to fevers     #Sepsis  - Febrile and tachycardia, meeting sepsis criteria   - Unclear etiology - cholangitis vs. aspiration PNA from ERCP vs. tumor fever   - Blood cultures sent, UA, UCx and RVP pending   - CXR from yesterday reviewed and stable   - Zosyn started empirically   - PICC line and chemo on hold due to fever     #Hematemesis   - Likely due to friable gastric CA and recent ERCP procedure   - GI aware  - Monitor CBC   - Lovenox ON HOLD due to hematemesis - if does not resolve will need to discuss with GI if there is anything else to offer   - Anti-emetics PRN     #Transaminitis  #Possible cholangitis and CBD stricture  - Suspect this is hypervascularity caused by cancer vs. cholangitis  - Hep panel from prior admission negative, repeat panel sent and negative for acute infection   - AMA, Smooth muscle ab negative low suspicion for autoimmune hepatitis, other autoimmune serology pending   - MRCP reviewed and appreciate hepatology/advanced GI evaluation.   - ERCP attempted x2 and unsuccessful due to tumor burden. Per IR, there is no window for percutaneous biliary access given lack of intrahepatic biliary ductal dilatation. Surg-onc re-eval appreciated and there are no surgical options available either.   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:  - Discussed with onc - plan is to offer patient inpatient chemo --> PICC pending but on hold due to fever  - F/u appt scheduled for 9/19 @ [facility] will be rescheduled once discharge date is made clearer     #Severe protein calorie malnutrition s/p J tube insertion:  -  has been able to arrange for lower pricing of tube feeds and pump and family has agreed to pay for costs     #Hypercoagulable state with new PEs:   - Lovenox ON HOLD due to hematemesis   - Remains on therapeutic Lovenox, priced out 1$/month which pt can afford    #Neoplasm related pain:  - Pall care following and recs appreciated regarding pain regimen     #Dispo: awaiting above workup, plan for discharge home when medically ready    Discussed plan with patient

## 2022-09-22 NOTE — PROGRESS NOTE ADULT - PROBLEM SELECTOR PLAN 1
LFTs are elevating without a clear source; no evidence of mets to liver  RUQ U/S results are equivocal; shows some mild bile duct dilatation similar to CT on 9/9; new from CT on 8/14  CT shows no mets to the liver at the moment    Hepatitis labs from June and July 2022 show no signs of HepB/C infection  MRCP showed Stricture of CBD  Consulted [facility]; ERCP 9/16 unsuccessful, repeat ERCP 9/20 unsuccessful    TBili improving  Bloody sputum likely due to trauma from scope and friable mass in stomach - GI, onc, surg/onc following  - PICC (double lumen) ordered (wait until after sepsis workup), then onc will start chemo inpt  - EGD:  Infiltrative changes in gastric body concerning gastric malignancy, Congested duodenal mucosa, biopsy = poorly differentiated adenocarcinoma  - staging CT chest - Mediastinal/bilateral hilar/R internal mammary LAD  - mediastinal lymph node biopsy results - Metastatic adenocarcinoma   - pantoprazole  40 mg BID  - OR 8/26 with J tube placement    pain Control; Palliative will attempt to transition to morphine for cost control  -Morphine 15mg q2h for severe Pain 7-10  -Methadone 5mg q12h standing    PRICING UPDATES:  Lovenox: $1/month covered under [facility] medicaid  Tube Feeds and pump: ~$120/month  Methadone: 36$/month  Reglan: $60/month  Morphine: $1 COVERED    Family meeting held, everyone understands steps moving forward.

## 2022-09-23 NOTE — PROGRESS NOTE ADULT - PROBLEM SELECTOR PLAN 5
- Palliative consulted for pain management   - Patient is uninsured, pending insurance approval to be able to go to [facility] for treatment   - On discharge, patient must have follow up with outpatient palliative to continue prescribing pain meds  - Full code - patient says she is still "fighting"

## 2022-09-23 NOTE — PROGRESS NOTE ADULT - PROBLEM SELECTOR PLAN 1
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

## 2022-09-23 NOTE — PROGRESS NOTE ADULT - PROBLEM SELECTOR PLAN 4
LFTs are elevating without a clear source; no evidence of mets to liver  RUQ U/S results are equivocal; shows some mild bile duct dilatation similar to CT on 9/9; new from CT on 8/14  CT shows no mets to the liver at the moment    Hepatitis labs from June and July 2022 show no signs of HepB/C infection  MRCP showed Stricture of CBD  Consulted [facility]; ERCP 9/16 unsuccessful, repeat ERCP 9/20 unsuccessful    TBili improving  Bloody sputum likely due to trauma from scope and friable mass in stomach

## 2022-09-23 NOTE — PROGRESS NOTE ADULT - PROBLEM SELECTOR PLAN 5
b/l PEs seen on CT 9/9. Denies pleuritic chest pain, SOB. Stable on RA  Hold  lovenox 70 BID for now for hematemesis

## 2022-09-23 NOTE — PROGRESS NOTE ADULT - SUBJECTIVE AND OBJECTIVE BOX
Internal Medicine Progress Note    Patient is a 57y old  Female who presents with a chief complaint of abdominal pain & nausea/vomiting (22 Sep 2022 19:15)    OVERNIGHT EVENTS/SUBJECTIVE:    OBJECTIVE:  Vital Signs Last 24 Hrs  T(C): 36.8 (23 Sep 2022 05:50), Max: 36.8 (22 Sep 2022 13:39)  T(F): 98.2 (23 Sep 2022 05:50), Max: 98.3 (22 Sep 2022 13:39)  HR: 103 (23 Sep 2022 05:50) (103 - 116)  BP: 129/84 (23 Sep 2022 05:50) (121/82 - 129/84)  BP(mean): --  RR: 18 (23 Sep 2022 05:50) (18 - 24)  SpO2: 96% (23 Sep 2022 05:50) (95% - 97%)    Parameters below as of 23 Sep 2022 05:50  Patient On (Oxygen Delivery Method): room air      I&O's Detail    22 Sep 2022 07:01  -  23 Sep 2022 06:49  --------------------------------------------------------  IN:    Enteral Tube Flush: 30 mL  Total IN: 30 mL    OUT:  Total OUT: 0 mL    Total NET: 30 mL        Daily     Daily   Physical Exam:  General: NAD, resting comfortably in bed  Neuro: A&Ox4, 5/5 strength in all ext  HEENT: NC/AT, EOMI, normal hearing, oral mucosa moist, no oral lesions noted, no pharyngeal erythema, uvula midline  Neck: supple, thyroid not enlarged, no LAD  Resp: Breathing comfortably on RA, LCTA b/l  CV: Normal sinus rhythm, S1 and S2, no r/m/g  Abd: soft, non-distended, non-tender. No HSM.  Ext: ROMIx4, no edema, +2 pulses bilaterally  Skin: Warm and dry, no rashes or discolorations  Psych: Appropriate affect    Medications:  MEDICATIONS  (STANDING):  Biotene Dry Mouth Oral Rinse 5 milliLiter(s) Swish and Spit two times a day  lidocaine   4% Patch 1 Patch Transdermal daily  methadone   Solution 7.5 milliGRAM(s) Enteral Tube every 12 hours  pantoprazole  Injectable 40 milliGRAM(s) IV Push daily  piperacillin/tazobactam IVPB.. 3.375 Gram(s) IV Intermittent every 8 hours    MEDICATIONS  (PRN):  bisacodyl Suppository 10 milliGRAM(s) Rectal daily PRN Constipation  morphine   Solution 15 milliGRAM(s) Enteral Tube every 4 hours PRN Breakthrough pain  ondansetron Injectable 8 milliGRAM(s) IV Push every 8 hours PRN Nausea and/or Vomiting  polyethylene glycol 3350 17 Gram(s) Oral daily PRN Constipation      Labs:                        10.1   13.69 )-----------( 413      ( 22 Sep 2022 06:35 )             32.7     09-22    133<L>  |  99  |  20  ----------------------------<  133<H>  4.5   |  22  |  0.72    Ca    8.5      22 Sep 2022 06:35  Phos  2.9     09-22  Mg     2.10     09-22    TPro  6.9  /  Alb  2.6<L>  /  TBili  7.1<H>  /  DBili  x   /  AST  245<H>  /  ALT  194<H>  /  AlkPhos  896<H>  09-22        SARS-CoV-2: NotDetec (22 Sep 2022 11:13)  COVID-19 PCR: NotDetec (19 Sep 2022 12:50)  COVID-19 PCR: NotDetec (14 Sep 2022 13:05)  SARS-CoV-2: NotDetec (08 Sep 2022 18:15)  COVID-19 PCR: NotDetec (08 Sep 2022 07:31)  COVID-19 PCR: NotDetec (02 Sep 2022 06:00)  COVID-19 PCR: NotDetec (25 Aug 2022 08:02)  COVID-19 PCR: NotDetec (21 Aug 2022 06:49)  COVID-19 PCR: NotDetec (14 Aug 2022 17:47)      Radiology: Internal Medicine Progress Note    Patient is a 57y old  Female who presents with a chief complaint of abdominal pain & nausea/vomiting (22 Sep 2022 19:15)    OVERNIGHT EVENTS/SUBJECTIVE: Pt reports epigastric pain and some more hematemesis, but less than yesterday. Denies HA, CP, palps, SOB, const/diarrhea, fever.      OBJECTIVE:  Vital Signs Last 24 Hrs  T(C): 36.8 (23 Sep 2022 05:50), Max: 36.8 (22 Sep 2022 13:39)  T(F): 98.2 (23 Sep 2022 05:50), Max: 98.3 (22 Sep 2022 13:39)  HR: 103 (23 Sep 2022 05:50) (103 - 116)  BP: 129/84 (23 Sep 2022 05:50) (121/82 - 129/84)  BP(mean): --  RR: 18 (23 Sep 2022 05:50) (18 - 24)  SpO2: 96% (23 Sep 2022 05:50) (95% - 97%)    Parameters below as of 23 Sep 2022 05:50  Patient On (Oxygen Delivery Method): room air      I&O's Detail    22 Sep 2022 07:01  -  23 Sep 2022 06:49  --------------------------------------------------------  IN:    Enteral Tube Flush: 30 mL  Total IN: 30 mL    OUT:  Total OUT: 0 mL    Total NET: 30 mL        Daily     Daily   Physical Exam:  General: NAD, resting comfortably in bed  Neuro: A&Ox4, no gross deficits      HEENT: NC/AT, EOMI, normal hearing  Resp: Breathing comfortably on RA, LCTA b/l  CV: Normal sinus rhythm, S1 and S2, no r/m/g  Abd: soft, non-distended, non-tender  Ext: no edema, +2 pulses bilaterally  Skin: Warm and dry, no rashes or discolorations  Psych: despondent    Medications:  MEDICATIONS  (STANDING):  Biotene Dry Mouth Oral Rinse 5 milliLiter(s) Swish and Spit two times a day  lidocaine   4% Patch 1 Patch Transdermal daily  methadone   Solution 7.5 milliGRAM(s) Enteral Tube every 12 hours  pantoprazole  Injectable 40 milliGRAM(s) IV Push daily  piperacillin/tazobactam IVPB.. 3.375 Gram(s) IV Intermittent every 8 hours    MEDICATIONS  (PRN):  bisacodyl Suppository 10 milliGRAM(s) Rectal daily PRN Constipation  morphine   Solution 15 milliGRAM(s) Enteral Tube every 4 hours PRN Breakthrough pain  ondansetron Injectable 8 milliGRAM(s) IV Push every 8 hours PRN Nausea and/or Vomiting  polyethylene glycol 3350 17 Gram(s) Oral daily PRN Constipation      Labs:                        10.1   13.69 )-----------( 413      ( 22 Sep 2022 06:35 )             32.7     09-22    133<L>  |  99  |  20  ----------------------------<  133<H>  4.5   |  22  |  0.72    Ca    8.5      22 Sep 2022 06:35  Phos  2.9     09-22  Mg     2.10     09-22    TPro  6.9  /  Alb  2.6<L>  /  TBili  7.1<H>  /  DBili  x   /  AST  245<H>  /  ALT  194<H>  /  AlkPhos  896<H>  09-22        SARS-CoV-2: NotDetec (22 Sep 2022 11:13)  COVID-19 PCR: NotDetec (19 Sep 2022 12:50)  COVID-19 PCR: NotDetec (14 Sep 2022 13:05)  SARS-CoV-2: NotDetec (08 Sep 2022 18:15)  COVID-19 PCR: NotDetec (08 Sep 2022 07:31)  COVID-19 PCR: NotDetec (02 Sep 2022 06:00)  COVID-19 PCR: NotDetec (25 Aug 2022 08:02)  COVID-19 PCR: NotDetec (21 Aug 2022 06:49)  COVID-19 PCR: NotDetec (14 Aug 2022 17:47)      Radiology: Internal Medicine Progress Note     Patient is a 57y old  Female who presents with a chief complaint of abdominal pain & nausea/vomiting (22 Sep 2022 19:15)    OVERNIGHT EVENTS/SUBJECTIVE: Pt reports epigastric pain and some more hematemesis, but less than yesterday. Denies HA, CP, palps, SOB, const/diarrhea, fever.      OBJECTIVE:  Vital Signs Last 24 Hrs  T(C): 36.8 (23 Sep 2022 05:50), Max: 36.8 (22 Sep 2022 13:39)  T(F): 98.2 (23 Sep 2022 05:50), Max: 98.3 (22 Sep 2022 13:39)  HR: 103 (23 Sep 2022 05:50) (103 - 116)  BP: 129/84 (23 Sep 2022 05:50) (121/82 - 129/84)  BP(mean): --  RR: 18 (23 Sep 2022 05:50) (18 - 24)  SpO2: 96% (23 Sep 2022 05:50) (95% - 97%)    Parameters below as of 23 Sep 2022 05:50  Patient On (Oxygen Delivery Method): room air      I&O's Detail    22 Sep 2022 07:01  -  23 Sep 2022 06:49  --------------------------------------------------------  IN:    Enteral Tube Flush: 30 mL  Total IN: 30 mL    OUT:  Total OUT: 0 mL    Total NET: 30 mL        Daily     Daily   Physical Exam:  General: NAD, resting comfortably in bed  Neuro: A&Ox4, no gross deficits      HEENT: NC/AT, EOMI, normal hearing  Resp: Breathing comfortably on RA, LCTA b/l  CV: Normal sinus rhythm, S1 and S2, no r/m/g  Abd: soft, non-distended, non-tender  Ext: no edema, +2 pulses bilaterally  Skin: Warm and dry, no rashes or discolorations  Psych: despondent    Medications:  MEDICATIONS  (STANDING):  Biotene Dry Mouth Oral Rinse 5 milliLiter(s) Swish and Spit two times a day  lidocaine   4% Patch 1 Patch Transdermal daily  methadone   Solution 7.5 milliGRAM(s) Enteral Tube every 12 hours  pantoprazole  Injectable 40 milliGRAM(s) IV Push daily  piperacillin/tazobactam IVPB.. 3.375 Gram(s) IV Intermittent every 8 hours    MEDICATIONS  (PRN):  bisacodyl Suppository 10 milliGRAM(s) Rectal daily PRN Constipation  morphine   Solution 15 milliGRAM(s) Enteral Tube every 4 hours PRN Breakthrough pain  ondansetron Injectable 8 milliGRAM(s) IV Push every 8 hours PRN Nausea and/or Vomiting  polyethylene glycol 3350 17 Gram(s) Oral daily PRN Constipation      Labs:                        10.1   13.69 )-----------( 413      ( 22 Sep 2022 06:35 )             32.7     09-22    133<L>  |  99  |  20  ----------------------------<  133<H>  4.5   |  22  |  0.72    Ca    8.5      22 Sep 2022 06:35  Phos  2.9     09-22  Mg     2.10     09-22    TPro  6.9  /  Alb  2.6<L>  /  TBili  7.1<H>  /  DBili  x   /  AST  245<H>  /  ALT  194<H>  /  AlkPhos  896<H>  09-22        SARS-CoV-2: NotDetec (22 Sep 2022 11:13)  COVID-19 PCR: NotDetec (19 Sep 2022 12:50)  COVID-19 PCR: NotDetec (14 Sep 2022 13:05)  SARS-CoV-2: NotDetec (08 Sep 2022 18:15)  COVID-19 PCR: NotDetec (08 Sep 2022 07:31)  COVID-19 PCR: NotDetec (02 Sep 2022 06:00)  COVID-19 PCR: NotDetec (25 Aug 2022 08:02)  COVID-19 PCR: NotDetec (21 Aug 2022 06:49)  COVID-19 PCR: NotDetec (14 Aug 2022 17:47)      Radiology:

## 2022-09-23 NOTE — PROGRESS NOTE ADULT - PROBLEM SELECTOR PLAN 2
Since second ERCP. Per GI, no intervention indicated unless pt becomes HD unstable.   - standing iv zofran, can give prn iv reglan  - can pause j tube feeds  - ppi  - lovenox held   - fu palliative recs   - urgently call GI if large volume or HD unstable.

## 2022-09-23 NOTE — PROGRESS NOTE ADULT - SUBJECTIVE AND OBJECTIVE BOX
SUBJECTIVE AND OBJECTIVE:  Indication for Geriatrics and Palliative Care Services/INTERVAL HPI: Pain management   DNR on chart: No     INTERVAL EVENTS: Patient seen this AM, looking tired. Inquired about persistent nausea and hematemesis Says nausea is not constant, on and off. Hard to gauge history, or if certain meds are working or not. I briefly discussed code status, which may have been introduced prior. She says she wants to go peacefully, however when asked about artificially life sustaining machines, she says she would like as she is "still fighting."     LFTS still rising           Allergies    No Known Allergies    Intolerances    MEDICATIONS  (STANDING):  Biotene Dry Mouth Oral Rinse 5 milliLiter(s) Swish and Spit two times a day  enoxaparin Injectable 65 milliGRAM(s) SubCutaneous every 12 hours  lidocaine   4% Patch 1 Patch Transdermal daily  methadone   Solution 7.5 milliGRAM(s) Enteral Tube every 12 hours  pantoprazole  Injectable 40 milliGRAM(s) IV Push daily  piperacillin/tazobactam IVPB.. 3.375 Gram(s) IV Intermittent every 8 hours    MEDICATIONS  (PRN):  bisacodyl Suppository 10 milliGRAM(s) Rectal daily PRN Constipation  morphine   Solution 15 milliGRAM(s) Enteral Tube every 4 hours PRN Breakthrough pain  ondansetron Injectable 8 milliGRAM(s) IV Push every 8 hours PRN Nausea and/or Vomiting  polyethylene glycol 3350 17 Gram(s) Oral daily PRN Constipation      ITEMS UNCHECKED ARE NOT PRESENT    PRESENT SYMPTOMS: [ ]Unable to self-report - see [ ] CPOT [ ] PAINADS [ ] RDOS  Source if other than patient:  [ ]Family   [ ]Team     Pain:  [x ]yes [ ]no  QOL impact - Yes   Location -    epigastric area               Aggravating factors - none  Quality - sharp  Radiation - to the back  Timing- constant  Severity (0-10 scale): 10/10  Minimal acceptable level (0-10 scale): 4/10    Dyspnea:                           [ ]Mild [ ]Moderate [ ]Severe    RDOS:  0 to 2  minimal or no respiratory distress   3  mild distress  4 to 6 moderate distress  >7 severe distress  [email or web address] (Ctrl +  left click to view)     Anxiety:                             [ ]Mild [ ]Moderate [ ]Severe  Fatigue:                             [ ]Mild [ ]Moderate [ ]Severe  Nausea:                             [ ]Mild [x ]Moderate [ ]Severe  Loss of appetite:                [ ]Mild [ ]Moderate [ ]Severe  Constipation:                     [ ]Mild [ ]Moderate [ ]Severe    PCSSQ[Palliative Care Spiritual Screening Question]   Severity (0-10):  Score of 4 or > indicate consideration of Chaplaincy referral.  Chaplaincy Referral: [ ] yes [ ] refused [ ] following    Other Symptoms:  [ ]All other review of systems negative     Vital Signs Last 24 Hrs  Vital Signs Last 24 Hrs  T(C): 36.6 (23 Sep 2022 09:50), Max: 36.8 (22 Sep 2022 13:39)  T(F): 97.8 (23 Sep 2022 09:50), Max: 98.3 (22 Sep 2022 13:39)  HR: 98 (23 Sep 2022 09:50) (98 - 109)  BP: 120/80 (23 Sep 2022 09:50) (120/80 - 129/84)  BP(mean): --  RR: 20 (23 Sep 2022 12:00) (18 - 21)  SpO2: 97% (23 Sep 2022 12:00) (95% - 97%)    Parameters below as of 23 Sep 2022 12:00  Patient On (Oxygen Delivery Method): room air    GENERAL: [ ]Cachexia  Temporal wasting   [x ]Alert  [x ]Oriented    [ ]Lethargic  [ ]Unarousable  [ x]Verbal  [ ]Non-Verbal  Behavioral:   [ ]Anxiety  [ ]Delirium [ ]Agitation [ ]Other  HEENT:  [x ]Normal   [ ]Dry mouth   [ ]ET Tube/Trach  [ ]Oral lesions  PULMONARY:   [x ]Clear [ ]Tachypnea  [ ]Audible excessive secretions   [ ]Rhonchi        [ ]Right [ ]Left [ ]Bilateral  [ ]Crackles        [ ]Right [ ]Left [ ]Bilateral  [ ]Wheezing     [ ]Right [ ]Left [ ]Bilateral  [ ]Diminished BS [ ] Right [ ]Left [ ]Bilateral  CARDIOVASCULAR:    [x ]Regular [ ]Irregular [ ]Tachy  [ ]Luis Miguel [ ]Murmur [ ]Other  GASTROINTESTINAL:  [x ]Soft  [ ]Distended   [ ]+BS  [ ]Non tender [ ]Tender  [ ]Other [X ]J tube [ ]OGT/ NGT Patient has laparoscopic surgical incisions covered by  gauze    GENITOURINARY:  [ ]Normal [ ]Incontinent   [ ]Oliguria/Anuria   [x ]Patterson  MUSCULOSKELETAL:   [ x]Normal   [ ]Weakness  [ ]Bed/Wheelchair bound [ ]Edema  NEUROLOGIC:   [x ]No focal deficits  [ ] Cognitive impairment  [ ] Dysphagia [ ]Dysarthria [ ] Paresis [ ]Other   SKIN:   [x ]Normal  [ ]Rash  [ ]Other  [ ]Pressure ulcer(s) [ ]y [x ]n present on admission    CRITICAL CARE:  [ ]Shock Present  [ ]Septic [ ]Cardiogenic [ ]Neurologic [ ]Hypovolemic  [ ]Vasopressors [ ]Inotropes  [ ]Respiratory failure present [ ]Mechanical Ventilation [ ]Non-invasive ventilatory support [ ]High-Flow   [ ]Acute  [ ]Chronic [ ]Hypoxic  [ ]Hypercarbic [ ]Other  [ ]Other organ failure     LABS:                          10.1   15.22 )-----------( 400      ( 23 Sep 2022 07:05 )             31.8     09-23    135  |  102  |  20  ----------------------------<  143<H>  4.7   |  23  |  0.96    Ca    8.1<L>      23 Sep 2022 07:05  Phos  4.0     09-23  Mg     2.20     09-23    TPro  6.6  /  Alb  2.6<L>  /  TBili  8.3<H>  /  DBili  x   /  AST  208<H>  /  ALT  183<H>  /  AlkPhos  950<H>  09-23      Protein Calorie Malnutrition Present: [ ]mild [ ]moderate [ ]severe [ ]underweight [ ]morbid obesity    Height (cm): 157.5 (08-26-22 @ 07:10)  Weight (kg): 71.1 (08-26-22 @ 07:11)  BMI (kg/m2): 28.7 (08-26-22 @ 07:11)    [ ]PPSV2 < or = 30%  [ ]significant weight loss [ ]poor nutritional intake [ ]anasarca[X ]Artificial Nutrition - J tube     Other REFERRALS:  [ ]Hospice  [ ]Child Life  [ ]Social Work  [ ]Case management [ ]Holistic Therapy

## 2022-09-23 NOTE — PROGRESS NOTE ADULT - TIME BILLING
Metastatic Gastric Adenoca
Metastatic Gastric Cancer
Reviewing the EMR, vitals, imaging, medication list, recent labs, prior records and coordinating care with medical providers
Gastric Cancer
Metastatic Gastric Adenocarcinoma
Metastatic Gastric Cancer
risks and benefits of the procedure, alternative procedures as well as further management plan. Complex patient requiring services. Services provided were separate in additional from general pulmonary services due to critical nature of patient and interventions requires. Time spent separate from time spent doing any procedures.
Gastric cancer
Reviewing the EMR, vitals, imaging, medication list, recent labs, prior records and coordinating care with medical providers
review of laboratory data, radiology results, discussion with primary team\patient, and monitoring for potential decompensation. Interventions were performed as documented above.

## 2022-09-23 NOTE — PROGRESS NOTE ADULT - PROBLEM SELECTOR PLAN 2
- 2/2 metastatic gastric cancer, also with hematemesis, stiff stomach and duodenal stenosis   - Reporting nausea is on and off   - No improvement on IV zofran 4 mg  - c/w trial of IV zofran 8 mg q8h PRN for nausea/vomiting   - If no improvement of above can also trial:       - IV Reglan 10 mg q6h PRN for nausea/vomiting OR       - IV Compazine 10 mg q6h PRN for nausea/vomiting

## 2022-09-23 NOTE — PROGRESS NOTE ADULT - PROBLEM SELECTOR PLAN 9
DVT ppx: lovenox held for now, SCDs instead     Dispo planning: complex dispo planning due to lack of insurance  Emergency medicaid can cover outpatient chemo/transport. DVT ppx: lovenox held for now    Dispo planning: complex dispo planning due to lack of insurance  Emergency medicaid can cover outpatient chemo/transport.

## 2022-09-23 NOTE — PROGRESS NOTE ADULT - PROBLEM SELECTOR PLAN 3
Fever to 100.6, HR to 125  - fu blood cultures  - c/w empiric zosyn. Afebrile not, VSS  - fu blood cultures  - c/w empiric zosyn.

## 2022-09-23 NOTE — PROGRESS NOTE ADULT - ASSESSMENT
57F PMH of renal stones p/w nonresectable gastric adenocarcinoma w/ mets to LN c/b DEVI, now resolved, and nephrolithiasis with L hydronephrosis currently S/p j tube placement, with stable tube feed and stable pain regimen with course c/b transaminitis with ERCP unable to bypass blockages in duodenum to place stent, pending repeat ERCP. 57F PMH of renal stones p/w nonresectable gastric adenocarcinoma w/ mets to LN c/b DEVI, now resolved, and nephrolithiasis with L hydronephrosis currently S/p j tube placement, with stable tube feed and stable pain regimen with course c/b transaminitis with ERCP unable to bypass blockages in duodenum to place stent x2.

## 2022-09-23 NOTE — PROGRESS NOTE ADULT - ATTENDING COMMENTS
Pt is a 56 yo F with no significant PMH p/w abd pain, N/V found to have gastric adenocarcinoma with metastatic disease confirmed with bx. EGD confirmed advanced disease and not a candidate for resection. s/p jejunostomy for tube feeds. Course c/b subsegmental PE, initially on lovenox full dose AC, however course c/b hematemesis 2/2 friable gastric mass and AC now on hold. May need to consider IVC fx, pending family discussion and repeat GOC convo (palliative following). ERCP attempted x2 for stent placement without success d/t extent of disease. Had completed 7d course of zosyn (9/9-17) with new Tmax 9/22 prompting reinitiation of zosyn and fever w/u. If fever w/u unrevealing, plan for PICC with initiation of chemo. Rest as outlined above in resident note.

## 2022-09-23 NOTE — PROGRESS NOTE ADULT - PROBLEM SELECTOR PLAN 3
- Biopsy proven poorly differentiated metastatic gastric adenocarcinoma  - Hospital course complicated by PE and hyperbilirubinemia   - Status post laparoscopic evaluation on 8/26/2022 , non resectable tumor. Status post J tube placement.   - Was a candidate for inpatient chemo, however now with worsening hyperbilirubinemia from CBD stricture, unclear origin

## 2022-09-23 NOTE — PROGRESS NOTE ADULT - SUBJECTIVE AND OBJECTIVE BOX
INTERVAL HPI/OVERNIGHT EVENTS:  Patient S&E at bedside. No complaints at this time. No F/C, CP, SOB, abdominal pain, N/V, rash, or edema      VITAL SIGNS:  T(F): 98.2 (09-23-22 @ 05:50)  HR: 103 (09-23-22 @ 05:50)  BP: 129/84 (09-23-22 @ 05:50)  RR: 18 (09-23-22 @ 05:50)  SpO2: 96% (09-23-22 @ 05:50)  Wt(kg): --    PHYSICAL EXAM:    Constitutional: NAD  Eyes: EOMI, sclera non-icteric  Neck: supple  Respiratory: no increased WOB, CTAB/L  Cardiovascular: RRR, S1, S2, no m/g/r  Gastrointestinal: soft, NTND, no masses palpable, no HSM  Extremities: no c/c/e  Neurological: AAOx3    MEDICATIONS  (STANDING):  Biotene Dry Mouth Oral Rinse 5 milliLiter(s) Swish and Spit two times a day  enoxaparin Injectable 65 milliGRAM(s) SubCutaneous every 12 hours  lidocaine   4% Patch 1 Patch Transdermal daily  methadone   Solution 7.5 milliGRAM(s) Enteral Tube every 12 hours  pantoprazole  Injectable 40 milliGRAM(s) IV Push daily  piperacillin/tazobactam IVPB.. 3.375 Gram(s) IV Intermittent every 8 hours    MEDICATIONS  (PRN):  bisacodyl Suppository 10 milliGRAM(s) Rectal daily PRN Constipation  morphine   Solution 15 milliGRAM(s) Enteral Tube every 4 hours PRN Breakthrough pain  ondansetron Injectable 8 milliGRAM(s) IV Push every 8 hours PRN Nausea and/or Vomiting  polyethylene glycol 3350 17 Gram(s) Oral daily PRN Constipation      LABS:                        10.1   15.22 )-----------( 400      ( 23 Sep 2022 07:05 )             31.8     09-23    135  |  102  |  20  ----------------------------<  143<H>  4.7   |  23  |  0.96    Ca    8.1<L>      23 Sep 2022 07:05  Phos  4.0     09-23  Mg     2.20     09-23    TPro  6.6  /  Alb  2.6<L>  /  TBili  8.3<H>  /  DBili  x   /  AST  208<H>  /  ALT  183<H>  /  AlkPhos  950<H>  09-23          RADIOLOGY & ADDITIONAL TESTS:   INTERVAL HPI/OVERNIGHT EVENTS:  Patient S&E at bedside. She is very weak, in pain, and just had an episode of hematemesis. No F/C, CP, SOB, rash, or edema      VITAL SIGNS:  T(F): 98.2 (09-23-22 @ 05:50)  HR: 103 (09-23-22 @ 05:50)  BP: 129/84 (09-23-22 @ 05:50)  RR: 18 (09-23-22 @ 05:50)  SpO2: 96% (09-23-22 @ 05:50)  Wt(kg): --    PHYSICAL EXAM:    Constitutional: Very fatigued, depressed  Eyes: EOMI, sclera slightly icteric  Neck: supple  Respiratory: no increased WOB, CTAB/L  Cardiovascular: RRR, S1, S2, no m/g/r  Gastrointestinal: soft, + epigastric and RUQ TTP, mildly distended, no masses palpable  Extremities: no c/c/e  Neurological: AAOx3    MEDICATIONS  (STANDING):  Biotene Dry Mouth Oral Rinse 5 milliLiter(s) Swish and Spit two times a day  enoxaparin Injectable 65 milliGRAM(s) SubCutaneous every 12 hours  lidocaine   4% Patch 1 Patch Transdermal daily  methadone   Solution 7.5 milliGRAM(s) Enteral Tube every 12 hours  pantoprazole  Injectable 40 milliGRAM(s) IV Push daily  piperacillin/tazobactam IVPB.. 3.375 Gram(s) IV Intermittent every 8 hours    MEDICATIONS  (PRN):  bisacodyl Suppository 10 milliGRAM(s) Rectal daily PRN Constipation  morphine   Solution 15 milliGRAM(s) Enteral Tube every 4 hours PRN Breakthrough pain  ondansetron Injectable 8 milliGRAM(s) IV Push every 8 hours PRN Nausea and/or Vomiting  polyethylene glycol 3350 17 Gram(s) Oral daily PRN Constipation      LABS:                        10.1   15.22 )-----------( 400      ( 23 Sep 2022 07:05 )             31.8     09-23    135  |  102  |  20  ----------------------------<  143<H>  4.7   |  23  |  0.96    Ca    8.1<L>      23 Sep 2022 07:05  Phos  4.0     09-23  Mg     2.20     09-23    TPro  6.6  /  Alb  2.6<L>  /  TBili  8.3<H>  /  DBili  x   /  AST  208<H>  /  ALT  183<H>  /  AlkPhos  950<H>  09-23          RADIOLOGY & ADDITIONAL TESTS:

## 2022-09-23 NOTE — PROGRESS NOTE ADULT - ASSESSMENT
58 yo F with a PMH of nephrolithiasis who p/w progressive abdominal pain, N/V, and weight loss, found to have poorly differentiated metastatic gastric adenocarcinoma, course complicated by acute pulmonary emboli, fever, transaminitis, and r/o cholangitis.    #Metastatic Gastric Adenocarcinoma/Transaminitis/Hyperbilirubinemia  - Presented with abdominal pain, N/V, and weight loss  - Admission CT C/A/P shows abdominal and thoracic (subdiaphragmatic, mediastinal, subcarinal and bilateral hilar) LAD, 3 mm RML nodule, and gastric body thickening and gastrocolic ligament nodularity  - CEA elevated (1544);  mildly elevated, CA 19-9 normal  - S/p EUS by GI on 8/16 showing infiltrative gastric body changes w/pathology showing poorly differentiated gastric adenocarcinoma (+AE1/3, CK7, CK20, CDX2, neg for ER), MS-stable  - HER2 2+ (equivocal) but RIAN negative, not a candidate for Trastuzumab  - S/p EBUS with level 7 mediastinal LN bx on 8/23 confirming metastasis of gastric adenocarcinoma, and ascitic fluid (trace ascites) also confirming disease there with signet-ring cells  - S/p J-tube placement by surgery; 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 Tube feeds per primary team/surgery  - Pain management by primary team and palliative care, appreciate recs  - C/w PPI BID  - CT C/A/P 9/9/22 show progression of mediastinal and b/l hilar lymphadenopathy, enhancement of CBD concerning for possible ?cholangitis, and left hydroureteronephrosis likely sequela of neoplasm/metastatic disease, small to moderate progressive ascites  - Completed 10 days of Zosyn  - Developed transaminitis/hyperbilirubinemia, L hepatic lobe T2 signaling on MRI, but unclear and without obvious gross metastatic lesions. No intrahepatic biliary ductal dilation but does have mild distal CBD dilation/thickening  - Hepatitis panel and autoimmune panel negative. Appreciate hepatology recs  - GI attempted ERCP on 9/16 and 9/20 but could not pass scope through gastric mass into duodenum due to obstruction  - No surgical options; not able to perform per rupesh drain by IR due to lack of intrahepatic biliary dilation  - Clinically suspect that patient has metastatic disease causing hepato-biliary dysfunction particularly including CBD dilation. Although not ideal given hyperbilirubinemia, was planned and consented to give FOLFOX inpatient for the goal of improved hepatobiliary function by tumor shrinkage. Was also planned for PICC line, but delayed due to fever 9/22. Although patient has been afebrile for 24 hours, she is extremely weak and does not want chemotherapy at this time. She is aware that without treatment, given that her symptoms are likely from the cancer, her condition will continue to worsen (although discussed that treatment will not guarantee she will feel better either). Discussed this with patient's son Dmitry at bedside. Dmitry will be here tomorrow and we will discuss further GOC  - Hold off on PICC line for now  - Palliative care for both aggressive pain control and anti-emetic control, and for assistance with GOC conversations    #Bilateral Pulmonary Emboli  - CTA chest 9/9/22 showed bilateral pulmonary emboli during admission while on PPX dose of Lovenox  - Increased Lovenox dose to 1 mg/kg BID (full A/C dose), but now having hematemesis, and therefore, would hold A/C for now. Potentially could be a candidate for IVC filter depending on GOC discussions      Aryles Hedjar, MD, PGY-5  Hematology/Oncology Fellow  [id number]  Pager: [phone]  After 5PM and on weekends and holidays, please call the inpatient fellow on call.

## 2022-09-23 NOTE — PROGRESS NOTE ADULT - ASSESSMENT
57 yr old F with poorly differentiated non-resectable gastric adenocarcinoma, status post laparoscopy and J tube placement on 8/26/2022. Palliative following for symptom management.

## 2022-09-23 NOTE — PROGRESS NOTE ADULT - ATTENDING COMMENTS
58 y/o F presented ~one month ago with abdominal pain, n/v and CT C/A/P showed abdominal and thoracic LAD, 3 mm RML nodule, and gastric body thickening s/p EUS by GI on 8/16 showing infiltrative gastric body changes w/pathology showing poorly differentiated gastric adenocarcinoma (+AE1/3, CK7, CK20, CDX2, neg for ER), MS-stable, HER 2 equivocal FISH pending. 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  Then course c/b fevers, found to have PE on AC and also started on Zosyn 10 day course completed over the weekend. Unfortunately, she now has rising bilirubin. Unclear etiology s/p ERCP attempts on 9/16 and 9/20, both times unable to pass scope due to duodenal stenosis and not able to have biliary decompression by IR due to lack of intrahepatic ductal dilation (there is some mild CBD thickening). Imaging with hypodensity in left lobe of liver, but no significant liver mets. No role for surgical intervention either.      Her bili continues to rise and is 8.3 today with elevated transaminases. Although initial plan after discussion with patient and family on 9/21/22 was for PICC line and to initiate FOLFOX C1D1 inpatient, Ms. Gordillo had a fever over night to 100.6 and placed back on Zosyn, therefore PICC line on hold. Additionally she experienced hematemesis 150-300cc and so anticoagulation held, counts are stable. This morning anticoagulation restarted and patient again with 300cc of hematemesis. Patient evaluated at bedside, she has become increasingly deconditioned and states she does not think her body can handle any more. Spoke to son Dmitry via telephone per patient request and initiated goals of care discussion. Son will be present in person tomorrow and will have a family meeting to discuss further.

## 2022-09-23 NOTE — PROGRESS NOTE ADULT - PROBLEM SELECTOR PLAN 1
- GI, onc, surg/onc following  - PICC (double lumen) ordered (wait until after sepsis workup), then onc will start chemo inpt  - EGD:  Infiltrative changes in gastric body concerning gastric malignancy, Congested duodenal mucosa, biopsy = poorly differentiated adenocarcinoma  - staging CT chest - Mediastinal/bilateral hilar/R internal mammary LAD  - mediastinal lymph node biopsy results - Metastatic adenocarcinoma   - pantoprazole  40 mg BID  - OR 8/26 with J tube placement    pain Control; Palliative will attempt to transition to morphine for cost control  -Morphine 15mg q2h for severe Pain 7-10  -Methadone 5mg q12h standing    PRICING UPDATES:  Lovenox: $1/month covered under [facility] medicaid  Tube Feeds and pump: ~$120/month  Methadone: 36$/month  Reglan: $60/month  Morphine: $1 COVERED    Family meeting held, everyone understands steps moving forward. - GI, onc, surg/onc following  - PICC (double lumen) ordered (wait until after sepsis workup), then onc will start chemo inpt  - EGD:  Infiltrative changes in gastric body concerning gastric malignancy, Congested duodenal mucosa, biopsy = poorly differentiated adenocarcinoma  - staging CT chest - Mediastinal/bilateral hilar/R internal mammary LAD  - mediastinal lymph node biopsy results - Metastatic adenocarcinoma   - pantoprazole  40 mg daily  - OR 8/26 with J tube placement    pain Control; Palliative will attempt to transition to morphine for cost control  -Morphine 15mg q2h for severe Pain 7-10  -Methadone 5mg q12h standing    PRICING UPDATES:  Lovenox: $1/month covered under [facility] medicaid  Tube Feeds and pump: ~$120/month  Methadone: 36$/month  Reglan: $60/month  Morphine: $1 COVERED    Family meeting held, everyone understands steps moving forward. - GI, onc, surg/onc following  - pt declining chemo at this time, so PICC on hold  - EGD:  Infiltrative changes in gastric body concerning gastric malignancy, Congested duodenal mucosa, biopsy = poorly differentiated adenocarcinoma  - staging CT chest - Mediastinal/bilateral hilar/R internal mammary LAD  - mediastinal lymph node biopsy results - Metastatic adenocarcinoma   - pantoprazole  40 mg daily  - OR 8/26 with J tube placement    pain Control; Palliative will attempt to transition to morphine for cost control  -Morphine 15mg q2h for severe Pain 7-10  -Methadone 5mg q12h standing    PRICING UPDATES:  Lovenox: $1/month covered under [facility] medicaid  Tube Feeds and pump: ~$120/month  Methadone: 36$/month  Reglan: $60/month  Morphine: $1 COVERED    Family meeting held, everyone understands steps moving forward.

## 2022-09-24 NOTE — PROGRESS NOTE ADULT - PROBLEM SELECTOR PLAN 9
DVT ppx: lovenox held for now    Dispo planning: complex dispo planning due to lack of insurance  Emergency medicaid can cover outpatient chemo/transport.

## 2022-09-24 NOTE — PROGRESS NOTE ADULT - ATTENDING COMMENTS
Pt is a 56 yo F with no significant PMH p/w abd pain, N/V found to have gastric adenocarcinoma with metastatic disease confirmed with bx. EGD confirmed advanced disease and not a candidate for resection. s/p jejunostomy for tube feeds. Course c/b subsegmental PE, initially on lovenox full dose AC, however course c/b hematemesis 2/2 friable gastric mass and AC now on hold. ERCP attempted x2 for stent placement without success d/t extent of disease. Had completed 7d course of zosyn (9/9-17) with new Tmax 9/22 prompting reinitiation of zosyn and fever w/u. If fever w/u unrevealing, plan for PICC with initiation of chemo. Will need to consider IVC fx, lengthy discussion held bedside (see GOC note) with plan at present to pursue IVC fx. Will consult IR. Rest as outlined above in resident note.      ADDENDUM:   Discussed with Dr. Danita Hernandez @ 4:30pm via telephone - Dr. Hernandez also held lengthy discussion with pt and pt's family, see consult progress note. At this time, pt and family choosing to pursue hospice and forego chemotherapy / invasive measures. IR notified and dc'd consult for IVC fx. Will notify SW/CM. Chaplaincy consult placed for spiritual support.

## 2022-09-24 NOTE — PROGRESS NOTE ADULT - PROBLEM SELECTOR PLAN 5
b/l PEs seen on CT 9/9. Denies pleuritic chest pain, SOB. Stable on RA  Hold  lovenox 70 BID for now for hematemesis b/l PEs seen on CT 9/9. Denies pleuritic chest pain, SOB. Stable on RA  Hold  lovenox 70 BID for now for hematemesis  Candidate for IVC filter per IR if she and family choose to

## 2022-09-24 NOTE — PROGRESS NOTE ADULT - ASSESSMENT
57F PMH of renal stones p/w nonresectable gastric adenocarcinoma w/ mets to LN c/b DEVI, now resolved, and nephrolithiasis with L hydronephrosis currently S/p j tube placement, with stable tube feed and stable pain regimen with course c/b transaminitis with ERCP unable to bypass blockages in duodenum to place stent x2.

## 2022-09-24 NOTE — CHART NOTE - NSCHARTNOTEFT_GEN_A_CORE
IR Follow-Up     56 yo female with history of non-resectable metastatic gastric adenocarcinoma. CT PE on 9/9 shows bilateral pulmonary emboli, and patient with contraindication to anticoagulation given hematemesis.     IR consulted for IVC Filter placement ; patient now palliative and no longer wants filter. Please re-consult as needed.    D/w Dr. Styles    --  Juan Pablo Norton DO/SHAWN  Interventional Radiology Resident (PGY-4)  Available on [employer] TEAMS    For EMERGENT inquiries/questions:  IR Pager ([facility]): [phone]  IR Pager ([facility]): [phone] ; [phone]    For non-emergent consults/questions:   Please place a sunrise order "Consult- Interventional Radiology" with an appropriate callback number    For questions about scheduling during appropriate work hours, call IR :  [facility]: [phone]  [facility]: [phone]    For outpatient IR booking:  [facility]: [phone]  [facility]: [phone]

## 2022-09-24 NOTE — PROGRESS NOTE ADULT - PROBLEM SELECTOR PLAN 1
- GI, onc, surg/onc following  - pt declining chemo at this time, so PICC on hold  - EGD:  Infiltrative changes in gastric body concerning gastric malignancy, Congested duodenal mucosa, biopsy = poorly differentiated adenocarcinoma  - staging CT chest - Mediastinal/bilateral hilar/R internal mammary LAD  - mediastinal lymph node biopsy results - Metastatic adenocarcinoma   - pantoprazole  40 mg daily  - OR 8/26 with J tube placement    pain Control; Palliative will attempt to transition to morphine for cost control  -Morphine 15mg q2h for severe Pain 7-10  -Methadone 5mg q12h standing    PRICING UPDATES:  Lovenox: $1/month covered under [facility] medicaid  Tube Feeds and pump: ~$120/month  Methadone: 36$/month  Reglan: $60/month  Morphine: $1 COVERED    Family meeting held, everyone understands steps moving forward. - GI, onc, surg/onc following  - pt declining chemo at this time, so PICC on hold. Ongiong discussions re: GOC/if she wants chemo  - EGD:  Infiltrative changes in gastric body concerning gastric malignancy, Congested duodenal mucosa, biopsy = poorly differentiated adenocarcinoma  - staging CT chest - Mediastinal/bilateral hilar/R internal mammary LAD  - mediastinal lymph node biopsy results - Metastatic adenocarcinoma   - pantoprazole  40 mg daily  - OR 8/26 with J tube placement    pain Control; Palliative will attempt to transition to morphine for cost control  -Morphine 15mg q2h for severe Pain 7-10  -Methadone 5mg q12h standing    PRICING UPDATES:  Lovenox: $1/month covered under [facility] medicaid  Tube Feeds and pump: ~$120/month  Methadone: 36$/month  Reglan: $60/month  Morphine: $1 COVERED    Family meeting held, everyone understands steps moving forward.

## 2022-09-24 NOTE — PROGRESS NOTE ADULT - SUBJECTIVE AND OBJECTIVE BOX
Internal Medicine Progress Note    Patient is a 57y old  Female who presents with a chief complaint of abdominal pain & nausea/vomiting (23 Sep 2022 12:28)    OVERNIGHT EVENTS/SUBJECTIVE:    OBJECTIVE:  Vital Signs Last 24 Hrs  T(C): 36.7 (24 Sep 2022 05:40), Max: 36.7 (23 Sep 2022 13:50)  T(F): 98.1 (24 Sep 2022 05:40), Max: 98.1 (24 Sep 2022 05:40)  HR: 105 (24 Sep 2022 05:40) (88 - 105)  BP: 130/87 (24 Sep 2022 05:40) (120/80 - 130/87)  BP(mean): --  RR: 20 (24 Sep 2022 05:40) (20 - 21)  SpO2: 95% (24 Sep 2022 05:40) (95% - 97%)    Parameters below as of 24 Sep 2022 05:40  Patient On (Oxygen Delivery Method): room air      I&O's Detail    23 Sep 2022 07:01  -  24 Sep 2022 07:00  --------------------------------------------------------  IN:    Enteral Tube Flush: 120 mL    TwoCal HN: 330 mL  Total IN: 450 mL    OUT:  Total OUT: 0 mL    Total NET: 450 mL        Daily     Daily   Physical Exam:  General: NAD, resting comfortably in bed  Neuro: A&Ox4, 5/5 strength in all ext  HEENT: NC/AT, EOMI, normal hearing, oral mucosa moist, no oral lesions noted, no pharyngeal erythema, uvula midline  Neck: supple, thyroid not enlarged, no LAD  Resp: Breathing comfortably on RA, LCTA b/l  CV: Normal sinus rhythm, S1 and S2, no r/m/g  Abd: soft, non-distended, non-tender. No HSM.  Ext: ROMIx4, no edema, +2 pulses bilaterally  Skin: Warm and dry, no rashes or discolorations  Psych: Appropriate affect    Medications:  MEDICATIONS  (STANDING):  Biotene Dry Mouth Oral Rinse 5 milliLiter(s) Swish and Spit two times a day  lidocaine   4% Patch 1 Patch Transdermal daily  methadone   Solution 7.5 milliGRAM(s) Enteral Tube every 12 hours  pantoprazole  Injectable 40 milliGRAM(s) IV Push daily  piperacillin/tazobactam IVPB.. 3.375 Gram(s) IV Intermittent every 8 hours    MEDICATIONS  (PRN):  bisacodyl Suppository 10 milliGRAM(s) Rectal daily PRN Constipation  morphine   Solution 15 milliGRAM(s) Enteral Tube every 4 hours PRN Breakthrough pain  ondansetron Injectable 8 milliGRAM(s) IV Push every 8 hours PRN Nausea and/or Vomiting  polyethylene glycol 3350 17 Gram(s) Oral daily PRN Constipation      Labs:                        10.0   15.81 )-----------( 391      ( 23 Sep 2022 13:40 )             32.0         135  |  102  |  20  ----------------------------<  143<H>  4.7   |  23  |  0.96    Ca    8.1<L>      23 Sep 2022 07:05  Phos  4.0       Mg     2.20         TPro  6.6  /  Alb  2.6<L>  /  TBili  8.3<H>  /  DBili  x   /  AST  208<H>  /  ALT  183<H>  /  AlkPhos  950<H>        Urinalysis Basic - ( 23 Sep 2022 10:48 )    Color: Cassidy / Appearance: Slightly Turbid / S.025 / pH: x  Gluc: x / Ketone: Negative  / Bili: Moderate / Urobili: <2 mg/dL   Blood: x / Protein: Trace / Nitrite: Negative   Leuk Esterase: Negative / RBC: 1 /HPF / WBC 1 /HPF   Sq Epi: x / Non Sq Epi: 0 /HPF / Bacteria: Negative      SARS-CoV-2: NotDetec (22 Sep 2022 11:13)  COVID-19 PCR: NotDetec (19 Sep 2022 12:50)  COVID-19 PCR: NotDetec (14 Sep 2022 13:05)  SARS-CoV-2: NotDetec (08 Sep 2022 18:15)  COVID-19 PCR: NotDetec (08 Sep 2022 07:31)  COVID-19 PCR: NotDetec (02 Sep 2022 06:00)  COVID-19 PCR: NotDetec (25 Aug 2022 08:02)  COVID-19 PCR: NotDetec (21 Aug 2022 06:49)  COVID-19 PCR: NotDetec (14 Aug 2022 17:47)      Radiology: Internal Medicine Progress Note    Patient is a 57y old  Female who presents with a chief complaint of abdominal pain & nausea/vomiting (23 Sep 2022 12:28)    OVERNIGHT EVENTS/SUBJECTIVE: Pt reports vomiting small volumes of blood, less than previous. Otherwise denies HA, CP, SOB, palps, const/diarrhea.    OBJECTIVE:  Vital Signs Last 24 Hrs  T(C): 36.7 (24 Sep 2022 05:40), Max: 36.7 (23 Sep 2022 13:50)  T(F): 98.1 (24 Sep 2022 05:40), Max: 98.1 (24 Sep 2022 05:40)  HR: 105 (24 Sep 2022 05:40) (88 - 105)  BP: 130/87 (24 Sep 2022 05:40) (120/80 - 130/87)  BP(mean): --  RR: 20 (24 Sep 2022 05:40) (20 - 21)  SpO2: 95% (24 Sep 2022 05:40) (95% - 97%)    Parameters below as of 24 Sep 2022 05:40  Patient On (Oxygen Delivery Method): room air      I&O's Detail    23 Sep 2022 07:01  -  24 Sep 2022 07:00  --------------------------------------------------------  IN:    Enteral Tube Flush: 120 mL    TwoCal HN: 330 mL  Total IN: 450 mL    OUT:  Total OUT: 0 mL    Total NET: 450 mL        Daily     Daily   Physical Exam:  General: NAD, resting comfortably in bed  Neuro: A&Ox4, no gross deficits   HEENT: NC/AT, EOMI, normal hearing  Resp: Breathing comfortably on RA, LCTA b/l  CV:tachy,  S1 and S2, no r/m/g  Abd: soft, non-distended, non-tender. No HSM.  Ext:  no edema, +2 pulses bilaterally  Skin: Warm and dry, no rashes or discolorations  Psych: Appropriate affect    Medications:  MEDICATIONS  (STANDING):  Biotene Dry Mouth Oral Rinse 5 milliLiter(s) Swish and Spit two times a day  lidocaine   4% Patch 1 Patch Transdermal daily  methadone   Solution 7.5 milliGRAM(s) Enteral Tube every 12 hours  pantoprazole  Injectable 40 milliGRAM(s) IV Push daily  piperacillin/tazobactam IVPB.. 3.375 Gram(s) IV Intermittent every 8 hours    MEDICATIONS  (PRN):  bisacodyl Suppository 10 milliGRAM(s) Rectal daily PRN Constipation  morphine   Solution 15 milliGRAM(s) Enteral Tube every 4 hours PRN Breakthrough pain  ondansetron Injectable 8 milliGRAM(s) IV Push every 8 hours PRN Nausea and/or Vomiting  polyethylene glycol 3350 17 Gram(s) Oral daily PRN Constipation      Labs:                        10.0   15.81 )-----------( 391      ( 23 Sep 2022 13:40 )             32.0         135  |  102  |  20  ----------------------------<  143<H>  4.7   |  23  |  0.96    Ca    8.1<L>      23 Sep 2022 07:05  Phos  4.0       Mg     2.20         TPro  6.6  /  Alb  2.6<L>  /  TBili  8.3<H>  /  DBili  x   /  AST  208<H>  /  ALT  183<H>  /  AlkPhos  950<H>        Urinalysis Basic - ( 23 Sep 2022 10:48 )    Color: Cassidy / Appearance: Slightly Turbid / S.025 / pH: x  Gluc: x / Ketone: Negative  / Bili: Moderate / Urobili: <2 mg/dL   Blood: x / Protein: Trace / Nitrite: Negative   Leuk Esterase: Negative / RBC: 1 /HPF / WBC 1 /HPF   Sq Epi: x / Non Sq Epi: 0 /HPF / Bacteria: Negative      SARS-CoV-2: NotDetec (22 Sep 2022 11:13)  COVID-19 PCR: NotDetec (19 Sep 2022 12:50)  COVID-19 PCR: NotDetec (14 Sep 2022 13:05)  SARS-CoV-2: NotDetec (08 Sep 2022 18:15)  COVID-19 PCR: NotDetec (08 Sep 2022 07:31)  COVID-19 PCR: NotDetec (02 Sep 2022 06:00)  COVID-19 PCR: NotDetec (25 Aug 2022 08:02)  COVID-19 PCR: NotDetec (21 Aug 2022 06:49)  COVID-19 PCR: NotDetec (14 Aug 2022 17:47)      Radiology:

## 2022-09-24 NOTE — PROGRESS NOTE ADULT - PROBLEM SELECTOR PLAN 3
Afebrile not, VSS  - fu blood cultures  - c/w empiric zosyn. T to 100.6, tachycardic  - fu blood, urine cultures  - c/w empiric zosyn.

## 2022-09-24 NOTE — PROGRESS NOTE ADULT - PROBLEM SELECTOR PLAN 8
Impression: Stable anemia. May be combination of Iron deficiency anemia from poor PO intake and normocytic anemia from Anemia of Chronic Disease w/ this presentation c/f gastric malignancy.  - Total Iron 22, TIBC 165, transferrin 149  H/H stable, continue to monitor Impression: Stable anemia. May be combination of Iron deficiency anemia from poor PO intake and normocytic anemia from Anemia of Chronic Disease w/ this presentation c/f gastric malignancy. Also now hematemesis  - Total Iron 22, TIBC 165, transferrin 149  H/H stable, continue to monitor

## 2022-09-24 NOTE — GOALS OF CARE CONVERSATION - ADVANCED CARE PLANNING - CONVERSATION DETAILS
58 y/o F presented ~one month ago with abdominal pain, n/v and CT C/A/P showed abdominal and thoracic LAD, 3 mm RML nodule, and gastric body thickening s/p EUS by GI on 8/16 showing infiltrative gastric body changes w/pathology showing poorly differentiated gastric adenocarcinoma (+AE1/3, CK7, CK20, CDX2, neg for ER), MS-stable, HER 2 equivocal FISH pending. 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  Then course c/b fevers, found to have PE on AC and also started on Zosyn 10 day course completed over the weekend. Unfortunately, she now has rising bilirubin. Unclear etiology s/p ERCP attempts on 9/16 and 9/20, both times unable to pass scope due to duodenal stenosis and not able to have biliary decompression by IR due to lack of intrahepatic ductal dilation (there is some mild CBD thickening). Imaging with hypodensity in left lobe of liver, but no significant liver mets. No role for surgical intervention either.      Her bili continues to rise with elevated transaminases. Although initial plan after discussion with patient and family on 9/21/22 was for PICC line and to initiate FOLFOX C1D1 inpatient, Ms. Gordillo became febrile and placed back on Zosyn, therefore PICC line on hold. Additionally she experienced hematemesis 150-300cc and so anticoagulation held, counts are stable.     Patient evaluated at bedside, she has become increasingly deconditioned and states she does not think her body can handle any more. Her son Dmitry was at bedside with other familial support and expressed frustration at the back and forth regarding whether or not patient would be a candidate for chemotherapy. I explained that it was our hope to provide cancer directed treatment, but unfortunately Ms. Gordillo's clinical condition has declined and she would not be a candidate for chemotherapy at this time. We reviewed the natural course of her cancer and that unfortunately life expectancy is less than 6 months. Introduced hospice services and patient and her family are interested. After discussion with patient's daughter Ann ([phone]) regarding home with hospice vs inpatient hospice, family belivies Ms. Gordillo would best be taken care of at a hospice facility and so will request primary team and social work to arrange that. Patient and family had several questions which were answered to their satisfaction, at this time her primary goal is comfort.

## 2022-09-24 NOTE — PROGRESS NOTE ADULT - PROBLEM SELECTOR PLAN 7
The patient is a 11y Female complaining of multiple medical complaints. Impression: 72 hour caloric count to evaluate for malnutrition - indicates malnutrition. Pt unable to eat due to pain.   Now J tube placement    Nutrition consult - Recommend TwoCal HN via J-tube initiated at 10 mL/hr increased by 10mL q4 hrs until goal rate 35 mL/hr x24 hrs to provide 840 mL formula, 1680 kcal, 70.14 gm protein, 588 mL free water (meets 33.6 kcal/kg, 1.4 gm protein/kg).    Patient able to tolerate rate of 27cc/hr

## 2022-09-24 NOTE — GOALS OF CARE CONVERSATION - ADVANCED CARE PLANNING - CONVERSATION DETAILS
Discussion held bedside with patient, son, niece, nephew, and via facetime (pt's daughter). Lengthy discussion of prolonged and complex hospital course from new metastatic gastric adenocarcinoma diagnosis, multiple GI procedures, jejunostomy, PE, fevers, and most recently hematemesis. Discussed treatment options - pursuing chemo with unknown response to treatment and unknown ability to tolerate medication, ineligibility of AC (2x hematemesis while on AC for PE) and likely need for IVC fx if pursuing aggressive measures VS. ability to go home without invasive measures and with pain control to live remaining time comfortably understanding that doing so would lead to ultimate natural death. Pt overwhelmed with hospital course and large amount of information requiring decisions. Initially wanted to pursue IVC fx and PICC placement for chemotherapy with family in agreement. However family also mentioned wanting to discuss further on their own, as they could see pt is struggling with mood and burden of decisions. Plan to discuss further with heme-onc for their recommendations regarding chemotherapy.

## 2022-09-24 NOTE — PROGRESS NOTE ADULT - PROBLEM SELECTOR PLAN 4
LFTs are elevating without a clear source; no evidence of mets to liver  RUQ U/S results are equivocal; shows some mild bile duct dilatation similar to CT on 9/9; new from CT on 8/14  CT shows no mets to the liver at the moment    Hepatitis labs from June and July 2022 show no signs of HepB/C infection  MRCP showed Stricture of CBD  Consulted Advanced Endoscopy; ERCP 9/16 unsuccessful, repeat ERCP 9/20 unsuccessful    TBili improving  Bloody sputum likely due to trauma from scope and friable mass in stomach LFTs are elevating without a clear source; no evidence of mets to liver  RUQ U/S results are equivocal; shows some mild bile duct dilatation similar to CT on 9/9; new from CT on 8/14  CT shows no mets to the liver at the moment    Hepatitis labs from June and July 2022 show no signs of HepB/C infection  MRCP showed Stricture of CBD  ERCP 9/16 unsuccessful, repeat ERCP 9/20 unsuccessful    TBili improving  Bloody sputum likely due to trauma from scope and friable mass in stomach

## 2022-09-25 NOTE — PROGRESS NOTE ADULT - PROBLEM SELECTOR PLAN 5
b/l PEs seen on CT 9/9. Denies pleuritic chest pain, SOB. Stable on RA  Hold  lovenox 70 BID for now for hematemesis  Candidate for IVC filter per IR if she and family choose to

## 2022-09-25 NOTE — PROGRESS NOTE ADULT - PROBLEM SELECTOR PLAN 1
- GI, onc, surg/onc following  - pt declining chemo at this time, so PICC on hold. Ongiong discussions re: GOC/if she wants chemo  - EGD:  Infiltrative changes in gastric body concerning gastric malignancy, Congested duodenal mucosa, biopsy = poorly differentiated adenocarcinoma  - staging CT chest - Mediastinal/bilateral hilar/R internal mammary LAD  - mediastinal lymph node biopsy results - Metastatic adenocarcinoma   - pantoprazole  40 mg daily  - OR 8/26 with J tube placement    pain Control; Palliative will attempt to transition to morphine for cost control  -Morphine 15mg q2h for severe Pain 7-10  -Methadone 5mg q12h standing    PRICING UPDATES:  Lovenox: $1/month covered under [facility] medicaid  Tube Feeds and pump: ~$120/month  Methadone: 36$/month  Reglan: $60/month  Morphine: $1 COVERED    Family meeting held, everyone understands steps moving forward. - GI, onc, surg/onc following  - pt transitioned to palliative/hospice route, no plan for chemotherapy  - EGD:  Infiltrative changes in gastric body concerning gastric malignancy, Congested duodenal mucosa, biopsy = poorly differentiated adenocarcinoma  - staging CT chest - Mediastinal/bilateral hilar/R internal mammary LAD  - mediastinal lymph node biopsy results - Metastatic adenocarcinoma   - pantoprazole  40 mg daily  - OR 8/26 with J tube placement    pain Control; Palliative will attempt to transition to morphine for cost control  -Morphine 15mg q2h for severe Pain 7-10  -Methadone 5mg q12h standing    PRICING UPDATES:  Lovenox: $1/month covered under [facility] medicaid  Tube Feeds and pump: ~$120/month  Methadone: 36$/month  Reglan: $60/month  Morphine: $1 COVERED    Family meeting held, everyone understands steps moving forward.

## 2022-09-25 NOTE — PROGRESS NOTE ADULT - ATTENDING COMMENTS
Pt is a 56 yo F with no significant PMH p/w abd pain, N/V found to have gastric adenocarcinoma with metastatic disease confirmed with bx. EGD confirmed advanced disease and not a candidate for resection. s/p jejunostomy for tube feeds. Course c/b subsegmental PE, initially on lovenox full dose AC, however course c/b hematemesis 2/2 friable gastric mass and AC now deferred. Initially planned for PICC for chemo and IVC fx, however family deciding to pursue hospice and forego invasive measures. ERCP attempted x2 for stent placement without success d/t extent of disease. Had completed 7d course of zosyn (9/9-17) with new Tmax 9/22 prompting reinitiation of zosyn and fever w/u. Fever w/u unrevealing and pt with continued low grade temps (last 9/24); will plan to complete course of abx to end 9/26 - discussed with family and in agreement. See GOC note from today. Family pursuing hospice, not yet ready to make decisions regarding future blood draws / transfusions / dc'ing abx / fluids -- team to continue discussion tomorrow. At present, in agreement to not restart or prolong abx if continues to be febrile. Rest as outlined above in resident note.

## 2022-09-25 NOTE — PROGRESS NOTE ADULT - SUBJECTIVE AND OBJECTIVE BOX
Aung Thurston MD  PGY2  Preferred contact via Microsoft Teams      Patient is a 57y old  Female who presents with a chief complaint of abdominal pain & nausea/vomiting (24 Sep 2022 07:15)      SUBJECTIVE / OVERNIGHT EVENTS:    MEDICATIONS  (STANDING):  Biotene Dry Mouth Oral Rinse 5 milliLiter(s) Swish and Spit two times a day  lidocaine   4% Patch 1 Patch Transdermal daily  methadone   Solution 7.5 milliGRAM(s) Enteral Tube every 12 hours  pantoprazole  Injectable 40 milliGRAM(s) IV Push daily  piperacillin/tazobactam IVPB.. 3.375 Gram(s) IV Intermittent every 8 hours    MEDICATIONS  (PRN):  bisacodyl Suppository 10 milliGRAM(s) Rectal daily PRN Constipation  morphine   Solution 15 milliGRAM(s) Enteral Tube every 4 hours PRN Breakthrough pain  ondansetron Injectable 8 milliGRAM(s) IV Push every 8 hours PRN Nausea and/or Vomiting  polyethylene glycol 3350 17 Gram(s) Oral daily PRN Constipation      CAPILLARY BLOOD GLUCOSE        I&O's Summary    24 Sep 2022 07:01  -  25 Sep 2022 07:00  --------------------------------------------------------  IN: 60 mL / OUT: 0 mL / NET: 60 mL        Vital Signs Last 24 Hrs  T(C): 36.7 (25 Sep 2022 06:16), Max: 38.1 (24 Sep 2022 14:40)  T(F): 98.1 (25 Sep 2022 06:16), Max: 100.6 (24 Sep 2022 14:40)  HR: 119 (25 Sep 2022 06:16) (100 - 119)  BP: 116/79 (25 Sep 2022 06:16) (116/79 - 131/85)  BP(mean): --  RR: 20 (25 Sep 2022 06:16) (20 - 21)  SpO2: 96% (25 Sep 2022 06:16) (94% - 100%)    Parameters below as of 25 Sep 2022 06:16  Patient On (Oxygen Delivery Method): room air        PHYSICAL EXAM:      LABS:                        9.8    16.76 )-----------( 390      ( 25 Sep 2022 07:10 )             32.2          133<L>  |  100  |  26<H>  ----------------------------<  132<H>  4.8   |  23  |  1.03    Ca    8.4      25 Sep 2022 07:10  Phos  3.4       Mg     2.30         TPro  6.6  /  Alb  2.6<L>  /  TBili  9.3<H>  /  DBili  x   /  AST  242<H>  /  ALT  226<H>  /  AlkPhos  1111<H>            Urinalysis Basic - ( 23 Sep 2022 10:48 )    Color: Cassidy / Appearance: Slightly Turbid / S.025 / pH: x  Gluc: x / Ketone: Negative  / Bili: Moderate / Urobili: <2 mg/dL   Blood: x / Protein: Trace / Nitrite: Negative   Leuk Esterase: Negative / RBC: 1 /HPF / WBC 1 /HPF   Sq Epi: x / Non Sq Epi: 0 /HPF / Bacteria: Negative        RADIOLOGY & ADDITIONAL TESTS:    Imaging Personally Reviewed:    Consultant(s) Notes Reviewed:      Care Discussed with Consultants/Other Providers:   Aung Thurston MD  PGY2  Preferred contact via Microsoft Teams      Patient is a 57y old  Female who presents with a chief complaint of abdominal pain & nausea/vomiting (24 Sep 2022 07:15)      SUBJECTIVE / OVERNIGHT EVENTS: NAEON. Patient seen and examined at bedside.     MEDICATIONS  (STANDING):  Biotene Dry Mouth Oral Rinse 5 milliLiter(s) Swish and Spit two times a day  lidocaine   4% Patch 1 Patch Transdermal daily  methadone   Solution 7.5 milliGRAM(s) Enteral Tube every 12 hours  pantoprazole  Injectable 40 milliGRAM(s) IV Push daily  piperacillin/tazobactam IVPB.. 3.375 Gram(s) IV Intermittent every 8 hours    MEDICATIONS  (PRN):  bisacodyl Suppository 10 milliGRAM(s) Rectal daily PRN Constipation  morphine   Solution 15 milliGRAM(s) Enteral Tube every 4 hours PRN Breakthrough pain  ondansetron Injectable 8 milliGRAM(s) IV Push every 8 hours PRN Nausea and/or Vomiting  polyethylene glycol 3350 17 Gram(s) Oral daily PRN Constipation      CAPILLARY BLOOD GLUCOSE        I&O's Summary    24 Sep 2022 07:01  -  25 Sep 2022 07:00  --------------------------------------------------------  IN: 60 mL / OUT: 0 mL / NET: 60 mL        Vital Signs Last 24 Hrs  T(C): 36.7 (25 Sep 2022 06:16), Max: 38.1 (24 Sep 2022 14:40)  T(F): 98.1 (25 Sep 2022 06:16), Max: 100.6 (24 Sep 2022 14:40)  HR: 119 (25 Sep 2022 06:16) (100 - 119)  BP: 116/79 (25 Sep 2022 06:16) (116/79 - 131/85)  BP(mean): --  RR: 20 (25 Sep 2022 06:16) (20 - 21)  SpO2: 96% (25 Sep 2022 06:16) (94% - 100%)    Parameters below as of 25 Sep 2022 06:16  Patient On (Oxygen Delivery Method): room air        PHYSICAL EXAM:   General: NAD, resting comfortably in bed, lethargic   Neuro: A&Ox4, no gross deficits   HEENT: NC/AT, EOMI, normal hearing  Resp: Breathing comfortably on RA, LCTA b/l  CV:tachy,  S1 and S2, no r/m/g  Abd: soft, non-distended, non-tender. No HSM.  Ext:  no edema, +2 pulses bilaterally  Skin: Warm and dry, no rashes or discolorations  Psych: Appropriate affect      LABS:                        9.8    16.76 )-----------( 390      ( 25 Sep 2022 07:10 )             32.2          133<L>  |  100  |  26<H>  ----------------------------<  132<H>  4.8   |  23  |  1.03    Ca    8.4      25 Sep 2022 07:10  Phos  3.4       Mg     2.30         TPro  6.6  /  Alb  2.6<L>  /  TBili  9.3<H>  /  DBili  x   /  AST  242<H>  /  ALT  226<H>  /  AlkPhos  1111<H>            Urinalysis Basic - ( 23 Sep 2022 10:48 )    Color: Cassidy / Appearance: Slightly Turbid / S.025 / pH: x  Gluc: x / Ketone: Negative  / Bili: Moderate / Urobili: <2 mg/dL   Blood: x / Protein: Trace / Nitrite: Negative   Leuk Esterase: Negative / RBC: 1 /HPF / WBC 1 /HPF   Sq Epi: x / Non Sq Epi: 0 /HPF / Bacteria: Negative        RADIOLOGY & ADDITIONAL TESTS:    Imaging Personally Reviewed:    Consultant(s) Notes Reviewed:      Care Discussed with Consultants/Other Providers:

## 2022-09-25 NOTE — PROGRESS NOTE ADULT - PROBLEM SELECTOR PLAN 3
T to 100.6, tachycardic  - fu blood, urine cultures  - c/w empiric zosyn. T to 100.6, tachycardic  - fu blood, urine cultures  - c/w empiric zosyn, finish 9/26

## 2022-09-25 NOTE — PROGRESS NOTE ADULT - PROBLEM SELECTOR PLAN 4
LFTs are elevating without a clear source; no evidence of mets to liver  RUQ U/S results are equivocal; shows some mild bile duct dilatation similar to CT on 9/9; new from CT on 8/14  CT shows no mets to the liver at the moment    Hepatitis labs from June and July 2022 show no signs of HepB/C infection  MRCP showed Stricture of CBD  ERCP 9/16 unsuccessful, repeat ERCP 9/20 unsuccessful    TBili improving  Bloody sputum likely due to trauma from scope and friable mass in stomach

## 2022-09-25 NOTE — GOALS OF CARE CONVERSATION - ADVANCED CARE PLANNING - CONVERSATION DETAILS
See prior [facility] note from 9/24. Met bedside today with pt, son, niece, and nephew. Family requesting to speak privately, away from pt - pt in agreement. Family feels current course has been too much for pt, and mood is very depressed. Son is very emotional d/t rapid progression of illness. All in agreement for current plan to pursue hospice, prefer inpt if possible. To involve palliative and SW tomorrow. Discussed options moving forward - considering stopping bloodwork, considering completing current abx course (end 9/26) and not restart abx, considering deferring transfusions in the event Hb drops <7. Conversation was difficult for pt's son and he understands complexity of current situation, wants time to decide what they should do or not do moving forward. End goal is pt's comfort but not yet ready to make decision to stop blood draws/abx. Would like to discuss again tomorrow. See prior [facility] note from 9/24. Met bedside today with pt, son, niece, and nephew. Family requesting to speak privately, away from pt - pt in agreement. Family feels current course has been too much for pt, and mood is very depressed. Son is very emotional d/t rapid progression of illness. All in agreement for current plan to pursue hospice, prefer inpt if possible. To involve palliative and SW tomorrow. Discussed options moving forward - considering stopping bloodwork, considering completing current abx course (end 9/26) and not restart abx, considering deferring transfusions in the event Hb drops <7. Conversation was difficult for pt's son and he understands complexity of current situation, wants time to decide what they should do or not do moving forward. End goal is pt's comfort but not yet ready to make decision to stop blood draws/abx. In agreement that it would not be beneficial to prolong current course of abx or restart after completion of current course if pt continues to be febrile. Would like to discuss again tomorrow.

## 2022-09-25 NOTE — PROGRESS NOTE ADULT - PROBLEM SELECTOR PLAN 8
Impression: Stable anemia. May be combination of Iron deficiency anemia from poor PO intake and normocytic anemia from Anemia of Chronic Disease w/ this presentation c/f gastric malignancy. Also now hematemesis  - Total Iron 22, TIBC 165, transferrin 149  H/H stable, continue to monitor

## 2022-09-25 NOTE — PROGRESS NOTE ADULT - PROBLEM SELECTOR PLAN 2
Since second ERCP. Per GI, no intervention indicated unless pt becomes HD unstable.   - standing iv zofran, can give prn iv reglan  - can pause j tube feeds  - ppi  - lovenox held   - fu palliative recs   - urgently call GI if large volume or HD unstable. Since second ERCP. Per GI, no intervention indicated unless pt becomes HD unstable.   - standing iv zofran, can give prn iv reglan. Ativan 0.5 ordered for breakthrough nausea  - can pause j tube feeds  - ppi  - lovenox held   - fu palliative recs   - urgently call GI if large volume or HD unstable.

## 2022-09-26 NOTE — CHART NOTE - NSCHARTNOTEFT_GEN_A_CORE
Source: Patient [ ]    Family [x ]     other [x ] RN, Chart Review    Current Diet : Diet, NPO with Tube Feed:   Tube Feeding Modality: Jejunostomy  TwoCal HN (TWOCALHNRTH)  Total Volume for 24 Hours (mL): 720  Continuous  Starting Tube Feed Rate {mL per Hour}: 21  Increase Tube Feed Rate by (mL): 3     Every 6 hours  Until Goal Tube Feed Rate (mL per Hour): 30  Tube Feed Duration (in Hours): 24  Tube Feed Start Time: 10:00 (09-21-22 @ 17:33)  (Provides 720ml in volume, 1440kcal, 60gm protein, 504ml free water from feed)    Reported:  [x ] vomiting   Last available weight: 65kg/ 143.3lbs (9/20/2022, per flow sheet)  Weight History: 65.8kg/145lbs (9/16/2022, per flow sheet), 71.1kg/156.7lbs (8/16/2022, per flow sheet).   Weight change: weight loss of 13.4lbs/-8.6% BW x 1 month noted.    Nutrition Note:   As per observation, tube feeding is not running at the time of visit. Patient's family by bedside and on face time provided information about patient, reports no nausea, vomiting since this morning, would like patient to continue with tube feeding, reported family wishes to resident. More information obtained from RN, RN reports last bowel movement is 9/25/2022, no diarrhea or constipation reported at this time. As per chart review, patient had an episode of emesis yesterday, tube feeding on hold. Currently on pantoprazole, ondansetron. And on bowel regimen. As per chart review, patient has nausea and vomiting reported, Peptimen formula recommendation has been communicated to resident to consider, recommend consider changing tube feeding to Peptimen 1.5@ 20ml/hr and increase 3ml/hr q4hr, as tolerated, until goal rate of 42ml x24hrs (1008ml, 1512kcal, 68.5 gm protein, 778ml water from feed), free water flushes per MD discretion.       __________________ Pertinent Medications__________________   MEDICATIONS  (STANDING):  Biotene Dry Mouth Oral Rinse 5 milliLiter(s) Swish and Spit two times a day  lidocaine   4% Patch 1 Patch Transdermal daily  methadone   Solution 7.5 milliGRAM(s) Enteral Tube every 12 hours  pantoprazole  Injectable 40 milliGRAM(s) IV Push daily  piperacillin/tazobactam IVPB.. 3.375 Gram(s) IV Intermittent every 8 hours    MEDICATIONS  (PRN):  bisacodyl Suppository 10 milliGRAM(s) Rectal daily PRN Constipation  LORazepam   Injectable 0.5 milliGRAM(s) IV Push every 6 hours PRN Nausea and/or Vomiting  morphine   Solution 15 milliGRAM(s) Enteral Tube every 4 hours PRN Breakthrough pain  ondansetron Injectable 8 milliGRAM(s) IV Push every 8 hours PRN Nausea and/or Vomiting  polyethylene glycol 3350 17 Gram(s) Oral daily PRN Constipation      __________________ Pertinent Labs__________________   09-26 Na134 mmol/L<L> Glu 122 mg/dL<H> K+ 4.7 mmol/L Cr  1.53 mg/dL<H> BUN 37 mg/dL<H> 09-26 Phos 5.4 mg/dL<H> 09-26 Alb 2.7 g/dL<L>      Edema: As per flow sheet, no edema noted at this time.   Skin: As per flow sheet, no pressure injury noted at this time.     Estimated Needs:   [x] no change since previous assessment      Previous Nutrition Diagnosis:  [x ] Malnutrition     Nutrition Diagnosis is [x ] ongoing    New Nutrition Diagnosis: [x ] not applicable    Interventions:     Recommend    [x ] Please consider change tube feeding to: Peptimen 1.5@ 20ml/hr and increase 3ml/hr q4hr, as tolerated, until goal rate of 42ml x24hrs (1008ml, 1512kcal, 68.5 gm protein, 778ml water from feed), free water flushes per MD discretion.     [x] Other: Monitor TF tolerance, reconsult service if needed, RD remains available. RD to follow-up per protocol.     Monitoring and Evaluation:    [x ] Tolerance to peg feed [x ] weights [x ] follow up per protocol  [x ] other: bowel movement Source: Patient [ ]    Family [x ]     other [x ] RN, Chart Review    Current Diet : Diet, NPO with Tube Feed:   Tube Feeding Modality: Jejunostomy  TwoCal HN (TWOCALHNRTH)  Total Volume for 24 Hours (mL): 720  Continuous  Starting Tube Feed Rate {mL per Hour}: 21  Increase Tube Feed Rate by (mL): 3     Every 6 hours  Until Goal Tube Feed Rate (mL per Hour): 30  Tube Feed Duration (in Hours): 24  Tube Feed Start Time: 10:00 (09-21-22 @ 17:33)  (Provides 720ml in volume, 1440kcal, 60gm protein, 504ml free water from feed)    Reported:  [x ] vomiting   Last available weight: 65kg/ 143.3lbs (9/20/2022, per flow sheet)  Weight History: 65.8kg/145lbs (9/16/2022, per flow sheet), 71.1kg/156.7lbs (8/16/2022, per flow sheet).   Weight change: weight loss of 13.4lbs/-8.6% BW x 1 month noted.    Nutrition Note:   As per observation, tube feeding is not running at the time of visit. Patient's family by bedside and on face time provided information about patient, reports no nausea, vomiting since this morning, would like patient to continue with tube feeding, reported family wishes to resident. More information obtained from RN, RN reports last bowel movement is 9/25/2022, no diarrhea or constipation reported at this time. As per chart review, patient had an episode of emesis yesterday, tube feeding on hold. Currently on pantoprazole, ondansetron. And on bowel regimen. As per chart review, patient has nausea and vomiting reported, Peptimen formula recommendation has been communicated to resident to consider, recommend consider changing tube feeding to Peptimen 1.5@ 20ml/hr and increase 3ml/hr q4hr, as tolerated, until goal rate of 42ml x24hrs (1008ml, 1512kcal, 68.5 gm protein, 778ml water from feed), free water flushes per MD discretion.       __________________ Pertinent Medications__________________   MEDICATIONS  (STANDING):  Biotene Dry Mouth Oral Rinse 5 milliLiter(s) Swish and Spit two times a day  lidocaine   4% Patch 1 Patch Transdermal daily  methadone   Solution 7.5 milliGRAM(s) Enteral Tube every 12 hours  pantoprazole  Injectable 40 milliGRAM(s) IV Push daily  piperacillin/tazobactam IVPB.. 3.375 Gram(s) IV Intermittent every 8 hours    MEDICATIONS  (PRN):  bisacodyl Suppository 10 milliGRAM(s) Rectal daily PRN Constipation  LORazepam   Injectable 0.5 milliGRAM(s) IV Push every 6 hours PRN Nausea and/or Vomiting  morphine   Solution 15 milliGRAM(s) Enteral Tube every 4 hours PRN Breakthrough pain  ondansetron Injectable 8 milliGRAM(s) IV Push every 8 hours PRN Nausea and/or Vomiting  polyethylene glycol 3350 17 Gram(s) Oral daily PRN Constipation      __________________ Pertinent Labs__________________   09-26 Na134 mmol/L<L> Glu 122 mg/dL<H> K+ 4.7 mmol/L Cr  1.53 mg/dL<H> BUN 37 mg/dL<H> 09-26 Phos 5.4 mg/dL<H> 09-26 Alb 2.7 g/dL<L>      Edema: As per flow sheet, no edema noted at this time.   Skin: As per flow sheet, no pressure injury noted at this time.     Estimated Needs:   [x] no change since previous assessment      Previous Nutrition Diagnosis:  [x ] Malnutrition     Nutrition Diagnosis is [x ] ongoing    New Nutrition Diagnosis: [x ] not applicable    Interventions:     Recommend    [x ] Please consider change tube feeding to: Peptimen 1.5@ 20ml/hr and increase 3ml/hr q4hr, as tolerated, until goal rate of 42ml x24hrs (1008ml, 1512kcal, 68.5 gm protein, 778ml water from feed), free water flushes per MD discretion.     [x] Other: Monitor TF tolerance, reconsult service if needed, RD remains available. RD to follow-up per protocol.  Please obtain weekly weight, to monitor weight trend.      Monitoring and Evaluation:    [x ] Tolerance to peg feed [x ] weights [x ] follow up per protocol  [x ] other: bowel movement

## 2022-09-26 NOTE — PROGRESS NOTE ADULT - PROBLEM SELECTOR PLAN 2
Since second ERCP. Per GI, no intervention indicated unless pt becomes HD unstable.   - standing iv zofran, can give prn iv reglan. Ativan 0.5 ordered for breakthrough nausea  - can pause j tube feeds  - ppi  - lovenox held   - fu palliative recs   - urgently call GI if large volume or HD unstable. Since second ERCP. Per GI, no intervention indicated unless pt becomes HD unstable.   - standing iv zofran, can give prn iv reglan. Ativan 0.5 ordered for breakthrough nausea  - can pause j tube feeds  - ppi  - lovenox held   - fu palliative recs   - can urgently call GI if large volume or HD unstable.

## 2022-09-26 NOTE — PROGRESS NOTE ADULT - PROBLEM SELECTOR PLAN 5
- Palliative consulted for pain management  - Ongoing GOC, family reports wanting comfort for patient but HCP struggling, patient herself aware she has limited time but difficult to engage on her thoughts on end of life plans.

## 2022-09-26 NOTE — PROGRESS NOTE ADULT - PROBLEM SELECTOR PLAN 4
LFTs are elevating without a clear source; no evidence of mets to liver  RUQ U/S results are equivocal; shows some mild bile duct dilatation similar to CT on 9/9; new from CT on 8/14  CT shows no mets to the liver at the moment    Hepatitis labs from June and July 2022 show no signs of HepB/C infection  MRCP showed Stricture of CBD  ERCP 9/16 unsuccessful, repeat ERCP 9/20 unsuccessful    TBili improving  Bloody sputum likely due to trauma from scope and friable mass in stomach LFTs are elevating without a clear source; no evidence of mets to liver  RUQ U/S results are equivocal; shows some mild bile duct dilatation similar to CT on 9/9; new from CT on 8/14  CT shows no mets to the liver at the moment    Hepatitis labs from June and July 2022 show no signs of HepB/C infection  MRCP showed Stricture of CBD  ERCP 9/16 unsuccessful, repeat ERCP 9/20 unsuccessful  Bloody sputum likely due to trauma from scope and friable mass in stomach

## 2022-09-26 NOTE — PROGRESS NOTE ADULT - SUBJECTIVE AND OBJECTIVE BOX
Internal Medicine Progress Note    Patient is a 57y old  Female who presents with a chief complaint of abdominal pain & nausea/vomiting (25 Sep 2022 08:37)    OVERNIGHT EVENTS/SUBJECTIVE:    OBJECTIVE:  Vital Signs Last 24 Hrs  T(C): 36.9 (26 Sep 2022 05:46), Max: 37.1 (25 Sep 2022 17:09)  T(F): 98.4 (26 Sep 2022 05:46), Max: 98.8 (25 Sep 2022 17:09)  HR: 110 (26 Sep 2022 05:46) (110 - 126)  BP: 123/76 (26 Sep 2022 05:46) (116/63 - 123/76)  BP(mean): --  RR: 20 (26 Sep 2022 05:46) (18 - 20)  SpO2: 98% (26 Sep 2022 05:46) (94% - 98%)    Parameters below as of 26 Sep 2022 05:46  Patient On (Oxygen Delivery Method): room air      I&O's Detail    Daily     Daily   Physical Exam:  General: NAD, resting comfortably in bed  Neuro: A&Ox4, 5/5 strength in all ext  HEENT: NC/AT, EOMI, normal hearing, oral mucosa moist, no oral lesions noted, no pharyngeal erythema, uvula midline  Neck: supple, thyroid not enlarged, no LAD  Resp: Breathing comfortably on RA, LCTA b/l  CV: Normal sinus rhythm, S1 and S2, no r/m/g  Abd: soft, non-distended, non-tender. No HSM.  Ext: ROMIx4, no edema, +2 pulses bilaterally  Skin: Warm and dry, no rashes or discolorations  Psych: Appropriate affect    Medications:  MEDICATIONS  (STANDING):  Biotene Dry Mouth Oral Rinse 5 milliLiter(s) Swish and Spit two times a day  lidocaine   4% Patch 1 Patch Transdermal daily  methadone   Solution 7.5 milliGRAM(s) Enteral Tube every 12 hours  pantoprazole  Injectable 40 milliGRAM(s) IV Push daily  piperacillin/tazobactam IVPB.. 3.375 Gram(s) IV Intermittent every 8 hours    MEDICATIONS  (PRN):  bisacodyl Suppository 10 milliGRAM(s) Rectal daily PRN Constipation  LORazepam   Injectable 0.5 milliGRAM(s) IV Push every 6 hours PRN Nausea and/or Vomiting  morphine   Solution 15 milliGRAM(s) Enteral Tube every 4 hours PRN Breakthrough pain  ondansetron Injectable 8 milliGRAM(s) IV Push every 8 hours PRN Nausea and/or Vomiting  polyethylene glycol 3350 17 Gram(s) Oral daily PRN Constipation      Labs:                        9.0    20.12 )-----------( 390      ( 26 Sep 2022 05:54 )             29.2     09-26    134<L>  |  100  |  x   ----------------------------<  x   4.7   |  x   |  x     Ca    8.4      25 Sep 2022 07:10  Phos  3.4     09-25  Mg     2.30     09-25    TPro  x   /  Alb  x   /  TBili  x   /  DBili  x   /  AST  233<H>  /  ALT  x   /  AlkPhos  x   09-26        SARS-CoV-2: NotDetec (22 Sep 2022 11:13)  COVID-19 PCR: NotDetec (19 Sep 2022 12:50)  COVID-19 PCR: NotDetec (14 Sep 2022 13:05)  SARS-CoV-2: NotDetec (08 Sep 2022 18:15)  COVID-19 PCR: NotDetec (08 Sep 2022 07:31)  COVID-19 PCR: NotDetec (02 Sep 2022 06:00)  COVID-19 PCR: NotDetec (25 Aug 2022 08:02)  COVID-19 PCR: NotDetec (21 Aug 2022 06:49)  COVID-19 PCR: NotDetec (14 Aug 2022 17:47)      Radiology: Internal Medicine Progress Note    Patient is a 57y old  Female who presents with a chief complaint of abdominal pain & nausea/vomiting (25 Sep 2022 08:37)    OVERNIGHT EVENTS/SUBJECTIVE: Pt reports coughing up more blood and continuing to have abd pain. She feels like her heart is beating fast. She generally does not want to talk this morning, but denies any new symptoms otherwise.     OBJECTIVE:  Vital Signs Last 24 Hrs  T(C): 36.9 (26 Sep 2022 05:46), Max: 37.1 (25 Sep 2022 17:09)  T(F): 98.4 (26 Sep 2022 05:46), Max: 98.8 (25 Sep 2022 17:09)  HR: 110 (26 Sep 2022 05:46) (110 - 126)  BP: 123/76 (26 Sep 2022 05:46) (116/63 - 123/76)  BP(mean): --  RR: 20 (26 Sep 2022 05:46) (18 - 20)  SpO2: 98% (26 Sep 2022 05:46) (94% - 98%)    Parameters below as of 26 Sep 2022 05:46  Patient On (Oxygen Delivery Method): room air      I&O's Detail    Daily     Daily   Physical Exam:  General: NAD, resting in bed  Neuro: A&Ox4, no gross deficits   HEENT: NC/AT, EOMI, normal hearing  Resp: Breathing comfortably on RA, LCTA b/l  CV: tachycardic, S1 and S2, no r/m/g  Abd: distended, non-tender. J tube in place   Ext: no edema, +2 pulses bilaterally  Skin: Warm and dry, no rashes or discolorations  Psych: Appropriate affect    Medications:  MEDICATIONS  (STANDING):  Biotene Dry Mouth Oral Rinse 5 milliLiter(s) Swish and Spit two times a day  lidocaine   4% Patch 1 Patch Transdermal daily  methadone   Solution 7.5 milliGRAM(s) Enteral Tube every 12 hours  pantoprazole  Injectable 40 milliGRAM(s) IV Push daily  piperacillin/tazobactam IVPB.. 3.375 Gram(s) IV Intermittent every 8 hours    MEDICATIONS  (PRN):  bisacodyl Suppository 10 milliGRAM(s) Rectal daily PRN Constipation  LORazepam   Injectable 0.5 milliGRAM(s) IV Push every 6 hours PRN Nausea and/or Vomiting  morphine   Solution 15 milliGRAM(s) Enteral Tube every 4 hours PRN Breakthrough pain  ondansetron Injectable 8 milliGRAM(s) IV Push every 8 hours PRN Nausea and/or Vomiting  polyethylene glycol 3350 17 Gram(s) Oral daily PRN Constipation      Labs:                        9.0    20.12 )-----------( 390      ( 26 Sep 2022 05:54 )             29.2     09-26    134<L>  |  100  |  x   ----------------------------<  x   4.7   |  x   |  x     Ca    8.4      25 Sep 2022 07:10  Phos  3.4     09-25  Mg     2.30     09-25    TPro  x   /  Alb  x   /  TBili  x   /  DBili  x   /  AST  233<H>  /  ALT  x   /  AlkPhos  x   09-26        SARS-CoV-2: NotDetec (22 Sep 2022 11:13)  COVID-19 PCR: NotDetec (19 Sep 2022 12:50)  COVID-19 PCR: NotDetec (14 Sep 2022 13:05)  SARS-CoV-2: NotDetec (08 Sep 2022 18:15)  COVID-19 PCR: NotDetec (08 Sep 2022 07:31)  COVID-19 PCR: NotDetec (02 Sep 2022 06:00)  COVID-19 PCR: NotDetec (25 Aug 2022 08:02)  COVID-19 PCR: NotDetec (21 Aug 2022 06:49)  COVID-19 PCR: NotDetec (14 Aug 2022 17:47)      Radiology:

## 2022-09-26 NOTE — PROGRESS NOTE ADULT - PROBLEM SELECTOR PLAN 6
Urology/IR believe there is no need for urgent intervention  Will reconsult if there appears to be signs of UTI I have personally seen and examined this patient.  I have fully participated in the care of this patient. I have reviewed all pertinent clinical information, including history, physical exam, plan and the Resident’s note and agree except as noted.

## 2022-09-26 NOTE — PROGRESS NOTE ADULT - PROBLEM SELECTOR PLAN 3
T to 100.6, tachycardic  - fu blood, urine cultures  - c/w empiric zosyn, finish 9/26 improved  spoke with family about not restarting abx after zosyn course complete  T to 100.6, tachycardic  - c/w empiric zosyn, finish 9/26

## 2022-09-26 NOTE — PROGRESS NOTE ADULT - ATTENDING COMMENTS
57F with no significant PMH p/w abd pain, N/V found to have gastric adenocarcinoma with metastatic disease confirmed with bx. EGD confirmed advanced disease and not a candidate for resection. s/p jejunostomy for tube feeds. Course c/b subsegmental PE, initially on lovenox full dose AC, however course c/b hematemesis 2/2 friable gastric mass and AC now deferred. Course c/b rising LFTs, suspect related to metastatic disease - ERCP attempted x2 for stent placement without success d/t extent of disease. Had completed 7d course of zosyn (9/9-17) with new Tmax 9/22 prompting reinitiation of zosyn and fever w/u. Fever w/u unrevealing and pt with continued low grade temps (last 9/24. Initially planned for PICC for chemo however deferred due to fever and now no longer a candidate for chemo per oncology. Also potential plan for IVC filter, but patient and family deciding about withdrawing invasive measures and making patient more comfortable. Discussed with patient's son Dmitry and daughter Yun over the phone - we discussed that patient likely has days to weeks left and that there are no disease modifying treatments being offered but there are different medical interventions that we can provide (ex: blood transfusion, IV antbiotics, IVF, etc). Daughter states that she would want to make patient comfortable and no prolong suffering with these interventions. Son is extremely upset, tearful and was not able to make decision. When discussed with patient, she is very withdrawn and states she is in pain and cannot go on any longer, states she wants to go peacefully. Family wants to continue talking amongst themselves, not yet ready to make decisions regarding invasive measures vs. comfort care. Will continue discussions - informed pall care Dr. Leal as well. 57F with no significant PMH p/w abd pain, N/V found to have gastric adenocarcinoma with metastatic disease confirmed with bx. EGD confirmed advanced disease and not a candidate for resection. s/p jejunostomy for tube feeds. Course c/b subsegmental PE, initially on lovenox full dose AC, however course c/b hematemesis 2/2 friable gastric mass and AC now deferred. Course c/b rising LFTs, suspect related to metastatic disease - ERCP attempted x2 for stent placement without success d/t extent of disease. Had completed 7d course of zosyn (9/9-17) with new Tmax 9/22 prompting reinitiation of zosyn and fever w/u. Fever w/u unrevealing and pt with continued low grade temps (last 9/24. Initially planned for PICC for chemo however deferred due to fever and now no longer a candidate for chemo per oncology. Also potential plan for IVC filter, but patient and family deciding about withdrawing invasive measures and making patient more comfortable. Discussed with patient's son Dmitry and daughter Yun over the phone - we discussed that patient likely has days to weeks left and that there are no disease modifying treatments being offered but there are different medical interventions that we can provide (ex: blood transfusion, IV antbiotics, IVF, etc). Daughter states that she would want to make patient comfortable and no prolong suffering with these interventions. Son is extremely upset, tearful and was not able to make decision. When discussed with patient, she is very withdrawn and states she is in pain and cannot go on any longer, states she wants to go peacefully. Family wants to continue talking amongst themselves, not yet ready to make decisions regarding invasive measures vs. comfort care. Will continue discussions - informed pall care Dr. Leal as well. Will continue with blood draws for now. Rising leukocytosis - suspect related to malignancy but will c/w Zosyn for now. Also with new DEVI - will give trial of IVF and check bladder scan. Lovenox on hold for PE due to hematemesis and family is still deferring IVC filter.

## 2022-09-26 NOTE — GOALS OF CARE CONVERSATION - ADVANCED CARE PLANNING - CONVERSATION DETAILS
On 9/26, the primary medicine team, supervising attending, patient and her son as well as her daughter (over face time) had an indepth discussion about goals of care. First, the primary medicine team and pt's children discussed patient's poor prognosis and remaining possible interventions such as IVC versus comfort care measures. The pt's children are aware that chemotherapy can not be offered at this time given the pt's condition. When discussed what interventions can be done, we explained we can move forward with IVC filter to help with the pt's risk of blood clots. However, we also explained the risks and benefits of that procedure as as well as the overall impact of a procedure that will be managing symptoms and not treating the cancer itself. The children agreed that the patient herself should make the decision of pursuing further interventions since she is alert and oriented and able to make her own decisions.     At bedside, the pt's son had her sister on facetime and the daughter asked the pt directly what she wants to do, if she wants to move forward with procedures or not. The pt repeated stated, "I am tired. I want to be in peace. Too much pain." The children explained they don't want to "punish" their mother by going through more procedures that will make her uncomfortable through the process without any long-term outcome.     The children were very tearful and the primary team explained that no decision needs to be made at this time. We allowed the family to spend time with one another and discuss goals of care within their own comfort.     Of note, the primary supervising attending thoroughly explained the meaning of palliative care and the resources we have available. We also discussed hospice care and what that entails; the children explained they are not in a position to take care of their mother at home given their job and schedules. At this time continuing with fluid, medication, abx, and vitals.

## 2022-09-26 NOTE — PROGRESS NOTE ADULT - SUBJECTIVE AND OBJECTIVE BOX
SUBJECTIVE AND OBJECTIVE:  Indication for Geriatrics and Palliative Care Services/INTERVAL HPI: Pain management   DNR on chart: No     INTERVAL EVENTS: Patient seen this AM, fatigued. Patient using one prn on average for pain. Continued to have hematemesis at times. Attempted to speak to patient about GOC and code status, however patient just says she knows she will die and unable to take part in discussion.   Primary team was able to have GOC with patient's son and daughter. Per primary, son is very overwhelmed.     Allergies    No Known Allergies    Intolerances    MEDICATIONS  (STANDING):  MEDICATIONS  (STANDING):  Biotene Dry Mouth Oral Rinse 5 milliLiter(s) Swish and Spit two times a day  lidocaine   4% Patch 1 Patch Transdermal daily  methadone   Solution 7.5 milliGRAM(s) Enteral Tube every 12 hours  pantoprazole  Injectable 40 milliGRAM(s) IV Push daily  piperacillin/tazobactam IVPB.. 3.375 Gram(s) IV Intermittent every 8 hours  sodium chloride 0.9% Bolus 1000 milliLiter(s) IV Bolus once    MEDICATIONS  (PRN):  bisacodyl Suppository 10 milliGRAM(s) Rectal daily PRN Constipation  LORazepam   Injectable 0.5 milliGRAM(s) IV Push every 6 hours PRN Nausea and/or Vomiting  morphine   Solution 15 milliGRAM(s) Enteral Tube every 4 hours PRN Breakthrough pain  ondansetron Injectable 8 milliGRAM(s) IV Push every 8 hours PRN Nausea and/or Vomiting  polyethylene glycol 3350 17 Gram(s) Oral daily PRN Constipation      ITEMS UNCHECKED ARE NOT PRESENT    PRESENT SYMPTOMS: [ ]Unable to self-report - see [ ] CPOT [ ] PAINADS [ ] RDOS  Source if other than patient:  [ ]Family   [ ]Team     Pain:  [x ]yes [ ]no  QOL impact - Yes   Location -    epigastric area               Aggravating factors - none  Quality - sharp  Radiation - to the back  Timing- constant  Severity (0-10 scale): 10/10  Minimal acceptable level (0-10 scale): 4/10    Dyspnea:                           [ ]Mild [ ]Moderate [ ]Severe    RDOS:  0 to 2  minimal or no respiratory distress   3  mild distress  4 to 6 moderate distress  >7 severe distress  [email or web address] (Ctrl +  left click to view)     Anxiety:                             [ ]Mild [ ]Moderate [ ]Severe  Fatigue:                             [ ]Mild [ ]Moderate [ ]Severe  Nausea:                             [ ]Mild [x ]Moderate [ ]Severe  Loss of appetite:                [ ]Mild [ ]Moderate [ ]Severe  Constipation:                     [ ]Mild [ ]Moderate [ ]Severe    PCSSQ[Palliative Care Spiritual Screening Question]   Severity (0-10):  Score of 4 or > indicate consideration of Chaplaincy referral.  Chaplaincy Referral: [ ] yes [ ] refused [ ] following    Other Symptoms:  [ ]All other review of systems negative     Vital Signs Last 24 Hrs  T(C): 37 (26 Sep 2022 12:59), Max: 37.1 (25 Sep 2022 17:09)  T(F): 98.6 (26 Sep 2022 12:59), Max: 98.8 (25 Sep 2022 17:09)  HR: 105 (26 Sep 2022 12:59) (105 - 126)  BP: 120/70 (26 Sep 2022 12:59) (109/75 - 123/76)  BP(mean): --  RR: 19 (26 Sep 2022 12:59) (18 - 20)  SpO2: 97% (26 Sep 2022 12:59) (94% - 98%)    Parameters below as of 26 Sep 2022 12:59  Patient On (Oxygen Delivery Method): room air        GENERAL: [ ]Cachexia  Temporal wasting   [x ]Alert  [x ]Oriented    [ ]Lethargic  [ ]Unarousable  [ x]Verbal  [ ]Non-Verbal  Behavioral:   [ ]Anxiety  [ ]Delirium [ ]Agitation [ ]Other  HEENT:  [x ]Normal   [ ]Dry mouth   [ ]ET Tube/Trach  [ ]Oral lesions  PULMONARY:   [x ]Clear [ ]Tachypnea  [ ]Audible excessive secretions   [ ]Rhonchi        [ ]Right [ ]Left [ ]Bilateral  [ ]Crackles        [ ]Right [ ]Left [ ]Bilateral  [ ]Wheezing     [ ]Right [ ]Left [ ]Bilateral  [ ]Diminished BS [ ] Right [ ]Left [ ]Bilateral  CARDIOVASCULAR:    [x ]Regular [ ]Irregular [ ]Tachy  [ ]Luis Miguel [ ]Murmur [ ]Other  GASTROINTESTINAL:  [x ]Soft  [ ]Distended   [ ]+BS  [ ]Non tender [ ]Tender  [ ]Other [X ]J tube [ ]OGT/ NGT Patient has laparoscopic surgical incisions covered by  gauze    GENITOURINARY:  [ ]Normal [ ]Incontinent   [ ]Oliguria/Anuria   [x ]Patterson  MUSCULOSKELETAL:   [ x]Normal   [ ]Weakness  [ ]Bed/Wheelchair bound [ ]Edema  NEUROLOGIC:   [x ]No focal deficits  [ ] Cognitive impairment  [ ] Dysphagia [ ]Dysarthria [ ] Paresis [ ]Other   SKIN:   [x ]Normal  [ ]Rash  [ ]Other  [ ]Pressure ulcer(s) [ ]y [x ]n present on admission    CRITICAL CARE:  [ ]Shock Present  [ ]Septic [ ]Cardiogenic [ ]Neurologic [ ]Hypovolemic  [ ]Vasopressors [ ]Inotropes  [ ]Respiratory failure present [ ]Mechanical Ventilation [ ]Non-invasive ventilatory support [ ]High-Flow   [ ]Acute  [ ]Chronic [ ]Hypoxic  [ ]Hypercarbic [ ]Other  [ ]Other organ failure     LABS:                          9.0    20.12 )-----------( 390      ( 26 Sep 2022 05:54 )             29.2     09-26    134<L>  |  100  |  37<H>  ----------------------------<  122<H>  4.7   |  20<L>  |  1.53<H>    Ca    8.4      26 Sep 2022 05:54  Phos  5.4     09-26  Mg     2.40     09-26    TPro  6.3  /  Alb  2.7<L>  /  TBili  11.6<H>  /  DBili  x   /  AST  233<H>  /  ALT  212<H>  /  AlkPhos  1133<H>  09-26      Protein Calorie Malnutrition Present: [ ]mild [ ]moderate [ ]severe [ ]underweight [ ]morbid obesity    Height (cm): 157.5 (08-26-22 @ 07:10)  Weight (kg): 71.1 (08-26-22 @ 07:11)  BMI (kg/m2): 28.7 (08-26-22 @ 07:11)    [ ]PPSV2 < or = 30%  [ ]significant weight loss [ ]poor nutritional intake [ ]anasarca[X ]Artificial Nutrition - J tube     Other REFERRALS:  [ ]Hospice  [ ]Child Life  [ ]Social Work  [ ]Case management [ ]Holistic Therapy

## 2022-09-26 NOTE — PROGRESS NOTE ADULT - PROBLEM SELECTOR PLAN 1
- GI, onc, surg/onc following  - pt transitioned to palliative/hospice route, no plan for chemotherapy  - EGD:  Infiltrative changes in gastric body concerning gastric malignancy, Congested duodenal mucosa, biopsy = poorly differentiated adenocarcinoma  - staging CT chest - Mediastinal/bilateral hilar/R internal mammary LAD  - mediastinal lymph node biopsy results - Metastatic adenocarcinoma   - pantoprazole  40 mg daily  - OR 8/26 with J tube placement    pain Control; Palliative will attempt to transition to morphine for cost control  -Morphine 15mg q2h for severe Pain 7-10  -Methadone 5mg q12h standing    PRICING UPDATES:  Lovenox: $1/month covered under [facility] medicaid  Tube Feeds and pump: ~$120/month  Methadone: 36$/month  Reglan: $60/month  Morphine: $1 COVERED    Family meeting held, everyone understands steps moving forward. - pt transitioned to palliative/hospice route, no plan for chemotherapy  - EGD:  Infiltrative changes in gastric body concerning gastric malignancy, Congested duodenal mucosa, biopsy = poorly differentiated adenocarcinoma  - staging CT chest - Mediastinal/bilateral hilar/R internal mammary LAD  - mediastinal lymph node biopsy results - Metastatic adenocarcinoma   - pantoprazole  40 mg daily  - OR 8/26 with J tube placement    pain Control; Palliative will attempt to transition to morphine for cost control  -Morphine 15mg q2h for severe Pain 7-10  -Methadone 5mg q12h standing    PRICING UPDATES:  Lovenox: $1/month covered under [facility] medicaid  Tube Feeds and pump: ~$120/month  Methadone: 36$/month  Reglan: $60/month  Morphine: $1 COVERED

## 2022-09-26 NOTE — PROGRESS NOTE ADULT - PROBLEM SELECTOR PLAN 4
- Patient originally named her cousin Last Gonzales as main HCP and her son as alternate   - Patient changed mind- wants her son Dmitry Umanzor to be primary HCP, Last Gonzales as alternate

## 2022-09-27 NOTE — PROGRESS NOTE ADULT - NUTRITIONAL ASSESSMENT
This patient has been assessed with a concern for Malnutrition and has been determined to have a diagnosis/diagnoses of Severe protein-calorie malnutrition.    This patient is being managed with:   Diet NPO with Tube Feed-  Tube Feeding Modality: Jejunostomy  Peptamen 1.5 ([id number])  Total Volume for 24 Hours (mL): 1008  Continuous  Starting Tube Feed Rate {mL per Hour}: 20  Increase Tube Feed Rate by (mL): 3     Every 4 hours  Until Goal Tube Feed Rate (mL per Hour): 42  Tube Feed Duration (in Hours): 24  Tube Feed Start Time: 10:00  Entered: Sep 26 2022  5:37PM

## 2022-09-27 NOTE — PROGRESS NOTE ADULT - ATTENDING COMMENTS
57F with no significant PMH p/w abd pain, N/V found to have gastric adenocarcinoma with metastatic disease confirmed with bx. EGD confirmed advanced disease and not a candidate for resection. s/p jejunostomy for tube feeds. Course c/b subsegmental PE, initially on lovenox full dose AC, however course c/b hematemesis 2/2 friable gastric mass and AC now deferred. Course c/b rising LFTs, suspect related to metastatic disease - ERCP attempted x2 for stent placement without success d/t extent of disease. Had completed 7d course of zosyn (9/9-17) with new Tmax 9/22 prompting reinitiation of zosyn and fever w/u. Fever w/u unrevealing and pt with continued low grade temps (last 9/24. Initially planned for PICC for chemo however deferred due to fever and now no longer a candidate for chemo per oncology. Also potential plan for IVC filter, but patient and family declined and decided to withdraw invasive measures and make patient more comfortable.     Patient with acute clinical decompensation today - febrile to 101F, tachycardic and hypoxic - meeting sepsis criteria. Concern for possible infection, however patient is already on Zosyn. Also concern for prorogation of PE as patient is off lovenox due to hematemesis.     Had family meeting with Palliative care team (Dr. Leal and Cele HELTON) and patient's son Dmitry and two aunts in person and daughter's Yun and Jossy over the phone - we discussed that patient is acutely getting worse and explained concern for infection vs. PE vs. tumor fever as cause. Discussed there are different medical interventions that we can provide (ex: blood transfusion, IV antibiotics, IVF, etc) but they would not treat the underlying cause, which is the cancer. Family is aware of patient's deteriorating medical status and decided that they would like to do things that would make patient comfortable - no more blood draws, antibiotics or IVF. They are okay with pain medications and measures that would relieve her suffering. Discussed that patient likely has hours to days left, they are aware and calling family to make arrangements. Discussed that we would let patient pass naturally and would not do CPR, family in agreement with DNR/DNI. MOLST completed by palliative care team. Comfort measures in place.

## 2022-09-27 NOTE — GOALS OF CARE CONVERSATION - ADVANCED CARE PLANNING - CONVERSATION DETAILS
Referral to palliative care for complex decision making and symptom management in setting of advanced malignancy. Meeting held with pt's family (son, 2 dtrs, 2 aunts) with primary medical team and palliative care. Reviewed pt's current clinical status. Family aware that pt's clinical course has deteriorated and pt is not a candidate for DMT. Family aware of pt's poor prognosis.  Family are in agreement to transition to symptom directed care. Family do not wish for aggressive interventions of CPR or mechanical intervention at the end of life and amenable to completion of MOLST for DNR/DNI. Family do not want further blood draws and in favor of comfort care. Chaplaincy support offered. Family amenable. Referral made. Support provided to family.

## 2022-09-27 NOTE — GOALS OF CARE CONVERSATION - ADVANCED CARE PLANNING - WHAT MATTERS MOST
Balance between patient comfort and living a long healthy life
Family want to alleviate pt's suffering and want symptom directed care.
Patient comfort- pain management specifically
To be at peace, no pain
pt's comfort

## 2022-09-27 NOTE — PROGRESS NOTE ADULT - SUBJECTIVE AND OBJECTIVE BOX
SUBJECTIVE AND OBJECTIVE:  Indication for Geriatrics and Palliative Care Services/INTERVAL HPI: Pain management   DNR on chart: No     INTERVAL EVENTS: Patient seen this AM, tachypneic, restless, and complaining of pain, trying to sit up in bed. She is complaining of full body pain. However patient has become frail, unsafe to be seated. Family meeting held to discuss comfort measures. See separate GOC (to follow).   Patient was seen after family meeting, more calm but lethargic/fatigued.     Allergies    No Known Allergies    Intolerances    MEDICATIONS  (STANDING):  MEDICATIONS  (STANDING):  Biotene Dry Mouth Oral Rinse 5 milliLiter(s) Swish and Spit two times a day  lidocaine   4% Patch 1 Patch Transdermal daily  methadone   Solution 7.5 milliGRAM(s) Enteral Tube every 12 hours  pantoprazole  Injectable 40 milliGRAM(s) IV Push daily  piperacillin/tazobactam IVPB.. 3.375 Gram(s) IV Intermittent every 8 hours  sodium chloride 0.9% Bolus 1000 milliLiter(s) IV Bolus once    MEDICATIONS  (PRN):  bisacodyl Suppository 10 milliGRAM(s) Rectal daily PRN Constipation  LORazepam   Injectable 0.5 milliGRAM(s) IV Push every 6 hours PRN Nausea and/or Vomiting  morphine   Solution 15 milliGRAM(s) Enteral Tube every 4 hours PRN Breakthrough pain  ondansetron Injectable 8 milliGRAM(s) IV Push every 8 hours PRN Nausea and/or Vomiting  polyethylene glycol 3350 17 Gram(s) Oral daily PRN Constipation      ITEMS UNCHECKED ARE NOT PRESENT    PRESENT SYMPTOMS: [ ]Unable to self-report - see [ ] CPOT [ ] PAINADS [ ] RDOS  Source if other than patient:  [ ]Family   [ ]Team     Pain:  [x ]yes [ ]no  QOL impact - Yes   Location -    All over body            Aggravating factors - none  Quality - sharp  Radiation - to the back  Timing- constant  Severity (0-10 scale): 10/10  Minimal acceptable level (0-10 scale): 4/10    Dyspnea:                           [ ]Mild [ ]Moderate [ ]Severe    RDOS:  0 to 2  minimal or no respiratory distress   3  mild distress  4 to 6 moderate distress  >7 severe distress  [email or web address] (Ctrl +  left click to view)     Anxiety:                             [ ]Mild [ ]Moderate [ ]Severe  Fatigue:                             [ ]Mild [ ]Moderate [ ]Severe  Nausea:                             [ ]Mild [x ]Moderate [ ]Severe  Loss of appetite:                [ ]Mild [ ]Moderate [ ]Severe  Constipation:                     [ ]Mild [ ]Moderate [ ]Severe    PCSSQ[Palliative Care Spiritual Screening Question]   Severity (0-10):  Score of 4 or > indicate consideration of Chaplaincy referral.  Chaplaincy Referral: [ ] yes [ ] refused [ ] following    Other Symptoms:  [ ]All other review of systems negative     Vital Signs Last 24 Hrs  Vital Signs Last 24 Hrs  T(C): 36.9 (27 Sep 2022 14:12), Max: 37.1 (27 Sep 2022 02:04)  T(F): 98.4 (27 Sep 2022 14:12), Max: 98.8 (27 Sep 2022 02:04)  HR: 105 (27 Sep 2022 14:12) (100 - 114)  BP: 113/76 (27 Sep 2022 14:12) (113/76 - 130/92)  BP(mean): --  RR: 17 (27 Sep 2022 14:12) (15 - 18)  SpO2: 91% (27 Sep 2022 14:12) (91% - 96%)    Parameters below as of 27 Sep 2022 14:12  Patient On (Oxygen Delivery Method): room air        GENERAL: [ ]Cachexia  Temporal wasting   [ ]Alert  [ ]Oriented    [X ]Lethargic  [ ]Unarousable  [ ]Verbal  [ ]Non-Verbal  Behavioral:   [ ]Anxiety  [ ]Delirium [X ]Agitation [ ]Other  HEENT:  [x ]Normal   [ ]Dry mouth   [ ]ET Tube/Trach  [ ]Oral lesions  PULMONARY:   [X ]Clear [X ]Tachypnea  [ ]Audible excessive secretions   [ ]Rhonchi        [ ]Right [ ]Left [ ]Bilateral  [ ]Crackles        [ ]Right [ ]Left [ ]Bilateral  [ ]Wheezing     [ ]Right [ ]Left [ ]Bilateral  [ ]Diminished BS [ ] Right [ ]Left [ ]Bilateral  CARDIOVASCULAR:    [x ]Regular [ ]Irregular [ ]Tachy  [ ]Luis Miguel [ ]Murmur [ ]Other  GASTROINTESTINAL:  [x ]Soft  [ ]Distended   [ ]+BS  [ ]Non tender [ ]Tender  [ ]Other [X ]J tube [ ]OGT/ NGT Patient has laparoscopic surgical incisions covered by  gauze    GENITOURINARY:  [X ]Normal [ ]Incontinent   [ ]Oliguria/Anuria   [ ]Patterson  MUSCULOSKELETAL:   [ ]Normal   [ ]Weakness  [X ]Bed/Wheelchair bound [ ]Edema  NEUROLOGIC:   [x ]No focal deficits  [ ] Cognitive impairment  [ ] Dysphagia [ ]Dysarthria [ ] Paresis [ ]Other   SKIN:   [x ]Normal  [ ]Rash  [ ]Other  [ ]Pressure ulcer(s) [ ]y [x ]n present on admission    CRITICAL CARE:  [ ]Shock Present  [ ]Septic [ ]Cardiogenic [ ]Neurologic [ ]Hypovolemic  [ ]Vasopressors [ ]Inotropes  [ ]Respiratory failure present [ ]Mechanical Ventilation [ ]Non-invasive ventilatory support [ ]High-Flow   [ ]Acute  [ ]Chronic [ ]Hypoxic  [ ]Hypercarbic [ ]Other  [ ]Other organ failure     LABS:                          8.9    19.80 )-----------( 388      ( 27 Sep 2022 12:30 )             29.9     09-27    133<L>  |  99  |  40<H>  ----------------------------<  129<H>  4.4   |  21<L>  |  1.42<H>    Ca    8.2<L>      27 Sep 2022 12:30  Phos  3.6     09-27  Mg     2.60     09-27    TPro  6.5  /  Alb  2.8<L>  /  TBili  13.3<H>  /  DBili  x   /  AST  293<H>  /  ALT  243<H>  /  AlkPhos  1261<H>  09-27      Protein Calorie Malnutrition Present: [ ]mild [ ]moderate [X ]severe [ ]underweight [ ]morbid obesity    Height (cm): 157.5 (08-26-22 @ 07:10)  Weight (kg): 71.1 (08-26-22 @ 07:11)  BMI (kg/m2): 28.7 (08-26-22 @ 07:11)    [ ]PPSV2 < or = 30%  [X ]significant weight loss [ ]poor nutritional intake [ ]anasarca[X ]Artificial Nutrition - J tube     Other REFERRALS:  [ ]Hospice  [ ]Child Life  [ ]Social Work  [ ]Case management [ ]Holistic Therapy

## 2022-09-27 NOTE — PROGRESS NOTE ADULT - PROBLEM SELECTOR PLAN 7
Continued Stay Note  [facility]     Patient Name: Corey Lawson  MRN: [id number]  Today's Date: 11/19/2019    Admit Date: 11/16/2019    Discharge Plan     Row Name 11/19/19 1226       Plan    Plan  SNF- [facility] to evaluate- will need [employer] M[employer] pre-cert    Plan Comments  Spoke with patient and wife at bedside.  They are interested in [facility] @ [place].  Referral sent to [facility].  Patient will need [employer] MCR pre-cert. Valencia Vanegas RN        Discharge Codes    No documentation.       Expected Discharge Date and Time     Expected Discharge Date Expected Discharge Time    Nov 20, 2019             Valencia Vanegas RN     Impression: 72 hour caloric count to evaluate for malnutrition - indicates malnutrition. Pt unable to eat due to pain.   Now J tube placement    Nutrition consult - Recommend TwoCal HN via J-tube initiated at 10 mL/hr increased by 10mL q4 hrs until goal rate 35 mL/hr x24 hrs to provide 840 mL formula, 1680 kcal, 70.14 gm protein, 588 mL free water (meets 33.6 kcal/kg, 1.4 gm protein/kg).    Patient able to tolerate rate of 27cc/hr

## 2022-09-27 NOTE — PROGRESS NOTE ADULT - CONVERSATION DETAILS
Had family meeting with Palliative care team (Dr. Leal and Cele HELTON) and patient's son Dmitry and two aunts in person and daughter's Yun and Jossy over the phone - we discussed that patient is acutely getting worse and explained concern for infection vs. PE vs. tumor fever as cause. Discussed there are different medical interventions that we can provide (ex: blood transfusion, IV antibiotics, IVF, etc) but they would not treat the underlying cause, which is the cancer. Family is aware of patient's deteriorating medical status and decided that they would like to do things that would make patient comfortable - no more blood draws, antibiotics or IVF. They are okay with pain medications and measures that would relieve her suffering. Discussed that patient likely has hours to days left, they are aware and calling family to make arrangements. Discussed that we would let patient pass naturally and would not do CPR, family in agreement with DNR/DNI. MOLST completed by palliative care team. Comfort measures in place.
Advanced Care Planning:  I have had goals of care discussion, advanced directive, and code status with the patient today.  I have spent 25 minutes with this patient. I discussed the objective findings thus far regarding her endoscopy results and her staging CT scan that showed LAD. Although there is no official diagnosis of gastric cancer until the pathology report comes back, I shared information regarding the concerning findings on her chest CT that could be indicative of mets. I specifically spoke with patient about her wishes in regards to resuscitation. She expressed that she would like to be full code, specifically would like to have CPR, intubation, and ventilation if necessary. The patient had all questions answered and expressed understanding of the plan.

## 2022-09-27 NOTE — PROGRESS NOTE ADULT - PROBLEM SELECTOR PLAN 3
improved  spoke with family about not restarting abx after zosyn course complete  T to 100.6, tachycardic  - c/w empiric zosyn, finish 9/26

## 2022-09-27 NOTE — PROGRESS NOTE ADULT - PROBLEM SELECTOR PLAN 9
DVT ppx: lovenox held for now    Dispo planning: complex dispo planning due to lack of insurance  Emergency medicaid can cover outpatient chemo/transport. DVT ppx: lovenox held for now    Dispo planning: complex dispo planning due to lack of insurance  Emergency medicaid can cover outpatient chemo/transport.    GOC: comfort care per patient and family. DNR/DNI

## 2022-09-27 NOTE — PROGRESS NOTE ADULT - PROBLEM SELECTOR PLAN 1
- pt transitioned to palliative/hospice route, no plan for chemotherapy  - EGD:  Infiltrative changes in gastric body concerning gastric malignancy, Congested duodenal mucosa, biopsy = poorly differentiated adenocarcinoma  - staging CT chest - Mediastinal/bilateral hilar/R internal mammary LAD  - mediastinal lymph node biopsy results - Metastatic adenocarcinoma   - pantoprazole  40 mg daily  - OR 8/26 with J tube placement    pain Control; Palliative will attempt to transition to morphine for cost control  -Morphine 15mg q2h for severe Pain 7-10  -Methadone 5mg q12h standing    PRICING UPDATES:  Lovenox: $1/month covered under [facility] medicaid  Tube Feeds and pump: ~$120/month  Methadone: 36$/month  Reglan: $60/month  Morphine: $1 COVERED

## 2022-09-27 NOTE — PROGRESS NOTE ADULT - PROBLEM SELECTOR PLAN 4
- Biopsy proven poorly differentiated metastatic gastric adenocarcinoma  - Hospital course complicated by PE and hyperbilirubinemia   - Status post laparoscopic evaluation on 8/26/2022 , non resectable tumor. Status post J tube placement.   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

## 2022-09-27 NOTE — PROGRESS NOTE ADULT - PROBLEM SELECTOR PLAN 3
- 2/2 metastatic gastric cancer, also with hematemesis, stiff stomach and duodenal stenosis   - Reporting nausea is on and off   - c/w IV zofran 8 mg q8h PRN for nausea/vomiting   - If no improvement of above can also trial:       - IV Reglan 10 mg q6h PRN for nausea/vomiting OR       - IV Compazine 10 mg q6h PRN for nausea/vomiting

## 2022-09-27 NOTE — PROGRESS NOTE ADULT - SUBJECTIVE AND OBJECTIVE BOX
Internal Medicine Progress Note    Patient is a 57y old  Female who presents with a chief complaint of abdominal pain & nausea/vomiting (26 Sep 2022 15:50)    OVERNIGHT EVENTS/SUBJECTIVE:    OBJECTIVE:  Vital Signs Last 24 Hrs  T(C): 37.1 (27 Sep 2022 02:04), Max: 37.1 (27 Sep 2022 02:04)  T(F): 98.8 (27 Sep 2022 02:04), Max: 98.8 (27 Sep 2022 02:04)  HR: 105 (27 Sep 2022 05:13) (100 - 109)  BP: 122/74 (27 Sep 2022 05:13) (109/75 - 122/74)  BP(mean): --  RR: 17 (27 Sep 2022 05:13) (15 - 19)  SpO2: 96% (27 Sep 2022 05:13) (95% - 98%)    Parameters below as of 27 Sep 2022 05:13  Patient On (Oxygen Delivery Method): room air      I&O's Detail    Daily     Daily   Physical Exam:  General: NAD, resting comfortably in bed  Neuro: A&Ox4, 5/5 strength in all ext  HEENT: NC/AT, EOMI, normal hearing, oral mucosa moist, no oral lesions noted, no pharyngeal erythema, uvula midline  Neck: supple, thyroid not enlarged, no LAD  Resp: Breathing comfortably on RA, LCTA b/l  CV: Normal sinus rhythm, S1 and S2, no r/m/g  Abd: soft, non-distended, non-tender. No HSM.  Ext: ROMIx4, no edema, +2 pulses bilaterally  Skin: Warm and dry, no rashes or discolorations  Psych: Appropriate affect    Medications:  MEDICATIONS  (STANDING):  Biotene Dry Mouth Oral Rinse 5 milliLiter(s) Swish and Spit two times a day  lidocaine   4% Patch 1 Patch Transdermal daily  methadone   Solution 7.5 milliGRAM(s) Enteral Tube every 12 hours  pantoprazole  Injectable 40 milliGRAM(s) IV Push daily  piperacillin/tazobactam IVPB.. 3.375 Gram(s) IV Intermittent every 8 hours  sodium chloride 0.9%. 1000 milliLiter(s) (100 mL/Hr) IV Continuous <Continuous>    MEDICATIONS  (PRN):  bisacodyl Suppository 10 milliGRAM(s) Rectal daily PRN Constipation  LORazepam   Injectable 0.5 milliGRAM(s) IV Push every 6 hours PRN Nausea and/or Vomiting  morphine   Solution 15 milliGRAM(s) Enteral Tube every 4 hours PRN Breakthrough pain  ondansetron Injectable 8 milliGRAM(s) IV Push every 8 hours PRN Nausea and/or Vomiting  polyethylene glycol 3350 17 Gram(s) Oral daily PRN Constipation      Labs:                        9.0    20.12 )-----------( 390      ( 26 Sep 2022 05:54 )             29.2     09-26    134<L>  |  100  |  37<H>  ----------------------------<  122<H>  4.7   |  20<L>  |  1.53<H>    Ca    8.4      26 Sep 2022 05:54  Phos  5.4     09-26  Mg     2.40     09-26    TPro  6.3  /  Alb  2.7<L>  /  TBili  11.6<H>  /  DBili  x   /  AST  233<H>  /  ALT  212<H>  /  AlkPhos  1133<H>  09-26        COVID-19 PCR: NotDetec (26 Sep 2022 05:43)  SARS-CoV-2: NotDetec (22 Sep 2022 11:13)  COVID-19 PCR: NotDetec (19 Sep 2022 12:50)  COVID-19 PCR: NotDetec (14 Sep 2022 13:05)  SARS-CoV-2: NotDetec (08 Sep 2022 18:15)  COVID-19 PCR: NotDetec (08 Sep 2022 07:31)  COVID-19 PCR: NotDetec (02 Sep 2022 06:00)  COVID-19 PCR: NotDetec (25 Aug 2022 08:02)  COVID-19 PCR: NotDetec (21 Aug 2022 06:49)  COVID-19 PCR: NotDetec (14 Aug 2022 17:47)      Radiology: Internal Medicine Progress Note    Patient is a 57y old  Female who presents with a chief complaint of abdominal pain & nausea/vomiting (26 Sep 2022 15:50)    OVERNIGHT EVENTS/SUBJECTIVE: Pts iv was changed overnight after she accidentally pulled it out. She states she wants an injection that will make her not wake up. Does not respond otherwise, ROS unobtainable.     OBJECTIVE:  Vital Signs Last 24 Hrs  T(C): 37.1 (27 Sep 2022 02:04), Max: 37.1 (27 Sep 2022 02:04)  T(F): 98.8 (27 Sep 2022 02:04), Max: 98.8 (27 Sep 2022 02:04)  HR: 105 (27 Sep 2022 05:13) (100 - 109)  BP: 122/74 (27 Sep 2022 05:13) (109/75 - 122/74)  BP(mean): --  RR: 17 (27 Sep 2022 05:13) (15 - 19)  SpO2: 96% (27 Sep 2022 05:13) (95% - 98%)    Parameters below as of 27 Sep 2022 05:13  Patient On (Oxygen Delivery Method): room air      I&O's Detail    Daily     Daily   Physical Exam:  General: Drowsy, diaphoretic  Neuro: lethargic  HEENT: NC/AT, EOMI, normal hearing  Resp: Breathing comfortably on RA, LCTA b/l  CV: Normal sinus rhythm, S1 and S2, no r/m/g  Abd: soft, non-distended, non-tender  Ext:  no edema, +2 pulses bilaterally  Skin: Warm and dry, no rashes or discolorations  Psych: despondent    Medications:  MEDICATIONS  (STANDING):  Biotene Dry Mouth Oral Rinse 5 milliLiter(s) Swish and Spit two times a day  lidocaine   4% Patch 1 Patch Transdermal daily  methadone   Solution 7.5 milliGRAM(s) Enteral Tube every 12 hours  pantoprazole  Injectable 40 milliGRAM(s) IV Push daily  piperacillin/tazobactam IVPB.. 3.375 Gram(s) IV Intermittent every 8 hours  sodium chloride 0.9%. 1000 milliLiter(s) (100 mL/Hr) IV Continuous <Continuous>    MEDICATIONS  (PRN):  bisacodyl Suppository 10 milliGRAM(s) Rectal daily PRN Constipation  LORazepam   Injectable 0.5 milliGRAM(s) IV Push every 6 hours PRN Nausea and/or Vomiting  morphine   Solution 15 milliGRAM(s) Enteral Tube every 4 hours PRN Breakthrough pain  ondansetron Injectable 8 milliGRAM(s) IV Push every 8 hours PRN Nausea and/or Vomiting  polyethylene glycol 3350 17 Gram(s) Oral daily PRN Constipation      Labs:                        9.0    20.12 )-----------( 390      ( 26 Sep 2022 05:54 )             29.2     09-26    134<L>  |  100  |  37<H>  ----------------------------<  122<H>  4.7   |  20<L>  |  1.53<H>    Ca    8.4      26 Sep 2022 05:54  Phos  5.4     09-26  Mg     2.40     09-26    TPro  6.3  /  Alb  2.7<L>  /  TBili  11.6<H>  /  DBili  x   /  AST  233<H>  /  ALT  212<H>  /  AlkPhos  1133<H>  09-26        COVID-19 PCR: NotDetec (26 Sep 2022 05:43)  SARS-CoV-2: NotDetec (22 Sep 2022 11:13)  COVID-19 PCR: NotDetec (19 Sep 2022 12:50)  COVID-19 PCR: NotDetec (14 Sep 2022 13:05)  SARS-CoV-2: NotDetec (08 Sep 2022 18:15)  COVID-19 PCR: NotDetec (08 Sep 2022 07:31)  COVID-19 PCR: NotDetec (02 Sep 2022 06:00)  COVID-19 PCR: NotDetec (25 Aug 2022 08:02)  COVID-19 PCR: NotDetec (21 Aug 2022 06:49)  COVID-19 PCR: NotDetec (14 Aug 2022 17:47)      Radiology:

## 2022-09-27 NOTE — PROGRESS NOTE ADULT - PROBLEM SELECTOR PLAN 5
- Patient originally named her cousin Last Gonzales as main HCP and her son as alternate   - Patient changed mind- wants her son Dmitry Umanzor to be primary HCP, Last Gonzales as alternate  - Family meeting held today with patient's three children, who decided for DNR/DNI comfort care. THEA vivas - Patient originally named her cousin Last Gonzales as main HCP and her son as alternate   - Patient changed mind- wants her son Dmitry Umanzor to be primary HCP, Last Gonzales as alternate  - Family meeting held today with patient's three children (Dmitry, Ann and Jossy), who decided for DNR/DNI comfort care. THEA vivas

## 2022-09-27 NOTE — PROGRESS NOTE ADULT - PROBLEM SELECTOR PLAN 2
- Patient found this AM with hypoxia and tachypnea  - Has known PE however AC was held due to hematemesis  - Started on IV dilaudid 0.5 mg q2h PRN for respiratory distress

## 2022-09-27 NOTE — PROGRESS NOTE ADULT - PROBLEM SELECTOR PLAN 1
- Epigastric pain 2/2 metastatic gastric cancer (linitis plastica), hematemesis   - Patient now comfort care  - Patient in distress from physical pain but also existential pain   - J tube clogged, enteral methadone and morphine discontinued   - Started on IV dilaudid 1 mg q4h ATC   - Started on IV dilaudid 1 mg q2h PRN for breakthrough pain   - Started on IV dilaudid 0.5 mg q2h PRN for respiratory distress

## 2022-09-27 NOTE — GOALS OF CARE CONVERSATION - ADVANCED CARE PLANNING - CONVERSATION/DISCUSSION
Prognosis/Treatment Options
Diagnosis/Prognosis
Diagnosis/Prognosis/Hospice Referral
Diagnosis/Prognosis/Treatment Options/Hospice Referral/Palliative Care Referral
Diagnosis/Prognosis/MOLST Discussed/Chaplaincy Referral

## 2022-09-27 NOTE — PROGRESS NOTE ADULT - PROBLEM SELECTOR PLAN 4
LFTs are elevating without a clear source; no evidence of mets to liver  RUQ U/S results are equivocal; shows some mild bile duct dilatation similar to CT on 9/9; new from CT on 8/14  CT shows no mets to the liver at the moment    Hepatitis labs from June and July 2022 show no signs of HepB/C infection  MRCP showed Stricture of CBD  ERCP 9/16 unsuccessful, repeat ERCP 9/20 unsuccessful  Bloody sputum likely due to trauma from scope and friable mass in stomach

## 2022-09-27 NOTE — PROGRESS NOTE ADULT - PROBLEM SELECTOR PLAN 2
Since second ERCP. Per GI, no intervention indicated unless pt becomes HD unstable.   - standing iv zofran, can give prn iv reglan. Ativan 0.5 ordered for breakthrough nausea  - can pause j tube feeds  - ppi  - lovenox held   - fu palliative recs   - can urgently call GI if large volume or HD unstable.

## 2022-09-27 NOTE — PROGRESS NOTE ADULT - PROBLEM SELECTOR PLAN 6
- Palliative consulted for pain management  - Patient is no longer candidate for cancer treatment, at end of life  - Family agreed to DNR/DNI, comfort measures

## 2022-09-28 NOTE — PROGRESS NOTE ADULT - SUBJECTIVE AND OBJECTIVE BOX
SUBJECTIVE AND OBJECTIVE:  Indication for Geriatrics and Palliative Care Services/INTERVAL HPI: Pain management/GOC   DNR on chart: No     INTERVAL EVENTS: Patient seen this AM, sleeping comfortably at the time. She required 5 PRNS of 1 mg IV dilaudid for pain in past 24 hours.   Son Dmitry was at bedside. Son reports that everyone was worried she would die yesterday, however in the evening patient became more calm and interactive. Son was taken aback by what seemed like improvement. Son was educated that dying trajectory was unpredictable and she may have moments where she is more lucid and engaged, but she still had limited time. He was also hoping if patient could move to another room possibly as roommate is making noise and causing patient to be uncomfortable. Discussed with nurse as well, hoping patient can be moved/switched.       Allergies    No Known Allergies    Intolerances    MEDICATIONS  (STANDING):    MEDICATIONS  (STANDING):  Biotene Dry Mouth Oral Rinse 5 milliLiter(s) Swish and Spit two times a day  HYDROmorphone  Injectable 2 milliGRAM(s) IV Push every 4 hours  lidocaine   4% Patch 1 Patch Transdermal daily  pantoprazole  Injectable 40 milliGRAM(s) IV Push daily    MEDICATIONS  (PRN):  bisacodyl Suppository 10 milliGRAM(s) Rectal daily PRN Constipation  HYDROmorphone  Injectable 1 milliGRAM(s) IV Push every 2 hours PRN Breakthrough  HYDROmorphone  Injectable 0.5 milliGRAM(s) IV Push every 2 hours PRN Respiratory distress  LORazepam   Injectable 0.5 milliGRAM(s) IV Push every 6 hours PRN restlessness/agitation  LORazepam   Injectable 0.5 milliGRAM(s) IV Push every 6 hours PRN Nausea and/or Vomiting  ondansetron Injectable 8 milliGRAM(s) IV Push every 8 hours PRN Nausea and/or Vomiting  polyethylene glycol 3350 17 Gram(s) Oral daily PRN Constipation      ITEMS UNCHECKED ARE NOT PRESENT    PRESENT SYMPTOMS: [ ]Unable to self-report - see [ ] CPOT [ ] PAINADS [ ] RDOS  Source if other than patient:  [ ]Family   [ ]Team     Pain:  [x ]yes [ ]no  QOL impact - Yes   Location -    All over body            Aggravating factors - none  Quality - sharp  Radiation - to the back  Timing- constant  Severity (0-10 scale): 10/10  Minimal acceptable level (0-10 scale): 4/10    Dyspnea:                           [ ]Mild [ ]Moderate [ ]Severe    RDOS:  0 to 2  minimal or no respiratory distress   3  mild distress  4 to 6 moderate distress  >7 severe distress  [email or web address] (Ctrl +  left click to view)     Anxiety:                             [ ]Mild [ ]Moderate [ ]Severe  Fatigue:                             [ ]Mild [ ]Moderate [ ]Severe  Nausea:                             [ ]Mild [x ]Moderate [ ]Severe  Loss of appetite:                [ ]Mild [ ]Moderate [ ]Severe  Constipation:                     [ ]Mild [ ]Moderate [ ]Severe    PCSSQ[Palliative Care Spiritual Screening Question]   Severity (0-10): 0  Score of 4 or > indicate consideration of Chaplaincy referral.  Chaplaincy Referral: [ ] yes [ ] refused [X ] following    Other Symptoms:  [ ]All other review of systems negative     Vital Signs Last 24 Hrs  Vital Signs Last 24 Hrs  T(C): 36.7 (27 Sep 2022 20:35), Max: 36.9 (27 Sep 2022 14:12)  T(F): 98 (27 Sep 2022 20:35), Max: 98.4 (27 Sep 2022 14:12)  HR: 92 (27 Sep 2022 20:35) (92 - 112)  BP: 111/71 (27 Sep 2022 20:35) (111/71 - 119/83)  BP(mean): --  RR: 17 (27 Sep 2022 20:35) (17 - 17)  SpO2: 97% (27 Sep 2022 20:35) (91% - 97%)    Parameters below as of 27 Sep 2022 20:35  Patient On (Oxygen Delivery Method): nasal cannula  O2 Flow (L/min): 2      GENERAL: [ ]Cachexia  Temporal wasting   [ ]Alert  [ ]Oriented    [X ]Lethargic  [ ]Unarousable  [ ]Verbal  [ ]Non-Verbal  Behavioral:   [ ]Anxiety  [ ]Delirium [X ]Agitation [ ]Other  HEENT:  [x ]Normal   [ ]Dry mouth   [ ]ET Tube/Trach  [ ]Oral lesions  PULMONARY:   [X ]Clear [X ]Tachypnea  [ ]Audible excessive secretions   [ ]Rhonchi        [ ]Right [ ]Left [ ]Bilateral  [ ]Crackles        [ ]Right [ ]Left [ ]Bilateral  [ ]Wheezing     [ ]Right [ ]Left [ ]Bilateral  [ ]Diminished BS [ ] Right [ ]Left [ ]Bilateral  CARDIOVASCULAR:    [x ]Regular [ ]Irregular [ ]Tachy  [ ]Luis Miguel [ ]Murmur [ ]Other  GASTROINTESTINAL:  [x ]Soft  [ ]Distended   [ ]+BS  [ ]Non tender [ ]Tender  [ ]Other [X ]J tube [ ]OGT/ NGT Patient has laparoscopic surgical incisions covered by  gauze    GENITOURINARY:  [X ]Normal [ ]Incontinent   [ ]Oliguria/Anuria   [ ]Patterson  MUSCULOSKELETAL:   [ ]Normal   [ ]Weakness  [X ]Bed/Wheelchair bound [ ]Edema  NEUROLOGIC:   [x ]No focal deficits  [ ] Cognitive impairment  [ ] Dysphagia [ ]Dysarthria [ ] Paresis [ ]Other   SKIN:   [x ]Normal  [ ]Rash  [ ]Other  [ ]Pressure ulcer(s) [ ]y [x ]n present on admission    CRITICAL CARE:  [ ]Shock Present  [ ]Septic [ ]Cardiogenic [ ]Neurologic [ ]Hypovolemic  [ ]Vasopressors [ ]Inotropes  [ ]Respiratory failure present [ ]Mechanical Ventilation [ ]Non-invasive ventilatory support [ ]High-Flow   [ ]Acute  [ ]Chronic [ ]Hypoxic  [ ]Hypercarbic [ ]Other  [ ]Other organ failure     LABS:      Protein Calorie Malnutrition Present: [ ]mild [ ]moderate [X ]severe [ ]underweight [ ]morbid obesity    Height (cm): 157.5 (08-26-22 @ 07:10)  Weight (kg): 71.1 (08-26-22 @ 07:11)  BMI (kg/m2): 28.7 (08-26-22 @ 07:11)    [ ]PPSV2 < or = 30%  [X ]significant weight loss [ ]poor nutritional intake [ ]anasarca[X ]Artificial Nutrition - J tube     Other REFERRALS:  [ ]Hospice  [ ]Child Life  [ ]Social Work  [ ]Case management [ ]Holistic Therapy

## 2022-09-28 NOTE — PROGRESS NOTE ADULT - PROBLEM SELECTOR PLAN 6
- Palliative consulted for pain management  - Patient is no longer candidate for cancer treatment, at end of life  - Family agreed to DNR/DNI, comfort measures  - Should be an inpatient hospice candidate as J tube clogged and unable to take meds orally due to GI symptoms, will require IV meds for symptom control

## 2022-09-28 NOTE — PROGRESS NOTE ADULT - PROBLEM SELECTOR PLAN 2
- Patient found this AM with hypoxia and tachypnea  - Has known PE however AC was held due to hematemesis  - c/w IV dilaudid 0.5 mg q2h PRN for respiratory distress

## 2022-09-28 NOTE — PROGRESS NOTE ADULT - ATTENDING COMMENTS
57F with no significant PMH p/w abd pain, N/V found to have gastric adenocarcinoma with metastatic disease confirmed with bx. EGD confirmed advanced disease and not a candidate for resection. s/p jejunostomy for tube feeds. Course c/b subsegmental PE, initially on lovenox, however course c/b hematemesis 2/2 friable gastric mass and AC now deferred. Course further c/b rising LFTs, suspect related to metastatic disease - ERCP attempted x2 for stent placement without success d/t extent of disease. Had completed 7d course of zosyn (9/9-17) with new Tmax 9/22 prompting reinitiation of zosyn and fever w/u. Initially planned for PICC for chemo however deferred due to fever and now no longer a candidate for chemo per oncology. Also potential plan for IVC filter, but patient and family decided to forgo invasive measures. Family meeting held with palliative care, medical team, and patient's children on 9/27. Family decision was made to pursue comfort measures, including no blood draws, no antibiotics, and patient made DNR/DNI. Pain regimen being managed by palliative care. Can consider inpatient hospice, per CM no bed.     Discussed with patient's son Dmitry

## 2022-09-28 NOTE — PROGRESS NOTE ADULT - SUBJECTIVE AND OBJECTIVE BOX
Internal Medicine Progress Note    Patient is a 57y old  Female who presents with a chief complaint of abdominal pain & nausea/vomiting (27 Sep 2022 14:17)    OVERNIGHT EVENTS/SUBJECTIVE:    OBJECTIVE:  Vital Signs Last 24 Hrs  T(C): 36.7 (27 Sep 2022 20:35), Max: 36.9 (27 Sep 2022 14:12)  T(F): 98 (27 Sep 2022 20:35), Max: 98.4 (27 Sep 2022 14:12)  HR: 92 (27 Sep 2022 20:35) (92 - 114)  BP: 111/71 (27 Sep 2022 20:35) (111/71 - 130/92)  BP(mean): --  RR: 17 (27 Sep 2022 20:35) (17 - 18)  SpO2: 97% (27 Sep 2022 20:35) (91% - 97%)    Parameters below as of 27 Sep 2022 20:35  Patient On (Oxygen Delivery Method): nasal cannula  O2 Flow (L/min): 2    I&O's Detail    Daily     Daily   Physical Exam:  General: NAD, resting comfortably in bed  Neuro: A&Ox4, 5/5 strength in all ext  HEENT: NC/AT, EOMI, normal hearing, oral mucosa moist, no oral lesions noted, no pharyngeal erythema, uvula midline  Neck: supple, thyroid not enlarged, no LAD  Resp: Breathing comfortably on RA, LCTA b/l  CV: Normal sinus rhythm, S1 and S2, no r/m/g  Abd: soft, non-distended, non-tender. No HSM.  Ext: ROMIx4, no edema, +2 pulses bilaterally  Skin: Warm and dry, no rashes or discolorations  Psych: Appropriate affect    Medications:  MEDICATIONS  (STANDING):  Biotene Dry Mouth Oral Rinse 5 milliLiter(s) Swish and Spit two times a day  HYDROmorphone  Injectable 1 milliGRAM(s) IV Push every 4 hours  lidocaine   4% Patch 1 Patch Transdermal daily  pantoprazole  Injectable 40 milliGRAM(s) IV Push daily    MEDICATIONS  (PRN):  bisacodyl Suppository 10 milliGRAM(s) Rectal daily PRN Constipation  HYDROmorphone  Injectable 1 milliGRAM(s) IV Push every 2 hours PRN Breakthrough  HYDROmorphone  Injectable 0.5 milliGRAM(s) IV Push every 2 hours PRN Respiratory distress  LORazepam   Injectable 0.5 milliGRAM(s) IV Push every 6 hours PRN Nausea and/or Vomiting  LORazepam   Injectable 0.5 milliGRAM(s) IV Push every 6 hours PRN restlessness/agitation  ondansetron Injectable 8 milliGRAM(s) IV Push every 8 hours PRN Nausea and/or Vomiting  polyethylene glycol 3350 17 Gram(s) Oral daily PRN Constipation      Labs:                        8.9    19.80 )-----------( 388      ( 27 Sep 2022 12:30 )             29.9     09-27    133<L>  |  99  |  40<H>  ----------------------------<  129<H>  4.4   |  21<L>  |  1.42<H>    Ca    8.2<L>      27 Sep 2022 12:30  Phos  3.6     09-27  Mg     2.60     09-27    TPro  6.5  /  Alb  2.8<L>  /  TBili  13.3<H>  /  DBili  x   /  AST  293<H>  /  ALT  243<H>  /  AlkPhos  1261<H>  09-27        COVID-19 PCR: NotDetec (27 Sep 2022 12:24)  COVID-19 PCR: NotDetec (26 Sep 2022 05:43)  SARS-CoV-2: NotDetec (22 Sep 2022 11:13)  COVID-19 PCR: NotDetec (19 Sep 2022 12:50)  COVID-19 PCR: NotDetec (14 Sep 2022 13:05)  SARS-CoV-2: NotDetec (08 Sep 2022 18:15)  COVID-19 PCR: NotDetec (08 Sep 2022 07:31)  COVID-19 PCR: NotDetec (02 Sep 2022 06:00)  COVID-19 PCR: NotDetec (25 Aug 2022 08:02)  COVID-19 PCR: NotDetec (21 Aug 2022 06:49)  COVID-19 PCR: NotDetec (14 Aug 2022 17:47)      Radiology: Internal Medicine Progress Note    Patient is a 57y old  Female who presents with a chief complaint of abdominal pain & nausea/vomiting (27 Sep 2022 14:17)    OVERNIGHT EVENTS/SUBJECTIVE: Pt not responding to questioning, keeping her eyes closed and only nodding 'yes' to "are you in any pain?" ROS otherwise unobtainable.     OBJECTIVE:  Vital Signs Last 24 Hrs  T(C): 36.7 (27 Sep 2022 20:35), Max: 36.9 (27 Sep 2022 14:12)  T(F): 98 (27 Sep 2022 20:35), Max: 98.4 (27 Sep 2022 14:12)  HR: 92 (27 Sep 2022 20:35) (92 - 114)  BP: 111/71 (27 Sep 2022 20:35) (111/71 - 130/92)  BP(mean): --  RR: 17 (27 Sep 2022 20:35) (17 - 18)  SpO2: 97% (27 Sep 2022 20:35) (91% - 97%)    Parameters below as of 27 Sep 2022 20:35  Patient On (Oxygen Delivery Method): nasal cannula  O2 Flow (L/min): 2    I&O's Detail    Daily     Daily   Physical Exam:  General: NAD, resting in bed  Neuro: A&Ox3  HEENT: NC/AT, EOMI, normal hearing  Resp: Breathing comfortably on RA, LCTA b/l  CV: Normal sinus rhythm, S1 and S2, no r/m/g  Abd: soft, non-distended  Skin: Warm and dry, no rashes or discolorations      Medications:  MEDICATIONS  (STANDING):  Biotene Dry Mouth Oral Rinse 5 milliLiter(s) Swish and Spit two times a day  HYDROmorphone  Injectable 1 milliGRAM(s) IV Push every 4 hours  lidocaine   4% Patch 1 Patch Transdermal daily  pantoprazole  Injectable 40 milliGRAM(s) IV Push daily    MEDICATIONS  (PRN):  bisacodyl Suppository 10 milliGRAM(s) Rectal daily PRN Constipation  HYDROmorphone  Injectable 1 milliGRAM(s) IV Push every 2 hours PRN Breakthrough  HYDROmorphone  Injectable 0.5 milliGRAM(s) IV Push every 2 hours PRN Respiratory distress  LORazepam   Injectable 0.5 milliGRAM(s) IV Push every 6 hours PRN Nausea and/or Vomiting  LORazepam   Injectable 0.5 milliGRAM(s) IV Push every 6 hours PRN restlessness/agitation  ondansetron Injectable 8 milliGRAM(s) IV Push every 8 hours PRN Nausea and/or Vomiting  polyethylene glycol 3350 17 Gram(s) Oral daily PRN Constipation      Labs:                        8.9    19.80 )-----------( 388      ( 27 Sep 2022 12:30 )             29.9     09-27    133<L>  |  99  |  40<H>  ----------------------------<  129<H>  4.4   |  21<L>  |  1.42<H>    Ca    8.2<L>      27 Sep 2022 12:30  Phos  3.6     09-27  Mg     2.60     09-27    TPro  6.5  /  Alb  2.8<L>  /  TBili  13.3<H>  /  DBili  x   /  AST  293<H>  /  ALT  243<H>  /  AlkPhos  1261<H>  09-27        COVID-19 PCR: NotDetec (27 Sep 2022 12:24)  COVID-19 PCR: NotDetec (26 Sep 2022 05:43)  SARS-CoV-2: NotDetec (22 Sep 2022 11:13)  COVID-19 PCR: NotDetec (19 Sep 2022 12:50)  COVID-19 PCR: NotDetec (14 Sep 2022 13:05)  SARS-CoV-2: NotDetec (08 Sep 2022 18:15)  COVID-19 PCR: NotDetec (08 Sep 2022 07:31)  COVID-19 PCR: NotDetec (02 Sep 2022 06:00)  COVID-19 PCR: NotDetec (25 Aug 2022 08:02)  COVID-19 PCR: NotDetec (21 Aug 2022 06:49)  COVID-19 PCR: NotDetec (14 Aug 2022 17:47)      Radiology:

## 2022-09-28 NOTE — PROGRESS NOTE ADULT - PROBLEM SELECTOR PLAN 1
- Epigastric pain 2/2 metastatic gastric cancer (linitis plastica), hematemesis   - Patient now comfort care  - Patient in distress from physical pain but also existential pain   - J tube clogged, enteral methadone and morphine discontinued   - Patient has required frequent PRNS of IV dilaudid 1 mg   - Increased IV dilaudid to 2 mg q4h ATC   - c/w IV dilaudid 1 mg q2h PRN for breakthrough pain   - c/w IV dilaudid 0.5 mg q2h PRN for respiratory distress

## 2022-09-28 NOTE — CHART NOTE - NSCHARTNOTEFT_GEN_A_CORE
Appreciate palliative care recommendations/consult. Patient now comfort care. Please notify oncology if there are any questions or concerns, available for support.     Cullen Chaudhry MD, PGY6  Hematology/Oncology Fellow   pager [phone]  After 5pm and on weekends page on call fellow

## 2022-09-28 NOTE — PROGRESS NOTE ADULT - PROBLEM SELECTOR PLAN 9
DVT ppx: lovenox held for now    Dispo planning: complex dispo planning due to lack of insurance  Emergency medicaid can cover outpatient chemo/transport.    GOC: comfort care per patient and family. DNR/DNI

## 2022-09-28 NOTE — PROGRESS NOTE ADULT - PROBLEM SELECTOR PLAN 1
- pt transitioned to palliative/hospice route, no plan for chemotherapy  - EGD:  Infiltrative changes in gastric body concerning gastric malignancy, Congested duodenal mucosa, biopsy = poorly differentiated adenocarcinoma  - staging CT chest - Mediastinal/bilateral hilar/R internal mammary LAD  - mediastinal lymph node biopsy results - Metastatic adenocarcinoma   - pantoprazole  40 mg daily  - OR 8/26 with J tube placement    pain Control; Palliative will attempt to transition to morphine for cost control  -Morphine 15mg q2h for severe Pain 7-10  -Methadone 5mg q12h standing    PRICING UPDATES:  Lovenox: $1/month covered under [facility] medicaid  Tube Feeds and pump: ~$120/month  Methadone: 36$/month  Reglan: $60/month  Morphine: $1 COVERED

## 2022-09-28 NOTE — PROGRESS NOTE ADULT - PROBLEM SELECTOR PLAN 3
improved  spoke with family about not restarting abx after zosyn course complete  T to 100.6, tachycardic  - c/w empiric zosyn, finish 9/26 resolved, off abx now

## 2022-09-28 NOTE — PROGRESS NOTE ADULT - PROBLEM SELECTOR PLAN 5
- Patient originally named her cousin Last Gonzales as main HCP and her son as alternate   - Patient changed mind- wants her son Dmitry Umanzor to be primary HCP, Last Gonzales as alternate  - Family meeting held today with patient's three children (Dmitry, Ann and Jossy), who decided for DNR/DNI comfort care. THEA vivas

## 2022-09-29 NOTE — PROGRESS NOTE ADULT - ATTENDING COMMENTS
57F with no significant PMH p/w abd pain, N/V found to have gastric adenocarcinoma with metastatic disease confirmed with bx. EGD confirmed advanced disease and not a candidate for resection. Now s/p jejunostomy for tube feeds. Course c/b subsegmental PE, initially on lovenox. Course further c/b hematemesis 2/2 friable gastric mass and AC now deferred. Course further c/b rising LFTs, suspect related to metastatic disease. ERCP was attempted x2 for stent placement without success d/t extent of disease. Had completed 7d course of zosyn (9/9-17) with new Tmax 9/22 prompting reinitiation of zosyn and fever w/u. Initially planned for PICC for chemo however deferred due to fever and now no longer a candidate for chemo per oncology due to worsening liver function. Also potential plan for IVC filter, but patient and family decided to forgo invasive measures. Family meeting held with palliative care, medical team, and patient's children on 9/27. Family decision was made to pursue comfort measures, including no blood draws, no antibiotics, and patient made DNR/DNI. Pain regimen being managed by palliative care. Referral made for inpatient hospice, patient has no oral access, so would be appropriate candidate given need for IV pain meds

## 2022-09-29 NOTE — PROGRESS NOTE ADULT - PROBLEM SELECTOR PLAN 3
Pre-op Diagnosis: Right greater saphenous vein insufficiency    Post-op Diagnosis: Right greater saphenous vein insufficiency    Procedure: Endovenous radiofrequency ablation of the right greater saphenous vein    Surgeon: SHAUNA Valverde was then passed onto the field. Using sterile technique, the catheter was placed into the sheath and moved into position for radiofrequency ablation.   The proximal position of the radiofrequency probe was verified by ultrasound to be 3.3 cm from the junct approximately 7-10 days to go to undergo follow up ultrasound of the lower extremity. The patient will followup with me within the next 2-3 weeks to assess healing.     Behzad Lin MD resolved, off abx now

## 2022-09-29 NOTE — PROGRESS NOTE ADULT - PROBLEM SELECTOR PLAN 1
- pt transitioned to palliative/hospice route, no plan for chemotherapy  - EGD:  Infiltrative changes in gastric body concerning gastric malignancy, Congested duodenal mucosa, biopsy = poorly differentiated adenocarcinoma  - staging CT chest - Mediastinal/bilateral hilar/R internal mammary LAD  - mediastinal lymph node biopsy results - Metastatic adenocarcinoma   - pantoprazole  40 mg daily  - OR 8/26 with J tube placement    pain Control; Palliative will attempt to transition to morphine for cost control  -Morphine 15mg q2h for severe Pain 7-10  -Methadone 5mg q12h standing    PRICING UPDATES:  Lovenox: $1/month covered under [facility] medicaid  Tube Feeds and pump: ~$120/month  Methadone: 36$/month  Reglan: $60/month  Morphine: $1 COVERED

## 2022-09-29 NOTE — PROGRESS NOTE ADULT - SUBJECTIVE AND OBJECTIVE BOX
PROGRESS NOTE:   Authored by Dr. Cherri Pinto MD ([id number]-1). Pager [facility] [phone] / [facility]     Patient is a 57y old  Female who presents with a chief complaint of abdominal pain & nausea/vomiting (28 Sep 2022 13:14)      SUBJECTIVE / OVERNIGHT EVENTS:  No acute events overnight.     ADDITIONAL REVIEW OF SYSTEMS:  Patient denies fevers, chills, chest pain, shortness of breath, nausea, abdominal pain, diarrhea, constipation, dysuria, leg swelling, headache, light headedness.    MEDICATIONS  (STANDING):  Biotene Dry Mouth Oral Rinse 5 milliLiter(s) Swish and Spit two times a day  HYDROmorphone  Injectable 2 milliGRAM(s) IV Push every 4 hours  lidocaine   4% Patch 1 Patch Transdermal daily  pantoprazole  Injectable 40 milliGRAM(s) IV Push daily    MEDICATIONS  (PRN):  bisacodyl Suppository 10 milliGRAM(s) Rectal daily PRN Constipation  HYDROmorphone  Injectable 1 milliGRAM(s) IV Push every 2 hours PRN Breakthrough  HYDROmorphone  Injectable 0.5 milliGRAM(s) IV Push every 2 hours PRN Respiratory distress  LORazepam   Injectable 0.5 milliGRAM(s) IV Push every 6 hours PRN restlessness/agitation  LORazepam   Injectable 0.5 milliGRAM(s) IV Push every 6 hours PRN Nausea and/or Vomiting  ondansetron Injectable 8 milliGRAM(s) IV Push every 8 hours PRN Nausea and/or Vomiting  polyethylene glycol 3350 17 Gram(s) Oral daily PRN Constipation      CAPILLARY BLOOD GLUCOSE        I&O's Summary      PHYSICAL EXAM:  Vital Signs Last 24 Hrs  T(C): 36.3 (29 Sep 2022 05:31), Max: 36.7 (28 Sep 2022 22:19)  T(F): 97.3 (29 Sep 2022 05:31), Max: 98.1 (28 Sep 2022 22:19)  HR: 101 (29 Sep 2022 05:31) (101 - 111)  BP: 119/81 (29 Sep 2022 05:31) (119/81 - 124/80)  BP(mean): --  RR: 17 (29 Sep 2022 05:31) (16 - 17)  SpO2: 96% (29 Sep 2022 05:31) (96% - 99%)    Parameters below as of 29 Sep 2022 05:31  Patient On (Oxygen Delivery Method): nasal cannula  O2 Flow (L/min): 2      CONSTITUTIONAL: distress  RESPIRATORY: tachycardic, no respiratory distress  CARDIOVASCULAR: Regular rate and rhythm, normal S1 and S2, no murmur/rub/gallop; No lower extremity edema; Peripheral pulses are 2+ bilaterally  ABDOMEN: Nontender to palpation, normoactive bowel sounds, no rebound/guarding; No hepatosplenomegaly  MUSCLOSKELETAL: no clubbing or cyanosis of digits; no joint swelling or tenderness to palpation  PSYCH: A+O to person, place, and time; affect appropriate    LABS:                        8.9    19.80 )-----------( 388      ( 27 Sep 2022 12:30 )             29.9     09-27    133<L>  |  99  |  40<H>  ----------------------------<  129<H>  4.4   |  21<L>  |  1.42<H>    Ca    8.2<L>      27 Sep 2022 12:30  Phos  3.6     09-27  Mg     2.60     09-27    TPro  6.5  /  Alb  2.8<L>  /  TBili  13.3<H>  /  DBili  x   /  AST  293<H>  /  ALT  243<H>  /  AlkPhos  1261<H>  09-27                Tele Reviewed:    RADIOLOGY & ADDITIONAL TESTS:  Results Reviewed:   Imaging Personally Reviewed:  Electrocardiogram Personally Reviewed:

## 2022-09-30 NOTE — PROGRESS NOTE ADULT - PROBLEM SELECTOR PLAN 7
Impression: 72 hour caloric count to evaluate for malnutrition - indicates malnutrition. Pt unable to eat due to pain.   Now J tube placement    Nutrition consult - Recommend TwoCal HN via J-tube initiated at 10 mL/hr increased by 10mL q4 hrs until goal rate 35 mL/hr x24 hrs to provide 840 mL formula, 1680 kcal, 70.14 gm protein, 588 mL free water (meets 33.6 kcal/kg, 1.4 gm protein/kg).    Patient able to tolerate rate of 27cc/hr Impression: Stable anemia. May be combination of Iron deficiency anemia from poor PO intake and normocytic anemia from Anemia of Chronic Disease w/ this presentation c/f gastric malignancy. Also now hematemesis  - Total Iron 22, TIBC 165, transferrin 149  H/H stable, continue to monitor

## 2022-09-30 NOTE — PROGRESS NOTE ADULT - PROBLEM SELECTOR PLAN 4
LFTs are elevating without a clear source; no evidence of mets to liver  RUQ U/S results are equivocal; shows some mild bile duct dilatation similar to CT on 9/9; new from CT on 8/14  CT shows no mets to the liver at the moment    Hepatitis labs from June and July 2022 show no signs of HepB/C infection  MRCP showed Stricture of CBD  ERCP 9/16 unsuccessful, repeat ERCP 9/20 unsuccessful  Bloody sputum likely due to trauma from scope and friable mass in stomach b/l PEs seen on CT 9/9. Denies pleuritic chest pain, SOB. Stable on RA  Hold  lovenox 70 BID for now for hematemesis  Candidate for IVC filter per IR if she and family choose to

## 2022-09-30 NOTE — PROGRESS NOTE ADULT - PROBLEM SELECTOR PLAN 1
- Epigastric pain 2/2 metastatic gastric cancer (linitis plastica), hematemesis; also with "total body pain"   - Patient now comfort care  - Patient in distress from physical pain but also existential pain   - J tube clogged, enteral methadone and morphine discontinued   - Patient continues to have uncontrolled pain on IV dilaudid 2 mg q4h ATC, frequently asking for prn   - Discontinued IV dilaudid ATC   - Started on IV dilaudid infusion 0.5 mg/hr   - Increased IV dilaudid PRN to 1.5 mg q2h PRN for breakthrough pain

## 2022-09-30 NOTE — PROGRESS NOTE ADULT - PROBLEM SELECTOR PLAN 6
- Patient originally named her cousin Last Gonzales as main HCP and her son as alternate   - Patient changed mind- wants her son Dmitry Umanzor to be primary HCP, Last Gonzales as alternate  - Family meeting held with patient's three children (Dmitry, Ann and Jossy), who decided for DNR/DNI comfort care. THEA vivas

## 2022-09-30 NOTE — PROGRESS NOTE ADULT - PROBLEM SELECTOR PLAN 3
- Patient had episode of hypoxia and tachypnea  - Has known PE however AC was held due to hematemesis  - c/w IV dilaudid 0.5 mg q2h PRN for respiratory distress

## 2022-09-30 NOTE — PROGRESS NOTE ADULT - SUBJECTIVE AND OBJECTIVE BOX
SUBJECTIVE AND OBJECTIVE:  Indication for Geriatrics and Palliative Care Services/INTERVAL HPI: Pain management/GOC   DNR on chart: Yes      INTERVAL EVENTS: Patient seen this AM, looking fatigued, restless and uncomfortable. Was trying to sit up in bed and was grimacing. However when asked about pain level, closes eyes and does not respond.     Allergies    No Known Allergies    Intolerances    MEDICATIONS  (STANDING):    MEDICATIONS  (STANDING):  Biotene Dry Mouth Oral Rinse 5 milliLiter(s) Swish and Spit two times a day  HYDROmorphone  Injectable 2 milliGRAM(s) IV Push every 4 hours  lidocaine   4% Patch 1 Patch Transdermal daily  pantoprazole  Injectable 40 milliGRAM(s) IV Push daily    MEDICATIONS  (PRN):  bisacodyl Suppository 10 milliGRAM(s) Rectal daily PRN Constipation  HYDROmorphone  Injectable 1 milliGRAM(s) IV Push every 2 hours PRN Breakthrough  HYDROmorphone  Injectable 0.5 milliGRAM(s) IV Push every 2 hours PRN Respiratory distress  LORazepam   Injectable 0.5 milliGRAM(s) IV Push every 6 hours PRN restlessness/agitation  LORazepam   Injectable 0.5 milliGRAM(s) IV Push every 6 hours PRN Nausea and/or Vomiting  ondansetron Injectable 8 milliGRAM(s) IV Push every 8 hours PRN Nausea and/or Vomiting  polyethylene glycol 3350 17 Gram(s) Oral daily PRN Constipation      ITEMS UNCHECKED ARE NOT PRESENT    PRESENT SYMPTOMS: [ ]Unable to self-report - see [ ] CPOT [ ] PAINADS [ ] RDOS  Source if other than patient:  [ ]Family   [ ]Team     Pain:  [x ]yes [ ]no  QOL impact - Yes   Location -    All over body            Aggravating factors - none  Quality - sharp  Radiation - to the back  Timing- constant  Severity (0-10 scale): 10/10  Minimal acceptable level (0-10 scale): 4/10    Dyspnea:                           [ ]Mild [ ]Moderate [ ]Severe    RDOS:  0 to 2  minimal or no respiratory distress   3  mild distress  4 to 6 moderate distress  >7 severe distress  [email or web address] (Ctrl +  left click to view)     Anxiety:                             [ ]Mild [ ]Moderate [ ]Severe  Fatigue:                             [ ]Mild [ ]Moderate [ ]Severe  Nausea:                             [ ]Mild [x ]Moderate [ ]Severe  Loss of appetite:                [ ]Mild [ ]Moderate [ ]Severe  Constipation:                     [ ]Mild [ ]Moderate [ ]Severe    PCSSQ[Palliative Care Spiritual Screening Question]   Severity (0-10): 0  Score of 4 or > indicate consideration of Chaplaincy referral.  Chaplaincy Referral: [ ] yes [ ] refused [X ] following    Other Symptoms:  [ ]All other review of systems negative     Vital Signs Last 24 Hrs  Vital Signs Last 24 Hrs  T(C): 36.7 (27 Sep 2022 20:35), Max: 36.9 (27 Sep 2022 14:12)  T(F): 98 (27 Sep 2022 20:35), Max: 98.4 (27 Sep 2022 14:12)  HR: 92 (27 Sep 2022 20:35) (92 - 112)  BP: 111/71 (27 Sep 2022 20:35) (111/71 - 119/83)  BP(mean): --  RR: 17 (27 Sep 2022 20:35) (17 - 17)  SpO2: 97% (27 Sep 2022 20:35) (91% - 97%)    Parameters below as of 27 Sep 2022 20:35  Patient On (Oxygen Delivery Method): nasal cannula  O2 Flow (L/min): 2      GENERAL: [ ]Cachexia  Temporal wasting   [ ]Alert  [ ]Oriented    [X ]Lethargic  [ ]Unarousable  [ ]Verbal  [ ]Non-Verbal  Behavioral:   [X ]Anxiety  [ ]Delirium [X ]Agitation [ ]Other  HEENT:  [x ]Normal   [ ]Dry mouth   [ ]ET Tube/Trach  [ ]Oral lesions  PULMONARY:   [X ]Clear [X ]Tachypnea  [ ]Audible excessive secretions   [ ]Rhonchi        [ ]Right [ ]Left [ ]Bilateral  [ ]Crackles        [ ]Right [ ]Left [ ]Bilateral  [ ]Wheezing     [ ]Right [ ]Left [ ]Bilateral  [ ]Diminished BS [ ] Right [ ]Left [ ]Bilateral  CARDIOVASCULAR:    [x ]Regular [ ]Irregular [ ]Tachy  [ ]Luis Miguel [ ]Murmur [ ]Other  GASTROINTESTINAL:  [x ]Soft  [ ]Distended   [ ]+BS  [ ]Non tender [ ]Tender  [ ]Other [X ]J tube [ ]OGT/ NGT Patient has laparoscopic surgical incisions covered by  gauze    GENITOURINARY:  [X ]Normal [ ]Incontinent   [ ]Oliguria/Anuria   [ ]Patterson  MUSCULOSKELETAL:   [ ]Normal   [ ]Weakness  [X ]Bed/Wheelchair bound [ ]Edema  NEUROLOGIC:   [x ]No focal deficits  [ ] Cognitive impairment  [ ] Dysphagia [ ]Dysarthria [ ] Paresis [ ]Other   SKIN:   [x ]Normal  [ ]Rash  [ ]Other  [ ]Pressure ulcer(s) [ ]y [x ]n present on admission    CRITICAL CARE:  [ ]Shock Present  [ ]Septic [ ]Cardiogenic [ ]Neurologic [ ]Hypovolemic  [ ]Vasopressors [ ]Inotropes  [ ]Respiratory failure present [ ]Mechanical Ventilation [ ]Non-invasive ventilatory support [ ]High-Flow   [ ]Acute  [ ]Chronic [ ]Hypoxic  [ ]Hypercarbic [ ]Other  [ ]Other organ failure     LABS:      Protein Calorie Malnutrition Present: [ ]mild [ ]moderate [X ]severe [ ]underweight [ ]morbid obesity    Height (cm): 157.5 (08-26-22 @ 07:10)  Weight (kg): 71.1 (08-26-22 @ 07:11)  BMI (kg/m2): 28.7 (08-26-22 @ 07:11)    [ ]PPSV2 < or = 30%  [X ]significant weight loss [ ]poor nutritional intake [ ]anasarca[X ]Artificial Nutrition - J tube     Other REFERRALS:  [ ]Hospice  [ ]Child Life  [ ]Social Work  [ ]Case management [ ]Holistic Therapy

## 2022-09-30 NOTE — PROGRESS NOTE ADULT - SUBJECTIVE AND OBJECTIVE BOX
Internal Medicine Progress Note    Patient is a 57y old  Female who presents with a chief complaint of abdominal pain & nausea/vomiting (29 Sep 2022 11:44)    OVERNIGHT EVENTS/SUBJECTIVE:    OBJECTIVE:  Vital Signs Last 24 Hrs  T(C): 36.9 (30 Sep 2022 05:30), Max: 36.9 (29 Sep 2022 20:11)  T(F): 98.4 (30 Sep 2022 05:30), Max: 98.4 (29 Sep 2022 20:11)  HR: 100 (30 Sep 2022 05:30) (100 - 105)  BP: 105/63 (30 Sep 2022 05:30) (105/63 - 114/71)  BP(mean): --  RR: 16 (30 Sep 2022 05:30) (16 - 16)  SpO2: 98% (30 Sep 2022 05:30) (98% - 98%)    Parameters below as of 30 Sep 2022 05:30  Patient On (Oxygen Delivery Method): nasal cannula  O2 Flow (L/min): 2    I&O's Detail    29 Sep 2022 07:01  -  30 Sep 2022 06:55  --------------------------------------------------------  IN:    Oral Fluid: 500 mL  Total IN: 500 mL    OUT:    Voided (mL): 200 mL  Total OUT: 200 mL    Total NET: 300 mL        Daily     Daily   Physical Exam:  General: NAD, resting comfortably in bed  Neuro: A&Ox4, 5/5 strength in all ext  HEENT: NC/AT, EOMI, normal hearing, oral mucosa moist, no oral lesions noted, no pharyngeal erythema, uvula midline  Neck: supple, thyroid not enlarged, no LAD  Resp: Breathing comfortably on RA, LCTA b/l  CV: Normal sinus rhythm, S1 and S2, no r/m/g  Abd: soft, non-distended, non-tender. No HSM.  Ext: ROMIx4, no edema, +2 pulses bilaterally  Skin: Warm and dry, no rashes or discolorations  Psych: Appropriate affect    Medications:  MEDICATIONS  (STANDING):  Biotene Dry Mouth Oral Rinse 5 milliLiter(s) Swish and Spit two times a day  HYDROmorphone  Injectable 2 milliGRAM(s) IV Push every 4 hours  lidocaine   4% Patch 1 Patch Transdermal daily  pantoprazole  Injectable 40 milliGRAM(s) IV Push daily    MEDICATIONS  (PRN):  bisacodyl Suppository 10 milliGRAM(s) Rectal daily PRN Constipation  HYDROmorphone  Injectable 1 milliGRAM(s) IV Push every 2 hours PRN Breakthrough  HYDROmorphone  Injectable 0.5 milliGRAM(s) IV Push every 2 hours PRN Respiratory distress  LORazepam   Injectable 0.5 milliGRAM(s) IV Push every 6 hours PRN restlessness/agitation  LORazepam   Injectable 0.5 milliGRAM(s) IV Push every 6 hours PRN Nausea and/or Vomiting  ondansetron Injectable 8 milliGRAM(s) IV Push every 8 hours PRN Nausea and/or Vomiting  polyethylene glycol 3350 17 Gram(s) Oral daily PRN Constipation      Labs:              COVID-19 PCR: NotDetec (27 Sep 2022 12:24)  COVID-19 PCR: NotDetec (26 Sep 2022 05:43)  SARS-CoV-2: NotDetec (22 Sep 2022 11:13)  COVID-19 PCR: NotDetec (19 Sep 2022 12:50)  COVID-19 PCR: NotDetec (14 Sep 2022 13:05)  SARS-CoV-2: NotDetec (08 Sep 2022 18:15)  COVID-19 PCR: NotDetec (08 Sep 2022 07:31)  COVID-19 PCR: NotDetec (02 Sep 2022 06:00)  COVID-19 PCR: NotDetec (25 Aug 2022 08:02)  COVID-19 PCR: NotDetec (21 Aug 2022 06:49)  COVID-19 PCR: NotDetec (14 Aug 2022 17:47)      Radiology: Internal Medicine Progress Note    Patient is a 57y old  Female who presents with a chief complaint of abdominal pain & nausea/vomiting (29 Sep 2022 11:44)    OVERNIGHT EVENTS/SUBJECTIVE: No overnight events. She says she feels warm but otherwise has no complaints. Encouraged pt to ask if she needs anything. Pt refused ROS however appears improved from previous.     OBJECTIVE:  Vital Signs Last 24 Hrs  T(C): 36.9 (30 Sep 2022 05:30), Max: 36.9 (29 Sep 2022 20:11)  T(F): 98.4 (30 Sep 2022 05:30), Max: 98.4 (29 Sep 2022 20:11)  HR: 100 (30 Sep 2022 05:30) (100 - 105)  BP: 105/63 (30 Sep 2022 05:30) (105/63 - 114/71)  BP(mean): --  RR: 16 (30 Sep 2022 05:30) (16 - 16)  SpO2: 98% (30 Sep 2022 05:30) (98% - 98%)    Parameters below as of 30 Sep 2022 05:30  Patient On (Oxygen Delivery Method): nasal cannula  O2 Flow (L/min): 2    I&O's Detail    29 Sep 2022 07:01  -  30 Sep 2022 06:55  --------------------------------------------------------  IN:    Oral Fluid: 500 mL  Total IN: 500 mL    OUT:    Voided (mL): 200 mL  Total OUT: 200 mL    Total NET: 300 mL        Daily     Daily   Physical Exam:  General: NAD, resting comfortably in bed  Neuro: A&Ox4, no gross deficits   HEENT: NC/AT, EOMI, normal hearing, oral mucosa moist, no oral lesions noted, no pharyngeal erythema, uvula midline  Neck: supple, thyroid not enlarged, no LAD  Resp: Breathing comfortably on RA, LCTA b/l  CV: Normal sinus rhythm, S1 and S2, no r/m/g  Abd: soft, non-distended, non-tender. No HSM.  Ext: ROMIx4, no edema, +2 pulses bilaterally  Skin: Warm and dry, no rashes or discolorations  Psych: Appropriate affect    Medications:  MEDICATIONS  (STANDING):  Biotene Dry Mouth Oral Rinse 5 milliLiter(s) Swish and Spit two times a day  HYDROmorphone  Injectable 2 milliGRAM(s) IV Push every 4 hours  lidocaine   4% Patch 1 Patch Transdermal daily  pantoprazole  Injectable 40 milliGRAM(s) IV Push daily    MEDICATIONS  (PRN):  bisacodyl Suppository 10 milliGRAM(s) Rectal daily PRN Constipation  HYDROmorphone  Injectable 1 milliGRAM(s) IV Push every 2 hours PRN Breakthrough  HYDROmorphone  Injectable 0.5 milliGRAM(s) IV Push every 2 hours PRN Respiratory distress  LORazepam   Injectable 0.5 milliGRAM(s) IV Push every 6 hours PRN restlessness/agitation  LORazepam   Injectable 0.5 milliGRAM(s) IV Push every 6 hours PRN Nausea and/or Vomiting  ondansetron Injectable 8 milliGRAM(s) IV Push every 8 hours PRN Nausea and/or Vomiting  polyethylene glycol 3350 17 Gram(s) Oral daily PRN Constipation      Labs:              COVID-19 PCR: NotDetec (27 Sep 2022 12:24)  COVID-19 PCR: NotDetec (26 Sep 2022 05:43)  SARS-CoV-2: NotDetec (22 Sep 2022 11:13)  COVID-19 PCR: NotDetec (19 Sep 2022 12:50)  COVID-19 PCR: NotDetec (14 Sep 2022 13:05)  SARS-CoV-2: NotDetec (08 Sep 2022 18:15)  COVID-19 PCR: NotDetec (08 Sep 2022 07:31)  COVID-19 PCR: NotDetec (02 Sep 2022 06:00)  COVID-19 PCR: NotDetec (25 Aug 2022 08:02)  COVID-19 PCR: NotDetec (21 Aug 2022 06:49)  COVID-19 PCR: NotDetec (14 Aug 2022 17:47)      Radiology: Internal Medicine Progress Note    Patient is a 57y old  Female who presents with a chief complaint of abdominal pain & nausea/vomiting (29 Sep 2022 11:44)    OVERNIGHT EVENTS/SUBJECTIVE: No overnight events. She says she feels warm but otherwise has no complaints. Encouraged pt to ask if she needs anything. Pt refused ROS however appears improved from previous.     OBJECTIVE:  Vital Signs Last 24 Hrs  T(C): 36.9 (30 Sep 2022 05:30), Max: 36.9 (29 Sep 2022 20:11)  T(F): 98.4 (30 Sep 2022 05:30), Max: 98.4 (29 Sep 2022 20:11)  HR: 100 (30 Sep 2022 05:30) (100 - 105)  BP: 105/63 (30 Sep 2022 05:30) (105/63 - 114/71)  BP(mean): --  RR: 16 (30 Sep 2022 05:30) (16 - 16)  SpO2: 98% (30 Sep 2022 05:30) (98% - 98%)    Parameters below as of 30 Sep 2022 05:30  Patient On (Oxygen Delivery Method): nasal cannula  O2 Flow (L/min): 2    I&O's Detail    29 Sep 2022 07:01  -  30 Sep 2022 06:55  --------------------------------------------------------  IN:    Oral Fluid: 500 mL  Total IN: 500 mL    OUT:    Voided (mL): 200 mL  Total OUT: 200 mL    Total NET: 300 mL        Daily     Daily   Physical Exam:  General: NAD, resting comfortably in bed, appears warm with blankets off  Neuro: A&Ox4, no gross deficits   HEENT: NC/AT, EOMI, normal hearing, oral mucosa moist, no oral lesions noted, no pharyngeal erythema, uvula midline  Neck: supple, thyroid not enlarged, no LAD  Resp: Breathing comfortably on RA, LCTA b/l  CV: Normal sinus rhythm, S1 and S2, no r/m/g  Abd: soft, non-distended, non-tender. No HSM.  Ext: ROMIx4, no edema, +2 pulses bilaterally  Skin: Warm and dry, no rashes or discolorations  Psych: Appropriate affect    Medications:  MEDICATIONS  (STANDING):  Biotene Dry Mouth Oral Rinse 5 milliLiter(s) Swish and Spit two times a day  HYDROmorphone  Injectable 2 milliGRAM(s) IV Push every 4 hours  lidocaine   4% Patch 1 Patch Transdermal daily  pantoprazole  Injectable 40 milliGRAM(s) IV Push daily    MEDICATIONS  (PRN):  bisacodyl Suppository 10 milliGRAM(s) Rectal daily PRN Constipation  HYDROmorphone  Injectable 1 milliGRAM(s) IV Push every 2 hours PRN Breakthrough  HYDROmorphone  Injectable 0.5 milliGRAM(s) IV Push every 2 hours PRN Respiratory distress  LORazepam   Injectable 0.5 milliGRAM(s) IV Push every 6 hours PRN restlessness/agitation  LORazepam   Injectable 0.5 milliGRAM(s) IV Push every 6 hours PRN Nausea and/or Vomiting  ondansetron Injectable 8 milliGRAM(s) IV Push every 8 hours PRN Nausea and/or Vomiting  polyethylene glycol 3350 17 Gram(s) Oral daily PRN Constipation      Labs:              COVID-19 PCR: NotDetec (27 Sep 2022 12:24)  COVID-19 PCR: NotDetec (26 Sep 2022 05:43)  SARS-CoV-2: NotDetec (22 Sep 2022 11:13)  COVID-19 PCR: NotDetec (19 Sep 2022 12:50)  COVID-19 PCR: NotDetec (14 Sep 2022 13:05)  SARS-CoV-2: NotDetec (08 Sep 2022 18:15)  COVID-19 PCR: NotDetec (08 Sep 2022 07:31)  COVID-19 PCR: NotDetec (02 Sep 2022 06:00)  COVID-19 PCR: NotDetec (25 Aug 2022 08:02)  COVID-19 PCR: NotDetec (21 Aug 2022 06:49)  COVID-19 PCR: NotDetec (14 Aug 2022 17:47)      Radiology: Internal Medicine Progress Note    Patient is a 57y old  Female who presents with a chief complaint of abdominal pain & nausea/vomiting (29 Sep 2022 11:44)    OVERNIGHT EVENTS/SUBJECTIVE: No overnight events. She says she feels warm but otherwise has no complaints. Encouraged pt to ask if she needs anything. Pt refused ROS however appears improved from previous.     OBJECTIVE:  Vital Signs Last 24 Hrs  T(C): 36.9 (30 Sep 2022 05:30), Max: 36.9 (29 Sep 2022 20:11)  T(F): 98.4 (30 Sep 2022 05:30), Max: 98.4 (29 Sep 2022 20:11)  HR: 100 (30 Sep 2022 05:30) (100 - 105)  BP: 105/63 (30 Sep 2022 05:30) (105/63 - 114/71)  BP(mean): --  RR: 16 (30 Sep 2022 05:30) (16 - 16)  SpO2: 98% (30 Sep 2022 05:30) (98% - 98%)    Parameters below as of 30 Sep 2022 05:30  Patient On (Oxygen Delivery Method): nasal cannula  O2 Flow (L/min): 2    I&O's Detail    29 Sep 2022 07:01  -  30 Sep 2022 06:55  --------------------------------------------------------  IN:    Oral Fluid: 500 mL  Total IN: 500 mL    OUT:    Voided (mL): 200 mL  Total OUT: 200 mL    Total NET: 300 mL        Daily     Daily   Physical Exam:  General: NAD, resting comfortably in bed, appears warm with blankets off  Neuro: A&Ox4, no gross deficits   HEENT: NC/AT, EOMI, normal hearing  Resp: Breathing comfortably on RA  Skin: Warm and dry, no rashes or discolorations      Medications:  MEDICATIONS  (STANDING):  Biotene Dry Mouth Oral Rinse 5 milliLiter(s) Swish and Spit two times a day  HYDROmorphone  Injectable 2 milliGRAM(s) IV Push every 4 hours  lidocaine   4% Patch 1 Patch Transdermal daily  pantoprazole  Injectable 40 milliGRAM(s) IV Push daily    MEDICATIONS  (PRN):  bisacodyl Suppository 10 milliGRAM(s) Rectal daily PRN Constipation  HYDROmorphone  Injectable 1 milliGRAM(s) IV Push every 2 hours PRN Breakthrough  HYDROmorphone  Injectable 0.5 milliGRAM(s) IV Push every 2 hours PRN Respiratory distress  LORazepam   Injectable 0.5 milliGRAM(s) IV Push every 6 hours PRN restlessness/agitation  LORazepam   Injectable 0.5 milliGRAM(s) IV Push every 6 hours PRN Nausea and/or Vomiting  ondansetron Injectable 8 milliGRAM(s) IV Push every 8 hours PRN Nausea and/or Vomiting  polyethylene glycol 3350 17 Gram(s) Oral daily PRN Constipation      Labs:              COVID-19 PCR: NotDetec (27 Sep 2022 12:24)  COVID-19 PCR: NotDetec (26 Sep 2022 05:43)  SARS-CoV-2: NotDetec (22 Sep 2022 11:13)  COVID-19 PCR: NotDetec (19 Sep 2022 12:50)  COVID-19 PCR: NotDetec (14 Sep 2022 13:05)  SARS-CoV-2: NotDetec (08 Sep 2022 18:15)  COVID-19 PCR: NotDetec (08 Sep 2022 07:31)  COVID-19 PCR: NotDetec (02 Sep 2022 06:00)  COVID-19 PCR: NotDetec (25 Aug 2022 08:02)  COVID-19 PCR: NotDetec (21 Aug 2022 06:49)  COVID-19 PCR: NotDetec (14 Aug 2022 17:47)      Radiology:

## 2022-09-30 NOTE — PROGRESS NOTE ADULT - PROBLEM SELECTOR PLAN 8
Impression: Stable anemia. May be combination of Iron deficiency anemia from poor PO intake and normocytic anemia from Anemia of Chronic Disease w/ this presentation c/f gastric malignancy. Also now hematemesis  - Total Iron 22, TIBC 165, transferrin 149  H/H stable, continue to monitor DVT ppx: lovenox held for now    Dispo planning: complex dispo planning due to lack of insurance  Emergency medicaid can cover outpatient chemo/transport.    GOC: comfort care per patient and family. DNR/DNI

## 2022-09-30 NOTE — PROGRESS NOTE ADULT - PROBLEM SELECTOR PLAN 1
- pt transitioned to palliative/hospice route, no plan for chemotherapy  - EGD:  Infiltrative changes in gastric body concerning gastric malignancy, Congested duodenal mucosa, biopsy = poorly differentiated adenocarcinoma  - staging CT chest - Mediastinal/bilateral hilar/R internal mammary LAD  - mediastinal lymph node biopsy results - Metastatic adenocarcinoma   - pantoprazole  40 mg daily  - OR 8/26 with J tube placement    pain Control; Palliative will attempt to transition to morphine for cost control  -Morphine 15mg q2h for severe Pain 7-10  -Methadone 5mg q12h standing    PRICING UPDATES:  Lovenox: $1/month covered under [facility] medicaid  Tube Feeds and pump: ~$120/month  Methadone: 36$/month  Reglan: $60/month  Morphine: $1 COVERED - pt transitioned to palliative/hospice route, no plan for chemotherapy  - mediastinal lymph node biopsy results - Metastatic adenocarcinoma   - pantoprazole  40 mg daily  - OR 8/26 with J tube placement    pain Control; Palliative will attempt to transition to morphine for cost control  -Morphine 15mg q2h for severe Pain 7-10  -Methadone 5mg q12h standing    PRICING UPDATES:  Lovenox: $1/month covered under [facility] medicaid  Tube Feeds and pump: ~$120/month  Methadone: 36$/month  Reglan: $60/month  Morphine: $1 COVERED

## 2022-09-30 NOTE — PROGRESS NOTE ADULT - PROBLEM SELECTOR PLAN 3
resolved, off abx now LFTs are elevating without a clear source; no evidence of mets to liver  RUQ U/S results are equivocal; shows some mild bile duct dilatation similar to CT on 9/9; new from CT on 8/14  CT shows no mets to the liver at the moment    Hepatitis labs from June and July 2022 show no signs of HepB/C infection  MRCP showed Stricture of CBD  ERCP 9/16 unsuccessful, repeat ERCP 9/20 unsuccessful  Bloody sputum likely due to trauma from scope and friable mass in stomach

## 2022-09-30 NOTE — PROGRESS NOTE ADULT - PROBLEM SELECTOR PLAN 6
Urology/IR believe there is no need for urgent intervention  Will reconsult if there appears to be signs of UTI Impression: 72 hour caloric count to evaluate for malnutrition - indicates malnutrition. Pt unable to eat due to pain.   Now J tube placement    c/w tube feeds, can pause when nauseated or uncomfortable

## 2022-09-30 NOTE — PROGRESS NOTE ADULT - ATTENDING COMMENTS
57F with no significant PMH p/w abd pain, N/V found to have gastric adenocarcinoma with metastatic disease confirmed with bx. EGD confirmed advanced disease and not a candidate for resection. Now s/p jejunostomy for tube feeds. Course c/b subsegmental PE, initially on lovenox. Course further c/b hematemesis 2/2 friable gastric mass and AC now deferred. Course further c/b rising LFTs, suspect related to metastatic disease. ERCP was attempted x2 for stent placement without success d/t extent of disease. Had completed 7d course of zosyn (9/9-17) with new Tmax 9/22 prompting reinitiation of zosyn and fever w/u. Initially planned for PICC for chemo however deferred due to fever and now no longer a candidate for chemo per oncology due to worsening liver function. Also potential plan for IVC filter, but patient and family decided to forgo invasive measures. Family meeting held with palliative care, medical team, and patient's children on 9/27. Family decision was made to pursue comfort measures, including no blood draws, no antibiotics, and patient made DNR/DNI. Pain regimen being managed by palliative care. Referral made for inpatient hospice, patient has no oral access, so would be appropriate candidate given need for IV pain meds .

## 2022-09-30 NOTE — PROGRESS NOTE ADULT - PROBLEM SELECTOR PLAN 5
b/l PEs seen on CT 9/9. Denies pleuritic chest pain, SOB. Stable on RA  Hold  lovenox 70 BID for now for hematemesis  Candidate for IVC filter per IR if she and family choose to Urology/IR believe there is no need for urgent intervention  Will reconsult if there appears to be signs of UTI

## 2022-10-01 NOTE — PROGRESS NOTE ADULT - PROBLEM SELECTOR PLAN 1
- pt transitioned to palliative/hospice route, no plan for chemotherapy  - mediastinal lymph node biopsy results - Metastatic adenocarcinoma   - pantoprazole  40 mg daily  - OR 8/26 with J tube placement    pain Control; Palliative will attempt to transition to morphine for cost control  -Morphine 15mg q2h for severe Pain 7-10  -Methadone 5mg q12h standing    PRICING UPDATES:  Lovenox: $1/month covered under [facility] medicaid  Tube Feeds and pump: ~$120/month  Methadone: 36$/month  Reglan: $60/month  Morphine: $1 COVERED

## 2022-10-01 NOTE — PROGRESS NOTE ADULT - PROBLEM SELECTOR PLAN 6
Impression: 72 hour caloric count to evaluate for malnutrition - indicates malnutrition. Pt unable to eat due to pain.   Now J tube placement    c/w tube feeds, can pause when nauseated or uncomfortable

## 2022-10-01 NOTE — PROGRESS NOTE ADULT - SUBJECTIVE AND OBJECTIVE BOX
Internal Medicine Progress Note    Patient is a 57y old  Female who presents with a chief complaint of abdominal pain & nausea/vomiting (30 Sep 2022 16:45)    OVERNIGHT EVENTS/SUBJECTIVE:    OBJECTIVE:  Vital Signs Last 24 Hrs  T(C): 36.8 (01 Oct 2022 05:00), Max: 36.8 (30 Sep 2022 22:00)  T(F): 98.2 (01 Oct 2022 05:00), Max: 98.3 (30 Sep 2022 22:00)  HR: 105 (01 Oct 2022 05:00) (100 - 107)  BP: 117/75 (01 Oct 2022 05:00) (105/71 - 117/75)  BP(mean): --  RR: 16 (01 Oct 2022 05:00) (16 - 19)  SpO2: 98% (01 Oct 2022 05:00) (98% - 100%)    Parameters below as of 01 Oct 2022 05:00  Patient On (Oxygen Delivery Method): room air      I&O's Detail    Daily     Daily   Physical Exam:  General: NAD, resting comfortably in bed  Neuro: A&Ox4, 5/5 strength in all ext  HEENT: NC/AT, EOMI, normal hearing, oral mucosa moist, no oral lesions noted, no pharyngeal erythema, uvula midline  Neck: supple, thyroid not enlarged, no LAD  Resp: Breathing comfortably on RA, LCTA b/l  CV: Normal sinus rhythm, S1 and S2, no r/m/g  Abd: soft, non-distended, non-tender. No HSM.  Ext: ROMIx4, no edema, +2 pulses bilaterally  Skin: Warm and dry, no rashes or discolorations  Psych: Appropriate affect    Medications:  MEDICATIONS  (STANDING):  Biotene Dry Mouth Oral Rinse 5 milliLiter(s) Swish and Spit two times a day  HYDROmorphone Infusion 0.5 mG/Hr (0.5 mL/Hr) IV Continuous <Continuous>  lidocaine   4% Patch 1 Patch Transdermal daily  pantoprazole  Injectable 40 milliGRAM(s) IV Push daily    MEDICATIONS  (PRN):  bisacodyl Suppository 10 milliGRAM(s) Rectal daily PRN Constipation  HYDROmorphone  Injectable 0.5 milliGRAM(s) IV Push every 2 hours PRN Respiratory distress  HYDROmorphone  Injectable 1.5 milliGRAM(s) IV Push every 2 hours PRN Breakthrough  LORazepam   Injectable 0.5 milliGRAM(s) IV Push every 6 hours PRN restlessness/agitation/anxiety  LORazepam   Injectable 0.5 milliGRAM(s) IV Push every 6 hours PRN Nausea and/or Vomiting  ondansetron Injectable 8 milliGRAM(s) IV Push every 8 hours PRN Nausea and/or Vomiting  polyethylene glycol 3350 17 Gram(s) Oral daily PRN Constipation      Labs:              COVID-19 PCR: NotDetec (27 Sep 2022 12:24)  COVID-19 PCR: NotDetec (26 Sep 2022 05:43)  SARS-CoV-2: NotDetec (22 Sep 2022 11:13)  COVID-19 PCR: NotDetec (19 Sep 2022 12:50)  COVID-19 PCR: NotDetec (14 Sep 2022 13:05)  SARS-CoV-2: NotDetec (08 Sep 2022 18:15)  COVID-19 PCR: NotDetec (08 Sep 2022 07:31)  COVID-19 PCR: NotDetec (02 Sep 2022 06:00)  COVID-19 PCR: NotDetec (25 Aug 2022 08:02)  COVID-19 PCR: NotDetec (21 Aug 2022 06:49)  COVID-19 PCR: NotDetec (14 Aug 2022 17:47)      Radiology: Internal Medicine Progress Note    Patient is a 57y old  Female who presents with a chief complaint of abdominal pain & nausea/vomiting (30 Sep 2022 16:45)    OVERNIGHT EVENTS/SUBJECTIVE: No overnight events. Pt reports being uncomfortable but has no requests and does not say she needs more pain meds.     OBJECTIVE:  Vital Signs Last 24 Hrs  T(C): 36.8 (01 Oct 2022 05:00), Max: 36.8 (30 Sep 2022 22:00)  T(F): 98.2 (01 Oct 2022 05:00), Max: 98.3 (30 Sep 2022 22:00)  HR: 105 (01 Oct 2022 05:00) (100 - 107)  BP: 117/75 (01 Oct 2022 05:00) (105/71 - 117/75)  BP(mean): --  RR: 16 (01 Oct 2022 05:00) (16 - 19)  SpO2: 98% (01 Oct 2022 05:00) (98% - 100%)    Parameters below as of 01 Oct 2022 05:00  Patient On (Oxygen Delivery Method): room air      I&O's Detail    Daily     Daily   Physical Exam:  General: NAD, resting comfortably in bed  Neuro: A&Ox3  HEENT: NC/AT, EOMI, normal hearing  Resp: Breathing comfortably on RA, LCTA b/l  CV: Normal sinus rhythm, S1 and S2, no r/m/g  Skin: Warm and dry, no rashes or discolorations      Medications:  MEDICATIONS  (STANDING):  Biotene Dry Mouth Oral Rinse 5 milliLiter(s) Swish and Spit two times a day  HYDROmorphone Infusion 0.5 mG/Hr (0.5 mL/Hr) IV Continuous <Continuous>  lidocaine   4% Patch 1 Patch Transdermal daily  pantoprazole  Injectable 40 milliGRAM(s) IV Push daily    MEDICATIONS  (PRN):  bisacodyl Suppository 10 milliGRAM(s) Rectal daily PRN Constipation  HYDROmorphone  Injectable 0.5 milliGRAM(s) IV Push every 2 hours PRN Respiratory distress  HYDROmorphone  Injectable 1.5 milliGRAM(s) IV Push every 2 hours PRN Breakthrough  LORazepam   Injectable 0.5 milliGRAM(s) IV Push every 6 hours PRN restlessness/agitation/anxiety  LORazepam   Injectable 0.5 milliGRAM(s) IV Push every 6 hours PRN Nausea and/or Vomiting  ondansetron Injectable 8 milliGRAM(s) IV Push every 8 hours PRN Nausea and/or Vomiting  polyethylene glycol 3350 17 Gram(s) Oral daily PRN Constipation      Labs:              COVID-19 PCR: NotDetec (27 Sep 2022 12:24)  COVID-19 PCR: NotDetec (26 Sep 2022 05:43)  SARS-CoV-2: NotDetec (22 Sep 2022 11:13)  COVID-19 PCR: NotDetec (19 Sep 2022 12:50)  COVID-19 PCR: NotDetec (14 Sep 2022 13:05)  SARS-CoV-2: NotDetec (08 Sep 2022 18:15)  COVID-19 PCR: NotDetec (08 Sep 2022 07:31)  COVID-19 PCR: NotDetec (02 Sep 2022 06:00)  COVID-19 PCR: NotDetec (25 Aug 2022 08:02)  COVID-19 PCR: NotDetec (21 Aug 2022 06:49)  COVID-19 PCR: NotDetec (14 Aug 2022 17:47)      Radiology:

## 2022-10-01 NOTE — PROGRESS NOTE ADULT - ATTENDING COMMENTS
57F with no significant PMH p/w abd pain, N/V found to have gastric adenocarcinoma with metastatic disease confirmed with bx. EGD confirmed advanced disease and not a candidate for resection. Now s/p jejunostomy for tube feeds. Course c/b subsegmental PE, initially on lovenox. Course further c/b hematemesis 2/2 friable gastric mass and AC now deferred. Course further c/b rising LFTs, suspect related to metastatic disease. ERCP was attempted x2 for stent placement without success d/t extent of disease. Had completed 7d course of zosyn (9/9-17) with new Tmax 9/22 prompting reinitiation of zosyn and fever w/u. Initially planned for PICC for chemo however deferred due to fever and now no longer a candidate for chemo per oncology due to worsening liver function. Also potential plan for IVC filter, but patient and family decided to forgo invasive measures. Family meeting held with palliative care, medical team, and patient's children on 9/27. Family decision was made to pursue comfort measures, including no blood draws, no antibiotics, and patient made DNR/DNI. Pain regimen being managed by palliative care. Referral made for inpatient hospice, patient has no oral access, so would be appropriate candidate given need for IV pain meds. Team reconfirmed with comfort care status with family 10/1.

## 2022-10-02 NOTE — PROVIDER CONTACT NOTE (OTHER) - BACKGROUND
Patient admitted with ARF. Hx of gastric cancer,
Patient admitted with acute renal failure
Patient admitted with acute renal failure
Pt admitted with acute renal failure
Patient admitted with ARF. Hx of gastric cancer,
Patient admitted with acute renal failure
58 y/o female admitted for acute renal failure with hx of gastric CA, nephrolithiasis
Patient admitted with acute renal failure
pt is admitted with acute renal failure
pt is admitted with acute renal failure
pt is admitted with acute renal failure. found to have gastric adenocarcinoma
PAtient 56 yo F admitted for advanced gastric adenocarcinoma.
56 y/o female admitted for acute renal failure with hx of gastric CA, nephrolithiasis
58 y/o F admitted with gastric adenocarcinoma
Patient admitted with acute renal failure
pt is admitted with acute renal failure
Patient admitted for acute renal failure. [facility] nephrolithiasis
Patient admitted with acute renal failure
Patient admitted with acute renal failure
pt is admitted with acute renal failure

## 2022-10-02 NOTE — PROVIDER CONTACT NOTE (OTHER) - SITUATION
, patient had an episode of emesis
patient had episodes of bloody emesis
vomited blood
Patient had an episode of bloody emesis
Patient had episode of bloody emesis
Patient had 3 vomiting episodes throughout the shift
STAT 0.6mg IV Dilaudid ordered but patients last dose of IV dilaudid 0.6 mg [id number] was administered at 14:40
pt's HR is 113
Pt was vomiting
patient refusing IV change
Pt febrile, hypertensive, tachycardic, spao 02% on room air
pt has a temp of 100.6F and a HR of 125
pt's HR is 111
Patient had episode of small blood tinged vomitus
J tube beginning to drain with odor
patient states abdomen feels full and tight and wants feeding tube off
pt has a temp of 100.4F

## 2022-10-02 NOTE — PROGRESS NOTE ADULT - PROBLEM SELECTOR PLAN 1
- pt transitioned to palliative/hospice route, no plan for chemotherapy  - mediastinal lymph node biopsy results - Metastatic adenocarcinoma   - pantoprazole  40 mg daily  - OR 8/26 with J tube placement    pain Control; Palliative will attempt to transition to morphine for cost control  -Morphine 15mg q2h for severe Pain 7-10  -Methadone 5mg q12h standing    PRICING UPDATES:  Lovenox: $1/month covered under [facility] medicaid  Tube Feeds and pump: ~$120/month  Methadone: 36$/month  Reglan: $60/month  Morphine: $1 COVERED

## 2022-10-02 NOTE — PROVIDER CONTACT NOTE (OTHER) - NAME OF MD/NP/PA/DO NOTIFIED:
Angelique Clifton MD
MD Matthews
 [id number]
Anitra
MD Matthews
Armen Gill
Cassie HAYDEN
 [id number]
 [id number]
MD Matthews
Dr. Duron [facility]
JUAN ALBERTO ANN
Dr. Duron [facility]
George Matthews MD
JUAN ALBERTO ANN
Team 7 Provider Armen Gill
 [id number]
MD Matthews
MD Thurston
George Matthews MD

## 2022-10-02 NOTE — PROVIDER CONTACT NOTE (OTHER) - ACTION/TREATMENT ORDERED:
CBC to be collected
MD Matthews notified. MD to assess at bedside.
MD made aware, no new orders, continue to monitor
Tube feedings held as per nausea, zofran and reglan administered via IV, EKG completed
No action required at this time. Continue to monitor
Team 7 Provider Armen Gill aware. Tube feedings still held at this time. Reglan to be ordered & given IVP. Safety maintained.
Tube feedings held
MD made aware, with order for stat CBC, continue to monitor
Will continue VS Q 4, tube feedings held
no recommendation at this time.  will continue monitoring.
MD made aware, no interventions at this time. nursing care continued
given prn Tylenol 650 mg po. monitoring is ongoing.
Md notified. MD discontinued tube feeding and will reevaluate with day team on restarting.
No action required at this time. Continue to monitor
Zofran administered, will continue to monitor
awaiting MD's recommendation. monitoring is ongoing.
no recommendation at this time.  will continue monitoring.
MD made aware, no interventions at this time. nursing care continued
MD notified, MD says will cancel STAT dilaudid order

## 2022-10-02 NOTE — PROVIDER CONTACT NOTE (OTHER) - DATE AND TIME:
02-Sep-2022 00:52
03-Sep-2022 22:20
08-Sep-2022 16:18
09-Sep-2022 09:28
27-Aug-2022 00:48
04-Sep-2022 06:01
24-Sep-2022 13:26
22-Sep-2022 05:49
22-Sep-2022 02:01
24-Sep-2022 21:30
25-Sep-2022 09:13
02-Oct-2022 12:20
23-Sep-2022 12:00
24-Aug-2022 16:58
25-Sep-2022 06:16
26-Sep-2022 16:11
26-Sep-2022 16:11
21-Sep-2022 16:36
21-Sep-2022 23:01
22-Sep-2022 14:00

## 2022-10-02 NOTE — PROVIDER CONTACT NOTE (OTHER) - RECOMMENDATIONS
NOtify MD
continue to monitor
continue to monitor
notify MD
MD notified
MD notified
MD notified, will give zofran when due
Notify MD
notify MD
CT angio, labs, blood cultures, RVP, EKG, UA
educate patient on IV change policy
Dr. Domi SaBryant notified
MD notified
MD notified
hold feeding tube 2 hours
notify MD
give zofran as ordered
Dr. Domi SaCord notified
notify MD
Team 7 Provider Armen Gill notified

## 2022-10-02 NOTE — PROGRESS NOTE ADULT - ATTENDING COMMENTS
57F with no significant PMH p/w abd pain, N/V found to have gastric adenocarcinoma with metastatic disease confirmed with bx. EGD confirmed advanced disease and not a candidate for resection. Now s/p jejunostomy for tube feeds. Course c/b subsegmental PE, initially on lovenox. Course further c/b hematemesis 2/2 friable gastric mass and AC now deferred. Course further c/b rising LFTs, suspect related to metastatic disease. ERCP was attempted x2 for stent placement without success d/t extent of disease. Had completed 7d course of zosyn (9/9-17) with new Tmax 9/22 prompting reinitiation of zosyn and fever w/u. Initially planned for PICC for chemo however deferred due to fever and now no longer a candidate for chemo per oncology due to worsening liver function. Also potential plan for IVC filter, but patient and family decided to forgo invasive measures. Family meeting held with palliative care, medical team, and patient's children on 9/27. Family decision was made to pursue comfort measures, including no blood draws, no antibiotics, and patient made DNR/DNI. Pain regimen being managed by palliative care. Referral made for inpatient hospice, patient has no oral access, so would be appropriate candidate given need for IV pain meds. Team reconfirmed with comfort care status with family 10/1. Today patient became hypotensive and more lethargic. Seemed uncomfortable on exam. Will get palliative input to see if further comfort measure needed. Will reach out to family to come see her as her condition is deteriorating and likely entering the dying process. At this time, not safe to transfer to inpatient hospice facility.

## 2022-10-02 NOTE — PROVIDER CONTACT NOTE (OTHER) - ASSESSMENT
pt is alert and oriented. denies any discomfort
pt is alert and oriented. denies pain or any discomfort.
pt is alert and oriented. no distress noted.
, patient had an episode of emesis, patient filled pink water container
 T 98.1 /79 RR 20 oxygen sat RA 96%; not in distress, alert and responsive
Patient had 3 vomiting episodes throughout the shift, emesis presents as dark brown/ red, patient nauseous, consistently belching
patient refusing IV change. IV site in intact, no redness noted. flushes without difficult. no pain when flushed
pt is alert and oriented. denies pain or any discomfort.
Pt A&Ox4. Vitals as per flowsheet.
patient had episodes of bloody emesis, patient refusing zofran at this time, stated she is okay
patient states abdomen feels full and tight and wants feeding tube off
Patient a/o x4, resting comfortably at this time. Patient replied "a little pain" when asked if she was in pain currently. Patient rated pain a 6 on scale of 1-10.
Patient had episode of small blood tinged vomitus. PAtient VSS. No complaints.
J tube beginning to drain with odor
Patient had episode of bloody emesis, denies SOB, dizziness, blurred vision, no other sources of bleeding present
Chills, cold
vomited blood approximately 2 cups; not in distress, alert and responsive with v/s: T 97.4  RR 20 /76 oxygen sat RA 94%
Patient had an episode of bloody emesis, no other signs of bleeding, patient denies chest pain, VSS

## 2022-10-02 NOTE — PROVIDER CONTACT NOTE (OTHER) - REASON
Pt febrile, hypertensive, tachycardic, spao 02% on room air
patient refusing IV change
pt's HR is 111
STAT Dilaudid ordered but last dose of dilaudid revieved was 14:40
patient had episodes of bloody emesis
pt has a temp of 100.6F and a HR of 125
J tube beginning to drain with odor
, patient had an episode of emesis
Patient had episode of bloody emesis
Patient had 3 vomiting episodes throughout the shift
Patient had an episode of bloody emesis
Patient had episode of small blood tinged vomitus
pt has a temp of 100.4F
Pt was vomiting
patient states abdomen feels full and tight and wants feeding tube off
pt's HR is 113
vomited blood

## 2022-10-03 NOTE — PROGRESS NOTE ADULT - ASSESSMENT
57F PMH of renal stones p/w nonresectable gastric adenocarcinoma w/ mets to LN c/b DEVI, now resolved, and nephrolithiasis with L hydronephrosis currently S/p j tube placement, with stable tube feed and stable pain regimen with course c/b transaminitis with ERCP unable to bypass blockages in duodenum to place stent x2. 57F PMH of renal stones p/w nonresectable gastric adenocarcinoma w/ mets to LN c/b DEVI, now resolved, and nephrolithiasis with L hydronephrosis currently S/p j tube placement, with stable tube feed and stable pain regimen with course c/b transaminitis with ERCP unable to bypass blockages in duodenum to place stent x2.    At this time, patient is comfort care measures and DNR/DNI.

## 2022-10-03 NOTE — PROGRESS NOTE ADULT - ATTENDING COMMENTS
57F with no significant PMH p/w abd pain, N/V found to have gastric adenocarcinoma with metastatic disease confirmed with bx. EGD confirmed advanced disease and not a candidate for resection. Now s/p jejunostomy for tube feeds. Course c/b subsegmental PE, initially on lovenox. Course further c/b hematemesis 2/2 friable gastric mass and AC now deferred. Course further c/b rising LFTs, suspect related to metastatic disease. ERCP was attempted x2 for stent placement without success d/t extent of disease. Had completed 7d course of zosyn (9/9-17) with new Tmax 9/22 prompting reinitiation of zosyn and fever w/u. Initially planned for PICC for chemo however deferred due to fever and now no longer a candidate for chemo per oncology due to worsening liver function. Also potential plan for IVC filter, but patient and family decided to forgo invasive measures. Family meeting held with palliative care, medical team, and patient's children on 9/27. Family decision was made to pursue comfort measures, including no blood draws, no antibiotics, and patient made DNR/DNI. Pain regimen being managed by palliative care. Referral made for inpatient hospice, patient has no oral access, so would be appropriate candidate given need for IV pain meds. Team reconfirmed with comfort care status with family 10/1. Pt intermittently hypotensive and more lethargic, clinically deteriorating. At this time, not safe to transfer to inpatient hospice facility. 57F with no significant PMH p/w abd pain, N/V found to have gastric adenocarcinoma with metastatic disease confirmed with bx. EGD confirmed advanced disease and not a candidate for resection. Now s/p jejunostomy for tube feeds. Course c/b subsegmental PE, initially on lovenox. Course further c/b hematemesis 2/2 friable gastric mass and AC now deferred. Course further c/b rising LFTs, suspect related to metastatic disease. ERCP was attempted x2 for stent placement without success d/t extent of disease. Family meeting held with palliative care, medical team, and patient's children on 9/27. Family decision was made to pursue comfort measures, including no blood draws, no antibiotics, and patient made DNR/DNI. Pain regimen being managed by palliative care on IV Dilauidid gtt. Referral initially made for inpatient hospice but pt is clinically deteriorating with agonal breathing and only responsive to painful stimuli. Pt seems to be actively dying and not safe to transfer to inpatient hospice facility.

## 2022-10-03 NOTE — PROGRESS NOTE ADULT - PROBLEM SELECTOR PLAN 1
- pt transitioned to palliative/hospice route, no plan for chemotherapy  - mediastinal lymph node biopsy results - Metastatic adenocarcinoma   - pantoprazole  40 mg daily  - OR 8/26 with J tube placement    pain Control; Palliative will attempt to transition to morphine for cost control  -Morphine 15mg q2h for severe Pain 7-10  -Methadone 5mg q12h standing    PRICING UPDATES:  Lovenox: $1/month covered under [facility] medicaid  Tube Feeds and pump: ~$120/month  Methadone: 36$/month  Reglan: $60/month  Morphine: $1 COVERED

## 2022-10-03 NOTE — PROGRESS NOTE ADULT - SUBJECTIVE AND OBJECTIVE BOX
***************************************************************  Joshua Agustin, PGY1  Internal Medicine   TEAMS Preferred  [facility] Pager [phone]  [facility] Pager [phone]  ***************************************************************    SCOOTER MAZARIEGOS  57y  MRN: [id number]  [birth year]-22 (50d)    Patient is a 57y old  Female who presents with a chief complaint of abdominal pain & nausea/vomiting (02 Oct 2022 06:56)      SUBJECTIVE / OVERNIGHT EVENTS:   No acute overnight events. Pt seen and examined at bedside. Denies fevers, chills, CP, SOB, Abdominal pain, N/V, Constipation, Diarrhea. Last BM     12 Point ROS negative with the exception of the above    MEDICATIONS  (STANDING):  Biotene Dry Mouth Oral Rinse 5 milliLiter(s) Swish and Spit two times a day  HYDROmorphone Infusion 0.5 mG/Hr (0.5 mL/Hr) IV Continuous <Continuous>  lidocaine   4% Patch 1 Patch Transdermal daily  pantoprazole  Injectable 40 milliGRAM(s) IV Push daily    MEDICATIONS  (PRN):  bisacodyl Suppository 10 milliGRAM(s) Rectal daily PRN Constipation  HYDROmorphone  Injectable 0.5 milliGRAM(s) IV Push every 2 hours PRN Respiratory distress  HYDROmorphone  Injectable 1.5 milliGRAM(s) IV Push every 2 hours PRN Breakthrough  LORazepam   Injectable 0.5 milliGRAM(s) IV Push every 6 hours PRN restlessness/agitation/anxiety  ondansetron Injectable 8 milliGRAM(s) IV Push every 8 hours PRN Nausea and/or Vomiting  polyethylene glycol 3350 17 Gram(s) Oral daily PRN Constipation      OBJECTIVE:  Vital Signs Last 24 Hrs  T(C): 36.8 (02 Oct 2022 23:41), Max: 36.8 (02 Oct 2022 23:41)  T(F): 98.2 (02 Oct 2022 23:41), Max: 98.2 (02 Oct 2022 23:41)  HR: 99 (02 Oct 2022 23:41) (99 - 110)  BP: 93/62 (02 Oct 2022 23:41) (93/62 - 98/51)  BP(mean): --  RR: 19 (02 Oct 2022 23:41) (18 - 19)  SpO2: 96% (02 Oct 2022 23:41) (96% - 96%)    Parameters below as of 02 Oct 2022 23:41  Patient On (Oxygen Delivery Method): nasal cannula  O2 Flow (L/min): 2      I&O's Summary    02 Oct 2022 07:01  -  03 Oct 2022 06:34  --------------------------------------------------------  IN: 42 mL / OUT: 0 mL / NET: 42 mL        PHYSICAL EXAM:  GENERAL: Laying comfortably, NAD  HEENT: NCAT, PERRLA, EOMI, no scleral icterus, no LAD  NECK: No JVD  LUNG: CTABL; No wheezes, crackles, or rhonchi  HEART: RRR; normal S1/S2; No murmurs, rubs, or gallops  ABDOMEN: +BS, soft, nontender, nondistended, no HSM; No rebound, guarding, or rigidity  EXTREMITIES:  No LE edema b/l, 2+ Peripheral Pulses, No clubbing or cyanosis  NEUROLOGY: AOx3, non-focal, strength 5/5 in all extremities, sensation intact  PSYCH: calm and cooperative  SKIN: No rashes or lesions    LABS:                      CAPILLARY BLOOD GLUCOSE            RADIOLOGY & ADDITIONAL TESTS:     ***************************************************************  Joshua Agustin, PGY1  Internal Medicine   TEAMS Preferred  [facility] Pager [phone]  [facility] Pager [phone]  ***************************************************************    SCOOTER MAZARIEGOS  57y  MRN: [id number]  [birth year] (50d)    Patient is a 57y old  Female who presents with a chief complaint of abdominal pain & nausea/vomiting (02 Oct 2022 06:56)      SUBJECTIVE / OVERNIGHT EVENTS:   No acute overnight events. Pt seen and examined at bedside. Pt not responding to interview this am. Opens eyes to verbal and non-verbal stimuli.     MEDICATIONS  (STANDING):  Biotene Dry Mouth Oral Rinse 5 milliLiter(s) Swish and Spit two times a day  HYDROmorphone Infusion 0.5 mG/Hr (0.5 mL/Hr) IV Continuous <Continuous>  lidocaine   4% Patch 1 Patch Transdermal daily  pantoprazole  Injectable 40 milliGRAM(s) IV Push daily    MEDICATIONS  (PRN):  bisacodyl Suppository 10 milliGRAM(s) Rectal daily PRN Constipation  HYDROmorphone  Injectable 0.5 milliGRAM(s) IV Push every 2 hours PRN Respiratory distress  HYDROmorphone  Injectable 1.5 milliGRAM(s) IV Push every 2 hours PRN Breakthrough  LORazepam   Injectable 0.5 milliGRAM(s) IV Push every 6 hours PRN restlessness/agitation/anxiety  ondansetron Injectable 8 milliGRAM(s) IV Push every 8 hours PRN Nausea and/or Vomiting  polyethylene glycol 3350 17 Gram(s) Oral daily PRN Constipation      OBJECTIVE:  Vital Signs Last 24 Hrs  T(C): 36.8 (02 Oct 2022 23:41), Max: 36.8 (02 Oct 2022 23:41)  T(F): 98.2 (02 Oct 2022 23:41), Max: 98.2 (02 Oct 2022 23:41)  HR: 99 (02 Oct 2022 23:41) (99 - 110)  BP: 93/62 (02 Oct 2022 23:41) (93/62 - 98/51)  BP(mean): --  RR: 19 (02 Oct 2022 23:41) (18 - 19)  SpO2: 96% (02 Oct 2022 23:41) (96% - 96%)    Parameters below as of 02 Oct 2022 23:41  Patient On (Oxygen Delivery Method): nasal cannula  O2 Flow (L/min): 2      I&O's Summary    02 Oct 2022 07:01  -  03 Oct 2022 06:34  --------------------------------------------------------  IN: 42 mL / OUT: 0 mL / NET: 42 mL        PHYSICAL EXAM:  GENERAL: Laying comfortably, NAD  HEENT: NCAT, no scleral icterus  LUNG: CTABL, breathing with increased effort, short shallow breaths  HEART: RRR; normal S1/S2; No murmurs, rubs, or gallops  ABDOMEN: +BS, soft, nondistended  EXTREMITIES:  No LE edema b/l  NEUROLOGY: opens eyes to verbal and non-verbal stimuli  PSYCH: not alert  SKIN: No rashes or lesions   ***************************************************************  Joshua Agustin, PGY1  Internal Medicine   TEAMS Preferred  [facility] Pager [phone]  [facility] Pager [phone]  ***************************************************************    SCOOTER MAZARIEGOS  57y  MRN: [id number]  08-[birth year]22 (50d)    Patient is a 57y old  Female who presents with a chief complaint of abdominal pain & nausea/vomiting (02 Oct 2022 06:56)      SUBJECTIVE / OVERNIGHT EVENTS:   No acute overnight events. Pt seen and examined at bedside. Pt not responding to interview this am. Opens eyes to verbal and non-verbal stimuli.     MEDICATIONS  (STANDING):  Biotene Dry Mouth Oral Rinse 5 milliLiter(s) Swish and Spit two times a day  HYDROmorphone Infusion 0.5 mG/Hr (0.5 mL/Hr) IV Continuous <Continuous>  lidocaine   4% Patch 1 Patch Transdermal daily  pantoprazole  Injectable 40 milliGRAM(s) IV Push daily    MEDICATIONS  (PRN):  bisacodyl Suppository 10 milliGRAM(s) Rectal daily PRN Constipation  HYDROmorphone  Injectable 0.5 milliGRAM(s) IV Push every 2 hours PRN Respiratory distress  HYDROmorphone  Injectable 1.5 milliGRAM(s) IV Push every 2 hours PRN Breakthrough  LORazepam   Injectable 0.5 milliGRAM(s) IV Push every 6 hours PRN restlessness/agitation/anxiety  ondansetron Injectable 8 milliGRAM(s) IV Push every 8 hours PRN Nausea and/or Vomiting  polyethylene glycol 3350 17 Gram(s) Oral daily PRN Constipation      OBJECTIVE:  Vital Signs Last 24 Hrs  T(C): 36.8 (02 Oct 2022 23:41), Max: 36.8 (02 Oct 2022 23:41)  T(F): 98.2 (02 Oct 2022 23:41), Max: 98.2 (02 Oct 2022 23:41)  HR: 99 (02 Oct 2022 23:41) (99 - 110)  BP: 93/62 (02 Oct 2022 23:41) (93/62 - 98/51)  BP(mean): --  RR: 19 (02 Oct 2022 23:41) (18 - 19)  SpO2: 96% (02 Oct 2022 23:41) (96% - 96%)    Parameters below as of 02 Oct 2022 23:41  Patient On (Oxygen Delivery Method): nasal cannula  O2 Flow (L/min): 2      I&O's Summary    02 Oct 2022 07:01  -  03 Oct 2022 06:34  --------------------------------------------------------  IN: 42 mL / OUT: 0 mL / NET: 42 mL        PHYSICAL EXAM:  GENERAL: Laying comfortably, NAD  HEENT: NCAT, no scleral icterus  LUNG: CTABL, breathing with increased effort, short shallow breaths  HEART: RRR; normal S1/S2; No murmurs, rubs, or gallops  ABDOMEN: +BS, soft, nondistended  EXTREMITIES:  No LE edema b/l  NEUROLOGY: opens eyes to verbal and non-verbal stimuli, somnolent   PSYCH: not alert  SKIN: No rashes or lesions

## 2022-10-04 NOTE — PROGRESS NOTE ADULT - PROBLEM SELECTOR PLAN 3
- Biopsy proven poorly differentiated metastatic gastric adenocarcinoma  - Hospital course complicated by PE and hyperbilirubinemia   - Status post laparoscopic evaluation on 8/26/2022 , non resectable tumor. Status post J tube placement.   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

## 2022-10-04 NOTE — PROGRESS NOTE ADULT - PROBLEM SELECTOR PLAN 4
b/l PEs seen on CT 9/9. Denies pleuritic chest pain, SOB. Stable on RA  Hold  lovenox 70 BID for now for hematemesis  Candidate for IVC filter per IR if she and family choose to b/l PEs seen on CT 9/9. Denies pleuritic chest pain, SOB. Stable on RA  Hold  lovenox 70 BID for now for hematemesis  Candidate for IVC filter per IR if she and family choose to. Family forgoing IVC filter placement, prefer comfort care measures only DVT ppx: lovenox held for now  Diet: J tube feeds, start D5 +1/2 NS for tube feeds stopped intermittently     Patient is likely end of life. Family prefers to keep patient inpatient rather than transfer to hospice.     GOC: comfort care per patient and family. DNR/DNI

## 2022-10-04 NOTE — PROGRESS NOTE ADULT - ASSESSMENT
57F PMH of renal stones p/w nonresectable gastric adenocarcinoma w/ mets to LN c/b DEVI, now resolved, and nephrolithiasis with L hydronephrosis currently S/p j tube placement, with stable tube feed and stable pain regimen with course c/b transaminitis with ERCP unable to bypass blockages in duodenum to place stent x2.    At this time, patient is comfort care measures and DNR/DNI. 57F PMH of renal stones p/w nonresectable gastric adenocarcinoma w/ mets. Patient is now DNR/DNI, comfort measures only.

## 2022-10-04 NOTE — PROGRESS NOTE ADULT - SUBJECTIVE AND OBJECTIVE BOX
SUBJECTIVE AND OBJECTIVE:  Indication for Geriatrics and Palliative Care Services/INTERVAL HPI: Pain management/GOC   DNR on chart: Yes      INTERVAL EVENTS: Patient was seen this AM, agonally breathing, hypotensive RR ~30. No prns used in past 24 hours.     Allergies    No Known Allergies    Intolerances    MEDICATIONS  (STANDING):    MEDICATIONS  (STANDING):  Biotene Dry Mouth Oral Rinse 5 milliLiter(s) Swish and Spit two times a day  HYDROmorphone  Injectable 2 milliGRAM(s) IV Push every 4 hours  lidocaine   4% Patch 1 Patch Transdermal daily  pantoprazole  Injectable 40 milliGRAM(s) IV Push daily    MEDICATIONS  (PRN):  bisacodyl Suppository 10 milliGRAM(s) Rectal daily PRN Constipation  HYDROmorphone  Injectable 1 milliGRAM(s) IV Push every 2 hours PRN Breakthrough  HYDROmorphone  Injectable 0.5 milliGRAM(s) IV Push every 2 hours PRN Respiratory distress  LORazepam   Injectable 0.5 milliGRAM(s) IV Push every 6 hours PRN restlessness/agitation  LORazepam   Injectable 0.5 milliGRAM(s) IV Push every 6 hours PRN Nausea and/or Vomiting  ondansetron Injectable 8 milliGRAM(s) IV Push every 8 hours PRN Nausea and/or Vomiting  polyethylene glycol 3350 17 Gram(s) Oral daily PRN Constipation      ITEMS UNCHECKED ARE NOT PRESENT    PRESENT SYMPTOMS: [ ]Unable to self-report - see [ ] CPOT [ ] PAINADS [X ] RDOS  Source if other than patient:  [ ]Family   [ ]Team     Pain:  [X ]yes [ ]no  QOL impact - Yes   Location -    All over body            Aggravating factors - none  Quality - sharp  Radiation - to the back  Timing- constant  Severity (0-10 scale): 10/10  Minimal acceptable level (0-10 scale): 4/10    Dyspnea:                           [ ]Mild [ ]Moderate [ ]Severe    RDOS:  0 to 2  minimal or no respiratory distress   3  mild distress  4 to 6 moderate distress  >7 severe distress  [email or web address] (Ctrl +  left click to view)     Anxiety:                             [ ]Mild [ ]Moderate [ ]Severe  Fatigue:                             [ ]Mild [ ]Moderate [ ]Severe  Nausea:                             [ ]Mild [x ]Moderate [ ]Severe  Loss of appetite:                [ ]Mild [ ]Moderate [ ]Severe  Constipation:                     [ ]Mild [ ]Moderate [ ]Severe    PCSSQ[Palliative Care Spiritual Screening Question]   Severity (0-10): 0  Score of 4 or > indicate consideration of Chaplaincy referral.  Chaplaincy Referral: [ ] yes [ ] refused [X ] following    Other Symptoms:  [ ]All other review of systems negative     Vital Signs Last 24 Hrs  Vital Signs Last 24 Hrs  T(C): 36.7 (27 Sep 2022 20:35), Max: 36.9 (27 Sep 2022 14:12)  T(F): 98 (27 Sep 2022 20:35), Max: 98.4 (27 Sep 2022 14:12)  HR: 92 (27 Sep 2022 20:35) (92 - 112)  BP: 111/71 (27 Sep 2022 20:35) (111/71 - 119/83)  BP(mean): --  RR: 17 (27 Sep 2022 20:35) (17 - 17)  SpO2: 97% (27 Sep 2022 20:35) (91% - 97%)    Parameters below as of 27 Sep 2022 20:35  Patient On (Oxygen Delivery Method): nasal cannula  O2 Flow (L/min): 2      GENERAL: [ ]Cachexia  Temporal wasting   [ ]Alert  [ ]Oriented    [X ]Lethargic  [ ]Unarousable  [ ]Verbal  [ ]Non-Verbal  Behavioral:   [X ]Anxiety  [ ]Delirium [X ]Agitation [ ]Other  HEENT:  [x ]Normal   [ ]Dry mouth   [ ]ET Tube/Trach  [ ]Oral lesions  PULMONARY:   [X ]Clear [X ]Tachypnea  [ ]Audible excessive secretions   [ ]Rhonchi        [ ]Right [ ]Left [ ]Bilateral  [ ]Crackles        [ ]Right [ ]Left [ ]Bilateral  [ ]Wheezing     [ ]Right [ ]Left [ ]Bilateral  [ ]Diminished BS [ ] Right [ ]Left [ ]Bilateral  CARDIOVASCULAR:    [x ]Regular [ ]Irregular [ ]Tachy  [ ]Luis Miguel [ ]Murmur [ ]Other  GASTROINTESTINAL:  [x ]Soft  [ ]Distended   [ ]+BS  [ ]Non tender [ ]Tender  [ ]Other [X ]J tube [ ]OGT/ NGT Patient has laparoscopic surgical incisions covered by  gauze    GENITOURINARY:  [X ]Normal [ ]Incontinent   [ ]Oliguria/Anuria   [ ]Patterson  MUSCULOSKELETAL:   [ ]Normal   [ ]Weakness  [X ]Bed/Wheelchair bound [ ]Edema  NEUROLOGIC:   [x ]No focal deficits  [ ] Cognitive impairment  [ ] Dysphagia [ ]Dysarthria [ ] Paresis [ ]Other   SKIN:   [x ]Normal  [ ]Rash  [ ]Other  [ ]Pressure ulcer(s) [ ]y [x ]n present on admission    CRITICAL CARE:  [ ]Shock Present  [ ]Septic [ ]Cardiogenic [ ]Neurologic [ ]Hypovolemic  [ ]Vasopressors [ ]Inotropes  [ ]Respiratory failure present [ ]Mechanical Ventilation [ ]Non-invasive ventilatory support [ ]High-Flow   [ ]Acute  [ ]Chronic [ ]Hypoxic  [ ]Hypercarbic [ ]Other  [ ]Other organ failure     LABS:      Protein Calorie Malnutrition Present: [ ]mild [ ]moderate [X ]severe [ ]underweight [ ]morbid obesity    Height (cm): 157.5 (08-26-22 @ 07:10)  Weight (kg): 71.1 (08-26-22 @ 07:11)  BMI (kg/m2): 28.7 (08-26-22 @ 07:11)    [ ]PPSV2 < or = 30%  [X ]significant weight loss [ ]poor nutritional intake [ ]anasarca[X ]Artificial Nutrition - J tube     Other REFERRALS:  [ ]Hospice  [ ]Child Life  [ ]Social Work  [ ]Case management [ ]Holistic Therapy

## 2022-10-04 NOTE — PROGRESS NOTE ADULT - PROBLEM SELECTOR PLAN 8
DVT ppx: lovenox held for now    Dispo planning: complex dispo planning due to lack of insurance  Emergency medicaid can cover outpatient chemo/transport.    GOC: comfort care per patient and family. DNR/DNI DVT ppx: lovenox held for now  Diet: J tube feeds, start D5 +1/2 NS for tube feeds stopped intermittently     Dispo planning: complex dispo planning due to lack of insurance  Emergency medicaid can cover outpatient chemo/transport.    Patient is likely end of life. Family prefers to keep patient inpatient rather than transfer to hospice.     GOC: comfort care per patient and family. DNR/DNI

## 2022-10-04 NOTE — PROGRESS NOTE ADULT - ATTENDING COMMENTS
57F with no significant PMH p/w abd pain, N/V found to have gastric adenocarcinoma with metastatic disease confirmed with bx. EGD confirmed advanced disease and not a candidate for resection. Now s/p jejunostomy for tube feeds. Course c/b subsegmental PE, initially on lovenox. Course further c/b hematemesis 2/2 friable gastric mass and AC now deferred. Course further c/b rising LFTs, suspect related to metastatic disease. ERCP was attempted x2 for stent placement without success d/t extent of disease. Had completed 7d course of zosyn (9/9-17) with new Tmax 9/22 prompting reinitiation of zosyn and fever w/u. Initially planned for PICC for chemo however deferred due to fever and now no longer a candidate for chemo per oncology due to worsening liver function. Also potential plan for IVC filter, but patient and family decided to forgo invasive measures. Family meeting held with palliative care, medical team, and patient's children on 9/27. Family decision was made to pursue comfort measures, including no blood draws, no antibiotics, and patient made DNR/DNI. Pain regimen being managed by palliative care.  Pt intermittently hypotensive and more lethargic, clinically deteriorating. At this time, not safe to transfer to inpatient hospice facility.

## 2022-10-04 NOTE — PROGRESS NOTE ADULT - PROBLEM SELECTOR PLAN 1
- Comfort care, DNR/DNI  - Agonally breathing   - Increased IV dilaudid drip to 0.8 mg/hr  - c/w IV dilaudid 0.5 mg q2h PRN for respiratory distress  - c/w IV dilaudid PRN to 1.5 mg q2h PRN for breakthrough pain

## 2022-10-04 NOTE — PROGRESS NOTE ADULT - PROBLEM SELECTOR PLAN 2
Since second ERCP. Per GI, no intervention indicated unless pt becomes HD unstable.   - standing iv zofran, can give prn iv reglan. Ativan 0.5 ordered for breakthrough nausea  - can pause j tube feeds  - ppi  - lovenox held   - fu palliative recs   - can urgently call GI if large volume or HD unstable. Since second ERCP. Per GI, no intervention indicated unless pt becomes HD unstable.   - standing iv zofran, can give prn iv reglan. Ativan 0.5 ordered for breakthrough nausea  - ppi  - lovenox held   - fu palliative recs   - can urgently call GI if large volume or HD unstable. Hematemesis earlier in hospitalization, now resolved   - patient DNR/DNI, prioritizing comfort   - standing iv zofran  - Ativan 0.5 ordered for breakthrough nausea  - ppi

## 2022-10-04 NOTE — PROGRESS NOTE ADULT - PROBLEM SELECTOR PLAN 5
- Palliative consulted for pain management  - Patient is no longer candidate for cancer treatment, at end of life  - Family agreed to DNR/DNI, comfort measures  - Patient is actively dying in hospital

## 2022-10-04 NOTE — PROGRESS NOTE ADULT - PROBLEM SELECTOR PLAN 3
LFTs are elevating without a clear source; no evidence of mets to liver  RUQ U/S results are equivocal; shows some mild bile duct dilatation similar to CT on 9/9; new from CT on 8/14  CT shows no mets to the liver at the moment    Hepatitis labs from June and July 2022 show no signs of HepB/C infection  MRCP showed Stricture of CBD  ERCP 9/16 unsuccessful, repeat ERCP 9/20 unsuccessful  Bloody sputum likely due to trauma from scope and friable mass in stomach Impression: 72 hour caloric count to evaluate for malnutrition - indicates malnutrition. Pt unable to eat due to pain.   Now J tube placement    c/w tube feeds per family as per GOC discussions

## 2022-10-04 NOTE — PROGRESS NOTE ADULT - PROBLEM SELECTOR PLAN 1
- pt transitioned to palliative/hospice route, no plan for chemotherapy  - mediastinal lymph node biopsy results - Metastatic adenocarcinoma   - pantoprazole  40 mg daily  - OR 8/26 with J tube placement    pain Control; Palliative will attempt to transition to morphine for cost control  -Morphine 15mg q2h for severe Pain 7-10  -Methadone 5mg q12h standing    PRICING UPDATES:  Lovenox: $1/month covered under [facility] medicaid  Tube Feeds and pump: ~$120/month  Methadone: 36$/month  Reglan: $60/month  Morphine: $1 COVERED - pt transitioned to palliative/hospice route, no plan for chemotherapy  - mediastinal lymph node biopsy results - Metastatic adenocarcinoma   - pantoprazole  40 mg daily  - OR 8/26 with J tube placement    pain Control; Palliative following, patient on Dilaudid drip

## 2022-10-05 NOTE — CHART NOTE - NSCHARTNOTEFT_GEN_A_CORE
Source: Patient [ ]    Family [ ]     other [ x] chart review    57 year old female with a PMH of renal stones p/w nonresectable gastric adenocarcinoma w/ mets. Patient is now DNR/DNI, comfort measures only, continues on tube feeds as per GOC per internal medicine note (10/5).    Patient continues on Peptamen 1.5 via J-tube @ 42 mL/hr. x 24 hrs. for total volume 1,008 mL and is at goal rate per bed side observation. Provides 1,512 kcal, 68.5 g pro and 778 mL of fluid (TF free water). No intolerances to tube feed provision noted. Last bowel movement (10/4) per RN flow sheet. Noted with +1 L/R ankle edema and no pressure injuries per RN flow sheet.    Diet : Diet, NPO with Tube Feed:   Tube Feeding Modality: Jejunostomy  Peptamen 1.5 (PEPTAMEN1.5RTH)  Total Volume for 24 Hours (mL): 1008  Continuous  Starting Tube Feed Rate {mL per Hour}: 20  Increase Tube Feed Rate by (mL): 3     Every 4 hours  Until Goal Tube Feed Rate (mL per Hour): 42  Tube Feed Duration (in Hours): 24  Tube Feed Start Time: 10:00 (09-26-22 @ 17:37)    Current Weight: no new weight to assess  65 kg (9/20)  65.8 kg (9/16)  71.1 kg (8/16)    Pertinent Medications: MEDICATIONS  (STANDING):  Biotene Dry Mouth Oral Rinse 5 milliLiter(s) Swish and Spit two times a day  HYDROmorphone Infusion 1 mG/Hr (1 mL/Hr) IV Continuous <Continuous>  lidocaine   4% Patch 1 Patch Transdermal daily  pantoprazole  Injectable 40 milliGRAM(s) IV Push daily    MEDICATIONS  (PRN):  bisacodyl Suppository 10 milliGRAM(s) Rectal daily PRN Constipation  HYDROmorphone  Injectable 1.5 milliGRAM(s) IV Push every 2 hours PRN Breakthrough  HYDROmorphone  Injectable 0.5 milliGRAM(s) IV Push every 2 hours PRN Respiratory distress  LORazepam   Injectable 0.5 milliGRAM(s) IV Push every 6 hours PRN restlessness/agitation/anxiety  ondansetron Injectable 8 milliGRAM(s) IV Push every 8 hours PRN Nausea and/or Vomiting  polyethylene glycol 3350 17 Gram(s) Oral daily PRN Constipation    Pertinent Labs: no new labs to assess    Estimated Needs: [ x] no change since previous assessment: based on ideal body weight 110 lbs / 50 kg  1550-7305 kcal daily @ 30-35 kcal/kg, 60-75 gm protein daily @ 1.2-1.5 gm/kg    Previous Nutrition Diagnosis: Severe malnutrition    Nutrition Diagnosis is [x ] ongoing  [ ] resolved [ ] not applicable     Interventions:   - managed with tube feeds    Recommend  - continue tube feed provision as per GOC  - nutrition remains available for reconsult as needed     Monitoring and Evaluation:   [x ] Tolerance to diet prescription [x ] weights [ x] follow up per protocol Source: Patient [ ]    Family [ ]     other [ x] chart review    Patient seen for f/u for severe malnutrition. 57 year old female with a PMH of renal stones p/w nonresectable gastric adenocarcinoma w/ mets. Patient is now DNR/DNI, comfort measures only, continues on tube feeds as per GOC per internal medicine note (10/5).    Patient continues on Peptamen 1.5 via J-tube @ 42 mL/hr. x 24 hrs. for total volume 1,008 mL and is at goal rate per bed side observation. Provides 1,512 kcal, 68.5 g pro and 778 mL of fluid (TF free water). No intolerances to tube feed provision noted. Last bowel movement (10/4) per RN flow sheet. Noted with +1 L/R ankle edema and no pressure injuries per RN flow sheet.    Diet : Diet, NPO with Tube Feed:   Tube Feeding Modality: Jejunostomy  Peptamen 1.5 (PEPTAMEN1.5RTH)  Total Volume for 24 Hours (mL): 1008  Continuous  Starting Tube Feed Rate {mL per Hour}: 20  Increase Tube Feed Rate by (mL): 3     Every 4 hours  Until Goal Tube Feed Rate (mL per Hour): 42  Tube Feed Duration (in Hours): 24  Tube Feed Start Time: 10:00 (09-26-22 @ 17:37)    Current Weight: no new weight to assess  65 kg (9/20)  65.8 kg (9/16)  71.1 kg (8/16)    Pertinent Medications: MEDICATIONS  (STANDING):  Biotene Dry Mouth Oral Rinse 5 milliLiter(s) Swish and Spit two times a day  HYDROmorphone Infusion 1 mG/Hr (1 mL/Hr) IV Continuous <Continuous>  lidocaine   4% Patch 1 Patch Transdermal daily  pantoprazole  Injectable 40 milliGRAM(s) IV Push daily    MEDICATIONS  (PRN):  bisacodyl Suppository 10 milliGRAM(s) Rectal daily PRN Constipation  HYDROmorphone  Injectable 1.5 milliGRAM(s) IV Push every 2 hours PRN Breakthrough  HYDROmorphone  Injectable 0.5 milliGRAM(s) IV Push every 2 hours PRN Respiratory distress  LORazepam   Injectable 0.5 milliGRAM(s) IV Push every 6 hours PRN restlessness/agitation/anxiety  ondansetron Injectable 8 milliGRAM(s) IV Push every 8 hours PRN Nausea and/or Vomiting  polyethylene glycol 3350 17 Gram(s) Oral daily PRN Constipation    Pertinent Labs: no new labs to assess    Estimated Needs: [ x] no change since previous assessment: based on ideal body weight 110 lbs / 50 kg  5021-8585 kcal daily @ 30-35 kcal/kg, 60-75 gm protein daily @ 1.2-1.5 gm/kg    Previous Nutrition Diagnosis: Severe malnutrition    Nutrition Diagnosis is [x ] ongoing  [ ] resolved [ ] not applicable     Interventions:   - managed with tube feeds    Recommend  - continue tube feed provision as per GOC  - nutrition remains available for reconsult as needed     Monitoring and Evaluation:   [x ] Tolerance to diet prescription [x ] weights [ x] follow up per protocol

## 2022-10-05 NOTE — PROGRESS NOTE ADULT - PROBLEM SELECTOR PLAN 1
- Comfort care, DNR/DNI  - Agonally breathing, respiratory distress, grunting   - Patient is not receiving PRNs despite probably requiring some doses at least   - Increased IV dilaudid drip to 1 mg/hr  - c/w IV dilaudid 0.5 mg q2h PRN for respiratory distress  - c/w IV dilaudid PRN to 1.5 mg q2h PRN for breakthrough pain

## 2022-10-05 NOTE — PROGRESS NOTE ADULT - PROBLEM SELECTOR PLAN 1
- pt transitioned to palliative/hospice route, no plan for chemotherapy  - mediastinal lymph node biopsy results - Metastatic adenocarcinoma   - pantoprazole  40 mg daily  - OR 8/26 with J tube placement    pain Control; Palliative following, patient on Dilaudid drip

## 2022-10-05 NOTE — CHART NOTE - NSCHARTNOTEFT_GEN_A_CORE
DEATH NOTE    Called to bedside to evaluate the patient for cardiopulmonary arrest.     On physical exam, patient did not respond to verbal or noxious stimuli.  No spontaneous respirations.  Absent heart and breath sounds.  Absent radial and carotid pulses.   Pupils are fixed and dilated, no corneal reflex.  Patient pronounced dead at 15:47 on 10/5/22.  Attending notified.  Family declined autopsy.

## 2022-10-05 NOTE — DISCHARGE NOTE FOR THE EXPIRED PATIENT - NS PATIENT DEATH CRITERIA
Patient is dead based upon Brain Death Criteria/Patient is dead based on Cardiopulmonary criteria including absent breath sounds, pulselessness and/or asystole Patient is dead based on Cardiopulmonary criteria including absent breath sounds, pulselessness and/or asystole

## 2022-10-05 NOTE — PROGRESS NOTE ADULT - PROVIDER SPECIALTY LIST ADULT
Heme/Onc
Hepatology
Internal Medicine
Palliative Care
Pulmonology
Surgery
Heme/Onc
Internal Medicine
Internal Medicine
Palliative Care
Surgery
Gastroenterology
Gastroenterology
Heme/Onc
Hepatology
Internal Medicine
Intervent Pulmonology
Palliative Care
Surgery
Urology
Gastroenterology
Heme/Onc
Internal Medicine
Surgery
Intervent Pulmonology
Palliative Care
Pulmonology
Surgery
Internal Medicine
Palliative Care
Internal Medicine
Palliative Care
Internal Medicine
Palliative Care
Internal Medicine
Palliative Care
Internal Medicine
Internal Medicine
Palliative Care
Internal Medicine
Palliative Care
Internal Medicine
Palliative Care
Internal Medicine
Internal Medicine
Palliative Care
Internal Medicine
Palliative Care
Internal Medicine
Palliative Care
Internal Medicine

## 2022-10-05 NOTE — PROGRESS NOTE ADULT - PROBLEM SELECTOR PLAN 4
DVT ppx: lovenox held for now  Diet: J tube feeds, start D5 +1/2 NS for tube feeds stopped intermittently     Patient is likely end of life. Family prefers to keep patient inpatient rather than transfer to hospice.     GOC: comfort care per patient and family. DNR/DNI

## 2022-10-05 NOTE — CHART NOTE - NSCHARTNOTESELECT_GEN_ALL_CORE
Chart Note
Event Note
Event Note
Follow Up/Nutrition Services
IP post procedure note/Event Note
IR Follow-Up/Event Note
IR Resident/Off Service Note
Nutrition Services
Nutrition Services
Palliative
event note
iSTOP/Event Note
Consult/Nutrition Services
Consult/Nutrition Services
Death Note/Event Note
Event Note
Follow Up/Nutrition Services
Follow-up/Nutrition Services
GI Update/Event Note
GI/Event Note
Oncology/Event Note
Post Operative Check
Tube Feed Initiation/Event Note
Urology/Off Service Note
oncology

## 2022-10-05 NOTE — PROGRESS NOTE ADULT - PROBLEM SELECTOR PLAN 3
Impression: 72 hour caloric count to evaluate for malnutrition - indicates malnutrition. Pt unable to eat due to pain.   Now J tube placement    c/w tube feeds per family as per GOC discussions

## 2022-10-05 NOTE — PROGRESS NOTE ADULT - REASON FOR ADMISSION
abdominal pain & nausea/vomiting

## 2022-10-05 NOTE — DISCHARGE NOTE FOR THE EXPIRED PATIENT - HOSPITAL COURSE
Ms. 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 Patient presented to the ED with abdominal pain for the past two months associated with nausea/vomiting. She also noted a 20 pound weight loss.     ED Course:, patient received IVF and zofran for nausea management. A CT A/P was done that showed gastric wall thickening concerning for malignancy. EGD with biopsy showed severe, diffuse mucosal changes in the gastric body that were highly suspicious of gastric malignancy. Oncology was consulted for further management and a CT chest was done for further staging. The chest CT scan showed several areas of lymphadenopathy, concerning for mets. Interventional pulmonology consulted, Bx showed #7 mediastinal lymph node for adenocarcinoma.    Patient was evaluated by surgery; and underwent j-tube placement. No surgical resection could be performed because of the extent of disease in the stomach, Jejunostomy feeding tube was placed. The patient was seen by nutrition and started on tube feeds. The patient continued to experience pain and the palliative care team was consulted for further management.     Patient developed progressively worsening transaminitis as well as elevated alkaline phosphatase. 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 Interventional radiology was consulted and indicated there was no need for percutaneous biliary drainage placement at the time. Pt's consition continued to detiorate; she began to have hemoptysis/hematemesis so AC was held. At this point without therapeutic options focus shifted to comfort care.    Pt's family deciding if they are interested in inpatient hospice.    Ms. 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 Patient presented to the ED with abdominal pain for the past two months associated with nausea/vomiting. She also noted a 20 pound weight loss.     ED Course:, patient received IVF and zofran for nausea management. A CT A/P was done that showed gastric wall thickening concerning for malignancy. EGD with biopsy showed severe, diffuse mucosal changes in the gastric body that were highly suspicious of gastric malignancy. Oncology was consulted for further management and a CT chest was done for further staging. The chest CT scan showed several areas of lymphadenopathy, concerning for mets. Interventional pulmonology consulted, Bx showed #7 mediastinal lymph node for adenocarcinoma.    Patient was evaluated by surgery; and underwent j-tube placement. No surgical resection could be performed because of the extent of disease in the stomach, Jejunostomy feeding tube was placed. The patient was seen by nutrition and started on tube feeds. The patient continued to experience pain and the palliative care team was consulted for further management.     Patient developed progressively worsening transaminitis as well as elevated alkaline phosphatase. 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 Interventional radiology was consulted and indicated there was no need for percutaneous biliary drainage placement at the time. Pt's condition continued to deteriorate; she began to have hemoptysis/hematemesis so AC was held. At this point without therapeutic options focus shifted to comfort care. Patient was made DNR/DNI and passed away on 10/5/22 at 15:47

## 2022-10-05 NOTE — PROGRESS NOTE ADULT - ASSESSMENT
57F PMH of renal stones p/w nonresectable gastric adenocarcinoma w/ mets. Patient is now DNR/DNI, comfort measures only.

## 2022-10-05 NOTE — PROGRESS NOTE ADULT - SUBJECTIVE AND OBJECTIVE BOX
***************************************************************  Joshua Agustin, PGY1  Internal Medicine   TEAMS Preferred  [facility] Pager [phone]  [facility] Pager [phone]  ***************************************************************    SCOOTER MAZARIEGOS  57y  MRN: [id number]  08-14-22 (53d)    Patient is a 57y old  Female who presents with a chief complaint of abdominal pain & nausea/vomiting (04 Oct 2022 10:30)      SUBJECTIVE / OVERNIGHT EVENTS:   No acute overnight events. Pt seen and examined at bedside. Denies fevers, chills, CP, SOB, Abdominal pain, N/V, Constipation, Diarrhea. Last BM     12 Point ROS negative with the exception of the above    MEDICATIONS  (STANDING):  Biotene Dry Mouth Oral Rinse 5 milliLiter(s) Swish and Spit two times a day  HYDROmorphone Infusion 0.8 mG/Hr (0.8 mL/Hr) IV Continuous <Continuous>  lidocaine   4% Patch 1 Patch Transdermal daily  pantoprazole  Injectable 40 milliGRAM(s) IV Push daily    MEDICATIONS  (PRN):  bisacodyl Suppository 10 milliGRAM(s) Rectal daily PRN Constipation  HYDROmorphone  Injectable 1.5 milliGRAM(s) IV Push every 2 hours PRN Breakthrough  LORazepam   Injectable 0.5 milliGRAM(s) IV Push every 6 hours PRN restlessness/agitation/anxiety  ondansetron Injectable 8 milliGRAM(s) IV Push every 8 hours PRN Nausea and/or Vomiting  polyethylene glycol 3350 17 Gram(s) Oral daily PRN Constipation      OBJECTIVE:  Vital Signs Last 24 Hrs  T(C): 36.9 (05 Oct 2022 05:29), Max: 36.9 (05 Oct 2022 05:29)  T(F): 98.5 (05 Oct 2022 05:29), Max: 98.5 (05 Oct 2022 05:29)  HR: 84 (05 Oct 2022 05:29) (84 - 100)  BP: 85/50 (05 Oct 2022 05:29) (84/50 - 95/56)  BP(mean): --  RR: 20 (05 Oct 2022 05:29) (20 - 20)  SpO2: 94% (05 Oct 2022 05:29) (94% - 100%)    Parameters below as of 05 Oct 2022 05:29  Patient On (Oxygen Delivery Method): nasal cannula  O2 Flow (L/min): 2      I&O's Summary    04 Oct 2022 07:01  -  05 Oct 2022 07:00  --------------------------------------------------------  IN: 554 mL / OUT: 0 mL / NET: 554 mL        PHYSICAL EXAM:  GENERAL: Laying comfortably, NAD  HEENT: NCAT, PERRLA, EOMI, no scleral icterus, no LAD  NECK: No JVD  LUNG: CTABL; No wheezes, crackles, or rhonchi  HEART: RRR; normal S1/S2; No murmurs, rubs, or gallops  ABDOMEN: +BS, soft, nontender, nondistended, no HSM; No rebound, guarding, or rigidity  EXTREMITIES:  No LE edema b/l, 2+ Peripheral Pulses, No clubbing or cyanosis  NEUROLOGY: AOx3, non-focal, strength 5/5 in all extremities, sensation intact  PSYCH: calm and cooperative  SKIN: No rashes or lesions    LABS:                      CAPILLARY BLOOD GLUCOSE            RADIOLOGY & ADDITIONAL TESTS:     ***************************************************************  Joshua Agustin, PGY1  Internal Medicine   TEAMS Preferred  [facility] Pager [phone]  [facility] Pager [phone]  ***************************************************************    SCOOTER MAZARIEGOS  57y  MRN: [id number]  08-14-22 (53d)    Patient is a 57y old  Female who presents with a chief complaint of abdominal pain & nausea/vomiting (04 Oct 2022 10:30)      SUBJECTIVE / OVERNIGHT EVENTS:   No acute overnight events. Pt seen and examined at bedside. Patient appeared to be mildly uncomfortable this am, will give pain meds for comfort. Patient non-verbal, withdraws to pain alone.     12 Point ROS negative with the exception of the above    MEDICATIONS  (STANDING):  Biotene Dry Mouth Oral Rinse 5 milliLiter(s) Swish and Spit two times a day  HYDROmorphone Infusion 0.8 mG/Hr (0.8 mL/Hr) IV Continuous <Continuous>  lidocaine   4% Patch 1 Patch Transdermal daily  pantoprazole  Injectable 40 milliGRAM(s) IV Push daily    MEDICATIONS  (PRN):  bisacodyl Suppository 10 milliGRAM(s) Rectal daily PRN Constipation  HYDROmorphone  Injectable 1.5 milliGRAM(s) IV Push every 2 hours PRN Breakthrough  LORazepam   Injectable 0.5 milliGRAM(s) IV Push every 6 hours PRN restlessness/agitation/anxiety  ondansetron Injectable 8 milliGRAM(s) IV Push every 8 hours PRN Nausea and/or Vomiting  polyethylene glycol 3350 17 Gram(s) Oral daily PRN Constipation      OBJECTIVE:  Vital Signs Last 24 Hrs  T(C): 36.9 (05 Oct 2022 05:29), Max: 36.9 (05 Oct 2022 05:29)  T(F): 98.5 (05 Oct 2022 05:29), Max: 98.5 (05 Oct 2022 05:29)  HR: 84 (05 Oct 2022 05:29) (84 - 100)  BP: 85/50 (05 Oct 2022 05:29) (84/50 - 95/56)  BP(mean): --  RR: 20 (05 Oct 2022 05:29) (20 - 20)  SpO2: 94% (05 Oct 2022 05:29) (94% - 100%)    Parameters below as of 05 Oct 2022 05:29  Patient On (Oxygen Delivery Method): nasal cannula  O2 Flow (L/min): 2      I&O's Summary    04 Oct 2022 07:01  -  05 Oct 2022 07:00  --------------------------------------------------------  IN: 554 mL / OUT: 0 mL / NET: 554 mL        PHYSICAL EXAM:  GENERAL: uncomfortable appearing   HEENT: NCAT, no scleral icterus, no LAD  LUNG: agonal breathing, short shallow breaths  HEART: RRR; normal S1/S2; No murmurs, rubs, or gallops  ABDOMEN: +BS, soft, nondistended, tube in place  EXTREMITIES:  No LE edema b/l, 2+ Peripheral Pulses, No clubbing or cyanosis  NEUROLOGY: AOx0, non-focal, strength 5/5 in all extremities, sensation intact  PSYCH: non-verbal  SKIN: No rashes or lesions

## 2022-10-05 NOTE — PROGRESS NOTE ADULT - ATTENDING SUPERVISION STATEMENT
Fellow
Resident

## 2022-10-05 NOTE — PROGRESS NOTE ADULT - SUBJECTIVE AND OBJECTIVE BOX
SUBJECTIVE AND OBJECTIVE:  Indication for Geriatrics and Palliative Care Services/INTERVAL HPI: Pain management/GOC   DNR on chart: Yes      INTERVAL EVENTS: Patient was seen this AM, agonally breathing. Patient has not received any prns, IV dilaudid ordered fell off. Son at bedside, understands patient is dying. Says it is hard to see her dying like this as she was having respiratory distress yesterday. Emotional support provided.     Allergies    No Known Allergies    Intolerances    MEDICATIONS  (STANDING):    MEDICATIONS  (STANDING):  Biotene Dry Mouth Oral Rinse 5 milliLiter(s) Swish and Spit two times a day  HYDROmorphone  Injectable 2 milliGRAM(s) IV Push every 4 hours  lidocaine   4% Patch 1 Patch Transdermal daily  pantoprazole  Injectable 40 milliGRAM(s) IV Push daily    MEDICATIONS  (PRN):  bisacodyl Suppository 10 milliGRAM(s) Rectal daily PRN Constipation  HYDROmorphone  Injectable 1 milliGRAM(s) IV Push every 2 hours PRN Breakthrough  HYDROmorphone  Injectable 0.5 milliGRAM(s) IV Push every 2 hours PRN Respiratory distress  LORazepam   Injectable 0.5 milliGRAM(s) IV Push every 6 hours PRN restlessness/agitation  LORazepam   Injectable 0.5 milliGRAM(s) IV Push every 6 hours PRN Nausea and/or Vomiting  ondansetron Injectable 8 milliGRAM(s) IV Push every 8 hours PRN Nausea and/or Vomiting  polyethylene glycol 3350 17 Gram(s) Oral daily PRN Constipation      ITEMS UNCHECKED ARE NOT PRESENT    PRESENT SYMPTOMS: [ ]Unable to self-report - see [ ] CPOT [ ] PAINADS [X ] RDOS  Source if other than patient:  [ ]Family   [ ]Team     Pain:  [X ]yes [ ]no  QOL impact - Yes   Location -    All over body            Aggravating factors - none  Quality - sharp  Radiation - to the back  Timing- constant  Severity (0-10 scale): 10/10  Minimal acceptable level (0-10 scale): 4/10    Dyspnea:                           [ ]Mild [ ]Moderate [ ]Severe    RDOS:  0 to 2  minimal or no respiratory distress   3  mild distress  4 to 6 moderate distress  >7 severe distress  [email or web address] (Ctrl +  left click to view)     Anxiety:                             [ ]Mild [ ]Moderate [ ]Severe  Fatigue:                             [ ]Mild [ ]Moderate [ ]Severe  Nausea:                             [ ]Mild [x ]Moderate [ ]Severe  Loss of appetite:                [ ]Mild [ ]Moderate [ ]Severe  Constipation:                     [ ]Mild [ ]Moderate [ ]Severe    PCSSQ[Palliative Care Spiritual Screening Question]   Severity (0-10): 0  Score of 4 or > indicate consideration of Chaplaincy referral.  Chaplaincy Referral: [ ] yes [ ] refused [X ] following    Other Symptoms:  [ ]All other review of systems negative     Vital Signs Last 24 Hrs  Vital Signs Last 24 Hrs  T(C): 36.9 (05 Oct 2022 05:29), Max: 36.9 (05 Oct 2022 05:29)  T(F): 98.5 (05 Oct 2022 05:29), Max: 98.5 (05 Oct 2022 05:29)  HR: 84 (05 Oct 2022 05:29) (84 - 87)  BP: 85/50 (05 Oct 2022 05:29) (84/50 - 85/50)  BP(mean): --  RR: 20 (05 Oct 2022 05:29) (20 - 20)  SpO2: 94% (05 Oct 2022 05:29) (94% - 100%)    Parameters below as of 05 Oct 2022 05:29  Patient On (Oxygen Delivery Method): nasal cannula  O2 Flow (L/min): 2      GENERAL: [ ]Cachexia  Temporal wasting   [ ]Alert  [ ]Oriented    [ ]Lethargic  [X ]Unarousable  [ ]Verbal  [X ]Non-Verbal  Behavioral:   [ ]Anxiety  [ ]Delirium [X ]Agitation [ ]Other  HEENT:  [ ]Normal   [X ]Dry mouth   [ ]ET Tube/Trach  [ ]Oral lesions  PULMONARY:   [X ]Clear [X ]Tachypnea  [ ]Audible excessive secretions   [ ]Rhonchi        [ ]Right [ ]Left [ ]Bilateral  [ ]Crackles        [ ]Right [ ]Left [ ]Bilateral  [ ]Wheezing     [ ]Right [ ]Left [ ]Bilateral  [ ]Diminished BS [ ] Right [ ]Left [ ]Bilateral  CARDIOVASCULAR:    [x ]Regular [ ]Irregular [ ]Tachy  [ ]Luis Miguel [ ]Murmur [ ]Other  GASTROINTESTINAL:  [x ]Soft  [ ]Distended   [ ]+BS  [ ]Non tender [ ]Tender  [ ]Other [X ]J tube [ ]OGT/ NGT Patient has laparoscopic surgical incisions covered by  gauze    GENITOURINARY:  [X ]Normal [ ]Incontinent   [ ]Oliguria/Anuria   [ ]Patterson  MUSCULOSKELETAL:   [ ]Normal   [ ]Weakness  [X ]Bed/Wheelchair bound [ ]Edema  NEUROLOGIC:   [x ]No focal deficits  [ ] Cognitive impairment  [ ] Dysphagia [ ]Dysarthria [ ] Paresis [ ]Other   SKIN:   [x ]Normal  [ ]Rash  [ ]Other  [ ]Pressure ulcer(s) [ ]y [x ]n present on admission    CRITICAL CARE:  [ ]Shock Present  [ ]Septic [ ]Cardiogenic [ ]Neurologic [ ]Hypovolemic  [ ]Vasopressors [ ]Inotropes  [ ]Respiratory failure present [ ]Mechanical Ventilation [ ]Non-invasive ventilatory support [ ]High-Flow   [ ]Acute  [ ]Chronic [ ]Hypoxic  [ ]Hypercarbic [ ]Other  [ ]Other organ failure     LABS:      Protein Calorie Malnutrition Present: [ ]mild [ ]moderate [X ]severe [ ]underweight [ ]morbid obesity    Height (cm): 157.5 (08-26-22 @ 07:10)  Weight (kg): 71.1 (08-26-22 @ 07:11)  BMI (kg/m2): 28.7 (08-26-22 @ 07:11)    [ ]PPSV2 < or = 30%  [X ]significant weight loss [ ]poor nutritional intake [ ]anasarca[X ]Artificial Nutrition - J tube     Other REFERRALS:  [ ]Hospice  [ ]Child Life  [ ]Social Work  [ ]Case management [ ]Holistic Therapy

## 2022-10-05 NOTE — PROGRESS NOTE ADULT - PROBLEM SELECTOR PLAN 2
Hematemesis earlier in hospitalization, now resolved   - patient DNR/DNI, prioritizing comfort   - standing iv zofran  - Ativan 0.5 ordered for breakthrough nausea  - ppi

## 2023-05-15 NOTE — PROGRESS NOTE ADULT - SUBJECTIVE AND OBJECTIVE BOX
Subjective:   Patient seen at bedside this AM. Pt had some small volume emesis overnight. Not currently with nausea, but still having abdominal pain.    Objective:  Vital Signs  T(C): 36.8 (09-04 @ 06:13), Max: 36.8 (09-04 @ 06:13)  HR: 91 (09-04 @ 06:30) (82 - 107)  BP: 115/70 (09-04 @ 06:30) (107/68 - 135/87)  RR: 18 (09-04 @ 06:30) (16 - 18)  SpO2: 94% (09-04 @ 06:30) (94% - 99%)  09-03-22 @ 07:01  -  09-04-22 @ 07:00  --------------------------------------------------------  IN:  Total IN: 0 mL    OUT:    Voided (mL): 590 mL  Total OUT: 590 mL    Total NET: -590 mL        Physical Exam:  GEN: resting in bed comfortably in NAD  RESP: no increased WOB  ABD: soft, moderately distended, TTP in epigastrium. No leakage noted around J-tube or kinking of tube  EXTR: warm, well-perfused, no edema  NEURO: awake, alert      Labs:        CAPILLARY BLOOD GLUCOSE          Imaging:     Winlevi Counseling:  I discussed with the patient the risks of topical clascoterone including but not limited to erythema, scaling, itching, and stinging. Patient voiced their understanding.

## 2023-06-27 NOTE — ED ADULT NURSE NOTE - SUICIDE SCREENING QUESTION 2
Body Location Override (Optional - Billing Will Still Be Based On Selected Body Map Location If Applicable): right nasal tip Detail Level: Detailed Depth Of Biopsy: dermis Was A Bandage Applied: Yes Size Of Lesion In Cm: 0.6 X Size Of Lesion In Cm: 0 Biopsy Type: H and E Biopsy Method: 15 blade Anesthesia Type: 1% lidocaine with epinephrine Anesthesia Volume In Cc (Will Not Render If 0): 0.5 Hemostasis: Electrocautery Wound Care: Bacitracin Dressing: bandage Destruction After The Procedure: No Type Of Destruction Used: Curettage Curettage Text: The wound bed was treated with curettage after the biopsy was performed. Cryotherapy Text: The wound bed was treated with cryotherapy after the biopsy was performed. Electrodesiccation Text: The wound bed was treated with electrodesiccation after the biopsy was performed. Electrodesiccation And Curettage Text: The wound bed was treated with electrodesiccation and curettage after the biopsy was performed. Silver Nitrate Text: The wound bed was treated with silver nitrate after the biopsy was performed. Lab: [facility] Lab Facility: [address] [place] [address] Path Notes (To The Dermatopathologist): Size: .6\\nR/O:  SCC vs AK Consent: Written consent was obtained and risks were reviewed including but not limited to scarring, infection, bleeding, scabbing, incomplete removal, nerve damage and allergy to anesthesia. Post-Care Instructions: I reviewed with the patient in detail post-care instructions. Patient is to keep the biopsy site dry overnight, and then apply bacitracin twice daily until healed. Patient may apply hydrogen peroxide soaks to remove any crusting. Notification Instructions: Patient will be notified of biopsy results. However, patient instructed to call the office if not contacted within 2 weeks. Billing Type: [employer] Information: Selecting Yes will display possible errors in your note based on the variables you have selected. This validation is only offered as a suggestion for you. PLEASE NOTE THAT THE VALIDATION TEXT WILL BE REMOVED WHEN YOU FINALIZE YOUR NOTE. IF YOU WANT TO FAX A PRELIMINARY NOTE YOU WILL NEED TO TOGGLE THIS TO 'NO' IF YOU DO NOT WANT IT IN YOUR FAXED NOTE. Body Location Override (Optional - Billing Will Still Be Based On Selected Body Map Location If Applicable): right posterior shoulder Lab: [id number] Lab Facility: 90 Path Notes (To The Dermatopathologist): Size: .6\\nR/O:  [facility] Billing Type: Third-Party Bill No

## 2024-02-24 NOTE — PROGRESS NOTE ADULT - PROBLEM SELECTOR PLAN 4
Cr upon admission 3.13 down to 2.14 on subsequent labs,  DEVI likely 2/2 volume depletion from poor PO intake w/ intractable nausea/vomiting.   - 8/17 CR 0.6 wnl  - Continue IV fluids  - Monitor BMP daily  - Avoid NSAIDs, nephrotoxins Female

## 2024-04-04 NOTE — PROGRESS NOTE ADULT - PROBLEM SELECTOR PLAN 5
Provider E-Visit time total (minutes): 12   DVT ppx: lovenox      Dispo planning: complex dispo planning due to lack of insurance  Emergency medicaid can cover outpatient chemo/transport. Impression: Stable anemia. May be combination of Iron deficiency anemia from poor PO intake and normocytic anemia from Anemia of Chronic Disease w/ this presentation c/f gastric malignancy.  - Total Iron 22, TIBC 165, transferrin 149  H/H stable    continue to monitor with CBCs

## 2024-07-01 NOTE — DISCHARGE NOTE PROVIDER - NSDCCPGOAL_GEN_ALL_CORE_FT
Last seen 5/28/2024  Next appt 11/19/2024    Requested Prescriptions     Pending Prescriptions Disp Refills    amLODIPine (NORVASC) 10 MG tablet [Pharmacy Med Name: amLODIPine Besylate 10 MG Oral Tablet] 30 tablet 0     Sig: Take 1 tablet by mouth once daily       To get better and follow your care plan as instructed.

## 2025-01-22 NOTE — PRE-OP CHECKLIST - ALLERGIES REVIEWED
done
patient aox3, breathing even and unlabored, in NAD, patient agreed to stay in hospital and be admitted to [facility]. upon initial assessment, full vanco infusion had finished from night shift RN odalis acosta. as per pharmacist, it was noted to be held for perimeters due to no weight being documented. infusing complete, ems here for transfer to [facility].
done

## (undated) DEVICE — MASK SURGICAL WITH EYESHIELD ANTIFOG (ORANGE)

## (undated) DEVICE — POSITIONER STRAP ARMBOARD VELCRO TS-30

## (undated) DEVICE — DRSG CURITY GAUZE SPONGE 4 X 4" 12-PLY NON-STERILE

## (undated) DEVICE — CONTAINER FORMALIN 80ML YELLOW

## (undated) DEVICE — PROTECTOR HEEL / ELBOW FLUFFY

## (undated) DEVICE — XI ARM FORCEP FENESTRATED BIPOLAR 8MM

## (undated) DEVICE — DRSG 2X2

## (undated) DEVICE — SUT SILK 3-0 30" SH

## (undated) DEVICE — TROCAR APPLIED MEDICAL KII BALLOON BLUNT TIP 12MM X 100MM

## (undated) DEVICE — XI ENDOWRIST 12 - 8 MM CANNULA REDUCER

## (undated) DEVICE — FACESHIELD FULL VISOR

## (undated) DEVICE — NDL ENDOBRONCHIAL ULTRASOUND FINE BIOPSY DEVICE 22GA

## (undated) DEVICE — BIOPSY FORCEP RADIAL JAW 4 STANDARD WITH NEEDLE

## (undated) DEVICE — TUBING IV SET GRAVITY 3Y 100" MACRO

## (undated) DEVICE — WARMING BLANKET FULL ADULT

## (undated) DEVICE — VALVE SUCTION EVIS 160/200/240

## (undated) DEVICE — UNDERPAD LINEN SAVER 17 X 24"

## (undated) DEVICE — XI OBTURATOR OPTICAL BLADELESS 8MM

## (undated) DEVICE — DRSG BANDAID 0.75X3"

## (undated) DEVICE — SUT VICRYL 0 27" UR-6

## (undated) DEVICE — BRUSH CYTO DISP

## (undated) DEVICE — DRAPE IOBAN 23" X 23"

## (undated) DEVICE — SYS AQUASHIELD CO2

## (undated) DEVICE — SYR LUER SLIP TIP 30CC

## (undated) DEVICE — LOK DVC RX AND BIOPSY

## (undated) DEVICE — POSITIONER FOAM HEAD CRADLE (PINK)

## (undated) DEVICE — PACK ROBOTIC LIJ

## (undated) DEVICE — STAPLER ECHELON FLEX POWERED ADV PLCMT TIP

## (undated) DEVICE — SOL INJ NS 0.9% 100ML

## (undated) DEVICE — XI ARM GRASPER TIP UP FENESTRATED

## (undated) DEVICE — ORGANIZER MIO MEDICAL DEVICE DISP

## (undated) DEVICE — INJ SYS RAP REFIL

## (undated) DEVICE — XI SEAL UNIV 5- 8 MM

## (undated) DEVICE — STOPCOCK 4-WAY (BLUE) DISCOFIX SPIN-LOCK CONNECTOR

## (undated) DEVICE — SUT MONOCRYL 4-0 27" PS-2 UNDYED

## (undated) DEVICE — SOL INJ NS 0.9% 500ML 1-PORT

## (undated) DEVICE — XI DRAPE ARM

## (undated) DEVICE — TUBING SUCTION NONCONDUCTIVE 6MM X 12FT

## (undated) DEVICE — SUT SILK 0 24" SH DA

## (undated) DEVICE — SYR LUER LOK 20CC

## (undated) DEVICE — XI ARM NEEDLE DRIVER SUTURECUT MEGA 8MM

## (undated) DEVICE — SYR LUER LOK 10CC

## (undated) DEVICE — SUT VLOC 180 0 6" GS-21 GREEN

## (undated) DEVICE — SUTURE REMOVAL KIT

## (undated) DEVICE — XI ARM CLIP APPLIER MEDIUM-LARGE

## (undated) DEVICE — STERIS DEFENDO 3-PIECE KIT (AIR/WATER, SUCTION & BIOPSY VALVES)

## (undated) DEVICE — BALLOON US ENDO

## (undated) DEVICE — LINE BREATHE SAMPLNG

## (undated) DEVICE — OMNIPAQUE 300  30ML

## (undated) DEVICE — FORCEP BIOPSY BRONCHOSCOPE DISP

## (undated) DEVICE — LUBRICATING JELLY HR ONE SHOT 3G

## (undated) DEVICE — DRSG CURITY GAUZE SPONGE 4 X 4" 12-PLY

## (undated) DEVICE — XI DRAPE COLUMN

## (undated) DEVICE — BASIN EMESIS 10IN GRADUATED MAUVE

## (undated) DEVICE — SALIVA EJECTOR (BLUE)

## (undated) DEVICE — NDL ASPIRATION VIZISHOT2 22G

## (undated) DEVICE — XI ARM SCISSOR MONO CURVED

## (undated) DEVICE — DENTURE CUP PINK

## (undated) DEVICE — BIOPSY FORCEP COLD DISP

## (undated) DEVICE — TIP METZENBAUM SCISSOR MONOPOLAR ENDOCUT (ORANGE)

## (undated) DEVICE — CATH IV SAFE BC 22G X 1" (BLUE)

## (undated) DEVICE — ELCTR ECG CONDUCTIVE ADHESIVE

## (undated) DEVICE — TUBING MEDI-VAC W MAXIGRIP CONNECTORS 1/4"X6'

## (undated) DEVICE — POSITIONER PINK PAD PIGAZZI SYSTEM

## (undated) DEVICE — SUT SILK 2-0 30" SH

## (undated) DEVICE — XI ARM DISSECTOR CURVED BIPOLAR 8MM

## (undated) DEVICE — CLAMP BX HOT RAD JAW 3

## (undated) DEVICE — XI NEEDLE DRIVER LARGE

## (undated) DEVICE — GLV 7 PROTEXIS (WHITE)

## (undated) DEVICE — CONTAINER FORMALIN 10% 60ML

## (undated) DEVICE — POSITIONER PURPLE ARM ONE STEP (LARGE)

## (undated) DEVICE — NDL HYPO REGULAR BEVEL 25G X 1.5" (BLUE)

## (undated) DEVICE — VENODYNE/SCD SLEEVE CALF MEDIUM

## (undated) DEVICE — SENSOR O2 FINGER ADULT

## (undated) DEVICE — WARMING BLANKET LOWER ADULT

## (undated) DEVICE — NDL HYPO SAFE 22G X 1" (BLACK)

## (undated) DEVICE — GOWN LG

## (undated) DEVICE — BALLOON SINGLE FOR BF-UC160F

## (undated) DEVICE — TUBING AIRSEAL TRI-LUMEN FILTERED

## (undated) DEVICE — XI ARM CLIP APPLIER LARGE

## (undated) DEVICE — PACK IV START WITH CHG

## (undated) DEVICE — SYR LUER LOK 3CC

## (undated) DEVICE — DRAPE TOWEL BLUE 17" X 24"

## (undated) DEVICE — Device

## (undated) DEVICE — XI 12MM AND STAPLER CANNULA SEAL

## (undated) DEVICE — ADAPTER FIBEROPTIC BRONCHOSCOPE DUAL AXIS SWIVEL

## (undated) DEVICE — BITE BLOCK ADULT 20 X 27MM (GREEN)

## (undated) DEVICE — SOL IRR POUR NS 0.9% 500ML

## (undated) DEVICE — VALVE BIOPSY BRONCHOVIDEOSCOPE

## (undated) DEVICE — TROCAR SURGIQUEST AIRSEAL 5MM X 100MM

## (undated) DEVICE — TRAP SPECIMEN SPUTUM 40CC

## (undated) DEVICE — PACK BRONCHOSCOPY

## (undated) DEVICE — BAG URINE W METER 2L

## (undated) DEVICE — DRSG TAPE TRANSPORE 1"